# Patient Record
Sex: FEMALE | Race: WHITE | NOT HISPANIC OR LATINO | Employment: PART TIME | URBAN - METROPOLITAN AREA
[De-identification: names, ages, dates, MRNs, and addresses within clinical notes are randomized per-mention and may not be internally consistent; named-entity substitution may affect disease eponyms.]

---

## 2017-01-03 ENCOUNTER — OFFICE VISIT (OUTPATIENT)
Dept: FAMILY MEDICINE | Facility: CLINIC | Age: 47
End: 2017-01-03
Payer: MEDICARE

## 2017-01-03 VITALS
RESPIRATION RATE: 16 BRPM | SYSTOLIC BLOOD PRESSURE: 150 MMHG | TEMPERATURE: 98 F | WEIGHT: 186.75 LBS | OXYGEN SATURATION: 98 % | DIASTOLIC BLOOD PRESSURE: 96 MMHG | HEIGHT: 62 IN | BODY MASS INDEX: 34.37 KG/M2 | HEART RATE: 61 BPM

## 2017-01-03 DIAGNOSIS — J06.9 UPPER RESPIRATORY TRACT INFECTION, UNSPECIFIED TYPE: ICD-10-CM

## 2017-01-03 DIAGNOSIS — I10 ESSENTIAL HYPERTENSION: ICD-10-CM

## 2017-01-03 DIAGNOSIS — Z23 NEED FOR PROPHYLACTIC VACCINATION AND INOCULATION AGAINST INFLUENZA: ICD-10-CM

## 2017-01-03 DIAGNOSIS — G43.711 CHRONIC MIGRAINE WITHOUT AURA, WITH INTRACTABLE MIGRAINE, SO STATED, WITH STATUS MIGRAINOSUS: Primary | ICD-10-CM

## 2017-01-03 PROCEDURE — 96372 THER/PROPH/DIAG INJ SC/IM: CPT | Mod: 59,S$GLB,, | Performed by: PHYSICIAN ASSISTANT

## 2017-01-03 PROCEDURE — 90688 IIV4 VACCINE SPLT 0.5 ML IM: CPT | Mod: S$GLB,,, | Performed by: FAMILY MEDICINE

## 2017-01-03 PROCEDURE — 99999 PR PBB SHADOW E&M-EST. PATIENT-LVL IV: CPT | Mod: PBBFAC,,, | Performed by: PHYSICIAN ASSISTANT

## 2017-01-03 PROCEDURE — 99214 OFFICE O/P EST MOD 30 MIN: CPT | Mod: 25,S$GLB,, | Performed by: PHYSICIAN ASSISTANT

## 2017-01-03 PROCEDURE — 99499 UNLISTED E&M SERVICE: CPT | Mod: S$PBB,,, | Performed by: PHYSICIAN ASSISTANT

## 2017-01-03 PROCEDURE — G0008 ADMIN INFLUENZA VIRUS VAC: HCPCS | Mod: S$GLB,,, | Performed by: FAMILY MEDICINE

## 2017-01-03 PROCEDURE — 3077F SYST BP >= 140 MM HG: CPT | Mod: S$GLB,,, | Performed by: PHYSICIAN ASSISTANT

## 2017-01-03 PROCEDURE — 3080F DIAST BP >= 90 MM HG: CPT | Mod: S$GLB,,, | Performed by: PHYSICIAN ASSISTANT

## 2017-01-03 PROCEDURE — 1159F MED LIST DOCD IN RCRD: CPT | Mod: S$GLB,,, | Performed by: PHYSICIAN ASSISTANT

## 2017-01-03 RX ORDER — KETOROLAC TROMETHAMINE 30 MG/ML
60 INJECTION, SOLUTION INTRAMUSCULAR; INTRAVENOUS
Status: COMPLETED | OUTPATIENT
Start: 2017-01-03 | End: 2017-01-03

## 2017-01-03 RX ADMIN — KETOROLAC TROMETHAMINE 60 MG: 30 INJECTION, SOLUTION INTRAMUSCULAR; INTRAVENOUS at 10:01

## 2017-01-03 NOTE — MR AVS SNAPSHOT
[facility]  [address]  [address]  [place] [address]  Phone: [phone]  Fax: [phone]                  Giuliana Rodas   1/3/2017 9:00 AM   Office Visit    Description:  Female : 1970   Provider:  DAISY Arreguin   Department:  [facility]           Reason for Visit     URI           Diagnoses this Visit        Comments    Chronic migraine without aura, with intractable migraine, so stated, with status migrainosus    -  Primary     Essential hypertension         Upper respiratory tract infection, unspecified type                To Do List           Future Appointments        Provider Department Dept Phone    2017 9:00 AM DAISY Arreguin [facility] [phone]    2017 8:20 AM Amrik Santacruz MD [facility] Neurology [phone]      Goals (5 Years of Data)     None      [facility] On [facility]     [facility] On Call Nurse Care Line -  Assistance  Registered nurses in the [facility] On Call Center provide clinical advisement, health education, appointment booking, and other advisory services.  Call for this free service at [phone].             Medications           Message regarding Medications     Verify the changes and/or additions to your medication regime listed below are the same as discussed with your clinician today.  If any of these changes or additions are incorrect, please notify your healthcare provider.        These medications were administered today        Dose Freq    ketorolac injection 60 mg 60 mg Clinic/HOD 1 time    Sig: Inject 2 mLs (60 mg total) into the muscle one time.    Class: Normal    Route: Intramuscular    Cosign for Ordering: Required by Frank Auguste MD      STOP taking these medications     co-enzyme Q-10 30 mg capsule Take 30 mg by mouth once daily.           Verify that the below list of medications is an accurate representation of the medications you are currently taking.  If none reported,  "the list may be blank. If incorrect, please contact your healthcare provider. Carry this list with you in case of emergency.           Current Medications     amitriptyline (ELAVIL) 75 MG tablet Take 1 tablet (75 mg total) by mouth every evening.    amlodipine (NORVASC) 5 MG tablet TAKE ONE TABLET BY MOUTH EVERY DAY    atenolol (TENORMIN) 50 MG tablet TAKE ONE TABLET BY MOUTH DAILY    BIFIDOBACTERIUM INFANTIS (ALIGN ORAL) Take 1 tablet by mouth once daily.    cetirizine (ZYRTEC) 10 MG tablet Take 10 mg by mouth once daily.    fluticasone (FLONASE) 50 mcg/actuation nasal spray ONE SPRAY IN EACH NOSTRIL DAILY    hydrocodone-acetaminophen 7.5-325mg (NORCO) 7.5-325 mg per tablet Take 1 tablet by mouth every 4 to 6 hours as needed.    JUNEL FE 1/20, 28, 1 mg-20 mcg (21)/75 mg (7) per tablet TAKE ONE TABLET BY MOUTH DAILY *21 DAYS    lidocaine-prilocaine (EMLA) cream     methocarbamol (ROBAXIN) 500 MG Tab Take 1 tablet (500 mg total) by mouth every 8 (eight) hours as needed.    multivitamin capsule Take 1 capsule by mouth once daily.    promethazine (PHENERGAN) 25 MG tablet Take 1 tablet (25 mg total) by mouth every 6 (six) hours as needed for Nausea.    tramadol (ULTRAM) 50 mg tablet Take 1 tablet (50 mg total) by mouth every 8 (eight) hours as needed for Pain.    diclofenac (VOLTAREN) 50 MG EC tablet TAKE ONE TABLET BY MOUTH TWICE DAILY WITH FOOD *DO NOT TAKE WITH ASPIRIN* ** NO ALCOHOL **    magnesium oxide (MAG-OX) 400 mg tablet Take 400 mg by mouth 2 (two) times daily.            Clinical Reference Information           Vital Signs - Last Recorded  Most recent update: 1/3/2017  9:01 AM by Sera Lopez MA    BP Pulse Temp Resp Ht Wt    (!) 150/96 61 98.2 °F (36.8 °C) (Oral) 16 5' 2" (1.575 m) 84.7 kg (186 lb 11.7 oz)    SpO2 BMI             98% 34.15 kg/m2         Blood Pressure          Most Recent Value    BP  (!)  150/96      Allergies as of 1/3/2017     Depo-provera [Medroxyprogesterone]    Dicyclomine    Prozac " [Fluoxetine]      Immunizations Administered on Date of Encounter - 1/3/2017     None      Instructions    Switch zyrtec to Claritin. She may additionally take benadryl at night if she still struggles the drip.   Hot tea with honey and lemon  Salt water gargles.   Call for fever 100.4 or higher, change in discharge color, no improvement in 7-10 days.

## 2017-01-03 NOTE — PROGRESS NOTES
"Subjective:       Patient ID: Giuliana Rodas is a 46 y.o. female with multiple medical diagnoses as listed in the medical history and problem list that presents for URI  .    Chief Complaint: URI      URI    This is a new problem. The current episode started yesterday (7 pm last night ). Associated symptoms include congestion, ear pain (left ), headaches, nausea, neck pain and a sore throat. Pertinent negatives include no abdominal pain, coughing, diarrhea, rhinorrhea, sneezing, vomiting or wheezing. Treatments tried: nyquil last night; zyrtec today and tramadol  The treatment provided mild relief.     Review of Systems   Constitutional: Negative for chills and fever (99.8 at highest).   HENT: Positive for congestion, ear pain (left ), postnasal drip, sinus pressure and sore throat. Negative for rhinorrhea and sneezing.    Eyes: Positive for photophobia and pain. Negative for discharge and itching.        Eyes "burning"   Respiratory: Positive for shortness of breath. Negative for cough, chest tightness and wheezing.    Gastrointestinal: Positive for nausea. Negative for abdominal pain, constipation, diarrhea and vomiting.   Musculoskeletal: Positive for myalgias (both arms ) and neck pain. Negative for neck stiffness.   Neurological: Positive for headaches.         PAST MEDICAL HISTORY:  Past Medical History   Diagnosis Date    Bile reflux gastritis     Eczema     Fibromyalgia     Gastroparesis      dr. harden    GERD (gastroesophageal reflux disease)     Hyperglyceridemia     Hyperlipidemia     Hypertension     IC (interstitial cystitis)      dr. labadie    Psoriasis     Tension headache        SOCIAL HISTORY:  Social History     Social History    Marital status:      Spouse name: N/A    Number of children: 2    Years of education: N/A     Occupational History     [employer]     Social History Main Topics    Smoking status: Never Smoker    Smokeless tobacco: Not on file    " Alcohol use No    Drug use: No    Sexual activity: Yes     Partners: Male     Other Topics Concern    Not on file     Social History Narrative    Lives at home with  and daughter       ALLERGIES AND MEDICATIONS: updated and reviewed.  Review of patient's allergies indicates:   Allergen Reactions    Depo-provera [medroxyprogesterone] Anxiety    Dicyclomine Rash     Swelling of lip      Prozac [fluoxetine] Anxiety     Current Outpatient Prescriptions   Medication Sig Dispense Refill    amitriptyline (ELAVIL) 75 MG tablet Take 1 tablet (75 mg total) by mouth every evening. 30 tablet 5    amlodipine (NORVASC) 5 MG tablet TAKE ONE TABLET BY MOUTH EVERY DAY 90 tablet 0    atenolol (TENORMIN) 50 MG tablet TAKE ONE TABLET BY MOUTH DAILY 90 tablet 0    BIFIDOBACTERIUM INFANTIS (ALIGN ORAL) Take 1 tablet by mouth once daily.      cetirizine (ZYRTEC) 10 MG tablet Take 10 mg by mouth once daily.      fluticasone (FLONASE) 50 mcg/actuation nasal spray ONE SPRAY IN EACH NOSTRIL DAILY 16 g 5    hydrocodone-acetaminophen 7.5-325mg (NORCO) 7.5-325 mg per tablet Take 1 tablet by mouth every 4 to 6 hours as needed.  0    JUNEL FE 1/20, 28, 1 mg-20 mcg (21)/75 mg (7) per tablet TAKE ONE TABLET BY MOUTH DAILY *21 DAYS 28 tablet 6    lidocaine-prilocaine (EMLA) cream       methocarbamol (ROBAXIN) 500 MG Tab Take 1 tablet (500 mg total) by mouth every 8 (eight) hours as needed. 90 tablet 2    multivitamin capsule Take 1 capsule by mouth once daily.      promethazine (PHENERGAN) 25 MG tablet Take 1 tablet (25 mg total) by mouth every 6 (six) hours as needed for Nausea. 12 tablet 0    tramadol (ULTRAM) 50 mg tablet Take 1 tablet (50 mg total) by mouth every 8 (eight) hours as needed for Pain. 40 tablet 0    diclofenac (VOLTAREN) 50 MG EC tablet TAKE ONE TABLET BY MOUTH TWICE DAILY WITH FOOD *DO NOT TAKE WITH ASPIRIN* ** NO ALCOHOL ** 60 tablet 2    magnesium oxide (MAG-OX) 400 mg tablet Take 400 mg by mouth 2  "(two) times daily.        Current Facility-Administered Medications   Medication Dose Route Frequency Provider Last Rate Last Dose    ketorolac injection 60 mg  60 mg Intramuscular 1 time in Clinic/HOD DAISY Arreguin             Objective:     Visit Vitals    BP (!) 150/96    Pulse 61    Temp 98.2 °F (36.8 °C) (Oral)    Resp 16    Ht 5' 2" (1.575 m)    Wt 84.7 kg (186 lb 11.7 oz)    SpO2 98%    BMI 34.15 kg/m2        Physical Exam   Constitutional: She is oriented to person, place, and time.   Shades on    HENT:   Head: Normocephalic and atraumatic.   Right Ear: External ear and ear canal normal.   Left Ear: External ear and ear canal normal.   Nose: Mucosal edema present. No rhinorrhea. Right sinus exhibits no maxillary sinus tenderness and no frontal sinus tenderness. Left sinus exhibits no maxillary sinus tenderness and no frontal sinus tenderness.   Mouth/Throat: Uvula is midline and mucous membranes are normal. Posterior oropharyngeal erythema (PND) present. No oropharyngeal exudate.   Air fluid levels    Eyes: Conjunctivae and EOM are normal.   Cardiovascular: Normal rate and regular rhythm.    Pulmonary/Chest: Effort normal and breath sounds normal. She has no wheezes.   Musculoskeletal: Normal range of motion.   Lymphadenopathy:     She has no cervical adenopathy.   Neurological: She is alert and oriented to person, place, and time.   Skin: Skin is warm. No erythema.           Assessment:       1. Chronic migraine without aura, with intractable migraine, so stated, with status migrainosus    2. Essential hypertension    3. Upper respiratory tract infection, unspecified type        Plan:       Chronic migraine without aura, with intractable migraine, so stated, with status migrainosus  -     ketorolac injection 60 mg; Inject 2 mLs (60 mg total) into the muscle one time.    Essential hypertension  She forgot to take her BP medication yesterday in the am so took it last night when her pressure was " high.   It has come down since but still high today. She took Nyquil last night.  She is to follow up in 2 weeks to recheck her BP on this this regiment.     Upper respiratory tract infection, unspecified type  Switch zyrtec to Claritin. She may additionally take benadryl at night if she still struggles the drip.   Hot tea with honey and lemon  Salt water gargles.   Call for fever 100.4 or higher, change in discharge color, no improvement in 7-10 days.             No Follow-up on file.

## 2017-01-03 NOTE — PATIENT INSTRUCTIONS
Switch zyrtec to Claritin. She may additionally take benadryl at night if she still struggles the drip.   Hot tea with honey and lemon  Salt water gargles.   Call for fever 100.4 or higher, change in discharge color, no improvement in 7-10 days.

## 2017-01-10 ENCOUNTER — OFFICE VISIT (OUTPATIENT)
Dept: FAMILY MEDICINE | Facility: CLINIC | Age: 47
End: 2017-01-10
Payer: MEDICARE

## 2017-01-10 VITALS
TEMPERATURE: 99 F | OXYGEN SATURATION: 97 % | WEIGHT: 185.19 LBS | HEART RATE: 76 BPM | RESPIRATION RATE: 16 BRPM | HEIGHT: 62 IN | DIASTOLIC BLOOD PRESSURE: 70 MMHG | BODY MASS INDEX: 34.08 KG/M2 | SYSTOLIC BLOOD PRESSURE: 130 MMHG

## 2017-01-10 DIAGNOSIS — J01.90 ACUTE SINUSITIS WITH SYMPTOMS GREATER THAN 10 DAYS: Primary | ICD-10-CM

## 2017-01-10 DIAGNOSIS — T14.8XXA MUSCLE STRAIN: ICD-10-CM

## 2017-01-10 PROCEDURE — 99999 PR PBB SHADOW E&M-EST. PATIENT-LVL IV: CPT | Mod: PBBFAC,,, | Performed by: PHYSICIAN ASSISTANT

## 2017-01-10 PROCEDURE — 3075F SYST BP GE 130 - 139MM HG: CPT | Mod: S$GLB,,, | Performed by: PHYSICIAN ASSISTANT

## 2017-01-10 PROCEDURE — 99214 OFFICE O/P EST MOD 30 MIN: CPT | Mod: S$GLB,,, | Performed by: PHYSICIAN ASSISTANT

## 2017-01-10 PROCEDURE — 3078F DIAST BP <80 MM HG: CPT | Mod: S$GLB,,, | Performed by: PHYSICIAN ASSISTANT

## 2017-01-10 PROCEDURE — 1159F MED LIST DOCD IN RCRD: CPT | Mod: S$GLB,,, | Performed by: PHYSICIAN ASSISTANT

## 2017-01-10 RX ORDER — BETHANECHOL CHLORIDE 25 MG/1
TABLET ORAL
Refills: 0 | COMMUNITY
Start: 2016-12-01 | End: 2017-05-16

## 2017-01-10 RX ORDER — PANTOPRAZOLE SODIUM 40 MG/1
40 TABLET, DELAYED RELEASE ORAL DAILY
Refills: 6 | COMMUNITY
Start: 2017-01-06 | End: 2018-05-30 | Stop reason: SDUPTHER

## 2017-01-10 RX ORDER — LORATADINE 10 MG/1
10 TABLET ORAL DAILY
COMMUNITY
End: 2017-12-19

## 2017-01-10 RX ORDER — AMOXICILLIN 500 MG/1
500 TABLET, FILM COATED ORAL EVERY 12 HOURS
Qty: 20 TABLET | Refills: 0 | Status: SHIPPED | OUTPATIENT
Start: 2017-01-10 | End: 2017-01-20

## 2017-01-10 RX ORDER — SUCRALFATE 1 G/10ML
SUSPENSION ORAL
Refills: 0 | COMMUNITY
Start: 2017-01-05 | End: 2018-05-30 | Stop reason: DRUGHIGH

## 2017-01-10 RX ORDER — BUTALBITAL, ACETAMINOPHEN AND CAFFEINE 50; 325; 40 MG/1; MG/1; MG/1
1 TABLET ORAL EVERY 6 HOURS PRN
Refills: 2 | COMMUNITY
Start: 2016-12-01 | End: 2017-02-16 | Stop reason: SDUPTHER

## 2017-01-10 NOTE — PROGRESS NOTES
Subjective:       Patient ID: Giuliana Rodas is a 46 y.o. female with multiple medical diagnoses as listed in the medical history and problem list that presents for URI (nose bleeds when blow) and Abdominal Pain (left side)  .    Chief Complaint: URI (nose bleeds when blow) and Abdominal Pain (left side)      Abdominal Pain   This is a new problem. The current episode started in the past 7 days (4 days ). The onset quality is sudden. The problem has been unchanged. The pain is located in the LUQ and left flank. The pain is at a severity of 2/10 (only with cough ). The quality of the pain is dull. The abdominal pain does not radiate. Associated symptoms include frequency, headaches, myalgias and nausea. Pertinent negatives include no belching, diarrhea, dysuria, fever, flatus, hematuria or vomiting. The pain is aggravated by coughing (walking; twisting, ). The pain is relieved by being still.   Sinus Problem   This is a new problem. The current episode started 1 to 4 weeks ago. The problem has been gradually worsening since onset. There has been no fever. Associated symptoms include congestion, coughing, ear pain, headaches, shortness of breath, sinus pressure, sneezing and a sore throat. Pertinent negatives include no chills. Treatments tried: claritin, salt water gargle.     Review of Systems   Constitutional: Negative for chills and fever.   HENT: Positive for congestion, ear pain, postnasal drip, sinus pressure, sneezing, sore throat and trouble swallowing. Negative for rhinorrhea.    Eyes: Negative for pain, discharge (watery) and itching.   Respiratory: Positive for cough and shortness of breath. Negative for chest tightness and wheezing.    Gastrointestinal: Positive for nausea. Negative for abdominal pain, diarrhea, flatus and vomiting.   Genitourinary: Positive for frequency. Negative for dysuria and hematuria.   Musculoskeletal: Positive for myalgias.   Neurological: Positive for weakness and headaches.          PAST MEDICAL HISTORY:  Past Medical History   Diagnosis Date    Bile reflux gastritis     Eczema     Fibromyalgia     Gastroparesis      dr. harden    GERD (gastroesophageal reflux disease)     Hyperglyceridemia     Hyperlipidemia     Hypertension     IC (interstitial cystitis)      dr. labadie    Psoriasis     Tension headache        SOCIAL HISTORY:  Social History     Social History    Marital status:      Spouse name: N/A    Number of children: 2    Years of education: N/A     Occupational History     [employer]     Social History Main Topics    Smoking status: Never Smoker    Smokeless tobacco: Not on file    Alcohol use No    Drug use: No    Sexual activity: Yes     Partners: Male     Other Topics Concern    Not on file     Social History Narrative    Lives at home with  and daughter       ALLERGIES AND MEDICATIONS: updated and reviewed.  Review of patient's allergies indicates:   Allergen Reactions    Depo-provera [medroxyprogesterone] Anxiety    Dicyclomine Rash     Swelling of lip      Prozac [fluoxetine] Anxiety     Current Outpatient Prescriptions   Medication Sig Dispense Refill    amitriptyline (ELAVIL) 75 MG tablet Take 1 tablet (75 mg total) by mouth every evening. 30 tablet 5    amlodipine (NORVASC) 5 MG tablet TAKE ONE TABLET BY MOUTH EVERY DAY 90 tablet 0    atenolol (TENORMIN) 50 MG tablet TAKE ONE TABLET BY MOUTH DAILY 90 tablet 0    bethanechol (URECHOLINE) 25 MG Tab TAKE ONE TABLET BY MOUTH FOUR TIMES DAILY ON AN EMPTY STOMACH ONE HOUR BEFORE OR 2 HOURS AFTER meal  0    BIFIDOBACTERIUM INFANTIS (ALIGN ORAL) Take 1 tablet by mouth once daily.      butalbital-acetaminophen-caffeine -40 mg (FIORICET, ESGIC) -40 mg per tablet Take 1 tablet by mouth every 6 (six) hours as needed.  2    diclofenac (VOLTAREN) 50 MG EC tablet TAKE ONE TABLET BY MOUTH TWICE DAILY WITH FOOD *DO NOT TAKE WITH ASPIRIN* ** NO ALCOHOL ** 60 tablet 2     "fluticasone (FLONASE) 50 mcg/actuation nasal spray ONE SPRAY IN EACH NOSTRIL DAILY 16 g 5    hydrocodone-acetaminophen 7.5-325mg (NORCO) 7.5-325 mg per tablet Take 1 tablet by mouth every 4 to 6 hours as needed.  0    JUNEL FE 1/20, 28, 1 mg-20 mcg (21)/75 mg (7) per tablet TAKE ONE TABLET BY MOUTH DAILY *21 DAYS 28 tablet 6    lidocaine-prilocaine (EMLA) cream       loratadine (CLARITIN) 10 mg tablet Take 10 mg by mouth once daily.      methocarbamol (ROBAXIN) 500 MG Tab Take 1 tablet (500 mg total) by mouth every 8 (eight) hours as needed. 90 tablet 2    pantoprazole (PROTONIX) 40 MG tablet Take 40 mg by mouth once daily.  6    promethazine (PHENERGAN) 25 MG tablet Take 1 tablet (25 mg total) by mouth every 6 (six) hours as needed for Nausea. 12 tablet 0    sucralfate (CARAFATE) 100 mg/mL suspension TAKE TWO TEASPOONSFUL BY MOUTH FOUR TIMES DAILY ON AN EMPTY STOMACH ONE HOUR BEFORE meals AND AT BEDTIME  0    tramadol (ULTRAM) 50 mg tablet Take 1 tablet (50 mg total) by mouth every 8 (eight) hours as needed for Pain. 40 tablet 0    amoxicillin (AMOXIL) 500 MG Tab Take 1 tablet (500 mg total) by mouth every 12 (twelve) hours. 20 tablet 0    cetirizine (ZYRTEC) 10 MG tablet Take 10 mg by mouth once daily.       No current facility-administered medications for this visit.          Objective:     Visit Vitals    /70    Pulse 76    Temp 98.5 °F (36.9 °C) (Oral)    Resp 16    Ht 5' 2" (1.575 m)    Wt 84 kg (185 lb 3 oz)    SpO2 97%    BMI 33.87 kg/m2        Physical Exam   Constitutional: She is oriented to person, place, and time. No distress.   HENT:   Head: Normocephalic and atraumatic.   Right Ear: External ear and ear canal normal. No middle ear effusion.   Left Ear: External ear and ear canal normal.  No middle ear effusion.   Nose: No mucosal edema or rhinorrhea. Right sinus exhibits no maxillary sinus tenderness and no frontal sinus tenderness. Left sinus exhibits no maxillary sinus " tenderness and no frontal sinus tenderness.   Mouth/Throat: Uvula is midline and mucous membranes are normal. No oropharyngeal exudate or posterior oropharyngeal erythema.   Air fluid levels    Eyes: Conjunctivae and EOM are normal.   Cardiovascular: Normal rate and regular rhythm.    Pulmonary/Chest: Effort normal and breath sounds normal. She has no wheezes.   Abdominal: Soft. Bowel sounds are normal. There is no tenderness. There is no CVA tenderness.   Musculoskeletal: Normal range of motion.        Back:    Lymphadenopathy:     She has no cervical adenopathy.   Neurological: She is alert and oriented to person, place, and time.   Skin: Skin is warm.           Assessment:       1. Acute sinusitis with symptoms greater than 10 days    2. Muscle strain        Plan:       Acute sinusitis with symptoms greater than 10 days  -     amoxicillin (AMOXIL) 500 MG Tab; Take 1 tablet (500 mg total) by mouth every 12 (twelve) hours.  Dispense: 20 tablet; Refill: 0  Continue anti-histamine.     Muscle strain  She has robaxin at home.   Heat.           No Follow-up on file.

## 2017-01-10 NOTE — MR AVS SNAPSHOT
[facility]  [address]  [address]  [place] [address]  Phone: [phone]  Fax: [phone]                  Giuliana Rodas   1/10/2017 2:00 PM   Office Visit    Description:  Female : 1970   Provider:  DAISY Arreguin   Department:  [facility]           Reason for Visit     URI     Abdominal Pain           Diagnoses this Visit        Comments    Acute sinusitis with symptoms greater than 10 days    -  Primary     Muscle strain                To Do List           Future Appointments        Provider Department Dept Phone    2017 9:00 AM DAISY Arreguin [facility] [phone]    2017 8:20 AM Amrik Santacruz MD [facility]rology [phone]      Goals (5 Years of Data)     None       These Medications        Disp Refills Start End    amoxicillin (AMOXIL) 500 MG Tab 20 tablet 0 1/10/2017 2017    Take 1 tablet (500 mg total) by mouth every 12 (twelve) hours. - Oral    Pharmacy: [facility] - [facility] [place], [place] - [address] Ph #: [phone]         [facility] On Call     [facility] On Call Nurse Care Line -  Assistance  Registered nurses in the [facility] On Call Center provide clinical advisement, health education, appointment booking, and other advisory services.  Call for this free service at [phone].             Medications           Message regarding Medications     Verify the changes and/or additions to your medication regime listed below are the same as discussed with your clinician today.  If any of these changes or additions are incorrect, please notify your healthcare provider.        START taking these NEW medications        Refills    amoxicillin (AMOXIL) 500 MG Tab 0    Sig: Take 1 tablet (500 mg total) by mouth every 12 (twelve) hours.    Class: Normal    Route: Oral      STOP taking these medications     magnesium oxide (MAG-OX) 400 mg tablet Take 400 mg by mouth 2 (two) times  daily.     multivitamin capsule Take 1 capsule by mouth once daily.           Verify that the below list of medications is an accurate representation of the medications you are currently taking.  If none reported, the list may be blank. If incorrect, please contact your healthcare provider. Carry this list with you in case of emergency.           Current Medications     amitriptyline (ELAVIL) 75 MG tablet Take 1 tablet (75 mg total) by mouth every evening.    amlodipine (NORVASC) 5 MG tablet TAKE ONE TABLET BY MOUTH EVERY DAY    atenolol (TENORMIN) 50 MG tablet TAKE ONE TABLET BY MOUTH DAILY    bethanechol (URECHOLINE) 25 MG Tab TAKE ONE TABLET BY MOUTH FOUR TIMES DAILY ON AN EMPTY STOMACH ONE HOUR BEFORE OR 2 HOURS AFTER meal    BIFIDOBACTERIUM INFANTIS (ALIGN ORAL) Take 1 tablet by mouth once daily.    butalbital-acetaminophen-caffeine -40 mg (FIORICET, ESGIC) -40 mg per tablet Take 1 tablet by mouth every 6 (six) hours as needed.    diclofenac (VOLTAREN) 50 MG EC tablet TAKE ONE TABLET BY MOUTH TWICE DAILY WITH FOOD *DO NOT TAKE WITH ASPIRIN* ** NO ALCOHOL **    fluticasone (FLONASE) 50 mcg/actuation nasal spray ONE SPRAY IN EACH NOSTRIL DAILY    hydrocodone-acetaminophen 7.5-325mg (NORCO) 7.5-325 mg per tablet Take 1 tablet by mouth every 4 to 6 hours as needed.    JUNEL FE 1/20, 28, 1 mg-20 mcg (21)/75 mg (7) per tablet TAKE ONE TABLET BY MOUTH DAILY *21 DAYS    lidocaine-prilocaine (EMLA) cream     loratadine (CLARITIN) 10 mg tablet Take 10 mg by mouth once daily.    methocarbamol (ROBAXIN) 500 MG Tab Take 1 tablet (500 mg total) by mouth every 8 (eight) hours as needed.    pantoprazole (PROTONIX) 40 MG tablet Take 40 mg by mouth once daily.    promethazine (PHENERGAN) 25 MG tablet Take 1 tablet (25 mg total) by mouth every 6 (six) hours as needed for Nausea.    sucralfate (CARAFATE) 100 mg/mL suspension TAKE TWO TEASPOONSFUL BY MOUTH FOUR TIMES DAILY ON AN EMPTY STOMACH ONE HOUR BEFORE meals AND  "AT BEDTIME    tramadol (ULTRAM) 50 mg tablet Take 1 tablet (50 mg total) by mouth every 8 (eight) hours as needed for Pain.    amoxicillin (AMOXIL) 500 MG Tab Take 1 tablet (500 mg total) by mouth every 12 (twelve) hours.    cetirizine (ZYRTEC) 10 MG tablet Take 10 mg by mouth once daily.           Clinical Reference Information           Vital Signs - Last Recorded  Most recent update: 1/10/2017  2:10 PM by Sera Lopez MA    BP Pulse Temp Resp Ht Wt    130/70 76 98.5 °F (36.9 °C) (Oral) 16 5' 2" (1.575 m) 84 kg (185 lb 3 oz)    SpO2 BMI             97% 33.87 kg/m2         Blood Pressure          Most Recent Value    BP  130/70      Allergies as of 1/10/2017     Depo-provera [Medroxyprogesterone]    Dicyclomine    Prozac [Fluoxetine]      Immunizations Administered on Date of Encounter - 1/10/2017     None      "

## 2017-01-27 ENCOUNTER — OFFICE VISIT (OUTPATIENT)
Dept: FAMILY MEDICINE | Facility: CLINIC | Age: 47
End: 2017-01-27
Payer: MEDICARE

## 2017-01-27 VITALS
OXYGEN SATURATION: 96 % | HEART RATE: 78 BPM | SYSTOLIC BLOOD PRESSURE: 130 MMHG | WEIGHT: 184.75 LBS | BODY MASS INDEX: 34 KG/M2 | RESPIRATION RATE: 20 BRPM | TEMPERATURE: 99 F | DIASTOLIC BLOOD PRESSURE: 90 MMHG | HEIGHT: 62 IN

## 2017-01-27 DIAGNOSIS — J02.9 VIRAL PHARYNGITIS: Primary | ICD-10-CM

## 2017-01-27 DIAGNOSIS — J02.9 SORE THROAT: ICD-10-CM

## 2017-01-27 PROCEDURE — 99214 OFFICE O/P EST MOD 30 MIN: CPT | Mod: S$GLB,,, | Performed by: PHYSICIAN ASSISTANT

## 2017-01-27 PROCEDURE — 1159F MED LIST DOCD IN RCRD: CPT | Mod: S$GLB,,, | Performed by: PHYSICIAN ASSISTANT

## 2017-01-27 PROCEDURE — 99999 PR PBB SHADOW E&M-EST. PATIENT-LVL V: CPT | Mod: PBBFAC,,, | Performed by: PHYSICIAN ASSISTANT

## 2017-01-27 PROCEDURE — 3075F SYST BP GE 130 - 139MM HG: CPT | Mod: S$GLB,,, | Performed by: PHYSICIAN ASSISTANT

## 2017-01-27 PROCEDURE — 87081 CULTURE SCREEN ONLY: CPT

## 2017-01-27 PROCEDURE — 3080F DIAST BP >= 90 MM HG: CPT | Mod: S$GLB,,, | Performed by: PHYSICIAN ASSISTANT

## 2017-01-27 RX ORDER — PREDNISONE 20 MG/1
TABLET ORAL
Qty: 9 TABLET | Refills: 0 | Status: SHIPPED | OUTPATIENT
Start: 2017-01-27 | End: 2017-02-16 | Stop reason: ALTCHOICE

## 2017-01-27 NOTE — PROGRESS NOTES
Subjective:       Patient ID: Giuliana Rodas is a 46 y.o. female with multiple medical diagnoses as listed in the medical history and problem list that presents for Sore Throat (x 1 day)  .    Chief Complaint: Sore Throat (x 1 day)      Sore Throat    This is a new problem. The current episode started yesterday. The problem has been gradually worsening. The pain is worse on the left side. There has been no fever. The pain is at a severity of 8/10. The pain is moderate. Associated symptoms include coughing (mild ), headaches and trouble swallowing. Pertinent negatives include no abdominal pain, congestion, diarrhea, drooling, ear discharge, ear pain (left side ), hoarse voice, plugged ear sensation or vomiting. She has had no exposure to mono.        Review of Systems   Constitutional: Negative for chills, fatigue and fever.   HENT: Positive for postnasal drip, sore throat and trouble swallowing. Negative for congestion, drooling, ear discharge, ear pain (left side ), hoarse voice, rhinorrhea, sinus pressure and sneezing.         Feels dry    Eyes: Negative for pain, discharge and itching.   Respiratory: Positive for cough (mild ).    Gastrointestinal: Negative for abdominal pain, blood in stool, diarrhea and vomiting.   Musculoskeletal: Positive for myalgias.   Skin: Negative for rash.   Neurological: Positive for headaches.         PAST MEDICAL HISTORY:  Past Medical History   Diagnosis Date    Bile reflux gastritis     Eczema     Fibromyalgia     Gastroparesis      dr. harden    GERD (gastroesophageal reflux disease)     Hyperglyceridemia     Hyperlipidemia     Hypertension     IC (interstitial cystitis)      dr. labadie    Psoriasis     Tension headache        SOCIAL HISTORY:  Social History     Social History    Marital status:      Spouse name: N/A    Number of children: 2    Years of education: N/A     Occupational History     [employer]     Social History Main Topics     Smoking status: Never Smoker    Smokeless tobacco: Not on file    Alcohol use No    Drug use: No    Sexual activity: Yes     Partners: Male     Other Topics Concern    Not on file     Social History Narrative    Lives at home with  and daughter       ALLERGIES AND MEDICATIONS: updated and reviewed.  Review of patient's allergies indicates:   Allergen Reactions    Depo-provera [medroxyprogesterone] Anxiety    Dicyclomine Rash     Swelling of lip      Prozac [fluoxetine] Anxiety     Current Outpatient Prescriptions   Medication Sig Dispense Refill    amitriptyline (ELAVIL) 75 MG tablet Take 1 tablet (75 mg total) by mouth every evening. 30 tablet 5    amlodipine (NORVASC) 5 MG tablet TAKE ONE TABLET BY MOUTH EVERY DAY 90 tablet 0    atenolol (TENORMIN) 50 MG tablet TAKE ONE TABLET BY MOUTH DAILY 90 tablet 0    bethanechol (URECHOLINE) 25 MG Tab TAKE ONE TABLET BY MOUTH FOUR TIMES DAILY ON AN EMPTY STOMACH ONE HOUR BEFORE OR 2 HOURS AFTER meal  0    BIFIDOBACTERIUM INFANTIS (ALIGN ORAL) Take 1 tablet by mouth once daily.      butalbital-acetaminophen-caffeine -40 mg (FIORICET, ESGIC) -40 mg per tablet Take 1 tablet by mouth every 6 (six) hours as needed.  2    cetirizine (ZYRTEC) 10 MG tablet Take 10 mg by mouth once daily.      diclofenac (VOLTAREN) 50 MG EC tablet TAKE ONE TABLET BY MOUTH TWICE DAILY WITH FOOD *DO NOT TAKE WITH ASPIRIN* ** NO ALCOHOL ** 60 tablet 2    fluticasone (FLONASE) 50 mcg/actuation nasal spray ONE SPRAY IN EACH NOSTRIL DAILY 16 g 5    JUNEL FE 1/20, 28, 1 mg-20 mcg (21)/75 mg (7) per tablet TAKE ONE TABLET BY MOUTH DAILY *21 DAYS 28 tablet 6    lidocaine-prilocaine (EMLA) cream       loratadine (CLARITIN) 10 mg tablet Take 10 mg by mouth once daily.      methocarbamol (ROBAXIN) 500 MG Tab Take 1 tablet (500 mg total) by mouth every 8 (eight) hours as needed. 90 tablet 2    pantoprazole (PROTONIX) 40 MG tablet Take 40 mg by mouth once daily.  6     "sucralfate (CARAFATE) 100 mg/mL suspension TAKE TWO TEASPOONSFUL BY MOUTH FOUR TIMES DAILY ON AN EMPTY STOMACH ONE HOUR BEFORE meals AND AT BEDTIME  0    hydrocodone-acetaminophen 7.5-325mg (NORCO) 7.5-325 mg per tablet Take 1 tablet by mouth every 4 to 6 hours as needed.  0    predniSONE (DELTASONE) 20 MG tablet Take 3 daily for 3 days. 9 tablet 0    promethazine (PHENERGAN) 25 MG tablet Take 1 tablet (25 mg total) by mouth every 6 (six) hours as needed for Nausea. 12 tablet 0     No current facility-administered medications for this visit.          Objective:     Visit Vitals    BP (!) 130/90    Pulse 78    Temp 98.9 °F (37.2 °C) (Oral)    Resp 20    Ht 5' 2" (1.575 m)    Wt 83.8 kg (184 lb 11.9 oz)    SpO2 96%    BMI 33.79 kg/m2        Physical Exam   Constitutional: She is oriented to person, place, and time. No distress.   HENT:   Head: Normocephalic and atraumatic.   Right Ear: Tympanic membrane, external ear and ear canal normal.   Left Ear: Tympanic membrane, external ear and ear canal normal.   Nose: Nose normal.   Mouth/Throat: Uvula is midline. No oropharyngeal exudate or posterior oropharyngeal erythema. No tonsillar exudate.   Injected pharynx    Eyes: Conjunctivae and EOM are normal.   Cardiovascular: Normal rate and regular rhythm.    Pulmonary/Chest: Effort normal and breath sounds normal. She has no wheezes.   Lymphadenopathy:     She has cervical adenopathy.   Neurological: She is alert and oriented to person, place, and time.   Skin: Skin is warm. No erythema.           Assessment:       1. Viral pharyngitis    2. Sore throat        Plan:       Viral pharyngitis  -     predniSONE (DELTASONE) 20 MG tablet; Take 3 daily for 3 days.  Dispense: 9 tablet; Refill: 0  Ibuprofen 800 mg TID with food.  Call me if anything changes.     Sore throat  -     POCT Rapid Strep A: negative   -     Strep A culture, throat  Will contact with results.             No Follow-up on file.  "

## 2017-01-27 NOTE — LETTER
January 27, 2017               [facility]  [address]  [address]  [place] [address]  Phone: [phone]  Fax: [phone]   January 27, 2017     Patient: Giuliana Rodas   YOB: 1970   Date of Visit: 1/27/2017       To Whom it May Concern:    Giuliana Rodas was seen in my clinic on 1/27/2017. She may return to work on 1/30/17.    If you have any questions or concerns, please don't hesitate to call.    Sincerely,         DAISY Arreguin

## 2017-01-27 NOTE — MR AVS SNAPSHOT
[facility]  [address]  [address]  [place] [address]  Phone: [phone]  Fax: [phone]                  Giuliana Rodas   2017 1:00 PM   Office Visit    Description:  Female : 1970   Provider:  DAISY Arreguin   Department:  [facility]           Reason for Visit     Sore Throat           Diagnoses this Visit        Comments    Viral pharyngitis    -  Primary     Sore throat                To Do List           Future Appointments        Provider Department Dept Phone    2017 8:20 AM Amrik Santacruz MD [facility] [phone]      Goals (5 Years of Data)     None       These Medications        Disp Refills Start End    predniSONE (DELTASONE) 20 MG tablet 9 tablet 0 2017     Take 3 daily for 3 days.    Pharmacy: [facility] [place] - [address] Ph #: [phone]         [facility] On Call     [facility] On Call Nurse Care Line -  Assistance  Registered nurses in the [facility] On Call Center provide clinical advisement, health education, appointment booking, and other advisory services.  Call for this free service at [phone].             Medications           Message regarding Medications     Verify the changes and/or additions to your medication regime listed below are the same as discussed with your clinician today.  If any of these changes or additions are incorrect, please notify your healthcare provider.        START taking these NEW medications        Refills    predniSONE (DELTASONE) 20 MG tablet 0    Sig: Take 3 daily for 3 days.    Class: Normal           Verify that the below list of medications is an accurate representation of the medications you are currently taking.  If none reported, the list may be blank. If incorrect, please contact your healthcare provider. Carry this list with you in case of emergency.           Current Medications     amitriptyline (ELAVIL) 75 MG  tablet Take 1 tablet (75 mg total) by mouth every evening.    amlodipine (NORVASC) 5 MG tablet TAKE ONE TABLET BY MOUTH EVERY DAY    atenolol (TENORMIN) 50 MG tablet TAKE ONE TABLET BY MOUTH DAILY    bethanechol (URECHOLINE) 25 MG Tab TAKE ONE TABLET BY MOUTH FOUR TIMES DAILY ON AN EMPTY STOMACH ONE HOUR BEFORE OR 2 HOURS AFTER meal    BIFIDOBACTERIUM INFANTIS (ALIGN ORAL) Take 1 tablet by mouth once daily.    butalbital-acetaminophen-caffeine -40 mg (FIORICET, ESGIC) -40 mg per tablet Take 1 tablet by mouth every 6 (six) hours as needed.    cetirizine (ZYRTEC) 10 MG tablet Take 10 mg by mouth once daily.    diclofenac (VOLTAREN) 50 MG EC tablet TAKE ONE TABLET BY MOUTH TWICE DAILY WITH FOOD *DO NOT TAKE WITH ASPIRIN* ** NO ALCOHOL **    fluticasone (FLONASE) 50 mcg/actuation nasal spray ONE SPRAY IN EACH NOSTRIL DAILY    JUNEL FE 1/20, 28, 1 mg-20 mcg (21)/75 mg (7) per tablet TAKE ONE TABLET BY MOUTH DAILY *21 DAYS    lidocaine-prilocaine (EMLA) cream     loratadine (CLARITIN) 10 mg tablet Take 10 mg by mouth once daily.    methocarbamol (ROBAXIN) 500 MG Tab Take 1 tablet (500 mg total) by mouth every 8 (eight) hours as needed.    pantoprazole (PROTONIX) 40 MG tablet Take 40 mg by mouth once daily.    sucralfate (CARAFATE) 100 mg/mL suspension TAKE TWO TEASPOONSFUL BY MOUTH FOUR TIMES DAILY ON AN EMPTY STOMACH ONE HOUR BEFORE meals AND AT BEDTIME    hydrocodone-acetaminophen 7.5-325mg (NORCO) 7.5-325 mg per tablet Take 1 tablet by mouth every 4 to 6 hours as needed.    predniSONE (DELTASONE) 20 MG tablet Take 3 daily for 3 days.    promethazine (PHENERGAN) 25 MG tablet Take 1 tablet (25 mg total) by mouth every 6 (six) hours as needed for Nausea.           Clinical Reference Information           Vital Signs - Last Recorded  Most recent update: 1/27/2017  1:03 PM by Christine Borden LPN    BP Pulse Temp Resp Ht Wt    (!) 160/98 (BP Location: Left arm, Patient Position: Sitting, BP Method: Manual) 78 98.9  "°F (37.2 °C) (Oral) 20 5' 2" (1.575 m) 83.8 kg (184 lb 11.9 oz)    SpO2 BMI             96% 33.79 kg/m2         Blood Pressure          Most Recent Value    BP  (!)  160/98      Allergies as of 1/27/2017     Depo-provera [Medroxyprogesterone]    Dicyclomine    Prozac [Fluoxetine]      Immunizations Administered on Date of Encounter - 1/27/2017     None      Orders Placed During Today's Visit      Normal Orders This Visit    POCT Rapid Strep A     Strep A culture, throat       "

## 2017-01-29 LAB — BACTERIA THROAT CULT: NORMAL

## 2017-02-16 ENCOUNTER — OFFICE VISIT (OUTPATIENT)
Dept: NEUROLOGY | Facility: CLINIC | Age: 47
End: 2017-02-16
Payer: MEDICARE

## 2017-02-16 VITALS
DIASTOLIC BLOOD PRESSURE: 72 MMHG | HEIGHT: 62 IN | BODY MASS INDEX: 34.48 KG/M2 | SYSTOLIC BLOOD PRESSURE: 128 MMHG | WEIGHT: 187.38 LBS | HEART RATE: 75 BPM

## 2017-02-16 DIAGNOSIS — K31.84 GASTROPARESIS: ICD-10-CM

## 2017-02-16 DIAGNOSIS — G43.909 MIGRAINE WITHOUT STATUS MIGRAINOSUS, NOT INTRACTABLE, UNSPECIFIED MIGRAINE TYPE: Primary | ICD-10-CM

## 2017-02-16 PROCEDURE — 3078F DIAST BP <80 MM HG: CPT | Mod: S$GLB,,, | Performed by: NEUROLOGICAL SURGERY

## 2017-02-16 PROCEDURE — 99999 PR PBB SHADOW E&M-EST. PATIENT-LVL III: CPT | Mod: PBBFAC,,, | Performed by: NEUROLOGICAL SURGERY

## 2017-02-16 PROCEDURE — 99214 OFFICE O/P EST MOD 30 MIN: CPT | Mod: S$GLB,,, | Performed by: NEUROLOGICAL SURGERY

## 2017-02-16 PROCEDURE — 3074F SYST BP LT 130 MM HG: CPT | Mod: S$GLB,,, | Performed by: NEUROLOGICAL SURGERY

## 2017-02-16 PROCEDURE — 99499 UNLISTED E&M SERVICE: CPT | Mod: S$PBB,,, | Performed by: NEUROLOGICAL SURGERY

## 2017-02-16 RX ORDER — AMITRIPTYLINE HYDROCHLORIDE 100 MG/1
100 TABLET ORAL NIGHTLY
Qty: 30 TABLET | Refills: 5 | Status: SHIPPED | OUTPATIENT
Start: 2017-02-16 | End: 2017-08-18 | Stop reason: SDUPTHER

## 2017-02-16 RX ORDER — DICLOFENAC SODIUM 50 MG/1
TABLET, DELAYED RELEASE ORAL
Qty: 60 TABLET | Refills: 2 | Status: SHIPPED | OUTPATIENT
Start: 2017-02-16 | End: 2017-12-19

## 2017-02-16 RX ORDER — BUTALBITAL, ACETAMINOPHEN AND CAFFEINE 50; 325; 40 MG/1; MG/1; MG/1
1 TABLET ORAL EVERY 6 HOURS PRN
Qty: 40 TABLET | Refills: 2 | Status: SHIPPED | OUTPATIENT
Start: 2017-02-16 | End: 2017-10-11 | Stop reason: SDUPTHER

## 2017-02-16 NOTE — PATIENT INSTRUCTIONS
Increase Amitriptyline to 100 mg at night to help with migraine prevention  Use Fioricet as needed to help with headaches  Try Diclofenac if headaches do not respond to Fioricet

## 2017-02-16 NOTE — PROGRESS NOTES
"Chief Complaint   Patient presents with    3 Month Follow Up for Tension Headache        Giuliana Rodas is a 46 y.o. female with a history of multiple medical diagnoses as listed below that presents for evaluation of headaches. She has been having headaches almost weekly for the last several months. The headache tends to feel like pressure as if a "cap" were on her head and at other times she has headaches that are from the front of her head down to the middle of the posterior aspect of the head. The headache that is like a ca is described as a "sharp pressure" that is 10/10 in intensity. The headache are debilitating and only allow her to lie down in a ximena quiet room as the pain worsened with lights and sounds. She has nausea very frequently with these headaches but says that she does not vomit. When the headaches are in the center of her head she feels that the pain extends into the neck and shoulders as well. It too can get up to a 10/10 but she is less sensitive to light and sounds and tends not to have nausea. The head can last from 3 hours to 3 days at a time. She has not family history of headaches. She has also been having problems sleeping so she was started on Elavil by her PCP in hopes of treating both her headaches and her insomnia. She has not had a headache in the last three weeks since she has been on the medication. She has no complaints with the medication.    Interval History  8/18/2016  She has had about 5-6 "bad" headaches since she was last seen in clinic. She has been having various headache types including tension headaches and sinus headaches as well. She has been using Robaxin which she says helps with the tension headaches and she has been using Fioricet to help with her various other headache types. She has not had much relief with tramadol as she says that she has taken the medication several times in the past and feel that it's effects have likely been lost on her. Elavil 50 mg qhs has " been helping her to sleep but she does not feel like the medication has made her excessively sedated and she does think that she could tolerate an increased dose of the medication. She has been having GI issues and has been looking to find a gastroenterologist that is within her network for gastroparesis and she has been scheduled to see dermatology for evaluation of two skin lesions that her PCP felt could be precancerous.    11/17/2016  She has still been having episodic headaches since she was last seen. She recently had a tooth extracted and was told that she had an infection underlying it as well. She has been taking Elavil as directed and has bene having some improvement in her migraines. She still tends to have frequent tension headaches that are treated very well with Robaxin. She has been seeing GI for her gastroparesis but has scheduled follow-up toward the end of the month to decide how the problem will be approached. She has not had any new problems since she was last seen in clinic.    02/16/2017  Since last being seen she says that she has been seen by GI who determined that she had a problem with bile acid production. She says that she has been doing better overall with her headaches and feels that when she has taken the Fioricet it has been beneficial to help in the relief of her headaches. She has not had any new problems but feels that the pain has been slightly more frequent than before.     PAST MEDICAL HISTORY:  Past Medical History   Diagnosis Date    Bile reflux gastritis     Eczema     Fibromyalgia     Gastroparesis      dr. harden    GERD (gastroesophageal reflux disease)     Hyperglyceridemia     Hyperlipidemia     Hypertension     IC (interstitial cystitis)      dr. labadie    Psoriasis     Tension headache        PAST SURGICAL HISTORY:  Past Surgical History   Procedure Laterality Date    Cholecystectomy      Hysterectomy      Hemorrhoid surgery      Oophorectomy       Knee surgery         SOCIAL HISTORY:  Social History     Social History    Marital status:      Spouse name: N/A    Number of children: 2    Years of education: N/A     Occupational History     [employer]     Social History Main Topics    Smoking status: Never Smoker    Smokeless tobacco: Not on file    Alcohol use No    Drug use: No    Sexual activity: Yes     Partners: Male     Other Topics Concern    Not on file     Social History Narrative    Lives at home with  and daughter       FAMILY HISTORY:  Family History   Problem Relation Age of Onset    Hypertension Mother     Migraines Mother     Hypertension Father     Stroke Father     Heart disease Father     Hyperlipidemia Father     Breast cancer Paternal Grandmother     Colon cancer Neg Hx     Ovarian cancer Neg Hx     Inflammatory bowel disease Neg Hx     Celiac disease Neg Hx        ALLERGIES AND MEDICATIONS: updated and reviewed.  Review of patient's allergies indicates:   Allergen Reactions    Depo-provera [medroxyprogesterone] Anxiety    Dicyclomine Rash     Swelling of lip      Prozac [fluoxetine] Anxiety     Current Outpatient Prescriptions   Medication Sig Dispense Refill    amitriptyline (ELAVIL) 100 MG tablet Take 1 tablet (100 mg total) by mouth every evening. 30 tablet 5    amlodipine (NORVASC) 5 MG tablet TAKE ONE TABLET BY MOUTH EVERY DAY 90 tablet 0    atenolol (TENORMIN) 50 MG tablet TAKE ONE TABLET BY MOUTH DAILY 90 tablet 0    BIFIDOBACTERIUM INFANTIS (ALIGN ORAL) Take 1 tablet by mouth once daily.      butalbital-acetaminophen-caffeine -40 mg (FIORICET, ESGIC) -40 mg per tablet Take 1 tablet by mouth every 6 (six) hours as needed. 40 tablet 2    fluticasone (FLONASE) 50 mcg/actuation nasal spray ONE SPRAY IN EACH NOSTRIL DAILY 16 g 5    JUNEL FE 1/20, 28, 1 mg-20 mcg (21)/75 mg (7) per tablet TAKE ONE TABLET BY MOUTH DAILY *21 DAYS 28 tablet 6    loratadine (CLARITIN) 10 mg tablet  Take 10 mg by mouth once daily.      methocarbamol (ROBAXIN) 500 MG Tab Take 1 tablet (500 mg total) by mouth every 8 (eight) hours as needed. 90 tablet 2    pantoprazole (PROTONIX) 40 MG tablet Take 40 mg by mouth once daily.  6    promethazine (PHENERGAN) 25 MG tablet Take 1 tablet (25 mg total) by mouth every 6 (six) hours as needed for Nausea. 12 tablet 0    sucralfate (CARAFATE) 100 mg/mL suspension TAKE TWO TEASPOONSFUL BY MOUTH FOUR TIMES DAILY ON AN EMPTY STOMACH ONE HOUR BEFORE meals AND AT BEDTIME  0    bethanechol (URECHOLINE) 25 MG Tab TAKE ONE TABLET BY MOUTH FOUR TIMES DAILY ON AN EMPTY STOMACH ONE HOUR BEFORE OR 2 HOURS AFTER meal  0    diclofenac (VOLTAREN) 50 MG EC tablet TAKE ONE TABLET BY MOUTH TWICE DAILY WITH FOOD *DO NOT TAKE WITH ASPIRIN* ** NO ALCOHOL ** 60 tablet 2    hydrocodone-acetaminophen 7.5-325mg (NORCO) 7.5-325 mg per tablet Take 1 tablet by mouth every 4 to 6 hours as needed.  0    lidocaine-prilocaine (EMLA) cream        No current facility-administered medications for this visit.        Review of Systems   Constitutional: Negative for activity change, appetite change, fever and unexpected weight change.   HENT: Negative for trouble swallowing and voice change.    Eyes: Positive for photophobia and visual disturbance.   Respiratory: Negative for apnea and shortness of breath.    Cardiovascular: Negative for chest pain and leg swelling.   Gastrointestinal: Positive for nausea and vomiting. Negative for constipation.   Genitourinary: Negative for difficulty urinating.   Musculoskeletal: Negative for back pain, gait problem and neck pain.   Skin: Negative for color change and pallor.   Neurological: Positive for headaches. Negative for dizziness, seizures, syncope, weakness and numbness.   Hematological: Negative for adenopathy.   Psychiatric/Behavioral: Negative for agitation, confusion and decreased concentration.       Neurologic Exam     Mental Status   Oriented to person,  place, and time.   Registration: recalls 3 of 3 objects.   Attention: normal. Concentration: normal.   Speech: speech is normal   Level of consciousness: alert  Knowledge: good.     Cranial Nerves     CN II   Visual fields full to confrontation.   Right visual field deficit: none  Left visual field deficit: none     CN III, IV, VI   Pupils are equal, round, and reactive to light.  Extraocular motions are normal.   Right pupil: Size: 3 mm. Shape: regular. Accommodation: intact.   Left pupil: Size: 3 mm. Shape: regular. Accommodation: intact.   CN III: no CN III palsy  CN VI: no CN VI palsy  Nystagmus: none   Diplopia: none  Ophthalmoparesis: none  Upgaze: normal  Downgaze: normal  Conjugate gaze: present    CN V   Facial sensation intact.   Right facial sensation deficit: none  Left facial sensation deficit: none    CN VII   Facial expression full, symmetric.   Right facial weakness: none  Left facial weakness: none    CN VIII   CN VIII normal.     CN IX, X   CN IX normal.   CN X normal.   Palate: symmetric    CN XI   CN XI normal.   Right sternocleidomastoid strength: normal  Left sternocleidomastoid strength: normal  Right trapezius strength: normal  Left trapezius strength: normal    CN XII   CN XII normal.   Tongue deviation: none    Motor Exam   Muscle bulk: normal  Overall muscle tone: normal  Right arm tone: normal  Left arm tone: normal  Right leg tone: normal  Left leg tone: normal    Strength   Strength 5/5 throughout.     Sensory Exam   Right arm light touch: normal  Left arm light touch: normal  Right leg light touch: normal  Left leg light touch: normal  Right arm vibration: normal  Left arm vibration: normal  Right leg vibration: normal  Left leg vibration: normal  Right arm proprioception: normal  Left arm proprioception: normal  Right leg proprioception: normal  Left leg proprioception: normal  Right arm pinprick: normal  Left arm pinprick: normal  Right leg pinprick: normal  Left leg pinprick:  "normal    Gait, Coordination, and Reflexes     Gait  Gait: normal    Coordination   Romberg: negative  Finger to nose coordination: normal  Heel to shin coordination: normal  Tandem walking coordination: normal    Tremor   Resting tremor: absent    Reflexes   Right brachioradialis: 2+  Left brachioradialis: 2+  Right biceps: 2+  Left biceps: 2+  Right triceps: 2+  Left triceps: 2+  Right patellar: 2+  Left patellar: 2+  Right achilles: 2+  Left achilles: 2+  Right plantar: normal  Left plantar: normal      Physical Exam   Constitutional: She is oriented to person, place, and time. She appears well-developed and well-nourished.   HENT:   Head: Normocephalic and atraumatic.   Eyes: EOM are normal. Pupils are equal, round, and reactive to light.   Neck: Normal range of motion.   Cardiovascular: Normal rate and intact distal pulses.    Pulmonary/Chest: Effort normal. No apnea. No respiratory distress.   Musculoskeletal: Normal range of motion.   Neurological: She is alert and oriented to person, place, and time. She has normal strength. She has a normal Finger-Nose-Finger Test, a normal Heel to Shin Test, a normal Romberg Test and a normal Tandem Gait Test. Gait normal.   Reflex Scores:       Tricep reflexes are 2+ on the right side and 2+ on the left side.       Bicep reflexes are 2+ on the right side and 2+ on the left side.       Brachioradialis reflexes are 2+ on the right side and 2+ on the left side.       Patellar reflexes are 2+ on the right side and 2+ on the left side.       Achilles reflexes are 2+ on the right side and 2+ on the left side.  Skin: Skin is warm and dry.   Psychiatric: She has a normal mood and affect. Her speech is normal and behavior is normal. Thought content normal.   Vitals reviewed.      Vitals:    02/16/17 1000   BP: 128/72   BP Location: Right arm   Patient Position: Sitting   BP Method: Manual   Pulse: 75   Weight: 85 kg (187 lb 6.3 oz)   Height: 5' 2" (1.575 m)       Assessment & " Plan:  Problem List Items Addressed This Visit     Gastroparesis      Other Visit Diagnoses     Migraine without status migrainosus, not intractable, unspecified migraine type    -  Primary    Relevant Medications    butalbital-acetaminophen-caffeine -40 mg (FIORICET, ESGIC) -40 mg per tablet    amitriptyline (ELAVIL) 100 MG tablet    diclofenac (VOLTAREN) 50 MG EC tablet        Increase Elavil to help with headache prevention; monitor gastroparesis  Fioricet and Diclofenac PRN to treat her headaches  Health Maintenance reviewed.    Follow-up: Return in about 3 months (around 5/16/2017).  More than 50% of this 25 minute encounter was spent in counseling and coordinating care.

## 2017-02-16 NOTE — MR AVS SNAPSHOT
[facility]  [address]  [address]  [place] [address]  Phone: [phone]  Fax: [phone]                  Giuliana Rodas   2017 8:20 AM   Office Visit    Description:  Female : 1970   Provider:  Amrik Santacruz MD   Department:  [facility]           Reason for Visit     3 Month Follow Up for Tension Headache           Diagnoses this Visit        Comments    Migraine without status migrainosus, not intractable, unspecified migraine type    -  Primary     Gastroparesis                To Do List           Goals (5 Years of Data)     None      Follow-Up and Disposition     Return in about 3 months (around 2017).       These Medications        Disp Refills Start End    butalbital-acetaminophen-caffeine -40 mg (FIORICET, ESGIC) -40 mg per tablet 40 tablet 2 2017     Take 1 tablet by mouth every 6 (six) hours as needed. - Oral    Pharmacy: [facility] - [facility] [address] Ph #: [phone]       amitriptyline (ELAVIL) 100 MG tablet 30 tablet 5 2017    Take 1 tablet (100 mg total) by mouth every evening. - Oral    Pharmacy: [place], [place] - [address] Ph #: [phone]       diclofenac (VOLTAREN) 50 MG EC tablet 60 tablet 2 2017     TAKE ONE TABLET BY MOUTH TWICE DAILY WITH FOOD *DO NOT TAKE WITH ASPIRIN* ** NO ALCOHOL **    Pharmacy: [place], [place] - [address] Ph #: [phone]         [facility]     [facility] -  Assistance  Registered nurses in the [facility] provide clinical advisement, health education, appointment booking, and other advisory services.  Call for this free service at [phone].             Medications           Message regarding Medications     Verify the changes and/or additions to your medication regime listed below are the same as discussed  with your clinician today.  If any of these changes or additions are incorrect, please notify your healthcare provider.        CHANGE how you are taking these medications     Start Taking Instead of    amitriptyline (ELAVIL) 100 MG tablet amitriptyline (ELAVIL) 75 MG tablet    Dosage:  Take 1 tablet (100 mg total) by mouth every evening. Dosage:  Take 1 tablet (75 mg total) by mouth every evening.    Reason for Change:  Reorder       STOP taking these medications     cetirizine (ZYRTEC) 10 MG tablet Take 10 mg by mouth once daily.    predniSONE (DELTASONE) 20 MG tablet Take 3 daily for 3 days.           Verify that the below list of medications is an accurate representation of the medications you are currently taking.  If none reported, the list may be blank. If incorrect, please contact your healthcare provider. Carry this list with you in case of emergency.           Current Medications     amitriptyline (ELAVIL) 100 MG tablet Take 1 tablet (100 mg total) by mouth every evening.    amlodipine (NORVASC) 5 MG tablet TAKE ONE TABLET BY MOUTH EVERY DAY    atenolol (TENORMIN) 50 MG tablet TAKE ONE TABLET BY MOUTH DAILY    BIFIDOBACTERIUM INFANTIS (ALIGN ORAL) Take 1 tablet by mouth once daily.    butalbital-acetaminophen-caffeine -40 mg (FIORICET, ESGIC) -40 mg per tablet Take 1 tablet by mouth every 6 (six) hours as needed.    fluticasone (FLONASE) 50 mcg/actuation nasal spray ONE SPRAY IN EACH NOSTRIL DAILY    JUNEL FE 1/20, 28, 1 mg-20 mcg (21)/75 mg (7) per tablet TAKE ONE TABLET BY MOUTH DAILY *21 DAYS    loratadine (CLARITIN) 10 mg tablet Take 10 mg by mouth once daily.    methocarbamol (ROBAXIN) 500 MG Tab Take 1 tablet (500 mg total) by mouth every 8 (eight) hours as needed.    pantoprazole (PROTONIX) 40 MG tablet Take 40 mg by mouth once daily.    promethazine (PHENERGAN) 25 MG tablet Take 1 tablet (25 mg total) by mouth every 6 (six) hours as needed for Nausea.    sucralfate (CARAFATE) 100 mg/mL  "suspension TAKE TWO TEASPOONSFUL BY MOUTH FOUR TIMES DAILY ON AN EMPTY STOMACH ONE HOUR BEFORE meals AND AT BEDTIME    bethanechol (URECHOLINE) 25 MG Tab TAKE ONE TABLET BY MOUTH FOUR TIMES DAILY ON AN EMPTY STOMACH ONE HOUR BEFORE OR 2 HOURS AFTER meal    diclofenac (VOLTAREN) 50 MG EC tablet TAKE ONE TABLET BY MOUTH TWICE DAILY WITH FOOD *DO NOT TAKE WITH ASPIRIN* ** NO ALCOHOL **    hydrocodone-acetaminophen 7.5-325mg (NORCO) 7.5-325 mg per tablet Take 1 tablet by mouth every 4 to 6 hours as needed.    lidocaine-prilocaine (EMLA) cream            Clinical Reference Information           Your Vitals Were     BP Pulse Height Weight BMI    128/72 (BP Location: Right arm, Patient Position: Sitting, BP Method: Manual) 75 5' 2" (1.575 m) 85 kg (187 lb 6.3 oz) 34.27 kg/m2      Blood Pressure          Most Recent Value    BP  128/72      Allergies as of 2/16/2017     Depo-provera [Medroxyprogesterone]    Dicyclomine    Prozac [Fluoxetine]      Immunizations Administered on Date of Encounter - 2/16/2017     None      Instructions    Increase Amitriptyline to 100 mg at night to help with migraine prevention  Use Fioricet as needed to help with headaches  Try Diclofenac if headaches do not respond to Fioricet       Language Assistance Services     ATTENTION: Language assistance services are available, free of charge. Please call [phone].      ATENCIÓN: Si hung pang, tiene a davis disposición servicios gratuitos de asistencia lingüística. Llame al [phone].     [facility] Ý: N?u b?n nói Ti?ng Vi?t, có các d?ch v? h? tr? ngôn ng? mi?n phí dành cho b?n. G?i s? [phone].         [facility] complies with applicable Federal civil rights laws and does not discriminate on the basis of race, color, national origin, age, disability, or sex.        "

## 2017-02-27 RX ORDER — ATENOLOL 50 MG/1
TABLET ORAL
Qty: 90 TABLET | Refills: 0 | Status: SHIPPED | OUTPATIENT
Start: 2017-02-27 | End: 2017-05-30 | Stop reason: SDUPTHER

## 2017-03-03 RX ORDER — AMLODIPINE BESYLATE 5 MG/1
TABLET ORAL
Qty: 90 TABLET | Refills: 1 | Status: SHIPPED | OUTPATIENT
Start: 2017-03-03 | End: 2017-09-05 | Stop reason: SDUPTHER

## 2017-04-03 ENCOUNTER — OFFICE VISIT (OUTPATIENT)
Dept: FAMILY MEDICINE | Facility: CLINIC | Age: 47
End: 2017-04-03
Payer: MEDICARE

## 2017-04-03 VITALS
WEIGHT: 189.81 LBS | OXYGEN SATURATION: 97 % | HEART RATE: 78 BPM | TEMPERATURE: 99 F | SYSTOLIC BLOOD PRESSURE: 140 MMHG | DIASTOLIC BLOOD PRESSURE: 80 MMHG | BODY MASS INDEX: 34.93 KG/M2 | HEIGHT: 62 IN

## 2017-04-03 DIAGNOSIS — M25.50 ARTHRALGIA, UNSPECIFIED JOINT: Primary | ICD-10-CM

## 2017-04-03 DIAGNOSIS — I10 ESSENTIAL HYPERTENSION: ICD-10-CM

## 2017-04-03 DIAGNOSIS — Z12.39 BREAST CANCER SCREENING: ICD-10-CM

## 2017-04-03 DIAGNOSIS — Z23 NEED FOR VACCINATION WITH 13-POLYVALENT PNEUMOCOCCAL CONJUGATE VACCINE: ICD-10-CM

## 2017-04-03 PROCEDURE — 1160F RVW MEDS BY RX/DR IN RCRD: CPT | Mod: S$GLB,,, | Performed by: FAMILY MEDICINE

## 2017-04-03 PROCEDURE — 3079F DIAST BP 80-89 MM HG: CPT | Mod: S$GLB,,, | Performed by: FAMILY MEDICINE

## 2017-04-03 PROCEDURE — 99499 UNLISTED E&M SERVICE: CPT | Mod: S$PBB,,, | Performed by: FAMILY MEDICINE

## 2017-04-03 PROCEDURE — G0009 ADMIN PNEUMOCOCCAL VACCINE: HCPCS | Mod: 59,S$GLB,, | Performed by: FAMILY MEDICINE

## 2017-04-03 PROCEDURE — 3077F SYST BP >= 140 MM HG: CPT | Mod: S$GLB,,, | Performed by: FAMILY MEDICINE

## 2017-04-03 PROCEDURE — 99999 PR PBB SHADOW E&M-EST. PATIENT-LVL III: CPT | Mod: PBBFAC,,, | Performed by: FAMILY MEDICINE

## 2017-04-03 PROCEDURE — 99214 OFFICE O/P EST MOD 30 MIN: CPT | Mod: S$GLB,,, | Performed by: FAMILY MEDICINE

## 2017-04-03 PROCEDURE — 90670 PCV13 VACCINE IM: CPT | Mod: S$GLB,,, | Performed by: FAMILY MEDICINE

## 2017-04-03 NOTE — PROGRESS NOTES
Answers for HPI/ROS submitted by the patient on 4/1/2017   neck pain: Yes, Yes  hearing loss: No  rhinorrhea: No  trouble swallowing: No  eye discharge: No  visual disturbance: Yes  chest tightness: No  wheezing: No  chest pain: No  palpitations: No  blood in stool: No  constipation: No  vomiting: No  diarrhea: No  polydipsia: No  polyuria: No  difficulty urinating: No  hematuria: No  menstrual problem: No  dysuria: No  joint swelling: No  arthralgias: Yes  headaches: Yes  weakness: Yes  confusion: Yes  dysphoric mood: No

## 2017-04-03 NOTE — PROGRESS NOTES
"Subjective:       Patient ID: Giuliana Rodas is a 46 y.o. female.    Chief Complaint: Generalized Body Aches    HPI Comments: Patient presents with concerns about joint pains in her  left ankle, knees, hips, and knuckles. She states it has been gradual and it has worsened. She denies joint swelling or redness. She has no fever or chills. She is not taking anything for this. She states she used to take glucosamine, but stopped this as she doesn't want to continue so many pills per day. She hs fibromyalgia, and has bene under stress as her mom had an MI AND HER DAD WAS REALLY SICK IN [place].    She also would like her heart checked. She states her mom had an MI at 71. Her father also has CAD. He has heart disease at 71. She has had her cholesterol done, but it was a  year ago.     Review of Systems   Constitutional: Positive for activity change.   HENT: Negative for hearing loss, rhinorrhea and trouble swallowing.    Eyes: Positive for visual disturbance. Negative for discharge.   Respiratory: Negative for chest tightness and wheezing.    Cardiovascular: Negative for chest pain and palpitations.   Gastrointestinal: Negative for blood in stool, constipation, diarrhea and vomiting.   Endocrine: Negative for polydipsia and polyuria.   Genitourinary: Negative for difficulty urinating, dysuria, hematuria and menstrual problem.   Musculoskeletal: Positive for arthralgias. Negative for joint swelling.   Neurological: Positive for weakness and headaches.   Psychiatric/Behavioral: Positive for confusion. Negative for dysphoric mood.       Objective:       Vitals:    04/03/17 1543   BP: (!) 140/80   Pulse: 78   Temp: 98.8 °F (37.1 °C)   TempSrc: Oral   SpO2: 97%   Weight: 86.1 kg (189 lb 13.1 oz)   Height: 5' 2" (1.575 m)       Physical Exam   Constitutional: She is oriented to person, place, and time. She appears well-developed and well-nourished. No distress.   HENT:   Head: Normocephalic and atraumatic.   Eyes: Conjunctivae " are normal.   Neck: Normal range of motion. Neck supple. Carotid bruit is not present.   Cardiovascular: Normal rate, regular rhythm and normal heart sounds.  Exam reveals no gallop and no friction rub.    No murmur heard.  Pulmonary/Chest: Effort normal and breath sounds normal. No respiratory distress. She has no wheezes. She has no rales.   Musculoskeletal: She exhibits no edema.   Neurological: She is alert and oriented to person, place, and time.   Skin: She is not diaphoretic.       Assessment:       1. Arthralgia, unspecified joint    2. Essential hypertension        Plan:       Giuliana was seen today for generalized body aches.    Diagnoses and all orders for this visit:    Arthralgia, unspecified joint  -     C-reactive protein; Future  -     Sedimentation rate, manual; Future  -     Vitamin D; Future  -     Cyclic citrul peptide antibody, IgG; Future    Essential hypertension  -     Comprehensive metabolic panel; Future  -     Lipid panel; Future  -     CBC auto differential; Future    .

## 2017-04-05 ENCOUNTER — LAB VISIT (OUTPATIENT)
Dept: LAB | Facility: HOSPITAL | Age: 47
End: 2017-04-05
Attending: FAMILY MEDICINE
Payer: MEDICARE

## 2017-04-05 DIAGNOSIS — I10 ESSENTIAL HYPERTENSION: ICD-10-CM

## 2017-04-05 DIAGNOSIS — M25.50 ARTHRALGIA, UNSPECIFIED JOINT: ICD-10-CM

## 2017-04-05 LAB
25(OH)D3+25(OH)D2 SERPL-MCNC: 36 NG/ML
ALBUMIN SERPL BCP-MCNC: 3.7 G/DL
ALP SERPL-CCNC: 59 U/L
ALT SERPL W/O P-5'-P-CCNC: 20 U/L
ANION GAP SERPL CALC-SCNC: 10 MMOL/L
AST SERPL-CCNC: 27 U/L
BASOPHILS # BLD AUTO: 0.02 K/UL
BASOPHILS NFR BLD: 0.4 %
BILIRUB SERPL-MCNC: 0.6 MG/DL
BUN SERPL-MCNC: 13 MG/DL
CALCIUM SERPL-MCNC: 9.7 MG/DL
CCP AB SER IA-ACNC: 0.9 U/ML
CHLORIDE SERPL-SCNC: 105 MMOL/L
CHOLEST/HDLC SERPL: 5.2 {RATIO}
CO2 SERPL-SCNC: 27 MMOL/L
CREAT SERPL-MCNC: 1 MG/DL
CRP SERPL-MCNC: 22.8 MG/L
DIFFERENTIAL METHOD: NORMAL
EOSINOPHIL # BLD AUTO: 0.1 K/UL
EOSINOPHIL NFR BLD: 2.9 %
ERYTHROCYTE [DISTWIDTH] IN BLOOD BY AUTOMATED COUNT: 12.8 %
ERYTHROCYTE [SEDIMENTATION RATE] IN BLOOD BY WESTERGREN METHOD: 13 MM/HR
EST. GFR  (AFRICAN AMERICAN): >60 ML/MIN/1.73 M^2
EST. GFR  (NON AFRICAN AMERICAN): >60 ML/MIN/1.73 M^2
GLUCOSE SERPL-MCNC: 84 MG/DL
HCT VFR BLD AUTO: 40.8 %
HDL/CHOLESTEROL RATIO: 19.3 %
HDLC SERPL-MCNC: 202 MG/DL
HDLC SERPL-MCNC: 39 MG/DL
HGB BLD-MCNC: 14.4 G/DL
LDLC SERPL CALC-MCNC: 94.2 MG/DL
LYMPHOCYTES # BLD AUTO: 1.7 K/UL
LYMPHOCYTES NFR BLD: 35.2 %
MCH RBC QN AUTO: 29.3 PG
MCHC RBC AUTO-ENTMCNC: 35.3 %
MCV RBC AUTO: 83 FL
MONOCYTES # BLD AUTO: 0.4 K/UL
MONOCYTES NFR BLD: 9.2 %
NEUTROPHILS # BLD AUTO: 2.5 K/UL
NEUTROPHILS NFR BLD: 52.1 %
NONHDLC SERPL-MCNC: 163 MG/DL
PLATELET # BLD AUTO: 169 K/UL
PMV BLD AUTO: 10 FL
POTASSIUM SERPL-SCNC: 4.2 MMOL/L
PROT SERPL-MCNC: 7.7 G/DL
RBC # BLD AUTO: 4.92 M/UL
SODIUM SERPL-SCNC: 142 MMOL/L
TRIGL SERPL-MCNC: 344 MG/DL
WBC # BLD AUTO: 4.77 K/UL

## 2017-04-05 PROCEDURE — 86140 C-REACTIVE PROTEIN: CPT

## 2017-04-05 PROCEDURE — 85025 COMPLETE CBC W/AUTO DIFF WBC: CPT

## 2017-04-05 PROCEDURE — 82306 VITAMIN D 25 HYDROXY: CPT

## 2017-04-05 PROCEDURE — 36415 COLL VENOUS BLD VENIPUNCTURE: CPT

## 2017-04-05 PROCEDURE — 80053 COMPREHEN METABOLIC PANEL: CPT

## 2017-04-05 PROCEDURE — 86200 CCP ANTIBODY: CPT

## 2017-04-05 PROCEDURE — 85651 RBC SED RATE NONAUTOMATED: CPT

## 2017-04-05 PROCEDURE — 80061 LIPID PANEL: CPT

## 2017-04-07 ENCOUNTER — PATIENT MESSAGE (OUTPATIENT)
Dept: FAMILY MEDICINE | Facility: CLINIC | Age: 47
End: 2017-04-07

## 2017-04-07 DIAGNOSIS — E78.5 HYPERLIPIDEMIA, UNSPECIFIED HYPERLIPIDEMIA TYPE: Primary | ICD-10-CM

## 2017-04-07 RX ORDER — PRAVASTATIN SODIUM 20 MG/1
20 TABLET ORAL NIGHTLY
Qty: 90 TABLET | Refills: 1 | Status: SHIPPED | OUTPATIENT
Start: 2017-04-07 | End: 2017-10-18 | Stop reason: SDUPTHER

## 2017-05-02 ENCOUNTER — HOSPITAL ENCOUNTER (OUTPATIENT)
Dept: RADIOLOGY | Facility: HOSPITAL | Age: 47
Discharge: HOME | End: 2017-05-02
Attending: OBSTETRICS & GYNECOLOGY
Payer: MEDICARE

## 2017-05-02 DIAGNOSIS — N63.0 LUMP OR MASS IN BREAST: ICD-10-CM

## 2017-05-02 PROCEDURE — 77066 DX MAMMO INCL CAD BI: CPT | Mod: 26,,, | Performed by: RADIOLOGY

## 2017-05-02 PROCEDURE — 76641 ULTRASOUND BREAST COMPLETE: CPT | Mod: TC,LT

## 2017-05-02 PROCEDURE — 76642 ULTRASOUND BREAST LIMITED: CPT | Mod: TC,LT

## 2017-05-02 PROCEDURE — 77066 DX MAMMO INCL CAD BI: CPT | Mod: TC

## 2017-05-02 PROCEDURE — 77062 BREAST TOMOSYNTHESIS BI: CPT | Mod: 26,,, | Performed by: RADIOLOGY

## 2017-05-02 PROCEDURE — 76642 ULTRASOUND BREAST LIMITED: CPT | Mod: 26,LT,, | Performed by: RADIOLOGY

## 2017-05-16 ENCOUNTER — OFFICE VISIT (OUTPATIENT)
Dept: NEUROLOGY | Facility: CLINIC | Age: 47
End: 2017-05-16
Payer: MEDICARE

## 2017-05-16 VITALS
HEART RATE: 96 BPM | SYSTOLIC BLOOD PRESSURE: 119 MMHG | HEIGHT: 62 IN | WEIGHT: 181.69 LBS | DIASTOLIC BLOOD PRESSURE: 80 MMHG | BODY MASS INDEX: 33.43 KG/M2

## 2017-05-16 DIAGNOSIS — E50.7 XEROPHTHALMIA: ICD-10-CM

## 2017-05-16 DIAGNOSIS — G44.209 TENSION HEADACHE: ICD-10-CM

## 2017-05-16 DIAGNOSIS — G43.711 CHRONIC MIGRAINE WITHOUT AURA, WITH INTRACTABLE MIGRAINE, SO STATED, WITH STATUS MIGRAINOSUS: Primary | ICD-10-CM

## 2017-05-16 DIAGNOSIS — M54.81 OCCIPITAL NEURALGIA, UNSPECIFIED LATERALITY: ICD-10-CM

## 2017-05-16 PROCEDURE — 3079F DIAST BP 80-89 MM HG: CPT | Mod: S$GLB,,, | Performed by: NEUROLOGICAL SURGERY

## 2017-05-16 PROCEDURE — 99999 PR PBB SHADOW E&M-EST. PATIENT-LVL III: CPT | Mod: PBBFAC,,, | Performed by: NEUROLOGICAL SURGERY

## 2017-05-16 PROCEDURE — 99499 UNLISTED E&M SERVICE: CPT | Mod: S$PBB,,, | Performed by: NEUROLOGICAL SURGERY

## 2017-05-16 PROCEDURE — 1160F RVW MEDS BY RX/DR IN RCRD: CPT | Mod: S$GLB,,, | Performed by: NEUROLOGICAL SURGERY

## 2017-05-16 PROCEDURE — 99214 OFFICE O/P EST MOD 30 MIN: CPT | Mod: S$GLB,,, | Performed by: NEUROLOGICAL SURGERY

## 2017-05-16 PROCEDURE — 3074F SYST BP LT 130 MM HG: CPT | Mod: S$GLB,,, | Performed by: NEUROLOGICAL SURGERY

## 2017-05-16 RX ORDER — METHOCARBAMOL 500 MG/1
500 TABLET, FILM COATED ORAL EVERY 8 HOURS PRN
Qty: 90 TABLET | Refills: 0 | Status: SHIPPED | OUTPATIENT
Start: 2017-05-16 | End: 2017-06-28 | Stop reason: SDUPTHER

## 2017-05-16 RX ORDER — CARBOXYMETHYLCELLULOSE SODIUM 5 MG/ML
1 SOLUTION/ DROPS OPHTHALMIC 3 TIMES DAILY PRN
Qty: 30 ML | Refills: 5 | Status: SHIPPED | OUTPATIENT
Start: 2017-05-16 | End: 2017-06-28 | Stop reason: SDUPTHER

## 2017-05-16 RX ORDER — METHOCARBAMOL 500 MG/1
500 TABLET, FILM COATED ORAL EVERY 8 HOURS PRN
Qty: 90 TABLET | Refills: 2 | Status: SHIPPED | OUTPATIENT
Start: 2017-05-16 | End: 2017-05-16 | Stop reason: SDUPTHER

## 2017-05-16 NOTE — PROGRESS NOTES
"Chief Complaint   Patient presents with    Headache        Giuliana Rodas is a 46 y.o. female with a history of multiple medical diagnoses as listed below that presents for evaluation of headaches. She has been having headaches almost weekly for the last several months. The headache tends to feel like pressure as if a "cap" were on her head and at other times she has headaches that are from the front of her head down to the middle of the posterior aspect of the head. The headache that is like a ca is described as a "sharp pressure" that is 10/10 in intensity. The headache are debilitating and only allow her to lie down in a ximena quiet room as the pain worsened with lights and sounds. She has nausea very frequently with these headaches but says that she does not vomit. When the headaches are in the center of her head she feels that the pain extends into the neck and shoulders as well. It too can get up to a 10/10 but she is less sensitive to light and sounds and tends not to have nausea. The head can last from 3 hours to 3 days at a time. She has not family history of headaches. She has also been having problems sleeping so she was started on Elavil by her PCP in hopes of treating both her headaches and her insomnia. She has not had a headache in the last three weeks since she has been on the medication. She has no complaints with the medication.    Interval History  8/18/2016  She has had about 5-6 "bad" headaches since she was last seen in clinic. She has been having various headache types including tension headaches and sinus headaches as well. She has been using Robaxin which she says helps with the tension headaches and she has been using Fioricet to help with her various other headache types. She has not had much relief with tramadol as she says that she has taken the medication several times in the past and feel that it's effects have likely been lost on her. Elavil 50 mg qhs has been helping her to sleep but " she does not feel like the medication has made her excessively sedated and she does think that she could tolerate an increased dose of the medication. She has been having GI issues and has been looking to find a gastroenterologist that is within her network for gastroparesis and she has been scheduled to see dermatology for evaluation of two skin lesions that her PCP felt could be precancerous.    11/17/2016  She has still been having episodic headaches since she was last seen. She recently had a tooth extracted and was told that she had an infection underlying it as well. She has been taking Elavil as directed and has bene having some improvement in her migraines. She still tends to have frequent tension headaches that are treated very well with Robaxin. She has been seeing GI for her gastroparesis but has scheduled follow-up toward the end of the month to decide how the problem will be approached. She has not had any new problems since she was last seen in clinic.    02/16/2017  Since last being seen she says that she has been seen by GI who determined that she had a problem with bile acid production. She says that she has been doing better overall with her headaches and feels that when she has taken the Fioricet it has been beneficial to help in the relief of her headaches. She has not had any new problems but feels that the pain has been slightly more frequent than before.    05/16/2017  Headaches were well controlled when she was taking her medications consisting of Elavil and Methocarbamol as preventative medications. She has since been unable to get Robaxin because insurance is no longer covering the medication. She has not been taking it for the last month and has been having more frequent headaches since that time. She has been using Fioricet as an abortive medication which has been helpful in alleviating her pain. She has dry mouth and dry eyes as a result of her Elavil. Her dry eyes has been making  reading more difficult as she has constant tearing in the eyes.     PAST MEDICAL HISTORY:  Past Medical History:   Diagnosis Date    Bile reflux gastritis     Eczema     Fibromyalgia     Gastroparesis     dr. harden    GERD (gastroesophageal reflux disease)     Hyperglyceridemia     Hyperlipidemia     Hypertension     IC (interstitial cystitis)     dr. labadie    Psoriasis     Tension headache        PAST SURGICAL HISTORY:  Past Surgical History:   Procedure Laterality Date    CHOLECYSTECTOMY      HEMORRHOID SURGERY      HYSTERECTOMY      KNEE SURGERY      OOPHORECTOMY         SOCIAL HISTORY:  Social History     Social History    Marital status:      Spouse name: N/A    Number of children: 2    Years of education: N/A     Occupational History     [employer]     Social History Main Topics    Smoking status: Never Smoker    Smokeless tobacco: Not on file    Alcohol use No    Drug use: No    Sexual activity: Yes     Partners: Male     Other Topics Concern    Not on file     Social History Narrative    Lives at home with  and daughter       FAMILY HISTORY:  Family History   Problem Relation Age of Onset    Hypertension Mother     Migraines Mother     Hypertension Father     Stroke Father     Heart disease Father     Hyperlipidemia Father     Breast cancer Paternal Grandmother     Colon cancer Neg Hx     Ovarian cancer Neg Hx     Inflammatory bowel disease Neg Hx     Celiac disease Neg Hx        ALLERGIES AND MEDICATIONS: updated and reviewed.  Review of patient's allergies indicates:   Allergen Reactions    Depo-provera [medroxyprogesterone] Anxiety    Dicyclomine Rash     Swelling of lip      Prozac [fluoxetine] Anxiety     Current Outpatient Prescriptions   Medication Sig Dispense Refill    amitriptyline (ELAVIL) 100 MG tablet Take 1 tablet (100 mg total) by mouth every evening. 30 tablet 5    amlodipine (NORVASC) 5 MG tablet TAKE ONE TABLET BY MOUTH  EVERY DAY 90 tablet 1    atenolol (TENORMIN) 50 MG tablet TAKE ONE TABLET BY MOUTH DAILY 90 tablet 0    BIFIDOBACTERIUM INFANTIS (ALIGN ORAL) Take 1 tablet by mouth once daily.      butalbital-acetaminophen-caffeine -40 mg (FIORICET, ESGIC) -40 mg per tablet Take 1 tablet by mouth every 6 (six) hours as needed. 40 tablet 2    diclofenac (VOLTAREN) 50 MG EC tablet TAKE ONE TABLET BY MOUTH TWICE DAILY WITH FOOD *DO NOT TAKE WITH ASPIRIN* ** NO ALCOHOL ** 60 tablet 2    fluticasone (FLONASE) 50 mcg/actuation nasal spray ONE SPRAY IN EACH NOSTRIL DAILY 16 g 5    hydrocodone-acetaminophen 7.5-325mg (NORCO) 7.5-325 mg per tablet Take 1 tablet by mouth every 4 to 6 hours as needed.  0    lidocaine-prilocaine (EMLA) cream       loratadine (CLARITIN) 10 mg tablet Take 10 mg by mouth once daily.      methocarbamol (ROBAXIN) 500 MG Tab Take 1 tablet (500 mg total) by mouth every 8 (eight) hours as needed. 90 tablet 0    norethindrone-ethinyl estradiol (JUNEL FE 1/20) 1 mg-20 mcg (21)/75 mg (7) per tablet TAKE ONE TABLET BY MOUTH DAILY *21 DAYS 28 tablet 4    pantoprazole (PROTONIX) 40 MG tablet Take 40 mg by mouth once daily.  6    pravastatin (PRAVACHOL) 20 MG tablet Take 1 tablet (20 mg total) by mouth every evening. 90 tablet 1    promethazine (PHENERGAN) 25 MG tablet Take 1 tablet (25 mg total) by mouth every 6 (six) hours as needed for Nausea. 12 tablet 0    sucralfate (CARAFATE) 100 mg/mL suspension TAKE TWO TEASPOONSFUL BY MOUTH FOUR TIMES DAILY ON AN EMPTY STOMACH ONE HOUR BEFORE meals AND AT BEDTIME  0     No current facility-administered medications for this visit.        Review of Systems   Constitutional: Negative for activity change, appetite change, fever and unexpected weight change.   HENT: Negative for trouble swallowing and voice change.    Eyes: Positive for photophobia and visual disturbance.   Respiratory: Negative for apnea and shortness of breath.    Cardiovascular: Negative for  chest pain and leg swelling.   Gastrointestinal: Positive for nausea and vomiting. Negative for constipation.   Genitourinary: Negative for difficulty urinating.   Musculoskeletal: Negative for back pain, gait problem and neck pain.   Skin: Negative for color change and pallor.   Neurological: Positive for headaches. Negative for dizziness, seizures, syncope, weakness and numbness.   Hematological: Negative for adenopathy.   Psychiatric/Behavioral: Negative for agitation, confusion and decreased concentration.       Neurologic Exam     Mental Status   Oriented to person, place, and time.   Registration: recalls 3 of 3 objects.   Attention: normal. Concentration: normal.   Speech: speech is normal   Level of consciousness: alert  Knowledge: good.     Cranial Nerves     CN II   Visual fields full to confrontation.   Right visual field deficit: none  Left visual field deficit: none     CN III, IV, VI   Pupils are equal, round, and reactive to light.  Extraocular motions are normal.   Right pupil: Size: 3 mm. Shape: regular. Accommodation: intact.   Left pupil: Size: 3 mm. Shape: regular. Accommodation: intact.   CN III: no CN III palsy  CN VI: no CN VI palsy  Nystagmus: none   Diplopia: none  Ophthalmoparesis: none  Upgaze: normal  Downgaze: normal  Conjugate gaze: present    CN V   Facial sensation intact.   Right facial sensation deficit: none  Left facial sensation deficit: none    CN VII   Facial expression full, symmetric.   Right facial weakness: none  Left facial weakness: none    CN VIII   CN VIII normal.     CN IX, X   CN IX normal.   CN X normal.   Palate: symmetric    CN XI   CN XI normal.   Right sternocleidomastoid strength: normal  Left sternocleidomastoid strength: normal  Right trapezius strength: normal  Left trapezius strength: normal    CN XII   CN XII normal.   Tongue deviation: none    Motor Exam   Muscle bulk: normal  Overall muscle tone: normal  Right arm tone: normal  Left arm tone:  normal  Right leg tone: normal  Left leg tone: normal    Strength   Strength 5/5 throughout.     Sensory Exam   Right arm light touch: normal  Left arm light touch: normal  Right leg light touch: normal  Left leg light touch: normal  Right arm vibration: normal  Left arm vibration: normal  Right leg vibration: normal  Left leg vibration: normal  Right arm proprioception: normal  Left arm proprioception: normal  Right leg proprioception: normal  Left leg proprioception: normal  Right arm pinprick: normal  Left arm pinprick: normal  Right leg pinprick: normal  Left leg pinprick: normal    Gait, Coordination, and Reflexes     Gait  Gait: normal    Coordination   Romberg: negative  Finger to nose coordination: normal  Heel to shin coordination: normal  Tandem walking coordination: normal    Tremor   Resting tremor: absent    Reflexes   Right brachioradialis: 2+  Left brachioradialis: 2+  Right biceps: 2+  Left biceps: 2+  Right triceps: 2+  Left triceps: 2+  Right patellar: 2+  Left patellar: 2+  Right achilles: 2+  Left achilles: 2+  Right plantar: normal  Left plantar: normal      Physical Exam   Constitutional: She is oriented to person, place, and time. She appears well-developed and well-nourished.   HENT:   Head: Normocephalic and atraumatic.   Eyes: EOM are normal. Pupils are equal, round, and reactive to light.   Neck: Normal range of motion.   Cardiovascular: Normal rate and intact distal pulses.    Pulmonary/Chest: Effort normal. No apnea. No respiratory distress.   Musculoskeletal: Normal range of motion.   Neurological: She is alert and oriented to person, place, and time. She has normal strength. She has a normal Finger-Nose-Finger Test, a normal Heel to Shin Test, a normal Romberg Test and a normal Tandem Gait Test. Gait normal.   Reflex Scores:       Tricep reflexes are 2+ on the right side and 2+ on the left side.       Bicep reflexes are 2+ on the right side and 2+ on the left side.        "Brachioradialis reflexes are 2+ on the right side and 2+ on the left side.       Patellar reflexes are 2+ on the right side and 2+ on the left side.       Achilles reflexes are 2+ on the right side and 2+ on the left side.  Skin: Skin is warm and dry.   Psychiatric: She has a normal mood and affect. Her speech is normal and behavior is normal. Thought content normal.   Vitals reviewed.      Vitals:    05/16/17 0829   BP: 119/80   BP Location: Left arm   Patient Position: Sitting   BP Method: Manual   Pulse: 96   Weight: 82.4 kg (181 lb 10.5 oz)   Height: 5' 2" (1.575 m)       Assessment & Plan:  Problem List Items Addressed This Visit     Chronic migraine without aura, with intractable migraine, so stated, with status migrainosus - Primary    Occipital neuralgia      Other Visit Diagnoses     Tension headache        Relevant Medications    methocarbamol (ROBAXIN) 500 MG Tab        Follow-up: No Follow-up on file.  More than 50% of this 25 minute encounter was spent in counseling and coordinating care.      "

## 2017-05-30 RX ORDER — ATENOLOL 50 MG/1
TABLET ORAL
Qty: 90 TABLET | Refills: 1 | Status: SHIPPED | OUTPATIENT
Start: 2017-05-30 | End: 2017-12-19 | Stop reason: SDUPTHER

## 2017-06-28 DIAGNOSIS — E50.7 XEROPHTHALMIA: ICD-10-CM

## 2017-06-28 DIAGNOSIS — G44.209 TENSION HEADACHE: ICD-10-CM

## 2017-06-28 RX ORDER — CARBOXYMETHYLCELLULOSE SODIUM 5 MG/ML
1 SOLUTION/ DROPS OPHTHALMIC 3 TIMES DAILY PRN
Qty: 30 ML | Refills: 5 | Status: SHIPPED | OUTPATIENT
Start: 2017-06-28 | End: 2017-12-19

## 2017-06-28 RX ORDER — METHOCARBAMOL 500 MG/1
500 TABLET, FILM COATED ORAL EVERY 8 HOURS PRN
Qty: 90 TABLET | Refills: 0 | Status: SHIPPED | OUTPATIENT
Start: 2017-06-28 | End: 2017-07-18 | Stop reason: SDUPTHER

## 2017-06-28 NOTE — TELEPHONE ENCOUNTER
----- Message from Santo Hamm sent at 6/28/2017  1:37 PM CDT -----  Contact: Self  Pt request a refill for Methotareamol and also states she never received the prescription for the eyedrops.    Mrs Rodas- [phone]

## 2017-07-12 ENCOUNTER — OFFICE VISIT (OUTPATIENT)
Dept: FAMILY MEDICINE | Facility: CLINIC | Age: 47
End: 2017-07-12
Payer: MEDICARE

## 2017-07-12 VITALS
DIASTOLIC BLOOD PRESSURE: 80 MMHG | HEIGHT: 62 IN | TEMPERATURE: 99 F | HEART RATE: 77 BPM | WEIGHT: 181.88 LBS | OXYGEN SATURATION: 97 % | BODY MASS INDEX: 33.47 KG/M2 | SYSTOLIC BLOOD PRESSURE: 130 MMHG

## 2017-07-12 DIAGNOSIS — L70.9 ACNE, UNSPECIFIED ACNE TYPE: ICD-10-CM

## 2017-07-12 DIAGNOSIS — E78.5 HYPERLIPIDEMIA, UNSPECIFIED HYPERLIPIDEMIA TYPE: Primary | ICD-10-CM

## 2017-07-12 PROCEDURE — 99999 PR PBB SHADOW E&M-EST. PATIENT-LVL III: CPT | Mod: PBBFAC,,, | Performed by: FAMILY MEDICINE

## 2017-07-12 PROCEDURE — 99499 UNLISTED E&M SERVICE: CPT | Mod: S$PBB,,, | Performed by: FAMILY MEDICINE

## 2017-07-12 PROCEDURE — 99213 OFFICE O/P EST LOW 20 MIN: CPT | Mod: S$GLB,,, | Performed by: FAMILY MEDICINE

## 2017-07-12 RX ORDER — CLINDAMYCIN PHOSPHATE 10 MG/G
GEL TOPICAL 2 TIMES DAILY
Qty: 30 G | Refills: 1 | Status: SHIPPED | OUTPATIENT
Start: 2017-07-12 | End: 2018-05-30

## 2017-07-12 NOTE — PROGRESS NOTES
"Subjective:       Patient ID: Giuliana Rodas is a 47 y.o. female.    Chief Complaint: Follow Up/ Cholesterol    Hyperlipidemia   This is a chronic problem. The current episode started more than 1 year ago. The problem is uncontrolled. Recent lipid tests were reviewed and are high. Factors aggravating her hyperlipidemia include fatty foods. Pertinent negatives include no chest pain, focal weakness, leg pain or myalgias. Current antihyperlipidemic treatment includes diet change. The current treatment provides moderate improvement of lipids. Risk factors for coronary artery disease include hypertension and obesity.     Review of Systems   Cardiovascular: Negative for chest pain.   Musculoskeletal: Negative for myalgias.   Neurological: Negative for focal weakness.       Objective:       Vitals:    07/12/17 1609   BP: 130/80   Pulse: 77   Temp: 99.1 °F (37.3 °C)   TempSrc: Oral   SpO2: 97%   Weight: 82.5 kg (181 lb 14.1 oz)   Height: 5' 2" (1.575 m)       Physical Exam   Constitutional: She appears well-developed and well-nourished. No distress.   HENT:   Head: Normocephalic and atraumatic.   Skin: She is not diaphoretic.            Assessment:       1. Hyperlipidemia, unspecified hyperlipidemia type    2. Acne, unspecified acne type        Plan:       Giuliana was seen today for follow up/ cholesterol.    Diagnoses and all orders for this visit:    Hyperlipidemia, unspecified hyperlipidemia type  -     Comprehensive metabolic panel; Future  -     Lipid panel; Future  He is due for labs    Acne, unspecified acne type  -     clindamycin phosphate 1% (CLINDAGEL) 1 % gel; Apply topically 2 (two) times daily.           "

## 2017-07-13 ENCOUNTER — LAB VISIT (OUTPATIENT)
Dept: LAB | Facility: HOSPITAL | Age: 47
End: 2017-07-13
Attending: FAMILY MEDICINE
Payer: MEDICARE

## 2017-07-13 DIAGNOSIS — E78.5 HYPERLIPIDEMIA, UNSPECIFIED HYPERLIPIDEMIA TYPE: ICD-10-CM

## 2017-07-13 LAB
ALBUMIN SERPL BCP-MCNC: 3.6 G/DL
ALP SERPL-CCNC: 60 U/L
ALT SERPL W/O P-5'-P-CCNC: 22 U/L
ANION GAP SERPL CALC-SCNC: 8 MMOL/L
AST SERPL-CCNC: 25 U/L
BILIRUB SERPL-MCNC: 0.4 MG/DL
BUN SERPL-MCNC: 11 MG/DL
CALCIUM SERPL-MCNC: 9.5 MG/DL
CHLORIDE SERPL-SCNC: 106 MMOL/L
CHOLEST/HDLC SERPL: 4 {RATIO}
CO2 SERPL-SCNC: 28 MMOL/L
CREAT SERPL-MCNC: 1 MG/DL
EST. GFR  (AFRICAN AMERICAN): >60 ML/MIN/1.73 M^2
EST. GFR  (NON AFRICAN AMERICAN): >60 ML/MIN/1.73 M^2
GLUCOSE SERPL-MCNC: 84 MG/DL
HDL/CHOLESTEROL RATIO: 25.1 %
HDLC SERPL-MCNC: 175 MG/DL
HDLC SERPL-MCNC: 44 MG/DL
LDLC SERPL CALC-MCNC: 90.4 MG/DL
NONHDLC SERPL-MCNC: 131 MG/DL
POTASSIUM SERPL-SCNC: 3.8 MMOL/L
PROT SERPL-MCNC: 7.5 G/DL
SODIUM SERPL-SCNC: 142 MMOL/L
TRIGL SERPL-MCNC: 203 MG/DL

## 2017-07-13 PROCEDURE — 80053 COMPREHEN METABOLIC PANEL: CPT

## 2017-07-13 PROCEDURE — 80061 LIPID PANEL: CPT

## 2017-07-13 PROCEDURE — 36415 COLL VENOUS BLD VENIPUNCTURE: CPT

## 2017-07-14 RX ORDER — FLUTICASONE PROPIONATE 50 MCG
SPRAY, SUSPENSION (ML) NASAL
Qty: 16 G | Refills: 2 | Status: SHIPPED | OUTPATIENT
Start: 2017-07-14 | End: 2018-02-03 | Stop reason: SDUPTHER

## 2017-07-18 ENCOUNTER — OFFICE VISIT (OUTPATIENT)
Dept: NEUROLOGY | Facility: CLINIC | Age: 47
End: 2017-07-18
Payer: MEDICARE

## 2017-07-18 VITALS
WEIGHT: 182.19 LBS | SYSTOLIC BLOOD PRESSURE: 130 MMHG | BODY MASS INDEX: 33.53 KG/M2 | DIASTOLIC BLOOD PRESSURE: 72 MMHG | HEART RATE: 72 BPM | HEIGHT: 62 IN

## 2017-07-18 DIAGNOSIS — G43.711 CHRONIC MIGRAINE WITHOUT AURA, WITH INTRACTABLE MIGRAINE, SO STATED, WITH STATUS MIGRAINOSUS: Primary | ICD-10-CM

## 2017-07-18 DIAGNOSIS — M54.81 OCCIPITAL NEURALGIA, UNSPECIFIED LATERALITY: ICD-10-CM

## 2017-07-18 DIAGNOSIS — G44.209 TENSION HEADACHE: ICD-10-CM

## 2017-07-18 PROCEDURE — 99214 OFFICE O/P EST MOD 30 MIN: CPT | Mod: S$GLB,,, | Performed by: NEUROLOGICAL SURGERY

## 2017-07-18 PROCEDURE — 99499 UNLISTED E&M SERVICE: CPT | Mod: S$PBB,,, | Performed by: FAMILY MEDICINE

## 2017-07-18 PROCEDURE — 99999 PR PBB SHADOW E&M-EST. PATIENT-LVL II: CPT | Mod: PBBFAC,,, | Performed by: NEUROLOGICAL SURGERY

## 2017-07-18 PROCEDURE — 99499 UNLISTED E&M SERVICE: CPT | Mod: S$PBB,,, | Performed by: NEUROLOGICAL SURGERY

## 2017-07-18 RX ORDER — SUMATRIPTAN SUCCINATE 100 MG/1
100 TABLET ORAL
Qty: 9 TABLET | Refills: 5 | Status: SHIPPED | OUTPATIENT
Start: 2017-07-18 | End: 2017-12-19

## 2017-07-18 RX ORDER — METHOCARBAMOL 500 MG/1
500 TABLET, FILM COATED ORAL EVERY 8 HOURS PRN
Qty: 90 TABLET | Refills: 0 | Status: SHIPPED | OUTPATIENT
Start: 2017-07-18 | End: 2017-10-18 | Stop reason: ALTCHOICE

## 2017-07-18 NOTE — PROGRESS NOTES
"Chief Complaint   Patient presents with    Headache        Giuliana Rodas is a 47 y.o. female with a history of multiple medical diagnoses as listed below that presents for evaluation of headaches. She has been having headaches almost weekly for the last several months. The headache tends to feel like pressure as if a "cap" were on her head and at other times she has headaches that are from the front of her head down to the middle of the posterior aspect of the head. The headache that is like a ca is described as a "sharp pressure" that is 10/10 in intensity. The headache are debilitating and only allow her to lie down in a ximena quiet room as the pain worsened with lights and sounds. She has nausea very frequently with these headaches but says that she does not vomit. When the headaches are in the center of her head she feels that the pain extends into the neck and shoulders as well. It too can get up to a 10/10 but she is less sensitive to light and sounds and tends not to have nausea. The head can last from 3 hours to 3 days at a time. She has not family history of headaches. She has also been having problems sleeping so she was started on Elavil by her PCP in hopes of treating both her headaches and her insomnia. She has not had a headache in the last three weeks since she has been on the medication. She has no complaints with the medication.    Interval History  8/18/2016  She has had about 5-6 "bad" headaches since she was last seen in clinic. She has been having various headache types including tension headaches and sinus headaches as well. She has been using Robaxin which she says helps with the tension headaches and she has been using Fioricet to help with her various other headache types. She has not had much relief with tramadol as she says that she has taken the medication several times in the past and feel that it's effects have likely been lost on her. Elavil 50 mg qhs has been helping her to sleep but " she does not feel like the medication has made her excessively sedated and she does think that she could tolerate an increased dose of the medication. She has been having GI issues and has been looking to find a gastroenterologist that is within her network for gastroparesis and she has been scheduled to see dermatology for evaluation of two skin lesions that her PCP felt could be precancerous.    11/17/2016  She has still been having episodic headaches since she was last seen. She recently had a tooth extracted and was told that she had an infection underlying it as well. She has been taking Elavil as directed and has bene having some improvement in her migraines. She still tends to have frequent tension headaches that are treated very well with Robaxin. She has been seeing GI for her gastroparesis but has scheduled follow-up toward the end of the month to decide how the problem will be approached. She has not had any new problems since she was last seen in clinic.    02/16/2017  Since last being seen she says that she has been seen by GI who determined that she had a problem with bile acid production. She says that she has been doing better overall with her headaches and feels that when she has taken the Fioricet it has been beneficial to help in the relief of her headaches. She has not had any new problems but feels that the pain has been slightly more frequent than before.    05/16/2017  Headaches were well controlled when she was taking her medications consisting of Elavil and Methocarbamol as preventative medications. She has since been unable to get Robaxin because insurance is no longer covering the medication. She has not been taking it for the last month and has been having more frequent headaches since that time. She has been using Fioricet as an abortive medication which has been helpful in alleviating her pain. She has dry mouth and dry eyes as a result of her Elavil. Her dry eyes has been making  reading more difficult as she has constant tearing in the eyes.    07/18/2017  She has had at least one migraine since she was last seen in clinic while she was visiting [place] with her parents and was spending some time outside when she had a headache that began and intensified fairly quickly. Fioricet was on hand which she took and was able to relieve her headache so that she could continue her day without many issues. She has continued to have tension headaches that eminate in the neck and into the shoulders as well. She has the pain radiate across the back at times as well. She has also been having headaches across the front of the head that has been feeling of intense pressure that she feels is related to her sinus headaches. She has been working to become more active and exercise more.     PAST MEDICAL HISTORY:  Past Medical History:   Diagnosis Date    Bile reflux gastritis     Eczema     Fibromyalgia     Gastroparesis     dr. harden    GERD (gastroesophageal reflux disease)     Hyperglyceridemia     Hyperlipidemia     Hypertension     IC (interstitial cystitis)     dr. labadie    Psoriasis     Tension headache        PAST SURGICAL HISTORY:  Past Surgical History:   Procedure Laterality Date    CHOLECYSTECTOMY      HEMORRHOID SURGERY      HYSTERECTOMY      KNEE SURGERY      OOPHORECTOMY         SOCIAL HISTORY:  Social History     Social History    Marital status:      Spouse name: N/A    Number of children: 2    Years of education: N/A     Occupational History     [employer]     Social History Main Topics    Smoking status: Never Smoker    Smokeless tobacco: Not on file    Alcohol use No    Drug use: No    Sexual activity: Yes     Partners: Male     Other Topics Concern    Not on file     Social History Narrative    Lives at home with  and daughter       FAMILY HISTORY:  Family History   Problem Relation Age of Onset    Hypertension Mother     Migraines  Mother     Hypertension Father     Stroke Father     Heart disease Father     Hyperlipidemia Father     Breast cancer Paternal Grandmother     Colon cancer Neg Hx     Ovarian cancer Neg Hx     Inflammatory bowel disease Neg Hx     Celiac disease Neg Hx        ALLERGIES AND MEDICATIONS: updated and reviewed.  Review of patient's allergies indicates:   Allergen Reactions    Depo-provera [medroxyprogesterone] Anxiety    Dicyclomine Rash     Swelling of lip      Prozac [fluoxetine] Anxiety     Current Outpatient Prescriptions   Medication Sig Dispense Refill    amitriptyline (ELAVIL) 100 MG tablet Take 1 tablet (100 mg total) by mouth every evening. 30 tablet 5    amlodipine (NORVASC) 5 MG tablet TAKE ONE TABLET BY MOUTH EVERY DAY 90 tablet 1    atenolol (TENORMIN) 50 MG tablet TAKE ONE TABLET BY MOUTH DAILY 90 tablet 1    BIFIDOBACTERIUM INFANTIS (ALIGN ORAL) Take 1 tablet by mouth once daily.      butalbital-acetaminophen-caffeine -40 mg (FIORICET, ESGIC) -40 mg per tablet Take 1 tablet by mouth every 6 (six) hours as needed. 40 tablet 2    carboxymethylcellulose (REFRESH PLUS) 0.5 % Dpet Place 1 drop into both eyes 3 (three) times daily as needed. 30 mL 5    clindamycin phosphate 1% (CLINDAGEL) 1 % gel Apply topically 2 (two) times daily. 30 g 1    diclofenac (VOLTAREN) 50 MG EC tablet TAKE ONE TABLET BY MOUTH TWICE DAILY WITH FOOD *DO NOT TAKE WITH ASPIRIN* ** NO ALCOHOL ** 60 tablet 2    fluticasone (FLONASE) 50 mcg/actuation nasal spray ONE SPRAY IN EACH NOSTRIL DAILY 16 g 2    lidocaine-prilocaine (EMLA) cream       loratadine (CLARITIN) 10 mg tablet Take 10 mg by mouth once daily.      methocarbamol (ROBAXIN) 500 MG Tab Take 1 tablet (500 mg total) by mouth every 8 (eight) hours as needed. 90 tablet 0    norethindrone-ethinyl estradiol (JUNEL FE 1/20) 1 mg-20 mcg (21)/75 mg (7) per tablet TAKE ONE TABLET BY MOUTH DAILY *21 DAYS 28 tablet 4    pantoprazole (PROTONIX) 40 MG  tablet Take 40 mg by mouth once daily.  6    pravastatin (PRAVACHOL) 20 MG tablet Take 1 tablet (20 mg total) by mouth every evening. 90 tablet 1    promethazine (PHENERGAN) 25 MG tablet Take 1 tablet (25 mg total) by mouth every 6 (six) hours as needed for Nausea. 12 tablet 0    sucralfate (CARAFATE) 100 mg/mL suspension TAKE TWO TEASPOONSFUL BY MOUTH FOUR TIMES DAILY ON AN EMPTY STOMACH ONE HOUR BEFORE meals AND AT BEDTIME  0    sumatriptan (IMITREX) 100 MG tablet Take 1 tablet (100 mg total) by mouth as needed for Migraine. Take at the onset of headache, may take 1 more in 1 hour if needed. 9 tablet 5     No current facility-administered medications for this visit.        Review of Systems   Constitutional: Negative for activity change, appetite change, fever and unexpected weight change.   HENT: Negative for trouble swallowing and voice change.    Eyes: Positive for photophobia and visual disturbance.   Respiratory: Negative for apnea and shortness of breath.    Cardiovascular: Negative for chest pain and leg swelling.   Gastrointestinal: Positive for nausea and vomiting. Negative for constipation.   Genitourinary: Negative for difficulty urinating.   Musculoskeletal: Negative for back pain, gait problem and neck pain.   Skin: Negative for color change and pallor.   Neurological: Positive for headaches. Negative for dizziness, seizures, syncope, weakness and numbness.   Hematological: Negative for adenopathy.   Psychiatric/Behavioral: Negative for agitation, confusion and decreased concentration.       Neurologic Exam     Mental Status   Oriented to person, place, and time.   Registration: recalls 3 of 3 objects.   Attention: normal. Concentration: normal.   Speech: speech is normal   Level of consciousness: alert  Knowledge: good.     Cranial Nerves     CN II   Visual fields full to confrontation.   Right visual field deficit: none  Left visual field deficit: none     CN III, IV, VI   Pupils are equal,  round, and reactive to light.  Extraocular motions are normal.   Right pupil: Size: 3 mm. Shape: regular. Accommodation: intact.   Left pupil: Size: 3 mm. Shape: regular. Accommodation: intact.   CN III: no CN III palsy  CN VI: no CN VI palsy  Nystagmus: none   Diplopia: none  Ophthalmoparesis: none  Upgaze: normal  Downgaze: normal  Conjugate gaze: present    CN V   Facial sensation intact.   Right facial sensation deficit: none  Left facial sensation deficit: none    CN VII   Facial expression full, symmetric.   Right facial weakness: none  Left facial weakness: none    CN VIII   CN VIII normal.     CN IX, X   CN IX normal.   CN X normal.   Palate: symmetric    CN XI   CN XI normal.   Right sternocleidomastoid strength: normal  Left sternocleidomastoid strength: normal  Right trapezius strength: normal  Left trapezius strength: normal    CN XII   CN XII normal.   Tongue deviation: none    Motor Exam   Muscle bulk: normal  Overall muscle tone: normal  Right arm tone: normal  Left arm tone: normal  Right leg tone: normal  Left leg tone: normal    Strength   Strength 5/5 throughout.     Sensory Exam   Right arm light touch: normal  Left arm light touch: normal  Right leg light touch: normal  Left leg light touch: normal  Right arm vibration: normal  Left arm vibration: normal  Right leg vibration: normal  Left leg vibration: normal  Right arm proprioception: normal  Left arm proprioception: normal  Right leg proprioception: normal  Left leg proprioception: normal  Right arm pinprick: normal  Left arm pinprick: normal  Right leg pinprick: normal  Left leg pinprick: normal    Gait, Coordination, and Reflexes     Gait  Gait: normal    Coordination   Romberg: negative  Finger to nose coordination: normal  Heel to shin coordination: normal  Tandem walking coordination: normal    Tremor   Resting tremor: absent    Reflexes   Right brachioradialis: 2+  Left brachioradialis: 2+  Right biceps: 2+  Left biceps: 2+  Right  "triceps: 2+  Left triceps: 2+  Right patellar: 2+  Left patellar: 2+  Right achilles: 2+  Left achilles: 2+  Right plantar: normal  Left plantar: normal      Physical Exam   Constitutional: She is oriented to person, place, and time. She appears well-developed and well-nourished.   HENT:   Head: Normocephalic and atraumatic.   Eyes: EOM are normal. Pupils are equal, round, and reactive to light.   Neck: Normal range of motion.   Cardiovascular: Normal rate and intact distal pulses.    Pulmonary/Chest: Effort normal. No apnea. No respiratory distress.   Musculoskeletal: Normal range of motion.   Neurological: She is alert and oriented to person, place, and time. She has normal strength. She has a normal Finger-Nose-Finger Test, a normal Heel to Shin Test, a normal Romberg Test and a normal Tandem Gait Test. Gait normal.   Reflex Scores:       Tricep reflexes are 2+ on the right side and 2+ on the left side.       Bicep reflexes are 2+ on the right side and 2+ on the left side.       Brachioradialis reflexes are 2+ on the right side and 2+ on the left side.       Patellar reflexes are 2+ on the right side and 2+ on the left side.       Achilles reflexes are 2+ on the right side and 2+ on the left side.  Skin: Skin is warm and dry.   Psychiatric: She has a normal mood and affect. Her speech is normal and behavior is normal. Thought content normal.   Vitals reviewed.      Vitals:    07/18/17 0837   BP: 130/72   BP Location: Left arm   Patient Position: Sitting   BP Method: Manual   Pulse: 72   Weight: 82.6 kg (182 lb 3.4 oz)   Height: 5' 2" (1.575 m)       Assessment & Plan:  Problem List Items Addressed This Visit     Chronic migraine without aura, with intractable migraine, so stated, with status migrainosus - Primary    Overview     Migraine headaches have been better controlled with Elavil. She has been having occasional migraines that have been responsive to her abortive therapies.         Current Assessment & Plan "     Discussed using Imitrex PRN as first line abortive. Advised that using the mediation as early in the onset of the headache as possible is most effective. Can still use Fioricet PRN for any headache refractory to triptans.         Relevant Medications    sumatriptan (IMITREX) 100 MG tablet    Occipital neuralgia    Overview     She has tried nerve block in the past but did not find that it provided lasting relief. She wishes to defer at this time.         Tension headache    Relevant Medications    methocarbamol (ROBAXIN) 500 MG Tab      Other Visit Diagnoses    None.       Follow-up: Return in about 3 months (around 10/18/2017).

## 2017-07-18 NOTE — ASSESSMENT & PLAN NOTE
Discussed using Imitrex PRN as first line abortive. Advised that using the mediation as early in the onset of the headache as possible is most effective. Can still use Fioricet PRN for any headache refractory to triptans.

## 2017-08-18 DIAGNOSIS — G43.909 MIGRAINE WITHOUT STATUS MIGRAINOSUS, NOT INTRACTABLE, UNSPECIFIED MIGRAINE TYPE: ICD-10-CM

## 2017-08-18 RX ORDER — AMITRIPTYLINE HYDROCHLORIDE 100 MG/1
TABLET ORAL
Qty: 30 TABLET | Refills: 5 | Status: SHIPPED | OUTPATIENT
Start: 2017-08-18 | End: 2018-01-15 | Stop reason: SDUPTHER

## 2017-09-05 RX ORDER — AMLODIPINE BESYLATE 5 MG/1
TABLET ORAL
Qty: 90 TABLET | Refills: 0 | Status: SHIPPED | OUTPATIENT
Start: 2017-09-05 | End: 2017-10-23

## 2017-10-11 DIAGNOSIS — G43.909 MIGRAINE WITHOUT STATUS MIGRAINOSUS, NOT INTRACTABLE, UNSPECIFIED MIGRAINE TYPE: ICD-10-CM

## 2017-10-11 RX ORDER — BUTALBITAL, ACETAMINOPHEN AND CAFFEINE 50; 325; 40 MG/1; MG/1; MG/1
TABLET ORAL
Qty: 40 TABLET | Refills: 1 | Status: SHIPPED | OUTPATIENT
Start: 2017-10-11 | End: 2017-12-19 | Stop reason: SDUPTHER

## 2017-10-18 ENCOUNTER — OFFICE VISIT (OUTPATIENT)
Dept: NEUROLOGY | Facility: CLINIC | Age: 47
End: 2017-10-18
Payer: MEDICARE

## 2017-10-18 VITALS
HEIGHT: 62 IN | BODY MASS INDEX: 33.51 KG/M2 | DIASTOLIC BLOOD PRESSURE: 85 MMHG | SYSTOLIC BLOOD PRESSURE: 196 MMHG | HEART RATE: 72 BPM | WEIGHT: 182.13 LBS

## 2017-10-18 DIAGNOSIS — G44.221 CHRONIC TENSION-TYPE HEADACHE, INTRACTABLE: Primary | ICD-10-CM

## 2017-10-18 DIAGNOSIS — G43.711 CHRONIC MIGRAINE WITHOUT AURA, WITH INTRACTABLE MIGRAINE, SO STATED, WITH STATUS MIGRAINOSUS: ICD-10-CM

## 2017-10-18 DIAGNOSIS — G44.209 TENSION HEADACHE: ICD-10-CM

## 2017-10-18 DIAGNOSIS — E78.5 HYPERLIPIDEMIA, UNSPECIFIED HYPERLIPIDEMIA TYPE: ICD-10-CM

## 2017-10-18 PROCEDURE — 99999 PR PBB SHADOW E&M-EST. PATIENT-LVL III: CPT | Mod: PBBFAC,,, | Performed by: NEUROLOGICAL SURGERY

## 2017-10-18 PROCEDURE — 99214 OFFICE O/P EST MOD 30 MIN: CPT | Mod: S$GLB,,, | Performed by: NEUROLOGICAL SURGERY

## 2017-10-18 PROCEDURE — 99499 UNLISTED E&M SERVICE: CPT | Mod: S$PBB,,, | Performed by: NEUROLOGICAL SURGERY

## 2017-10-18 RX ORDER — TIZANIDINE 4 MG/1
4 TABLET ORAL EVERY 8 HOURS PRN
Qty: 60 TABLET | Refills: 3 | Status: SHIPPED | OUTPATIENT
Start: 2017-10-18 | End: 2018-06-09 | Stop reason: SDUPTHER

## 2017-10-18 RX ORDER — PRAVASTATIN SODIUM 20 MG/1
TABLET ORAL
Qty: 90 TABLET | Refills: 1 | Status: SHIPPED | OUTPATIENT
Start: 2017-10-18 | End: 2018-04-16 | Stop reason: SDUPTHER

## 2017-10-18 NOTE — PROGRESS NOTES
"Chief Complaint   Patient presents with    Headache        Giuliana Rodas is a 47 y.o. female with a history of multiple medical diagnoses as listed below that presents for evaluation of headaches. She has been having headaches almost weekly for the last several months. The headache tends to feel like pressure as if a "cap" were on her head and at other times she has headaches that are from the front of her head down to the middle of the posterior aspect of the head. The headache that is like a ca is described as a "sharp pressure" that is 10/10 in intensity. The headache are debilitating and only allow her to lie down in a ximean quiet room as the pain worsened with lights and sounds. She has nausea very frequently with these headaches but says that she does not vomit. When the headaches are in the center of her head she feels that the pain extends into the neck and shoulders as well. It too can get up to a 10/10 but she is less sensitive to light and sounds and tends not to have nausea. The head can last from 3 hours to 3 days at a time. She has not family history of headaches. She has also been having problems sleeping so she was started on Elavil by her PCP in hopes of treating both her headaches and her insomnia. She has not had a headache in the last three weeks since she has been on the medication. She has no complaints with the medication.    Interval History  8/18/2016  She has had about 5-6 "bad" headaches since she was last seen in clinic. She has been having various headache types including tension headaches and sinus headaches as well. She has been using Robaxin which she says helps with the tension headaches and she has been using Fioricet to help with her various other headache types. She has not had much relief with tramadol as she says that she has taken the medication several times in the past and feel that it's effects have likely been lost on her. Elavil 50 mg qhs has been helping her to sleep but " she does not feel like the medication has made her excessively sedated and she does think that she could tolerate an increased dose of the medication. She has been having GI issues and has been looking to find a gastroenterologist that is within her network for gastroparesis and she has been scheduled to see dermatology for evaluation of two skin lesions that her PCP felt could be precancerous.    11/17/2016  She has still been having episodic headaches since she was last seen. She recently had a tooth extracted and was told that she had an infection underlying it as well. She has been taking Elavil as directed and has bene having some improvement in her migraines. She still tends to have frequent tension headaches that are treated very well with Robaxin. She has been seeing GI for her gastroparesis but has scheduled follow-up toward the end of the month to decide how the problem will be approached. She has not had any new problems since she was last seen in clinic.    02/16/2017  Since last being seen she says that she has been seen by GI who determined that she had a problem with bile acid production. She says that she has been doing better overall with her headaches and feels that when she has taken the Fioricet it has been beneficial to help in the relief of her headaches. She has not had any new problems but feels that the pain has been slightly more frequent than before.    05/16/2017  Headaches were well controlled when she was taking her medications consisting of Elavil and Methocarbamol as preventative medications. She has since been unable to get Robaxin because insurance is no longer covering the medication. She has not been taking it for the last month and has been having more frequent headaches since that time. She has been using Fioricet as an abortive medication which has been helpful in alleviating her pain. She has dry mouth and dry eyes as a result of her Elavil. Her dry eyes has been making  reading more difficult as she has constant tearing in the eyes.    07/18/2017  She has had at least one migraine since she was last seen in clinic while she was visiting [place] with her parents and was spending some time outside when she had a headache that began and intensified fairly quickly. Fioricet was on hand which she took and was able to relieve her headache so that she could continue her day without many issues. She has continued to have tension headaches that eminate in the neck and into the shoulders as well. She has the pain radiate across the back at times as well. She has also been having headaches across the front of the head that has been feeling of intense pressure that she feels is related to her sinus headaches. She has been working to become more active and exercise more.    10/18/2017  She continues to work with her medical team try to get better control of her symptoms.  Tension headaches has still been bothersome despite her compliance and methocarbamol.  She feels that the medication be ineffective and she is is wishing to try different medication to control her symptoms.  Despite the frequency of her tension headaches she has had fewer migraines than compared to her previous visit.  She'll been taking all medications as directed and has been tolerating well.     PAST MEDICAL HISTORY:  Past Medical History:   Diagnosis Date    Bile reflux gastritis     Eczema     Fibromyalgia     Gastroparesis     dr. harden    GERD (gastroesophageal reflux disease)     Hyperglyceridemia     Hyperlipidemia     Hypertension     IC (interstitial cystitis)     dr. labadie    Psoriasis     Tension headache        PAST SURGICAL HISTORY:  Past Surgical History:   Procedure Laterality Date    CHOLECYSTECTOMY      HEMORRHOID SURGERY      HYSTERECTOMY      KNEE SURGERY      OOPHORECTOMY         SOCIAL HISTORY:  Social History     Social History    Marital status:      Spouse name: N/A     Number of children: 2    Years of education: N/A     Occupational History     [employer]     Social History Main Topics    Smoking status: Never Smoker    Smokeless tobacco: Not on file    Alcohol use No    Drug use: No    Sexual activity: Yes     Partners: Male     Other Topics Concern    Not on file     Social History Narrative    Lives at home with  and daughter       FAMILY HISTORY:  Family History   Problem Relation Age of Onset    Hypertension Mother     Migraines Mother     Hypertension Father     Stroke Father     Heart disease Father     Hyperlipidemia Father     Breast cancer Paternal Grandmother     Colon cancer Neg Hx     Ovarian cancer Neg Hx     Inflammatory bowel disease Neg Hx     Celiac disease Neg Hx        ALLERGIES AND MEDICATIONS: updated and reviewed.  Review of patient's allergies indicates:   Allergen Reactions    Depo-provera [medroxyprogesterone] Anxiety    Dicyclomine Rash     Swelling of lip      Prozac [fluoxetine] Anxiety     Current Outpatient Prescriptions   Medication Sig Dispense Refill    amitriptyline (ELAVIL) 100 MG tablet Take 1 tablet by mouth every evening. 30 tablet 5    amlodipine (NORVASC) 5 MG tablet TAKE ONE TABLET BY MOUTH EVERY DAY 90 tablet 0    atenolol (TENORMIN) 50 MG tablet TAKE ONE TABLET BY MOUTH DAILY 90 tablet 1    BIFIDOBACTERIUM INFANTIS (ALIGN ORAL) Take 1 tablet by mouth once daily.      butalbital-acetaminophen-caffeine -40 mg (FIORICET, ESGIC) -40 mg per tablet Take 1 tablet by mouth every 6 hours as needed. 40 tablet 1    carboxymethylcellulose (REFRESH PLUS) 0.5 % Dpet Place 1 drop into both eyes 3 (three) times daily as needed. 30 mL 5    clindamycin phosphate 1% (CLINDAGEL) 1 % gel Apply topically 2 (two) times daily. 30 g 1    diclofenac (VOLTAREN) 50 MG EC tablet TAKE ONE TABLET BY MOUTH TWICE DAILY WITH FOOD *DO NOT TAKE WITH ASPIRIN* ** NO ALCOHOL ** 60 tablet 2    fluticasone (FLONASE)  50 mcg/actuation nasal spray ONE SPRAY IN EACH NOSTRIL DAILY 16 g 2    lidocaine-prilocaine (EMLA) cream       loratadine (CLARITIN) 10 mg tablet Take 10 mg by mouth once daily.      norethindrone-ethinyl estradiol (JUNEL FE 1/20) 1 mg-20 mcg (21)/75 mg (7) per tablet TAKE ONE TABLET BY MOUTH DAILY *21 DAYS 28 tablet 4    norethindrone-ethinyl estradiol (JUNEL FE 1/20) 1 mg-20 mcg (21)/75 mg (7) per tablet TAKE ONE TABLET BY MOUTH EVERY DAY 21 DAYS 28 tablet 3    pantoprazole (PROTONIX) 40 MG tablet Take 40 mg by mouth once daily.  6    pravastatin (PRAVACHOL) 20 MG tablet TAKE ONE TABLET BY MOUTH EVERY EVENING 90 tablet 1    promethazine (PHENERGAN) 25 MG tablet Take 1 tablet (25 mg total) by mouth every 6 (six) hours as needed for Nausea. 12 tablet 0    sucralfate (CARAFATE) 100 mg/mL suspension TAKE TWO TEASPOONSFUL BY MOUTH FOUR TIMES DAILY ON AN EMPTY STOMACH ONE HOUR BEFORE meals AND AT BEDTIME  0    sumatriptan (IMITREX) 100 MG tablet Take 1 tablet (100 mg total) by mouth as needed for Migraine. Take at the onset of headache, may take 1 more in 1 hour if needed. 9 tablet 5    tizanidine (ZANAFLEX) 4 MG tablet Take 1 tablet (4 mg total) by mouth every 8 (eight) hours as needed. 60 tablet 3     No current facility-administered medications for this visit.        Review of Systems   Constitutional: Negative for activity change, appetite change, fever and unexpected weight change.   HENT: Negative for trouble swallowing and voice change.    Eyes: Positive for photophobia and visual disturbance.   Respiratory: Negative for apnea and shortness of breath.    Cardiovascular: Negative for chest pain and leg swelling.   Gastrointestinal: Positive for nausea and vomiting. Negative for constipation.   Genitourinary: Negative for difficulty urinating.   Musculoskeletal: Negative for back pain, gait problem and neck pain.   Skin: Negative for color change and pallor.   Neurological: Positive for headaches.  Negative for dizziness, seizures, syncope, weakness and numbness.   Hematological: Negative for adenopathy.   Psychiatric/Behavioral: Negative for agitation, confusion and decreased concentration.       Neurologic Exam     Mental Status   Oriented to person, place, and time.   Registration: recalls 3 of 3 objects.   Attention: normal. Concentration: normal.   Speech: speech is normal   Level of consciousness: alert  Knowledge: good.     Cranial Nerves     CN II   Visual fields full to confrontation.   Right visual field deficit: none  Left visual field deficit: none     CN III, IV, VI   Pupils are equal, round, and reactive to light.  Extraocular motions are normal.   Right pupil: Size: 3 mm. Shape: regular. Accommodation: intact.   Left pupil: Size: 3 mm. Shape: regular. Accommodation: intact.   CN III: no CN III palsy  CN VI: no CN VI palsy  Nystagmus: none   Diplopia: none  Ophthalmoparesis: none  Upgaze: normal  Downgaze: normal  Conjugate gaze: present    CN V   Facial sensation intact.   Right facial sensation deficit: none  Left facial sensation deficit: none    CN VII   Facial expression full, symmetric.   Right facial weakness: none  Left facial weakness: none    CN VIII   CN VIII normal.     CN IX, X   CN IX normal.   CN X normal.   Palate: symmetric    CN XI   CN XI normal.   Right sternocleidomastoid strength: normal  Left sternocleidomastoid strength: normal  Right trapezius strength: normal  Left trapezius strength: normal    CN XII   CN XII normal.   Tongue deviation: none    Motor Exam   Muscle bulk: normal  Overall muscle tone: normal  Right arm tone: normal  Left arm tone: normal  Right leg tone: normal  Left leg tone: normal    Strength   Strength 5/5 throughout.     Sensory Exam   Right arm light touch: normal  Left arm light touch: normal  Right leg light touch: normal  Left leg light touch: normal  Right arm vibration: normal  Left arm vibration: normal  Right leg vibration: normal  Left leg  vibration: normal  Right arm proprioception: normal  Left arm proprioception: normal  Right leg proprioception: normal  Left leg proprioception: normal  Right arm pinprick: normal  Left arm pinprick: normal  Right leg pinprick: normal  Left leg pinprick: normal    Gait, Coordination, and Reflexes     Gait  Gait: normal    Coordination   Romberg: negative  Finger to nose coordination: normal  Heel to shin coordination: normal  Tandem walking coordination: normal    Tremor   Resting tremor: absent    Reflexes   Right brachioradialis: 2+  Left brachioradialis: 2+  Right biceps: 2+  Left biceps: 2+  Right triceps: 2+  Left triceps: 2+  Right patellar: 2+  Left patellar: 2+  Right achilles: 2+  Left achilles: 2+  Right plantar: normal  Left plantar: normal      Physical Exam   Constitutional: She is oriented to person, place, and time. She appears well-developed and well-nourished.   HENT:   Head: Normocephalic and atraumatic.   Eyes: EOM are normal. Pupils are equal, round, and reactive to light.   Neck: Normal range of motion.   Cardiovascular: Normal rate and intact distal pulses.    Pulmonary/Chest: Effort normal. No apnea. No respiratory distress.   Musculoskeletal: Normal range of motion.   Neurological: She is alert and oriented to person, place, and time. She has normal strength. She has a normal Finger-Nose-Finger Test, a normal Heel to Shin Test, a normal Romberg Test and a normal Tandem Gait Test. Gait normal.   Reflex Scores:       Tricep reflexes are 2+ on the right side and 2+ on the left side.       Bicep reflexes are 2+ on the right side and 2+ on the left side.       Brachioradialis reflexes are 2+ on the right side and 2+ on the left side.       Patellar reflexes are 2+ on the right side and 2+ on the left side.       Achilles reflexes are 2+ on the right side and 2+ on the left side.  Skin: Skin is warm and dry.   Psychiatric: She has a normal mood and affect. Her speech is normal and behavior is  "normal. Thought content normal.   Vitals reviewed.      Vitals:    10/18/17 1555   BP: (!) 196/85   BP Location: Left arm   Patient Position: Sitting   BP Method: Large (Automatic)   Pulse: 72   Weight: 82.6 kg (182 lb 1.6 oz)   Height: 5' 2" (1.575 m)       Assessment & Plan:  Problem List Items Addressed This Visit     Chronic migraine without aura, with intractable migraine, so stated, with status migrainosus    Overview     Migraine headaches have been better controlled with Elavil. She has been having occasional migraines that have been responsive to her abortive therapies.         Tension headache      Other Visit Diagnoses     Chronic tension-type headache, intractable    -  Primary    Relevant Medications    tizanidine (ZANAFLEX) 4 MG tablet        Follow-up: Return in about 3 months (around 1/18/2018).  More than 50% of this 25 minute encounter was spent in counseling and coordinating care.      "

## 2017-10-23 ENCOUNTER — OFFICE VISIT (OUTPATIENT)
Dept: FAMILY MEDICINE | Facility: CLINIC | Age: 47
End: 2017-10-23
Payer: MEDICARE

## 2017-10-23 VITALS
DIASTOLIC BLOOD PRESSURE: 100 MMHG | HEART RATE: 87 BPM | BODY MASS INDEX: 35.06 KG/M2 | WEIGHT: 190.5 LBS | SYSTOLIC BLOOD PRESSURE: 164 MMHG | TEMPERATURE: 99 F | OXYGEN SATURATION: 96 % | HEIGHT: 62 IN

## 2017-10-23 DIAGNOSIS — Z23 NEEDS FLU SHOT: ICD-10-CM

## 2017-10-23 DIAGNOSIS — I10 ESSENTIAL (PRIMARY) HYPERTENSION: Primary | ICD-10-CM

## 2017-10-23 PROCEDURE — 99999 PR PBB SHADOW E&M-EST. PATIENT-LVL III: CPT | Mod: PBBFAC,,, | Performed by: FAMILY MEDICINE

## 2017-10-23 PROCEDURE — 99214 OFFICE O/P EST MOD 30 MIN: CPT | Mod: S$GLB,,, | Performed by: FAMILY MEDICINE

## 2017-10-23 PROCEDURE — G0008 ADMIN INFLUENZA VIRUS VAC: HCPCS | Mod: S$GLB,,, | Performed by: FAMILY MEDICINE

## 2017-10-23 PROCEDURE — 90686 IIV4 VACC NO PRSV 0.5 ML IM: CPT | Mod: S$GLB,,, | Performed by: FAMILY MEDICINE

## 2017-10-23 PROCEDURE — 99499 UNLISTED E&M SERVICE: CPT | Mod: S$PBB,,, | Performed by: FAMILY MEDICINE

## 2017-10-23 RX ORDER — AMLODIPINE BESYLATE 10 MG/1
10 TABLET ORAL DAILY
Qty: 90 TABLET | Refills: 1 | Status: SHIPPED | OUTPATIENT
Start: 2017-10-23 | End: 2017-12-28 | Stop reason: SINTOL

## 2017-10-23 RX ORDER — CEPHALEXIN 500 MG/1
CAPSULE ORAL
COMMUNITY
Start: 2017-10-04 | End: 2017-12-19

## 2017-10-23 RX ORDER — ESCITALOPRAM OXALATE 10 MG/1
TABLET ORAL
COMMUNITY
Start: 2017-10-05 | End: 2017-12-22 | Stop reason: DRUGHIGH

## 2017-10-23 RX ORDER — ATENOLOL 50 MG/1
50 TABLET ORAL 2 TIMES DAILY
Qty: 180 TABLET | Refills: 1 | Status: SHIPPED | OUTPATIENT
Start: 2017-10-23 | End: 2018-05-15 | Stop reason: DRUGHIGH

## 2017-10-23 NOTE — PROGRESS NOTES
"Subjective:       Patient ID: Giuliana Rodas is a 47 y.o. female.    Chief Complaint: Follow-up; Hypertension; and Sinusitis    Patient presents for follow-up . She has been having elevated blood pressures. She states she swaw a cardiologist and her stress test was normal, but blood pressure was elevated. No changes were made. She is on atenolol 50 mg and amlodipine 5 mg once daily      Hypertension   This is a chronic problem. The current episode started more than 1 year ago. The problem has been gradually worsening since onset. The problem is uncontrolled. Pertinent negatives include no anxiety, chest pain, orthopnea, palpitations, peripheral edema or shortness of breath. Past treatments include calcium channel blockers and beta blockers (atenolol 50 mg , amlodipine 5 mg). The current treatment provides significant improvement. There is no history of kidney disease, CAD/MI, heart failure or left ventricular hypertrophy.     Review of Systems   Respiratory: Negative for shortness of breath.    Cardiovascular: Negative for chest pain, palpitations and orthopnea.       Objective:       Vitals:    10/23/17 0855   BP: (!) 164/100   Pulse: 87   Temp: 99 °F (37.2 °C)   TempSrc: Oral   SpO2: 96%   Weight: 86.4 kg (190 lb 7.6 oz)   Height: 5' 2" (1.575 m)       Physical Exam   Constitutional: She is oriented to person, place, and time. She appears well-developed and well-nourished. No distress.   HENT:   Head: Normocephalic and atraumatic.   Eyes: Conjunctivae are normal.   Neck: Normal range of motion. Neck supple. Carotid bruit is not present.   Cardiovascular: Normal rate, regular rhythm and normal heart sounds.  Exam reveals no gallop and no friction rub.    No murmur heard.  Pulmonary/Chest: Effort normal and breath sounds normal. No respiratory distress. She has no wheezes. She has no rales.   Musculoskeletal: She exhibits no edema.   Neurological: She is alert and oriented to person, place, and time.   Skin: She is " not diaphoretic.       Assessment:       1. Essential (primary) hypertension    2. Needs flu shot        Plan:       Giuliana was seen today for follow-up, hypertension and sinusitis.    Diagnoses and all orders for this visit:    Essential (primary) hypertension  -     amlodipine (NORVASC) 10 MG tablet; Take 1 tablet (10 mg total) by mouth once daily.  -     atenolol (TENORMIN) 50 MG tablet; Take 1 tablet (50 mg total) by mouth 2 (two) times daily.  Increasing both amlodipine from 5 mg to 10 mg and atenolol to 50 mg twice a day from once a day.   Return in about 1 week (around 10/30/2017).    Other orders  -     Influenza - Quadrivalent (3 years & older) (PF)

## 2017-11-01 ENCOUNTER — OFFICE VISIT (OUTPATIENT)
Dept: FAMILY MEDICINE | Facility: CLINIC | Age: 47
End: 2017-11-01
Payer: MEDICARE

## 2017-11-01 VITALS
HEIGHT: 62 IN | SYSTOLIC BLOOD PRESSURE: 136 MMHG | OXYGEN SATURATION: 96 % | BODY MASS INDEX: 34.61 KG/M2 | HEART RATE: 75 BPM | WEIGHT: 188.06 LBS | DIASTOLIC BLOOD PRESSURE: 82 MMHG | TEMPERATURE: 99 F

## 2017-11-01 DIAGNOSIS — I10 ESSENTIAL HYPERTENSION: Primary | ICD-10-CM

## 2017-11-01 PROCEDURE — 99499 UNLISTED E&M SERVICE: CPT | Mod: S$PBB,,, | Performed by: FAMILY MEDICINE

## 2017-11-01 PROCEDURE — 99214 OFFICE O/P EST MOD 30 MIN: CPT | Mod: S$GLB,,, | Performed by: FAMILY MEDICINE

## 2017-11-01 PROCEDURE — 99999 PR PBB SHADOW E&M-EST. PATIENT-LVL III: CPT | Mod: PBBFAC,,, | Performed by: FAMILY MEDICINE

## 2017-11-01 NOTE — PROGRESS NOTES
Answers for HPI/ROS submitted by the patient on 10/30/2017   Hypertension  Chronicity: chronic  Onset: more than 1 year ago  Progression since onset: gradually worsening  Condition status: resistant  anxiety: No  blurred vision: Yes  chest pain: No  headaches: Yes  malaise/fatigue: No  neck pain: Yes  orthopnea: No  palpitations: Yes  peripheral edema: No  PND: No  shortness of breath: No  sweats: No  Agents associated with hypertension: NSAIDs, oral contraceptives  CAD risks: dyslipidemia, obesity, stress  Compliance problems: diet  Improvement on treatment: moderate

## 2017-11-01 NOTE — PROGRESS NOTES
"Subjective:       Patient ID: Giuliana Rodas is a 47 y.o. female.    Chief Complaint: Follow Up/ Hypertension    Hypertension   This is a chronic problem. The current episode started more than 1 year ago. The problem has been gradually worsening since onset. The problem is resistant. Associated symptoms include blurred vision, headaches, neck pain and palpitations. Pertinent negatives include no anxiety, chest pain, malaise/fatigue, orthopnea, peripheral edema, PND, shortness of breath or sweats. Agents associated with hypertension include NSAIDs and oral contraceptives. Risk factors for coronary artery disease include dyslipidemia, obesity and stress. The current treatment provides moderate improvement. Compliance problems include diet.  There is no history of kidney disease or CAD/MI.     Review of Systems   Constitutional: Negative for malaise/fatigue.   Eyes: Positive for blurred vision.   Respiratory: Negative for shortness of breath.    Cardiovascular: Positive for palpitations. Negative for chest pain, orthopnea and PND.   Musculoskeletal: Positive for neck pain.   Neurological: Positive for headaches.       Objective:       Vitals:    11/01/17 1125   BP: 136/82   Pulse: 75   Temp: 98.6 °F (37 °C)   TempSrc: Oral   SpO2: 96%   Weight: 85.3 kg (188 lb 0.8 oz)   Height: 5' 2" (1.575 m)         Physical Exam   Constitutional: She is oriented to person, place, and time. She appears well-developed and well-nourished. No distress.   HENT:   Head: Normocephalic and atraumatic.   Eyes: Conjunctivae are normal.   Neck: Normal range of motion. Neck supple. Carotid bruit is not present.   Cardiovascular: Normal rate, regular rhythm and normal heart sounds.  Exam reveals no gallop and no friction rub.    No murmur heard.  Pulmonary/Chest: Effort normal and breath sounds normal. No respiratory distress. She has no wheezes. She has no rales.   Musculoskeletal: She exhibits no edema.   Neurological: She is alert and " oriented to person, place, and time.   Skin: She is not diaphoretic.       Assessment:       1. Essential hypertension        Plan:       Giuliana was seen today for follow up/ hypertension.    Diagnoses and all orders for this visit:    Essential hypertension      Continue amlodipine 10 mg and atenolol 50 mg BID.

## 2017-11-17 ENCOUNTER — CLINICAL SUPPORT (OUTPATIENT)
Dept: FAMILY MEDICINE | Facility: CLINIC | Age: 47
End: 2017-11-17
Payer: MEDICARE

## 2017-11-17 VITALS — SYSTOLIC BLOOD PRESSURE: 136 MMHG | DIASTOLIC BLOOD PRESSURE: 88 MMHG

## 2017-11-17 DIAGNOSIS — I10 ESSENTIAL HYPERTENSION: Primary | ICD-10-CM

## 2017-11-17 PROCEDURE — 99999 PR PBB SHADOW E&M-EST. PATIENT-LVL III: CPT | Mod: PBBFAC,,,

## 2017-11-17 PROCEDURE — 99499 UNLISTED E&M SERVICE: CPT | Mod: S$GLB,,, | Performed by: FAMILY MEDICINE

## 2017-11-17 NOTE — PROGRESS NOTES
Giuliana LEIVA Adrianna 47 y.o. female is here today for Blood Pressure check.   History of HTN yes.    Review of patient's allergies indicates:   Allergen Reactions    Depo-provera [medroxyprogesterone] Anxiety    Dicyclomine Rash     Swelling of lip      Prozac [fluoxetine] Anxiety     Creatinine   Date Value Ref Range Status   07/13/2017 1.0 0.5 - 1.4 mg/dL Final     Sodium   Date Value Ref Range Status   07/13/2017 142 136 - 145 mmol/L Final     Potassium   Date Value Ref Range Status   07/13/2017 3.8 3.5 - 5.1 mmol/L Final   ]  Patient verifies taking blood pressure medications on a regular basis at the same time of the day.     Current Outpatient Prescriptions:     amitriptyline (ELAVIL) 100 MG tablet, Take 1 tablet by mouth every evening., Disp: 30 tablet, Rfl: 5    amlodipine (NORVASC) 10 MG tablet, Take 1 tablet (10 mg total) by mouth once daily., Disp: 90 tablet, Rfl: 1    atenolol (TENORMIN) 50 MG tablet, TAKE ONE TABLET BY MOUTH DAILY, Disp: 90 tablet, Rfl: 1    atenolol (TENORMIN) 50 MG tablet, Take 1 tablet (50 mg total) by mouth 2 (two) times daily., Disp: 180 tablet, Rfl: 1    BIFIDOBACTERIUM INFANTIS (ALIGN ORAL), Take 1 tablet by mouth once daily., Disp: , Rfl:     butalbital-acetaminophen-caffeine -40 mg (FIORICET, ESGIC) -40 mg per tablet, Take 1 tablet by mouth every 6 hours as needed., Disp: 40 tablet, Rfl: 1    carboxymethylcellulose (REFRESH PLUS) 0.5 % Dpet, Place 1 drop into both eyes 3 (three) times daily as needed., Disp: 30 mL, Rfl: 5    cephALEXin (KEFLEX) 500 MG capsule, , Disp: , Rfl:     clindamycin phosphate 1% (CLINDAGEL) 1 % gel, Apply topically 2 (two) times daily., Disp: 30 g, Rfl: 1    diclofenac (VOLTAREN) 50 MG EC tablet, TAKE ONE TABLET BY MOUTH TWICE DAILY WITH FOOD *DO NOT TAKE WITH ASPIRIN* ** NO ALCOHOL **, Disp: 60 tablet, Rfl: 2    escitalopram oxalate (LEXAPRO) 10 MG tablet, , Disp: , Rfl:     fluticasone (FLONASE) 50 mcg/actuation nasal spray, ONE  SPRAY IN EACH NOSTRIL DAILY, Disp: 16 g, Rfl: 2    lidocaine-prilocaine (EMLA) cream, , Disp: , Rfl:     loratadine (CLARITIN) 10 mg tablet, Take 10 mg by mouth once daily., Disp: , Rfl:     norethindrone-ethinyl estradiol (JUNEL FE 1/20) 1 mg-20 mcg (21)/75 mg (7) per tablet, TAKE ONE TABLET BY MOUTH DAILY *21 DAYS, Disp: 28 tablet, Rfl: 4    norethindrone-ethinyl estradiol (JUNEL FE 1/20) 1 mg-20 mcg (21)/75 mg (7) per tablet, TAKE ONE TABLET BY MOUTH EVERY DAY 21 DAYS, Disp: 28 tablet, Rfl: 0    pantoprazole (PROTONIX) 40 MG tablet, Take 40 mg by mouth once daily., Disp: , Rfl: 6    pravastatin (PRAVACHOL) 20 MG tablet, TAKE ONE TABLET BY MOUTH EVERY EVENING, Disp: 90 tablet, Rfl: 1    promethazine (PHENERGAN) 25 MG tablet, Take 1 tablet (25 mg total) by mouth every 6 (six) hours as needed for Nausea., Disp: 12 tablet, Rfl: 0    sucralfate (CARAFATE) 100 mg/mL suspension, TAKE TWO TEASPOONSFUL BY MOUTH FOUR TIMES DAILY ON AN EMPTY STOMACH ONE HOUR BEFORE meals AND AT BEDTIME, Disp: , Rfl: 0    sumatriptan (IMITREX) 100 MG tablet, Take 1 tablet (100 mg total) by mouth as needed for Migraine. Take at the onset of headache, may take 1 more in 1 hour if needed., Disp: 9 tablet, Rfl: 5    tizanidine (ZANAFLEX) 4 MG tablet, Take 1 tablet (4 mg total) by mouth every 8 (eight) hours as needed., Disp: 60 tablet, Rfl: 3  Does patient have record of home blood pressure readings yes.    Last dose of blood pressure medication was taken at 8:00 am.  Patient is asymptomatic.   Complains of patient has no complaints.    Vitals:    11/17/17 0834   BP: 136/88         Dr. oRjas notified.

## 2017-11-27 ENCOUNTER — OFFICE VISIT (OUTPATIENT)
Dept: FAMILY MEDICINE | Facility: CLINIC | Age: 47
End: 2017-11-27
Payer: MEDICARE

## 2017-11-27 VITALS
OXYGEN SATURATION: 97 % | HEART RATE: 77 BPM | SYSTOLIC BLOOD PRESSURE: 124 MMHG | HEIGHT: 62 IN | TEMPERATURE: 99 F | DIASTOLIC BLOOD PRESSURE: 84 MMHG

## 2017-11-27 DIAGNOSIS — I10 ESSENTIAL HYPERTENSION: Primary | ICD-10-CM

## 2017-11-27 PROCEDURE — 99999 PR PBB SHADOW E&M-EST. PATIENT-LVL II: CPT | Mod: PBBFAC,,, | Performed by: FAMILY MEDICINE

## 2017-11-27 PROCEDURE — 99214 OFFICE O/P EST MOD 30 MIN: CPT | Mod: S$GLB,,, | Performed by: FAMILY MEDICINE

## 2017-11-27 PROCEDURE — 99499 UNLISTED E&M SERVICE: CPT | Mod: S$PBB,,, | Performed by: FAMILY MEDICINE

## 2017-11-27 RX ORDER — VALSARTAN 160 MG/1
160 TABLET ORAL DAILY
Qty: 90 TABLET | Refills: 1 | Status: SHIPPED | OUTPATIENT
Start: 2017-11-27 | End: 2017-12-28 | Stop reason: DRUGHIGH

## 2017-11-27 NOTE — PROGRESS NOTES
"Subjective:       Patient ID: Giuliana Rodas is a 47 y.o. female.    Chief Complaint: No chief complaint on file.    Patient presents for follow up on her blood pressure. She is on atenolol 50 mg BID and amlodipine 10 mg. She has had increased swelling of her legs and ankles, which is uncomfortable. She has had a blood pressure ranges in the 130's-150's/80's-90's.  She had a stress test hat was normal.       Review of Systems   Constitutional: Negative for fatigue.   Respiratory: Negative for cough, chest tightness and shortness of breath.    Cardiovascular: Positive for leg swelling. Negative for chest pain and palpitations.   Gastrointestinal: Negative for abdominal pain.   Neurological: Negative for dizziness, syncope, light-headedness and headaches.       Objective:       Vitals:    11/27/17 1608   BP: 124/84   Pulse: 77   Temp: 98.9 °F (37.2 °C)   TempSrc: Oral   SpO2: 97%   Height: 5' 2" (1.575 m)       Physical Exam   Constitutional: She is oriented to person, place, and time. She appears well-developed and well-nourished. No distress.   HENT:   Head: Normocephalic and atraumatic.   Eyes: Conjunctivae are normal.   Neck: Normal range of motion. Neck supple. Carotid bruit is not present.   Cardiovascular: Normal rate, regular rhythm and normal heart sounds.  Exam reveals no gallop and no friction rub.    No murmur heard.  Pulmonary/Chest: Effort normal and breath sounds normal. No respiratory distress. She has no wheezes. She has no rales.   Musculoskeletal: She exhibits edema.   Neurological: She is alert and oriented to person, place, and time.   Skin: She is not diaphoretic.       Assessment:       1. Essential hypertension        Plan:       Diagnoses and all orders for this visit:    Essential hypertension  -     valsartan (DIOVAN) 160 MG tablet; Take 1 tablet (160 mg total) by mouth once daily.      Stop amlodipine and start valsartan for blood pressure control.      "

## 2017-12-19 ENCOUNTER — OFFICE VISIT (OUTPATIENT)
Dept: FAMILY MEDICINE | Facility: CLINIC | Age: 47
End: 2017-12-19
Payer: MEDICARE

## 2017-12-19 VITALS
TEMPERATURE: 99 F | SYSTOLIC BLOOD PRESSURE: 140 MMHG | DIASTOLIC BLOOD PRESSURE: 90 MMHG | OXYGEN SATURATION: 98 % | HEIGHT: 62 IN | BODY MASS INDEX: 35.46 KG/M2 | WEIGHT: 192.69 LBS | HEART RATE: 74 BPM | RESPIRATION RATE: 16 BRPM

## 2017-12-19 DIAGNOSIS — I10 HYPERTENSION, UNSPECIFIED TYPE: Primary | ICD-10-CM

## 2017-12-19 DIAGNOSIS — G43.711 CHRONIC MIGRAINE WITHOUT AURA, WITH INTRACTABLE MIGRAINE, SO STATED, WITH STATUS MIGRAINOSUS: ICD-10-CM

## 2017-12-19 PROCEDURE — 99999 PR PBB SHADOW E&M-EST. PATIENT-LVL V: CPT | Mod: PBBFAC,,, | Performed by: PHYSICIAN ASSISTANT

## 2017-12-19 PROCEDURE — 99499 UNLISTED E&M SERVICE: CPT | Mod: S$PBB,,, | Performed by: PHYSICIAN ASSISTANT

## 2017-12-19 PROCEDURE — 96372 THER/PROPH/DIAG INJ SC/IM: CPT | Mod: S$GLB,,, | Performed by: FAMILY MEDICINE

## 2017-12-19 RX ORDER — CETIRIZINE HYDROCHLORIDE 10 MG/1
10 TABLET ORAL DAILY
COMMUNITY
End: 2018-03-15

## 2017-12-19 RX ORDER — TIZANIDINE 4 MG/1
4 TABLET ORAL EVERY 8 HOURS PRN
Refills: 3 | COMMUNITY
Start: 2017-12-15 | End: 2018-07-30 | Stop reason: SDUPTHER

## 2017-12-19 RX ORDER — METHOCARBAMOL 500 MG/1
500 TABLET, FILM COATED ORAL 3 TIMES DAILY
COMMUNITY
End: 2017-12-28

## 2017-12-19 RX ORDER — BUTALBITAL, ACETAMINOPHEN AND CAFFEINE 50; 325; 40 MG/1; MG/1; MG/1
1 TABLET ORAL EVERY 6 HOURS PRN
Qty: 40 TABLET | Refills: 1 | Status: SHIPPED | OUTPATIENT
Start: 2017-12-19 | End: 2019-08-28

## 2017-12-19 RX ORDER — KETOROLAC TROMETHAMINE 30 MG/ML
60 INJECTION, SOLUTION INTRAMUSCULAR; INTRAVENOUS
Status: COMPLETED | OUTPATIENT
Start: 2017-12-19 | End: 2017-12-19

## 2017-12-19 RX ADMIN — KETOROLAC TROMETHAMINE 60 MG: 30 INJECTION, SOLUTION INTRAMUSCULAR; INTRAVENOUS at 01:12

## 2017-12-19 NOTE — PATIENT INSTRUCTIONS
Reach and Hold Exercise       Do this exercise on your hands and knees. Keep your knees under your hips and your hands under your shoulders. Keep your spine in a neutral position (not arched or sagging). Keep your ears in line with your shoulders. Hold for a few seconds before starting the exercise:  1. Tighten your abdominal muscles and raise one arm straight in front of you, palm down. Hold for 5 seconds, then lower. Repeat 5 times.  2. Do the exercise again, this time lifting your arm to the side. Repeat 5 times.  3. Do the exercise again, this time lifting your arm backward, palm up. Repeat 5 times.  Switch sides and do each exercise with the other arm.  Date Last Reviewed: 8/16/2015  © 2758-9139 [employer]. [address] [place] [address]. All rights reserved. This information is not intended as a substitute for professional medical care. Always follow your healthcare professional's instructions.        Shoulder Squeeze Exercise    To start, sit in a chair with your feet flat on the floor. Shift your weight slightly forward to avoid rounding your back. Relax. Keep your ears, shoulders, and hips aligned:  · Raise your arms to shoulder height, elbows bent and palms forward.  · Move your arms back, squeezing your shoulder blades together.  · Hold for 10 seconds. Return to starting position.   · Repeat 5 times.   For your safety, check with your healthcare provider before starting an exercise program.   Date Last Reviewed: 8/16/2015  © 4442-0508 [employer]. [address] [place] [address]. All rights reserved. This information is not intended as a substitute for professional medical care. Always follow your healthcare professional's instructions.        Neck Exercises: Neck Flex  To start, sit in a chair with your feet flat on the floor. Your weight should be slightly forward so that youre balanced evenly on your buttocks. Relax your shoulders and keep your head  level. Avoid arching your back or rounding your shoulders. Using a chair with arms may help you keep your balance:  · Rest the back of your left hand against your lower back. Place your right palm on the top of your head.  · Gently pull your head forward and down until you feel a stretch in the muscles in the back of your neck. Dont force the motion.  · Hold for 20 seconds, then return to starting position. Switch arms.    Date Last Reviewed: 10/2/2015  © 8297-4492 [employer]. [address] [place] [address]. All rights reserved. This information is not intended as a substitute for professional medical care. Always follow your healthcare professional's instructions.        Head Tilt / Upper Trapezius Stretch (Flexibility)    4. Sit up straight in a chair with your head and neck in a neutral position, ears in line with shoulders. Hold the edge of your chair seat with your right hand. Tuck your chin in slightly.  5. Tilt your head to the left, while looking straight ahead.  6. Put your left hand on the right side of your head. Gently pull your head to the left. Hold for 30 to 60 seconds. Use gentle pressure to increase the stretch. Dont force your head into position.  7. Return your head and neck to the neutral position.  8. Repeat this exercise 2 times, or as instructed.  9. Switch sides and repeat 2 times, or as instructed.  Challenge yourself  Tuck one end of a towel under your left arm. Then bring the other end over your right shoulder. Pull the towel down on your right shoulder with both hands as you side-bend your head to the left. Repeat with the other side.   Date Last Reviewed: 3/10/2016  © 8287-7813 [employer]. [address] [place] [address]. All rights reserved. This information is not intended as a substitute for professional medical care. Always follow your healthcare professional's instructions.

## 2017-12-19 NOTE — PROGRESS NOTES
Subjective:       Patient ID: Giuliana Rodas is a 47 y.o. female with multiple medical diagnoses as listed in the medical history and problem list that presents for Migraine (x 6 days)  .    Chief Complaint: Migraine (x 6 days)      Migraine    This is a chronic problem. Episode onset: most recent flair last wednesday  The problem occurs intermittently. The problem has been waxing and waning. The pain is located in the frontal, bilateral and occipital region. The pain radiates to the right neck and left neck. The pain quality is similar to prior headaches. Quality: tightness for neck and occiptal; sharp frontal pains and throbbing  Associated symptoms include nausea, phonophobia and photophobia. Pertinent negatives include no rhinorrhea or vomiting. Treatments tried: fiorcet    Hypertension   This is a new problem. The current episode started more than 1 month ago. The problem has been gradually improving since onset. Associated symptoms include headaches. Pertinent negatives include no anxiety, chest pain, palpitations or shortness of breath. Risk factors for coronary artery disease include obesity. Past treatments include calcium channel blockers. The current treatment provides moderate improvement.     Review of Systems   HENT: Negative for congestion and rhinorrhea.         Nasal dry    Eyes: Positive for photophobia.   Respiratory: Positive for chest tightness (working with GI; triggered with deep breath; some improvement with carafate ---negative stress test ). Negative for shortness of breath and wheezing.    Cardiovascular: Negative for chest pain and palpitations.   Gastrointestinal: Positive for nausea. Negative for vomiting.   Neurological: Positive for headaches.         PAST MEDICAL HISTORY:  Past Medical History:   Diagnosis Date    Bile reflux gastritis     Eczema     Fibromyalgia     Gastroparesis     dr. harden    GERD (gastroesophageal reflux disease)     Hyperglyceridemia      Hyperlipidemia     Hypertension     IC (interstitial cystitis)     dr. labadie    Psoriasis     Tension headache        SOCIAL HISTORY:  Social History     Social History    Marital status:      Spouse name: N/A    Number of children: 2    Years of education: N/A     Occupational History     [employer]     Social History Main Topics    Smoking status: Never Smoker    Smokeless tobacco: Never Used    Alcohol use No    Drug use: No    Sexual activity: Yes     Partners: Male     Other Topics Concern    Not on file     Social History Narrative    Lives at home with  and daughter       ALLERGIES AND MEDICATIONS: updated and reviewed.  Review of patient's allergies indicates:   Allergen Reactions    Depo-provera [medroxyprogesterone] Anxiety    Dicyclomine Rash     Swelling of lip      Prozac [fluoxetine] Anxiety     Current Outpatient Prescriptions   Medication Sig Dispense Refill    amitriptyline (ELAVIL) 100 MG tablet Take 1 tablet by mouth every evening. 30 tablet 5    atenolol (TENORMIN) 50 MG tablet Take 1 tablet (50 mg total) by mouth 2 (two) times daily. 180 tablet 1    BIFIDOBACTERIUM INFANTIS (ALIGN ORAL) Take 1 tablet by mouth once daily.      butalbital-acetaminophen-caffeine -40 mg (FIORICET, ESGIC) -40 mg per tablet Take 1 tablet by mouth every 6 (six) hours as needed. 40 tablet 1    cetirizine (ZYRTEC) 10 MG tablet Take 10 mg by mouth once daily.      clindamycin phosphate 1% (CLINDAGEL) 1 % gel Apply topically 2 (two) times daily. 30 g 1    escitalopram oxalate (LEXAPRO) 10 MG tablet       fluticasone (FLONASE) 50 mcg/actuation nasal spray ONE SPRAY IN EACH NOSTRIL DAILY 16 g 2    lidocaine-prilocaine (EMLA) cream       methocarbamol (ROBAXIN) 500 MG Tab Take 500 mg by mouth 3 (three) times daily.      norethindrone-ethinyl estradiol (JUNEL FE 1/20) 1 mg-20 mcg (21)/75 mg (7) per tablet TAKE ONE TABLET BY MOUTH EVERY DAY 21 DAYS 28 tablet 11     "pantoprazole (PROTONIX) 40 MG tablet Take 40 mg by mouth once daily.  6    pravastatin (PRAVACHOL) 20 MG tablet TAKE ONE TABLET BY MOUTH EVERY EVENING 90 tablet 1    sucralfate (CARAFATE) 100 mg/mL suspension TAKE TWO TEASPOONSFUL BY MOUTH FOUR TIMES DAILY ON AN EMPTY STOMACH ONE HOUR BEFORE meals AND AT BEDTIME  0    tiZANidine (ZANAFLEX) 4 MG tablet Take 4 mg by mouth every 8 (eight) hours as needed.  3    valsartan (DIOVAN) 160 MG tablet Take 1 tablet (160 mg total) by mouth once daily. 90 tablet 1    amlodipine (NORVASC) 10 MG tablet Take 1 tablet (10 mg total) by mouth once daily. 90 tablet 1     No current facility-administered medications for this visit.          Objective:   BP (!) 140/90   Pulse 74   Temp 98.6 °F (37 °C) (Oral)   Resp 16   Ht 5' 2" (1.575 m)   Wt 87.4 kg (192 lb 10.9 oz)   SpO2 98%   BMI 35.24 kg/m²      Physical Exam   Constitutional: She is oriented to person, place, and time. No distress.   HENT:   Head: Normocephalic and atraumatic.   Eyes: Conjunctivae and EOM are normal.   Neck: Muscular tenderness present.   Cardiovascular: Normal rate and regular rhythm.    Pulmonary/Chest: Effort normal and breath sounds normal. She has no wheezes.   Neurological: She is alert and oriented to person, place, and time.   Skin: Skin is warm. No erythema.           Assessment:       1. Hypertension, unspecified type    2. Chronic migraine without aura, with intractable migraine, so stated, with status migrainosus        Plan:       Hypertension, unspecified type  Reading was the same on recheck so I will have her come back and check with nurse in 2-3 weeks with her cuff as well. If still 140/90 or higher, will need to add another medication.     Chronic migraine without aura, with intractable migraine, so stated, with status migrainosus  -     ketorolac injection 60 mg; Inject 2 mLs (60 mg total) into the muscle one time.  Refill Fioricet.   Heat and stretches for neck too.           No " Follow-up on file.

## 2017-12-21 ENCOUNTER — CLINICAL SUPPORT (OUTPATIENT)
Dept: FAMILY MEDICINE | Facility: CLINIC | Age: 47
End: 2017-12-21
Payer: MEDICARE

## 2017-12-21 VITALS — DIASTOLIC BLOOD PRESSURE: 100 MMHG | SYSTOLIC BLOOD PRESSURE: 182 MMHG

## 2017-12-21 DIAGNOSIS — G43.711 CHRONIC MIGRAINE WITHOUT AURA, WITH INTRACTABLE MIGRAINE, SO STATED, WITH STATUS MIGRAINOSUS: ICD-10-CM

## 2017-12-21 DIAGNOSIS — I10 ESSENTIAL HYPERTENSION: Primary | ICD-10-CM

## 2017-12-21 PROCEDURE — 99499 UNLISTED E&M SERVICE: CPT | Mod: S$GLB,,, | Performed by: FAMILY MEDICINE

## 2017-12-21 PROCEDURE — 99999 PR PBB SHADOW E&M-EST. PATIENT-LVL I: CPT | Mod: PBBFAC,,,

## 2017-12-21 PROCEDURE — 96372 THER/PROPH/DIAG INJ SC/IM: CPT | Mod: S$GLB,,, | Performed by: FAMILY MEDICINE

## 2017-12-21 RX ORDER — KETOROLAC TROMETHAMINE 30 MG/ML
60 INJECTION, SOLUTION INTRAMUSCULAR; INTRAVENOUS
Status: COMPLETED | OUTPATIENT
Start: 2017-12-21 | End: 2017-12-21

## 2017-12-21 RX ADMIN — KETOROLAC TROMETHAMINE 60 MG: 30 INJECTION, SOLUTION INTRAMUSCULAR; INTRAVENOUS at 11:12

## 2017-12-21 NOTE — PROGRESS NOTES
Giuliana H Adrianna 47 y.o. female is here today for Blood Pressure check.   History of HTN yes.    Review of patient's allergies indicates:   Allergen Reactions    Depo-provera [medroxyprogesterone] Anxiety    Dicyclomine Rash     Swelling of lip      Prozac [fluoxetine] Anxiety     Creatinine   Date Value Ref Range Status   07/13/2017 1.0 0.5 - 1.4 mg/dL Final     Sodium   Date Value Ref Range Status   07/13/2017 142 136 - 145 mmol/L Final     Potassium   Date Value Ref Range Status   07/13/2017 3.8 3.5 - 5.1 mmol/L Final   ]  Patient verifies taking blood pressure medications on a regular basis at the same time of the day.     Current Outpatient Prescriptions:     amitriptyline (ELAVIL) 100 MG tablet, Take 1 tablet by mouth every evening., Disp: 30 tablet, Rfl: 5    atenolol (TENORMIN) 50 MG tablet, Take 1 tablet (50 mg total) by mouth 2 (two) times daily., Disp: 180 tablet, Rfl: 1    BIFIDOBACTERIUM INFANTIS (ALIGN ORAL), Take 1 tablet by mouth once daily., Disp: , Rfl:     butalbital-acetaminophen-caffeine -40 mg (FIORICET, ESGIC) -40 mg per tablet, Take 1 tablet by mouth every 6 (six) hours as needed., Disp: 40 tablet, Rfl: 1    cetirizine (ZYRTEC) 10 MG tablet, Take 10 mg by mouth once daily., Disp: , Rfl:     clindamycin phosphate 1% (CLINDAGEL) 1 % gel, Apply topically 2 (two) times daily., Disp: 30 g, Rfl: 1    escitalopram oxalate (LEXAPRO) 10 MG tablet, , Disp: , Rfl:     fluticasone (FLONASE) 50 mcg/actuation nasal spray, ONE SPRAY IN EACH NOSTRIL DAILY, Disp: 16 g, Rfl: 2    lidocaine-prilocaine (EMLA) cream, , Disp: , Rfl:     methocarbamol (ROBAXIN) 500 MG Tab, Take 500 mg by mouth 3 (three) times daily., Disp: , Rfl:     norethindrone-ethinyl estradiol (JUNEL FE 1/20) 1 mg-20 mcg (21)/75 mg (7) per tablet, TAKE ONE TABLET BY MOUTH EVERY DAY 21 DAYS, Disp: 28 tablet, Rfl: 11    pantoprazole (PROTONIX) 40 MG tablet, Take 40 mg by mouth once daily., Disp: , Rfl: 6    pravastatin  (PRAVACHOL) 20 MG tablet, TAKE ONE TABLET BY MOUTH EVERY EVENING, Disp: 90 tablet, Rfl: 1    sucralfate (CARAFATE) 100 mg/mL suspension, TAKE TWO TEASPOONSFUL BY MOUTH FOUR TIMES DAILY ON AN EMPTY STOMACH ONE HOUR BEFORE meals AND AT BEDTIME, Disp: , Rfl: 0    tiZANidine (ZANAFLEX) 4 MG tablet, Take 4 mg by mouth every 8 (eight) hours as needed., Disp: , Rfl: 3    valsartan (DIOVAN) 160 MG tablet, Take 1 tablet (160 mg total) by mouth once daily., Disp: 90 tablet, Rfl: 1    amlodipine (NORVASC) 10 MG tablet, Take 1 tablet (10 mg total) by mouth once daily., Disp: 90 tablet, Rfl: 1  Does patient have record of home blood pressure readings no.    Last dose of blood pressure medication was taken at 8 am.  Patient is symptomatic.   Complains of migraine and nausea. She was seen a few days ago for this and given a toradol injection.    Vitals:    12/21/17 1035 12/21/17 1103   BP: (!) 190/100 (!) 182/100         Dr. Rojas notified.     Patient advised per Dr Rojas to take an additional diovan 160 mg.  She was given toradol 60 mg injection for migraine and advised to follow up with her neurologist Dr Santacruz.  Patient verbalized understanding.

## 2017-12-22 ENCOUNTER — OFFICE VISIT (OUTPATIENT)
Dept: FAMILY MEDICINE | Facility: CLINIC | Age: 47
End: 2017-12-22
Payer: MEDICARE

## 2017-12-22 ENCOUNTER — TELEPHONE (OUTPATIENT)
Dept: FAMILY MEDICINE | Facility: CLINIC | Age: 47
End: 2017-12-22

## 2017-12-22 VITALS
SYSTOLIC BLOOD PRESSURE: 180 MMHG | WEIGHT: 193.31 LBS | DIASTOLIC BLOOD PRESSURE: 96 MMHG | HEART RATE: 79 BPM | TEMPERATURE: 99 F | BODY MASS INDEX: 35.57 KG/M2 | OXYGEN SATURATION: 96 % | HEIGHT: 62 IN

## 2017-12-22 DIAGNOSIS — I10 HYPERTENSION, UNSPECIFIED TYPE: Primary | ICD-10-CM

## 2017-12-22 PROCEDURE — 99999 PR PBB SHADOW E&M-EST. PATIENT-LVL V: CPT | Mod: PBBFAC,,, | Performed by: PHYSICIAN ASSISTANT

## 2017-12-22 PROCEDURE — 99499 UNLISTED E&M SERVICE: CPT | Mod: S$PBB,,, | Performed by: PHYSICIAN ASSISTANT

## 2017-12-22 PROCEDURE — 99214 OFFICE O/P EST MOD 30 MIN: CPT | Mod: S$GLB,,, | Performed by: PHYSICIAN ASSISTANT

## 2017-12-22 RX ORDER — HYDROCHLOROTHIAZIDE 12.5 MG/1
12.5 TABLET ORAL DAILY
Qty: 30 TABLET | Refills: 0 | Status: SHIPPED | OUTPATIENT
Start: 2017-12-22 | End: 2017-12-28 | Stop reason: ALTCHOICE

## 2017-12-22 RX ORDER — ESCITALOPRAM OXALATE 20 MG/1
20 TABLET ORAL NIGHTLY
COMMUNITY
Start: 2017-12-21 | End: 2018-10-11 | Stop reason: SDUPTHER

## 2017-12-22 NOTE — PATIENT INSTRUCTIONS
Check blood pressure twice a day. One hour after taking medication and after dinner or before bed. Sit for 5 minutes. Keep arm at heart level.

## 2017-12-22 NOTE — PROGRESS NOTES
Subjective:       Patient ID: Giuliana Rodas is a 47 y.o. female with multiple medical diagnoses as listed in the medical history and problem list that presents for Hypertension  .    Chief Complaint: Hypertension      Hypertension   This is a chronic problem. The current episode started more than 1 month ago. The problem is unchanged. The problem is uncontrolled. Associated symptoms include anxiety and headaches. Pertinent negatives include no blurred vision, chest pain, palpitations, peripheral edema, PND or shortness of breath. There are no associated agents to hypertension. Risk factors for coronary artery disease include obesity. Treatments tried: Diovan 160 --took 2 pills but not at the same time today. second dose around 11. Atenolol 50 mg.  The current treatment provides mild improvement.     Review of Systems   Eyes: Negative for blurred vision.   Respiratory: Negative for shortness of breath.    Cardiovascular: Negative for chest pain, palpitations and PND.   Neurological: Positive for headaches.         PAST MEDICAL HISTORY:  Past Medical History:   Diagnosis Date    Bile reflux gastritis     Eczema     Fibromyalgia     Gastroparesis     dr. harden    GERD (gastroesophageal reflux disease)     Hyperglyceridemia     Hyperlipidemia     Hypertension     IC (interstitial cystitis)     dr. labadie    Psoriasis     Tension headache        SOCIAL HISTORY:  Social History     Social History    Marital status:      Spouse name: N/A    Number of children: 2    Years of education: N/A     Occupational History     [employer]     Social History Main Topics    Smoking status: Never Smoker    Smokeless tobacco: Never Used    Alcohol use No    Drug use: No    Sexual activity: Yes     Partners: Male     Other Topics Concern    Not on file     Social History Narrative    Lives at home with  and daughter       ALLERGIES AND MEDICATIONS: updated and reviewed.  Review of patient's  allergies indicates:   Allergen Reactions    Depo-provera [medroxyprogesterone] Anxiety    Dicyclomine Rash     Swelling of lip      Prozac [fluoxetine] Anxiety     Current Outpatient Prescriptions   Medication Sig Dispense Refill    amitriptyline (ELAVIL) 100 MG tablet Take 1 tablet by mouth every evening. 30 tablet 5    atenolol (TENORMIN) 50 MG tablet Take 1 tablet (50 mg total) by mouth 2 (two) times daily. 180 tablet 1    BIFIDOBACTERIUM INFANTIS (ALIGN ORAL) Take 1 tablet by mouth once daily.      butalbital-acetaminophen-caffeine -40 mg (FIORICET, ESGIC) -40 mg per tablet Take 1 tablet by mouth every 6 (six) hours as needed. 40 tablet 1    cetirizine (ZYRTEC) 10 MG tablet Take 10 mg by mouth once daily.      clindamycin phosphate 1% (CLINDAGEL) 1 % gel Apply topically 2 (two) times daily. 30 g 1    escitalopram oxalate (LEXAPRO) 20 MG tablet       fluticasone (FLONASE) 50 mcg/actuation nasal spray ONE SPRAY IN EACH NOSTRIL DAILY 16 g 2    lidocaine-prilocaine (EMLA) cream       methocarbamol (ROBAXIN) 500 MG Tab Take 500 mg by mouth 3 (three) times daily.      norethindrone-ethinyl estradiol (JUNEL FE 1/20) 1 mg-20 mcg (21)/75 mg (7) per tablet TAKE ONE TABLET BY MOUTH EVERY DAY 21 DAYS 28 tablet 11    pantoprazole (PROTONIX) 40 MG tablet Take 40 mg by mouth once daily.  6    pravastatin (PRAVACHOL) 20 MG tablet TAKE ONE TABLET BY MOUTH EVERY EVENING 90 tablet 1    sucralfate (CARAFATE) 100 mg/mL suspension TAKE TWO TEASPOONSFUL BY MOUTH FOUR TIMES DAILY ON AN EMPTY STOMACH ONE HOUR BEFORE meals AND AT BEDTIME  0    tiZANidine (ZANAFLEX) 4 MG tablet Take 4 mg by mouth every 8 (eight) hours as needed.  3    amlodipine (NORVASC) 10 MG tablet Take 1 tablet (10 mg total) by mouth once daily. 90 tablet 1    hydroCHLOROthiazide (HYDRODIURIL) 12.5 MG Tab Take 1 tablet (12.5 mg total) by mouth once daily. 30 tablet 0    valsartan (DIOVAN) 160 MG tablet Take 1 tablet (160 mg total) by  "mouth once daily. 90 tablet 1     No current facility-administered medications for this visit.          Objective:   BP (!) 180/96   Pulse 79   Temp 99.2 °F (37.3 °C) (Oral)   Ht 5' 2" (1.575 m)   Wt 87.7 kg (193 lb 5.5 oz)   SpO2 96%   BMI 35.36 kg/m²      Physical Exam   Constitutional: She is oriented to person, place, and time. No distress.   HENT:   Head: Normocephalic and atraumatic.   Eyes: Conjunctivae and EOM are normal.   Cardiovascular: Normal rate and regular rhythm.    Pulmonary/Chest: Effort normal and breath sounds normal. She has no wheezes.   Lymphadenopathy:     She has no cervical adenopathy.   Neurological: She is alert and oriented to person, place, and time.   Skin: Skin is warm. No erythema.           Assessment:       1. Hypertension, unspecified type        Plan:       Hypertension, unspecified type  -     hydroCHLOROthiazide (HYDRODIURIL) 12.5 MG Tab; Take 1 tablet (12.5 mg total) by mouth once daily.  Dispense: 30 tablet; Refill: 0  Check blood pressure twice a day. One hour after taking medication and after dinner or before bed. Sit for 5 minutes. Keep arm at heart level.     Plan:  DIOVAN 320 mg and HCTz 25 mg daily and her Atenolol 50 BID.   Log sheet given.  Follow up next week for BP check.     Will see if she can getting to HTN program.   Other orders  -     Hypertension Digital Medicine (HDMP) Enrollment Order  -     Hypertension Digital Medicine (HDMP): Assign Onboarding Questionnaires            Return in about 2 weeks (around 1/5/2018) for nurse BP.  "

## 2017-12-22 NOTE — TELEPHONE ENCOUNTER
----- Message from Celia Foster sent at 12/22/2017  8:28 AM CST -----  Contact: self  Patient blood reading is 178/101 after taking medication. Please call patient at [phone].

## 2017-12-27 ENCOUNTER — PATIENT MESSAGE (OUTPATIENT)
Dept: ADMINISTRATIVE | Facility: OTHER | Age: 47
End: 2017-12-27

## 2017-12-28 ENCOUNTER — DOCUMENTATION ONLY (OUTPATIENT)
Dept: FAMILY MEDICINE | Facility: CLINIC | Age: 47
End: 2017-12-28

## 2017-12-28 ENCOUNTER — PATIENT MESSAGE (OUTPATIENT)
Dept: ADMINISTRATIVE | Facility: OTHER | Age: 47
End: 2017-12-28

## 2017-12-28 ENCOUNTER — NURSE TRIAGE (OUTPATIENT)
Dept: FAMILY MEDICINE | Facility: CLINIC | Age: 47
End: 2017-12-28

## 2017-12-28 ENCOUNTER — CLINICAL SUPPORT (OUTPATIENT)
Dept: FAMILY MEDICINE | Facility: CLINIC | Age: 47
End: 2017-12-28
Payer: MEDICARE

## 2017-12-28 ENCOUNTER — PATIENT OUTREACH (OUTPATIENT)
Dept: OTHER | Facility: OTHER | Age: 47
End: 2017-12-28

## 2017-12-28 VITALS — DIASTOLIC BLOOD PRESSURE: 106 MMHG | SYSTOLIC BLOOD PRESSURE: 172 MMHG

## 2017-12-28 DIAGNOSIS — I10 ESSENTIAL HYPERTENSION: Primary | ICD-10-CM

## 2017-12-28 PROCEDURE — 99499 UNLISTED E&M SERVICE: CPT | Mod: S$GLB,,, | Performed by: PHYSICIAN ASSISTANT

## 2017-12-28 RX ORDER — VALSARTAN 320 MG/1
320 TABLET ORAL DAILY
Qty: 90 TABLET | Refills: 3 | Status: SHIPPED | OUTPATIENT
Start: 2017-12-28 | End: 2018-07-23 | Stop reason: ALTCHOICE

## 2017-12-28 RX ORDER — CHLORTHALIDONE 25 MG/1
25 TABLET ORAL DAILY
Qty: 30 TABLET | Refills: 2 | Status: SHIPPED | OUTPATIENT
Start: 2017-12-28 | End: 2018-01-04

## 2017-12-28 NOTE — LETTER
[address] [place] [address]     Dear Giuliana Rodas,    Welcome to the [facility]!         My name is No care team member to display and I am your dedicated Digital Medicine clinician.  As an expert in medication management, I will help ensure that the medications you are taking continue to provide you with the intended benefits.      I am Luis Carcamo and I will be your health  for the duration of the program.  My  job is to help you identify lifestyle changes to improve your blood pressure control.  We will talk about nutrition, exercise, and other ways that you may be able to adjust your current habits to better your health. Together, we will work to improve your overall health and encourage you to meet your goals for a healthier lifestyle.    What we expect from YOU:    You will need to take blood pressure readings multiple times a week and no less than one reading per week.   It is important that you take your measurements at different times during the day, when possible.     What you should expect from your Digital Medicine Care Team:   We will provide you with education about high blood pressure, including lifestyle changes that could help you to control your blood pressure.   We will review your weekly readings and provide you with monthly blood pressure progress reports after you have been in the program for more than 30 days.   We will send monthly progress reports on your blood pressure control to your physician so they can follow along with your progress as well.    You will be able to reach me by phone at  or through your [facility] account by clicking my name under Care Team on the right side of the home screen.    I look forward to working with you to achieve your blood pressure goals!    Sincerely,    No care team member to display  Your personal clinician    Please visit [email or web address] to learn more  about high blood pressure and what you can do lower your blood pressure.                                                                                           Giuliana Rodas  [address] [place] [address] [place] [address]

## 2017-12-28 NOTE — LETTER
No care team member to display  [address] [facility] [place] [address]     Dear Giuliana Rodas,    Welcome to the [facility]!         My name is No care team member to display and I will be your dedicated Digital Medicine clinician. As an expert in medication management, I will help manage your medications and identify lifestyle changes to improve blood pressure control. Together, we will work to improve your overall health.     What we expect from YOU:    You will need to take blood pressure readings multiple times a week and no less than one reading per week.   It is important that you take your measurements at different times during the day, when possible.     What you should expect from your Digital Medicine Care Team:   I will provide you with education about high blood pressure, including lifestyle changes that could help you to control your blood pressure.   I will review your weekly readings and provide you with monthly blood pressure progress reports after you have been in the program for more than 30 days.   I will send monthly progress reports on your blood pressure control to your physician so they can follow along with your progress as well.    You will be able to reach me by phone at  or through your [facility] account by clicking my name under Care Team on the right side of the home screen.    I look forward to working with you to achieve your blood pressure goals!    Sincerely,    No care team member to display  Your Personal Clinical Pharmacist    Please visit [email or web address] to learn more about high blood pressure and what you can do lower your blood pressure.                                                                                                    Giuliana Rodas  [address] [place] [address]

## 2017-12-28 NOTE — LETTER
Bela Torres, PharmD  [address] [place] [address]     Dear Giuliana Rodas,    Welcome to the [facility]!         My name is Placido MillsD and I am your dedicated Digital Medicine clinician.  As an expert in medication management, I will help ensure that the medications you are taking continue to provide you with the intended benefits.      I am Luis Carcamo and I will be your health  for the duration of the program.  My  job is to help you identify lifestyle changes to improve your blood pressure control.  We will talk about nutrition, exercise, and other ways that you may be able to adjust your current habits to better your health. Together, we will work to improve your overall health and encourage you to meet your goals for a healthier lifestyle.    What we expect from YOU:    You will need to take blood pressure readings multiple times a week and no less than one reading per week.   It is important that you take your measurements at different times during the day, when possible.     What you should expect from your Digital Medicine Care Team:   We will provide you with education about high blood pressure, including lifestyle changes that could help you to control your blood pressure.   We will review your weekly readings and provide you with monthly blood pressure progress reports after you have been in the program for more than 30 days.   We will send monthly progress reports on your blood pressure control to your physician so they can follow along with your progress as well.    You will be able to reach me by phone at [phone] or through your [facility] account by clicking my name under Care Team on the right side of the home screen.    I look forward to working with you to achieve your blood pressure goals!    Sincerely,    Bela Torres, PharmD  Your personal clinician    Please visit  [email or web address] to learn more about high blood pressure and what you can do lower your blood pressure.                                                                                           Giuliana Rodas  [address] [place] [id number]

## 2017-12-28 NOTE — PROGRESS NOTES
Giuliana LEIVA Adrianna 47 y.o. female is here today for Blood Pressure check.   History of HTN yes.    Review of patient's allergies indicates:   Allergen Reactions    Depo-provera [medroxyprogesterone] Anxiety    Dicyclomine Rash     Swelling of lip      Prozac [fluoxetine] Anxiety     Creatinine   Date Value Ref Range Status   07/13/2017 1.0 0.5 - 1.4 mg/dL Final     Sodium   Date Value Ref Range Status   07/13/2017 142 136 - 145 mmol/L Final     Potassium   Date Value Ref Range Status   07/13/2017 3.8 3.5 - 5.1 mmol/L Final   ]  Patient verifies taking blood pressure medications on a regular basis at the same time of the day.     Current Outpatient Prescriptions:     amitriptyline (ELAVIL) 100 MG tablet, Take 1 tablet by mouth every evening., Disp: 30 tablet, Rfl: 5    amlodipine (NORVASC) 10 MG tablet, Take 1 tablet (10 mg total) by mouth once daily., Disp: 90 tablet, Rfl: 1    atenolol (TENORMIN) 50 MG tablet, Take 1 tablet (50 mg total) by mouth 2 (two) times daily., Disp: 180 tablet, Rfl: 1    BIFIDOBACTERIUM INFANTIS (ALIGN ORAL), Take 1 tablet by mouth once daily., Disp: , Rfl:     butalbital-acetaminophen-caffeine -40 mg (FIORICET, ESGIC) -40 mg per tablet, Take 1 tablet by mouth every 6 (six) hours as needed., Disp: 40 tablet, Rfl: 1    cetirizine (ZYRTEC) 10 MG tablet, Take 10 mg by mouth once daily., Disp: , Rfl:     clindamycin phosphate 1% (CLINDAGEL) 1 % gel, Apply topically 2 (two) times daily., Disp: 30 g, Rfl: 1    escitalopram oxalate (LEXAPRO) 20 MG tablet, , Disp: , Rfl:     fluticasone (FLONASE) 50 mcg/actuation nasal spray, ONE SPRAY IN EACH NOSTRIL DAILY, Disp: 16 g, Rfl: 2    hydroCHLOROthiazide (HYDRODIURIL) 12.5 MG Tab, Take 1 tablet (12.5 mg total) by mouth once daily., Disp: 30 tablet, Rfl: 0    lidocaine-prilocaine (EMLA) cream, , Disp: , Rfl:     methocarbamol (ROBAXIN) 500 MG Tab, Take 500 mg by mouth 3 (three) times daily., Disp: , Rfl:     norethindrone-ethinyl  estradiol (JUNEL FE 1/20) 1 mg-20 mcg (21)/75 mg (7) per tablet, TAKE ONE TABLET BY MOUTH EVERY DAY 21 DAYS, Disp: 28 tablet, Rfl: 11    pantoprazole (PROTONIX) 40 MG tablet, Take 40 mg by mouth once daily., Disp: , Rfl: 6    pravastatin (PRAVACHOL) 20 MG tablet, TAKE ONE TABLET BY MOUTH EVERY EVENING, Disp: 90 tablet, Rfl: 1    sucralfate (CARAFATE) 100 mg/mL suspension, TAKE TWO TEASPOONSFUL BY MOUTH FOUR TIMES DAILY ON AN EMPTY STOMACH ONE HOUR BEFORE meals AND AT BEDTIME, Disp: , Rfl: 0    tiZANidine (ZANAFLEX) 4 MG tablet, Take 4 mg by mouth every 8 (eight) hours as needed., Disp: , Rfl: 3    valsartan (DIOVAN) 160 MG tablet, Take 1 tablet (160 mg total) by mouth once daily., Disp: 90 tablet, Rfl: 1  Does patient have record of home blood pressure readings yes. Readings have been averaging 154//108.   Last dose of blood pressure medication was taken at 7:30 am this morning.  Patient is symptomatic.   Complains of migraines, nausea, weakness and unable to take deep breathes    BP: (!) 172/106 (180/100 rt arm) ,   .    Blood pressure reading after 15 minutes was 180/102, Pulse 70  DAISY Chase notified. Patient was instructed per Maryam Bates to increase HCTZ from 12.5mg to 25mg 1 daily; patient understands instructions.

## 2017-12-28 NOTE — TELEPHONE ENCOUNTER
Patient was seen in [facility] to enroll in Digital Hypertension Program.  [facility] technician called Primary Care supervisor to notify of blood pressure readings of 197/123, 191/104 and 179/104.  Patient reports left frontal headache.  States had BP Nurse Visit today with elevated blood pressure reading.  Was advised to take a second dose of HCTZ today.  Patient took a second dose of HCTZ today.  Also, took daily dose of Diovan (reports recently began doubling the Diovan dose) and Atenolol.  Manual blood pressure 162/88.  Notified Paulo Francisco of blood pressure reading. Patient is stable.  Scheduled appointment with PCP.

## 2017-12-28 NOTE — PROGRESS NOTES
"  Ms. Giuliana Rodas is a 47 y.o. male who is newly enrolled in the [facility]pert[facility]. She is newly enrolled in [facility]. She had very high readings at [facility] and was subsequently evaluated by a nurse and found to be stable. She currently denies any symptoms except for a headache that has been there for"about a week." Patient has had persistently elevated BP readings over the last few weeks. She developed THEE due to amlodipine and was recently switched to valsartan 160mg, then increased to 320mg daily about a week ago. She reports medication compliance. Reports diet high in sodium with frequent meals out.       The following information was reviewed/updated:  [facility] pharmacy   [facility] - [place], [place] - [address]  [address]  [facility] [address]  Phone: [phone] Fax: [phone]    [facility] [address]  [address]  [facility] [address]  Phone: [phone] Fax: [phone]      Review of patient's allergies indicates:   Allergen Reactions    Depo-provera [medroxyprogesterone] Anxiety    Dicyclomine Rash     Swelling of lip      Prozac [fluoxetine] Anxiety     Current Outpatient Prescriptions on File Prior to Visit   Medication Sig Dispense Refill    amitriptyline (ELAVIL) 100 MG tablet Take 1 tablet by mouth every evening. 30 tablet 5    atenolol (TENORMIN) 50 MG tablet Take 1 tablet (50 mg total) by mouth 2 (two) times daily. 180 tablet 1    BIFIDOBACTERIUM INFANTIS (ALIGN ORAL) Take 1 tablet by mouth once daily.      butalbital-acetaminophen-caffeine -40 mg (FIORICET, ESGIC) -40 mg per tablet Take 1 tablet by mouth every 6 (six) hours as needed. 40 tablet 1    cetirizine (ZYRTEC) 10 MG tablet Take 10 mg by mouth once daily.      clindamycin phosphate 1% (CLINDAGEL) 1 % gel Apply topically 2 (two) times daily. 30 g 1    escitalopram oxalate (LEXAPRO) 20 MG tablet       fluticasone (FLONASE) 50 " mcg/actuation nasal spray ONE SPRAY IN EACH NOSTRIL DAILY 16 g 2    lidocaine-prilocaine (EMLA) cream       norethindrone-ethinyl estradiol (JUNEL FE 1/20) 1 mg-20 mcg (21)/75 mg (7) per tablet TAKE ONE TABLET BY MOUTH EVERY DAY 21 DAYS 28 tablet 11    pantoprazole (PROTONIX) 40 MG tablet Take 40 mg by mouth once daily.  6    pravastatin (PRAVACHOL) 20 MG tablet TAKE ONE TABLET BY MOUTH EVERY EVENING 90 tablet 1    sucralfate (CARAFATE) 100 mg/mL suspension TAKE TWO TEASPOONSFUL BY MOUTH FOUR TIMES DAILY ON AN EMPTY STOMACH ONE HOUR BEFORE meals AND AT BEDTIME  0    tiZANidine (ZANAFLEX) 4 MG tablet Take 4 mg by mouth every 8 (eight) hours as needed.  3    [DISCONTINUED] amlodipine (NORVASC) 10 MG tablet Take 1 tablet (10 mg total) by mouth once daily. 90 tablet 1    [DISCONTINUED] hydroCHLOROthiazide (HYDRODIURIL) 12.5 MG Tab Take 1 tablet (12.5 mg total) by mouth once daily. 30 tablet 0    [DISCONTINUED] methocarbamol (ROBAXIN) 500 MG Tab Take 500 mg by mouth 3 (three) times daily.      [DISCONTINUED] valsartan (DIOVAN) 160 MG tablet Take 1 tablet (160 mg total) by mouth once daily. 90 tablet 1     No current facility-administered medications on file prior to visit.        Reviewed non-pharmacologic therapies and impact on BP:    1. Low-sodium diet- 11 mmHg reduction 2-4 weeks. I have reviewed [facility].A.[facility] diet sodium restrictions (<2000mg/day)  2. Exercise- 7 mmHg reduction 4-12 weeks. I have recommended patient engage in exercise as tolerated at least 30 minutes 5x per week to improve cardiovascular health.    3. Alcohol intake- 3 mmHg reduction 4-12 weeks. I have discussed with patient the maximum recommended number of 1 drink(s) per day for men/women.     Explained that we expect patient to obtain several blood pressures per week at random times of day.   Our goal is to get  BP to consistently below 130/80mmHg and make the process convenient so patient can avoid extra trips to the office. Getting your  blood pressure below 130/80mmHg (definition of control) will reduce your risk for heart attack, kidney failure, stroke and death (as well as kidney failure, eye disease, & dementia).     Patient is not meeting the goal already.   When asked what the patient thinks is causing BP to be elevated, she states: high sodium diet, lack of exercise.     Instructed patient not to allow anyone else to use phone and BP cuff.   I'm not available for emergencies. Patient will call [facility] on-call ([phone] or [phone]) or 911 if needed.     Discussed appropriate BP measuring technique:  Before taking your blood pressure  Find a quiet place. You will need to listen for your heartbeat.  Roll up the sleeve on your left arm or remove any tight-sleeved clothing, if needed. (It's best to take your blood pressure from your left arm if you are right-handed.You can use the other arm if you have been told by your health care provider to do so.)  Rest in a chair next to a table for 5 to 10 minutes. (Your left arm should rest comfortably at heart level.)  Sit up straight with your back against the chair, legs uncrossed and on the ground.  Rest your forearm on the table with the palm of your hand facing up.  You should not talk, read the newspaper, or watch television during this process.      Patient and I agreed that she will continue to monitor blood pressure and sodium intake, and continue to remain adherent to medications.   Change HCTZ to chlorthalidone 25mg  Schedule BMP at next encounter  I will plan to follow-up with the patient in 1 week      Emailed patient link to [facility] HTN webpage and my contact information in case she has any questions.       Last 5 Patient Entered Readings Current 30 Day Average: 179/104     Recent Readings 12/28/2017 12/28/2017 12/28/2017    SBP (mmHg) 179 191 197    DBP (mmHg) 104 104 123    Pulse 68 71 76

## 2018-01-02 ENCOUNTER — PATIENT OUTREACH (OUTPATIENT)
Dept: OTHER | Facility: OTHER | Age: 48
End: 2018-01-02

## 2018-01-02 DIAGNOSIS — I10 ESSENTIAL HYPERTENSION: Primary | ICD-10-CM

## 2018-01-02 NOTE — PROGRESS NOTES
"Ms. Rodas is tolerating chlorthalidone without problems. She reports she stopped taking lexapro 3 days ago, and she feels that it has helped bring the BP down. Denies CP, HA, SOB, etc. Does feel like BP goes up when she is active. She says she can "feel it".    Last 5 Patient Entered Readings Current 30 Day Average: 158/100     Recent Readings 1/1/2018 1/1/2018 12/31/2017 12/31/2017 12/30/2017    SBP (mmHg) 154 139 156 164 153    DBP (mmHg) 104 94 99 101 93    Pulse 79 81 85 82 85          BP trending down  BMP before 1/18 appt  Asked patient to resume lexapro until she sees Dr. Rojas on 1/4 and then discuss    Hypertension Medications             atenolol (TENORMIN) 50 MG tablet Take 1 tablet (50 mg total) by mouth 2 (two) times daily.    chlorthalidone (HYGROTEN) 25 MG Tab Take 1 tablet (25 mg total) by mouth once daily. STOP hydrochlorothiazide    valsartan (DIOVAN) 320 MG tablet Take 1 tablet (320 mg total) by mouth once daily.          "

## 2018-01-04 ENCOUNTER — OFFICE VISIT (OUTPATIENT)
Dept: FAMILY MEDICINE | Facility: CLINIC | Age: 48
End: 2018-01-04
Payer: MEDICARE

## 2018-01-04 VITALS
WEIGHT: 183 LBS | DIASTOLIC BLOOD PRESSURE: 80 MMHG | HEART RATE: 75 BPM | OXYGEN SATURATION: 97 % | HEIGHT: 62 IN | SYSTOLIC BLOOD PRESSURE: 136 MMHG | TEMPERATURE: 98 F | BODY MASS INDEX: 33.68 KG/M2

## 2018-01-04 DIAGNOSIS — I10 ESSENTIAL HYPERTENSION: Primary | ICD-10-CM

## 2018-01-04 PROCEDURE — 99999 PR PBB SHADOW E&M-EST. PATIENT-LVL III: CPT | Mod: PBBFAC,,, | Performed by: FAMILY MEDICINE

## 2018-01-04 PROCEDURE — 99214 OFFICE O/P EST MOD 30 MIN: CPT | Mod: S$GLB,,, | Performed by: FAMILY MEDICINE

## 2018-01-04 RX ORDER — AMLODIPINE BESYLATE 5 MG/1
5 TABLET ORAL DAILY
Qty: 30 TABLET | Refills: 11 | Status: SHIPPED | OUTPATIENT
Start: 2018-01-04 | End: 2018-07-23 | Stop reason: ALTCHOICE

## 2018-01-04 RX ORDER — HYDROCHLOROTHIAZIDE 12.5 MG/1
12.5 CAPSULE ORAL DAILY
Refills: 0 | COMMUNITY
Start: 2017-12-22 | End: 2018-01-04

## 2018-01-04 NOTE — PROGRESS NOTES
"Subjective:       Patient ID: Giuliana Rodas is a 47 y.o. female.    Chief Complaint: Follow-up (B/P follow up )    Patient presents for followup. She has some nausea, but no headache. Her blood pressure is controlled today. She has been having constant headaches, but states she had stopped her lexapro. She has since restarted this. She plans to see her optometrist soon as well. Her blood pressure has been controlled at home. She states it goes up with activity, which is natural.       Review of Systems    Objective:       Vitals:    01/04/18 0912   BP: 136/80   Pulse: 75   Temp: 98.2 °F (36.8 °C)   TempSrc: Tympanic   SpO2: 97%   Weight: 83 kg (182 lb 15.7 oz)   Height: 5' 2" (1.575 m)       Physical Exam   Constitutional: She is oriented to person, place, and time. She appears well-developed and well-nourished. No distress.   HENT:   Head: Normocephalic and atraumatic.   Eyes: Conjunctivae are normal.   Neck: Normal range of motion. Neck supple. Carotid bruit is not present.   Cardiovascular: Normal rate, regular rhythm and normal heart sounds.  Exam reveals no gallop and no friction rub.    No murmur heard.  Pulmonary/Chest: Effort normal and breath sounds normal. No respiratory distress. She has no wheezes. She has no rales.   Musculoskeletal: She exhibits no edema.   Neurological: She is alert and oriented to person, place, and time.   Skin: She is not diaphoretic.       Assessment:       1. Essential hypertension        Plan:       Giuliana was seen today for follow-up.    Diagnoses and all orders for this visit:    Essential hypertension  -     amLODIPine (NORVASC) 5 MG tablet; Take 1 tablet (5 mg total) by mouth once daily.       starting amlodipine 5 mg and continue atenolol, chlorthalidone, valstartan  "

## 2018-01-05 ENCOUNTER — PATIENT OUTREACH (OUTPATIENT)
Dept: OTHER | Facility: OTHER | Age: 48
End: 2018-01-05

## 2018-01-05 NOTE — PROGRESS NOTES
Last 5 Patient Entered Readings Current 30 Day Average: 153/97     Recent Readings 1/4/2018 1/4/2018 1/3/2018 1/2/2018 1/1/2018    SBP (mmHg) 139 138 150 142 154    DBP (mmHg) 87 91 92 95 104    Pulse 71 78 77 78 79        1/5-Intro attempt.Patient answered requesting a call later today.     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     Patient reports not knowing the proper timing of taking BP readings (i.e.waiting at least 45 minutes after ADLs to take BP reading). Informed patient about proper timing procedure.    Hypertension Digital Medicine Program (HDMP): Health  Introduction    Introduced Ms. Giuliana Rodas to HDMP. Discussed health  role and recommended lifestyle modifications.    Diet (i.e. Low sodium, food labels): Patient reports having a poor diet during the holidays. She states she was eating fast food and sodium-filled foods. Patient expresses a need to decrease her sodium content. Patient reports scrambling or frying eggs with veggies for her first meal. She states consuming soup frequently. Patient reports having discipline in restricting her carbohydrates (i.e. Breads). She states staying away from potato soup. Patient reports purchasing hot dinners from the local grocery store. Patient reports having a club sandwich with sweet potato fries. She expresses a need to increase her cooking skills. She states having success in making salads with chicken. Patient states using low-sodium Ms. DASH seasonings. Patient reports drinking Kangen asian water frequently. Patient is aware of the amount of high sodium in her food choices. Patient requested DASH Diet and Low-Sodium Seasoning information.   Exercise: Patient reports having a sedentary job. Patient reports walking will the best form of exercise. She states she would like to start walking. 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  Alcohol/Tobacco: Patient reports being a nonsmoker. She states not consuming alcohol.  Medication Adherence: has been compliant with the medicaiton regimen Patient reports no abnormal signs or symptoms with BP medication.  Other goals:    Reviewed AHA recommendations:  Limit sodium intake to <2000mg/day  Perform 150 minutes of physical activity per week    Patient is aware of the importance of taking daily BP readings at various times of the day  Patient aware that the health  can be used as a resource for lifestyle modifications to help reduce or maintain a healthy BP  Patient is aware of the importance of medication adherence.  [facility]. Patient should call 911 or [facility] on [facility] if one arises.

## 2018-01-15 DIAGNOSIS — G43.909 MIGRAINE WITHOUT STATUS MIGRAINOSUS, NOT INTRACTABLE, UNSPECIFIED MIGRAINE TYPE: ICD-10-CM

## 2018-01-15 RX ORDER — AMITRIPTYLINE HYDROCHLORIDE 100 MG/1
TABLET ORAL
Qty: 30 TABLET | Refills: 5 | Status: SHIPPED | OUTPATIENT
Start: 2018-01-15 | End: 2018-01-18 | Stop reason: SDUPTHER

## 2018-01-18 ENCOUNTER — LAB VISIT (OUTPATIENT)
Dept: LAB | Facility: HOSPITAL | Age: 48
End: 2018-01-18
Attending: FAMILY MEDICINE
Payer: MEDICARE

## 2018-01-18 ENCOUNTER — OFFICE VISIT (OUTPATIENT)
Dept: NEUROLOGY | Facility: CLINIC | Age: 48
End: 2018-01-18
Payer: MEDICARE

## 2018-01-18 ENCOUNTER — PATIENT MESSAGE (OUTPATIENT)
Dept: FAMILY MEDICINE | Facility: CLINIC | Age: 48
End: 2018-01-18

## 2018-01-18 VITALS
SYSTOLIC BLOOD PRESSURE: 132 MMHG | BODY MASS INDEX: 33.68 KG/M2 | WEIGHT: 183 LBS | HEIGHT: 62 IN | DIASTOLIC BLOOD PRESSURE: 79 MMHG | HEART RATE: 72 BPM

## 2018-01-18 DIAGNOSIS — G43.909 MIGRAINE WITHOUT STATUS MIGRAINOSUS, NOT INTRACTABLE, UNSPECIFIED MIGRAINE TYPE: ICD-10-CM

## 2018-01-18 DIAGNOSIS — N28.9 RENAL INSUFFICIENCY: Primary | ICD-10-CM

## 2018-01-18 DIAGNOSIS — I10 ESSENTIAL HYPERTENSION: ICD-10-CM

## 2018-01-18 LAB
ANION GAP SERPL CALC-SCNC: 9 MMOL/L
BUN SERPL-MCNC: 18 MG/DL
CALCIUM SERPL-MCNC: 10 MG/DL
CHLORIDE SERPL-SCNC: 99 MMOL/L
CO2 SERPL-SCNC: 30 MMOL/L
CREAT SERPL-MCNC: 1.3 MG/DL
EST. GFR  (AFRICAN AMERICAN): 56 ML/MIN/1.73 M^2
EST. GFR  (NON AFRICAN AMERICAN): 48 ML/MIN/1.73 M^2
GLUCOSE SERPL-MCNC: 100 MG/DL
POTASSIUM SERPL-SCNC: 3.3 MMOL/L
SODIUM SERPL-SCNC: 138 MMOL/L

## 2018-01-18 PROCEDURE — 36415 COLL VENOUS BLD VENIPUNCTURE: CPT

## 2018-01-18 PROCEDURE — 80048 BASIC METABOLIC PNL TOTAL CA: CPT

## 2018-01-18 PROCEDURE — 99999 PR PBB SHADOW E&M-EST. PATIENT-LVL III: CPT | Mod: PBBFAC,,, | Performed by: NEUROLOGICAL SURGERY

## 2018-01-18 PROCEDURE — 99214 OFFICE O/P EST MOD 30 MIN: CPT | Mod: S$GLB,,, | Performed by: NEUROLOGICAL SURGERY

## 2018-01-18 RX ORDER — AMITRIPTYLINE HYDROCHLORIDE 100 MG/1
100 TABLET ORAL NIGHTLY
Qty: 90 TABLET | Refills: 3 | Status: SHIPPED | OUTPATIENT
Start: 2018-01-18 | End: 2018-02-06

## 2018-01-19 ENCOUNTER — PATIENT OUTREACH (OUTPATIENT)
Dept: OTHER | Facility: OTHER | Age: 48
End: 2018-01-19

## 2018-01-19 NOTE — PROGRESS NOTES
Ms. Rodas started amlodipine 5mg per Dr. Rojas. She is tolerating without problems. She does not know why BP was low on 1/16. She did have dizziness and weakness but it resolved. No other episodes before or after. Reviewed home treatment for low BP. Patient verbalizes understanding. Reviewed labs.     BMP  Lab Results   Component Value Date     01/18/2018    K 3.3 (L) 01/18/2018    CL 99 01/18/2018    CO2 30 (H) 01/18/2018    BUN 18 01/18/2018    CREATININE 1.3 01/18/2018    CALCIUM 10.0 01/18/2018    ANIONGAP 9 01/18/2018    ESTGFRAFRICA 56 (A) 01/18/2018    EGFRNONAA 48 (A) 01/18/2018         Last 5 Patient Entered Readings                                      Current 30 Day Average: 138/89     Recent Readings 1/18/2018 1/17/2018 1/16/2018 1/16/2018 1/16/2018    SBP (mmHg) 146 125 74 85 91    DBP (mmHg) 87 83 51 51 64    Pulse 70 74 63 62 68        K LLN- encouraged patient eat K rich foods  Scr ULN- encouraged hydration. Avoid NSAIDS  Repeat BMP ~2/8  Continue monitoring

## 2018-01-20 NOTE — PROGRESS NOTES
"Chief Complaint   Patient presents with    Headache        Giuliana Rodas is a 47 y.o. female with a history of multiple medical diagnoses as listed below that presents for evaluation of headaches. She has been having headaches almost weekly for the last several months. The headache tends to feel like pressure as if a "cap" were on her head and at other times she has headaches that are from the front of her head down to the middle of the posterior aspect of the head. The headache that is like a ca is described as a "sharp pressure" that is 10/10 in intensity. The headache are debilitating and only allow her to lie down in a ximena quiet room as the pain worsened with lights and sounds. She has nausea very frequently with these headaches but says that she does not vomit. When the headaches are in the center of her head she feels that the pain extends into the neck and shoulders as well. It too can get up to a 10/10 but she is less sensitive to light and sounds and tends not to have nausea. The head can last from 3 hours to 3 days at a time. She has not family history of headaches. She has also been having problems sleeping so she was started on Elavil by her PCP in hopes of treating both her headaches and her insomnia. She has not had a headache in the last three weeks since she has been on the medication. She has no complaints with the medication.    Interval History  8/18/2016  She has had about 5-6 "bad" headaches since she was last seen in clinic. She has been having various headache types including tension headaches and sinus headaches as well. She has been using Robaxin which she says helps with the tension headaches and she has been using Fioricet to help with her various other headache types. She has not had much relief with tramadol as she says that she has taken the medication several times in the past and feel that it's effects have likely been lost on her. Elavil 50 mg qhs has been helping her to sleep but " she does not feel like the medication has made her excessively sedated and she does think that she could tolerate an increased dose of the medication. She has been having GI issues and has been looking to find a gastroenterologist that is within her network for gastroparesis and she has been scheduled to see dermatology for evaluation of two skin lesions that her PCP felt could be precancerous.    11/17/2016  She has still been having episodic headaches since she was last seen. She recently had a tooth extracted and was told that she had an infection underlying it as well. She has been taking Elavil as directed and has bene having some improvement in her migraines. She still tends to have frequent tension headaches that are treated very well with Robaxin. She has been seeing GI for her gastroparesis but has scheduled follow-up toward the end of the month to decide how the problem will be approached. She has not had any new problems since she was last seen in clinic.    02/16/2017  Since last being seen she says that she has been seen by GI who determined that she had a problem with bile acid production. She says that she has been doing better overall with her headaches and feels that when she has taken the Fioricet it has been beneficial to help in the relief of her headaches. She has not had any new problems but feels that the pain has been slightly more frequent than before.    05/16/2017  Headaches were well controlled when she was taking her medications consisting of Elavil and Methocarbamol as preventative medications. She has since been unable to get Robaxin because insurance is no longer covering the medication. She has not been taking it for the last month and has been having more frequent headaches since that time. She has been using Fioricet as an abortive medication which has been helpful in alleviating her pain. She has dry mouth and dry eyes as a result of her Elavil. Her dry eyes has been making  reading more difficult as she has constant tearing in the eyes.    07/18/2017  She has had at least one migraine since she was last seen in clinic while she was visiting [place] with her parents and was spending some time outside when she had a headache that began and intensified fairly quickly. Fioricet was on hand which she took and was able to relieve her headache so that she could continue her day without many issues. She has continued to have tension headaches that eminate in the neck and into the shoulders as well. She has the pain radiate across the back at times as well. She has also been having headaches across the front of the head that has been feeling of intense pressure that she feels is related to her sinus headaches. She has been working to become more active and exercise more.    10/18/2017  She continues to work with her medical team try to get better control of her symptoms.  Tension headaches has still been bothersome despite her compliance and methocarbamol.  She feels that the medication be ineffective and she is is wishing to try different medication to control her symptoms.  Despite the frequency of her tension headaches she has had fewer migraines than compared to her previous visit.  She'll been taking all medications as directed and has been tolerating well.    01/18/2018  She has been dealing with her mother going to chemotherapy treatments with has been taxing on both her mother and herself.  Headaches have still been present, but slightly better than when she was last seen in clinic.  Migraines have not been very bothersome; however, she has continued to have tension headaches relatively often.     PAST MEDICAL HISTORY:  Past Medical History:   Diagnosis Date    Bile reflux gastritis     Eczema     Fibromyalgia     Gastroparesis     dr. harden    GERD (gastroesophageal reflux disease)     Hyperglyceridemia     Hyperlipidemia     Hypertension     IC (interstitial cystitis)      dr. labadie    Psoriasis     Tension headache        PAST SURGICAL HISTORY:  Past Surgical History:   Procedure Laterality Date    CHOLECYSTECTOMY      HEMORRHOID SURGERY      HYSTERECTOMY      KNEE SURGERY      OOPHORECTOMY         SOCIAL HISTORY:  Social History     Social History    Marital status:      Spouse name: N/A    Number of children: 2    Years of education: N/A     Occupational History     [employer]     Social History Main Topics    Smoking status: Never Smoker    Smokeless tobacco: Never Used    Alcohol use No    Drug use: No    Sexual activity: Yes     Partners: Male     Other Topics Concern    Not on file     Social History Narrative    Lives at home with  and daughter       FAMILY HISTORY:  Family History   Problem Relation Age of Onset    Hypertension Mother     Migraines Mother     Breast cancer Mother     Hypertension Father     Stroke Father     Heart disease Father     Hyperlipidemia Father     Breast cancer Paternal Grandmother     Colon cancer Neg Hx     Ovarian cancer Neg Hx     Inflammatory bowel disease Neg Hx     Celiac disease Neg Hx        ALLERGIES AND MEDICATIONS: updated and reviewed.  Review of patient's allergies indicates:   Allergen Reactions    Depo-provera [medroxyprogesterone] Anxiety    Dicyclomine Rash     Swelling of lip      Prozac [fluoxetine] Anxiety     Current Outpatient Prescriptions   Medication Sig Dispense Refill    amitriptyline (ELAVIL) 100 MG tablet Take 1 tablet (100 mg total) by mouth every evening. 90 tablet 3    amLODIPine (NORVASC) 5 MG tablet Take 1 tablet (5 mg total) by mouth once daily. 30 tablet 11    atenolol (TENORMIN) 50 MG tablet Take 1 tablet (50 mg total) by mouth 2 (two) times daily. 180 tablet 1    BIFIDOBACTERIUM INFANTIS (ALIGN ORAL) Take 1 tablet by mouth once daily.      butalbital-acetaminophen-caffeine -40 mg (FIORICET, ESGIC) -40 mg per tablet Take 1 tablet by  mouth every 6 (six) hours as needed. 40 tablet 1    cetirizine (ZYRTEC) 10 MG tablet Take 10 mg by mouth once daily.      clindamycin phosphate 1% (CLINDAGEL) 1 % gel Apply topically 2 (two) times daily. 30 g 1    escitalopram oxalate (LEXAPRO) 20 MG tablet       fluticasone (FLONASE) 50 mcg/actuation nasal spray ONE SPRAY IN EACH NOSTRIL DAILY 16 g 2    lidocaine-prilocaine (EMLA) cream       norethindrone-ethinyl estradiol (JUNEL FE 1/20) 1 mg-20 mcg (21)/75 mg (7) per tablet TAKE ONE TABLET BY MOUTH EVERY DAY 21 DAYS 28 tablet 11    pantoprazole (PROTONIX) 40 MG tablet Take 40 mg by mouth once daily.  6    pravastatin (PRAVACHOL) 20 MG tablet TAKE ONE TABLET BY MOUTH EVERY EVENING 90 tablet 1    sucralfate (CARAFATE) 100 mg/mL suspension TAKE TWO TEASPOONSFUL BY MOUTH FOUR TIMES DAILY ON AN EMPTY STOMACH ONE HOUR BEFORE meals AND AT BEDTIME  0    tiZANidine (ZANAFLEX) 4 MG tablet Take 4 mg by mouth every 8 (eight) hours as needed.  3    valsartan (DIOVAN) 320 MG tablet Take 1 tablet (320 mg total) by mouth once daily. 90 tablet 3     No current facility-administered medications for this visit.        Review of Systems   Constitutional: Negative for activity change, appetite change, fever and unexpected weight change.   HENT: Negative for trouble swallowing and voice change.    Eyes: Positive for photophobia and visual disturbance.   Respiratory: Negative for apnea and shortness of breath.    Cardiovascular: Negative for chest pain and leg swelling.   Gastrointestinal: Positive for nausea and vomiting. Negative for constipation.   Genitourinary: Negative for difficulty urinating.   Musculoskeletal: Negative for back pain, gait problem and neck pain.   Skin: Negative for color change and pallor.   Neurological: Positive for headaches. Negative for dizziness, seizures, syncope, weakness and numbness.   Hematological: Negative for adenopathy.   Psychiatric/Behavioral: Negative for agitation, confusion and  decreased concentration.       Neurologic Exam     Mental Status   Oriented to person, place, and time.   Registration: recalls 3 of 3 objects.   Attention: normal. Concentration: normal.   Speech: speech is normal   Level of consciousness: alert  Knowledge: good.     Cranial Nerves     CN II   Visual fields full to confrontation.   Right visual field deficit: none  Left visual field deficit: none     CN III, IV, VI   Pupils are equal, round, and reactive to light.  Extraocular motions are normal.   Right pupil: Size: 3 mm. Shape: regular. Accommodation: intact.   Left pupil: Size: 3 mm. Shape: regular. Accommodation: intact.   CN III: no CN III palsy  CN VI: no CN VI palsy  Nystagmus: none   Diplopia: none  Ophthalmoparesis: none  Upgaze: normal  Downgaze: normal  Conjugate gaze: present    CN V   Facial sensation intact.   Right facial sensation deficit: none  Left facial sensation deficit: none    CN VII   Facial expression full, symmetric.   Right facial weakness: none  Left facial weakness: none    CN VIII   CN VIII normal.     CN IX, X   CN IX normal.   CN X normal.   Palate: symmetric    CN XI   CN XI normal.   Right sternocleidomastoid strength: normal  Left sternocleidomastoid strength: normal  Right trapezius strength: normal  Left trapezius strength: normal    CN XII   CN XII normal.   Tongue deviation: none    Motor Exam   Muscle bulk: normal  Overall muscle tone: normal  Right arm tone: normal  Left arm tone: normal  Right leg tone: normal  Left leg tone: normal    Strength   Strength 5/5 throughout.     Sensory Exam   Right arm light touch: normal  Left arm light touch: normal  Right leg light touch: normal  Left leg light touch: normal  Right arm vibration: normal  Left arm vibration: normal  Right leg vibration: normal  Left leg vibration: normal  Right arm proprioception: normal  Left arm proprioception: normal  Right leg proprioception: normal  Left leg proprioception: normal  Right arm pinprick:  normal  Left arm pinprick: normal  Right leg pinprick: normal  Left leg pinprick: normal    Gait, Coordination, and Reflexes     Gait  Gait: normal    Coordination   Romberg: negative  Finger to nose coordination: normal  Heel to shin coordination: normal  Tandem walking coordination: normal    Tremor   Resting tremor: absent    Reflexes   Right brachioradialis: 2+  Left brachioradialis: 2+  Right biceps: 2+  Left biceps: 2+  Right triceps: 2+  Left triceps: 2+  Right patellar: 2+  Left patellar: 2+  Right achilles: 2+  Left achilles: 2+  Right plantar: normal  Left plantar: normal      Physical Exam   Constitutional: She is oriented to person, place, and time. She appears well-developed and well-nourished.   HENT:   Head: Normocephalic and atraumatic.   Eyes: EOM are normal. Pupils are equal, round, and reactive to light.   Neck: Normal range of motion.   Cardiovascular: Normal rate and intact distal pulses.    Pulmonary/Chest: Effort normal. No apnea. No respiratory distress.   Musculoskeletal: Normal range of motion.   Neurological: She is alert and oriented to person, place, and time. She has normal strength. She has a normal Finger-Nose-Finger Test, a normal Heel to Shin Test, a normal Romberg Test and a normal Tandem Gait Test. Gait normal.   Reflex Scores:       Tricep reflexes are 2+ on the right side and 2+ on the left side.       Bicep reflexes are 2+ on the right side and 2+ on the left side.       Brachioradialis reflexes are 2+ on the right side and 2+ on the left side.       Patellar reflexes are 2+ on the right side and 2+ on the left side.       Achilles reflexes are 2+ on the right side and 2+ on the left side.  Skin: Skin is warm and dry.   Psychiatric: She has a normal mood and affect. Her speech is normal and behavior is normal. Thought content normal.   Vitals reviewed.      Vitals:    01/18/18 1534   BP: 132/79   BP Location: Right arm   Patient Position: Sitting   BP Method: Large (Automatic)  "  Pulse: 72   Weight: 83 kg (182 lb 15.7 oz)   Height: 5' 2" (1.575 m)       Assessment & Plan:  Problem List Items Addressed This Visit     None      Visit Diagnoses     Migraine without status migrainosus, not intractable, unspecified migraine type        Relevant Medications    amitriptyline (ELAVIL) 100 MG tablet        Follow-up: Follow-up in about 3 months (around 4/18/2018).  More than 50% of this 25 minute encounter was spent in counseling and coordinating care.      "

## 2018-01-26 ENCOUNTER — PATIENT OUTREACH (OUTPATIENT)
Dept: OTHER | Facility: OTHER | Age: 48
End: 2018-01-26

## 2018-01-26 NOTE — PROGRESS NOTES
"Last 5 Patient Entered Readings                                      Current 30 Day Average: 135/87     Recent Readings 1/25/2018 1/24/2018 1/22/2018 1/21/2018 1/20/2018    SBP (mmHg) 116 150 123 108 132    DBP (mmHg) 79 80 78 74 80    Pulse 69 85 65 76 72        Patient states she may feel a little lightheadedness when she turns her head too fast but is unsure if its related to BP medication. She states it rarely occurs. Instructed patient to document when she takes her BP medication and when she experiences the episodes.      Patient expressed her gratitude for the program. She states what we are doing is wonderful and sincerely thank'd us and the program.    Hypertension Digital Medicine Program (HDMP): Health  Follow Up    Lifestyle Modifications:    1.Low sodium diet: yes Patient reports consuming a lot of salads even when she is eating out. Patient states she is focusing on eliminating cleopatra cake in her diet.      2.Physical activity: yes Patient states stretching at work every hour. She states using the elliptical for about 12-15 minutes per day. Patient states having more energy and able to clean more around the house. She expressed she is able to go home after work and "do more."    3.Hypotension/Hypertension symptoms: no Patient states she may feel a little lightheadedness when she turns her head too fast but is unsure if its related to BP medication. She states it rarely occurs. Instructed patient to document when she takes her BP medication and when she experiences the episodes.  Frequency/Alleviating factors/Precipitating factors, etc.     4.Patient has been compliant with the medication regimen.     Follow up with Ms. Giuliana Rodas completed. No further questions or concerns. I will follow up in a few weeks to assess progress.     "

## 2018-01-29 NOTE — TELEPHONE ENCOUNTER
Spoke to pt, denies using nsaids, denies sleep problems, states drinks plenty of fluids,  reports blood pressure has recently been uncontrolled but now ok but seems to hallucinate with new medication, advise for pt to repeat lab and schedule a follow up with Dr. Rojas to review labs and treatment plan and adjust as needed. Pt verbalize understanding and states she will return to lab to obtain repeat renal studies.

## 2018-02-05 RX ORDER — FLUTICASONE PROPIONATE 50 MCG
SPRAY, SUSPENSION (ML) NASAL
Qty: 16 G | Refills: 2 | Status: SHIPPED | OUTPATIENT
Start: 2018-02-05 | End: 2019-01-09

## 2018-02-06 ENCOUNTER — OFFICE VISIT (OUTPATIENT)
Dept: FAMILY MEDICINE | Facility: CLINIC | Age: 48
End: 2018-02-06
Payer: MEDICARE

## 2018-02-06 VITALS
HEIGHT: 62 IN | TEMPERATURE: 99 F | SYSTOLIC BLOOD PRESSURE: 124 MMHG | DIASTOLIC BLOOD PRESSURE: 74 MMHG | HEART RATE: 83 BPM | OXYGEN SATURATION: 98 % | WEIGHT: 183.19 LBS | BODY MASS INDEX: 33.71 KG/M2

## 2018-02-06 DIAGNOSIS — G43.001 MIGRAINE WITHOUT AURA AND WITH STATUS MIGRAINOSUS, NOT INTRACTABLE: Primary | ICD-10-CM

## 2018-02-06 DIAGNOSIS — F10.982 ALCOHOL-INDUCED INSOMNIA: ICD-10-CM

## 2018-02-06 DIAGNOSIS — F51.01 PRIMARY INSOMNIA: ICD-10-CM

## 2018-02-06 PROCEDURE — 99214 OFFICE O/P EST MOD 30 MIN: CPT | Mod: S$GLB,,, | Performed by: FAMILY MEDICINE

## 2018-02-06 PROCEDURE — 99999 PR PBB SHADOW E&M-EST. PATIENT-LVL III: CPT | Mod: PBBFAC,,, | Performed by: FAMILY MEDICINE

## 2018-02-06 PROCEDURE — 3008F BODY MASS INDEX DOCD: CPT | Mod: S$GLB,,, | Performed by: FAMILY MEDICINE

## 2018-02-06 PROCEDURE — 99499 UNLISTED E&M SERVICE: CPT | Mod: S$GLB,,, | Performed by: FAMILY MEDICINE

## 2018-02-06 RX ORDER — AMITRIPTYLINE HYDROCHLORIDE 75 MG/1
75 TABLET ORAL NIGHTLY
Qty: 90 TABLET | Refills: 3 | Status: SHIPPED | OUTPATIENT
Start: 2018-02-06 | End: 2018-03-15 | Stop reason: SINTOL

## 2018-02-06 RX ORDER — ATENOLOL 100 MG/1
50 TABLET ORAL 2 TIMES DAILY
Refills: 1 | COMMUNITY
Start: 2018-01-22 | End: 2018-02-06 | Stop reason: ALTCHOICE

## 2018-02-06 RX ORDER — CHLORTHALIDONE 25 MG/1
TABLET ORAL
Refills: 2 | COMMUNITY
Start: 2018-01-22 | End: 2018-05-30 | Stop reason: SDUPTHER

## 2018-02-06 RX ORDER — ESCITALOPRAM OXALATE 10 MG/1
TABLET ORAL
COMMUNITY
Start: 2018-01-10 | End: 2018-02-06 | Stop reason: CLARIF

## 2018-02-06 RX ORDER — RAMELTEON 8 MG/1
8 TABLET ORAL NIGHTLY
Qty: 90 TABLET | Refills: 0 | Status: SHIPPED | OUTPATIENT
Start: 2018-02-06 | End: 2018-07-30

## 2018-02-06 NOTE — PROGRESS NOTES
"Subjective:       Patient ID: Giuliana Rodas is a 47 y.o. female.    Chief Complaint: Hallucinations    Patient presents with hallucinations. She states she is seeing different things ont he floor. She states she saw chandeliers with spiders on this. She also saw candy canes on the floor. She states when she wakes up she feels weak. She states the hallucianation started about amonth ago.       Review of Systems    Objective:       Vitals:    02/06/18 1446   BP: 124/74   Pulse: 83   Temp: 99.2 °F (37.3 °C)   TempSrc: Oral   SpO2: 98%   Weight: 83.1 kg (183 lb 3.2 oz)   Height: 5' 2" (1.575 m)       Physical Exam   Constitutional: She is oriented to person, place, and time. She appears well-developed and well-nourished. No distress.   HENT:   Head: Normocephalic and atraumatic.   Neck: Normal range of motion. Neck supple.   Cardiovascular: Normal rate, regular rhythm and normal heart sounds.  Exam reveals no gallop and no friction rub.    No murmur heard.  Pulmonary/Chest: Effort normal and breath sounds normal. No respiratory distress. She has no wheezes. She has no rales.   Neurological: She is alert and oriented to person, place, and time.   Skin: She is not diaphoretic.   Psychiatric: She has a normal mood and affect.       Assessment:       1. Migraine without aura and with status migrainosus, not intractable    2. Alcohol-induced insomnia    3. Primary insomnia        Plan:       Giuliana was seen today for hallucinations.    Diagnoses and all orders for this visit:    Migraine without aura and with status migrainosus, not intractable  -     amitriptyline (ELAVIL) 75 MG tablet; Take 1 tablet (75 mg total) by mouth every evening.  Decreased amitriptyline from 100 to 75 mg daily. She is hallucinating on this at 100 mg. Will see if decreasing this will minimize hallucinations.     Primary insomnia  -     ramelteon (ROZEREM) 8 mg tablet; Take 1 tablet (8 mg total) by mouth every evening.  Trial of rozerem for " insomnia.

## 2018-02-16 ENCOUNTER — PATIENT OUTREACH (OUTPATIENT)
Dept: OTHER | Facility: OTHER | Age: 48
End: 2018-02-16

## 2018-02-16 NOTE — PROGRESS NOTES
Last 5 Patient Entered Readings                                      Current 30 Day Average: 121/75     Recent Readings 2/16/2018 2/15/2018 2/15/2018 2/14/2018 2/10/2018    SBP (mmHg) 139 101 85 128 127    DBP (mmHg) 87 54 57 71 81    Pulse 85 63 64 78 73        Assessed low BP reading on 2/15 and patient said she felt light headed and ate some crackers to help bring up Bp. Patient asked about what might have caused the low reading. Informed patient BP reading fluctuates and is difficult to determine exactly caused it. Also, informed patient if she is feeling ill then BP readings may be irregular.      Patient states feeling like she is fighting off something when talking to patient. Encouraged patient to see PCP about issues. Patient states she will try to make an appointment soon.    Hypertension Digital Medicine Program (HDMP): Health  Follow Up    Lifestyle Modifications:    1.Low sodium diet: yes Patient states adhering to consuming salads often. She states she treated herself occasionally during Mikel Gras season. Patient states she has stayed within her diet for the most part.     2.Physical activity: no Patient reports not achieving much activity due to feeling like her body is fighting off something. Patient states her physical activity has been minimal but is achieving lite exercise by cleaning the house often. Encouraged patient to recover.     3.Hypotension/Hypertension symptoms: no Patient reports no abnormal signs or symptoms with BP medication.   Frequency/Alleviating factors/Precipitating factors, etc.     4.Patient has been compliant with the medication regimen.     Follow up with Mrs. Giuliana LEIVA Adrianna completed. No further questions or concerns. I will follow up in a few weeks to assess progress.

## 2018-02-23 ENCOUNTER — PATIENT MESSAGE (OUTPATIENT)
Dept: FAMILY MEDICINE | Facility: CLINIC | Age: 48
End: 2018-02-23

## 2018-03-09 ENCOUNTER — PATIENT OUTREACH (OUTPATIENT)
Dept: OTHER | Facility: OTHER | Age: 48
End: 2018-03-09

## 2018-03-09 NOTE — PROGRESS NOTES
"Last 5 Patient Entered Readings                                      Current 30 Day Average: 125/76     Recent Readings 3/7/2018 3/5/2018 3/3/2018 2/27/2018 2/23/2018    SBP (mmHg) 145 115 129 123 129    DBP (mmHg) 80 77 82 75 80    Pulse 69 66 70 67 86        Encounter consisted of patient discussing her GI issues. Patient reports feeling so bad its almost like the flu. Patient reports feeling sick all day. She states having gastroparesis and "all healthy foods are making her feel bad." She states she will try to deal with her issues day by day. Patient reports having heart palpitations the past two days. Encouraged patient to make doctor's appointment ASAP. She states she will make an appointment next week.      She states was not taking her amitripyline. She states since she has not been taking the pill due to the hallucinations.     Hypertension Digital Medicine Program (HDMP): Health  Follow Up    Lifestyle Modifications:    1.Low sodium diet: no Deferred    2.Physical activity: no Deferred    3.Hypotension/Hypertension symptoms: no Deferred  Frequency/Alleviating factors/Precipitating factors, etc.     4.Patient has been compliant with the medication regimen.     Follow up with Mrs. Giuliana LEIVA Adrianna completed. No further questions or concerns. I will follow up in a few weeks to assess progress.       "

## 2018-03-15 ENCOUNTER — LAB VISIT (OUTPATIENT)
Dept: LAB | Facility: HOSPITAL | Age: 48
End: 2018-03-15
Attending: PHYSICIAN ASSISTANT
Payer: MEDICARE

## 2018-03-15 ENCOUNTER — OFFICE VISIT (OUTPATIENT)
Dept: FAMILY MEDICINE | Facility: CLINIC | Age: 48
End: 2018-03-15
Payer: MEDICARE

## 2018-03-15 VITALS
HEIGHT: 62 IN | DIASTOLIC BLOOD PRESSURE: 80 MMHG | HEART RATE: 62 BPM | TEMPERATURE: 99 F | BODY MASS INDEX: 34.53 KG/M2 | SYSTOLIC BLOOD PRESSURE: 134 MMHG | OXYGEN SATURATION: 98 % | RESPIRATION RATE: 16 BRPM | WEIGHT: 187.63 LBS

## 2018-03-15 DIAGNOSIS — M79.7 FIBROMYALGIA: ICD-10-CM

## 2018-03-15 DIAGNOSIS — F41.9 ANXIETY: Primary | ICD-10-CM

## 2018-03-15 DIAGNOSIS — R00.2 PALPITATION: ICD-10-CM

## 2018-03-15 LAB
T4 FREE SERPL-MCNC: 1.03 NG/DL
TSH SERPL DL<=0.005 MIU/L-ACNC: 2.39 UIU/ML

## 2018-03-15 PROCEDURE — 99214 OFFICE O/P EST MOD 30 MIN: CPT | Mod: S$GLB,,, | Performed by: PHYSICIAN ASSISTANT

## 2018-03-15 PROCEDURE — 93005 ELECTROCARDIOGRAM TRACING: CPT | Mod: S$GLB,,, | Performed by: PHYSICIAN ASSISTANT

## 2018-03-15 PROCEDURE — 93010 ELECTROCARDIOGRAM REPORT: CPT | Mod: S$GLB,,, | Performed by: INTERNAL MEDICINE

## 2018-03-15 PROCEDURE — 84443 ASSAY THYROID STIM HORMONE: CPT

## 2018-03-15 PROCEDURE — 84480 ASSAY TRIIODOTHYRONINE (T3): CPT

## 2018-03-15 PROCEDURE — 99999 PR PBB SHADOW E&M-EST. PATIENT-LVL V: CPT | Mod: PBBFAC,,, | Performed by: PHYSICIAN ASSISTANT

## 2018-03-15 PROCEDURE — 36415 COLL VENOUS BLD VENIPUNCTURE: CPT | Mod: PO

## 2018-03-15 PROCEDURE — 3079F DIAST BP 80-89 MM HG: CPT | Mod: CPTII,S$GLB,, | Performed by: PHYSICIAN ASSISTANT

## 2018-03-15 PROCEDURE — 84439 ASSAY OF FREE THYROXINE: CPT

## 2018-03-15 PROCEDURE — 3075F SYST BP GE 130 - 139MM HG: CPT | Mod: CPTII,S$GLB,, | Performed by: PHYSICIAN ASSISTANT

## 2018-03-15 RX ORDER — METOCLOPRAMIDE 10 MG/1
1 TABLET ORAL 2 TIMES DAILY
COMMUNITY
Start: 2018-03-09 | End: 2018-05-30

## 2018-03-15 RX ORDER — DULOXETIN HYDROCHLORIDE 30 MG/1
30 CAPSULE, DELAYED RELEASE ORAL DAILY
Qty: 30 CAPSULE | Refills: 0 | Status: CANCELLED | OUTPATIENT
Start: 2018-03-15 | End: 2019-03-15

## 2018-03-15 RX ORDER — DULOXETIN HYDROCHLORIDE 30 MG/1
30 CAPSULE, DELAYED RELEASE ORAL DAILY
Qty: 30 CAPSULE | Refills: 0 | Status: SHIPPED | OUTPATIENT
Start: 2018-03-15 | End: 2018-05-30

## 2018-03-15 RX ORDER — BUSPIRONE HYDROCHLORIDE 5 MG/1
5 TABLET ORAL 2 TIMES DAILY PRN
Qty: 60 TABLET | Refills: 0 | Status: SHIPPED | OUTPATIENT
Start: 2018-03-15 | End: 2019-01-09

## 2018-03-15 NOTE — PROGRESS NOTES
Answers for HPI/ROS submitted by the patient on 3/15/2018   activity change: Yes  unexpected weight change: No  neck pain: No  hearing loss: No  rhinorrhea: No  trouble swallowing: No  eye discharge: No  visual disturbance: No  chest tightness: Yes  wheezing: No  chest pain: Yes  palpitations: Yes  blood in stool: No  constipation: No  vomiting: No  diarrhea: No  polydipsia: No  polyuria: Yes  difficulty urinating: No  hematuria: No  menstrual problem: No  dysuria: No  joint swelling: No  arthralgias: Yes  headaches: No  weakness: Yes  confusion: Yes  dysphoric mood: No

## 2018-03-15 NOTE — PROGRESS NOTES
Subjective:       Patient ID: Giuliana Rodas is a 47 y.o. female with multiple medical diagnoses as listed in the medical history and problem list that presents for Palpitations; Arm Pain (left); and Anxiety  .    Chief Complaint: Palpitations; Arm Pain (left); and Anxiety      Palpitations    This is a new problem. The current episode started in the past 7 days. Progression since onset: was just at night but today during the day to  The symptoms are aggravated by stress. Associated symptoms include anxiety, an irregular heartbeat and shortness of breath (mild ). Pertinent negatives include no chest fullness, chest pain, diaphoresis, fever, nausea, numbness or vomiting. She has tried beta blockers for the symptoms.   Arm Pain    The incident occurred 3 to 6 hours ago. There was no injury mechanism. Pain location: left upper arm  The quality of the pain is described as aching (sore). The pain does not radiate. The pain has been intermittent since the incident. Pertinent negatives include no chest pain, numbness or tingling. The symptoms are aggravated by movement.   Anxiety   Presents for follow-up visit. Symptoms include excessive worry, nervous/anxious behavior, palpitations and shortness of breath (mild ). Patient reports no chest pain, depressed mood, insomnia, irritability or nausea. Symptoms occur occasionally. The severity of symptoms is mild. The quality of sleep is fair.       States she stop Elavil and complains  Fibromyalgia pains worse.     Review of Systems   Constitutional: Negative for diaphoresis, fever and irritability.   Respiratory: Positive for shortness of breath (mild ).    Cardiovascular: Positive for palpitations. Negative for chest pain.   Gastrointestinal: Negative for nausea and vomiting.   Neurological: Negative for tingling and numbness.   Psychiatric/Behavioral: The patient is nervous/anxious. The patient does not have insomnia.          PAST MEDICAL HISTORY:  Past Medical History:    Diagnosis Date    Bile reflux gastritis     Eczema     Fibromyalgia     Gastroparesis     dr. harden    GERD (gastroesophageal reflux disease)     Hyperglyceridemia     Hyperlipidemia     Hypertension     IC (interstitial cystitis)     dr. labadie    Psoriasis     Tension headache        SOCIAL HISTORY:  Social History     Social History    Marital status:      Spouse name: N/A    Number of children: 2    Years of education: N/A     Occupational History     [employer]     Social History Main Topics    Smoking status: Never Smoker    Smokeless tobacco: Never Used    Alcohol use No    Drug use: No    Sexual activity: Yes     Partners: Male     Other Topics Concern    Not on file     Social History Narrative    Lives at home with  and daughter       ALLERGIES AND MEDICATIONS: updated and reviewed.  Review of patient's allergies indicates:   Allergen Reactions    Elavil [amitriptyline] Hallucinations    Depo-provera [medroxyprogesterone] Anxiety    Dicyclomine Rash     Swelling of lip      Prozac [fluoxetine] Anxiety     Current Outpatient Prescriptions   Medication Sig Dispense Refill    amLODIPine (NORVASC) 5 MG tablet Take 1 tablet (5 mg total) by mouth once daily. 30 tablet 11    atenolol (TENORMIN) 50 MG tablet Take 1 tablet (50 mg total) by mouth 2 (two) times daily. 180 tablet 1    BIFIDOBACTERIUM INFANTIS (ALIGN ORAL) Take 1 tablet by mouth once daily.      butalbital-acetaminophen-caffeine -40 mg (FIORICET, ESGIC) -40 mg per tablet Take 1 tablet by mouth every 6 (six) hours as needed. 40 tablet 1    chlorthalidone (HYGROTEN) 25 MG Tab TAKE ONE TABLET BY MOUTH DAILY. stop hydrochorothiazide  2    escitalopram oxalate (LEXAPRO) 20 MG tablet Take 20 mg by mouth every evening.       fluticasone (FLONASE) 50 mcg/actuation nasal spray ONE SPRAY IN EACH NOSTRIL EVERY DAY 16 g 2    lidocaine-prilocaine (EMLA) cream       norethindrone-ethinyl  "estradiol (JUNEL FE 1/20) 1 mg-20 mcg (21)/75 mg (7) per tablet TAKE ONE TABLET BY MOUTH EVERY DAY 21 DAYS 28 tablet 11    pantoprazole (PROTONIX) 40 MG tablet Take 40 mg by mouth once daily.  6    pravastatin (PRAVACHOL) 20 MG tablet TAKE ONE TABLET BY MOUTH EVERY EVENING 90 tablet 1    tiZANidine (ZANAFLEX) 4 MG tablet Take 4 mg by mouth every 8 (eight) hours as needed.  3    valsartan (DIOVAN) 320 MG tablet Take 1 tablet (320 mg total) by mouth once daily. 90 tablet 3    busPIRone (BUSPAR) 5 MG Tab Take 1 tablet (5 mg total) by mouth 2 (two) times daily as needed. 60 tablet 0    clindamycin phosphate 1% (CLINDAGEL) 1 % gel Apply topically 2 (two) times daily. 30 g 1    DULoxetine (CYMBALTA) 30 MG capsule Take 1 capsule (30 mg total) by mouth once daily. 30 capsule 0    metoclopramide HCl (REGLAN) 10 MG tablet Take 1 tablet by mouth 2 (two) times daily.      ramelteon (ROZEREM) 8 mg tablet Take 1 tablet (8 mg total) by mouth every evening. 90 tablet 0    sucralfate (CARAFATE) 100 mg/mL suspension TAKE TWO TEASPOONSFUL BY MOUTH FOUR TIMES DAILY ON AN EMPTY STOMACH ONE HOUR BEFORE meals AND AT BEDTIME  0     No current facility-administered medications for this visit.          Objective:   /80   Pulse 62   Temp 98.8 °F (37.1 °C) (Oral)   Resp 16   Ht 5' 2" (1.575 m)   Wt 85.1 kg (187 lb 9.8 oz)   SpO2 98%   BMI 34.31 kg/m²      Physical Exam   Constitutional: She is oriented to person, place, and time. No distress.   HENT:   Head: Normocephalic and atraumatic.   Eyes: Conjunctivae and EOM are normal.   Cardiovascular: Normal rate and regular rhythm.    Pulmonary/Chest: Effort normal and breath sounds normal. She has no wheezes.   Neurological: She is alert and oriented to person, place, and time.   Skin: Skin is warm. No erythema.           Assessment:       1. Anxiety    2. Palpitation    3. Fibromyalgia        Plan:       Anxiety  -     busPIRone (BUSPAR) 5 MG Tab; Take 1 tablet (5 mg total) " by mouth 2 (two) times daily as needed.  Dispense: 60 tablet; Refill: 0    Palpitation  -     TSH; Future; Expected date: 03/15/2018  -     T3; Future; Expected date: 03/15/2018  -     T4, free; Future; Expected date: 03/15/2018    Call if no improvement with additional anxiety treatment. Will get Holter. EKG was fine     Fibromyalgia  -     DULoxetine (CYMBALTA) 30 MG capsule; Take 1 capsule (30 mg total) by mouth once daily.  Dispense: 30 capsule; Refill: 0  Follow up in 4 weeks.                Follow-up in about 4 weeks (around 4/12/2018).

## 2018-03-16 LAB — T3 SERPL-MCNC: 125 NG/DL

## 2018-03-27 DIAGNOSIS — I10 ESSENTIAL HYPERTENSION: ICD-10-CM

## 2018-03-27 RX ORDER — CHLORTHALIDONE 25 MG/1
TABLET ORAL
Qty: 30 TABLET | Refills: 5 | Status: SHIPPED | OUTPATIENT
Start: 2018-03-27 | End: 2018-05-30 | Stop reason: SDUPTHER

## 2018-04-02 ENCOUNTER — PATIENT OUTREACH (OUTPATIENT)
Dept: OTHER | Facility: OTHER | Age: 48
End: 2018-04-02

## 2018-04-02 NOTE — PROGRESS NOTES
"Last 5 Patient Entered Readings                                      Current 30 Day Average: 131/77     Recent Readings 3/27/2018 3/17/2018 3/9/2018 3/7/2018 3/5/2018    SBP (mmHg) 135 137 124 145 115    DBP (mmHg) 71 75 77 80 77    Pulse 72 69 65 69 66          Hypertension Digital Medicine Program (HDMP): Health  Follow Up    Lifestyle Modifications:    1.Low sodium diet: no Patient reports "doing okay with it." She states consuming sodium-filled foods, like crackers. Patient reports consuming Cheese Its. Patient reports consuming crackers. Patient reports enjoying foods that have a crunch to them. Encouraged patient to consume healthier options like apples. She states enjoying apples and will replace crackers soon.     2.Physical activity: yes Patient reports stretching her full body, mostly stomach and back, 2x day for 30 minutes per session. She states needing to increase her activity level but her current exercise regimen. Encouraged patient to continue her stretching regimen.      3.Hypotension/Hypertension symptoms: no Patient reports not knowing if BP medication will cause "creepy/strange dreams." Encouraged patient to ask PCP about issue.   Frequency/Alleviating factors/Precipitating factors, etc.     4.Patient has been compliant with the medication regimen.     Follow up with . Giuliana ABBIE Rodas completed. No further questions or concerns. I will follow up in a few weeks to assess progress.     "

## 2018-04-16 DIAGNOSIS — E78.5 HYPERLIPIDEMIA, UNSPECIFIED HYPERLIPIDEMIA TYPE: ICD-10-CM

## 2018-04-16 RX ORDER — PRAVASTATIN SODIUM 20 MG/1
TABLET ORAL
Qty: 90 TABLET | Refills: 0 | Status: SHIPPED | OUTPATIENT
Start: 2018-04-16 | End: 2018-07-23 | Stop reason: SDUPTHER

## 2018-04-16 RX ORDER — ATENOLOL 50 MG/1
TABLET ORAL
Qty: 90 TABLET | Refills: 0 | Status: SHIPPED | OUTPATIENT
Start: 2018-04-16 | End: 2018-05-15 | Stop reason: DRUGHIGH

## 2018-04-23 ENCOUNTER — PATIENT OUTREACH (OUTPATIENT)
Dept: OTHER | Facility: OTHER | Age: 48
End: 2018-04-23

## 2018-04-23 NOTE — PROGRESS NOTES
"Last 5 Patient Entered Readings                                      Current 30 Day Average: 136/78     Recent Readings 4/20/2018 4/12/2018 4/4/2018 3/27/2018 3/17/2018    SBP (mmHg) 140 127 140 135 137    DBP (mmHg) 80 78 81 71 75    Pulse 66 68 69 72 69        Hypertension Digital Medicine Program (HDMP): Health  Follow Up    Lifestyle Modifications:    1.Low sodium diet: no Patient reports consuming fast food frequently. She states her children are moving in and finding it difficult to prepare meals due to moving their possessions. She states consuming roast beef deli meat with cheese. Informed patient about deli meat being high in sodium often. Patient reports not knowing the amount of sodium in deli meat.      2.Physical activity: no Patient states she has been moving boxes from her children moving in. She states using her elliptical around 20 minutes occasionally. She expresses an interest in finding a regimen involving her elliptical. Encouraged patient to continue using her elliptical and will further discuss a regimen upon next encounter.     3.Hypotension/Hypertension symptoms: no Patient reports "being fine" with her BP medication.   Frequency/Alleviating factors/Precipitating factors, etc.     4.Patient has been compliant with the medication regimen.     Follow up with Mrs. Giuliana LEIVA Adrianna completed. No further questions or concerns. I will follow up in a few weeks to assess progress.       "

## 2018-05-14 ENCOUNTER — PATIENT OUTREACH (OUTPATIENT)
Dept: OTHER | Facility: OTHER | Age: 48
End: 2018-05-14

## 2018-05-14 NOTE — PROGRESS NOTES
"Last 5 Patient Entered Readings                                      Current 30 Day Average: 125/72     Recent Readings 5/10/2018 5/6/2018 4/28/2018 4/20/2018 4/12/2018    SBP (mmHg) 100 119 142 140 127    DBP (mmHg) 61 73 74 80 78    Pulse 58 70 70 66 68             Digital Medicine: Health  Follow Up    Lifestyle Modifications:    1.Dietary Modifications (Sodium intake <2,000mg/day, food labels, dining out): Patient reports "being back and forth with diet." Patient reports reading food labels for sodium often. She states staying away from [employer]. She states watching her diet. Patient reports " not going overboard." Patient reports consuming potato salad for Mother's day. Patient reports still eating out "a bit." Patient reports doing her best to make more meals at home. Encouraged patient to continue reading labels and searching for items 140 mg of sodium or less and increase meals made at home.     2.Physical Activity: Patient reports walking her dog. She expresses her need to increase her walking, maybe more in the evening time now the temperature has increase.. Will further assess patient's increase in activity.     3.Medication Therapy: Patient has been compliant with the medication regimen.    4.Patient has the following medication side effects/concerns:   (Frequency/Alleviating factors/Precipitating factors, etc.)     Follow up with Mrs. Giuliana LEIVA Adrianna completed. No further questions or concerns. Will continue follow up to achieve health goals.  "

## 2018-05-14 NOTE — PROGRESS NOTES
Ms. Rodas reports some dizziness when she turns her head. Pleased with lower readings, monitoring sodium intake.     Last 5 Patient Entered Readings                                      Current 30 Day Average: 125/72     Recent Readings 5/10/2018 5/6/2018 4/28/2018 4/20/2018 4/12/2018    SBP (mmHg) 100 119 142 140 127    DBP (mmHg) 61 73 74 80 78    Pulse 58 70 70 66 68          BP at goal, <130/80 mmHg  Decrease atenolol to 25mg BID  Continue monitoring    Hypertension Medications             amLODIPine (NORVASC) 5 MG tablet Take 1 tablet (5 mg total) by mouth once daily.    atenolol (TENORMIN) 50 MG tablet Take 25 mg by mouth 2 (two) times daily.    chlorthalidone (HYGROTEN) 25 MG Tab TAKE ONE TABLET BY MOUTH DAILY. stop hydrochorothiazide    chlorthalidone (HYGROTEN) 25 MG Tab TAKE ONE TABLET BY MOUTH DAILY. stop hydrochorothiazide    valsartan (DIOVAN) 320 MG tablet Take 1 tablet (320 mg total) by mouth once daily.

## 2018-05-15 RX ORDER — ATENOLOL 50 MG/1
25 TABLET ORAL 2 TIMES DAILY
COMMUNITY
End: 2018-05-30 | Stop reason: SDUPTHER

## 2018-05-29 ENCOUNTER — PATIENT OUTREACH (OUTPATIENT)
Dept: OTHER | Facility: OTHER | Age: 48
End: 2018-05-29

## 2018-05-29 NOTE — PROGRESS NOTES
Ms. Rodas is feeling better since atenolol dose decrease. She wants to increase activity. Recommended a walk in the evenings now that it is warmer out.   Last 5 Patient Entered Readings                                      Current 30 Day Average: 129/76     Recent Readings 5/24/2018 5/23/2018 5/22/2018 5/21/2018 5/20/2018    SBP (mmHg) 119 139 126 130 128    DBP (mmHg) 69 81 77 77 75    Pulse 80 74 68 69 71          BP at goal, <130/80 mmHg  Continue monitoring  Continue lifestyle modifications    Hypertension Medications             amLODIPine (NORVASC) 5 MG tablet Take 1 tablet (5 mg total) by mouth once daily.    atenolol (TENORMIN) 50 MG tablet Take 25 mg by mouth 2 (two) times daily.    chlorthalidone (HYGROTEN) 25 MG Tab TAKE ONE TABLET BY MOUTH DAILY. stop hydrochorothiazide    chlorthalidone (HYGROTEN) 25 MG Tab TAKE ONE TABLET BY MOUTH DAILY. stop hydrochorothiazide    valsartan (DIOVAN) 320 MG tablet Take 1 tablet (320 mg total) by mouth once daily.

## 2018-05-30 ENCOUNTER — OFFICE VISIT (OUTPATIENT)
Dept: FAMILY MEDICINE | Facility: CLINIC | Age: 48
End: 2018-05-30
Payer: MEDICARE

## 2018-05-30 VITALS
HEIGHT: 62 IN | TEMPERATURE: 99 F | RESPIRATION RATE: 16 BRPM | WEIGHT: 189.63 LBS | HEART RATE: 74 BPM | SYSTOLIC BLOOD PRESSURE: 118 MMHG | OXYGEN SATURATION: 97 % | BODY MASS INDEX: 34.89 KG/M2 | DIASTOLIC BLOOD PRESSURE: 70 MMHG

## 2018-05-30 DIAGNOSIS — I10 ESSENTIAL HYPERTENSION: ICD-10-CM

## 2018-05-30 DIAGNOSIS — L21.9 SEBORRHEIC DERMATITIS: ICD-10-CM

## 2018-05-30 DIAGNOSIS — L85.3 DRY SKIN DERMATITIS: ICD-10-CM

## 2018-05-30 DIAGNOSIS — K21.9 GASTROESOPHAGEAL REFLUX DISEASE, ESOPHAGITIS PRESENCE NOT SPECIFIED: ICD-10-CM

## 2018-05-30 DIAGNOSIS — S33.5XXA LUMBAR SPRAIN, INITIAL ENCOUNTER: Primary | ICD-10-CM

## 2018-05-30 PROCEDURE — 3074F SYST BP LT 130 MM HG: CPT | Mod: S$GLB,,, | Performed by: FAMILY MEDICINE

## 2018-05-30 PROCEDURE — 3078F DIAST BP <80 MM HG: CPT | Mod: S$GLB,,, | Performed by: FAMILY MEDICINE

## 2018-05-30 PROCEDURE — 99214 OFFICE O/P EST MOD 30 MIN: CPT | Mod: 25,S$GLB,, | Performed by: FAMILY MEDICINE

## 2018-05-30 PROCEDURE — 96372 THER/PROPH/DIAG INJ SC/IM: CPT | Mod: S$GLB,,, | Performed by: FAMILY MEDICINE

## 2018-05-30 PROCEDURE — 99999 PR PBB SHADOW E&M-EST. PATIENT-LVL IV: CPT | Mod: PBBFAC,,, | Performed by: PHYSICIAN ASSISTANT

## 2018-05-30 PROCEDURE — 3008F BODY MASS INDEX DOCD: CPT | Mod: S$GLB,,, | Performed by: FAMILY MEDICINE

## 2018-05-30 RX ORDER — PANTOPRAZOLE SODIUM 40 MG/1
40 TABLET, DELAYED RELEASE ORAL DAILY
Qty: 90 TABLET | Refills: 3 | Status: SHIPPED | OUTPATIENT
Start: 2018-05-30 | End: 2020-01-04 | Stop reason: SDUPTHER

## 2018-05-30 RX ORDER — CHLORTHALIDONE 25 MG/1
25 TABLET ORAL DAILY
Qty: 90 TABLET | Refills: 3 | Status: SHIPPED | OUTPATIENT
Start: 2018-05-30 | End: 2019-01-14 | Stop reason: SINTOL

## 2018-05-30 RX ORDER — ATENOLOL 50 MG/1
25 TABLET ORAL 2 TIMES DAILY
Qty: 90 TABLET | Refills: 3 | Status: SHIPPED | OUTPATIENT
Start: 2018-05-30 | End: 2018-07-30 | Stop reason: DRUGHIGH

## 2018-05-30 RX ORDER — SUCRALFATE 1 G/1
1 TABLET ORAL 4 TIMES DAILY
COMMUNITY
End: 2020-07-01 | Stop reason: SDUPTHER

## 2018-05-30 RX ORDER — NAPROXEN 500 MG/1
500 TABLET ORAL 2 TIMES DAILY WITH MEALS
Qty: 60 TABLET | Refills: 0 | Status: SHIPPED | OUTPATIENT
Start: 2018-05-30 | End: 2018-11-28

## 2018-05-30 RX ORDER — KETOCONAZOLE 20 MG/ML
SHAMPOO, SUSPENSION TOPICAL
Qty: 120 ML | Refills: 0 | Status: SHIPPED | OUTPATIENT
Start: 2018-05-31 | End: 2019-05-22

## 2018-05-30 RX ORDER — KETOROLAC TROMETHAMINE 30 MG/ML
30 INJECTION, SOLUTION INTRAMUSCULAR; INTRAVENOUS ONCE
Status: COMPLETED | OUTPATIENT
Start: 2018-05-30 | End: 2018-05-30

## 2018-05-30 RX ADMIN — KETOROLAC TROMETHAMINE 30 MG: 30 INJECTION, SOLUTION INTRAMUSCULAR; INTRAVENOUS at 04:05

## 2018-05-30 NOTE — PROGRESS NOTES
Subjective:       Patient ID: Giuliana Rodas is a 48 y.o. female with multiple medical diagnoses as listed in the medical history and problem list that presents for Back Pain (since tuesday, right side and down buttock) and Dry Skin  .    Chief Complaint: Back Pain (since tuesday, right side and down buttock) and Dry Skin      Back Pain   This is a new problem. The current episode started yesterday. The problem occurs intermittently. The problem is unchanged. The pain is present in the lumbar spine (right ). The quality of the pain is described as shooting (sharp ). Radiates to: buttock  The pain is at a severity of 9/10. The pain is severe. The symptoms are aggravated by position, standing, bending and twisting. Pertinent negatives include no bladder incontinence, bowel incontinence, fever, headaches, leg pain, numbness, paresis, paresthesias, pelvic pain, perianal numbness, tingling or weakness. She has tried heat (ibuprofen 2 am and 3 pm none today and muscler relaxer cream ) for the symptoms.   Dry Skin   This is a recurrent problem. The current episode started 1 to 4 weeks ago. The problem occurs constantly. The problem has been waxing and waning (face and scalp ). Associated symptoms include a rash. Pertinent negatives include no chills, fever, headaches, nausea, numbness or weakness. She has tried nothing for the symptoms.     Review of Systems   Constitutional: Negative for chills and fever.   Gastrointestinal: Negative for bowel incontinence and nausea.   Genitourinary: Negative for bladder incontinence and pelvic pain.   Musculoskeletal: Positive for back pain.   Skin: Positive for rash.   Neurological: Negative for tingling, weakness, numbness, headaches and paresthesias.         PAST MEDICAL HISTORY:  Past Medical History:   Diagnosis Date    Bile reflux gastritis     Eczema     Fibromyalgia     Gastroparesis     dr. harden    GERD (gastroesophageal reflux disease)     Hyperglyceridemia      Hyperlipidemia     Hypertension     IC (interstitial cystitis)     dr. labadie    Psoriasis     Tension headache        SOCIAL HISTORY:  Social History     Social History    Marital status:      Spouse name: N/A    Number of children: 2    Years of education: N/A     Occupational History     [employer]     Social History Main Topics    Smoking status: Never Smoker    Smokeless tobacco: Never Used    Alcohol use No    Drug use: No    Sexual activity: Yes     Partners: Male     Other Topics Concern    Not on file     Social History Narrative    Lives at home with  and daughter       ALLERGIES AND MEDICATIONS: updated and reviewed.  Review of patient's allergies indicates:   Allergen Reactions    Elavil [amitriptyline] Hallucinations    Depo-provera [medroxyprogesterone] Anxiety    Dicyclomine Rash     Swelling of lip      Prozac [fluoxetine] Anxiety     Current Outpatient Prescriptions   Medication Sig Dispense Refill    amLODIPine (NORVASC) 5 MG tablet Take 1 tablet (5 mg total) by mouth once daily. 30 tablet 11    atenolol (TENORMIN) 50 MG tablet Take 0.5 tablets (25 mg total) by mouth 2 (two) times daily. 90 tablet 3    BIFIDOBACTERIUM INFANTIS (ALIGN ORAL) Take 1 tablet by mouth once daily.      butalbital-acetaminophen-caffeine -40 mg (FIORICET, ESGIC) -40 mg per tablet Take 1 tablet by mouth every 6 (six) hours as needed. 40 tablet 1    chlorthalidone (HYGROTEN) 25 MG Tab Take 1 tablet (25 mg total) by mouth once daily. 90 tablet 3    escitalopram oxalate (LEXAPRO) 20 MG tablet Take 20 mg by mouth every evening.       lidocaine-prilocaine (EMLA) cream       norethindrone-ethinyl estradiol (JUNEL FE 1/20) 1 mg-20 mcg (21)/75 mg (7) per tablet TAKE ONE TABLET BY MOUTH EVERY DAY 21 DAYS 28 tablet 11    pantoprazole (PROTONIX) 40 MG tablet Take 1 tablet (40 mg total) by mouth once daily. 90 tablet 3    pravastatin (PRAVACHOL) 20 MG tablet TAKE ONE TABLET BY  "MOUTH EVERY EVENING 90 tablet 0    ramelteon (ROZEREM) 8 mg tablet Take 1 tablet (8 mg total) by mouth every evening. 90 tablet 0    sucralfate (CARAFATE) 1 gram tablet Take 1 g by mouth 4 (four) times daily.      tiZANidine (ZANAFLEX) 4 MG tablet Take 4 mg by mouth every 8 (eight) hours as needed.  3    valsartan (DIOVAN) 320 MG tablet Take 1 tablet (320 mg total) by mouth once daily. 90 tablet 3    busPIRone (BUSPAR) 5 MG Tab Take 1 tablet (5 mg total) by mouth 2 (two) times daily as needed. 60 tablet 0    fluticasone (FLONASE) 50 mcg/actuation nasal spray ONE SPRAY IN EACH NOSTRIL EVERY DAY 16 g 2    ketoconazole (NIZORAL) 2 % shampoo Apply topically twice a week. 120 mL 0    naproxen (NAPROSYN) 500 MG tablet Take 1 tablet (500 mg total) by mouth 2 (two) times daily with meals. 60 tablet 0     No current facility-administered medications for this visit.          Objective:   /70   Pulse 74   Temp 98.7 °F (37.1 °C)   Resp 16   Ht 5' 2" (1.575 m)   Wt 86 kg (189 lb 9.5 oz)   SpO2 97%   BMI 34.68 kg/m²      Physical Exam   Constitutional: She is oriented to person, place, and time. No distress.   HENT:   Head: Normocephalic and atraumatic.   Cardiovascular: Normal rate and regular rhythm.    Pulmonary/Chest: Effort normal and breath sounds normal.   Musculoskeletal:        Lumbar back: She exhibits tenderness and pain. She exhibits normal range of motion and no bony tenderness.        Back:    Neurological: She is alert and oriented to person, place, and time.   Skin:   Scalp dry, flaky, yellow   Face dry between her eyebrows but covered with makeup as well            Assessment:       1. Lumbar sprain, initial encounter    2. Essential hypertension    3. Gastroesophageal reflux disease, esophagitis presence not specified    4. Seborrheic dermatitis    5. Dry skin dermatitis        Plan:       Lumbar sprain, initial encounter  -     ketorolac injection 30 mg; Inject 1 mL (30 mg total) into the " muscle once.  -     naproxen (NAPROSYN) 500 MG tablet; Take 1 tablet (500 mg total) by mouth 2 (two) times daily with meals.  Dispense: 60 tablet; Refill: 0  Start tomorrow  She has zanaflex. Patient informed she can take an additional 1-2 pills at night during this time If needed.  Moist heat  Stretches starting tomorrow. Provided handout.      Essential hypertension  -     chlorthalidone (HYGROTEN) 25 MG Tab; Take 1 tablet (25 mg total) by mouth once daily.  Dispense: 90 tablet; Refill: 3  -     atenolol (TENORMIN) 50 MG tablet; Take 0.5 tablets (25 mg total) by mouth 2 (two) times daily.  Dispense: 90 tablet; Refill: 3    Gastroesophageal reflux disease, esophagitis presence not specified  -     pantoprazole (PROTONIX) 40 MG tablet; Take 1 tablet (40 mg total) by mouth once daily.  Dispense: 90 tablet; Refill: 3    Seborrheic dermatitis  -     ketoconazole (NIZORAL) 2 % shampoo; Apply topically twice a week.  Dispense: 120 mL; Refill: 0    Dry skin dermatitis  Advised to avoid fragrance items on face.   Wash with Cetaphil and moisturize as well.   Avoid using exfoliating products.               No Follow-up on file.

## 2018-05-30 NOTE — PROGRESS NOTES
Administered Ketorolac 30 mg IM to right upper outer quad gluteus.  Patient tolerated injection well, no adverse reactions noted.

## 2018-06-06 ENCOUNTER — PATIENT OUTREACH (OUTPATIENT)
Dept: OTHER | Facility: OTHER | Age: 48
End: 2018-06-06

## 2018-06-06 NOTE — PROGRESS NOTES
"Last 5 Patient Entered Readings                                      Current 30 Day Average: 130/77     Recent Readings 6/2/2018 5/24/2018 5/23/2018 5/22/2018 5/21/2018    SBP (mmHg) 126 119 139 126 130    DBP (mmHg) 86 69 81 77 77    Pulse 67 80 74 68 69        Encounter also consisted of patient discussing how she gained weight after she acquired an illness.     Digital Medicine: Health  Follow Up    Lifestyle Modifications:    1.Dietary Modifications (Sodium intake <2,000mg/day, food labels, dining out): Patients maintaining the "back and forth" diet. She states consuming rice and other carb-heavy foods. She states cooking for her entire family therefore it is difficult to maintain a healthy diet. She states being her mother's caretaker, being a house mom, and parent of two children. She states its not easy to taking care of herself. Asked patient if she has close friends or family to help out with relieving responsibilities. She states having a son who helps her out frequently. Encouraged patient to empower son to help out with various jobs.     2.Physical Activity: Patient states her physical activity is about the same. She states knowing she needs to increase her activity level and is "back and forth with it." Encouraged patient to focus more on herself and incorporate son with sharing responsibilities.    3.Medication Therapy: Patient has been compliant with the medication regimen.    4.Patient has the following medication side effects/concerns:   (Frequency/Alleviating factors/Precipitating factors, etc.)     Follow up with Mrs. Giuliana LEIVA Adrianna completed. No further questions or concerns. Will continue follow up to achieve health goals.  "

## 2018-06-09 DIAGNOSIS — G44.221 CHRONIC TENSION-TYPE HEADACHE, INTRACTABLE: ICD-10-CM

## 2018-06-11 RX ORDER — TIZANIDINE 4 MG/1
TABLET ORAL
Qty: 60 TABLET | Refills: 3 | Status: SHIPPED | OUTPATIENT
Start: 2018-06-11 | End: 2019-02-12 | Stop reason: SDUPTHER

## 2018-06-27 ENCOUNTER — PATIENT OUTREACH (OUTPATIENT)
Dept: OTHER | Facility: OTHER | Age: 48
End: 2018-06-27

## 2018-06-27 NOTE — PROGRESS NOTES
Last 5 Patient Entered Readings                                      Current 30 Day Average: 127/83     Recent Readings 6/21/2018 6/10/2018 6/2/2018 5/24/2018 5/23/2018    SBP (mmHg) 121 135 126 119 139    DBP (mmHg) 80 83 86 69 81    Pulse 77 64 67 80 74        6/27-Patient answered stating she is not feeling well today. Will call in a couple weeks to try and follow up.

## 2018-07-03 RX ORDER — AMLODIPINE BESYLATE 5 MG/1
TABLET ORAL
Qty: 90 TABLET | Refills: 1 | Status: SHIPPED | OUTPATIENT
Start: 2018-07-03 | End: 2018-07-23 | Stop reason: ALTCHOICE

## 2018-07-10 NOTE — PROGRESS NOTES
Reviewed BP readings. BP actively managed, goal <130/80 mmHg  Continue monitoring      Last 5 Patient Entered Readings                                      Current 30 Day Average: 125/79     Recent Readings 7/2/2018 6/21/2018 6/10/2018 6/2/2018 5/24/2018    SBP (mmHg) 120 121 135 126 119    DBP (mmHg) 74 80 83 86 69    Pulse 67 77 64 67 80

## 2018-07-12 NOTE — PROGRESS NOTES
Last 5 Patient Entered Readings                                      Current 30 Day Average: 118/75     Recent Readings 7/10/2018 7/2/2018 6/21/2018 6/10/2018 6/2/2018    SBP (mmHg) 114 120 121 135 126    DBP (mmHg) 70 74 80 83 86    Pulse 68 67 77 64 67        Patient reports having sinus headaches recently but is trying to maintain a healthy diet as much as possible.     Digital Medicine: Health  Follow Up    Lifestyle Modifications:    1.Dietary Modifications (Sodium intake <2,000mg/day, food labels, dining out): Patient reports consuming tomato salads with salt free seasonings. She states consuming chicken salads with cucumbers often. Patient expresses her  is also adhering to low-sodium diet and exercising as well. Encouraged patient to continue low-sodium diet.      2.Physical Activity: Patient reports attending the gym for 1 hour, every other day of the week. She states focusing on using elliptical, rowing machine, and stairmaster machines. She states having some wrist pain but decreases exercises involving her wrist. Patient reports talking to a  about specific exercises. She expresses the success in the burning many calories. Encouraged patient to be mindful of physical burnout due to high level, high intensity training. She states she listens to her body to prevent injury.     3.Medication Therapy: Patient has been compliant with the medication regimen.    4.Patient has the following medication side effects/concerns:   (Frequency/Alleviating factors/Precipitating factors, etc.)     Follow up with Mrs. Giuliana LEIVA Adrianna completed. No further questions or concerns. Will continue follow up to achieve health goals.

## 2018-07-23 DIAGNOSIS — Z12.39 BREAST CANCER SCREENING: Primary | ICD-10-CM

## 2018-07-23 DIAGNOSIS — E78.5 HYPERLIPIDEMIA, UNSPECIFIED HYPERLIPIDEMIA TYPE: ICD-10-CM

## 2018-07-23 RX ORDER — AMLODIPINE AND VALSARTAN 5; 320 MG/1; MG/1
1 TABLET ORAL DAILY
Qty: 90 TABLET | Refills: 3 | Status: SHIPPED | OUTPATIENT
Start: 2018-07-23 | End: 2019-01-07

## 2018-07-23 RX ORDER — PRAVASTATIN SODIUM 20 MG/1
TABLET ORAL
Qty: 90 TABLET | Refills: 0 | Status: SHIPPED | OUTPATIENT
Start: 2018-07-23 | End: 2018-10-22 | Stop reason: SDUPTHER

## 2018-07-23 NOTE — TELEPHONE ENCOUNTER
Please make sure patient is aware we are combing Norvasc and valsartan so she does not take her old ones due to recall

## 2018-07-23 NOTE — TELEPHONE ENCOUNTER
----- Message from Funmilayo Valles sent at 7/23/2018  9:23 AM CDT -----  Contact: Beth with [facility]/ [phone]  VALSARTAN RECALL    Beth calling to request alternative. Please call to advise. Says alternatives are Exsorge or Combo-LOSARTAN. Thank you.    [facility] - [place] - [address]  [address]  [place] [address]  Phone: [phone] Fax: [phone]

## 2018-07-24 ENCOUNTER — HOSPITAL ENCOUNTER (OUTPATIENT)
Dept: RADIOLOGY | Facility: HOSPITAL | Age: 48
Discharge: HOME | End: 2018-07-24
Attending: PHYSICIAN ASSISTANT
Payer: MEDICARE

## 2018-07-24 DIAGNOSIS — Z12.39 BREAST CANCER SCREENING: ICD-10-CM

## 2018-07-24 PROCEDURE — 77063 BREAST TOMOSYNTHESIS BI: CPT | Mod: 26,,, | Performed by: RADIOLOGY

## 2018-07-24 PROCEDURE — 77067 SCR MAMMO BI INCL CAD: CPT | Mod: 26,,, | Performed by: RADIOLOGY

## 2018-07-24 PROCEDURE — 77067 SCR MAMMO BI INCL CAD: CPT | Mod: TC

## 2018-07-30 ENCOUNTER — OFFICE VISIT (OUTPATIENT)
Dept: FAMILY MEDICINE | Facility: CLINIC | Age: 48
End: 2018-07-30
Payer: MEDICARE

## 2018-07-30 VITALS
SYSTOLIC BLOOD PRESSURE: 120 MMHG | DIASTOLIC BLOOD PRESSURE: 76 MMHG | RESPIRATION RATE: 16 BRPM | HEIGHT: 62 IN | WEIGHT: 192.25 LBS | HEART RATE: 71 BPM | OXYGEN SATURATION: 97 % | BODY MASS INDEX: 35.38 KG/M2 | TEMPERATURE: 99 F

## 2018-07-30 DIAGNOSIS — M79.675 TOE PAIN, LEFT: ICD-10-CM

## 2018-07-30 DIAGNOSIS — I10 ESSENTIAL HYPERTENSION: ICD-10-CM

## 2018-07-30 DIAGNOSIS — N83.202 CYST OF LEFT OVARY: Primary | ICD-10-CM

## 2018-07-30 PROCEDURE — 99999 PR PBB SHADOW E&M-EST. PATIENT-LVL V: CPT | Mod: PBBFAC,,, | Performed by: PHYSICIAN ASSISTANT

## 2018-07-30 PROCEDURE — 3074F SYST BP LT 130 MM HG: CPT | Mod: CPTII,S$GLB,, | Performed by: PHYSICIAN ASSISTANT

## 2018-07-30 PROCEDURE — 99213 OFFICE O/P EST LOW 20 MIN: CPT | Mod: S$GLB,,, | Performed by: PHYSICIAN ASSISTANT

## 2018-07-30 PROCEDURE — 3008F BODY MASS INDEX DOCD: CPT | Mod: CPTII,S$GLB,, | Performed by: PHYSICIAN ASSISTANT

## 2018-07-30 PROCEDURE — 3078F DIAST BP <80 MM HG: CPT | Mod: CPTII,S$GLB,, | Performed by: PHYSICIAN ASSISTANT

## 2018-07-30 RX ORDER — ATENOLOL 25 MG/1
25 TABLET ORAL 2 TIMES DAILY
Refills: 0 | COMMUNITY
Start: 2018-06-26 | End: 2018-09-26 | Stop reason: SDUPTHER

## 2018-07-30 NOTE — PROGRESS NOTES
Subjective:       Patient ID: Giuliana Rodas is a 48 y.o. female with multiple medical diagnoses as listed in the medical history and problem list that presents for Hypertension  .    Chief Complaint: Hypertension      Hypertension   This is a chronic problem. The current episode started more than 1 year ago. The problem has been gradually improving since onset. The problem is controlled. Associated symptoms include blurred vision, headaches and neck pain. Pertinent negatives include no anxiety, chest pain, malaise/fatigue, orthopnea, palpitations, peripheral edema, PND, shortness of breath or sweats. Agents associated with hypertension include oral contraceptives. Risk factors for coronary artery disease include dyslipidemia and obesity. Past treatments include lifestyle changes, ACE inhibitors, calcium channel blockers and diuretics. The current treatment provides significant improvement. There are no compliance problems.    Toe Pain    The incident occurred more than 1 week ago. The incident occurred at home. There was no injury mechanism. Pain location: left big toe  Quality: pulling pain with walking only  The pain is mild. The pain has been intermittent since onset. Pertinent negatives include no inability to bear weight, loss of motion, loss of sensation, muscle weakness, numbness or tingling. The symptoms are aggravated by weight bearing. She has tried nothing for the symptoms.     Review of Systems   Constitutional: Negative for malaise/fatigue.   Eyes: Positive for blurred vision.   Respiratory: Negative for shortness of breath.    Cardiovascular: Negative for chest pain, palpitations, orthopnea and PND.   Musculoskeletal: Positive for neck pain.   Neurological: Positive for headaches. Negative for tingling and numbness.         PAST MEDICAL HISTORY:  Past Medical History:   Diagnosis Date    Bile reflux gastritis     Breast cyst     Eczema     Fibrocystic breast     Fibromyalgia     Gastroparesis      dr. harden    GERD (gastroesophageal reflux disease)     Hyperglyceridemia     Hyperlipidemia     Hypertension     IC (interstitial cystitis)     dr. labadie    Psoriasis     Tension headache        SOCIAL HISTORY:  Social History     Social History    Marital status:      Spouse name: N/A    Number of children: 2    Years of education: N/A     Occupational History     [employer]     Social History Main Topics    Smoking status: Never Smoker    Smokeless tobacco: Never Used    Alcohol use No    Drug use: No    Sexual activity: Yes     Partners: Male     Other Topics Concern    Not on file     Social History Narrative    Lives at home with  and daughter       ALLERGIES AND MEDICATIONS: updated and reviewed.  Review of patient's allergies indicates:   Allergen Reactions    Elavil [amitriptyline] Hallucinations    Depo-provera [medroxyprogesterone] Anxiety    Dicyclomine Rash     Swelling of lip      Prozac [fluoxetine] Anxiety     Current Outpatient Prescriptions   Medication Sig Dispense Refill    amlodipine-valsartan (EXFORGE) 5-320 mg per tablet Take 1 tablet by mouth once daily. 90 tablet 3    atenolol (TENORMIN) 25 MG tablet Take 25 mg by mouth 2 (two) times daily.  0    BIFIDOBACTERIUM INFANTIS (ALIGN ORAL) Take 1 tablet by mouth once daily.      busPIRone (BUSPAR) 5 MG Tab Take 1 tablet (5 mg total) by mouth 2 (two) times daily as needed. 60 tablet 0    butalbital-acetaminophen-caffeine -40 mg (FIORICET, ESGIC) -40 mg per tablet Take 1 tablet by mouth every 6 (six) hours as needed. 40 tablet 1    chlorthalidone (HYGROTEN) 25 MG Tab Take 1 tablet (25 mg total) by mouth once daily. 90 tablet 3    escitalopram oxalate (LEXAPRO) 20 MG tablet Take 20 mg by mouth every evening.       fluticasone (FLONASE) 50 mcg/actuation nasal spray ONE SPRAY IN EACH NOSTRIL EVERY DAY 16 g 2    ketoconazole (NIZORAL) 2 % shampoo Apply topically twice a week. 120 mL  "0    lidocaine-prilocaine (EMLA) cream       naproxen (NAPROSYN) 500 MG tablet Take 1 tablet (500 mg total) by mouth 2 (two) times daily with meals. 60 tablet 0    norethindrone-ethinyl estradiol (JUNEL FE 1/20) 1 mg-20 mcg (21)/75 mg (7) per tablet TAKE ONE TABLET BY MOUTH EVERY DAY 21 DAYS 28 tablet 11    pantoprazole (PROTONIX) 40 MG tablet Take 1 tablet (40 mg total) by mouth once daily. 90 tablet 3    pravastatin (PRAVACHOL) 20 MG tablet TAKE ONE TABLET BY MOUTH EVERY EVENING 90 tablet 0    sucralfate (CARAFATE) 1 gram tablet Take 1 g by mouth 4 (four) times daily.      tiZANidine (ZANAFLEX) 4 MG tablet TAKE ONE TABLET BY MOUTH EVERY 8 HOURS AS NEEDED 60 tablet 3     No current facility-administered medications for this visit.          Objective:   /76   Pulse 71   Temp 98.6 °F (37 °C) (Oral)   Resp 16   Ht 5' 2" (1.575 m)   Wt 87.2 kg (192 lb 3.9 oz)   SpO2 97%   BMI 35.16 kg/m²      Physical Exam   Constitutional: She is oriented to person, place, and time.   Eyes: Conjunctivae and EOM are normal.   Cardiovascular: Normal rate and regular rhythm.    Pulmonary/Chest: Effort normal and breath sounds normal. She has no wheezes.   Musculoskeletal: Normal range of motion.        Feet:    Feet:   Right Foot:   Skin Integrity: Positive for callus and dry skin. Negative for ulcer, blister, skin breakdown, erythema or warmth.   Left Foot:   Skin Integrity: Positive for callus and dry skin. Negative for ulcer, blister, skin breakdown, erythema or warmth.   Neurological: She is alert and oriented to person, place, and time.   Skin: Skin is warm. No erythema.           Assessment:       1. Cyst of left ovary    2. Essential hypertension    3. Toe pain, left        Plan:       Cyst of left ovary  -     Follicle stimulating hormone; Future; Expected date: 07/30/2018  If  will trial her off OCP to see if she still has issues with pain or not.     Essential hypertension  The current medical regimen is " effective;  continue present plan and medications.    Toe pain, left  Good supportive shoes   Let us know if it worsens.           No Follow-up on file.

## 2018-07-31 ENCOUNTER — LAB VISIT (OUTPATIENT)
Dept: LAB | Facility: HOSPITAL | Age: 48
End: 2018-07-31
Attending: FAMILY MEDICINE
Payer: MEDICARE

## 2018-07-31 DIAGNOSIS — N83.202 CYST OF LEFT OVARY: ICD-10-CM

## 2018-07-31 LAB — FSH SERPL-ACNC: 10.5 MIU/ML

## 2018-07-31 PROCEDURE — 83001 ASSAY OF GONADOTROPIN (FSH): CPT

## 2018-07-31 PROCEDURE — 36415 COLL VENOUS BLD VENIPUNCTURE: CPT | Mod: PO

## 2018-08-02 ENCOUNTER — PATIENT OUTREACH (OUTPATIENT)
Dept: OTHER | Facility: OTHER | Age: 48
End: 2018-08-02

## 2018-08-02 NOTE — PROGRESS NOTES
Last 5 Patient Entered Readings                                      Current 30 Day Average: 127/77     Recent Readings 7/29/2018 7/22/2018 7/20/2018 7/13/2018 7/10/2018    SBP (mmHg) 119 124 141 137 114    DBP (mmHg) 77 78 79 82 70    Pulse 66 74 80 75 68          8/2-Patient reports eating lunch. Requested phone call on 8/3.

## 2018-08-03 NOTE — PROGRESS NOTES
Last 5 Patient Entered Readings                                      Current 30 Day Average: 127/77     Recent Readings 7/29/2018 7/22/2018 7/20/2018 7/13/2018 7/10/2018    SBP (mmHg) 119 124 141 137 114    DBP (mmHg) 77 78 79 82 70    Pulse 66 74 80 75 68          Digital Medicine: Health  Follow Up    Lifestyle Modifications:    1.Dietary Modifications (Sodium intake <2,000mg/day, food labels, dining out): Patient reports eating fast food maybe 1-2x/week. She states eating healthy most of the time but treats herself sometimes. Encouraged patient to keep everything in moderation. She states she is trying her best and feels good about it.     2.Physical Activity: Patient reports attending gym stretching, leg-lifts, and using exercise machines (leg lift machine and rowing machine) every other day. She states losing around 200 calories for 30 minutes. Patient reports doing tricep exercises. Patient reports craving attaining her exercise. Patient reports feeling more energized and is happy where she is with her current exercise routine. Congratulated patient on her progress and encouraged her to continue her exercise regimen.       3.Medication Therapy: Patient has been compliant with the medication regimen.    4.Patient has the following medication side effects/concerns:   (Frequency/Alleviating factors/Precipitating factors, etc.)     Follow up with . Giuliana Rodas completed. No further questions or concerns. Will continue follow up to achieve health goals.

## 2018-08-17 ENCOUNTER — PATIENT OUTREACH (OUTPATIENT)
Dept: OTHER | Facility: OTHER | Age: 48
End: 2018-08-17

## 2018-08-17 NOTE — PROGRESS NOTES
"Last 5 Patient Entered Readings                                      Current 30 Day Average: 130/79     Recent Readings 8/9/2018 8/8/2018 7/29/2018 7/22/2018 7/20/2018    SBP (mmHg) 145 123 119 124 141    DBP (mmHg) 78 82 77 78 79    Pulse 81 71 66 74 80        Digital Medicine: Health  Follow Up    Lifestyle Modifications:    1.Dietary Modifications (Sodium intake <2,000mg/day, food labels, dining out): Patient reports eating fast food "here and there." She states trying to focus on meal prep. She reports wanting to focus on this moving forward. Encouraged patient to continue working on meal prep. Will call in 2 weeks to assess meal prep changes.     2.Physical Activity: Patient reports maintaining the same exercise regimen since previous encounter. She states exercising at home if she does not attend the gym. Encouraged patient to continue her exercise regimen.     3.Medication Therapy: Patient has been compliant with the medication regimen.    4.Patient has the following medication side effects/concerns:   (Frequency/Alleviating factors/Precipitating factors, etc.)     Follow up with Mi Giulianaluisana Rodas completed. No further questions or concerns. Will continue follow up to achieve health goals.  "

## 2018-09-17 ENCOUNTER — PATIENT OUTREACH (OUTPATIENT)
Dept: OTHER | Facility: OTHER | Age: 48
End: 2018-09-17

## 2018-09-17 NOTE — PROGRESS NOTES
Last 5 Patient Entered Readings                                      Current 30 Day Average: 113/75     Recent Readings 9/10/2018 8/30/2018 8/20/2018 8/9/2018 8/8/2018    SBP (mmHg) 114 114 112 145 123    DBP (mmHg) 74 74 77 78 82    Pulse 70 72 76 81 71        Patient also consisted of patient discussing her achilles heel incident 6 weeks ago. She states she is walking more and is better now.     Digital Medicine: Health  Follow Up    Lifestyle Modifications:    1.Dietary Modifications (Sodium intake <2,000mg/day, food labels, dining out): Patient reports increasing meal prep. She states making meals around 3x/week. She states bringing her lunch today. Patient reports reading food labels. She states she is snacking on sunflower seeds to prevent snacking on calorie-laden foods. She states using the no sodium sun flower seeds. Encouraged patient to continue making meals for lunch and reading food labels.      2.Physical Activity: She states exercising 3days last week and will exercise (walk) 3x this week. Encouraged patient to maintain her current exercise regimen.     3.Medication Therapy: Patient has been compliant with the medication regimen.    4.Patient has the following medication side effects/concerns:   (Frequency/Alleviating factors/Precipitating factors, etc.)     Follow up with Mrs. Giuliana LEIVA Adrianna completed. No further questions or concerns. Will continue to follow up to achieve health goals.

## 2018-09-21 DIAGNOSIS — I10 ESSENTIAL HYPERTENSION: Primary | ICD-10-CM

## 2018-09-24 ENCOUNTER — OFFICE VISIT (OUTPATIENT)
Dept: FAMILY MEDICINE | Facility: CLINIC | Age: 48
End: 2018-09-24
Payer: MEDICARE

## 2018-09-24 VITALS
HEIGHT: 62 IN | DIASTOLIC BLOOD PRESSURE: 80 MMHG | BODY MASS INDEX: 35.57 KG/M2 | SYSTOLIC BLOOD PRESSURE: 120 MMHG | TEMPERATURE: 99 F | HEART RATE: 70 BPM | WEIGHT: 193.31 LBS | OXYGEN SATURATION: 97 %

## 2018-09-24 DIAGNOSIS — E66.01 MORBID OBESITY: ICD-10-CM

## 2018-09-24 DIAGNOSIS — J30.9 ALLERGIC SINUSITIS: ICD-10-CM

## 2018-09-24 DIAGNOSIS — E78.5 HYPERLIPIDEMIA, UNSPECIFIED HYPERLIPIDEMIA TYPE: Primary | ICD-10-CM

## 2018-09-24 DIAGNOSIS — M20.60 DEFORMITY OF TOE, UNSPECIFIED LATERALITY: ICD-10-CM

## 2018-09-24 PROCEDURE — 3008F BODY MASS INDEX DOCD: CPT | Mod: CPTII,,, | Performed by: FAMILY MEDICINE

## 2018-09-24 PROCEDURE — 3074F SYST BP LT 130 MM HG: CPT | Mod: CPTII,,, | Performed by: FAMILY MEDICINE

## 2018-09-24 PROCEDURE — 99214 OFFICE O/P EST MOD 30 MIN: CPT | Mod: S$PBB,,, | Performed by: FAMILY MEDICINE

## 2018-09-24 PROCEDURE — 99999 PR PBB SHADOW E&M-EST. PATIENT-LVL III: CPT | Mod: PBBFAC,,, | Performed by: FAMILY MEDICINE

## 2018-09-24 PROCEDURE — 90686 IIV4 VACC NO PRSV 0.5 ML IM: CPT | Mod: PBBFAC,PO

## 2018-09-24 PROCEDURE — 99213 OFFICE O/P EST LOW 20 MIN: CPT | Mod: PBBFAC,PO,25 | Performed by: FAMILY MEDICINE

## 2018-09-24 PROCEDURE — 99499 UNLISTED E&M SERVICE: CPT | Mod: S$GLB,,, | Performed by: FAMILY MEDICINE

## 2018-09-24 PROCEDURE — 3079F DIAST BP 80-89 MM HG: CPT | Mod: CPTII,,, | Performed by: FAMILY MEDICINE

## 2018-09-24 RX ORDER — AZELASTINE 1 MG/ML
1 SPRAY, METERED NASAL 2 TIMES DAILY
Qty: 30 ML | Refills: 5 | Status: SHIPPED | OUTPATIENT
Start: 2018-09-24 | End: 2019-01-09

## 2018-09-24 NOTE — PROGRESS NOTES
Subjective:       Patient ID: Giuliana Rodas is a 48 y.o. female.    Chief Complaint: Discuss weight concerns; Discuss achilles tendon/ right leg; Discuss cyst on toe/ concerns about next step; and Sinus Problem    48 year old patient with hypertension, migraine headache, fibromyalgia, hyperlipidemia, IBS, gastroparesis, occipital neuralgia has concerns about her weight. She is exercising daily and doing the ellipitcal daily. She states she has issues with eating due to her health issues and she goes back to the carbs and she gains the weight. She has not been cooking lately. She is eating [employer].     She is also having sinus issues. She states she has had a nose bleed from it bleeding recently. She states she can't smell well lately. She is not taking anything for this now. She is not doing flonase anymore as it didn't help. She feels her nose is dry.     Past Medical History:  No date: Bile reflux gastritis  No date: Breast cyst  No date: Eczema  No date: Fibrocystic breast  No date: Fibromyalgia  No date: Gastroparesis      Comment:  dr. harden  No date: GERD (gastroesophageal reflux disease)  No date: Hyperglyceridemia  No date: Hyperlipidemia  No date: Hypertension  No date: IC (interstitial cystitis)      Comment:  dr. labadie  No date: Psoriasis  No date: Tension headache   Past Surgical History:  No date: BREAST BIOPSY; Right      Comment:  over 10 years ago/ milk duct  No date: CHOLECYSTECTOMY  2/1/2014: EGD (ESOPHAGOGASTRODUODENOSCOPY); N/A      Comment:  Performed by Fitz Breen MD at [facility] ([address] [place] [address])  No date: HEMORRHOID SURGERY  No date: HYSTERECTOMY  No date: KNEE SURGERY  No date: OOPHORECTOMY      Comment:  one ov removed  Review of patient's family history indicates:  Problem: Hypertension      Relation: Mother          Age of Onset: (Not Specified)  Problem: Migraines      Relation: Mother          Age of Onset: (Not Specified)  Problem: Breast cancer      Relation: Mother          Age of  Onset: (Not Specified)  Problem: Hypertension      Relation: Father          Age of Onset: (Not Specified)  Problem: Stroke      Relation: Father          Age of Onset: (Not Specified)  Problem: Heart disease      Relation: Father          Age of Onset: (Not Specified)  Problem: Hyperlipidemia      Relation: Father          Age of Onset: (Not Specified)  Problem: Breast cancer      Relation: Paternal Grandmother          Age of Onset: (Not Specified)  Problem: Colon cancer      Relation: Neg Hx          Age of Onset: (Not Specified)  Problem: Ovarian cancer      Relation: Neg Hx          Age of Onset: (Not Specified)  Problem: Inflammatory bowel disease      Relation: Neg Hx          Age of Onset: (Not Specified)  Problem: Celiac disease      Relation: Neg Hx          Age of Onset: (Not Specified)    Social History    Socioeconomic History      Marital status:       Spouse name: Not on file      Number of children: 2      Years of education: Not on file      Highest education level: Not on file    Social Needs      Financial resource strain: Not on file      Food insecurity - worry: Not on file      Food insecurity - inability: Not on file      Transportation needs - medical: Not on file      Transportation needs - non-medical: Not on file    Occupational History      Occupation:         Employer: [employer]    Tobacco Use      Smoking status: Never Smoker      Smokeless tobacco: Never Used    Substance and Sexual Activity      Alcohol use: No      Drug use: No      Sexual activity: Yes        Partners: Male    Other Topics      Concerns:        Not on file    Social History Narrative      Lives at home with  and daughter          Review of Systems   Constitutional: Positive for activity change. Negative for unexpected weight change.   HENT: Negative for hearing loss, rhinorrhea and trouble swallowing.    Eyes: Negative for discharge and visual disturbance.   Respiratory: Negative for chest  "tightness and wheezing.    Cardiovascular: Negative for chest pain and palpitations.   Gastrointestinal: Negative for blood in stool, constipation, diarrhea and vomiting.   Endocrine: Negative for polydipsia and polyuria.   Genitourinary: Negative for difficulty urinating, dysuria, hematuria and menstrual problem.   Musculoskeletal: Positive for arthralgias. Negative for joint swelling and neck pain.   Neurological: Positive for headaches. Negative for weakness.   Psychiatric/Behavioral: Negative for confusion and dysphoric mood.       Objective:       Vitals:    09/24/18 0917   BP: 120/80   Pulse: 70   Temp: 98.6 °F (37 °C)   TempSrc: Oral   SpO2: 97%   Weight: 87.7 kg (193 lb 5.5 oz)   Height: 5' 2" (1.575 m)       Physical Exam   Constitutional: She is oriented to person, place, and time. She appears well-developed and well-nourished. No distress.   HENT:   Head: Normocephalic and atraumatic.   Right Ear: External ear normal.   Left Ear: External ear normal.   Mouth/Throat: Oropharynx is clear and moist. No oropharyngeal exudate.   Neck: Normal range of motion. Neck supple.   Cardiovascular: Normal rate, regular rhythm and normal heart sounds. Exam reveals no gallop and no friction rub.   No murmur heard.  Pulmonary/Chest: Effort normal and breath sounds normal. No respiratory distress. She has no wheezes. She has no rales. She exhibits no tenderness.   Lymphadenopathy:     She has cervical adenopathy.   Neurological: She is alert and oriented to person, place, and time.   Skin: She is not diaphoretic.       Assessment:       1. Hyperlipidemia, unspecified hyperlipidemia type    2. Allergic sinusitis    3. Deformity of toe, unspecified laterality    4. Morbid obesity        Plan:       Giuliana was seen today for discuss weight concerns, discuss achilles tendon/ right leg, discuss cyst on toe/ concerns about next step and sinus problem.    Diagnoses and all orders for this visit:    Hyperlipidemia, unspecified " hyperlipidemia type  -     CBC auto differential; Future  -     Comprehensive metabolic panel; Future  -     Lipid panel; Future  -     TSH; Future    Allergic sinusitis  -     azelastine (ASTELIN) 137 mcg (0.1 %) nasal spray; 1 spray (137 mcg total) by Nasal route 2 (two) times daily.    Deformity of toe, unspecified laterality  -     Ambulatory referral to Podiatry    Morbid obesity  Patient will start to keep a food diary to address what she may be eating. She is to eat high protein, low carbohydrate meals 5 times per day. She is to increase her water intake to 3-5 water bottles per day as well . Moderate exercise for 30-45 minutes 5 days a week. Start with 5-10 minutes and work her way up .     Other orders  -     Influenza - Quadrivalent (3 years & older) (PF)  -     Cancel: Hemoglobin A1c; Future

## 2018-09-25 ENCOUNTER — LAB VISIT (OUTPATIENT)
Dept: LAB | Facility: HOSPITAL | Age: 48
End: 2018-09-25
Attending: FAMILY MEDICINE
Payer: MEDICARE

## 2018-09-25 DIAGNOSIS — E78.5 HYPERLIPIDEMIA, UNSPECIFIED HYPERLIPIDEMIA TYPE: ICD-10-CM

## 2018-09-25 LAB
ALBUMIN SERPL BCP-MCNC: 3.6 G/DL
ALP SERPL-CCNC: 69 U/L
ALT SERPL W/O P-5'-P-CCNC: 18 U/L
ANION GAP SERPL CALC-SCNC: 11 MMOL/L
AST SERPL-CCNC: 23 U/L
BASOPHILS # BLD AUTO: 0.02 K/UL
BASOPHILS NFR BLD: 0.4 %
BILIRUB SERPL-MCNC: 0.9 MG/DL
BUN SERPL-MCNC: 17 MG/DL
CALCIUM SERPL-MCNC: 9.6 MG/DL
CHLORIDE SERPL-SCNC: 100 MMOL/L
CHOLEST SERPL-MCNC: 153 MG/DL
CHOLEST/HDLC SERPL: 4.1 {RATIO}
CO2 SERPL-SCNC: 27 MMOL/L
CREAT SERPL-MCNC: 1.1 MG/DL
DIFFERENTIAL METHOD: ABNORMAL
EOSINOPHIL # BLD AUTO: 0.1 K/UL
EOSINOPHIL NFR BLD: 2.4 %
ERYTHROCYTE [DISTWIDTH] IN BLOOD BY AUTOMATED COUNT: 14 %
EST. GFR  (AFRICAN AMERICAN): >60 ML/MIN/1.73 M^2
EST. GFR  (NON AFRICAN AMERICAN): 59 ML/MIN/1.73 M^2
GLUCOSE SERPL-MCNC: 82 MG/DL
HCT VFR BLD AUTO: 38.3 %
HDLC SERPL-MCNC: 37 MG/DL
HDLC SERPL: 24.2 %
HGB BLD-MCNC: 12.8 G/DL
LDLC SERPL CALC-MCNC: 55.4 MG/DL
LYMPHOCYTES # BLD AUTO: 1.6 K/UL
LYMPHOCYTES NFR BLD: 29.8 %
MCH RBC QN AUTO: 26.1 PG
MCHC RBC AUTO-ENTMCNC: 33.4 G/DL
MCV RBC AUTO: 78 FL
MONOCYTES # BLD AUTO: 0.5 K/UL
MONOCYTES NFR BLD: 9.3 %
NEUTROPHILS # BLD AUTO: 3.1 K/UL
NEUTROPHILS NFR BLD: 58.1 %
NONHDLC SERPL-MCNC: 116 MG/DL
PLATELET # BLD AUTO: 184 K/UL
PMV BLD AUTO: 10.1 FL
POTASSIUM SERPL-SCNC: 3 MMOL/L
PROT SERPL-MCNC: 7.6 G/DL
RBC # BLD AUTO: 4.91 M/UL
SODIUM SERPL-SCNC: 138 MMOL/L
TRIGL SERPL-MCNC: 303 MG/DL
TSH SERPL DL<=0.005 MIU/L-ACNC: 2.43 UIU/ML
WBC # BLD AUTO: 5.37 K/UL

## 2018-09-25 PROCEDURE — 36415 COLL VENOUS BLD VENIPUNCTURE: CPT | Mod: PO

## 2018-09-25 PROCEDURE — 80061 LIPID PANEL: CPT

## 2018-09-25 PROCEDURE — 84443 ASSAY THYROID STIM HORMONE: CPT

## 2018-09-25 PROCEDURE — 85025 COMPLETE CBC W/AUTO DIFF WBC: CPT

## 2018-09-25 PROCEDURE — 80053 COMPREHEN METABOLIC PANEL: CPT

## 2018-09-26 DIAGNOSIS — E87.6 HYPOKALEMIA: Primary | ICD-10-CM

## 2018-09-26 DIAGNOSIS — E78.5 HYPERLIPIDEMIA, UNSPECIFIED HYPERLIPIDEMIA TYPE: ICD-10-CM

## 2018-09-26 RX ORDER — GEMFIBROZIL 600 MG/1
600 TABLET, FILM COATED ORAL
Qty: 180 TABLET | Refills: 3 | Status: SHIPPED | OUTPATIENT
Start: 2018-09-26 | End: 2020-01-27

## 2018-09-26 RX ORDER — POTASSIUM CHLORIDE 750 MG/1
10 TABLET, EXTENDED RELEASE ORAL DAILY
Qty: 3 TABLET | Refills: 0 | Status: SHIPPED | OUTPATIENT
Start: 2018-09-26 | End: 2018-09-29

## 2018-09-26 RX ORDER — ATENOLOL 25 MG/1
TABLET ORAL
Qty: 180 TABLET | Refills: 0 | Status: SHIPPED | OUTPATIENT
Start: 2018-09-26 | End: 2018-12-26 | Stop reason: SDUPTHER

## 2018-10-03 ENCOUNTER — HOSPITAL ENCOUNTER (OUTPATIENT)
Dept: RADIOLOGY | Facility: HOSPITAL | Age: 48
Discharge: HOME | End: 2018-10-03
Attending: PODIATRIST
Payer: MEDICARE

## 2018-10-03 ENCOUNTER — OFFICE VISIT (OUTPATIENT)
Dept: PODIATRY | Facility: CLINIC | Age: 48
End: 2018-10-03
Payer: MEDICARE

## 2018-10-03 VITALS — HEIGHT: 62 IN | BODY MASS INDEX: 35.51 KG/M2 | WEIGHT: 193 LBS

## 2018-10-03 DIAGNOSIS — M79.675 GREAT TOE PAIN, LEFT: ICD-10-CM

## 2018-10-03 DIAGNOSIS — G89.29 CHRONIC PAIN OF RIGHT ANKLE: ICD-10-CM

## 2018-10-03 DIAGNOSIS — M67.472 DIGITAL MUCINOUS CYST OF TOE OF LEFT FOOT: Primary | ICD-10-CM

## 2018-10-03 DIAGNOSIS — M25.571 CHRONIC PAIN OF RIGHT ANKLE: ICD-10-CM

## 2018-10-03 PROCEDURE — 99999 PR PBB SHADOW E&M-EST. PATIENT-LVL IV: CPT | Mod: PBBFAC,,, | Performed by: PODIATRIST

## 2018-10-03 PROCEDURE — 3008F BODY MASS INDEX DOCD: CPT | Mod: CPTII,,, | Performed by: PODIATRIST

## 2018-10-03 PROCEDURE — 99214 OFFICE O/P EST MOD 30 MIN: CPT | Mod: PBBFAC,25,PO | Performed by: PODIATRIST

## 2018-10-03 PROCEDURE — 99203 OFFICE O/P NEW LOW 30 MIN: CPT | Mod: 25,S$PBB,, | Performed by: PODIATRIST

## 2018-10-03 PROCEDURE — 73630 X-RAY EXAM OF FOOT: CPT | Mod: 26,LT,, | Performed by: RADIOLOGY

## 2018-10-03 PROCEDURE — 20612 ASPIRATE/INJ GANGLION CYST: CPT | Mod: S$PBB,LT,, | Performed by: PODIATRIST

## 2018-10-03 PROCEDURE — 20612 ASPIRATE/INJ GANGLION CYST: CPT | Mod: PBBFAC,PO | Performed by: PODIATRIST

## 2018-10-03 PROCEDURE — 73630 X-RAY EXAM OF FOOT: CPT | Mod: TC,FY,PO,LT

## 2018-10-03 NOTE — LETTER
October 5, 2018      Giuliana Rojas MD  [address]  [place] [address]           [facility]  [address] [facility]  [place] [address]  Phone: [phone]          Patient: Giuliana Rodas   MR Number: [id number]   YOB: 1970   Date of Visit: 10/3/2018       Dear Dr. Giuliana Rojas:    Thank you for referring Giuliana Rodas to me for evaluation. Attached you will find relevant portions of my assessment and plan of care.    If you have questions, please do not hesitate to call me. I look forward to following Giuliana Rodas along with you.    Sincerely,    Inga Adan, RAF    Enclosure  CC:  No Recipients    If you would like to receive this communication electronically, please contact [email or web address] or [phone] to request more information on "[employer] Link access.    For providers and/or their staff who would like to refer a patient to [facility], please contact us through our one-stop-shop provider referral line, [facility], at [phone].    If you feel you have received this communication in error or would no longer like to receive these types of communications, please e-mail [email or web address]

## 2018-10-05 NOTE — PROGRESS NOTES
Subjective:      Patient ID: Giuliana Rodas is a 48 y.o. female.    Chief Complaint: Foot Pain (achillies pain (PCP Dr Rojas)) and Foot Problem (cyst to left great toe )    Giuliana is a 48 y.o. female who presents to the podiatry clinic  with complaint of  bilateral foot pain.    RIGHT FOOT: Reports right posterior heel injury x 1.5 months. Reports dog clawed back or right heel leading to an achilles tendon injury. Pt. Was then seen in urgent care at [facility] where x ray was performed and patient was diagnosed with achilles tendon tear. Purchased boot for right foot and has been wearing this x one month and reports some improvement in pain - 50%.     LEFT FOOT- Reports painful cyst to left dorsal hallux IPJ for several months. Reports pain to left great toe with shoegear as this rubs on the mass. Requesting mass removal.       Review of Systems   Constitution: Negative for chills, diaphoresis, fever and weakness.   Cardiovascular: Negative for claudication, cyanosis, leg swelling and syncope.   Respiratory: Negative for cough and shortness of breath.    Skin: Positive for color change and suspicious lesions. Negative for nail changes.   Musculoskeletal: Positive for joint pain and myalgias. Negative for falls, muscle cramps and muscle weakness.   Gastrointestinal: Negative for diarrhea, nausea and vomiting.   Neurological: Negative for disturbances in coordination, numbness, paresthesias, sensory change and tremors.   Psychiatric/Behavioral: Negative for altered mental status.           Objective:      Physical Exam   Constitutional: She appears well-developed. She is cooperative.   Oriented to time, place, and person.   Cardiovascular:   DP and PT pulses are palpable bilaterally. 3 sec capillary refill time and toes and feet are warm to touch proximally .  There is  hair growth on the feet and toes b/l. There is no edema b/l. No spider veins or varicosities present b/l.      Musculoskeletal:   Equinus noted b/l ankles  with < 10 deg DF noted.   Right posterior ankle with palpable dell. Mild pain upon DF right ankle.     Pain upon palpation left dorsal hallux IPJ. Mobile mass palpated.      Feet:   Right Foot:   Skin Integrity: Negative for callus or dry skin.   Left Foot:   Skin Integrity: Negative for callus or dry skin.   Lymphadenopathy:   Negative lymphadenopathy bilateral popliteal fossa and tarsal tunnel.   Neurological: She is alert.   Light touch, proprioception, and sharp/dull sensation are all intact bilaterally. Protective threshold with the [place]ienstein monofilament is intact bilaterally.    Skin:   No open lesions, lacerations or wounds noted.Interdigital spaces clean, dry and intact b/l. No erythema noted to b/l foot.  Nails normal color and trophic qualities.     Psychiatric: She has a normal mood and affect.             Assessment:       Encounter Diagnoses   Name Primary?    Chronic pain of right ankle     Great toe pain, left     Digital mucinous cyst of toe of left foot Yes         Plan:       Giuliana was seen today for foot pain and foot problem.    Diagnoses and all orders for this visit:    Digital mucinous cyst of toe of left foot  -     Tissue Specimen To Pathology, Podiatry    Chronic pain of right ankle  -     MRI Foot ([place]) Right Without Contrast; Future    Great toe pain, left  -     X-Ray Foot Complete Left; Future      I counseled the patient on her conditions, their implications and medical management.      RIGHT FOOT:  MRI ordered to evaluate extent of Achilles tendon tear. Instructed to continue with  CAM boot applied to affected foot. Advised to use at all times while weightbearing and ambulating even for few minutes. Advised to use sneaker style shoe in opposite foot to maintain balance. Ok to remove  at night but reapply if patient is to get up in the middle of the night. Verbalized understanding. Advised to use for 3 -4 weeks.     RICE. The patient is advised to apply ice or cold  packs intermittently as needed to relieve pain. 20 minute increments, protect skin with towel. Apply ACE wrap, dispensed.     LEFT FOOT:     Aspiration left dorsal hallux cyst  performed today see procedure note.      Left foot xray to assess underlying deformity and for underlying osseous pathology.     PROCEDURE REPORT:   Date:  10/3/18  Time:  1500  Procedure name:  Aspiration/removal of ganglion cyst on the  Left dorsal great toe.   Indications: Painful recurrent  ganglion cyst.   Consent:  An informed consent was obtained.  Indications, risks, and alternatives to the procedure were explained to the patient.  The patient was given the opportunity to ask questions and patient consented to the procedure.  Anesthesia:  1ccs 2% plain lidocaine  Description of procedure:  A time out was performed.  With the patient's permission, the affected area was anesthetized with local anesthesia consisting of 1ccs of 2% lidocaine plain.  The area was cleaned and prepped with betadine.   The cyst was then aspirated using an 18 gauge needle.  Approximately 0.5ccs of viscous fluid was obtained.   The site was cleaned and covered with a dry dressing.   Complications:  None  EBL:  None  Disposition:   Patient tolerated the procedure well.  Verbal and written instructions provided for home care    F/u one week     Inga Adan DPM

## 2018-10-09 ENCOUNTER — TELEPHONE (OUTPATIENT)
Dept: PODIATRY | Facility: CLINIC | Age: 48
End: 2018-10-09

## 2018-10-09 NOTE — TELEPHONE ENCOUNTER
Spoke with patient. [place] [facility] [place]. Unsure when MRI will be repaired.     Offered to schedule test at another [facility] location     Patient refused    She will cancel follow up appointment with Dr. Adan until she can get MRI

## 2018-10-09 NOTE — TELEPHONE ENCOUNTER
----- Message from Funmilayo Mims sent at 10/9/2018  9:43 AM CDT -----  Contact: pt            Name of Who is Calling: pt      What is the request in detail: pt is requesting to discuss options for rescheduling her [place] but [place] [facility] states the mri on [address] is broken and nothing else is available. Call pt      Can the clinic reply by [facility]: no      What Number to Call Back if not in [facility]: [phone]

## 2018-10-11 RX ORDER — ESCITALOPRAM OXALATE 20 MG/1
TABLET ORAL
Qty: 30 TABLET | Refills: 2 | Status: SHIPPED | OUTPATIENT
Start: 2018-10-11 | End: 2019-01-19 | Stop reason: SDUPTHER

## 2018-10-19 ENCOUNTER — HOSPITAL ENCOUNTER (OUTPATIENT)
Dept: RADIOLOGY | Facility: HOSPITAL | Age: 48
Discharge: HOME | End: 2018-10-19
Attending: PODIATRIST
Payer: MEDICARE

## 2018-10-19 DIAGNOSIS — G89.29 CHRONIC PAIN OF RIGHT ANKLE: ICD-10-CM

## 2018-10-19 DIAGNOSIS — M25.571 CHRONIC PAIN OF RIGHT ANKLE: ICD-10-CM

## 2018-10-19 PROCEDURE — 73721 MRI JNT OF LWR EXTRE W/O DYE: CPT | Mod: TC,RT

## 2018-10-19 PROCEDURE — 73721 MRI JNT OF LWR EXTRE W/O DYE: CPT | Mod: 26,RT,, | Performed by: RADIOLOGY

## 2018-10-22 DIAGNOSIS — E78.5 HYPERLIPIDEMIA, UNSPECIFIED HYPERLIPIDEMIA TYPE: ICD-10-CM

## 2018-10-22 RX ORDER — PRAVASTATIN SODIUM 20 MG/1
TABLET ORAL
Qty: 90 TABLET | Refills: 0 | Status: SHIPPED | OUTPATIENT
Start: 2018-10-22 | End: 2019-01-19 | Stop reason: SDUPTHER

## 2018-10-24 ENCOUNTER — OFFICE VISIT (OUTPATIENT)
Dept: PODIATRY | Facility: CLINIC | Age: 48
End: 2018-10-24
Payer: MEDICARE

## 2018-10-24 VITALS — BODY MASS INDEX: 35.51 KG/M2 | HEIGHT: 62 IN | WEIGHT: 193 LBS

## 2018-10-24 DIAGNOSIS — M76.60 ACHILLES TENDINITIS, UNSPECIFIED LATERALITY: Primary | ICD-10-CM

## 2018-10-24 DIAGNOSIS — M25.571 CHRONIC PAIN OF RIGHT ANKLE: ICD-10-CM

## 2018-10-24 DIAGNOSIS — G89.29 CHRONIC PAIN OF RIGHT ANKLE: ICD-10-CM

## 2018-10-24 PROCEDURE — 99999 PR PBB SHADOW E&M-EST. PATIENT-LVL IV: CPT | Mod: PBBFAC,,, | Performed by: PODIATRIST

## 2018-10-24 PROCEDURE — 99214 OFFICE O/P EST MOD 30 MIN: CPT | Mod: PBBFAC,PO | Performed by: PODIATRIST

## 2018-10-24 PROCEDURE — 3008F BODY MASS INDEX DOCD: CPT | Mod: CPTII,,, | Performed by: PODIATRIST

## 2018-10-24 PROCEDURE — 99213 OFFICE O/P EST LOW 20 MIN: CPT | Mod: S$PBB,,, | Performed by: PODIATRIST

## 2018-10-28 NOTE — PROGRESS NOTES
Subjective:      Patient ID: Giuliana Rodas is a 48 y.o. female.    Chief Complaint: Foot Pain (right foot pain (PCP Dr Ashley)) and Foot Problem    Giuliana is a 48 y.o. female who presents to the podiatry clinic  with complaint of  bilateral foot pain.    RIGHT FOOT: Reports right posterior heel injury x 1.5 months. Reports dog clawed back or right heel leading to an achilles tendon injury. Pt. Was then seen in urgent care at [facility] where x ray was performed and patient was diagnosed with achilles tendon tear. Purchased boot for right foot and has been wearing this x one month and reports some improvement in pain - 50%.     LEFT FOOT- Reports painful cyst to left dorsal hallux IPJ for several months. Reports pain to left great toe with shoegear as this rubs on the mass. Requesting mass removal.     10/24/18: F/u right posterior heel pain.Rates pain 2/10. MRI completed unremarkable for Achilles tendon tear.       Review of Systems   Constitution: Negative for chills, diaphoresis, fever and weakness.   Cardiovascular: Negative for claudication, cyanosis, leg swelling and syncope.   Respiratory: Negative for cough and shortness of breath.    Skin: Positive for color change and suspicious lesions. Negative for nail changes.   Musculoskeletal: Positive for joint pain and myalgias. Negative for falls, muscle cramps and muscle weakness.   Gastrointestinal: Negative for diarrhea, nausea and vomiting.   Neurological: Negative for disturbances in coordination, numbness, paresthesias, sensory change and tremors.   Psychiatric/Behavioral: Negative for altered mental status.           Objective:      Physical Exam   Constitutional: She appears well-developed. She is cooperative.   Oriented to time, place, and person.   Cardiovascular:   DP and PT pulses are palpable bilaterally. 3 sec capillary refill time and toes and feet are warm to touch proximally .  There is  hair growth on the feet and toes b/l. There is no edema b/l. No  spider veins or varicosities present b/l.      Musculoskeletal:   Equinus noted b/l ankles with < 10 deg DF noted.   Mild pain upon DF right ankle.     No Pain upon palpation left dorsal hallux IPJ.    Feet:   Right Foot:   Skin Integrity: Negative for callus or dry skin.   Left Foot:   Skin Integrity: Negative for callus or dry skin.   Lymphadenopathy:   Negative lymphadenopathy bilateral popliteal fossa and tarsal tunnel.   Neurological: She is alert.   Light touch, proprioception, and sharp/dull sensation are all intact bilaterally. Protective threshold with the [place]Wienstein monofilament is intact bilaterally.    Skin:   No open lesions, lacerations or wounds noted.Interdigital spaces clean, dry and intact b/l. No erythema noted to b/l foot.  Nails normal color and trophic qualities.     Psychiatric: She has a normal mood and affect.             Assessment:       Encounter Diagnosis   Name Primary?    Achilles tendinitis, unspecified laterality Yes         Plan:       Giuliana was seen today for foot pain and foot problem.    Diagnoses and all orders for this visit:    Achilles tendinitis, unspecified laterality  -     Ambulatory consult to Physical Therapy      I counseled the patient on her conditions, their implications and medical management.    MRI reviewed results below unremarkable for achilles tendon tear.     Discussed conservative treatment with shoes of adequate dimensions, material, and style to alleviate symptoms and delay or prevent surgical intervention.    Prescription for PT sent    Stretching handout dispensed to patient. Instructions on adequate stretching reviewed in clinic     - Patient will stretch the tendo achilles complex three times daily as demonstrated in the office to lengthen heel cord and increase motion at ankle.    S/p left hallux IPJ aspiration, site healed.     F/u 6 weeks    Inga Adan DPM       MRI Ankle Without Contrast Right   Order: [id number]   Status:  Final result    Visible to patient:  Yes (Patient Portal) Next appt:  12/11/2018 at 03:15 PM in Podiatry (Inga Adan DPM) Dx:  Chronic pain of right ankle   Details     Reading Physician Reading Date Result Priority   Raj Ann MD 10/19/2018       Narrative     EXAMINATION:  MRI ANKLE WITHOUT CONTRAST RIGHT    CLINICAL HISTORY:  Foot pain, chronic, etiol unknown, first study;Eval for achilles tendon tear, h/o injury;  Pain in right ankle and joints of right foot    TECHNIQUE:  MRI without contrast    COMPARISON:  None    FINDINGS:  Limited fluid about major tendons medial and lateral to ankle joint including tibialis posterior, flexor hallucis longus and flexor digitorum longus and also perineal tendons.  No tendon, ligament tear.  No ankle joint effusion.    Ankle joint mortise, subtalar joint, plantar aponeurosis and Achilles tendon normal.    Localize DJD involves the anterior cuboid cuneiform joint as seen on image 11 series 4.  Otherwise no major DJD changes confirmed.    No fracture dislocation or medial or lateral collateral ligament, deltoid tendon abnormality.      Impression       Localize DJD cuboid 1st cuneiform joint.    Limited fluid within major tendon structures mediolateral to the ankle which does not appear clinically significant.    Consideration for radiographs of the right ankle suggested for further evaluation as indicated.

## 2018-10-30 ENCOUNTER — PATIENT OUTREACH (OUTPATIENT)
Dept: OTHER | Facility: OTHER | Age: 48
End: 2018-10-30

## 2018-10-30 NOTE — PROGRESS NOTES
Last 5 Patient Entered Readings                                      Current 30 Day Average: 120/76     Recent Readings 10/24/2018 10/14/2018 10/2/2018 9/27/2018 9/18/2018    SBP (mmHg) 136 127 98 105 131    DBP (mmHg) 80 84 65 79 80    Pulse 76 70 64 71 70        Encounter mostly consisted of HC encouraging patient to sending additional BP readings (i.e. At least 1 per week) and patient discussing previous occular issues.     BP is well controlled.     Digital Medicine: Health  Follow Up    Lifestyle Modifications:    1.Dietary Modifications (Sodium intake <2,000mg/day, food labels, dining out): Patient states she is struggling to maintain a healthy balance. She states making meals at home and consuming salads but thinks she needs more carbs. Encouraged patient to keep everything in moderation and its okay to treat herself-not adhere to a fad diet or strict diet.     2.Physical Activity: Patient reports exercise has been limited due to previous eye issues. She states she has healed and plans on returning to gym tonight. Encouraged patient to return to gym when she is able.     3.Medication Therapy: Patient has been compliant with the medication regimen.    4.Patient has the following medication side effects/concerns:   (Frequency/Alleviating factors/Precipitating factors, etc.)     Follow up with  Giuliana Rodas completed. No further questions or concerns. Will continue to follow up to achieve health goals.

## 2018-11-28 ENCOUNTER — OFFICE VISIT (OUTPATIENT)
Dept: FAMILY MEDICINE | Facility: CLINIC | Age: 48
End: 2018-11-28
Payer: MEDICARE

## 2018-11-28 VITALS
DIASTOLIC BLOOD PRESSURE: 70 MMHG | SYSTOLIC BLOOD PRESSURE: 104 MMHG | TEMPERATURE: 99 F | WEIGHT: 198.44 LBS | OXYGEN SATURATION: 97 % | HEART RATE: 70 BPM | BODY MASS INDEX: 36.29 KG/M2

## 2018-11-28 DIAGNOSIS — M25.50 ARTHRALGIA, UNSPECIFIED JOINT: Primary | ICD-10-CM

## 2018-11-28 PROCEDURE — 3074F SYST BP LT 130 MM HG: CPT | Mod: CPTII,S$GLB,, | Performed by: FAMILY MEDICINE

## 2018-11-28 PROCEDURE — 99999 PR PBB SHADOW E&M-EST. PATIENT-LVL IV: CPT | Mod: PBBFAC,,, | Performed by: FAMILY MEDICINE

## 2018-11-28 PROCEDURE — 3078F DIAST BP <80 MM HG: CPT | Mod: CPTII,S$GLB,, | Performed by: FAMILY MEDICINE

## 2018-11-28 PROCEDURE — 99214 OFFICE O/P EST MOD 30 MIN: CPT | Mod: S$GLB,,, | Performed by: FAMILY MEDICINE

## 2018-11-28 PROCEDURE — 3008F BODY MASS INDEX DOCD: CPT | Mod: CPTII,S$GLB,, | Performed by: FAMILY MEDICINE

## 2018-11-28 RX ORDER — METHYLPREDNISOLONE 4 MG/1
TABLET ORAL
Qty: 1 PACKAGE | Refills: 0 | Status: SHIPPED | OUTPATIENT
Start: 2018-11-28 | End: 2019-01-09 | Stop reason: ALTCHOICE

## 2018-11-28 RX ORDER — DICLOFENAC SODIUM 10 MG/G
2 GEL TOPICAL 4 TIMES DAILY
Qty: 100 G | Refills: 5 | Status: SHIPPED | OUTPATIENT
Start: 2018-11-28 | End: 2019-05-22

## 2018-11-28 NOTE — PROGRESS NOTES
Subjective:       Patient ID: Giuliana Rodas is a 48 y.o. female.    Chief Complaint: Discuss Aches and Pains    48 year old female presnets for body aches. She has been suffering with pains in her joints and her right foot. She states Dr. Adan, podiatry, diagnosed her with arthritis. shes tates her legs hurt. She states her hands hurt and her achy. She juan a any swelling of her joints. She avoid medications as she doesn't want to irritate her kidneys.             Past Medical History:  No date: Bile reflux gastritis  No date: Breast cyst  No date: Eczema  No date: Fibrocystic breast  No date: Fibromyalgia  No date: Gastroparesis      Comment:  dr. harden  No date: GERD (gastroesophageal reflux disease)  No date: Hyperglyceridemia  No date: Hyperlipidemia  No date: Hypertension  No date: IC (interstitial cystitis)      Comment:  dr. labadie  No date: Psoriasis  No date: Tension headache   Past Surgical History:  No date: BREAST BIOPSY; Right      Comment:  over 10 years ago/ milk duct  No date: CHOLECYSTECTOMY  2/1/2014: EGD (ESOPHAGOGASTRODUODENOSCOPY); N/A      Comment:  Performed by Fitz Breen MD at [facility] (4TH FLR)  No date: HEMORRHOID SURGERY  No date: HYSTERECTOMY  No date: KNEE SURGERY  No date: OOPHORECTOMY      Comment:  one ov removed  Review of patient's family history indicates:  Problem: Hypertension      Relation: Mother          Age of Onset: (Not Specified)  Problem: Migraines      Relation: Mother          Age of Onset: (Not Specified)  Problem: Breast cancer      Relation: Mother          Age of Onset: (Not Specified)  Problem: Hypertension      Relation: Father          Age of Onset: (Not Specified)  Problem: Stroke      Relation: Father          Age of Onset: (Not Specified)  Problem: Heart disease      Relation: Father          Age of Onset: (Not Specified)  Problem: Hyperlipidemia      Relation: Father          Age of Onset: (Not Specified)  Problem: Breast cancer      Relation:  Paternal Grandmother          Age of Onset: (Not Specified)  Problem: Colon cancer      Relation: Neg Hx          Age of Onset: (Not Specified)  Problem: Ovarian cancer      Relation: Neg Hx          Age of Onset: (Not Specified)  Problem: Inflammatory bowel disease      Relation: Neg Hx          Age of Onset: (Not Specified)  Problem: Celiac disease      Relation: Neg Hx          Age of Onset: (Not Specified)    Social History    Socioeconomic History      Marital status:       Spouse name: Not on file      Number of children: 2      Years of education: Not on file      Highest education level: Not on file    Social Needs      Financial resource strain: Not on file      Food insecurity - worry: Not on file      Food insecurity - inability: Not on file      Transportation needs - medical: Not on file      Transportation needs - non-medical: Not on file    Occupational History      Occupation:         Employer: [employer]    Tobacco Use      Smoking status: Never Smoker      Smokeless tobacco: Never Used    Substance and Sexual Activity      Alcohol use: No      Drug use: No      Sexual activity: Yes        Partners: Male    Other Topics      Concerns:        Not on file    Social History Narrative      Lives at home with  and daughter          Review of Systems   Constitutional: Negative for activity change and unexpected weight change.   HENT: Negative for hearing loss, rhinorrhea and trouble swallowing.    Eyes: Negative for discharge and visual disturbance.   Respiratory: Negative for chest tightness and wheezing.    Cardiovascular: Negative for chest pain and palpitations.   Gastrointestinal: Negative for blood in stool, constipation, diarrhea and vomiting.   Endocrine: Negative for polydipsia and polyuria.   Genitourinary: Negative for difficulty urinating, dysuria, hematuria and menstrual problem.   Musculoskeletal: Positive for arthralgias and neck pain. Negative for joint swelling.    Neurological: Positive for headaches. Negative for weakness.   Psychiatric/Behavioral: Negative for confusion and dysphoric mood.       Objective:       Vitals:    11/28/18 1048   BP: 104/70   Pulse: 70   Temp: 98.5 °F (36.9 °C)   TempSrc: Oral   SpO2: 97%   Weight: 90 kg (198 lb 6.6 oz)       Physical Exam   Constitutional: She appears well-developed and well-nourished. No distress.   HENT:   Head: Normocephalic and atraumatic.   Cardiovascular: Normal rate, regular rhythm and normal heart sounds. Exam reveals no gallop and no friction rub.   No murmur heard.  Musculoskeletal:        Cervical back: She exhibits pain. She exhibits normal range of motion, no tenderness, no bony tenderness and normal pulse.        Right hand: She exhibits normal range of motion, no tenderness, no deformity and no swelling.        Left hand: She exhibits normal range of motion, no tenderness, no deformity and no swelling.   Skin: She is not diaphoretic.       Assessment:       1. Arthralgia, unspecified joint        Plan:       Giuliana was seen today for discuss aches and pains.    Diagnoses and all orders for this visit:    Arthralgia, unspecified joint  -     methylPREDNISolone (MEDROL DOSEPACK) 4 mg tablet; use as directed  -     diclofenac sodium (VOLTAREN) 1 % Gel; Apply 2 g topically 4 (four) times daily. for 10 days

## 2018-11-29 ENCOUNTER — PATIENT OUTREACH (OUTPATIENT)
Dept: OTHER | Facility: OTHER | Age: 48
End: 2018-11-29

## 2018-11-29 ENCOUNTER — LAB VISIT (OUTPATIENT)
Dept: LAB | Facility: HOSPITAL | Age: 48
End: 2018-11-29
Attending: FAMILY MEDICINE
Payer: MEDICARE

## 2018-11-29 DIAGNOSIS — I10 ESSENTIAL HYPERTENSION: ICD-10-CM

## 2018-11-29 DIAGNOSIS — E87.6 HYPOKALEMIA: ICD-10-CM

## 2018-11-29 LAB
ANION GAP SERPL CALC-SCNC: 9 MMOL/L
BUN SERPL-MCNC: 22 MG/DL
CALCIUM SERPL-MCNC: 9.5 MG/DL
CHLORIDE SERPL-SCNC: 103 MMOL/L
CHOLEST SERPL-MCNC: 163 MG/DL
CHOLEST/HDLC SERPL: 3.3 {RATIO}
CO2 SERPL-SCNC: 27 MMOL/L
CREAT SERPL-MCNC: 0.9 MG/DL
EST. GFR  (AFRICAN AMERICAN): >60 ML/MIN/1.73 M^2
EST. GFR  (NON AFRICAN AMERICAN): >60 ML/MIN/1.73 M^2
GLUCOSE SERPL-MCNC: 92 MG/DL
HDLC SERPL-MCNC: 50 MG/DL
HDLC SERPL: 30.7 %
LDLC SERPL CALC-MCNC: 71.4 MG/DL
NONHDLC SERPL-MCNC: 113 MG/DL
POTASSIUM SERPL-SCNC: 3.5 MMOL/L
SODIUM SERPL-SCNC: 139 MMOL/L
TRIGL SERPL-MCNC: 208 MG/DL

## 2018-11-29 PROCEDURE — 80048 BASIC METABOLIC PNL TOTAL CA: CPT

## 2018-11-29 PROCEDURE — 36415 COLL VENOUS BLD VENIPUNCTURE: CPT | Mod: PO

## 2018-11-29 PROCEDURE — 80061 LIPID PANEL: CPT

## 2018-11-29 NOTE — PROGRESS NOTES
Last 5 Patient Entered Readings                                      Current 30 Day Average: 122/74     Recent Readings 11/26/2018 11/18/2018 11/11/2018 11/2/2018 10/24/2018    SBP (mmHg) 119 124 143 100 136    DBP (mmHg) 76 78 79 63 80    Pulse 79 70 75 79 76          11/29: Completed new health  introduction. States she has been working on diet.  Has gastroparesis and finds it hard to find foods that won't upset her stomach.  Will find list of foods that she can eat and send to "[employer].  States she finds herself snacking more at night.  States she eats raw sunflower seeds to help keep her busy and also have a low calorie snack.

## 2018-12-26 RX ORDER — ATENOLOL 25 MG/1
TABLET ORAL
Qty: 180 TABLET | Refills: 0 | Status: SHIPPED | OUTPATIENT
Start: 2018-12-26 | End: 2019-03-18 | Stop reason: SDUPTHER

## 2018-12-27 ENCOUNTER — HOSPITAL ENCOUNTER (EMERGENCY)
Facility: HOSPITAL | Age: 48
Discharge: HOME | End: 2018-12-28
Attending: EMERGENCY MEDICINE
Payer: MEDICARE

## 2018-12-27 DIAGNOSIS — R10.12 LEFT UPPER QUADRANT PAIN: Primary | ICD-10-CM

## 2018-12-27 DIAGNOSIS — S39.011A ABDOMINAL MUSCLE STRAIN, INITIAL ENCOUNTER: ICD-10-CM

## 2018-12-27 LAB
B-HCG UR QL: NEGATIVE
BASOPHILS # BLD AUTO: 0.02 K/UL
BASOPHILS NFR BLD: 0.3 %
CTP QC/QA: YES
DIFFERENTIAL METHOD: ABNORMAL
EOSINOPHIL # BLD AUTO: 0.2 K/UL
EOSINOPHIL NFR BLD: 2.9 %
ERYTHROCYTE [DISTWIDTH] IN BLOOD BY AUTOMATED COUNT: 14.1 %
HCT VFR BLD AUTO: 36.7 %
HGB BLD-MCNC: 12.2 G/DL
LYMPHOCYTES # BLD AUTO: 2.8 K/UL
LYMPHOCYTES NFR BLD: 45.1 %
MCH RBC QN AUTO: 26.3 PG
MCHC RBC AUTO-ENTMCNC: 33.2 G/DL
MCV RBC AUTO: 79 FL
MONOCYTES # BLD AUTO: 0.6 K/UL
MONOCYTES NFR BLD: 10.2 %
NEUTROPHILS # BLD AUTO: 2.5 K/UL
NEUTROPHILS NFR BLD: 41.3 %
PLATELET # BLD AUTO: 225 K/UL
PMV BLD AUTO: 9.6 FL
RBC # BLD AUTO: 4.64 M/UL
WBC # BLD AUTO: 6.16 K/UL

## 2018-12-27 PROCEDURE — 63600175 PHARM REV CODE 636 W HCPCS: Performed by: EMERGENCY MEDICINE

## 2018-12-27 PROCEDURE — 96374 THER/PROPH/DIAG INJ IV PUSH: CPT

## 2018-12-27 PROCEDURE — 99284 EMERGENCY DEPT VISIT MOD MDM: CPT | Mod: 25

## 2018-12-27 PROCEDURE — 25000003 PHARM REV CODE 250: Performed by: EMERGENCY MEDICINE

## 2018-12-27 PROCEDURE — 81000 URINALYSIS NONAUTO W/SCOPE: CPT

## 2018-12-27 PROCEDURE — 80053 COMPREHEN METABOLIC PANEL: CPT

## 2018-12-27 PROCEDURE — 85025 COMPLETE CBC W/AUTO DIFF WBC: CPT

## 2018-12-27 PROCEDURE — 81025 URINE PREGNANCY TEST: CPT | Performed by: NURSE PRACTITIONER

## 2018-12-27 PROCEDURE — 96361 HYDRATE IV INFUSION ADD-ON: CPT

## 2018-12-27 PROCEDURE — 83690 ASSAY OF LIPASE: CPT

## 2018-12-27 RX ORDER — SODIUM CHLORIDE 9 MG/ML
1000 INJECTION, SOLUTION INTRAVENOUS
Status: COMPLETED | OUTPATIENT
Start: 2018-12-27 | End: 2018-12-28

## 2018-12-27 RX ORDER — KETOROLAC TROMETHAMINE 30 MG/ML
15 INJECTION, SOLUTION INTRAMUSCULAR; INTRAVENOUS
Status: COMPLETED | OUTPATIENT
Start: 2018-12-27 | End: 2018-12-27

## 2018-12-27 RX ADMIN — SODIUM CHLORIDE 1000 ML: 0.9 INJECTION, SOLUTION INTRAVENOUS at 11:12

## 2018-12-27 RX ADMIN — KETOROLAC TROMETHAMINE 15 MG: 30 INJECTION, SOLUTION INTRAMUSCULAR at 11:12

## 2018-12-28 VITALS
SYSTOLIC BLOOD PRESSURE: 160 MMHG | OXYGEN SATURATION: 98 % | RESPIRATION RATE: 18 BRPM | HEART RATE: 70 BPM | TEMPERATURE: 98 F | BODY MASS INDEX: 34.23 KG/M2 | DIASTOLIC BLOOD PRESSURE: 82 MMHG | WEIGHT: 186 LBS | HEIGHT: 62 IN

## 2018-12-28 PROBLEM — S39.011A ABDOMINAL MUSCLE STRAIN, INITIAL ENCOUNTER: Status: ACTIVE | Noted: 2018-12-28

## 2018-12-28 LAB
ALBUMIN SERPL BCP-MCNC: 3.4 G/DL
ALP SERPL-CCNC: 53 U/L
ALT SERPL W/O P-5'-P-CCNC: 14 U/L
ANION GAP SERPL CALC-SCNC: 12 MMOL/L
AST SERPL-CCNC: 19 U/L
BACTERIA #/AREA URNS HPF: ABNORMAL /HPF
BILIRUB SERPL-MCNC: 0.3 MG/DL
BILIRUB UR QL STRIP: NEGATIVE
BUN SERPL-MCNC: 17 MG/DL
CALCIUM SERPL-MCNC: 9.5 MG/DL
CHLORIDE SERPL-SCNC: 101 MMOL/L
CLARITY UR: ABNORMAL
CO2 SERPL-SCNC: 26 MMOL/L
COLOR UR: YELLOW
CREAT SERPL-MCNC: 1.1 MG/DL
EST. GFR  (AFRICAN AMERICAN): >60 ML/MIN/1.73 M^2
EST. GFR  (NON AFRICAN AMERICAN): 59 ML/MIN/1.73 M^2
GLUCOSE SERPL-MCNC: 90 MG/DL
GLUCOSE UR QL STRIP: NEGATIVE
HGB UR QL STRIP: ABNORMAL
KETONES UR QL STRIP: NEGATIVE
LEUKOCYTE ESTERASE UR QL STRIP: NEGATIVE
LIPASE SERPL-CCNC: 107 U/L
MICROSCOPIC COMMENT: ABNORMAL
NITRITE UR QL STRIP: NEGATIVE
PH UR STRIP: 5 [PH] (ref 5–8)
POTASSIUM SERPL-SCNC: 3 MMOL/L
PROT SERPL-MCNC: 7.3 G/DL
PROT UR QL STRIP: NEGATIVE
RBC #/AREA URNS HPF: 3 /HPF (ref 0–4)
SODIUM SERPL-SCNC: 139 MMOL/L
SP GR UR STRIP: 1.01 (ref 1–1.03)
SQUAMOUS #/AREA URNS HPF: 3 /HPF
URN SPEC COLLECT METH UR: ABNORMAL
UROBILINOGEN UR STRIP-ACNC: NEGATIVE EU/DL
WBC #/AREA URNS HPF: 1 /HPF (ref 0–5)

## 2018-12-28 PROCEDURE — 25000003 PHARM REV CODE 250: Performed by: EMERGENCY MEDICINE

## 2018-12-28 RX ORDER — POTASSIUM CHLORIDE 20 MEQ/1
40 TABLET, EXTENDED RELEASE ORAL
Status: COMPLETED | OUTPATIENT
Start: 2018-12-28 | End: 2018-12-28

## 2018-12-28 RX ORDER — KETOROLAC TROMETHAMINE 10 MG/1
10 TABLET, FILM COATED ORAL EVERY 6 HOURS
Qty: 28 TABLET | Refills: 0 | Status: SHIPPED | OUTPATIENT
Start: 2018-12-28 | End: 2019-01-04

## 2018-12-28 RX ADMIN — POTASSIUM CHLORIDE 40 MEQ: 1500 TABLET, EXTENDED RELEASE ORAL at 12:12

## 2018-12-28 NOTE — ED PROVIDER NOTES
Encounter Date: 2018  This is a SORT/MSE of a 48 y.o. female with GERD presenting to the ED with c/o left upper abdominal pain x 6 hours.  Care will be transferred to an alternate provider when patient is roomed for a full evaluation and final disposition. Patient is aware that he/she is awaiting a room in the emergency department, where another provider will review results, evaluate and treat as needed. ROSY Ramirez DNP  SCRIBE #1 NOTE: I, Luis Owusu II, am scribing for, and in the presence of,  Lauren Montiel MD. I have scribed the following portions of the note - Other sections scribed: HPI, ROS, PE.       History     Chief Complaint   Patient presents with    Abdominal Pain     States she has abd pain with nausea since 1700 today.  States hx pancreatitis.       CC: Abdominal Pain     HPI: This 48 y.o. female presents to the ED c/o acute onset, abdominal pain with nausea x5 hours. ERP . Pt reports she has hx of pancreatitis and states similar pain. Abdominal pain is exacerbated with palpation, deep inspiration, and movement. She states her last episode was 5 years ago. Pt reports she is concerned and wanted to seek medical attention. Pt reports taking  rx'd Hydrocodone twice pta with minimal alleviation. She reports BORIS Brooks, dx her with gastroparesis 7 years ago. Pt reports she now being followed by BORIS Matamoros, for the past four years.     PMHx: fibromyalgia, hyperglyceridemia, HTN, IC, gastroparesis, eczema, GERD, tension headache, HLD, bile reflux gastritis, fibrocystic breast, breast cyst.       The history is provided by the patient. No  was used.     Review of patient's allergies indicates:   Allergen Reactions    Elavil [amitriptyline] Hallucinations    Depo-provera [medroxyprogesterone] Anxiety    Dicyclomine Rash     Swelling of lip      Prozac [fluoxetine] Anxiety     Past Medical History:   Diagnosis Date    Bile reflux gastritis     Breast  "cyst     Eczema     Fibrocystic breast     Fibromyalgia     Gastroparesis     dr. harden    GERD (gastroesophageal reflux disease)     Hyperglyceridemia     Hyperlipidemia     Hypertension     IC (interstitial cystitis)     dr. labadie    Psoriasis     Tension headache      Past Surgical History:   Procedure Laterality Date    BREAST BIOPSY Right     over 10 years ago/ milk duct    CHOLECYSTECTOMY      EGD (ESOPHAGOGASTRODUODENOSCOPY) N/A 2/1/2014    Performed by Fitz Breen [facility] at [facility] ([facility])    HEMORRHOID SURGERY      HYSTERECTOMY      KNEE SURGERY      OOPHORECTOMY      one ov removed     Family History   Problem Relation Age of Onset    Hypertension Mother     Migraines Mother     Breast cancer Mother     Hypertension Father     Stroke Father     Heart disease Father     Hyperlipidemia Father     Breast cancer Paternal Grandmother     Colon cancer Neg Hx     Ovarian cancer Neg Hx     Inflammatory bowel disease Neg Hx     Celiac disease Neg Hx      Social History     Tobacco Use    Smoking status: Never Smoker    Smokeless tobacco: Never Used   Substance Use Topics    Alcohol use: No    Drug use: No     Review of Systems    Physical Exam     Initial Vitals [12/27/18 2246]   BP Pulse Resp Temp SpO2   (!) 160/91 88 16 98.2 °F (36.8 °C) 97 %      MAP       --         Vitals:    12/27/18 2246 12/28/18 0017 12/28/18 0032   BP: (!) 160/91 (!) 164/80 (!) 166/86   Pulse: 88 72 69   Resp: 16     Temp: 98.2 °F (36.8 °C)     SpO2: 97% 95% 96%   Weight: 84.4 kg (186 lb)     Height: 5' 2" (1.575 m)         Physical Exam    Nursing note and vitals reviewed.  Constitutional: She appears well-developed and well-nourished. She is not diaphoretic. She is cooperative.  Non-toxic appearance. No distress.   HENT:   Head: Normocephalic and atraumatic.   Mouth/Throat: Oropharynx is clear and moist.   Eyes: Conjunctivae and EOM are normal. Pupils are equal, round, and reactive to light. Right " eye exhibits no discharge. Left eye exhibits no discharge.   Neck: Normal range of motion and full passive range of motion without pain. Neck supple. No thyromegaly present. No JVD present.   Cardiovascular: Normal rate, regular rhythm, normal heart sounds and normal pulses.   Pulmonary/Chest: Effort normal and breath sounds normal. No respiratory distress.   Abdominal: Soft. Normal appearance and bowel sounds are normal. She exhibits no distension and no mass. There is no tenderness.   LUQ costal margin is visibly swollen but not tender to palpation. There is no sign of erythema nor visible cellulitis.   Musculoskeletal: Normal range of motion.   Neurological: She is alert and oriented to person, place, and time. She has normal strength. No cranial nerve deficit or sensory deficit.   Skin: Skin is warm, dry and intact. No rash noted. No erythema.   Psychiatric: She has a normal mood and affect. Her speech is normal and behavior is normal. Judgment and thought content normal.         ED Course   Procedures  Labs Reviewed   CBC W/ AUTO DIFFERENTIAL - Abnormal; Notable for the following components:       Result Value    Hematocrit 36.7 (*)     MCV 79 (*)     MCH 26.3 (*)     All other components within normal limits   COMPREHENSIVE METABOLIC PANEL - Abnormal; Notable for the following components:    Potassium 3.0 (*)     Albumin 3.4 (*)     Alkaline Phosphatase 53 (*)     eGFR if non  59 (*)     All other components within normal limits   LIPASE - Abnormal; Notable for the following components:    Lipase 107 (*)     All other components within normal limits   URINALYSIS, REFLEX TO URINE CULTURE - Abnormal; Notable for the following components:    Appearance, UA Hazy (*)     Occult Blood UA 3+ (*)     All other components within normal limits    Narrative:     Preferred Collection Type->Urine, Clean Catch   URINALYSIS MICROSCOPIC - Abnormal; Notable for the following components:    Bacteria, UA Moderate  (*)     All other components within normal limits    Narrative:     Preferred Collection Type->Urine, Clean Catch   POCT URINE PREGNANCY          Imaging Results    None          Medical Decision Making:   Initial Assessment:   48-year-old female who comes to the emergency department for evaluation of acute abdominal pain.  Differential Diagnosis:   Differential diagnosis include but not limited to...  Pancreatitis  Abdominal muscle strain  Chest wall pain  Pleuritic pain    Clinical Tests:   Lab Tests: Ordered and Reviewed  ED Management:  Upon initial evaluation and physical examination the patient was alert and oriented x3 lying on the stretcher comfortable while having her hand on her left upper quadrant/left subcostal margin.  On physical examination the patient's abdomen was actually completely non-tender to palpation. I did visualize that there was mild swelling of the left subcostal margin/left upper quadrant.  I performed a bedside ultrasound that did not reveal any fluid collection nor abscess.  The patient verbalized concern that her symptoms could be due to a pancreatitis exacerbation but I informed her it was my clinical impression that her symptoms are more related to her abdominal muscle wall and not her pancreas.  I did reassure her by telling her that I would do a full workup to rule out pancreatitis.  Initially I had order a CT of the abdomen to look at her pancreas specifically but the lipase came back before the patient was able to go CT scan.  I did give her some Toradol IV for her pain and swelling. I discussed all the laboratory results with the patient including her lipase results which does not go with a diagnosis of pancreatitis.  Patient informed me that her symptoms had completely resolved and that she agree with my rationale to cancel CT scan. Thus decision was made to canceled the CT scan.  In addition I explained to her that her laboratory results showed low potassium for which  repletion was given here in the emergency department.  I explained to the patient that I will discharge her with anti-inflammatory medication and that I expect the swelling to continue to go down not get worse.  I discussed in detail signs and symptoms that should prompt an immediate return (such as erythema on the site.).  After the Toradol IV in the patient remained, and painless throughout her ED stay.  Patient was able to ambulate and tolerate p.o before discharge.     I discussed with the patient that the evaluation in the ED does not suggest any emergent or life-threatening medical condition requiring admission or immediate intervention beyond what was provided in the ED.  Regardless, an unremarkable evaluation in the ED does not preclude the development or presence of a serious or life threatening condition. As such, patient was instructed to return immediately for any worsening or change in current symptoms.  Patient verbalized understanding and agreement with outpatient treatment and follow-up plan.      ED Course:     12:14 AM After Toradol was given for pain, all of her pain has subsided.                       Clinical Impression:   The primary encounter diagnosis was Left upper quadrant pain. A diagnosis of Abdominal muscle strain, initial encounter was also pertinent to this visit.                             Lauren Montiel MD  12/28/18 0119

## 2018-12-28 NOTE — ED TRIAGE NOTES
Pt c/o upper left abdominal pain that began at 1700. Pt took  hydrocodone 2100 with no relief. Pt has history of pancreatitis

## 2019-01-07 ENCOUNTER — TELEPHONE (OUTPATIENT)
Dept: FAMILY MEDICINE | Facility: CLINIC | Age: 49
End: 2019-01-07

## 2019-01-07 ENCOUNTER — PATIENT OUTREACH (OUTPATIENT)
Dept: OTHER | Facility: OTHER | Age: 49
End: 2019-01-07

## 2019-01-07 RX ORDER — AMLODIPINE AND OLMESARTAN MEDOXOMIL 5; 40 MG/1; MG/1
1 TABLET ORAL DAILY
Qty: 90 TABLET | Refills: 3 | Status: SHIPPED | OUTPATIENT
Start: 2019-01-07 | End: 2020-01-09

## 2019-01-07 NOTE — PROGRESS NOTES
Last 5 Patient Entered Readings                                      Current 30 Day Average: 132/82     Recent Readings 1/3/2019 12/29/2018 12/20/2018 12/12/2018 12/4/2018    SBP (mmHg) 111 131 154 130 145    DBP (mmHg) 72 85 85 84 79    Pulse 69 67 80 85 75        Digital Medicine: Health  Follow Up    Lifestyle Modifications:    1.Dietary Modifications (Sodium intake <2,000mg/day, food labels, dining out): Patient inquired about keto pills as a supplement to weight loss.  Not familiar with keto pills, so did not recommend.  Encouraged patient to follow heart healthy diet.    2.Physical Activity: deferred    3.Medication Therapy: Patient has been compliant with the medication regimen.    4.Patient has the following medication side effects/concerns: Patient inquired about amlodipine-valsartan medication.  Informed patient to contact pharmacy to see if her medication was affected.  (Frequency/Alleviating factors/Precipitating factors, etc.)     Follow up with Mrs. Giuliana LEIVA Adrianna completed. No further questions or concerns. Will continue to follow up to achieve health goals.    Patient was admitted to ED on 12/27 due to inflamed muscle.  States she has also been suffering from some virus because she is experiencing diarrhea for the past week.  Encouraged patient to contact PCP or go to urgent care if it gets worse.  Also informed patient to stay hydrated.

## 2019-01-07 NOTE — TELEPHONE ENCOUNTER
----- Message from Ricardo Long sent at 1/7/2019  1:43 PM CST -----  Contact: [facility] [phone]  The pharmacy called to get a medication change for the patient's medication:amlodipine-valsartan (EXFORGE) 5-320 mg per tablet. They would like to change it to olmesartan- amlodipine. Please call at your earliest convenience.

## 2019-01-09 ENCOUNTER — OFFICE VISIT (OUTPATIENT)
Dept: FAMILY MEDICINE | Facility: CLINIC | Age: 49
End: 2019-01-09
Payer: MEDICARE

## 2019-01-09 VITALS
RESPIRATION RATE: 16 BRPM | TEMPERATURE: 99 F | BODY MASS INDEX: 36.07 KG/M2 | WEIGHT: 196 LBS | HEIGHT: 62 IN | HEART RATE: 68 BPM | SYSTOLIC BLOOD PRESSURE: 110 MMHG | OXYGEN SATURATION: 98 % | DIASTOLIC BLOOD PRESSURE: 70 MMHG

## 2019-01-09 DIAGNOSIS — R19.7 DIARRHEA, UNSPECIFIED TYPE: Primary | ICD-10-CM

## 2019-01-09 DIAGNOSIS — R10.9 ABDOMINAL CRAMPING: ICD-10-CM

## 2019-01-09 PROCEDURE — 99214 PR OFFICE/OUTPT VISIT, EST, LEVL IV, 30-39 MIN: ICD-10-PCS | Mod: S$GLB,,, | Performed by: PHYSICIAN ASSISTANT

## 2019-01-09 PROCEDURE — 3078F PR MOST RECENT DIASTOLIC BLOOD PRESSURE < 80 MM HG: ICD-10-PCS | Mod: CPTII,S$GLB,, | Performed by: PHYSICIAN ASSISTANT

## 2019-01-09 PROCEDURE — 3078F DIAST BP <80 MM HG: CPT | Mod: CPTII,S$GLB,, | Performed by: PHYSICIAN ASSISTANT

## 2019-01-09 PROCEDURE — 3008F PR BODY MASS INDEX (BMI) DOCUMENTED: ICD-10-PCS | Mod: CPTII,S$GLB,, | Performed by: PHYSICIAN ASSISTANT

## 2019-01-09 PROCEDURE — 3074F PR MOST RECENT SYSTOLIC BLOOD PRESSURE < 130 MM HG: ICD-10-PCS | Mod: CPTII,S$GLB,, | Performed by: PHYSICIAN ASSISTANT

## 2019-01-09 PROCEDURE — 3074F SYST BP LT 130 MM HG: CPT | Mod: CPTII,S$GLB,, | Performed by: PHYSICIAN ASSISTANT

## 2019-01-09 PROCEDURE — 99999 PR PBB SHADOW E&M-EST. PATIENT-LVL V: CPT | Mod: PBBFAC,,, | Performed by: PHYSICIAN ASSISTANT

## 2019-01-09 PROCEDURE — 99999 PR PBB SHADOW E&M-EST. PATIENT-LVL V: ICD-10-PCS | Mod: PBBFAC,,, | Performed by: PHYSICIAN ASSISTANT

## 2019-01-09 PROCEDURE — 99214 OFFICE O/P EST MOD 30 MIN: CPT | Mod: S$GLB,,, | Performed by: PHYSICIAN ASSISTANT

## 2019-01-09 PROCEDURE — 3008F BODY MASS INDEX DOCD: CPT | Mod: CPTII,S$GLB,, | Performed by: PHYSICIAN ASSISTANT

## 2019-01-09 RX ORDER — HYOSCYAMINE SULFATE 0.125 MG
125 TABLET ORAL EVERY 4 HOURS PRN
Qty: 60 TABLET | Refills: 0 | Status: SHIPPED | OUTPATIENT
Start: 2019-01-09 | End: 2019-05-22

## 2019-01-09 NOTE — PROGRESS NOTES
Answers for HPI/ROS submitted by the patient on 1/8/2019   activity change: Yes  neck pain: No  hearing loss: No  rhinorrhea: No  trouble swallowing: No  eye discharge: No  visual disturbance: No  chest tightness: No  wheezing: No  chest pain: No  palpitations: No  blood in stool: No  constipation: No  vomiting: No  diarrhea: Yes  polydipsia: No  polyuria: Yes  difficulty urinating: No  hematuria: No  menstrual problem: No  dysuria: No  joint swelling: No  arthralgias: Yes  headaches: No  weakness: Yes  confusion: No  dysphoric mood: No

## 2019-01-09 NOTE — PROGRESS NOTES
Subjective:       Patient ID: Giuliana Rodas is a 48 y.o. female with multiple medical diagnoses as listed in the medical history and problem list that presents for Diarrhea (1 week); Abdominal Pain; and Nausea  .    Chief Complaint: Diarrhea (1 week); Abdominal Pain; and Nausea      Diarrhea    This is a new problem. The current episode started 1 to 4 weeks ago (about a week or a little more ). The problem occurs 5 to 10 times per day. The problem has been unchanged. The stool consistency is described as watery. The patient states that diarrhea awakens her from sleep. Associated symptoms include abdominal pain, arthralgias, chills and myalgias. Pertinent negatives include no fever, headaches, increased  flatus or vomiting. gastroperesis, bile reflux    Abdominal Pain   This is a new problem. The current episode started in the past 7 days. The problem occurs intermittently. The problem has been unchanged. The pain is located in the epigastric region and RUQ. The pain is at a severity of 7/10. The pain is moderate. The quality of the pain is cramping. The abdominal pain does not radiate. Associated symptoms include arthralgias, diarrhea, myalgias and nausea. Pertinent negatives include no belching, constipation, dysuria, fever, flatus, headaches, hematuria, melena or vomiting. The pain is aggravated by eating (rich foods: atkins meal chicken magarita, onions, vegteable ). Treatments tried: immodium last dose 2 days ago, 4 pills  Prior diagnostic workup includes upper endoscopy and lower endoscopy. Her past medical history is significant for GERD.   Nausea   This is a new problem. The current episode started 1 to 4 weeks ago. The problem occurs intermittently. Associated symptoms include abdominal pain, arthralgias, chills, myalgias, nausea and weakness. Pertinent negatives include no chest pain, fever, headaches, joint swelling, neck pain or vomiting. She has tried nothing for the symptoms.     She just started the  low carb/keto diet right before she got sick.   No one with diarrhea around her.   No new medication or supplement.   No raw foods.   No recent out the country traveling.   No recent antibiotic use.     Review of Systems   Constitutional: Positive for activity change and chills. Negative for fever.   HENT: Negative for hearing loss, rhinorrhea and trouble swallowing.    Eyes: Negative for discharge and visual disturbance.   Respiratory: Negative for chest tightness and wheezing.    Cardiovascular: Negative for chest pain and palpitations.   Gastrointestinal: Positive for abdominal pain, diarrhea and nausea. Negative for blood in stool, constipation, flatus, melena and vomiting.   Endocrine: Positive for polyuria. Negative for polydipsia.   Genitourinary: Negative for difficulty urinating, dysuria, hematuria and menstrual problem.   Musculoskeletal: Positive for arthralgias and myalgias. Negative for joint swelling and neck pain.   Neurological: Positive for weakness. Negative for headaches.   Psychiatric/Behavioral: Negative for confusion and dysphoric mood.         PAST MEDICAL HISTORY:  Past Medical History:   Diagnosis Date    Bile reflux gastritis     Breast cyst     Eczema     Fibrocystic breast     Fibromyalgia     Gastroparesis     dr. harden    GERD (gastroesophageal reflux disease)     Hyperglyceridemia     Hyperlipidemia     Hypertension     IC (interstitial cystitis)     dr. labadie    Psoriasis     Tension headache        SOCIAL HISTORY:  Social History     Socioeconomic History    Marital status:      Spouse name: Not on file    Number of children: 2    Years of education: Not on file    Highest education level: Not on file   Social Needs    Financial resource strain: Not on file    Food insecurity - worry: Not on file    Food insecurity - inability: Not on file    Transportation needs - medical: Not on file    Transportation needs - non-medical: Not on file    Occupational History    Occupation:      Employer: [employer]   Tobacco Use    Smoking status: Never Smoker    Smokeless tobacco: Never Used   Substance and Sexual Activity    Alcohol use: No    Drug use: No    Sexual activity: Yes     Partners: Male   Other Topics Concern    Not on file   Social History Narrative    Lives at home with  and daughter       ALLERGIES AND MEDICATIONS: updated and reviewed.  Review of patient's allergies indicates:   Allergen Reactions    Elavil [amitriptyline] Hallucinations    Depo-provera [medroxyprogesterone] Anxiety    Dicyclomine Rash     Swelling of lip      Prozac [fluoxetine] Anxiety     Current Outpatient Medications   Medication Sig Dispense Refill    atenolol (TENORMIN) 25 MG tablet TAKE ONE TABLET BY MOUTH TWICE DAILY 180 tablet 0    BIFIDOBACTERIUM INFANTIS (ALIGN ORAL) Take 1 tablet by mouth once daily.      butalbital-acetaminophen-caffeine -40 mg (FIORICET, ESGIC) -40 mg per tablet Take 1 tablet by mouth every 6 (six) hours as needed. 40 tablet 1    chlorthalidone (HYGROTEN) 25 MG Tab Take 1 tablet (25 mg total) by mouth once daily. 90 tablet 3    diclofenac sodium (VOLTAREN) 1 % Gel Apply 2 g topically 4 (four) times daily. for 10 days 100 g 5    escitalopram oxalate (LEXAPRO) 20 MG tablet TAKE ONE TABLET BY MOUTH DAILY 30 tablet 2    gemfibrozil (LOPID) 600 MG tablet Take 1 tablet (600 mg total) by mouth 2 (two) times daily before meals. 180 tablet 3    norethindrone-ethinyl estradiol (JUNEL FE 1/20) 1 mg-20 mcg (21)/75 mg (7) per tablet TAKE ONE TABLET BY MOUTH EVERY DAY 21 DAYS 28 tablet 11    pantoprazole (PROTONIX) 40 MG tablet Take 1 tablet (40 mg total) by mouth once daily. 90 tablet 3    pravastatin (PRAVACHOL) 20 MG tablet TAKE ONE TABLET BY MOUTH EVERY EVENING 90 tablet 0    sucralfate (CARAFATE) 1 gram tablet Take 1 g by mouth 4 (four) times daily.      tiZANidine (ZANAFLEX) 4 MG tablet TAKE ONE TABLET BY  "MOUTH EVERY 8 HOURS AS NEEDED 60 tablet 3    amlodipine-olmesartan (ANGEL) 5-40 mg per tablet Take 1 tablet by mouth once daily. 90 tablet 3    hyoscyamine (ANASPAZ,LEVSIN) 0.125 mg Tab Take 1 tablet (125 mcg total) by mouth every 4 (four) hours as needed. 60 tablet 0    ketoconazole (NIZORAL) 2 % shampoo Apply topically twice a week. 120 mL 0     No current facility-administered medications for this visit.          Objective:   /70   Pulse 68   Temp 98.6 °F (37 °C) (Oral)   Resp 16   Ht 5' 2" (1.575 m)   Wt 88.9 kg (195 lb 15.8 oz)   SpO2 98%   BMI 35.85 kg/m²      Physical Exam   Constitutional: She is oriented to person, place, and time. No distress.   HENT:   Mouth/Throat: Oropharynx is clear and moist.   Eyes: Conjunctivae and EOM are normal.   Cardiovascular: Normal rate and regular rhythm.   Pulmonary/Chest: Effort normal and breath sounds normal.   Abdominal: Soft. Normal appearance and bowel sounds are normal. There is tenderness in the right lower quadrant, suprapubic area and left lower quadrant. There is no rigidity, no guarding, no CVA tenderness, no tenderness at McBurney's point and negative Frias's sign.   Negative psoas and obturator sign    Neurological: She is alert and oriented to person, place, and time.   Skin: Skin is warm. No rash noted. No erythema.           Assessment:       1. Diarrhea, unspecified type    2. Abdominal cramping        Plan:       Diarrhea, unspecified type  -     Comprehensive metabolic panel; Future; Expected date: 01/09/2019  -     CBC auto differential; Future; Expected date: 01/09/2019  -     Stool Exam-Ova,Cysts,Parasites; Future; Expected date: 01/09/2019  -     WBC, Stool; Future; Expected date: 01/09/2019  -     Stool culture; Future; Expected date: 01/09/2019  -     Clostridium difficile EIA; Future; Expected date: 01/09/2019    Will work up stool. If negative, follow up with her GI doctor LIN MOSLEY with electrolytes.   Vitals " stable.     Abdominal cramping  -     hyoscyamine (ANASPAZ,LEVSIN) 0.125 mg Tab; Take 1 tablet (125 mcg total) by mouth every 4 (four) hours as needed.  Dispense: 60 tablet; Refill: 0            No Follow-up on file.

## 2019-01-10 ENCOUNTER — TELEPHONE (OUTPATIENT)
Dept: FAMILY MEDICINE | Facility: CLINIC | Age: 49
End: 2019-01-10

## 2019-01-10 ENCOUNTER — LAB VISIT (OUTPATIENT)
Dept: LAB | Facility: HOSPITAL | Age: 49
End: 2019-01-10
Attending: FAMILY MEDICINE
Payer: MEDICARE

## 2019-01-10 DIAGNOSIS — N17.9 AKI (ACUTE KIDNEY INJURY): ICD-10-CM

## 2019-01-10 DIAGNOSIS — R19.7 DIARRHEA, UNSPECIFIED TYPE: ICD-10-CM

## 2019-01-10 DIAGNOSIS — E87.6 HYPOKALEMIA: Primary | ICD-10-CM

## 2019-01-10 LAB
ALBUMIN SERPL BCP-MCNC: 3.3 G/DL
ALP SERPL-CCNC: 55 U/L
ALT SERPL W/O P-5'-P-CCNC: 22 U/L
ANION GAP SERPL CALC-SCNC: 13 MMOL/L
AST SERPL-CCNC: 28 U/L
BASOPHILS # BLD AUTO: 0.02 K/UL
BASOPHILS NFR BLD: 0.3 %
BILIRUB SERPL-MCNC: 0.3 MG/DL
BUN SERPL-MCNC: 19 MG/DL
CALCIUM SERPL-MCNC: 9.4 MG/DL
CHLORIDE SERPL-SCNC: 104 MMOL/L
CO2 SERPL-SCNC: 22 MMOL/L
CREAT SERPL-MCNC: 1.3 MG/DL
DIFFERENTIAL METHOD: ABNORMAL
EOSINOPHIL # BLD AUTO: 0.5 K/UL
EOSINOPHIL NFR BLD: 7.8 %
ERYTHROCYTE [DISTWIDTH] IN BLOOD BY AUTOMATED COUNT: 14.2 %
EST. GFR  (AFRICAN AMERICAN): 55 ML/MIN/1.73 M^2
EST. GFR  (NON AFRICAN AMERICAN): 48 ML/MIN/1.73 M^2
GLUCOSE SERPL-MCNC: 89 MG/DL
HCT VFR BLD AUTO: 38.9 %
HGB BLD-MCNC: 13.3 G/DL
LYMPHOCYTES # BLD AUTO: 2 K/UL
LYMPHOCYTES NFR BLD: 30 %
MCH RBC QN AUTO: 26.4 PG
MCHC RBC AUTO-ENTMCNC: 34.2 G/DL
MCV RBC AUTO: 77 FL
MONOCYTES # BLD AUTO: 0.6 K/UL
MONOCYTES NFR BLD: 9.5 %
NEUTROPHILS # BLD AUTO: 3.5 K/UL
NEUTROPHILS NFR BLD: 52.1 %
PLATELET # BLD AUTO: 239 K/UL
PMV BLD AUTO: 10.3 FL
POTASSIUM SERPL-SCNC: 2.8 MMOL/L
PROT SERPL-MCNC: 7.2 G/DL
RBC # BLD AUTO: 5.03 M/UL
SODIUM SERPL-SCNC: 139 MMOL/L
WBC # BLD AUTO: 6.66 K/UL
WBC #/AREA STL HPF: NORMAL /[HPF]

## 2019-01-10 PROCEDURE — 36415 COLL VENOUS BLD VENIPUNCTURE: CPT | Mod: PO

## 2019-01-10 PROCEDURE — 80053 COMPREHEN METABOLIC PANEL: CPT

## 2019-01-10 PROCEDURE — 87449 NOS EACH ORGANISM AG IA: CPT

## 2019-01-10 PROCEDURE — 87427 SHIGA-LIKE TOXIN AG IA: CPT

## 2019-01-10 PROCEDURE — 87046 STOOL CULTR AEROBIC BACT EA: CPT

## 2019-01-10 PROCEDURE — 87045 FECES CULTURE AEROBIC BACT: CPT

## 2019-01-10 PROCEDURE — 85025 COMPLETE CBC W/AUTO DIFF WBC: CPT

## 2019-01-10 PROCEDURE — 89055 LEUKOCYTE ASSESSMENT FECAL: CPT

## 2019-01-10 RX ORDER — POTASSIUM CHLORIDE 20 MEQ/1
20 TABLET, EXTENDED RELEASE ORAL DAILY
Qty: 30 TABLET | Refills: 0 | Status: SHIPPED | OUTPATIENT
Start: 2019-01-10 | End: 2019-05-22

## 2019-01-11 LAB
C DIFF GDH STL QL: NEGATIVE
C DIFF TOX A+B STL QL IA: NEGATIVE
E COLI SXT1 STL QL IA: NEGATIVE
E COLI SXT2 STL QL IA: NEGATIVE

## 2019-01-13 LAB — BACTERIA STL CULT: NORMAL

## 2019-01-14 ENCOUNTER — LAB VISIT (OUTPATIENT)
Dept: LAB | Facility: HOSPITAL | Age: 49
End: 2019-01-14
Attending: PHYSICIAN ASSISTANT
Payer: MEDICARE

## 2019-01-14 DIAGNOSIS — E87.6 HYPOKALEMIA: ICD-10-CM

## 2019-01-14 DIAGNOSIS — E87.6 HYPOKALEMIA: Primary | ICD-10-CM

## 2019-01-14 DIAGNOSIS — N17.9 AKI (ACUTE KIDNEY INJURY): ICD-10-CM

## 2019-01-14 LAB
ANION GAP SERPL CALC-SCNC: 9 MMOL/L
BUN SERPL-MCNC: 12 MG/DL
CALCIUM SERPL-MCNC: 9.4 MG/DL
CHLORIDE SERPL-SCNC: 103 MMOL/L
CO2 SERPL-SCNC: 26 MMOL/L
CREAT SERPL-MCNC: 1.1 MG/DL
EST. GFR  (AFRICAN AMERICAN): >60 ML/MIN/1.73 M^2
EST. GFR  (NON AFRICAN AMERICAN): 59 ML/MIN/1.73 M^2
GLUCOSE SERPL-MCNC: 83 MG/DL
POTASSIUM SERPL-SCNC: 2.9 MMOL/L
SODIUM SERPL-SCNC: 138 MMOL/L

## 2019-01-14 PROCEDURE — 80048 BASIC METABOLIC PNL TOTAL CA: CPT

## 2019-01-14 PROCEDURE — 36415 COLL VENOUS BLD VENIPUNCTURE: CPT | Mod: PO

## 2019-01-16 ENCOUNTER — OFFICE VISIT (OUTPATIENT)
Dept: FAMILY MEDICINE | Facility: CLINIC | Age: 49
End: 2019-01-16
Payer: MEDICARE

## 2019-01-16 VITALS
TEMPERATURE: 98 F | RESPIRATION RATE: 16 BRPM | OXYGEN SATURATION: 97 % | SYSTOLIC BLOOD PRESSURE: 130 MMHG | HEIGHT: 62 IN | BODY MASS INDEX: 36.55 KG/M2 | WEIGHT: 198.63 LBS | HEART RATE: 70 BPM | DIASTOLIC BLOOD PRESSURE: 84 MMHG

## 2019-01-16 DIAGNOSIS — E87.6 HYPOKALEMIA: ICD-10-CM

## 2019-01-16 DIAGNOSIS — R76.8 POSITIVE ANA (ANTINUCLEAR ANTIBODY): Primary | ICD-10-CM

## 2019-01-16 DIAGNOSIS — R21 RASH: ICD-10-CM

## 2019-01-16 PROCEDURE — 3079F DIAST BP 80-89 MM HG: CPT | Mod: CPTII,S$GLB,, | Performed by: PHYSICIAN ASSISTANT

## 2019-01-16 PROCEDURE — 3008F PR BODY MASS INDEX (BMI) DOCUMENTED: ICD-10-PCS | Mod: CPTII,S$GLB,, | Performed by: PHYSICIAN ASSISTANT

## 2019-01-16 PROCEDURE — 3008F BODY MASS INDEX DOCD: CPT | Mod: CPTII,S$GLB,, | Performed by: PHYSICIAN ASSISTANT

## 2019-01-16 PROCEDURE — 99213 OFFICE O/P EST LOW 20 MIN: CPT | Mod: S$GLB,,, | Performed by: PHYSICIAN ASSISTANT

## 2019-01-16 PROCEDURE — 3075F SYST BP GE 130 - 139MM HG: CPT | Mod: CPTII,S$GLB,, | Performed by: PHYSICIAN ASSISTANT

## 2019-01-16 PROCEDURE — 99213 PR OFFICE/OUTPT VISIT, EST, LEVL III, 20-29 MIN: ICD-10-PCS | Mod: S$GLB,,, | Performed by: PHYSICIAN ASSISTANT

## 2019-01-16 PROCEDURE — 3079F PR MOST RECENT DIASTOLIC BLOOD PRESSURE 80-89 MM HG: ICD-10-PCS | Mod: CPTII,S$GLB,, | Performed by: PHYSICIAN ASSISTANT

## 2019-01-16 PROCEDURE — 99999 PR PBB SHADOW E&M-EST. PATIENT-LVL V: CPT | Mod: PBBFAC,,, | Performed by: PHYSICIAN ASSISTANT

## 2019-01-16 PROCEDURE — 3075F PR MOST RECENT SYSTOLIC BLOOD PRESS GE 130-139MM HG: ICD-10-PCS | Mod: CPTII,S$GLB,, | Performed by: PHYSICIAN ASSISTANT

## 2019-01-16 PROCEDURE — 99999 PR PBB SHADOW E&M-EST. PATIENT-LVL V: ICD-10-PCS | Mod: PBBFAC,,, | Performed by: PHYSICIAN ASSISTANT

## 2019-01-16 RX ORDER — SULFAMETHOXAZOLE AND TRIMETHOPRIM 800; 160 MG/1; MG/1
TABLET ORAL
Refills: 0 | COMMUNITY
Start: 2019-01-13 | End: 2019-05-22

## 2019-01-16 NOTE — PROGRESS NOTES
Answers for HPI/ROS submitted by the patient on 1/16/2019   Rash  Chronicity: new  Onset: 1 to 4 weeks ago  Progression since onset: gradually worsening  Affected locations: chest  Characteristics: dryness, redness  Exposed to: an ill contact, a new medication  anorexia: Yes  congestion: No  cough: No  diarrhea: No  eye pain: No  facial edema: No  fatigue: Yes  fever: No  joint pain: Yes  nail changes: No  rhinorrhea: No  shortness of breath: No  sore throat: No  vomiting: No  Treatments tried: nothing  Improvement on treatment: no relief  asthma: No  allergies: Yes  eczema: Yes  varicella: Yes  Answers for HPI/ROS submitted by the patient on 1/16/2019   Rash  Chronicity: new  Onset: 1 to 4 weeks ago  Progression since onset: gradually worsening  Affected locations: chest  Characteristics: dryness, redness  Exposed to: an ill contact, a new medication  anorexia: Yes  congestion: No  cough: No  diarrhea: No  eye pain: No  facial edema: No  fatigue: Yes  fever: No  joint pain: Yes  nail changes: No  rhinorrhea: No  shortness of breath: No  sore throat: No  vomiting: No  Treatments tried: nothing  Improvement on treatment: no relief  asthma: No  allergies: Yes  eczema: Yes  varicella: Yes  Answers for HPI/ROS submitted by the patient on 1/16/2019   Rash  Chronicity: new  Onset: 1 to 4 weeks ago  Progression since onset: gradually worsening  Affected locations: chest  Characteristics: dryness, redness  Exposed to: an ill contact, a new medication  anorexia: Yes  congestion: No  cough: No  diarrhea: No  eye pain: No  facial edema: No  fatigue: Yes  fever: No  joint pain: Yes  nail changes: No  rhinorrhea: No  shortness of breath: No  sore throat: No  vomiting: No  Treatments tried: nothing  Improvement on treatment: no relief  asthma: No  allergies: Yes  eczema: Yes  varicella: Yes

## 2019-01-16 NOTE — PROGRESS NOTES
Subjective:       Patient ID: Giuliana Rodas is a 48 y.o. female with multiple medical diagnoses as listed in the medical history and problem list that presents for Generalized Body Aches (since starting dg) and red spot (chest)  .    Chief Complaint: Generalized Body Aches (since starting dg) and red spot (chest)      Rash   This is a new problem. The current episode started 1 to 4 weeks ago. The problem is unchanged. Location: on spot left chest  The rash is characterized by redness and swelling. She was exposed to nothing. Associated symptoms include fatigue. Pertinent negatives include no congestion, cough, diarrhea, eye pain, fever, rhinorrhea, shortness of breath, sore throat or vomiting. Past treatments include nothing.      She states she JUST got the message today about stopping the chlorthalidone so she has not yet.     Review of Systems   Constitutional: Positive for fatigue. Negative for chills and fever.   HENT: Negative for congestion, rhinorrhea and sore throat.    Eyes: Negative for pain.   Respiratory: Negative for cough and shortness of breath.    Cardiovascular: Negative for chest pain, palpitations and leg swelling.   Gastrointestinal: Negative for diarrhea and vomiting.   Musculoskeletal: Positive for myalgias (with occasionally shocks ).   Skin: Positive for rash.         PAST MEDICAL HISTORY:  Past Medical History:   Diagnosis Date    Bile reflux gastritis     Breast cyst     Eczema     Fibrocystic breast     Fibromyalgia     Gastroparesis     dr. harden    GERD (gastroesophageal reflux disease)     Hyperglyceridemia     Hyperlipidemia     Hypertension     IC (interstitial cystitis)     dr. labadie    Psoriasis     Tension headache        SOCIAL HISTORY:  Social History     Socioeconomic History    Marital status:      Spouse name: Not on file    Number of children: 2    Years of education: Not on file    Highest education level: Not on file   Social Needs     Financial resource strain: Not on file    Food insecurity - worry: Not on file    Food insecurity - inability: Not on file    Transportation needs - medical: Not on file    Transportation needs - non-medical: Not on file   Occupational History    Occupation:      Employer: [employer]   Tobacco Use    Smoking status: Never Smoker    Smokeless tobacco: Never Used   Substance and Sexual Activity    Alcohol use: No    Drug use: No    Sexual activity: Yes     Partners: Male   Other Topics Concern    Not on file   Social History Narrative    Lives at home with  and daughter       ALLERGIES AND MEDICATIONS: updated and reviewed.  Review of patient's allergies indicates:   Allergen Reactions    Chlorthalidone      Hypokalemia     Elavil [amitriptyline] Hallucinations    Depo-provera [medroxyprogesterone] Anxiety    Dicyclomine Rash     Swelling of lip      Prozac [fluoxetine] Anxiety     Current Outpatient Medications   Medication Sig Dispense Refill    amlodipine-olmesartan (ANGEL) 5-40 mg per tablet Take 1 tablet by mouth once daily. 90 tablet 3    atenolol (TENORMIN) 25 MG tablet TAKE ONE TABLET BY MOUTH TWICE DAILY 180 tablet 0    BIFIDOBACTERIUM INFANTIS (ALIGN ORAL) Take 1 tablet by mouth once daily.      butalbital-acetaminophen-caffeine -40 mg (FIORICET, ESGIC) -40 mg per tablet Take 1 tablet by mouth every 6 (six) hours as needed. 40 tablet 1    diclofenac sodium (VOLTAREN) 1 % Gel Apply 2 g topically 4 (four) times daily. for 10 days 100 g 5    escitalopram oxalate (LEXAPRO) 20 MG tablet TAKE ONE TABLET BY MOUTH DAILY 30 tablet 2    gemfibrozil (LOPID) 600 MG tablet Take 1 tablet (600 mg total) by mouth 2 (two) times daily before meals. 180 tablet 3    hyoscyamine (ANASPAZ,LEVSIN) 0.125 mg Tab Take 1 tablet (125 mcg total) by mouth every 4 (four) hours as needed. 60 tablet 0    ketoconazole (NIZORAL) 2 % shampoo Apply topically twice a week. 120 mL 0     "norethindrone-ethinyl estradiol (JUNEL FE 1/20) 1 mg-20 mcg (21)/75 mg (7) per tablet TAKE ONE TABLET BY MOUTH EVERY DAY 21 DAYS 28 tablet 11    pantoprazole (PROTONIX) 40 MG tablet Take 1 tablet (40 mg total) by mouth once daily. 90 tablet 3    potassium chloride SA (K-DUR,KLOR-CON) 20 MEQ tablet Take 1 tablet (20 mEq total) by mouth once daily. 30 tablet 0    pravastatin (PRAVACHOL) 20 MG tablet TAKE ONE TABLET BY MOUTH EVERY EVENING 90 tablet 0    sucralfate (CARAFATE) 1 gram tablet Take 1 g by mouth 4 (four) times daily.      sulfamethoxazole-trimethoprim 800-160mg (BACTRIM DS) 800-160 mg Tab TK 1 T PO  BID  0    tiZANidine (ZANAFLEX) 4 MG tablet TAKE ONE TABLET BY MOUTH EVERY 8 HOURS AS NEEDED 60 tablet 3     No current facility-administered medications for this visit.          Objective:   /84   Pulse 70   Temp 98.2 °F (36.8 °C) (Oral)   Resp 16   Ht 5' 2" (1.575 m)   Wt 90.1 kg (198 lb 10.2 oz)   SpO2 97%   BMI 36.33 kg/m²      Physical Exam   Constitutional: She is oriented to person, place, and time. No distress.   HENT:   Head: Normocephalic and atraumatic.   Cardiovascular: Normal rate and regular rhythm.   Pulmonary/Chest: Effort normal and breath sounds normal.   Musculoskeletal: Normal range of motion.   Neurological: She is alert and oriented to person, place, and time.   Skin: Rash noted.                Assessment:       1. Positive BRANDEN (antinuclear antibody)    2. Rash    3. Hypokalemia        Plan:       Positive BRANDEN (antinuclear antibody)  -     Ambulatory referral to Rheumatology  States she use to see Dr. Verduzco but did not like him  She states her only dx from him was fibromyalgia     Rash  -     Ambulatory referral to Rheumatology    Hypokalemia  -     Magnesium; Future; Expected date: 01/16/2019    Stop chlorthalidone   Take potassium for 3 days   Repeat labs next thursday           No Follow-up on file.  "

## 2019-01-19 DIAGNOSIS — E78.5 HYPERLIPIDEMIA, UNSPECIFIED HYPERLIPIDEMIA TYPE: ICD-10-CM

## 2019-01-21 ENCOUNTER — OFFICE VISIT (OUTPATIENT)
Dept: FAMILY MEDICINE | Facility: CLINIC | Age: 49
End: 2019-01-21
Payer: MEDICARE

## 2019-01-21 ENCOUNTER — LAB VISIT (OUTPATIENT)
Dept: LAB | Facility: HOSPITAL | Age: 49
End: 2019-01-21
Attending: FAMILY MEDICINE
Payer: MEDICARE

## 2019-01-21 VITALS
DIASTOLIC BLOOD PRESSURE: 80 MMHG | HEART RATE: 68 BPM | BODY MASS INDEX: 37.08 KG/M2 | HEIGHT: 62 IN | SYSTOLIC BLOOD PRESSURE: 120 MMHG | WEIGHT: 201.5 LBS | RESPIRATION RATE: 16 BRPM | OXYGEN SATURATION: 98 % | TEMPERATURE: 98 F

## 2019-01-21 DIAGNOSIS — F41.9 ANXIETY: Primary | ICD-10-CM

## 2019-01-21 DIAGNOSIS — E87.6 HYPOKALEMIA: ICD-10-CM

## 2019-01-21 LAB
ANION GAP SERPL CALC-SCNC: 10 MMOL/L
BUN SERPL-MCNC: 13 MG/DL
CALCIUM SERPL-MCNC: 9.5 MG/DL
CHLORIDE SERPL-SCNC: 106 MMOL/L
CO2 SERPL-SCNC: 23 MMOL/L
CREAT SERPL-MCNC: 1 MG/DL
EST. GFR  (AFRICAN AMERICAN): >60 ML/MIN/1.73 M^2
EST. GFR  (NON AFRICAN AMERICAN): >60 ML/MIN/1.73 M^2
GLUCOSE SERPL-MCNC: 76 MG/DL
MAGNESIUM SERPL-MCNC: 1.7 MG/DL
POTASSIUM SERPL-SCNC: 4 MMOL/L
SODIUM SERPL-SCNC: 139 MMOL/L

## 2019-01-21 PROCEDURE — 80048 BASIC METABOLIC PNL TOTAL CA: CPT

## 2019-01-21 PROCEDURE — 3079F PR MOST RECENT DIASTOLIC BLOOD PRESSURE 80-89 MM HG: ICD-10-PCS | Mod: CPTII,S$GLB,, | Performed by: PHYSICIAN ASSISTANT

## 2019-01-21 PROCEDURE — 83735 ASSAY OF MAGNESIUM: CPT

## 2019-01-21 PROCEDURE — 3079F DIAST BP 80-89 MM HG: CPT | Mod: CPTII,S$GLB,, | Performed by: PHYSICIAN ASSISTANT

## 2019-01-21 PROCEDURE — 3074F SYST BP LT 130 MM HG: CPT | Mod: CPTII,S$GLB,, | Performed by: PHYSICIAN ASSISTANT

## 2019-01-21 PROCEDURE — 99213 PR OFFICE/OUTPT VISIT, EST, LEVL III, 20-29 MIN: ICD-10-PCS | Mod: S$GLB,,, | Performed by: PHYSICIAN ASSISTANT

## 2019-01-21 PROCEDURE — 3074F PR MOST RECENT SYSTOLIC BLOOD PRESSURE < 130 MM HG: ICD-10-PCS | Mod: CPTII,S$GLB,, | Performed by: PHYSICIAN ASSISTANT

## 2019-01-21 PROCEDURE — 3008F PR BODY MASS INDEX (BMI) DOCUMENTED: ICD-10-PCS | Mod: CPTII,S$GLB,, | Performed by: PHYSICIAN ASSISTANT

## 2019-01-21 PROCEDURE — 99999 PR PBB SHADOW E&M-EST. PATIENT-LVL V: CPT | Mod: PBBFAC,,, | Performed by: PHYSICIAN ASSISTANT

## 2019-01-21 PROCEDURE — 99999 PR PBB SHADOW E&M-EST. PATIENT-LVL V: ICD-10-PCS | Mod: PBBFAC,,, | Performed by: PHYSICIAN ASSISTANT

## 2019-01-21 PROCEDURE — 36415 COLL VENOUS BLD VENIPUNCTURE: CPT | Mod: PO

## 2019-01-21 PROCEDURE — 3008F BODY MASS INDEX DOCD: CPT | Mod: CPTII,S$GLB,, | Performed by: PHYSICIAN ASSISTANT

## 2019-01-21 PROCEDURE — 99213 OFFICE O/P EST LOW 20 MIN: CPT | Mod: S$GLB,,, | Performed by: PHYSICIAN ASSISTANT

## 2019-01-21 RX ORDER — PRAVASTATIN SODIUM 20 MG/1
TABLET ORAL
Qty: 90 TABLET | Refills: 0 | Status: SHIPPED | OUTPATIENT
Start: 2019-01-21 | End: 2019-04-15 | Stop reason: SDUPTHER

## 2019-01-21 RX ORDER — ESCITALOPRAM OXALATE 20 MG/1
TABLET ORAL
Qty: 30 TABLET | Refills: 2 | Status: SHIPPED | OUTPATIENT
Start: 2019-01-21 | End: 2019-05-20 | Stop reason: SDUPTHER

## 2019-01-21 NOTE — PROGRESS NOTES
Subjective:       Patient ID: Giuliana Rodas is a 48 y.o. female with multiple medical diagnoses as listed in the medical history and problem list that presents for Cough and Wheezing (last night)  .    Chief Complaint: Cough and Wheezing (last night)      Cough   This is a new problem. The current episode started yesterday (last night ). The problem has been gradually worsening. The cough is non-productive. Associated symptoms include chest pain, chills, headaches and wheezing (last night ). Pertinent negatives include no ear pain, fever, rhinorrhea or sore throat. Treatments tried: humidifer and vicks.   Wheezing    This is a new problem. Associated symptoms include chest pain, chills, coughing (dry cough ), diarrhea and headaches. Pertinent negatives include no ear pain, fever, neck pain, rhinorrhea, sore throat or vomiting.     Pt admits to increased stress.  After discussing patient states she has forgotten and is out of her lexapro about 1.5 weeks or so. It was refilled today and she state she needs to pick it up.       Review of Systems   Constitutional: Positive for chills. Negative for activity change, fever and unexpected weight change.   HENT: Positive for congestion. Negative for ear pain, hearing loss, rhinorrhea, sinus pressure, sinus pain, sore throat and trouble swallowing.    Eyes: Negative for pain, discharge, itching and visual disturbance.   Respiratory: Positive for cough (dry cough ), chest tightness and wheezing (last night ).    Cardiovascular: Positive for chest pain. Negative for palpitations.   Gastrointestinal: Positive for constipation and diarrhea. Negative for blood in stool and vomiting.   Endocrine: Negative for polydipsia and polyuria.   Genitourinary: Negative for difficulty urinating, dysuria, hematuria and menstrual problem.   Musculoskeletal: Negative for arthralgias, joint swelling and neck pain.   Neurological: Positive for weakness and headaches.   Psychiatric/Behavioral:  Positive for confusion. Negative for dysphoric mood.         PAST MEDICAL HISTORY:  Past Medical History:   Diagnosis Date    Bile reflux gastritis     Breast cyst     Eczema     Fibrocystic breast     Fibromyalgia     Gastroparesis     dr. harden    GERD (gastroesophageal reflux disease)     Hyperglyceridemia     Hyperlipidemia     Hypertension     IC (interstitial cystitis)     dr. labadie    Psoriasis     Tension headache        SOCIAL HISTORY:  Social History     Socioeconomic History    Marital status:      Spouse name: Not on file    Number of children: 2    Years of education: Not on file    Highest education level: Not on file   Social Needs    Financial resource strain: Not on file    Food insecurity - worry: Not on file    Food insecurity - inability: Not on file    Transportation needs - medical: Not on file    Transportation needs - non-medical: Not on file   Occupational History    Occupation:      Employer: [employer]   Tobacco Use    Smoking status: Never Smoker    Smokeless tobacco: Never Used   Substance and Sexual Activity    Alcohol use: No    Drug use: No    Sexual activity: Yes     Partners: Male   Other Topics Concern    Not on file   Social History Narrative    Lives at home with  and daughter       ALLERGIES AND MEDICATIONS: updated and reviewed.  Review of patient's allergies indicates:   Allergen Reactions    Chlorthalidone      Hypokalemia     Elavil [amitriptyline] Hallucinations    Depo-provera [medroxyprogesterone] Anxiety    Dicyclomine Rash     Swelling of lip      Prozac [fluoxetine] Anxiety     Current Outpatient Medications   Medication Sig Dispense Refill    amlodipine-olmesartan (ANGEL) 5-40 mg per tablet Take 1 tablet by mouth once daily. 90 tablet 3    atenolol (TENORMIN) 25 MG tablet TAKE ONE TABLET BY MOUTH TWICE DAILY 180 tablet 0    BIFIDOBACTERIUM INFANTIS (ALIGN ORAL) Take 1 tablet by mouth once daily.       "butalbital-acetaminophen-caffeine -40 mg (FIORICET, ESGIC) -40 mg per tablet Take 1 tablet by mouth every 6 (six) hours as needed. 40 tablet 1    diclofenac sodium (VOLTAREN) 1 % Gel Apply 2 g topically 4 (four) times daily. for 10 days 100 g 5    escitalopram oxalate (LEXAPRO) 20 MG tablet TAKE ONE TABLET BY MOUTH DAILY 30 tablet 2    gemfibrozil (LOPID) 600 MG tablet Take 1 tablet (600 mg total) by mouth 2 (two) times daily before meals. 180 tablet 3    hyoscyamine (ANASPAZ,LEVSIN) 0.125 mg Tab Take 1 tablet (125 mcg total) by mouth every 4 (four) hours as needed. 60 tablet 0    ketoconazole (NIZORAL) 2 % shampoo Apply topically twice a week. 120 mL 0    norethindrone-ethinyl estradiol (JUNEL FE 1/20) 1 mg-20 mcg (21)/75 mg (7) per tablet TAKE ONE TABLET BY MOUTH EVERY DAY 21 DAYS 28 tablet 11    pantoprazole (PROTONIX) 40 MG tablet Take 1 tablet (40 mg total) by mouth once daily. 90 tablet 3    potassium chloride SA (K-DUR,KLOR-CON) 20 MEQ tablet Take 1 tablet (20 mEq total) by mouth once daily. 30 tablet 0    pravastatin (PRAVACHOL) 20 MG tablet TAKE ONE TABLET BY MOUTH EVERY EVENING 90 tablet 0    sucralfate (CARAFATE) 1 gram tablet Take 1 g by mouth 4 (four) times daily.      sulfamethoxazole-trimethoprim 800-160mg (BACTRIM DS) 800-160 mg Tab TK 1 T PO  BID  0    tiZANidine (ZANAFLEX) 4 MG tablet TAKE ONE TABLET BY MOUTH EVERY 8 HOURS AS NEEDED 60 tablet 3     No current facility-administered medications for this visit.          Objective:   /80   Pulse 68   Temp 98.1 °F (36.7 °C) (Oral)   Resp 16   Ht 5' 2" (1.575 m)   Wt 91.4 kg (201 lb 8 oz)   SpO2 98%   BMI 36.85 kg/m²      Physical Exam   Constitutional: She is oriented to person, place, and time. No distress.   HENT:   Head: Normocephalic and atraumatic.   Eyes: Conjunctivae and EOM are normal.   Cardiovascular: Normal rate and regular rhythm.   Pulmonary/Chest: Effort normal and breath sounds normal. She has no " decreased breath sounds. She has no wheezes. She has no rhonchi. She has no rales.   Negative egophony    Neurological: She is alert and oriented to person, place, and time.           Assessment:       1. Anxiety        Plan:       Anxiety  She has NO positive physical exam findings for her complaints.   When asking about increased stress, she admits yes,  Will get her back on lexapro to see if symptoms improved. Buspar if needed.   Follow up for no improvement or any concerns.           No Follow-up on file.

## 2019-01-21 NOTE — PROGRESS NOTES
Answers for HPI/ROS submitted by the patient on 1/21/2019   activity change: No  unexpected weight change: No  neck pain: No  hearing loss: No  rhinorrhea: No  trouble swallowing: No  eye discharge: No  visual disturbance: No  chest tightness: Yes  wheezing: Yes  chest pain: Yes  palpitations: No  blood in stool: No  constipation: Yes  vomiting: No  diarrhea: Yes  polydipsia: No  polyuria: No  difficulty urinating: No  hematuria: No  menstrual problem: No  dysuria: No  joint swelling: No  arthralgias: No  headaches: No  weakness: Yes  confusion: Yes  dysphoric mood: No

## 2019-02-11 ENCOUNTER — PATIENT OUTREACH (OUTPATIENT)
Dept: OTHER | Facility: OTHER | Age: 49
End: 2019-02-11

## 2019-02-11 DIAGNOSIS — E87.6 HYPOKALEMIA: ICD-10-CM

## 2019-02-11 RX ORDER — POTASSIUM CHLORIDE 20 MEQ/1
TABLET, EXTENDED RELEASE ORAL
Qty: 30 TABLET | Refills: 0 | OUTPATIENT
Start: 2019-02-11

## 2019-02-11 NOTE — PROGRESS NOTES
"Last 5 Patient Entered Readings                                      Current 30 Day Average: 141/77     Recent Readings 2/7/2019 1/30/2019 1/22/2019 1/13/2019 1/3/2019    SBP (mmHg) 156 151 127 128 111    DBP (mmHg) 69 88 73 77 72    Pulse 82 72 75 80 69          Digital Medicine: Health  Follow Up    Lifestyle Modifications:    1.Dietary Modifications (Sodium intake <2,000mg/day, food labels, dining out): States she has changed her diet by cutting out carbs from of her diet.  States she is not eating salads because they "tear up her stomach."  States she is feeling so much better with cutting out carbs.    2.Physical Activity: States she is exercising 3x/week by exercising on the elliptical where you sit down, but still taking steps.  States she gets about 3000 steps in 30min.  States she works on the triceps and abdominal machine.  Set [employer].  States she is going to [place] in 6 weeks.     3.Medication Therapy: Patient has been compliant with the medication regimen.    4.Patient has the following medication side effects/concerns: none  (Frequency/Alleviating factors/Precipitating factors, etc.)     Follow up with Mrs. Giuliana LEIVA Adrianna completed. No further questions or concerns. Will continue to follow up to achieve health goals.    States she is unsure why readings have been higher lately.  Asked patient when the last time she charged cuff and she said about a month ago.  Encouraged patient to charge cuff 1x/2 weeks.  "

## 2019-02-12 DIAGNOSIS — G44.221 CHRONIC TENSION-TYPE HEADACHE, INTRACTABLE: ICD-10-CM

## 2019-02-12 RX ORDER — TIZANIDINE 4 MG/1
4 TABLET ORAL EVERY 8 HOURS PRN
Qty: 60 TABLET | Refills: 3 | Status: SHIPPED | OUTPATIENT
Start: 2019-02-12 | End: 2020-01-27

## 2019-02-18 ENCOUNTER — TELEPHONE (OUTPATIENT)
Dept: FAMILY MEDICINE | Facility: CLINIC | Age: 49
End: 2019-02-18

## 2019-02-19 ENCOUNTER — OFFICE VISIT (OUTPATIENT)
Dept: FAMILY MEDICINE | Facility: CLINIC | Age: 49
End: 2019-02-19
Payer: MEDICARE

## 2019-02-19 VITALS
HEART RATE: 68 BPM | TEMPERATURE: 98 F | HEIGHT: 62 IN | SYSTOLIC BLOOD PRESSURE: 138 MMHG | OXYGEN SATURATION: 98 % | DIASTOLIC BLOOD PRESSURE: 80 MMHG | BODY MASS INDEX: 36.63 KG/M2 | WEIGHT: 199.06 LBS | RESPIRATION RATE: 16 BRPM

## 2019-02-19 DIAGNOSIS — J01.90 ACUTE SINUSITIS, RECURRENCE NOT SPECIFIED, UNSPECIFIED LOCATION: ICD-10-CM

## 2019-02-19 DIAGNOSIS — E66.09 CLASS 2 OBESITY DUE TO EXCESS CALORIES WITH BODY MASS INDEX (BMI) OF 36.0 TO 36.9 IN ADULT, UNSPECIFIED WHETHER SERIOUS COMORBIDITY PRESENT: ICD-10-CM

## 2019-02-19 DIAGNOSIS — G44.209 TENSION HEADACHE: Primary | ICD-10-CM

## 2019-02-19 DIAGNOSIS — G43.711 CHRONIC MIGRAINE WITHOUT AURA, WITH INTRACTABLE MIGRAINE, SO STATED, WITH STATUS MIGRAINOSUS: ICD-10-CM

## 2019-02-19 PROCEDURE — 96372 THER/PROPH/DIAG INJ SC/IM: CPT | Mod: S$GLB,,, | Performed by: PHYSICIAN ASSISTANT

## 2019-02-19 PROCEDURE — 96372 PR INJECTION,THERAP/PROPH/DIAG2ST, IM OR SUBCUT: ICD-10-PCS | Mod: S$GLB,,, | Performed by: PHYSICIAN ASSISTANT

## 2019-02-19 PROCEDURE — 99214 PR OFFICE/OUTPT VISIT, EST, LEVL IV, 30-39 MIN: ICD-10-PCS | Mod: 25,S$GLB,, | Performed by: PHYSICIAN ASSISTANT

## 2019-02-19 PROCEDURE — 99214 OFFICE O/P EST MOD 30 MIN: CPT | Mod: 25,S$GLB,, | Performed by: PHYSICIAN ASSISTANT

## 2019-02-19 PROCEDURE — 3079F DIAST BP 80-89 MM HG: CPT | Mod: CPTII,S$GLB,, | Performed by: PHYSICIAN ASSISTANT

## 2019-02-19 PROCEDURE — 99999 PR PBB SHADOW E&M-EST. PATIENT-LVL V: CPT | Mod: PBBFAC,,, | Performed by: PHYSICIAN ASSISTANT

## 2019-02-19 PROCEDURE — 3008F BODY MASS INDEX DOCD: CPT | Mod: CPTII,S$GLB,, | Performed by: PHYSICIAN ASSISTANT

## 2019-02-19 PROCEDURE — 3079F PR MOST RECENT DIASTOLIC BLOOD PRESSURE 80-89 MM HG: ICD-10-PCS | Mod: CPTII,S$GLB,, | Performed by: PHYSICIAN ASSISTANT

## 2019-02-19 PROCEDURE — 3075F SYST BP GE 130 - 139MM HG: CPT | Mod: CPTII,S$GLB,, | Performed by: PHYSICIAN ASSISTANT

## 2019-02-19 PROCEDURE — 99999 PR PBB SHADOW E&M-EST. PATIENT-LVL V: ICD-10-PCS | Mod: PBBFAC,,, | Performed by: PHYSICIAN ASSISTANT

## 2019-02-19 PROCEDURE — 3008F PR BODY MASS INDEX (BMI) DOCUMENTED: ICD-10-PCS | Mod: CPTII,S$GLB,, | Performed by: PHYSICIAN ASSISTANT

## 2019-02-19 PROCEDURE — 3075F PR MOST RECENT SYSTOLIC BLOOD PRESS GE 130-139MM HG: ICD-10-PCS | Mod: CPTII,S$GLB,, | Performed by: PHYSICIAN ASSISTANT

## 2019-02-19 RX ORDER — KETOROLAC TROMETHAMINE 30 MG/ML
30 INJECTION, SOLUTION INTRAMUSCULAR; INTRAVENOUS ONCE
Status: COMPLETED | OUTPATIENT
Start: 2019-02-19 | End: 2019-02-19

## 2019-02-19 RX ORDER — FLUTICASONE PROPIONATE 50 MCG
1 SPRAY, SUSPENSION (ML) NASAL 2 TIMES DAILY
Qty: 16 G | Refills: 1 | Status: SHIPPED | OUTPATIENT
Start: 2019-02-19 | End: 2019-03-21

## 2019-02-19 RX ADMIN — KETOROLAC TROMETHAMINE 30 MG: 30 INJECTION, SOLUTION INTRAMUSCULAR; INTRAVENOUS at 11:02

## 2019-02-19 NOTE — PROGRESS NOTES
Subjective:       Patient ID: Giuliana Rodas is a 48 y.o. female with multiple medical diagnoses as listed in the medical history and problem list that presents for Headache  .    Chief Complaint: Headache      Migraine    This is a recurrent problem. The current episode started in the past 7 days (2 days ). The problem occurs constantly. The problem has been unchanged. The pain is located in the parietal, frontal, occipital and bilateral region. The pain does not radiate. The pain quality is similar to prior headaches. The quality of the pain is described as aching. The pain is at a severity of 8/10. Associated symptoms include back pain, blurred vision, nausea, neck pain, phonophobia, photophobia, sinus pressure and a sore throat. Pertinent negatives include no ear pain (left ear ), fever, hearing loss, insomnia, rhinorrhea, tinnitus, vomiting or weakness. The symptoms are aggravated by bright light and noise (smells ). Treatments tried: execdrin, fiocert, in the past 2 days; todaygoodies 2 hours ago. Her past medical history is significant for migraine headaches.     Review of Systems   Constitutional: Negative for activity change, chills, fever and unexpected weight change.   HENT: Positive for congestion, postnasal drip, sinus pressure, sneezing and sore throat. Negative for ear pain (left ear ), hearing loss, rhinorrhea, sinus pain, tinnitus and trouble swallowing.    Eyes: Positive for blurred vision and photophobia. Negative for discharge and visual disturbance.   Respiratory: Negative for chest tightness and wheezing.    Cardiovascular: Negative for chest pain and palpitations.   Gastrointestinal: Positive for nausea. Negative for blood in stool, constipation, diarrhea and vomiting.   Endocrine: Negative for polydipsia and polyuria.   Genitourinary: Negative for difficulty urinating, dysuria, hematuria and menstrual problem.   Musculoskeletal: Positive for arthralgias, back pain and neck pain. Negative for  joint swelling.   Neurological: Positive for headaches. Negative for weakness.   Psychiatric/Behavioral: Negative for confusion and dysphoric mood. The patient does not have insomnia.          PAST MEDICAL HISTORY:  Past Medical History:   Diagnosis Date    Bile reflux gastritis     Breast cyst     Eczema     Fibrocystic breast     Fibromyalgia     Gastroparesis     dr. harden    GERD (gastroesophageal reflux disease)     Hyperglyceridemia     Hyperlipidemia     Hypertension     IC (interstitial cystitis)     dr. labadie    Psoriasis     Tension headache        SOCIAL HISTORY:  Social History     Socioeconomic History    Marital status:      Spouse name: Not on file    Number of children: 2    Years of education: Not on file    Highest education level: Not on file   Social Needs    Financial resource strain: Not on file    Food insecurity - worry: Not on file    Food insecurity - inability: Not on file    Transportation needs - medical: Not on file    Transportation needs - non-medical: Not on file   Occupational History    Occupation:      Employer: [employer]   Tobacco Use    Smoking status: Never Smoker    Smokeless tobacco: Never Used   Substance and Sexual Activity    Alcohol use: No    Drug use: No    Sexual activity: Yes     Partners: Male   Other Topics Concern    Not on file   Social History Narrative    Lives at home with  and daughter       ALLERGIES AND MEDICATIONS: updated and reviewed.  Review of patient's allergies indicates:   Allergen Reactions    Chlorthalidone      Hypokalemia     Elavil [amitriptyline] Hallucinations    Depo-provera [medroxyprogesterone] Anxiety    Dicyclomine Rash     Swelling of lip      Prozac [fluoxetine] Anxiety     Current Outpatient Medications   Medication Sig Dispense Refill    amlodipine-olmesartan (ANGEL) 5-40 mg per tablet Take 1 tablet by mouth once daily. 90 tablet 3    atenolol (TENORMIN) 25 MG tablet TAKE  "ONE TABLET BY MOUTH TWICE DAILY 180 tablet 0    BIFIDOBACTERIUM INFANTIS (ALIGN ORAL) Take 1 tablet by mouth once daily.      butalbital-acetaminophen-caffeine -40 mg (FIORICET, ESGIC) -40 mg per tablet Take 1 tablet by mouth every 6 (six) hours as needed. 40 tablet 1    diclofenac sodium (VOLTAREN) 1 % Gel Apply 2 g topically 4 (four) times daily. for 10 days 100 g 5    escitalopram oxalate (LEXAPRO) 20 MG tablet TAKE ONE TABLET BY MOUTH DAILY 30 tablet 2    fluticasone (FLONASE) 50 mcg/actuation nasal spray 1 spray (50 mcg total) by Each Nare route 2 (two) times daily. 16 g 1    gemfibrozil (LOPID) 600 MG tablet Take 1 tablet (600 mg total) by mouth 2 (two) times daily before meals. 180 tablet 3    hyoscyamine (ANASPAZ,LEVSIN) 0.125 mg Tab Take 1 tablet (125 mcg total) by mouth every 4 (four) hours as needed. 60 tablet 0    ketoconazole (NIZORAL) 2 % shampoo Apply topically twice a week. 120 mL 0    norethindrone-ethinyl estradiol (JUNEL FE 1/20) 1 mg-20 mcg (21)/75 mg (7) per tablet TAKE ONE TABLET BY MOUTH EVERY DAY 21 DAYS 28 tablet 11    pantoprazole (PROTONIX) 40 MG tablet Take 1 tablet (40 mg total) by mouth once daily. 90 tablet 3    potassium chloride SA (K-DUR,KLOR-CON) 20 MEQ tablet Take 1 tablet (20 mEq total) by mouth once daily. 30 tablet 0    pravastatin (PRAVACHOL) 20 MG tablet TAKE ONE TABLET BY MOUTH EVERY EVENING 90 tablet 0    sucralfate (CARAFATE) 1 gram tablet Take 1 g by mouth 4 (four) times daily.      sulfamethoxazole-trimethoprim 800-160mg (BACTRIM DS) 800-160 mg Tab TK 1 T PO  BID  0    tiZANidine (ZANAFLEX) 4 MG tablet Take 1 tablet (4 mg total) by mouth every 8 (eight) hours as needed. 60 tablet 3     No current facility-administered medications for this visit.          Objective:   /80   Pulse 68   Temp 98.4 °F (36.9 °C) (Oral)   Resp 16   Ht 5' 2" (1.575 m)   Wt 90.3 kg (199 lb 1.2 oz)   SpO2 98%   BMI 36.41 kg/m²      Physical Exam "   Constitutional: She is oriented to person, place, and time.   HENT:   Head: Normocephalic and atraumatic.   Right Ear: No tenderness. Tympanic membrane is injected. No middle ear effusion.   Left Ear: No tenderness. Tympanic membrane is injected.  No middle ear effusion.   Nose: Mucosal edema present. Right sinus exhibits no maxillary sinus tenderness and no frontal sinus tenderness. Left sinus exhibits no maxillary sinus tenderness and no frontal sinus tenderness.   Mouth/Throat: Uvula is midline, oropharynx is clear and moist and mucous membranes are normal.   Neck: Normal range of motion. Muscular tenderness present. Normal range of motion present.   Cardiovascular: Normal rate and regular rhythm.   Pulmonary/Chest: Effort normal and breath sounds normal.   Neurological: She is alert and oriented to person, place, and time.           Assessment:       1. Tension headache    2. Chronic migraine without aura, with intractable migraine, so stated, with status migrainosus    3. Class 2 obesity due to excess calories with body mass index (BMI) of 36.0 to 36.9 in adult, unspecified whether serious comorbidity present    4. Acute sinusitis, recurrence not specified, unspecified location        Plan:       Tension headache  -     ketorolac injection 30 mg    Chronic migraine without aura, with intractable migraine, so stated, with status migrainosus  -     ketorolac injection 30 mg  Follow up with Dr. Santacruz.     Class 2 obesity due to excess calories with body mass index (BMI) of 36.0 to 36.9 in adult, unspecified whether serious comorbidity present    Acute sinusitis, recurrence not specified, unspecified location  -     fluticasone (FLONASE) 50 mcg/actuation nasal spray; 1 spray (50 mcg total) by Each Nare route 2 (two) times daily.  Dispense: 16 g; Refill: 1            No Follow-up on file.

## 2019-02-25 ENCOUNTER — TELEPHONE (OUTPATIENT)
Dept: NEUROLOGY | Facility: CLINIC | Age: 49
End: 2019-02-25

## 2019-02-25 NOTE — TELEPHONE ENCOUNTER
Made an appt.      ----- Message from Tawnya Mcmahon sent at 2/25/2019  9:31 AM CST -----  Contact: Self   Patient says she has been having really bad migraines and would like to know if she can be seen today. Please call at [phone].

## 2019-02-28 ENCOUNTER — TELEPHONE (OUTPATIENT)
Dept: NEUROLOGY | Facility: CLINIC | Age: 49
End: 2019-02-28

## 2019-02-28 ENCOUNTER — OFFICE VISIT (OUTPATIENT)
Dept: NEUROLOGY | Facility: CLINIC | Age: 49
End: 2019-02-28
Payer: MEDICARE

## 2019-02-28 VITALS
DIASTOLIC BLOOD PRESSURE: 81 MMHG | HEIGHT: 62 IN | HEART RATE: 68 BPM | WEIGHT: 199.06 LBS | SYSTOLIC BLOOD PRESSURE: 183 MMHG | BODY MASS INDEX: 36.63 KG/M2

## 2019-02-28 DIAGNOSIS — G43.711 CHRONIC MIGRAINE WITHOUT AURA, WITH INTRACTABLE MIGRAINE, SO STATED, WITH STATUS MIGRAINOSUS: Primary | ICD-10-CM

## 2019-02-28 DIAGNOSIS — G44.209 TENSION HEADACHE: ICD-10-CM

## 2019-02-28 DIAGNOSIS — R25.1 EPISODE OF SHAKING: ICD-10-CM

## 2019-02-28 PROCEDURE — 99214 PR OFFICE/OUTPT VISIT, EST, LEVL IV, 30-39 MIN: ICD-10-PCS | Mod: S$GLB,,, | Performed by: NEUROLOGICAL SURGERY

## 2019-02-28 PROCEDURE — 99999 PR PBB SHADOW E&M-EST. PATIENT-LVL II: ICD-10-PCS | Mod: PBBFAC,,, | Performed by: NEUROLOGICAL SURGERY

## 2019-02-28 PROCEDURE — 3008F BODY MASS INDEX DOCD: CPT | Mod: CPTII,S$GLB,, | Performed by: NEUROLOGICAL SURGERY

## 2019-02-28 PROCEDURE — 3079F DIAST BP 80-89 MM HG: CPT | Mod: CPTII,S$GLB,, | Performed by: NEUROLOGICAL SURGERY

## 2019-02-28 PROCEDURE — 3008F PR BODY MASS INDEX (BMI) DOCUMENTED: ICD-10-PCS | Mod: CPTII,S$GLB,, | Performed by: NEUROLOGICAL SURGERY

## 2019-02-28 PROCEDURE — 99214 OFFICE O/P EST MOD 30 MIN: CPT | Mod: S$GLB,,, | Performed by: NEUROLOGICAL SURGERY

## 2019-02-28 PROCEDURE — 3079F PR MOST RECENT DIASTOLIC BLOOD PRESSURE 80-89 MM HG: ICD-10-PCS | Mod: CPTII,S$GLB,, | Performed by: NEUROLOGICAL SURGERY

## 2019-02-28 PROCEDURE — 3077F PR MOST RECENT SYSTOLIC BLOOD PRESSURE >= 140 MM HG: ICD-10-PCS | Mod: CPTII,S$GLB,, | Performed by: NEUROLOGICAL SURGERY

## 2019-02-28 PROCEDURE — 99999 PR PBB SHADOW E&M-EST. PATIENT-LVL II: CPT | Mod: PBBFAC,,, | Performed by: NEUROLOGICAL SURGERY

## 2019-02-28 PROCEDURE — 3077F SYST BP >= 140 MM HG: CPT | Mod: CPTII,S$GLB,, | Performed by: NEUROLOGICAL SURGERY

## 2019-02-28 RX ORDER — METHOCARBAMOL 500 MG/1
500 TABLET, FILM COATED ORAL EVERY 8 HOURS PRN
Qty: 60 TABLET | Refills: 1 | Status: SHIPPED | OUTPATIENT
Start: 2019-02-28 | End: 2019-02-28 | Stop reason: SDUPTHER

## 2019-02-28 RX ORDER — KETOROLAC TROMETHAMINE 10 MG/1
10 TABLET, FILM COATED ORAL EVERY 6 HOURS
Qty: 30 TABLET | Refills: 5 | Status: SHIPPED | OUTPATIENT
Start: 2019-02-28 | End: 2019-05-22

## 2019-02-28 RX ORDER — TOPIRAMATE 25 MG/1
25 CAPSULE, EXTENDED RELEASE ORAL DAILY
Qty: 30 CAPSULE | Refills: 0 | Status: SHIPPED | OUTPATIENT
Start: 2019-02-28 | End: 2019-03-30 | Stop reason: SDUPTHER

## 2019-02-28 RX ORDER — TOPIRAMATE 50 MG/1
50 CAPSULE, EXTENDED RELEASE ORAL DAILY
Qty: 30 CAPSULE | Refills: 0 | Status: SHIPPED | OUTPATIENT
Start: 2019-02-28 | End: 2019-03-14 | Stop reason: SDUPTHER

## 2019-02-28 RX ORDER — METHOCARBAMOL 500 MG/1
500 TABLET, FILM COATED ORAL EVERY 8 HOURS PRN
Qty: 90 TABLET | Refills: 1 | Status: SHIPPED | OUTPATIENT
Start: 2019-02-28 | End: 2019-03-10

## 2019-02-28 NOTE — PROGRESS NOTES
"Chief Complaint   Patient presents with    Headache        Giuliana Rodas is a 48 y.o. female with a history of multiple medical diagnoses as listed below that presents for evaluation of headaches. She has been having headaches almost weekly for the last several months. The headache tends to feel like pressure as if a "cap" were on her head and at other times she has headaches that are from the front of her head down to the middle of the posterior aspect of the head. The headache that is like a ca is described as a "sharp pressure" that is 10/10 in intensity. The headache are debilitating and only allow her to lie down in a ximena quiet room as the pain worsened with lights and sounds. She has nausea very frequently with these headaches but says that she does not vomit. When the headaches are in the center of her head she feels that the pain extends into the neck and shoulders as well. It too can get up to a 10/10 but she is less sensitive to light and sounds and tends not to have nausea. The head can last from 3 hours to 3 days at a time. She has not family history of headaches. She has also been having problems sleeping so she was started on Elavil by her PCP in hopes of treating both her headaches and her insomnia. She has not had a headache in the last three weeks since she has been on the medication. She has no complaints with the medication.    Interval History  8/18/2016  She has had about 5-6 "bad" headaches since she was last seen in clinic. She has been having various headache types including tension headaches and sinus headaches as well. She has been using Robaxin which she says helps with the tension headaches and she has been using Fioricet to help with her various other headache types. She has not had much relief with tramadol as she says that she has taken the medication several times in the past and feel that it's effects have likely been lost on her. Elavil 50 mg qhs has been helping her to sleep but " she does not feel like the medication has made her excessively sedated and she does think that she could tolerate an increased dose of the medication. She has been having GI issues and has been looking to find a gastroenterologist that is within her network for gastroparesis and she has been scheduled to see dermatology for evaluation of two skin lesions that her PCP felt could be precancerous.    11/17/2016  She has still been having episodic headaches since she was last seen. She recently had a tooth extracted and was told that she had an infection underlying it as well. She has been taking Elavil as directed and has bene having some improvement in her migraines. She still tends to have frequent tension headaches that are treated very well with Robaxin. She has been seeing GI for her gastroparesis but has scheduled follow-up toward the end of the month to decide how the problem will be approached. She has not had any new problems since she was last seen in clinic.    02/16/2017  Since last being seen she says that she has been seen by GI who determined that she had a problem with bile acid production. She says that she has been doing better overall with her headaches and feels that when she has taken the Fioricet it has been beneficial to help in the relief of her headaches. She has not had any new problems but feels that the pain has been slightly more frequent than before.    05/16/2017  Headaches were well controlled when she was taking her medications consisting of Elavil and Methocarbamol as preventative medications. She has since been unable to get Robaxin because insurance is no longer covering the medication. She has not been taking it for the last month and has been having more frequent headaches since that time. She has been using Fioricet as an abortive medication which has been helpful in alleviating her pain. She has dry mouth and dry eyes as a result of her Elavil. Her dry eyes has been making  reading more difficult as she has constant tearing in the eyes.    07/18/2017  She has had at least one migraine since she was last seen in clinic while she was visiting [place] with her parents and was spending some time outside when she had a headache that began and intensified fairly quickly. Fioricet was on hand which she took and was able to relieve her headache so that she could continue her day without many issues. She has continued to have tension headaches that eminate in the neck and into the shoulders as well. She has the pain radiate across the back at times as well. She has also been having headaches across the front of the head that has been feeling of intense pressure that she feels is related to her sinus headaches. She has been working to become more active and exercise more.    10/18/2017  She continues to work with her medical team try to get better control of her symptoms.  Tension headaches has still been bothersome despite her compliance and methocarbamol.  She feels that the medication be ineffective and she is is wishing to try different medication to control her symptoms.  Despite the frequency of her tension headaches she has had fewer migraines than compared to her previous visit.  She'll been taking all medications as directed and has been tolerating well.    01/18/2018  She has been dealing with her mother going to chemotherapy treatments with has been taxing on both her mother and herself.  Headaches have still been present, but slightly better than when she was last seen in clinic.  Migraines have not been very bothersome; however, she has continued to have tension headaches relatively often.    02/28/2019  Headaches have been bothersome since she was last seen in clinic but seem to be better in the last year.  She has been tolerating her current medication regimen well.  She feels that her level of stress has been a major trigger for her symptoms which she has been unable to find  a suitable medication regimen to control these symptoms would do reliability.  She has been most plagued by an episode of shaking which occurred unexplained weight clear to patient said that she seemed to lose consciousness and was unaware of what was going on during the episode.  She has never had any episodes like this since it happened at does not recall any previous history of syncope or seizure..     PAST MEDICAL HISTORY:  Past Medical History:   Diagnosis Date    Bile reflux gastritis     Breast cyst     Eczema     Fibrocystic breast     Fibromyalgia     Gastroparesis     dr. harden    GERD (gastroesophageal reflux disease)     Hyperglyceridemia     Hyperlipidemia     Hypertension     IC (interstitial cystitis)     dr. labadie    Psoriasis     Tension headache        PAST SURGICAL HISTORY:  Past Surgical History:   Procedure Laterality Date    BREAST BIOPSY Right     over 10 years ago/ milk duct    CHOLECYSTECTOMY      EGD (ESOPHAGOGASTRODUODENOSCOPY) N/A 2/1/2014    [facility] ([facility]    HEMORRHOID SURGERY      HYSTERECTOMY      KNEE SURGERY      OOPHORECTOMY      one ov removed       SOCIAL HISTORY:  Social History     Socioeconomic History    Marital status:      Spouse name: Not on file    Number of children: 2    Years of education: Not on file    Highest education level: Not on file   Occupational History    Occupation:      Employer: [employer]   Social Needs    Financial resource strain: Not on file    Food insecurity:     Worry: Not on file     Inability: Not on file    Transportation needs:     Medical: Not on file     Non-medical: Not on file   Tobacco Use    Smoking status: Never Smoker    Smokeless tobacco: Never Used   Substance and Sexual Activity    Alcohol use: No    Drug use: No    Sexual activity: Yes     Partners: Male     Birth control/protection: See Surgical Hx   Lifestyle    Physical activity:     Days per  week: Not on file     Minutes per session: Not on file    Stress: Not on file   Relationships    Social connections:     Talks on phone: Not on file     Gets together: Not on file     Attends Latter-day service: Not on file     Active member of club or organization: Not on file     Attends meetings of clubs or organizations: Not on file     Relationship status: Not on file   Other Topics Concern    Not on file   Social History Narrative    Lives at home with  and daughter       FAMILY HISTORY:  Family History   Problem Relation Age of Onset    Hypertension Mother     Migraines Mother     Breast cancer Mother     Hypertension Father     Stroke Father     Heart disease Father     Hyperlipidemia Father     Breast cancer Paternal Grandmother     Colon cancer Neg Hx     Ovarian cancer Neg Hx     Inflammatory bowel disease Neg Hx     Celiac disease Neg Hx        ALLERGIES AND MEDICATIONS: updated and reviewed.  Review of patient's allergies indicates:   Allergen Reactions    Depo-provera [medroxyprogesterone] Anxiety    Dicyclomine Rash     Swelling of lip      Prozac [fluoxetine] Anxiety     Current Outpatient Medications   Medication Sig Dispense Refill    amlodipine-olmesartan (ANGEL) 5-40 mg per tablet Take 1 tablet by mouth once daily. 90 tablet 3    atenolol (TENORMIN) 25 MG tablet TAKE ONE TABLET BY MOUTH TWICE DAILY 180 tablet 0    BIFIDOBACTERIUM INFANTIS (ALIGN ORAL) Take 1 tablet by mouth once daily.      butalbital-acetaminophen-caffeine -40 mg (FIORICET, ESGIC) -40 mg per tablet Take 1 tablet by mouth every 6 (six) hours as needed. 40 tablet 1    diclofenac sodium (VOLTAREN) 1 % Gel Apply 2 g topically 4 (four) times daily. for 10 days 100 g 5    DULoxetine (CYMBALTA) 30 MG capsule 30 mg daily and then 30 mg bid 60 capsule 5    escitalopram oxalate (LEXAPRO) 20 MG tablet TAKE ONE TABLET BY MOUTH DAILY 30 tablet 2    gemfibrozil (LOPID) 600 MG tablet Take 1 tablet  (600 mg total) by mouth 2 (two) times daily before meals. 180 tablet 3    hyoscyamine (ANASPAZ,LEVSIN) 0.125 mg Tab Take 1 tablet (125 mcg total) by mouth every 4 (four) hours as needed. 60 tablet 0    ketoconazole (NIZORAL) 2 % shampoo Apply topically twice a week. 120 mL 0    ketorolac (TORADOL) 10 mg tablet Take 1 tablet (10 mg total) by mouth every 6 (six) hours. 30 tablet 5    norethindrone-ethinyl estradiol (JUNEL FE 1/20) 1 mg-20 mcg (21)/75 mg (7) per tablet Take 1 tablet by mouth once daily. 28 tablet 11    pantoprazole (PROTONIX) 40 MG tablet Take 1 tablet (40 mg total) by mouth once daily. 90 tablet 3    potassium chloride SA (K-DUR,KLOR-CON) 20 MEQ tablet Take 1 tablet (20 mEq total) by mouth once daily. 30 tablet 0    pravastatin (PRAVACHOL) 20 MG tablet TAKE ONE TABLET BY MOUTH EVERY EVENING 90 tablet 0    sucralfate (CARAFATE) 1 gram tablet Take 1 g by mouth 4 (four) times daily.      sulfamethoxazole-trimethoprim 800-160mg (BACTRIM DS) 800-160 mg Tab TK 1 T PO  BID  0    tiZANidine (ZANAFLEX) 4 MG tablet Take 1 tablet (4 mg total) by mouth every 8 (eight) hours as needed. 60 tablet 3    topiramate (TROKENDI XR) 100 mg Cp24 Take 1 capsule (100 mg total) by mouth once daily. 30 capsule 11    TROKENDI XR 25 mg Cp24 TAKE 1 CAPSULE BY MOUTH ONCE DAILY 30 capsule 0     No current facility-administered medications for this visit.        Review of Systems   Constitutional: Negative for activity change, appetite change, fever and unexpected weight change.   HENT: Negative for trouble swallowing and voice change.    Eyes: Positive for photophobia and visual disturbance.   Respiratory: Negative for apnea and shortness of breath.    Cardiovascular: Negative for chest pain and leg swelling.   Gastrointestinal: Positive for nausea and vomiting. Negative for constipation.   Genitourinary: Negative for difficulty urinating.   Musculoskeletal: Negative for back pain, gait problem and neck pain.   Skin:  Negative for color change and pallor.   Neurological: Positive for headaches. Negative for dizziness, seizures, syncope, weakness and numbness.   Hematological: Negative for adenopathy.   Psychiatric/Behavioral: Negative for agitation, confusion and decreased concentration.       Neurologic Exam     Mental Status   Oriented to person, place, and time.   Registration: recalls 3 of 3 objects.   Attention: normal. Concentration: normal.   Speech: speech is normal   Level of consciousness: alert  Knowledge: good.     Cranial Nerves     CN II   Visual fields full to confrontation.   Right visual field deficit: none  Left visual field deficit: none     CN III, IV, VI   Pupils are equal, round, and reactive to light.  Extraocular motions are normal.   Right pupil: Size: 3 mm. Shape: regular. Accommodation: intact.   Left pupil: Size: 3 mm. Shape: regular. Accommodation: intact.   CN III: no CN III palsy  CN VI: no CN VI palsy  Nystagmus: none   Diplopia: none  Ophthalmoparesis: none  Upgaze: normal  Downgaze: normal  Conjugate gaze: present    CN V   Facial sensation intact.   Right facial sensation deficit: none  Left facial sensation deficit: none    CN VII   Facial expression full, symmetric.   Right facial weakness: none  Left facial weakness: none    CN VIII   CN VIII normal.     CN IX, X   CN IX normal.   CN X normal.   Palate: symmetric    CN XI   CN XI normal.   Right sternocleidomastoid strength: normal  Left sternocleidomastoid strength: normal  Right trapezius strength: normal  Left trapezius strength: normal    CN XII   CN XII normal.   Tongue deviation: none    Motor Exam   Muscle bulk: normal  Overall muscle tone: normal  Right arm tone: normal  Left arm tone: normal  Right leg tone: normal  Left leg tone: normal    Strength   Strength 5/5 throughout.     Sensory Exam   Right arm light touch: normal  Left arm light touch: normal  Right leg light touch: normal  Left leg light touch: normal  Right arm  vibration: normal  Left arm vibration: normal  Right leg vibration: normal  Left leg vibration: normal  Right arm proprioception: normal  Left arm proprioception: normal  Right leg proprioception: normal  Left leg proprioception: normal  Right arm pinprick: normal  Left arm pinprick: normal  Right leg pinprick: normal  Left leg pinprick: normal    Gait, Coordination, and Reflexes     Gait  Gait: normal    Coordination   Romberg: negative  Finger to nose coordination: normal  Heel to shin coordination: normal  Tandem walking coordination: normal    Tremor   Resting tremor: absent    Reflexes   Right brachioradialis: 2+  Left brachioradialis: 2+  Right biceps: 2+  Left biceps: 2+  Right triceps: 2+  Left triceps: 2+  Right patellar: 2+  Left patellar: 2+  Right achilles: 2+  Left achilles: 2+  Right plantar: normal  Left plantar: normal      Physical Exam   Constitutional: She is oriented to person, place, and time. She appears well-developed and well-nourished.   HENT:   Head: Normocephalic and atraumatic.   Eyes: EOM are normal. Pupils are equal, round, and reactive to light.   Neck: Normal range of motion.   Cardiovascular: Normal rate and intact distal pulses.   Pulmonary/Chest: Effort normal. No apnea. No respiratory distress.   Musculoskeletal: Normal range of motion.   Neurological: She is alert and oriented to person, place, and time. She has normal strength. She has a normal Finger-Nose-Finger Test, a normal Heel to Shin Test, a normal Romberg Test and a normal Tandem Gait Test. Gait normal.   Reflex Scores:       Tricep reflexes are 2+ on the right side and 2+ on the left side.       Bicep reflexes are 2+ on the right side and 2+ on the left side.       Brachioradialis reflexes are 2+ on the right side and 2+ on the left side.       Patellar reflexes are 2+ on the right side and 2+ on the left side.       Achilles reflexes are 2+ on the right side and 2+ on the left side.  Skin: Skin is warm and dry.  "  Psychiatric: She has a normal mood and affect. Her speech is normal and behavior is normal. Thought content normal.   Vitals reviewed.      Vitals:    02/28/19 1448   BP: (!) 183/81   BP Location: Left arm   Patient Position: Sitting   BP Method: Large (Automatic)   Pulse: 68   Weight: 90.3 kg (199 lb 1.2 oz)   Height: 5' 2" (1.575 m)       Assessment & Plan:  Problem List Items Addressed This Visit     Chronic migraine without aura, with intractable migraine, so stated, with status migrainosus - Primary    Overview     Migraine headaches have been better controlled with Elavil. She has been having occasional migraines that have been responsive to her abortive therapies.         Tension headache    Relevant Medications    ketorolac (TORADOL) 10 mg tablet      Other Visit Diagnoses     Episode of shaking        Relevant Orders    EEG,w/awake & drowsy record        Follow-up: No follow-ups on file.  More than 50% of this 25 minute encounter was spent in counseling and coordinating care.      "

## 2019-02-28 NOTE — TELEPHONE ENCOUNTER
Told patient I will start a PA on Trokendi XR and the Methocarbamol today.      ----- Message from Ngozi Low sent at 2/28/2019  3:39 PM CST -----  Contact: Self   Pt states that the meds that was called into pharmacy are not cover by her ins. [phone]

## 2019-03-06 ENCOUNTER — HOSPITAL ENCOUNTER (EMERGENCY)
Facility: HOSPITAL | Age: 49
Discharge: HOME | End: 2019-03-06
Attending: EMERGENCY MEDICINE
Payer: MEDICARE

## 2019-03-06 VITALS
WEIGHT: 196 LBS | HEART RATE: 75 BPM | HEIGHT: 62 IN | RESPIRATION RATE: 16 BRPM | OXYGEN SATURATION: 95 % | SYSTOLIC BLOOD PRESSURE: 169 MMHG | DIASTOLIC BLOOD PRESSURE: 76 MMHG | BODY MASS INDEX: 36.07 KG/M2 | TEMPERATURE: 99 F

## 2019-03-06 DIAGNOSIS — G89.29 CHRONIC NONINTRACTABLE HEADACHE, UNSPECIFIED HEADACHE TYPE: Primary | ICD-10-CM

## 2019-03-06 DIAGNOSIS — R51.9 CHRONIC NONINTRACTABLE HEADACHE, UNSPECIFIED HEADACHE TYPE: Primary | ICD-10-CM

## 2019-03-06 PROCEDURE — 96361 HYDRATE IV INFUSION ADD-ON: CPT

## 2019-03-06 PROCEDURE — 99284 EMERGENCY DEPT VISIT MOD MDM: CPT | Mod: 25

## 2019-03-06 PROCEDURE — 96374 THER/PROPH/DIAG INJ IV PUSH: CPT

## 2019-03-06 PROCEDURE — 63600175 PHARM REV CODE 636 W HCPCS: Performed by: PHYSICIAN ASSISTANT

## 2019-03-06 PROCEDURE — 25000003 PHARM REV CODE 250: Performed by: NURSE PRACTITIONER

## 2019-03-06 PROCEDURE — 96375 TX/PRO/DX INJ NEW DRUG ADDON: CPT

## 2019-03-06 RX ORDER — KETOROLAC TROMETHAMINE 30 MG/ML
15 INJECTION, SOLUTION INTRAMUSCULAR; INTRAVENOUS
Status: COMPLETED | OUTPATIENT
Start: 2019-03-06 | End: 2019-03-06

## 2019-03-06 RX ORDER — DIPHENHYDRAMINE HYDROCHLORIDE 50 MG/ML
25 INJECTION INTRAMUSCULAR; INTRAVENOUS
Status: COMPLETED | OUTPATIENT
Start: 2019-03-06 | End: 2019-03-06

## 2019-03-06 RX ORDER — DEXAMETHASONE SODIUM PHOSPHATE 4 MG/ML
8 INJECTION, SOLUTION INTRA-ARTICULAR; INTRALESIONAL; INTRAMUSCULAR; INTRAVENOUS; SOFT TISSUE
Status: COMPLETED | OUTPATIENT
Start: 2019-03-06 | End: 2019-03-06

## 2019-03-06 RX ORDER — PROCHLORPERAZINE EDISYLATE 5 MG/ML
10 INJECTION INTRAMUSCULAR; INTRAVENOUS ONCE
Status: COMPLETED | OUTPATIENT
Start: 2019-03-06 | End: 2019-03-06

## 2019-03-06 RX ADMIN — DEXAMETHASONE SODIUM PHOSPHATE 8 MG: 4 INJECTION, SOLUTION INTRAMUSCULAR; INTRAVENOUS at 01:03

## 2019-03-06 RX ADMIN — KETOROLAC TROMETHAMINE 15 MG: 30 INJECTION, SOLUTION INTRAMUSCULAR at 01:03

## 2019-03-06 RX ADMIN — PROCHLORPERAZINE EDISYLATE 10 MG: 5 INJECTION INTRAMUSCULAR; INTRAVENOUS at 01:03

## 2019-03-06 RX ADMIN — DIPHENHYDRAMINE HYDROCHLORIDE 25 MG: 50 INJECTION INTRAMUSCULAR; INTRAVENOUS at 01:03

## 2019-03-06 RX ADMIN — SODIUM CHLORIDE 1000 ML: 0.9 INJECTION, SOLUTION INTRAVENOUS at 12:03

## 2019-03-06 NOTE — ED TRIAGE NOTES
Pt presents to ED c/o migraine x 4 days.  States her doctor put her on a new medication, but it isn't working.    Denies n/v/d.

## 2019-03-06 NOTE — ED PROVIDER NOTES
Encounter Date: 3/6/2019       History     Chief Complaint   Patient presents with    Migraine     x 4 weeks states started on new medecine by neurologist  but not working so far.      48-year-old female history of tension headaches, psoriasis, interstitial cystitis, hypertension, hyperlipidemia, GERD, gastroparesis, fibromyalgia, eczema, gastritis, migraines, with chief complaint migraine x4 weeks.  Patient admits to a sensation of a cap sitting on top overhead, also with occipital component.  She states the headache waxes and wanes in severity, never fully resolves.  She is followed by Dr. Santacruz.  She is currently taking Toradol, recently started on Topamax, also taking Robaxin for muscle stiffness/soreness.  She denies fever, denies trauma, denies visual disturbance.  She denies lightheadedness or dizziness.  She states this headache is the same character of her chronic headaches.  She denies any change in character.  She states she presented today because headache was severe today.  No alleviating factors.  Headache exacerbated with bright lights, loud noises.  Symptoms are chronic, constant, severity 8/10.    No unilateral weakness, no slurred speech, no facial droop, no change in mental status.  Denies history of TIA or CVA.          Review of patient's allergies indicates:   Allergen Reactions    Chlorthalidone      Hypokalemia     Elavil [amitriptyline] Hallucinations    Depo-provera [medroxyprogesterone] Anxiety    Dicyclomine Rash     Swelling of lip      Prozac [fluoxetine] Anxiety     Past Medical History:   Diagnosis Date    Bile reflux gastritis     Breast cyst     Eczema     Fibrocystic breast     Fibromyalgia     Gastroparesis     dr. harden    GERD (gastroesophageal reflux disease)     Hyperglyceridemia     Hyperlipidemia     Hypertension     IC (interstitial cystitis)     dr. labadie    Psoriasis     Tension headache      Past Surgical History:   Procedure Laterality Date     BREAST BIOPSY Right     over 10 years ago/ milk duct    CHOLECYSTECTOMY      EGD (ESOPHAGOGASTRODUODENOSCOPY) N/A 2/1/2014    Performed by Fitz [facility] at [facility] ([facility] F[facility])    HEMORRHOID SURGERY      HYSTERECTOMY      KNEE SURGERY      OOPHORECTOMY      one ov removed     Family History   Problem Relation Age of Onset    Hypertension Mother     Migraines Mother     Breast cancer Mother     Hypertension Father     Stroke Father     Heart disease Father     Hyperlipidemia Father     Breast cancer Paternal Grandmother     Colon cancer Neg Hx     Ovarian cancer Neg Hx     Inflammatory bowel disease Neg Hx     Celiac disease Neg Hx      Social History     Tobacco Use    Smoking status: Never Smoker    Smokeless tobacco: Never Used   Substance Use Topics    Alcohol use: No    Drug use: No     Review of Systems   Constitutional: Negative for chills and fever.   HENT: Negative for congestion and sore throat.    Eyes: Negative.    Respiratory: Negative for shortness of breath.    Cardiovascular: Negative for chest pain.   Gastrointestinal: Positive for nausea. Negative for abdominal pain and vomiting.   Endocrine: Negative.    Genitourinary: Negative for dysuria.   Musculoskeletal: Negative for back pain, neck pain and neck stiffness.   Skin: Negative for rash.   Neurological: Positive for headaches. Negative for dizziness, weakness, light-headedness and numbness.   Hematological: Does not bruise/bleed easily.   Psychiatric/Behavioral: Negative.    All other systems reviewed and are negative.      Physical Exam     Initial Vitals [03/06/19 1115]   BP Pulse Resp Temp SpO2   (!) 182/86 78 16 98.2 °F (36.8 °C) 97 %      MAP       --         Physical Exam    Nursing note and vitals reviewed.  Constitutional: She appears well-developed and well-nourished. She is not diaphoretic. No distress.   Well-appearing, nontoxic.   HENT:   Head: Normocephalic and atraumatic.   Eyes: Conjunctivae and EOM are normal.  Pupils are equal, round, and reactive to light.   Neck: Normal range of motion. Neck supple. No tracheal deviation present.   Cardiovascular: Intact distal pulses.   Pulmonary/Chest: Breath sounds normal. No stridor. No respiratory distress. She has no wheezes.   Abdominal: Soft. Bowel sounds are normal. She exhibits no distension. There is no tenderness.   Musculoskeletal: Normal range of motion.   Mild tenderness to cervical paraspinal musculature without midline spinal tenderness. Mild discomfort with flexion extension neck.  Neck remains supple.   Lymphadenopathy:     She has no cervical adenopathy.   Neurological: She is alert and oriented to person, place, and time. GCS score is 15. GCS eye subscore is 4. GCS verbal subscore is 5. GCS motor subscore is 6.   Negative Romberg.  No pronator drift.  Normal finger-to-nose bilaterally. No obvious visual field deficit.  Grossly equal biceps, triceps, hip flexion, knee extension, knee flexion strength bilaterally. Normal, steady gait.   Skin: Skin is warm and dry. Capillary refill takes less than 2 seconds.   Psychiatric: She has a normal mood and affect. Her behavior is normal. Judgment and thought content normal.         ED Course   Procedures  Labs Reviewed - No data to display       Imaging Results    None          Medical Decision Making:   Differential Diagnosis:   Headache disorder unspecified, chronic headaches, tension headache, migraine headache, meningitis  ED Management:  She states this is her chronic headache, worsening in severity today.  There is no focal neurologic deficit.  She denies any change in character of headache.  She is followed closely by Neurology.  I will treat supportively, have her follow up.  She does understand and agree.                      Clinical Impression:       ICD-10-CM ICD-9-CM   1. Chronic nonintractable headache, unspecified headache type R51 784.0         Disposition:   Disposition: Discharged  Condition:  Stable                        Donnie Esparza PA-C  03/07/19 9851

## 2019-03-06 NOTE — ED NOTES
Patient was asked if she had a ride. Patient states she can call for someone to pick her up. Patient has been educated that if no ride comes she would have to wait four hours before discharge. Patient called family member whom stated they would pick her up.

## 2019-03-12 ENCOUNTER — PATIENT OUTREACH (OUTPATIENT)
Dept: OTHER | Facility: OTHER | Age: 49
End: 2019-03-12

## 2019-03-12 NOTE — PROGRESS NOTES
Last 5 Patient Entered Readings                                      Current 30 Day Average: 153/86     Recent Readings 3/9/2019 3/9/2019 3/8/2019 3/8/2019 2/28/2019    SBP (mmHg) 157 155 160 167 164    DBP (mmHg) 87 95 87 85 86    Pulse 76 75 73 74 77          Digital Medicine: Health  Follow Up    Lifestyle Modifications:    1.Dietary Modifications (Sodium intake <2,000mg/day, food labels, dining out): Reports she is going to try to change diet and exercise when she returns from vacation.  Going to [place] on [place]th.    2.Physical Activity: Will start exercise when she returns from trip.  Will set SMG when she returns.    3.Medication Therapy: Patient has been compliant with the medication regimen.    4.Patient has the following medication side effects/concerns: none  (Frequency/Alleviating factors/Precipitating factors, etc.)     Follow up with Mrs. Giuliana LEIVA Adrianna completed. No further questions or concerns. Will continue to follow up to achieve health goals.    Patient was admitted to ED on 3/6 for a migraine that lasted 4 weeks.  Patient states she is feeling better since neurologist put her on a new medication.  Believes this was the cause of higher readings.

## 2019-03-14 DIAGNOSIS — G43.711 CHRONIC MIGRAINE WITHOUT AURA, WITH INTRACTABLE MIGRAINE, SO STATED, WITH STATUS MIGRAINOSUS: ICD-10-CM

## 2019-03-14 RX ORDER — TOPIRAMATE 50 MG/1
50 CAPSULE, EXTENDED RELEASE ORAL DAILY
Qty: 30 CAPSULE | Refills: 0 | Status: SHIPPED | OUTPATIENT
Start: 2019-03-14 | End: 2019-03-28

## 2019-03-14 RX ORDER — TOPIRAMATE 100 MG/1
100 CAPSULE, EXTENDED RELEASE ORAL DAILY
Qty: 30 CAPSULE | Refills: 11 | Status: SHIPPED | OUTPATIENT
Start: 2019-03-14 | End: 2019-04-09

## 2019-03-14 NOTE — TELEPHONE ENCOUNTER
----- Message from Funmilayo Mims sent at 3/14/2019 12:35 PM CDT -----  Contact: pt  Can the clinic reply in [facility]:       Please refill the medication(s) listed below. The patient can be reached at this phone number ([phone] once it is called into the pharmacy.      Medication #1topiramate (TROKENDI XR) 50 mg Cp24       Medication #2topiramate (TROKENDI XR) 100 mg Cp24       Preferred Pharmacy:[facility]

## 2019-03-18 ENCOUNTER — INITIAL CONSULT (OUTPATIENT)
Dept: RHEUMATOLOGY | Facility: CLINIC | Age: 49
End: 2019-03-18
Payer: MEDICARE

## 2019-03-18 VITALS
WEIGHT: 205.69 LBS | BODY MASS INDEX: 37.85 KG/M2 | SYSTOLIC BLOOD PRESSURE: 150 MMHG | HEART RATE: 73 BPM | DIASTOLIC BLOOD PRESSURE: 87 MMHG | HEIGHT: 62 IN

## 2019-03-18 DIAGNOSIS — M79.7 FIBROMYALGIA: Primary | ICD-10-CM

## 2019-03-18 PROCEDURE — 3008F BODY MASS INDEX DOCD: CPT | Mod: CPTII,S$GLB,, | Performed by: INTERNAL MEDICINE

## 2019-03-18 PROCEDURE — 99999 PR PBB SHADOW E&M-EST. PATIENT-LVL III: ICD-10-PCS | Mod: PBBFAC,,, | Performed by: INTERNAL MEDICINE

## 2019-03-18 PROCEDURE — 3008F PR BODY MASS INDEX (BMI) DOCUMENTED: ICD-10-PCS | Mod: CPTII,S$GLB,, | Performed by: INTERNAL MEDICINE

## 2019-03-18 PROCEDURE — 99203 OFFICE O/P NEW LOW 30 MIN: CPT | Mod: S$GLB,,, | Performed by: INTERNAL MEDICINE

## 2019-03-18 PROCEDURE — 3077F SYST BP >= 140 MM HG: CPT | Mod: CPTII,S$GLB,, | Performed by: INTERNAL MEDICINE

## 2019-03-18 PROCEDURE — 99999 PR PBB SHADOW E&M-EST. PATIENT-LVL III: CPT | Mod: PBBFAC,,, | Performed by: INTERNAL MEDICINE

## 2019-03-18 PROCEDURE — 3079F DIAST BP 80-89 MM HG: CPT | Mod: CPTII,S$GLB,, | Performed by: INTERNAL MEDICINE

## 2019-03-18 PROCEDURE — 3077F PR MOST RECENT SYSTOLIC BLOOD PRESSURE >= 140 MM HG: ICD-10-PCS | Mod: CPTII,S$GLB,, | Performed by: INTERNAL MEDICINE

## 2019-03-18 PROCEDURE — 99203 PR OFFICE/OUTPT VISIT, NEW, LEVL III, 30-44 MIN: ICD-10-PCS | Mod: S$GLB,,, | Performed by: INTERNAL MEDICINE

## 2019-03-18 PROCEDURE — 3079F PR MOST RECENT DIASTOLIC BLOOD PRESSURE 80-89 MM HG: ICD-10-PCS | Mod: CPTII,S$GLB,, | Performed by: INTERNAL MEDICINE

## 2019-03-18 RX ORDER — DULOXETIN HYDROCHLORIDE 30 MG/1
CAPSULE, DELAYED RELEASE ORAL
Qty: 60 CAPSULE | Refills: 5 | Status: SHIPPED | OUTPATIENT
Start: 2019-03-18 | End: 2019-03-18

## 2019-03-18 RX ORDER — DULOXETIN HYDROCHLORIDE 30 MG/1
CAPSULE, DELAYED RELEASE ORAL
Qty: 60 CAPSULE | Refills: 5 | Status: SHIPPED | OUTPATIENT
Start: 2019-03-18 | End: 2020-01-27 | Stop reason: SDUPTHER

## 2019-03-18 RX ORDER — ATENOLOL 25 MG/1
TABLET ORAL
Qty: 180 TABLET | Refills: 0 | Status: SHIPPED | OUTPATIENT
Start: 2019-03-18 | End: 2019-05-03 | Stop reason: DRUGHIGH

## 2019-03-18 ASSESSMENT — ROUTINE ASSESSMENT OF PATIENT INDEX DATA (RAPID3)
PSYCHOLOGICAL DISTRESS SCORE: 3.3
FATIGUE SCORE: 5.5
TOTAL RAPID3 SCORE: 4.5
PATIENT GLOBAL ASSESSMENT SCORE: 4.5
MDHAQ FUNCTION SCORE: .6
WHEN YOU AWAKENED IN THE MORNING OVER THE LAST WEEK, PLEASE INDICATE THE AMOUNT OF TIME IT TAKES UNTIL YOU ARE AS LIMBER AS YOU WILL BE FOR THE DAY: 1-2 HOURS
PAIN SCORE: 7
AM STIFFNESS SCORE: 1, YES

## 2019-03-18 NOTE — LETTER
March 18, 2019      DAISY Arreguin  [address] [place] [address]           [facility] - Rheumatology  [address]  [place] [address]  Phone: [phone]  Fax: [phone]          Patient: Giuliana Rodas   MR Number: [id number]   YOB: 1970   Date of Visit: 3/18/2019       Dear Paulo Francisco:    Thank you for referring Giuliana Rodas to me for evaluation. Attached you will find relevant portions of my assessment and plan of care.    If you have questions, please do not hesitate to call me. I look forward to following Giuliana Rodas along with you.    Sincerely,    Atif Galvan MD    Enclosure  CC:  No Recipients    If you would like to receive this communication electronically, please contact [email or web address] or [phone] to request more information on [employer] access.    For providers and/or their staff who would like to refer a patient to [facility], please contact us through our one-stop-shop provider referral line, [facility], at [phone].    If you feel you have received this communication in error or would no longer like to receive these types of communications, please e-mail [email or web address]

## 2019-03-18 NOTE — PROGRESS NOTES
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  Her hands started to hurt very bad  She used to be fatigued and falling asleep  This all started in 1999  She got hand braces  She used to be forgetful  She then had flu like body aches  She used to miss work a lot  She then couldn't move  Finally she saw rheumatology  She got diagnosed with fibromyalgia  Ruled out everything     For years she couldn't work    She now has fatigue,pain,tender spots in the hips and knees and arms  She has shooting pains  Hands hurt and feel weak  She is stiff in the back of the head and neck  She gets migraines  She gets the spasms   Top of feet hurt  She clenches her teeth  She has TMJ pain and she wears a mouth guard    She became hypertensive    She has interstitial cystitis    She has gastroparesis  She has acid reflux  She has bile reflux /gall bladder removed/has a stent in the region and has had pancreatitis and then took enzymes     Ketorolac doesn't help  Voltaren gel : insurance doesn't pay  Lexapro 20 mg   Zanaflex 4 mg every 8 hours  Topiramate prn for migraines     Past history : migraines,tension headaches,HLD,HTN,GERD,IBS,gastroparesis,fibromyalgia,occipital neuralgia    Family history : htn,migraines,breast cancer,stroke,heart disease,hld    Social history : not a smoker,alcoholic    Review of Systems   Constitutional: Negative for activity change, appetite change, chills, diaphoresis, fatigue, fever and unexpected weight change.    HENT: Negative for congestion, dental problem, drooling, ear discharge, ear pain, facial swelling, hearing loss, mouth sores, nosebleeds, postnasal drip, rhinorrhea, sinus pressure, sinus pain, sneezing, sore throat, tinnitus, trouble swallowing and voice change.    Eyes: Negative for photophobia, pain, discharge, redness, itching and visual disturbance.   Respiratory: Negative for apnea, cough, choking, chest tightness, shortness of breath, wheezing and stridor.    Cardiovascular: Negative for chest pain, palpitations and leg swelling.   Gastrointestinal: Negative for abdominal distention, abdominal pain, anal bleeding, blood in stool, constipation, diarrhea, nausea, rectal pain and vomiting.   Endocrine: Negative for cold intolerance, heat intolerance, polydipsia, polyphagia and polyuria.   Genitourinary: Negative for decreased urine volume, difficulty urinating, dysuria, enuresis, flank pain, frequency, genital sores, hematuria and urgency.   Musculoskeletal: Positive for arthralgias and myalgias. Negative for back pain, gait problem, joint swelling, neck pain and neck stiffness.   Skin: Negative for color change, pallor, rash and wound.   Allergic/Immunologic: Negative for environmental allergies, food allergies and immunocompromised state.   Neurological: Negative for dizziness, tremors, seizures, syncope, facial asymmetry, speech difficulty, weakness, light-headedness, numbness and headaches.   Hematological: Negative for adenopathy. Does not bruise/bleed easily.   Psychiatric/Behavioral: Negative for agitation, behavioral problems, confusion, decreased concentration, dysphoric mood, hallucinations, self-injury, sleep disturbance and suicidal ideas. The patient is not nervous/anxious and is not hyperactive.      Physical Exam     FREED-28 tender joint count: 0  FREED-28 swollen joint count: 0    Physical Exam   Constitutional: She is oriented to person, place, and time and well-developed, well-nourished, and in  no distress. No distress.   HENT:   Head: Normocephalic.   Mouth/Throat: Oropharynx is clear and moist.   Eyes: Conjunctivae are normal. Pupils are equal, round, and reactive to light. Right eye exhibits no discharge. Left eye exhibits no discharge. No scleral icterus.   Neck: Normal range of motion. No thyromegaly present.   Cardiovascular: Normal rate, regular rhythm, normal heart sounds and intact distal pulses.    Pulmonary/Chest: Effort normal and breath sounds normal. No stridor.   Abdominal: Soft. Bowel sounds are normal.   Lymphadenopathy:     She has no cervical adenopathy.   Neurological: She is alert and oriented to person, place, and time.   Skin: Skin is warm. No rash noted. She is not diaphoretic.     Psychiatric: Affect and judgment normal.   Musculoskeletal: Normal range of motion.         Assessment       48 year old female has been diagnosed with fibromyalgia in 2000  Initially diagnosed by rheumatologist and not followed    She tried cymbalta in the past,doesnt remember the dose,it didn't help  It did no harm  She tried lyrica,had a bad reaction    For years she couldn't work    She now has fatigue,pain,tender spots in the hips and knees and arms  She has shooting pains  Hands hurt and feel weak  She is stiff in the back of the head and neck  She gets migraines  She gets the spasms   Top of feet hurt  She clenches her teeth  She has TMJ pain and she wears a mouth guard    She has interstitial cystitis  She has gastroparesis  She has acid reflux  She has bile reflux /gall bladder removed/has a stent in the region and has had pancreatitis and then took enzymes     Ketorolac doesn't help  Voltaren gel : insurance doesn't pay  Lexapro 20 mg   Zanaflex 4 mg every 8 hours  Topiramate prn for migraines     She has migraines,tension headaches,HLD,HTN,GERD,IBS,gastroparesis,fibromyalgia,occipital neuralgia    She would like to initiate treatment for fibromyalgia     1. Fibromyalgia          New problem      Plan    Initiate duloxetine 30 mg bedtime and titrate to 60 mg in a week  Max 120 mg but slow titration  Wean off lexapro     Continue zanaflex 4 mg 1 to 2 tabs daily    Water aerobics   Yoga   Oni chi    Sleep hygiene    Management of mood symptoms      Giuliana was seen today for disease management and pain.    Diagnoses and all orders for this visit:    Fibromyalgia    Other orders  -     DULoxetine (CYMBALTA) 30 MG capsule; 30 mg daily and then 30 mg bid

## 2019-03-30 DIAGNOSIS — G43.711 CHRONIC MIGRAINE WITHOUT AURA, WITH INTRACTABLE MIGRAINE, SO STATED, WITH STATUS MIGRAINOSUS: ICD-10-CM

## 2019-03-31 RX ORDER — TOPIRAMATE 25 MG/1
CAPSULE, EXTENDED RELEASE ORAL
Qty: 30 CAPSULE | Refills: 0 | Status: SHIPPED | OUTPATIENT
Start: 2019-03-31 | End: 2019-04-09

## 2019-04-09 ENCOUNTER — TELEPHONE (OUTPATIENT)
Dept: NEUROLOGY | Facility: CLINIC | Age: 49
End: 2019-04-09

## 2019-04-09 RX ORDER — TOPIRAMATE 50 MG/1
50 CAPSULE, EXTENDED RELEASE ORAL DAILY
Qty: 30 CAPSULE | Refills: 0 | Status: SHIPPED | OUTPATIENT
Start: 2019-04-09 | End: 2019-04-16

## 2019-04-09 NOTE — TELEPHONE ENCOUNTER
Patient is having several side affects taking Trokendi 100mg and would like to get off the medication.       ----- Message from Ngozi Low sent at 4/9/2019 11:09 AM CDT -----  Contact: Self  Pt calling to speak to a nurse regarding health. [phone]

## 2019-04-11 ENCOUNTER — PATIENT OUTREACH (OUTPATIENT)
Dept: OTHER | Facility: OTHER | Age: 49
End: 2019-04-11

## 2019-04-11 NOTE — PROGRESS NOTES
Last 5 Patient Entered Readings                                      Current 30 Day Average: 153/90     Recent Readings 4/5/2019 3/26/2019 3/17/2019 3/9/2019 3/9/2019    SBP (mmHg) 149 144 166 157 155    DBP (mmHg) 91 87 92 87 95    Pulse 81 73 76 76 75          Digital Medicine: Health  Follow Up    Lifestyle Modifications:    1.Dietary Modifications (Sodium intake <2,000mg/day, food labels, dining out): Reports higher number could have been from a salty meal.  Reports coming back from [place] on 3/[place].     2.Physical Activity: States she has not been able to exercise much since feeling ill on medication for migraines.  Reports she is weaning off of it with help from neurologist.  Reports she did a lot of walking in [place].  Will set SMG once patient starts feeling better.    3.Medication Therapy: Patient has been compliant with the medication regimen.    4.Patient has the following medication side effects/concerns: none  (Frequency/Alleviating factors/Precipitating factors, etc.)     Follow up with Mi Giuliana ABBIE Rodas completed. No further questions or concerns. Will continue to follow up to achieve health goals.

## 2019-04-15 DIAGNOSIS — E78.5 HYPERLIPIDEMIA, UNSPECIFIED HYPERLIPIDEMIA TYPE: ICD-10-CM

## 2019-04-15 RX ORDER — PRAVASTATIN SODIUM 20 MG/1
TABLET ORAL
Qty: 90 TABLET | Refills: 0 | Status: SHIPPED | OUTPATIENT
Start: 2019-04-15 | End: 2019-07-05 | Stop reason: SDUPTHER

## 2019-04-22 ENCOUNTER — OFFICE VISIT (OUTPATIENT)
Dept: NEUROLOGY | Facility: CLINIC | Age: 49
End: 2019-04-22
Payer: MEDICARE

## 2019-04-22 VITALS
BODY MASS INDEX: 36.63 KG/M2 | HEART RATE: 80 BPM | HEIGHT: 62 IN | WEIGHT: 199.06 LBS | DIASTOLIC BLOOD PRESSURE: 81 MMHG | SYSTOLIC BLOOD PRESSURE: 160 MMHG

## 2019-04-22 DIAGNOSIS — G43.711 CHRONIC MIGRAINE WITHOUT AURA, WITH INTRACTABLE MIGRAINE, SO STATED, WITH STATUS MIGRAINOSUS: Primary | ICD-10-CM

## 2019-04-22 PROCEDURE — 99999 PR PBB SHADOW E&M-EST. PATIENT-LVL III: CPT | Mod: PBBFAC,,, | Performed by: NEUROLOGICAL SURGERY

## 2019-04-22 PROCEDURE — 99214 PR OFFICE/OUTPT VISIT, EST, LEVL IV, 30-39 MIN: ICD-10-PCS | Mod: S$GLB,,, | Performed by: NEUROLOGICAL SURGERY

## 2019-04-22 PROCEDURE — 3008F PR BODY MASS INDEX (BMI) DOCUMENTED: ICD-10-PCS | Mod: CPTII,S$GLB,, | Performed by: NEUROLOGICAL SURGERY

## 2019-04-22 PROCEDURE — 3077F PR MOST RECENT SYSTOLIC BLOOD PRESSURE >= 140 MM HG: ICD-10-PCS | Mod: CPTII,S$GLB,, | Performed by: NEUROLOGICAL SURGERY

## 2019-04-22 PROCEDURE — 99214 OFFICE O/P EST MOD 30 MIN: CPT | Mod: S$GLB,,, | Performed by: NEUROLOGICAL SURGERY

## 2019-04-22 PROCEDURE — 3079F PR MOST RECENT DIASTOLIC BLOOD PRESSURE 80-89 MM HG: ICD-10-PCS | Mod: CPTII,S$GLB,, | Performed by: NEUROLOGICAL SURGERY

## 2019-04-22 PROCEDURE — 99999 PR PBB SHADOW E&M-EST. PATIENT-LVL III: ICD-10-PCS | Mod: PBBFAC,,, | Performed by: NEUROLOGICAL SURGERY

## 2019-04-22 PROCEDURE — 3077F SYST BP >= 140 MM HG: CPT | Mod: CPTII,S$GLB,, | Performed by: NEUROLOGICAL SURGERY

## 2019-04-22 PROCEDURE — 3008F BODY MASS INDEX DOCD: CPT | Mod: CPTII,S$GLB,, | Performed by: NEUROLOGICAL SURGERY

## 2019-04-22 PROCEDURE — 3079F DIAST BP 80-89 MM HG: CPT | Mod: CPTII,S$GLB,, | Performed by: NEUROLOGICAL SURGERY

## 2019-04-22 NOTE — PROGRESS NOTES
"Chief Complaint   Patient presents with    Headache        Giuliana Rodas is a 48 y.o. female with a history of multiple medical diagnoses as listed below that presents for evaluation of headaches. She has been having headaches almost weekly for the last several months. The headache tends to feel like pressure as if a "cap" were on her head and at other times she has headaches that are from the front of her head down to the middle of the posterior aspect of the head. The headache that is like a ca is described as a "sharp pressure" that is 10/10 in intensity. The headache are debilitating and only allow her to lie down in a ximena quiet room as the pain worsened with lights and sounds. She has nausea very frequently with these headaches but says that she does not vomit. When the headaches are in the center of her head she feels that the pain extends into the neck and shoulders as well. It too can get up to a 10/10 but she is less sensitive to light and sounds and tends not to have nausea. The head can last from 3 hours to 3 days at a time. She has not family history of headaches. She has also been having problems sleeping so she was started on Elavil by her PCP in hopes of treating both her headaches and her insomnia. She has not had a headache in the last three weeks since she has been on the medication. She has no complaints with the medication.    Interval History  8/18/2016  She has had about 5-6 "bad" headaches since she was last seen in clinic. She has been having various headache types including tension headaches and sinus headaches as well. She has been using Robaxin which she says helps with the tension headaches and she has been using Fioricet to help with her various other headache types. She has not had much relief with tramadol as she says that she has taken the medication several times in the past and feel that it's effects have likely been lost on her. Elavil 50 mg qhs has been helping her to sleep but " she does not feel like the medication has made her excessively sedated and she does think that she could tolerate an increased dose of the medication. She has been having GI issues and has been looking to find a gastroenterologist that is within her network for gastroparesis and she has been scheduled to see dermatology for evaluation of two skin lesions that her PCP felt could be precancerous.    11/17/2016  She has still been having episodic headaches since she was last seen. She recently had a tooth extracted and was told that she had an infection underlying it as well. She has been taking Elavil as directed and has bene having some improvement in her migraines. She still tends to have frequent tension headaches that are treated very well with Robaxin. She has been seeing GI for her gastroparesis but has scheduled follow-up toward the end of the month to decide how the problem will be approached. She has not had any new problems since she was last seen in clinic.    02/16/2017  Since last being seen she says that she has been seen by GI who determined that she had a problem with bile acid production. She says that she has been doing better overall with her headaches and feels that when she has taken the Fioricet it has been beneficial to help in the relief of her headaches. She has not had any new problems but feels that the pain has been slightly more frequent than before.    05/16/2017  Headaches were well controlled when she was taking her medications consisting of Elavil and Methocarbamol as preventative medications. She has since been unable to get Robaxin because insurance is no longer covering the medication. She has not been taking it for the last month and has been having more frequent headaches since that time. She has been using Fioricet as an abortive medication which has been helpful in alleviating her pain. She has dry mouth and dry eyes as a result of her Elavil. Her dry eyes has been making  reading more difficult as she has constant tearing in the eyes.    07/18/2017  She has had at least one migraine since she was last seen in clinic while she was visiting [place] with her parents and was spending some time outside when she had a headache that began and intensified fairly quickly. Fioricet was on hand which she took and was able to relieve her headache so that she could continue her day without many issues. She has continued to have tension headaches that eminate in the neck and into the shoulders as well. She has the pain radiate across the back at times as well. She has also been having headaches across the front of the head that has been feeling of intense pressure that she feels is related to her sinus headaches. She has been working to become more active and exercise more.    10/18/2017  She continues to work with her medical team try to get better control of her symptoms.  Tension headaches has still been bothersome despite her compliance and methocarbamol.  She feels that the medication be ineffective and she is is wishing to try different medication to control her symptoms.  Despite the frequency of her tension headaches she has had fewer migraines than compared to her previous visit.  She'll been taking all medications as directed and has been tolerating well.    01/18/2018  She has been dealing with her mother going to chemotherapy treatments with has been taxing on both her mother and herself.  Headaches have still been present, but slightly better than when she was last seen in clinic.  Migraines have not been very bothersome; however, she has continued to have tension headaches relatively often.    02/28/2019  Headaches have been bothersome since she was last seen in clinic but seem to be better in the last year.  She has been tolerating her current medication regimen well.  She feels that her level of stress has been a major trigger for her symptoms which she has been unable to find  a suitable medication regimen to control these symptoms would do reliability.  She has been most plagued by an episode of shaking which occurred unexplained weight clear to patient said that she seemed to lose consciousness and was unaware of what was going on during the episode.  She has never had any episodes like this since it happened at does not recall any previous history of syncope or seizure.    04/22/2019  She has found the last 2 months the headaches seem to be less responsive to her current control medication.  Despite compliance with her Elavil therapy headaches seem to be occurring more frequently, more intensely, and seemed to be longer lasting compared to 2 months ago.  She has not been able to identify any triggers that would account for this increase in her headaches.  She has continued to have response to her abortive therapies before and she has been relying on the much more than she had been 2 months prior.  She has come to the point where her abortive therapies are not able to last as long as they did before she is concerned that with continued headache increases she would not having medication to successfully treat all the headaches she has had    PAST MEDICAL HISTORY:  Past Medical History:   Diagnosis Date    Bile reflux gastritis     Breast cyst     Eczema     Fibrocystic breast     Fibromyalgia     Gastroparesis     dr. harden    GERD (gastroesophageal reflux disease)     Hyperglyceridemia     Hyperlipidemia     Hypertension     IC (interstitial cystitis)     dr. labadie    Psoriasis     Tension headache        PAST SURGICAL HISTORY:  Past Surgical History:   Procedure Laterality Date    BREAST BIOPSY Right     over 10 years ago/ milk duct    CHOLECYSTECTOMY      EGD (ESOPHAGOGASTRODUODENOSCOPY) N/A 2/1/2014    Performed by Fitz [facility]reen [facility] ([facility])    HEMORRHOID SURGERY      HYSTERECTOMY      KNEE SURGERY      OOPHORECTOMY      one ov removed       SOCIAL  HISTORY:  Social History     Socioeconomic History    Marital status:      Spouse name: Not on file    Number of children: 2    Years of education: Not on file    Highest education level: Not on file   Occupational History    Occupation:      Employer: [employer]   Social Needs    Financial resource strain: Not on file    Food insecurity:     Worry: Not on file     Inability: Not on file    Transportation needs:     Medical: Not on file     Non-medical: Not on file   Tobacco Use    Smoking status: Never Smoker    Smokeless tobacco: Never Used   Substance and Sexual Activity    Alcohol use: No    Drug use: No    Sexual activity: Yes     Partners: Male     Birth control/protection: See Surgical Hx   Lifestyle    Physical activity:     Days per week: Not on file     Minutes per session: Not on file    Stress: Not on file   Relationships    Social connections:     Talks on phone: Not on file     Gets together: Not on file     Attends Jew service: Not on file     Active member of club or organization: Not on file     Attends meetings of clubs or organizations: Not on file     Relationship status: Not on file   Other Topics Concern    Not on file   Social History Narrative    Lives at home with  and daughter       FAMILY HISTORY:  Family History   Problem Relation Age of Onset    Hypertension Mother     Migraines Mother     Breast cancer Mother     Hypertension Father     Stroke Father     Heart disease Father     Hyperlipidemia Father     Breast cancer Paternal Grandmother     Colon cancer Neg Hx     Ovarian cancer Neg Hx     Inflammatory bowel disease Neg Hx     Celiac disease Neg Hx        ALLERGIES AND MEDICATIONS: updated and reviewed.  Review of patient's allergies indicates:   Allergen Reactions    Depo-provera [medroxyprogesterone] Anxiety    Dicyclomine Rash     Swelling of lip      Prozac [fluoxetine] Anxiety     Current Outpatient Medications    Medication Sig Dispense Refill    amlodipine-olmesartan (ANGEL) 5-40 mg per tablet Take 1 tablet by mouth once daily. 90 tablet 3    atenolol (TENORMIN) 25 MG tablet TAKE ONE TABLET BY MOUTH TWICE DAILY 180 tablet 0    BIFIDOBACTERIUM INFANTIS (ALIGN ORAL) Take 1 tablet by mouth once daily.      butalbital-acetaminophen-caffeine -40 mg (FIORICET, ESGIC) -40 mg per tablet Take 1 tablet by mouth every 6 (six) hours as needed. 40 tablet 1    diclofenac sodium (VOLTAREN) 1 % Gel Apply 2 g topically 4 (four) times daily. for 10 days 100 g 5    DULoxetine (CYMBALTA) 30 MG capsule 30 mg daily and then 30 mg bid 60 capsule 5    escitalopram oxalate (LEXAPRO) 20 MG tablet TAKE ONE TABLET BY MOUTH DAILY 30 tablet 2    fremanezumab-vfrm (AJOVY) 225 mg/1.5 mL Syrg Inject 225 mg into the skin every 28 days. 1.5 mL 2    gemfibrozil (LOPID) 600 MG tablet Take 1 tablet (600 mg total) by mouth 2 (two) times daily before meals. 180 tablet 3    hyoscyamine (ANASPAZ,LEVSIN) 0.125 mg Tab Take 1 tablet (125 mcg total) by mouth every 4 (four) hours as needed. 60 tablet 0    ketoconazole (NIZORAL) 2 % shampoo Apply topically twice a week. 120 mL 0    ketorolac (TORADOL) 10 mg tablet Take 1 tablet (10 mg total) by mouth every 6 (six) hours. 30 tablet 5    norethindrone-ethinyl estradiol (JUNEL FE 1/20) 1 mg-20 mcg (21)/75 mg (7) per tablet Take 1 tablet by mouth once daily. 28 tablet 11    pantoprazole (PROTONIX) 40 MG tablet Take 1 tablet (40 mg total) by mouth once daily. 90 tablet 3    potassium chloride SA (K-DUR,KLOR-CON) 20 MEQ tablet Take 1 tablet (20 mEq total) by mouth once daily. 30 tablet 0    pravastatin (PRAVACHOL) 20 MG tablet TAKE ONE TABLET BY MOUTH EVERY EVENING 90 tablet 0    sucralfate (CARAFATE) 1 gram tablet Take 1 g by mouth 4 (four) times daily.      sulfamethoxazole-trimethoprim 800-160mg (BACTRIM DS) 800-160 mg Tab TK 1 T PO  BID  0    tiZANidine (ZANAFLEX) 4 MG tablet Take 1 tablet  (4 mg total) by mouth every 8 (eight) hours as needed. 60 tablet 3     No current facility-administered medications for this visit.        Review of Systems   Constitutional: Negative for activity change, appetite change, fever and unexpected weight change.   HENT: Negative for trouble swallowing and voice change.    Eyes: Positive for photophobia and visual disturbance.   Respiratory: Negative for apnea and shortness of breath.    Cardiovascular: Negative for chest pain and leg swelling.   Gastrointestinal: Positive for nausea and vomiting. Negative for constipation.   Genitourinary: Negative for difficulty urinating.   Musculoskeletal: Negative for back pain, gait problem and neck pain.   Skin: Negative for color change and pallor.   Neurological: Positive for headaches. Negative for dizziness, seizures, syncope, weakness and numbness.   Hematological: Negative for adenopathy.   Psychiatric/Behavioral: Negative for agitation, confusion and decreased concentration.       Neurologic Exam     Mental Status   Oriented to person, place, and time.   Registration: recalls 3 of 3 objects.   Attention: normal. Concentration: normal.   Speech: speech is normal   Level of consciousness: alert  Knowledge: good.     Cranial Nerves     CN II   Visual fields full to confrontation.   Right visual field deficit: none  Left visual field deficit: none     CN III, IV, VI   Pupils are equal, round, and reactive to light.  Extraocular motions are normal.   Right pupil: Size: 3 mm. Shape: regular. Accommodation: intact.   Left pupil: Size: 3 mm. Shape: regular. Accommodation: intact.   CN III: no CN III palsy  CN VI: no CN VI palsy  Nystagmus: none   Diplopia: none  Ophthalmoparesis: none  Upgaze: normal  Downgaze: normal  Conjugate gaze: present    CN V   Facial sensation intact.   Right facial sensation deficit: none  Left facial sensation deficit: none    CN VII   Facial expression full, symmetric.   Right facial weakness: none  Left  facial weakness: none    CN VIII   CN VIII normal.     CN IX, X   CN IX normal.   CN X normal.   Palate: symmetric    CN XI   CN XI normal.   Right sternocleidomastoid strength: normal  Left sternocleidomastoid strength: normal  Right trapezius strength: normal  Left trapezius strength: normal    CN XII   CN XII normal.   Tongue deviation: none    Motor Exam   Muscle bulk: normal  Overall muscle tone: normal  Right arm tone: normal  Left arm tone: normal  Right leg tone: normal  Left leg tone: normal    Strength   Strength 5/5 throughout.     Sensory Exam   Right arm light touch: normal  Left arm light touch: normal  Right leg light touch: normal  Left leg light touch: normal  Right arm vibration: normal  Left arm vibration: normal  Right leg vibration: normal  Left leg vibration: normal  Right arm proprioception: normal  Left arm proprioception: normal  Right leg proprioception: normal  Left leg proprioception: normal  Right arm pinprick: normal  Left arm pinprick: normal  Right leg pinprick: normal  Left leg pinprick: normal    Gait, Coordination, and Reflexes     Gait  Gait: normal    Coordination   Romberg: negative  Finger to nose coordination: normal  Heel to shin coordination: normal  Tandem walking coordination: normal    Tremor   Resting tremor: absent    Reflexes   Right brachioradialis: 2+  Left brachioradialis: 2+  Right biceps: 2+  Left biceps: 2+  Right triceps: 2+  Left triceps: 2+  Right patellar: 2+  Left patellar: 2+  Right achilles: 2+  Left achilles: 2+  Right plantar: normal  Left plantar: normal      Physical Exam   Constitutional: She is oriented to person, place, and time. She appears well-developed and well-nourished.   HENT:   Head: Normocephalic and atraumatic.   Eyes: Pupils are equal, round, and reactive to light. EOM are normal.   Neck: Normal range of motion.   Cardiovascular: Normal rate and intact distal pulses.   Pulmonary/Chest: Effort normal. No apnea. No respiratory distress.  "  Musculoskeletal: Normal range of motion.   Neurological: She is alert and oriented to person, place, and time. She has normal strength. She has a normal Finger-Nose-Finger Test, a normal Heel to Shin Test, a normal Romberg Test and a normal Tandem Gait Test. Gait normal.   Reflex Scores:       Tricep reflexes are 2+ on the right side and 2+ on the left side.       Bicep reflexes are 2+ on the right side and 2+ on the left side.       Brachioradialis reflexes are 2+ on the right side and 2+ on the left side.       Patellar reflexes are 2+ on the right side and 2+ on the left side.       Achilles reflexes are 2+ on the right side and 2+ on the left side.  Skin: Skin is warm and dry.   Psychiatric: She has a normal mood and affect. Her speech is normal and behavior is normal. Thought content normal.   Vitals reviewed.      Vitals:    04/22/19 1500   BP: (!) 160/81   BP Location: Right arm   Patient Position: Sitting   BP Method: Large (Automatic)   Pulse: 80   Weight: 90.3 kg (199 lb 1.2 oz)   Height: 5' 2" (1.575 m)       Assessment & Plan:  Problem List Items Addressed This Visit     Chronic migraine without aura, with intractable migraine, so stated, with status migrainosus - Primary    Overview     She has been having increasing migraines and other headache semiology is.  At this point her headache control medication seems to be ineffective and a switch to a different agent should be considered to offer her better headache prevention which was minimize her risk for analgesic rebound headache due to overuse her abortive therapies.         Relevant Medications    fremanezumab-vfrm (AJOVY) 225 mg/1.5 mL Syrg        Follow-up: Follow up in about 3 months (around 7/22/2019).  More than 50% of this 25 minute encounter was spent in counseling and coordinating care of headache.      "

## 2019-04-23 ENCOUNTER — TELEPHONE (OUTPATIENT)
Dept: PHARMACY | Facility: CLINIC | Age: 49
End: 2019-04-23

## 2019-04-23 ENCOUNTER — TELEPHONE (OUTPATIENT)
Dept: NEUROLOGY | Facility: CLINIC | Age: 49
End: 2019-04-23

## 2019-04-23 NOTE — TELEPHONE ENCOUNTER
Informed patient that [facility] received prescription for Ajovy and benefits investigation is required.  Patient requested that we close referral as she is already working with her local pharmacy for this medication.  Advised that she call [facility] with any problems in the future.

## 2019-04-23 NOTE — TELEPHONE ENCOUNTER
----- Message from Pat Smalls sent at 4/23/2019 12:52 PM CDT -----  Contact: Self  Type: RX Refill Request    Who Called: Giuliana Rodas    Refill or New Rx:New    RX Name and Strength: Ajovy 225 mg/1.5 ml syringe    How is the patient currently taking it? (ex. 1XDay):1 every 28 days    Is this a 30 day or 90 day RX:30    Preferred Pharmacy with phone number:[facility] [place]  [place] - [address] [phone] (Phone)     Local or Mail Order:Local    Ordering Provider:Amrik Santacruz    Would the patient rather a call back or a response via [facility]? Call    Best Call Back Number:[phone]    Additional Information: patient stated medication was never called in to pharmacy      Spoke to patient, called in Ajovy to [facility] but it needs a PA. Will get PA and notify when complete

## 2019-04-25 ENCOUNTER — TELEPHONE (OUTPATIENT)
Dept: NEUROLOGY | Facility: CLINIC | Age: 49
End: 2019-04-25

## 2019-04-25 NOTE — TELEPHONE ENCOUNTER
Sent to Dr. Santacruz. Patient wants Zembrace.r        ----- Message from Cielo Sky sent at 4/25/2019 11:08 AM CDT -----  Contact: Patient  Type: RX Refill Request    Who Called:  Patient     Refill or New Rx: New    RX Name and Strength: Zembrace    How is the patient currently taking it? (ex. 1XDay): As needed    Is this a 30 day or 90 day RX: 90    Pref[facility] with phone number: [facility] [place] [place] - [address] [phone] (Phone)  [phone] (Fax)      Local or Mail Order: Local    Ordering Provider: Dr. Santacruz    Would the patient rather a call back or a response via [facility]?  Call back    Best Call Back Number: [phone]    Additional Information: Pt stated that she received a sample and was inform to call back if the medication is working to get it prescribed.

## 2019-04-30 ENCOUNTER — TELEPHONE (OUTPATIENT)
Dept: NEUROLOGY | Facility: CLINIC | Age: 49
End: 2019-04-30

## 2019-04-30 NOTE — TELEPHONE ENCOUNTER
Will start PA today for Zembrace.        ----- Message from Chencho Sneed sent at 4/30/2019  9:42 AM CDT -----  Contact: [facility]/[phone]  Type: Patient Call Back    Who called:Zayda    What is the request in detail:Zayda stating a PA is required for the medication Zembrace    Would the patient rather a call back or a response via [facility]? Call back    Best call back number:[phone]      Thank you

## 2019-05-03 ENCOUNTER — PATIENT OUTREACH (OUTPATIENT)
Dept: OTHER | Facility: OTHER | Age: 49
End: 2019-05-03

## 2019-05-03 ENCOUNTER — TELEPHONE (OUTPATIENT)
Dept: NEUROLOGY | Facility: CLINIC | Age: 49
End: 2019-05-03

## 2019-05-03 RX ORDER — SUMATRIPTAN SUCCINATE 4 MG/.5ML
4 INJECTION, SOLUTION SUBCUTANEOUS ONCE AS NEEDED
Qty: 9 EACH | Refills: 5 | Status: SHIPPED | OUTPATIENT
Start: 2019-05-03 | End: 2019-05-30 | Stop reason: SDUPTHER

## 2019-05-03 RX ORDER — ATENOLOL 25 MG/1
50 TABLET ORAL
COMMUNITY
End: 2019-06-03

## 2019-05-03 NOTE — TELEPHONE ENCOUNTER
Insurance company will pay for the sumatriptan 4mg injection please send in a RX for this drug to her pharmacy.        ----- Message from Ambar Leon sent at 5/3/2019 10:58 AM CDT -----  Contact: Jacinta with PHN  ..Type: Patient Call Back    Who called:Jacinta MENDOZA    What is the request in detail: Rep asking for a call back from staff regarding PA     Can the clinic reply by [facility]? No     Would the patient rather a call back or a response via [facility]? Call back     Best call back number:[phone]    Additional Information:Rep calling to ask if pt has tried Sumatriptan nasal spray or inj. They are tier 2 so they would only cost the pt $10 instead of $80 a month with Zembrace

## 2019-05-03 NOTE — PROGRESS NOTES
Called patient to review readings. She has been working with neurology to address migraines. Reports some elevated readings during migraines but others were resting. Amenable to medication changes. Will work on being more mindful of sodium intake.    Last 5 Patient Entered Readings                                      Current 30 Day Average: 155/90     Recent Readings 4/28/2019 4/27/2019 4/27/2019 4/27/2019 4/21/2019    SBP (mmHg) 160 165 179 171 145    DBP (mmHg) 97 87 93 91 87    Pulse 81 79 79 81 75          BP above goal, <130/80mmHg  Increase atenolol to 50mg BID- consider switch to carvedilol pending response  Continue monitoring    Hypertension Medications             atenolol (TENORMIN) 25 MG tablet Take 50 mg by mouth 2 (two) times daily.    amlodipine-olmesartan (ANGEL) 5-40 mg per tablet Take 1 tablet by mouth once daily.

## 2019-05-09 ENCOUNTER — PATIENT OUTREACH (OUTPATIENT)
Dept: OTHER | Facility: OTHER | Age: 49
End: 2019-05-09

## 2019-05-09 NOTE — PROGRESS NOTES
Last 5 Patient Entered Readings                                      Current 30 Day Average: 156/90     Recent Readings 5/5/2019 4/28/2019 4/27/2019 4/27/2019 4/27/2019    SBP (mmHg) 154 160 165 179 171    DBP (mmHg) 88 97 87 93 91    Pulse 68 81 79 79 81        Reports migraines have gotten better.    Digital Medicine: Health  Follow Up    Lifestyle Modifications:    1.Dietary Modifications (Sodium intake <2,000mg/day, food labels, dining out): States she has been making her own lunches.  States she uses a Ms Dash seasoning to season chicken tenderloins and boils vegetables in a low sodium beef broth.  Reports she brings leftovers to work. Set [employer]    2.Physical Activity: States she is getting up and moving for about 3-5min every hour at work with employees.  States they do arm circles or knee to elbow touches.    3.Medication Therapy: Patient has been compliant with the medication regimen.    4.Patient has the following medication side effects/concerns: none  (Frequency/Alleviating factors/Precipitating factors, etc.)     Follow up with Mrs. Giuliana LEIVA Adrianna completed. No further questions or concerns. Will continue to follow up to achieve health goals.

## 2019-05-20 RX ORDER — ESCITALOPRAM OXALATE 20 MG/1
TABLET ORAL
Qty: 30 TABLET | Refills: 11 | Status: SHIPPED | OUTPATIENT
Start: 2019-05-20 | End: 2020-01-27 | Stop reason: SDUPTHER

## 2019-05-22 ENCOUNTER — OFFICE VISIT (OUTPATIENT)
Dept: FAMILY MEDICINE | Facility: CLINIC | Age: 49
End: 2019-05-22
Payer: MEDICARE

## 2019-05-22 VITALS
DIASTOLIC BLOOD PRESSURE: 80 MMHG | HEIGHT: 62 IN | BODY MASS INDEX: 35.7 KG/M2 | HEART RATE: 72 BPM | SYSTOLIC BLOOD PRESSURE: 124 MMHG | WEIGHT: 194 LBS | OXYGEN SATURATION: 98 %

## 2019-05-22 DIAGNOSIS — M17.0 OSTEOARTHRITIS OF BOTH KNEES, UNSPECIFIED OSTEOARTHRITIS TYPE: ICD-10-CM

## 2019-05-22 DIAGNOSIS — L91.8 SKIN TAG: ICD-10-CM

## 2019-05-22 DIAGNOSIS — G47.00 INSOMNIA, UNSPECIFIED TYPE: Primary | ICD-10-CM

## 2019-05-22 PROCEDURE — 99999 PR PBB SHADOW E&M-EST. PATIENT-LVL IV: ICD-10-PCS | Mod: PBBFAC,,, | Performed by: FAMILY MEDICINE

## 2019-05-22 PROCEDURE — 3074F PR MOST RECENT SYSTOLIC BLOOD PRESSURE < 130 MM HG: ICD-10-PCS | Mod: CPTII,S$GLB,, | Performed by: FAMILY MEDICINE

## 2019-05-22 PROCEDURE — 99214 OFFICE O/P EST MOD 30 MIN: CPT | Mod: S$GLB,,, | Performed by: FAMILY MEDICINE

## 2019-05-22 PROCEDURE — 3074F SYST BP LT 130 MM HG: CPT | Mod: CPTII,S$GLB,, | Performed by: FAMILY MEDICINE

## 2019-05-22 PROCEDURE — 99999 PR PBB SHADOW E&M-EST. PATIENT-LVL IV: CPT | Mod: PBBFAC,,, | Performed by: FAMILY MEDICINE

## 2019-05-22 PROCEDURE — 99214 PR OFFICE/OUTPT VISIT, EST, LEVL IV, 30-39 MIN: ICD-10-PCS | Mod: S$GLB,,, | Performed by: FAMILY MEDICINE

## 2019-05-22 PROCEDURE — 3008F PR BODY MASS INDEX (BMI) DOCUMENTED: ICD-10-PCS | Mod: CPTII,S$GLB,, | Performed by: FAMILY MEDICINE

## 2019-05-22 PROCEDURE — 3079F PR MOST RECENT DIASTOLIC BLOOD PRESSURE 80-89 MM HG: ICD-10-PCS | Mod: CPTII,S$GLB,, | Performed by: FAMILY MEDICINE

## 2019-05-22 PROCEDURE — 3008F BODY MASS INDEX DOCD: CPT | Mod: CPTII,S$GLB,, | Performed by: FAMILY MEDICINE

## 2019-05-22 PROCEDURE — 3079F DIAST BP 80-89 MM HG: CPT | Mod: CPTII,S$GLB,, | Performed by: FAMILY MEDICINE

## 2019-05-22 RX ORDER — DICLOFENAC SODIUM 50 MG/1
50 TABLET, DELAYED RELEASE ORAL 2 TIMES DAILY PRN
Qty: 45 TABLET | Refills: 2 | Status: SHIPPED | OUTPATIENT
Start: 2019-05-22 | End: 2019-07-17 | Stop reason: SDUPTHER

## 2019-05-22 RX ORDER — TRAZODONE HYDROCHLORIDE 50 MG/1
50 TABLET ORAL NIGHTLY
Qty: 30 TABLET | Refills: 11 | Status: SHIPPED | OUTPATIENT
Start: 2019-05-22 | End: 2020-01-27 | Stop reason: SDUPTHER

## 2019-05-22 RX ORDER — FLUTICASONE PROPIONATE 50 MCG
1 SPRAY, SUSPENSION (ML) NASAL 2 TIMES DAILY
Refills: 1 | COMMUNITY
Start: 2019-04-15 | End: 2019-08-28

## 2019-05-22 NOTE — PROGRESS NOTES
Subjective:       Patient ID: Giuliana Rodas is a 49 y.o. female.    Chief Complaint: Bilateral Knee Pain and Discuss Cholesterol Medications    48 year old female with bilateral knee pain. She has it mainly in her right knee. She has no swelling, but it gives out on her. She denies a history of trauma. She states it is aching and is a sharp pain when she is trying to get up. She has it anteriorly and along the side. She also had some shooting pain down her leg to her foot. She is not exercising, but states 2 weeks ago she was doing elbow to knee exercises. She is using roll-on icy hot.     She has some issues with shooting pain in her legs and is wondering if it is her cholesterol medication.     She also stopped her Trokendi due to rash.    She states she is not sleeping well.S he tried Unisom and it helped temporarily. She tried melatonin and it doesn't help. She is up 4 to 5 times at night with urination. She has no dysuria, hematuria, or urgency, or frequency.      Past Medical History:  No date: Bile reflux gastritis  No date: Breast cyst  No date: Eczema  No date: Fibrocystic breast  No date: Fibromyalgia  No date: Gastroparesis      Comment:  dr. harden  No date: GERD (gastroesophageal reflux disease)  No date: Hyperglyceridemia  No date: Hyperlipidemia  No date: Hypertension  No date: IC (interstitial cystitis)      Comment:  dr. labadie  No date: Psoriasis  No date: Tension headache   Past Surgical History:  No date: BREAST BIOPSY; Right      Comment:  over 10 years ago/ milk duct  No date: CHOLECYSTECTOMY  2/1/2014: EGD (ESOPHAGOGASTRODUODENOSCOPY); N/A      Comment:  Performed by Fitz Breen MD at [facility] (4TH FLR)  No date: HEMORRHOID SURGERY  No date: HYSTERECTOMY  No date: KNEE SURGERY  No date: OOPHORECTOMY      Comment:  one ov removed  Review of patient's family history indicates:  Problem: Hypertension      Relation: Mother          Age of Onset: (Not Specified)  Problem: Migraines       Relation: Mother          Age of Onset: (Not Specified)  Problem: Breast cancer      Relation: Mother          Age of Onset: (Not Specified)  Problem: Hypertension      Relation: Father          Age of Onset: (Not Specified)  Problem: Stroke      Relation: Father          Age of Onset: (Not Specified)  Problem: Heart disease      Relation: Father          Age of Onset: (Not Specified)  Problem: Hyperlipidemia      Relation: Father          Age of Onset: (Not Specified)  Problem: Breast cancer      Relation: Paternal Grandmother          Age of Onset: (Not Specified)  Problem: Colon cancer      Relation: Neg Hx          Age of Onset: (Not Specified)  Problem: Ovarian cancer      Relation: Neg Hx          Age of Onset: (Not Specified)  Problem: Inflammatory bowel disease      Relation: Neg Hx          Age of Onset: (Not Specified)  Problem: Celiac disease      Relation: Neg Hx          Age of Onset: (Not Specified)    Social History    Socioeconomic History      Marital status:       Spouse name: Not on file      Number of children: 2      Years of education: Not on file      Highest education level: Not on file    Occupational History      Occupation:         Employer: [employer]    Social Needs      Financial resource strain: Not on file      Food insecurity:        Worry: Not on file        Inability: Not on file      Transportation needs:        Medical: Not on file        Non-medical: Not on file    Tobacco Use      Smoking status: Never Smoker      Smokeless tobacco: Never Used    Substance and Sexual Activity      Alcohol use: No      Drug use: No      Sexual activity: Yes        Partners: Male        Birth control/protection: See Surgical Hx    Lifestyle      Physical activity:        Days per week: Not on file        Minutes per session: Not on file      Stress: Not on file    Relationships      Social connections:        Talks on phone: Not on file        Gets together: Not on file         "Attends Confucianism service: Not on file        Active member of club or organization: Not on file        Attends meetings of clubs or organizations: Not on file        Relationship status: Not on file    Other Topics      Concerns:        Not on file    Social History Narrative      Lives at home with  and daughter        Review of Systems    Objective:       Vitals:    05/22/19 1437   BP: 124/80   Pulse: 72   SpO2: 98%   Weight: 88 kg (194 lb 0.1 oz)   Height: 5' 2" (1.575 m)       Physical Exam   Constitutional: She is oriented to person, place, and time. She appears well-developed and well-nourished. No distress.   HENT:   Head: Normocephalic and atraumatic.   Neck: Normal range of motion. Neck supple.   Cardiovascular: Normal rate, regular rhythm and normal heart sounds. Exam reveals no gallop and no friction rub.   No murmur heard.  Pulmonary/Chest: Effort normal and breath sounds normal. No stridor. No respiratory distress. She has no wheezes. She has no rales.   Musculoskeletal:        Right knee: She exhibits normal range of motion, no swelling, no effusion, no ecchymosis, no erythema, normal alignment, no LCL laxity, normal patellar mobility and no MCL laxity. Tenderness found. Medial joint line tenderness noted.   Neurological: She is alert and oriented to person, place, and time.   Skin: She is not diaphoretic.   Psychiatric: She has a normal mood and affect.       Assessment:       1. Insomnia, unspecified type    2. Skin tag    3. Osteoarthritis of both knees, unspecified osteoarthritis type        Plan:       Giuliana was seen today for bilateral knee pain and discuss cholesterol medications.    Diagnoses and all orders for this visit:    Insomnia, unspecified type  -     traZODone (DESYREL) 50 MG tablet; Take 1 tablet (50 mg total) by mouth every evening.  Trial of trazodone     Skin tag  -     Ambulatory referral to Dermatology    Osteoarthritis of both knees, unspecified osteoarthritis type  -    "  diclofenac (VOLTAREN) 50 MG EC tablet; Take 1 tablet (50 mg total) by mouth 2 (two) times daily as needed.  Trial of diclofenac for knee pain.

## 2019-05-28 RX ORDER — ATENOLOL 25 MG/1
TABLET ORAL
Qty: 180 TABLET | Refills: 0 | Status: SHIPPED | OUTPATIENT
Start: 2019-05-28 | End: 2019-06-03

## 2019-05-28 RX ORDER — ATENOLOL 50 MG/1
TABLET ORAL
Qty: 90 TABLET | Refills: 1 | Status: SHIPPED | OUTPATIENT
Start: 2019-05-28 | End: 2019-05-30 | Stop reason: SDUPTHER

## 2019-05-30 ENCOUNTER — TELEPHONE (OUTPATIENT)
Dept: FAMILY MEDICINE | Facility: CLINIC | Age: 49
End: 2019-05-30

## 2019-05-30 ENCOUNTER — PATIENT MESSAGE (OUTPATIENT)
Dept: ADMINISTRATIVE | Facility: OTHER | Age: 49
End: 2019-05-30

## 2019-05-30 RX ORDER — SUMATRIPTAN SUCCINATE 4 MG/.5ML
4 INJECTION, SOLUTION SUBCUTANEOUS ONCE AS NEEDED
Qty: 9 EACH | Refills: 5 | Status: SHIPPED | OUTPATIENT
Start: 2019-05-30 | End: 2019-07-08 | Stop reason: SDUPTHER

## 2019-05-30 RX ORDER — ATENOLOL 50 MG/1
50 TABLET ORAL DAILY
Qty: 90 TABLET | Refills: 1 | Status: SHIPPED | OUTPATIENT
Start: 2019-05-30 | End: 2019-06-03

## 2019-05-30 NOTE — TELEPHONE ENCOUNTER
----- Message from Ambar Leon sent at 5/30/2019  2:59 PM CDT -----  Contact: [facility]   ..Type: Patient Call Back    Who called: Elliott with [facility]    What is the request in detail: rep states pt believes she is supposed to take---atenolol (TENORMIN) 50 MG tablet--- 2x a day instead of 1. They are asking for a call back with clarification    Can the clinic reply by [facility]? No     Would the patient rather a call back or a response via [facility]? Call back     Best call back number:[phone]

## 2019-05-31 ENCOUNTER — HOSPITAL ENCOUNTER (EMERGENCY)
Facility: HOSPITAL | Age: 49
Discharge: HOME | End: 2019-05-31
Attending: EMERGENCY MEDICINE
Payer: MEDICARE

## 2019-05-31 VITALS
SYSTOLIC BLOOD PRESSURE: 178 MMHG | BODY MASS INDEX: 35.7 KG/M2 | DIASTOLIC BLOOD PRESSURE: 83 MMHG | RESPIRATION RATE: 18 BRPM | HEART RATE: 60 BPM | TEMPERATURE: 99 F | OXYGEN SATURATION: 95 % | HEIGHT: 62 IN | WEIGHT: 194 LBS

## 2019-05-31 DIAGNOSIS — R10.9 ABDOMINAL PAIN, UNSPECIFIED ABDOMINAL LOCATION: Primary | ICD-10-CM

## 2019-05-31 LAB
ALBUMIN SERPL BCP-MCNC: 3.8 G/DL (ref 3.5–5.2)
ALP SERPL-CCNC: 73 U/L (ref 55–135)
ALT SERPL W/O P-5'-P-CCNC: 41 U/L (ref 10–44)
ANION GAP SERPL CALC-SCNC: 11 MMOL/L (ref 8–16)
AST SERPL-CCNC: 38 U/L (ref 10–40)
B-HCG UR QL: NEGATIVE
BACTERIA #/AREA URNS HPF: ABNORMAL /HPF
BASOPHILS # BLD AUTO: 0.03 K/UL (ref 0–0.2)
BASOPHILS NFR BLD: 0.5 % (ref 0–1.9)
BILIRUB SERPL-MCNC: 0.3 MG/DL (ref 0.1–1)
BILIRUB UR QL STRIP: NEGATIVE
BUN SERPL-MCNC: 17 MG/DL (ref 6–20)
CALCIUM SERPL-MCNC: 9.8 MG/DL (ref 8.7–10.5)
CHLORIDE SERPL-SCNC: 106 MMOL/L (ref 95–110)
CLARITY UR: CLEAR
CO2 SERPL-SCNC: 24 MMOL/L (ref 23–29)
COLOR UR: YELLOW
CREAT SERPL-MCNC: 1.2 MG/DL (ref 0.5–1.4)
CTP QC/QA: YES
DIFFERENTIAL METHOD: ABNORMAL
EOSINOPHIL # BLD AUTO: 0.6 K/UL (ref 0–0.5)
EOSINOPHIL NFR BLD: 10.8 % (ref 0–8)
ERYTHROCYTE [DISTWIDTH] IN BLOOD BY AUTOMATED COUNT: 14 % (ref 11.5–14.5)
EST. GFR  (AFRICAN AMERICAN): >60 ML/MIN/1.73 M^2
EST. GFR  (NON AFRICAN AMERICAN): 52 ML/MIN/1.73 M^2
GLUCOSE SERPL-MCNC: 89 MG/DL (ref 70–110)
GLUCOSE UR QL STRIP: NEGATIVE
HCT VFR BLD AUTO: 39.2 % (ref 37–48.5)
HGB BLD-MCNC: 12.7 G/DL (ref 12–16)
HGB UR QL STRIP: ABNORMAL
HYALINE CASTS #/AREA URNS LPF: 0 /LPF
KETONES UR QL STRIP: NEGATIVE
LEUKOCYTE ESTERASE UR QL STRIP: ABNORMAL
LIPASE SERPL-CCNC: 49 U/L (ref 4–60)
LYMPHOCYTES # BLD AUTO: 1.6 K/UL (ref 1–4.8)
LYMPHOCYTES NFR BLD: 29.3 % (ref 18–48)
MCH RBC QN AUTO: 26.1 PG (ref 27–31)
MCHC RBC AUTO-ENTMCNC: 32.4 G/DL (ref 32–36)
MCV RBC AUTO: 81 FL (ref 82–98)
MICROSCOPIC COMMENT: ABNORMAL
MONOCYTES # BLD AUTO: 0.5 K/UL (ref 0.3–1)
MONOCYTES NFR BLD: 8.4 % (ref 4–15)
NEUTROPHILS # BLD AUTO: 2.8 K/UL (ref 1.8–7.7)
NEUTROPHILS NFR BLD: 51 % (ref 38–73)
NITRITE UR QL STRIP: NEGATIVE
PH UR STRIP: 5 [PH] (ref 5–8)
PLATELET # BLD AUTO: 168 K/UL (ref 150–350)
PMV BLD AUTO: 9.6 FL (ref 9.2–12.9)
POTASSIUM SERPL-SCNC: 3.7 MMOL/L (ref 3.5–5.1)
PROT SERPL-MCNC: 7.1 G/DL (ref 6–8.4)
PROT UR QL STRIP: ABNORMAL
RBC # BLD AUTO: 4.86 M/UL (ref 4–5.4)
RBC #/AREA URNS HPF: 10 /HPF (ref 0–4)
SODIUM SERPL-SCNC: 141 MMOL/L (ref 136–145)
SP GR UR STRIP: 1.01 (ref 1–1.03)
SQUAMOUS #/AREA URNS HPF: 25 /HPF
URN SPEC COLLECT METH UR: ABNORMAL
UROBILINOGEN UR STRIP-ACNC: NEGATIVE EU/DL
WBC # BLD AUTO: 5.46 K/UL (ref 3.9–12.7)
WBC #/AREA URNS HPF: 8 /HPF (ref 0–5)

## 2019-05-31 PROCEDURE — 96374 THER/PROPH/DIAG INJ IV PUSH: CPT

## 2019-05-31 PROCEDURE — 25000003 PHARM REV CODE 250: Performed by: EMERGENCY MEDICINE

## 2019-05-31 PROCEDURE — 99285 EMERGENCY DEPT VISIT HI MDM: CPT | Mod: 25

## 2019-05-31 PROCEDURE — 85025 COMPLETE CBC W/AUTO DIFF WBC: CPT

## 2019-05-31 PROCEDURE — 96375 TX/PRO/DX INJ NEW DRUG ADDON: CPT

## 2019-05-31 PROCEDURE — 83690 ASSAY OF LIPASE: CPT

## 2019-05-31 PROCEDURE — 81000 URINALYSIS NONAUTO W/SCOPE: CPT

## 2019-05-31 PROCEDURE — P9612 CATHETERIZE FOR URINE SPEC: HCPCS

## 2019-05-31 PROCEDURE — 81025 URINE PREGNANCY TEST: CPT | Performed by: EMERGENCY MEDICINE

## 2019-05-31 PROCEDURE — 63600175 PHARM REV CODE 636 W HCPCS: Performed by: EMERGENCY MEDICINE

## 2019-05-31 PROCEDURE — 25500020 PHARM REV CODE 255: Performed by: EMERGENCY MEDICINE

## 2019-05-31 PROCEDURE — 80053 COMPREHEN METABOLIC PANEL: CPT

## 2019-05-31 PROCEDURE — S0028 INJECTION, FAMOTIDINE, 20 MG: HCPCS | Performed by: EMERGENCY MEDICINE

## 2019-05-31 PROCEDURE — 96361 HYDRATE IV INFUSION ADD-ON: CPT

## 2019-05-31 RX ORDER — ONDANSETRON 4 MG/1
4 TABLET, FILM COATED ORAL EVERY 6 HOURS
Qty: 12 TABLET | Refills: 0 | Status: SHIPPED | OUTPATIENT
Start: 2019-05-31 | End: 2020-07-01 | Stop reason: SDUPTHER

## 2019-05-31 RX ORDER — ONDANSETRON 2 MG/ML
4 INJECTION INTRAMUSCULAR; INTRAVENOUS
Status: COMPLETED | OUTPATIENT
Start: 2019-05-31 | End: 2019-05-31

## 2019-05-31 RX ORDER — METHOCARBAMOL 500 MG/1
500 TABLET, FILM COATED ORAL 4 TIMES DAILY
COMMUNITY
End: 2020-01-27

## 2019-05-31 RX ORDER — KETOROLAC TROMETHAMINE 30 MG/ML
30 INJECTION, SOLUTION INTRAMUSCULAR; INTRAVENOUS
Status: COMPLETED | OUTPATIENT
Start: 2019-05-31 | End: 2019-05-31

## 2019-05-31 RX ORDER — FAMOTIDINE 10 MG/ML
20 INJECTION INTRAVENOUS
Status: COMPLETED | OUTPATIENT
Start: 2019-05-31 | End: 2019-05-31

## 2019-05-31 RX ORDER — OXYCODONE AND ACETAMINOPHEN 5; 325 MG/1; MG/1
1 TABLET ORAL EVERY 4 HOURS PRN
Qty: 11 TABLET | Refills: 0 | Status: SHIPPED | OUTPATIENT
Start: 2019-05-31 | End: 2020-01-27

## 2019-05-31 RX ORDER — MORPHINE SULFATE 10 MG/ML
4 INJECTION INTRAMUSCULAR; INTRAVENOUS; SUBCUTANEOUS
Status: COMPLETED | OUTPATIENT
Start: 2019-05-31 | End: 2019-05-31

## 2019-05-31 RX ADMIN — KETOROLAC TROMETHAMINE 30 MG: 30 INJECTION, SOLUTION INTRAMUSCULAR; INTRAVENOUS at 09:05

## 2019-05-31 RX ADMIN — FAMOTIDINE 20 MG: 10 INJECTION INTRAVENOUS at 09:05

## 2019-05-31 RX ADMIN — IOHEXOL 75 ML: 350 INJECTION, SOLUTION INTRAVENOUS at 10:05

## 2019-05-31 RX ADMIN — MORPHINE SULFATE 4 MG: 10 INJECTION INTRAVENOUS at 10:05

## 2019-05-31 RX ADMIN — ONDANSETRON 4 MG: 2 INJECTION INTRAMUSCULAR; INTRAVENOUS at 10:05

## 2019-05-31 RX ADMIN — SODIUM CHLORIDE 1000 ML: 0.9 INJECTION, SOLUTION INTRAVENOUS at 09:05

## 2019-05-31 NOTE — ED TRIAGE NOTES
"Pt arrives to ED via personal vehicle with mother-in-law with c/o abdominal pain, tenderness and swelling since 5/25/19. Pt reports abdominal pain radiates to back, pt states pain is 10/10. Pt also c/o "burning" chest pain 8/10 severity. Pt denies SOB and numbess/tingling.   "

## 2019-05-31 NOTE — DISCHARGE INSTRUCTIONS
Please call your gastroenterologist to discuss results and also symptoms with them and set up a follow-up appointment.

## 2019-05-31 NOTE — ED PROVIDER NOTES
Encounter Date: 5/31/2019    SCRIBE #1 NOTE: I, Ruddy Phillips, am scribing for, and in the presence of,  Jay Mello MD . I have scribed the following portions of the note - Other sections scribed: HPI, ROS, PE.       History     Chief Complaint   Patient presents with    Abdominal Pain     +ABD pain and swelling x 6 days, reports hx of gastroparesis. Pt reports nausea and diarrhea. NAD. VSS.      CC: Abdominal Pain    This is a 49 y.o F with pmhx of bile reflux gastritis, fibrocystic breast, fibromyalgia, gastroparesis, GERD, Hyperglyceridemia, Hyperlipidemia, HTN, interstitial cystitis, and migraines presenting to the ED for emergent evaluation of abdominal pain, abdominal distension, nausea and diarrhea x6 days. Pt reports that she symptoms began while shopping with her mother on the afternoon of 5/25/19. Specifically, about an hour after eating she was driving home and her abdomen started to swell and cramp. Pt also reports headache and weakness. She is followed by Dr. Gamez for GI. She states that she presented this am because she was unable to sleep and the pain has persisted. She also reports chronic knee pain from tendonitis. She denies dysuria, emesis, and fever. No other symptoms to report. No modifying factors.  Pain 10 in 10, achy, nonradiating, diffuse, no alleviating or worsening factors    The history is provided by the patient. No  was used.     Review of patient's allergies indicates:   Allergen Reactions    Amitriptyline Hallucinations and Other (See Comments)    Chlorthalidone      Hypokalemia     Adhesive Rash    Depo-provera [medroxyprogesterone] Anxiety    Dicyclomine Rash     Swelling of lip    Swelling of lip    Prozac [fluoxetine] Anxiety     Past Medical History:   Diagnosis Date    Bile reflux gastritis     Breast cyst     Eczema     Fibrocystic breast     Fibromyalgia     Gastroparesis     dr. harden    GERD (gastroesophageal reflux disease)      Hyperglyceridemia     Hyperlipidemia     Hypertension     IC (interstitial cystitis)     dr. labadie    Psoriasis     Tension headache      Past Surgical History:   Procedure Laterality Date    BREAST BIOPSY Right     over 10 years ago/ milk duct    CHOLECYSTECTOMY      EGD (ESOPHAGOGASTRODUODENOSCOPY) N/A 2/1/2014    Performed by [facility] at [facility] (4TH FLR)    HEMORRHOID SURGERY      HYSTERECTOMY      KNEE SURGERY      OOPHORECTOMY      one ov removed     Family History   Problem Relation Age of Onset    Hypertension Mother     Migraines Mother     Breast cancer Mother     Hypertension Father     Stroke Father     Heart disease Father     Hyperlipidemia Father     Breast cancer Paternal Grandmother     Colon cancer Neg Hx     Ovarian cancer Neg Hx     Inflammatory bowel disease Neg Hx     Celiac disease Neg Hx      Social History     Tobacco Use    Smoking status: Never Smoker    Smokeless tobacco: Never Used   Substance Use Topics    Alcohol use: No    Drug use: No     Review of Systems   Constitutional: Negative for fever.   HENT: Negative for congestion.    Eyes: Negative for redness.   Respiratory: Negative for cough.    Gastrointestinal: Positive for abdominal distention, abdominal pain, diarrhea and nausea. Negative for vomiting.   Genitourinary: Negative for dysuria.   Musculoskeletal: Negative for back pain.   Skin: Negative for rash.   Neurological: Positive for headaches.       Physical Exam     Initial Vitals [05/31/19 0837]   BP Pulse Resp Temp SpO2   (!) 165/95 71 18 97.9 °F (36.6 °C) 96 %      MAP       --         Physical Exam    Nursing note and vitals reviewed.  Constitutional: She appears well-nourished. She is not diaphoretic. No distress.   HENT:   Head: Normocephalic and atraumatic.   Mouth/Throat: Oropharynx is clear and moist.   Eyes: EOM are normal. Pupils are equal, round, and reactive to light. No scleral icterus.   Neck: Normal range of motion. Neck  supple. No JVD present.   Cardiovascular: Normal rate, regular rhythm and intact distal pulses.   Pulmonary/Chest: Breath sounds normal. No stridor. No respiratory distress.   Abdominal: Soft. Bowel sounds are normal. She exhibits no distension. There is tenderness (diffuse). There is no rebound and no guarding.   Diffuse   Musculoskeletal: Normal range of motion. She exhibits no edema or tenderness.   Neurological: She is alert and oriented to person, place, and time. She has normal strength. No cranial nerve deficit or sensory deficit.   Skin: Skin is warm and dry. Capillary refill takes less than 2 seconds.   Psychiatric: She has a normal mood and affect. Thought content normal.         ED Course   Procedures  Labs Reviewed   CBC W/ AUTO DIFFERENTIAL - Abnormal; Notable for the following components:       Result Value    Mean Corpuscular Volume 81 (*)     Mean Corpuscular Hemoglobin 26.1 (*)     Eos # 0.6 (*)     Eosinophil% 10.8 (*)     All other components within normal limits   COMPREHENSIVE METABOLIC PANEL - Abnormal; Notable for the following components:    eGFR if non  52 (*)     All other components within normal limits   URINALYSIS, REFLEX TO URINE CULTURE - Abnormal; Notable for the following components:    Protein, UA 2+ (*)     Occult Blood UA 2+ (*)     Leukocytes, UA Trace (*)     All other components within normal limits    Narrative:     Preferred Collection Type->Urine, Clean Catch   URINALYSIS MICROSCOPIC - Abnormal; Notable for the following components:    RBC, UA 10 (*)     WBC, UA 8 (*)     All other components within normal limits    Narrative:     Preferred Collection Type->Urine, Clean Catch   LIPASE   POCT URINE PREGNANCY          Imaging Results          CT Abdomen Pelvis With Contrast (Final result)  Result time 05/31/19 11:17:19    Final result by Griffin Estes MD (05/31/19 11:17:19)                 Impression:      1. No acute intra-abdominal abnormality  identified  2. Status post cholecystectomy; chronic, mild dilatation of common bile duct.  3. Bilateral renal cysts.  No urinary stone or obstruction.  4. Status post hysterectomy.  5. Other findings as above.      Electronically signed by: Griffin Estes MD  Date:    05/31/2019  Time:    11:17             Narrative:    EXAMINATION:  CT ABDOMEN PELVIS WITH CONTRAST    CLINICAL HISTORY:  abdominal pain;    TECHNIQUE:  Low dose axial images, sagittal and coronal reformations were obtained from the lung bases to the pubic symphysis following the IV administration of 75 mL of Omnipaque 350 .  No oral contrast.    COMPARISON:  CT renal stone study 06/19/2014.    FINDINGS:  Visualized lung bases are clear.  No free air is seen in the abdomen.    Spleen is upper normal size with homogeneous parenchyma except subcentimeter hypodense finding, too small to characterize, possible cyst.  Liver is normal size with homogeneous parenchyma and possible steatosis.  Surgical clips are present from cholecystectomy.  The common bile duct shows mild, chronic dilatation at 11 mm down to the papilla without obvious cause.  Periampullary diverticulum is noted in the area.  The pancreas appears normal.    Adrenal glands are normal.  The kidneys are normal size and enhance normally without evidence of stone, obstruction, or solid mass.  Several cysts are present in both kidneys up to about 3 cm size.  Extrarenal pelves are present bilaterally.  Ureters are normal caliber and clear to the bladder.  Wall of the urinary bladder is smooth.  The uterus is not identified.  Small left ovary does look to be present.    The abdominal aorta tapers normally with all major branch vessels patent, incidentally noting separate origin of the hepatic artery.  No enlarged retroperitoneal lymph nodes are seen.    No significant ascites is seen in the abdomen or pelvis.  Intestinal tract shows no obstruction or acute inflammatory process.  Stomach is  collapsed.  Small bowel is normal caliber.  The normal appendix is visualized.  Few diverticula are seen in the left colon without acute inflammation.    No abdominal wall hernias are seen.  Skeletal structures are intact without acute finding.  A small intramuscular lipoma is noted in the right thigh.                                 Medical Decision Making:   Initial Assessment:   49 year old patient presenting 2/2 abdominal pain with a history of abdominal surgeries and also gastroparesis and also chronic abdominal pain. Her pain is acutely worse than her baseline.  She does have diffuse tenderness.  I stoma differentials likely colitis, diverticulitis, or ileitis or partial small-bowel obstruction.  IV fluids, ketorolac ordered for patient.  Patient took her home blood pressure medications while in the emergency department.  Asymptomatic hypertension    Also considered but less likely:     AAA: location inconsistent, no pulsatile mass  Cholecystitis: location inconsistent, no relation with meals, negative burtons  Appendicitis: location inconsistent, no fever, no rebound/guarding  Mesenteric ischemia: HPI inconsistent, does not coincide with meals, other dx more likely  Kidney stone: no radiation to back or cva tenderness, no dysuria, no hematuria  Pyelonephritis: no cva tenderness, no dysuria, no fever  Pancreatitis: no history of alcohol abuse, unlikely gallstone obstructing, location inconsistent  TOA: no systemic symptoms, location inconsistent, pelvic exam benign  Ectopic: status post hysterectomy.  Urine pregnancy negative  Torsion: no adnexal tenderness and hpi not consistent  PID: no history of STDs, no vaginal discharge report    Patient still in pain and given IV morphine.  This significantly helped her symptoms.  Labs reassuring and CT abdomen pelvis do not show anything acute.  Patient is tolerating p.o. afterwards and feels better.  Will discharge home with and a couple Percocet and Zofran.  Patient  is to call her gastroenterologist in follow-up. I discussed with the patient the diagnosis, treatment plan, indications for return to the emergency department, and for expected follow-up. The patient verbalized an understanding. The patient is asked if there are any questions or concerns. We discuss the case, until all issues are addressed to the patients satisfaction. Patient understands and is agreeable to the plan.   Jay Mello    Clinical Tests:   Lab Tests: Reviewed and Ordered  Radiological Study: Ordered and Reviewed            Scribe Attestation:   Scribe #1: I performed the above scribed service and the documentation accurately describes the services I performed. I attest to the accuracy of the note.    Attending Attestation:           Physician Attestation for Scribe:  Physician Attestation Statement for Scribe #1: I, Jay Mello MD, reviewed documentation, as scribed by Ruddy Phillips in my presence, and it is both accurate and complete.                    Clinical Impression:       ICD-10-CM ICD-9-CM   1. Abdominal pain, unspecified abdominal location R10.9 789.00                                Jay Mello MD  05/31/19 1801

## 2019-06-03 RX ORDER — ATENOLOL 50 MG/1
50 TABLET ORAL 2 TIMES DAILY PRN
Qty: 180 TABLET | Refills: 1 | Status: SHIPPED | OUTPATIENT
Start: 2019-06-03 | End: 2020-01-13

## 2019-06-10 ENCOUNTER — PATIENT MESSAGE (OUTPATIENT)
Dept: ADMINISTRATIVE | Facility: OTHER | Age: 49
End: 2019-06-10

## 2019-06-10 ENCOUNTER — INITIAL CONSULT (OUTPATIENT)
Dept: DERMATOLOGY | Facility: CLINIC | Age: 49
End: 2019-06-10
Payer: MEDICARE

## 2019-06-10 DIAGNOSIS — L40.9 PSORIASIS: ICD-10-CM

## 2019-06-10 DIAGNOSIS — I78.1 TELANGIECTASIA: ICD-10-CM

## 2019-06-10 DIAGNOSIS — L72.0 MILIA: ICD-10-CM

## 2019-06-10 DIAGNOSIS — L82.1 SEBORRHEIC KERATOSIS: ICD-10-CM

## 2019-06-10 DIAGNOSIS — L57.0 AK (ACTINIC KERATOSIS): Primary | ICD-10-CM

## 2019-06-10 DIAGNOSIS — L91.8 SKIN TAGS, MULTIPLE ACQUIRED: ICD-10-CM

## 2019-06-10 DIAGNOSIS — D22.9 MULTIPLE BENIGN NEVI: ICD-10-CM

## 2019-06-10 DIAGNOSIS — L71.9 ROSACEA: ICD-10-CM

## 2019-06-10 DIAGNOSIS — L73.8 SEBACEOUS HYPERPLASIA: ICD-10-CM

## 2019-06-10 DIAGNOSIS — D18.00 ANGIOMA: ICD-10-CM

## 2019-06-10 DIAGNOSIS — B07.8 OTHER VIRAL WARTS: ICD-10-CM

## 2019-06-10 PROCEDURE — 17110 PR DESTRUCTION BENIGN LESIONS UP TO 14: ICD-10-PCS | Mod: S$GLB,,, | Performed by: NURSE PRACTITIONER

## 2019-06-10 PROCEDURE — 17000 PR DESTRUCTION(LASER SURGERY,CRYOSURGERY,CHEMOSURGERY),PREMALIGNANT LESIONS,FIRST LESION: ICD-10-PCS | Mod: 59,S$GLB,, | Performed by: NURSE PRACTITIONER

## 2019-06-10 PROCEDURE — 99203 OFFICE O/P NEW LOW 30 MIN: CPT | Mod: 25,S$GLB,, | Performed by: NURSE PRACTITIONER

## 2019-06-10 PROCEDURE — 99999 PR PBB SHADOW E&M-EST. PATIENT-LVL II: CPT | Mod: PBBFAC,,, | Performed by: NURSE PRACTITIONER

## 2019-06-10 PROCEDURE — 17110 DESTRUCTION B9 LES UP TO 14: CPT | Mod: S$GLB,,, | Performed by: NURSE PRACTITIONER

## 2019-06-10 PROCEDURE — 99203 PR OFFICE/OUTPT VISIT, NEW, LEVL III, 30-44 MIN: ICD-10-PCS | Mod: 25,S$GLB,, | Performed by: NURSE PRACTITIONER

## 2019-06-10 PROCEDURE — 99999 PR PBB SHADOW E&M-EST. PATIENT-LVL II: ICD-10-PCS | Mod: PBBFAC,,, | Performed by: NURSE PRACTITIONER

## 2019-06-10 PROCEDURE — 17000 DESTRUCT PREMALG LESION: CPT | Mod: 59,S$GLB,, | Performed by: NURSE PRACTITIONER

## 2019-06-10 RX ORDER — KETOCONAZOLE 20 MG/ML
SHAMPOO, SUSPENSION TOPICAL
Qty: 120 ML | Refills: 5 | Status: SHIPPED | OUTPATIENT
Start: 2019-06-10 | End: 2021-11-17 | Stop reason: SDUPTHER

## 2019-06-10 RX ORDER — FLUOCINONIDE TOPICAL SOLUTION USP, 0.05% 0.5 MG/ML
SOLUTION TOPICAL
Qty: 60 ML | Refills: 3 | Status: SHIPPED | OUTPATIENT
Start: 2019-06-10 | End: 2021-03-18

## 2019-06-10 RX ORDER — METRONIDAZOLE 7.5 MG/G
CREAM TOPICAL
Qty: 45 G | Refills: 3 | Status: CANCELLED | OUTPATIENT
Start: 2019-06-10

## 2019-06-10 RX ORDER — TRIAMCINOLONE ACETONIDE 1 MG/G
CREAM TOPICAL
Qty: 60 G | Refills: 1 | Status: SHIPPED | OUTPATIENT
Start: 2019-06-10 | End: 2020-09-24

## 2019-06-10 RX ORDER — AZELAIC ACID 0.15 G/G
GEL TOPICAL
Qty: 50 G | Refills: 2 | Status: SHIPPED | OUTPATIENT
Start: 2019-06-10 | End: 2019-10-29 | Stop reason: SDUPTHER

## 2019-06-10 RX ORDER — AZELAIC ACID 0.15 G/G
GEL TOPICAL
Status: CANCELLED | OUTPATIENT
Start: 2019-06-10 | End: 2020-06-09

## 2019-06-10 NOTE — LETTER
Hollie 10, 2019      Giuliana Rojas MD  [address] [place] [address]           [facility]  [address]  [place] [address]  Phone: [phone]  Fax: [phone]          Patient: Giuliana Rodas   MR Number: [id number]   YOB: 1970   Date of Visit: 6/10/2019       Dear Dr. Giuliana Rojas:    Thank you for referring Giuliana Rodas to me for evaluation. Attached you will find relevant portions of my assessment and plan of care.    If you have questions, please do not hesitate to call me. I look forward to following Giuliana Rodas along with you.    Sincerely,    Hannah Saha, NP    Enclosure  CC:  No Recipients    If you would like to receive this communication electronically, please contact [email or web address] or [phone] to request more information on [email or web address] Link access.    For providers and/or their staff who would like to refer a patient to [facility], please contact us through our one-stop-shop provider referral line, [facility], at [phone].    If you feel you have received this communication in error or would no longer like to receive these types of communications, please e-mail [email or web address]

## 2019-06-10 NOTE — PROGRESS NOTES
"  Subjective:       Patient ID:  Giuliana Rodas is a 49 y.o. female who presents for   Chief Complaint   Patient presents with    Rash     post neck, X2mos , red, dry, no tx     Skin Tags     neck , X3yrs, no tx     Lesion     right cheek, X1yr, non healing no tx      Rash  - Initial  Affected locations: back  Duration: 2 months  Signs / symptoms: redness  Severity: mild  Timing: constant  Aggravated by: nothing  Treatments tried: reports rash started after taking trokendi; started in March, stopped in April.    Skin Tags  - Initial  Affected locations: neck  Signs / symptoms: growing  Severity: mild  Timing: constant  Aggravated by: nothing  Treatments tried: reports she pinches them off.    Lesion  - Initial  Affected locations: face  Duration: 1 year  Signs / symptoms: growing  Severity: mild  Timing: constant  Aggravated by: nothing  Relieving factors/Treatments tried: OTC hydrocortisone  Improvement on treatment: no relief      Denies any previous h/o NMSC or MM    +h/o AK's    Review of Systems   Skin: Positive for rash, daily sunscreen use and activity-related sunscreen use.        Reports was diagnosed w/ "psoriasis & eczema" by PCP as a child        Objective:    Physical Exam   Constitutional: She appears well-developed and well-nourished. No distress.   Neurological: She is alert and oriented to person, place, and time. She is not disoriented.   Psychiatric: She has a normal mood and affect.   Skin:   Areas Examined (abnormalities noted in diagram):   Scalp / Hair Palpated and Inspected  Head / Face Inspection Performed  Neck Inspection Performed  Chest / Axilla Inspection Performed  Abdomen Inspection Performed  Back Inspection Performed  RUE Inspected  LUE Inspection Performed  RLE Inspected  LLE Inspection Performed                   Diagram Legend     Erythematous scaling macule/papule c/w actinic keratosis       Vascular papule c/w angioma      Pigmented verrucoid papule/plaque c/w seborrheic " keratosis      Yellow umbilicated papule c/w sebaceous hyperplasia      Irregularly shaped tan macule c/w lentigo     1-2 mm smooth white papules consistent with Milia      Movable subcutaneous cyst with punctum c/w epidermal inclusion cyst      Subcutaneous movable cyst c/w pilar cyst      Firm pink to brown papule c/w dermatofibroma      Pedunculated fleshy papule(s) c/w skin tag(s)      Evenly pigmented macule c/w junctional nevus     Mildly variegated pigmented, slightly irregular-bordered macule c/w mildly atypical nevus      Flesh colored to evenly pigmented papule c/w intradermal nevus       Pink pearly papule/plaque c/w basal cell carcinoma      Erythematous hyperkeratotic cursted plaque c/w SCC      Surgical scar with no sign of skin cancer recurrence      Open and closed comedones      Inflammatory papules and pustules      Verrucoid papule consistent consistent with wart     Erythematous eczematous patches and plaques     Dystrophic onycholytic nail with subungual debris c/w onychomycosis     Umbilicated papule    Erythematous-base heme-crusted tan verrucoid plaque consistent with inflamed seborrheic keratosis     Erythematous Silvery Scaling Plaque c/w Psoriasis     See annotation      Assessment / Plan:        AK (actinic keratosis)  Cryosurgery Procedure Note    Verbal consent from the patient is obtained and the patient is aware of the precancerous quality and need for treatment of these lesions. Liquid nitrogen cryosurgery is applied to the 1 actinic keratoses, as detailed in the physical exam, to produce a freeze injury. The patient is aware that blisters may form and is instructed on wound care with gentle cleansing and use of vaseline ointment to keep moist until healed. The patient is supplied a handout on cryosurgery and is instructed to call if lesions do not completely resolve.    Other viral warts  Cryosurgery procedure note:    Verbal consent from the patient is obtained. Liquid nitrogen  cryosurgery is applied to 1 verruca with prior paring, as detailed in the physical exam, to produce a freeze injury. 3 consecutive freeze thaw cycles are applied to each verruca. The patient is aware that blisters (possibly blood blisters) may form.    Rosacea w/ background redness & Telangiectasia  -     azelaic acid (FINACEA) 15 % gel; Apply to face nightly. Increase to BID as tolerated  Dispense: 50 g; Refill: 2    Discussed rosacea is chronic inflammatory disorder of the facial capillaries and facial pilosebaceous units resulting in flushing and telangiectasia. Topicals prescribed will help with redness and papules but typically not the telangiectasia. Recurrences are common. Discussed trying to avoid triggers.    Psoriasis- scalp  -     ketoconazole (NIZORAL) 2 % shampoo; Wash hair with medicated shampoo at least 2x/week - let sit on scalp at least 5 minutes prior to rinsing  Dispense: 120 mL; Refill: 5  -     fluocinonide (LIDEX) 0.05 % external solution; AAA scalp qday - bid prn pruritus  Dispense: 60 mL; Refill: 3  -     triamcinolone acetonide 0.1% (KENALOG) 0.1 % cream; AAA bid prn redness, scaling or itching  Dispense: 60 g; Refill: 1    Multiple benign nevi  Reassurance provided.  Instructed patient to observe lesion(s) for changes and follow up in clinic if changes are noted. Discussed ABCDE's of moles and brochure provided.    Angioma  This is a benign vascular lesion. Reassurance given. No treatment required.     Milia  Reassurance given to patient. No treatment is necessary.   Treatment of benign, asymptomatic lesions may be considered cosmetic.    Seborrheic keratosis  These are benign inherited growths without a malignant potential. Reassurance given to patient. No treatment is necessary.     Sebaceous hyperplasia  This is a common condition representing benign enlargement of the sebaceous lobule. It typically occurs in adulthood. Reassurance given to patient.     Skin tags  Reassurance given to  patient. No treatment is necessary.   Treatment of benign, asymptomatic lesions may be considered cosmetic.               Follow up in about 6 months (around 12/10/2019) for skin cancer screening  w/ MD.

## 2019-06-12 ENCOUNTER — PATIENT OUTREACH (OUTPATIENT)
Dept: OTHER | Facility: OTHER | Age: 49
End: 2019-06-12

## 2019-06-12 NOTE — PROGRESS NOTES
Patient continues to deal with GI distress after ED visit. She will see GI tomorrow. Has not been able to take many BP readings. Tolerating atenolol 50mg without problems. Will monitor more when she feels better.    Last 5 Patient Entered Readings                                      Current 30 Day Average: 157/93     Recent Readings 6/3/2019 5/30/2019 5/30/2019 5/22/2019 5/22/2019    SBP (mmHg) 155 160 170 166 170    DBP (mmHg) 87 92 96 93 99    Pulse 71 64 68 73 73          BP above goal, <130/80 mmHg  Continue monitoring  Hypertension Medications             amlodipine-olmesartan (ANGEL) 5-40 mg per tablet Take 1 tablet by mouth once daily.    atenolol (TENORMIN) 50 MG tablet Take 1 tablet (50 mg total) by mouth 2 (two) times daily as needed.

## 2019-06-13 NOTE — PROGRESS NOTES
Last 5 Patient Entered Readings                                      Current 30 Day Average: 157/92     Recent Readings 6/12/2019 6/3/2019 5/30/2019 5/30/2019 5/22/2019    SBP (mmHg) 160 155 160 170 166    DBP (mmHg) 87 87 92 96 93    Pulse 57 71 64 68 73          6/13: Spoke with pharmacist last week.  Will call in 1 week.

## 2019-06-20 ENCOUNTER — PATIENT OUTREACH (OUTPATIENT)
Dept: OTHER | Facility: OTHER | Age: 49
End: 2019-06-20

## 2019-06-20 NOTE — PROGRESS NOTES
Last 5 Patient Entered Readings                                      Current 30 Day Average: 161/94     Recent Readings 6/15/2019 6/15/2019 6/12/2019 6/3/2019 5/30/2019    SBP (mmHg) 166 162 160 155 160    DBP (mmHg) 92 100 87 87 92    Pulse 65 64 57 71 64        6/20: LVM.  Will call in 2 weeks.

## 2019-07-05 DIAGNOSIS — E78.5 HYPERLIPIDEMIA, UNSPECIFIED HYPERLIPIDEMIA TYPE: ICD-10-CM

## 2019-07-05 RX ORDER — PRAVASTATIN SODIUM 20 MG/1
TABLET ORAL
Qty: 90 TABLET | Refills: 0 | Status: SHIPPED | OUTPATIENT
Start: 2019-07-05 | End: 2020-09-24

## 2019-07-08 RX ORDER — SUMATRIPTAN SUCCINATE 4 MG/.5ML
4 INJECTION, SOLUTION SUBCUTANEOUS ONCE AS NEEDED
Qty: 9 EACH | Refills: 0 | Status: SHIPPED | OUTPATIENT
Start: 2019-07-08 | End: 2019-07-17 | Stop reason: SDUPTHER

## 2019-07-08 NOTE — PROGRESS NOTES
"Last 5 Patient Entered Readings                                      Current 30 Day Average: 157/91     Recent Readings 7/7/2019 7/5/2019 6/27/2019 6/20/2019 6/20/2019    SBP (mmHg) 153 171 140 151 166    DBP (mmHg) 86 90 88 90 98    Pulse 69 64 71 62 64          Digital Medicine: Health  Follow Up    Lifestyle Modifications:    1.Dietary Modifications (Sodium intake <2,000mg/day, food labels, dining out): Reports she has been sticking with a low salt and low fat diet. States she has lost 17lbs. Reports she has been eating "bland foods."  States she has been cooking her own rotisserie chickens in the air fryer so she can control the seasonings she uses.  Reports she drinks only water and milk and sometimes iced tea.    2.Physical Activity: Reports she has not been able to exercise due to headache and stomach pains.    3.Medication Therapy: Patient has been compliant with the medication regimen.    4.Patient has the following medication side effects/concerns: none  (Frequency/Alleviating factors/Precipitating factors, etc.)     Follow up with Mrs. Giuliana LEIVA Adrianna completed. No further questions or concerns. Will continue to follow up to achieve health goals.    "

## 2019-07-17 DIAGNOSIS — M17.0 OSTEOARTHRITIS OF BOTH KNEES, UNSPECIFIED OSTEOARTHRITIS TYPE: ICD-10-CM

## 2019-07-17 RX ORDER — SUMATRIPTAN SUCCINATE 4 MG/.5ML
4 INJECTION, SOLUTION SUBCUTANEOUS ONCE AS NEEDED
Qty: 9 EACH | Refills: 0 | Status: SHIPPED | OUTPATIENT
Start: 2019-07-17 | End: 2019-07-25 | Stop reason: SDUPTHER

## 2019-07-17 RX ORDER — SUMATRIPTAN SUCCINATE 4 MG/.5ML
4 INJECTION, SOLUTION SUBCUTANEOUS ONCE AS NEEDED
Qty: 9 EACH | Refills: 0 | Status: CANCELLED | OUTPATIENT
Start: 2019-07-17 | End: 2019-07-17

## 2019-07-17 RX ORDER — DICLOFENAC SODIUM 50 MG/1
50 TABLET, DELAYED RELEASE ORAL 2 TIMES DAILY PRN
Qty: 45 TABLET | Refills: 2 | Status: SHIPPED | OUTPATIENT
Start: 2019-07-17 | End: 2019-10-16 | Stop reason: SDUPTHER

## 2019-07-25 RX ORDER — SUMATRIPTAN SUCCINATE 4 MG/.5ML
4 INJECTION, SOLUTION SUBCUTANEOUS ONCE AS NEEDED
Qty: 9 EACH | Refills: 0 | Status: SHIPPED | OUTPATIENT
Start: 2019-07-25 | End: 2019-07-25

## 2019-07-30 ENCOUNTER — PATIENT OUTREACH (OUTPATIENT)
Dept: ADMINISTRATIVE | Facility: OTHER | Age: 49
End: 2019-07-30

## 2019-08-01 ENCOUNTER — OFFICE VISIT (OUTPATIENT)
Dept: NEUROLOGY | Facility: CLINIC | Age: 49
End: 2019-08-01
Payer: MEDICARE

## 2019-08-01 VITALS
HEART RATE: 68 BPM | BODY MASS INDEX: 33.79 KG/M2 | SYSTOLIC BLOOD PRESSURE: 180 MMHG | WEIGHT: 183.63 LBS | HEIGHT: 62 IN | DIASTOLIC BLOOD PRESSURE: 92 MMHG

## 2019-08-01 DIAGNOSIS — G43.711 CHRONIC MIGRAINE WITHOUT AURA, WITH INTRACTABLE MIGRAINE, SO STATED, WITH STATUS MIGRAINOSUS: Primary | ICD-10-CM

## 2019-08-01 PROCEDURE — 99214 PR OFFICE/OUTPT VISIT, EST, LEVL IV, 30-39 MIN: ICD-10-PCS | Mod: S$GLB,,, | Performed by: NEUROLOGICAL SURGERY

## 2019-08-01 PROCEDURE — 99999 PR PBB SHADOW E&M-EST. PATIENT-LVL IV: CPT | Mod: PBBFAC,,, | Performed by: NEUROLOGICAL SURGERY

## 2019-08-01 PROCEDURE — 99999 PR PBB SHADOW E&M-EST. PATIENT-LVL IV: ICD-10-PCS | Mod: PBBFAC,,, | Performed by: NEUROLOGICAL SURGERY

## 2019-08-01 PROCEDURE — 3008F BODY MASS INDEX DOCD: CPT | Mod: CPTII,S$GLB,, | Performed by: NEUROLOGICAL SURGERY

## 2019-08-01 PROCEDURE — 3008F PR BODY MASS INDEX (BMI) DOCUMENTED: ICD-10-PCS | Mod: CPTII,S$GLB,, | Performed by: NEUROLOGICAL SURGERY

## 2019-08-01 PROCEDURE — 99214 OFFICE O/P EST MOD 30 MIN: CPT | Mod: S$GLB,,, | Performed by: NEUROLOGICAL SURGERY

## 2019-08-01 PROCEDURE — 3080F PR MOST RECENT DIASTOLIC BLOOD PRESSURE >= 90 MM HG: ICD-10-PCS | Mod: CPTII,S$GLB,, | Performed by: NEUROLOGICAL SURGERY

## 2019-08-01 PROCEDURE — 3077F SYST BP >= 140 MM HG: CPT | Mod: CPTII,S$GLB,, | Performed by: NEUROLOGICAL SURGERY

## 2019-08-01 PROCEDURE — 3077F PR MOST RECENT SYSTOLIC BLOOD PRESSURE >= 140 MM HG: ICD-10-PCS | Mod: CPTII,S$GLB,, | Performed by: NEUROLOGICAL SURGERY

## 2019-08-01 PROCEDURE — 3080F DIAST BP >= 90 MM HG: CPT | Mod: CPTII,S$GLB,, | Performed by: NEUROLOGICAL SURGERY

## 2019-08-01 RX ORDER — SUMATRIPTAN SUCCINATE 100 MG/1
100 TABLET ORAL
Qty: 9 TABLET | Refills: 5 | Status: SHIPPED | OUTPATIENT
Start: 2019-08-01 | End: 2019-08-01 | Stop reason: SDUPTHER

## 2019-08-01 RX ORDER — SUMATRIPTAN SUCCINATE 100 MG/1
100 TABLET ORAL
Qty: 9 TABLET | Refills: 5 | Status: SHIPPED | OUTPATIENT
Start: 2019-08-01 | End: 2020-01-27

## 2019-08-01 NOTE — PROGRESS NOTES
"Chief Complaint   Patient presents with    Migraine        Giuliana Rodas is a 49 y.o. female with a history of multiple medical diagnoses as listed below that presents for evaluation of headaches. She has been having headaches almost weekly for the last several months. The headache tends to feel like pressure as if a "cap" were on her head and at other times she has headaches that are from the front of her head down to the middle of the posterior aspect of the head. The headache that is like a ca is described as a "sharp pressure" that is 10/10 in intensity. The headache are debilitating and only allow her to lie down in a ximena quiet room as the pain worsened with lights and sounds. She has nausea very frequently with these headaches but says that she does not vomit. When the headaches are in the center of her head she feels that the pain extends into the neck and shoulders as well. It too can get up to a 10/10 but she is less sensitive to light and sounds and tends not to have nausea. The head can last from 3 hours to 3 days at a time. She has not family history of headaches. She has also been having problems sleeping so she was started on Elavil by her PCP in hopes of treating both her headaches and her insomnia. She has not had a headache in the last three weeks since she has been on the medication. She has no complaints with the medication.    Interval History  8/18/2016  She has had about 5-6 "bad" headaches since she was last seen in clinic. She has been having various headache types including tension headaches and sinus headaches as well. She has been using Robaxin which she says helps with the tension headaches and she has been using Fioricet to help with her various other headache types. She has not had much relief with tramadol as she says that she has taken the medication several times in the past and feel that it's effects have likely been lost on her. Elavil 50 mg qhs has been helping her to sleep but " she does not feel like the medication has made her excessively sedated and she does think that she could tolerate an increased dose of the medication. She has been having GI issues and has been looking to find a gastroenterologist that is within her network for gastroparesis and she has been scheduled to see dermatology for evaluation of two skin lesions that her PCP felt could be precancerous.    11/17/2016  She has still been having episodic headaches since she was last seen. She recently had a tooth extracted and was told that she had an infection underlying it as well. She has been taking Elavil as directed and has bene having some improvement in her migraines. She still tends to have frequent tension headaches that are treated very well with Robaxin. She has been seeing GI for her gastroparesis but has scheduled follow-up toward the end of the month to decide how the problem will be approached. She has not had any new problems since she was last seen in clinic.    02/16/2017  Since last being seen she says that she has been seen by GI who determined that she had a problem with bile acid production. She says that she has been doing better overall with her headaches and feels that when she has taken the Fioricet it has been beneficial to help in the relief of her headaches. She has not had any new problems but feels that the pain has been slightly more frequent than before.    05/16/2017  Headaches were well controlled when she was taking her medications consisting of Elavil and Methocarbamol as preventative medications. She has since been unable to get Robaxin because insurance is no longer covering the medication. She has not been taking it for the last month and has been having more frequent headaches since that time. She has been using Fioricet as an abortive medication which has been helpful in alleviating her pain. She has dry mouth and dry eyes as a result of her Elavil. Her dry eyes has been making  reading more difficult as she has constant tearing in the eyes.    07/18/2017  She has had at least one migraine since she was last seen in clinic while she was visiting [place] with her parents and was spending some time outside when she had a headache that began and intensified fairly quickly. Fioricet was on hand which she took and was able to relieve her headache so that she could continue her day without many issues. She has continued to have tension headaches that eminate in the neck and into the shoulders as well. She has the pain radiate across the back at times as well. She has also been having headaches across the front of the head that has been feeling of intense pressure that she feels is related to her sinus headaches. She has been working to become more active and exercise more.    10/18/2017  She continues to work with her medical team try to get better control of her symptoms.  Tension headaches has still been bothersome despite her compliance and methocarbamol.  She feels that the medication be ineffective and she is is wishing to try different medication to control her symptoms.  Despite the frequency of her tension headaches she has had fewer migraines than compared to her previous visit.  She'll been taking all medications as directed and has been tolerating well.    01/18/2018  She has been dealing with her mother going to chemotherapy treatments with has been taxing on both her mother and herself.  Headaches have still been present, but slightly better than when she was last seen in clinic.  Migraines have not been very bothersome; however, she has continued to have tension headaches relatively often.    02/28/2019  Headaches have been bothersome since she was last seen in clinic but seem to be better in the last year.  She has been tolerating her current medication regimen well.  She feels that her level of stress has been a major trigger for her symptoms which she has been unable to find  a suitable medication regimen to control these symptoms would do reliability.  She has been most plagued by an episode of shaking which occurred unexplained weight clear to patient said that she seemed to lose consciousness and was unaware of what was going on during the episode.  She has never had any episodes like this since it happened at does not recall any previous history of syncope or seizure.    04/22/2019  She has found the last 2 months the headaches seem to be less responsive to her current control medication.  Despite compliance with her Elavil therapy headaches seem to be occurring more frequently, more intensely, and seemed to be longer lasting compared to 2 months ago.  She has not been able to identify any triggers that would account for this increase in her headaches.  She has continued to have response to her abortive therapies before and she has been relying on the much more than she had been 2 months prior.  She has come to the point where her abortive therapies are not able to last as long as they did before she is concerned that with continued headache increases she would not having medication to successfully treat all the headaches she has had.    08/01/2019  She has been following closely with her GI doctor to determine what has been causing her to have gastritis and persistent GI upset.  The patient has been having very little food intake and has also been having decreased fluid intake over the last several weeks due to persistent abdominal pain.  There is suspicion that she has had excessive by will build up the stomach which is causing some her symptoms.  She has been placed on medication to try her reduce bile secretion, but the medication seemed to make her headaches become worse.  She also admits that due to decrease food and fluid intake this could also be contributing to her symptoms.  She has found significant relief from injectable sumatriptan, but the medication seems to be  somewhat too strong at times and she request injectable at a lower dose to determine if he would be just as efficacious.    PAST MEDICAL HISTORY:  Past Medical History:   Diagnosis Date    Bile reflux gastritis     Breast cyst     Eczema     Fibrocystic breast     Fibromyalgia     Gastroparesis     dr. harden    GERD (gastroesophageal reflux disease)     Hyperglyceridemia     Hyperlipidemia     Hypertension     IC (interstitial cystitis)     dr. labadie    Psoriasis     Tension headache        PAST SURGICAL HISTORY:  Past Surgical History:   Procedure Laterality Date    BREAST BIOPSY Right     over 10 years ago/ milk duct    CHOLECYSTECTOMY      EGD (ESOPHAGOGASTRODUODENOSCOPY) N/A 2/1/2014    Performed by Fitz Breen [facility] at [facility] (4[facility])    HEMORRHOID SURGERY      HYSTERECTOMY      KNEE SURGERY      OOPHORECTOMY      one ov removed       SOCIAL HISTORY:  Social History     Socioeconomic History    Marital status:      Spouse name: Not on file    Number of children: 2    Years of education: Not on file    Highest education level: Not on file   Occupational History    Occupation:      Employer: [employer]   Social Needs    Financial resource strain: Not on file    Food insecurity:     Worry: Not on file     Inability: Not on file    Transportation needs:     Medical: Not on file     Non-medical: Not on file   Tobacco Use    Smoking status: Never Smoker    Smokeless tobacco: Never Used   Substance and Sexual Activity    Alcohol use: No    Drug use: No    Sexual activity: Yes     Partners: Male     Birth control/protection: See Surgical Hx   Lifestyle    Physical activity:     Days per week: Not on file     Minutes per session: Not on file    Stress: Not on file   Relationships    Social connections:     Talks on phone: Not on file     Gets together: Not on file     Attends Buddhism service: Not on file     Active member of club or organization: Not on file      Attends meetings of clubs or organizations: Not on file     Relationship status: Not on file   Other Topics Concern    Not on file   Social History Narrative    Lives at home with  and daughter       FAMILY HISTORY:  Family History   Problem Relation Age of Onset    Hypertension Mother     Migraines Mother     Breast cancer Mother     Hypertension Father     Stroke Father     Heart disease Father     Hyperlipidemia Father     Breast cancer Paternal Grandmother     Colon cancer Neg Hx     Ovarian cancer Neg Hx     Inflammatory bowel disease Neg Hx     Celiac disease Neg Hx        ALLERGIES AND MEDICATIONS: updated and reviewed.  Review of patient's allergies indicates:   Allergen Reactions    Depo-provera [medroxyprogesterone] Anxiety    Dicyclomine Rash     Swelling of lip      Prozac [fluoxetine] Anxiety     Current Outpatient Medications   Medication Sig Dispense Refill    amlodipine-olmesartan (ANGEL) 5-40 mg per tablet Take 1 tablet by mouth once daily. 90 tablet 3    atenolol (TENORMIN) 50 MG tablet Take 1 tablet (50 mg total) by mouth 2 (two) times daily as needed. 180 tablet 1    azelaic acid (FINACEA) 15 % gel Apply to face nightly. Increase to BID as tolerated 50 g 2    BIFIDOBACTERIUM INFANTIS (ALIGN ORAL) Take 1 tablet by mouth once daily.      butalbital-acetaminophen-caffeine -40 mg (FIORICET, ESGIC) -40 mg per tablet Take 1 tablet by mouth every 6 (six) hours as needed. 40 tablet 1    diclofenac (VOLTAREN) 50 MG EC tablet Take 1 tablet (50 mg total) by mouth 2 (two) times daily as needed. 45 tablet 2    DULoxetine (CYMBALTA) 30 MG capsule 30 mg daily and then 30 mg bid 60 capsule 5    escitalopram oxalate (LEXAPRO) 20 MG tablet TAKE ONE TABLET BY MOUTH DAILY 30 tablet 11    fluocinonide (LIDEX) 0.05 % external solution AAA scalp qday - bid prn pruritus 60 mL 3    fluticasone propionate (FLONASE) 50 mcg/actuation nasal spray 1 spray by Each Nare route 2 (two)  times daily.  1    fremanezumab-vfrm (AJOVY) 225 mg/1.5 mL Syrg Inject 225 mg into the skin every 28 days. 1.5 mL 2    gemfibrozil (LOPID) 600 MG tablet Take 1 tablet (600 mg total) by mouth 2 (two) times daily before meals. 180 tablet 3    ketoconazole (NIZORAL) 2 % shampoo Wash hair with medicated shampoo at least 2x/week - let sit on scalp at least 5 minutes prior to rinsing 120 mL 5    methocarbamol (ROBAXIN) 500 MG Tab Take 500 mg by mouth 4 (four) times daily.      ondansetron (ZOFRAN) 4 MG tablet Take 1 tablet (4 mg total) by mouth every 6 (six) hours. 12 tablet 0    oxyCODONE-acetaminophen (PERCOCET) 5-325 mg per tablet Take 1 tablet by mouth every 4 (four) hours as needed for Pain. 11 tablet 0    pantoprazole (PROTONIX) 40 MG tablet Take 1 tablet (40 mg total) by mouth once daily. 90 tablet 3    pravastatin (PRAVACHOL) 20 MG tablet TAKE ONE TABLET BY MOUTH EVERY EVENING 90 tablet 0    sucralfate (CARAFATE) 1 gram tablet Take 1 g by mouth 4 (four) times daily.      sumatriptan (IMITREX) 100 MG tablet Take 1 tablet (100 mg total) by mouth as needed for Migraine. Take at the onset of headache, may take 1 more in 1 hour if needed. 9 tablet 5    SUMAtriptan succinate (ZEMBRACE SYMTOUCH) 3 mg/0.5 mL PnIj Inject 3 mg into the skin once as needed. May repeat x 1 if needed. Do not exceed 2 in 24 hours. 6 Syringe 5    tiZANidine (ZANAFLEX) 4 MG tablet Take 1 tablet (4 mg total) by mouth every 8 (eight) hours as needed. 60 tablet 3    traZODone (DESYREL) 50 MG tablet Take 1 tablet (50 mg total) by mouth every evening. 30 tablet 11    triamcinolone acetonide 0.1% (KENALOG) 0.1 % cream AAA bid prn redness, scaling or itching 60 g 1     No current facility-administered medications for this visit.        Review of Systems   Constitutional: Negative for activity change, appetite change, fever and unexpected weight change.   HENT: Negative for trouble swallowing and voice change.    Eyes: Positive for  photophobia and visual disturbance.   Respiratory: Negative for apnea and shortness of breath.    Cardiovascular: Negative for chest pain and leg swelling.   Gastrointestinal: Positive for nausea and vomiting. Negative for constipation.   Genitourinary: Negative for difficulty urinating.   Musculoskeletal: Negative for back pain, gait problem and neck pain.   Skin: Negative for color change and pallor.   Neurological: Positive for headaches. Negative for dizziness, seizures, syncope, weakness and numbness.   Hematological: Negative for adenopathy.   Psychiatric/Behavioral: Negative for agitation, confusion and decreased concentration.       Neurologic Exam     Mental Status   Oriented to person, place, and time.   Registration: recalls 3 of 3 objects.   Attention: normal. Concentration: normal.   Speech: speech is normal   Level of consciousness: alert  Knowledge: good.     Cranial Nerves     CN II   Visual fields full to confrontation.   Right visual field deficit: none  Left visual field deficit: none     CN III, IV, VI   Pupils are equal, round, and reactive to light.  Extraocular motions are normal.   Right pupil: Size: 3 mm. Shape: regular. Accommodation: intact.   Left pupil: Size: 3 mm. Shape: regular. Accommodation: intact.   CN III: no CN III palsy  CN VI: no CN VI palsy  Nystagmus: none   Diplopia: none  Ophthalmoparesis: none  Upgaze: normal  Downgaze: normal  Conjugate gaze: present    CN V   Facial sensation intact.   Right facial sensation deficit: none  Left facial sensation deficit: none    CN VII   Facial expression full, symmetric.   Right facial weakness: none  Left facial weakness: none    CN VIII   CN VIII normal.     CN IX, X   CN IX normal.   CN X normal.   Palate: symmetric    CN XI   CN XI normal.   Right sternocleidomastoid strength: normal  Left sternocleidomastoid strength: normal  Right trapezius strength: normal  Left trapezius strength: normal    CN XII   CN XII normal.   Tongue  deviation: none    Motor Exam   Muscle bulk: normal  Overall muscle tone: normal  Right arm tone: normal  Left arm tone: normal  Right leg tone: normal  Left leg tone: normal    Strength   Strength 5/5 throughout.     Sensory Exam   Right arm light touch: normal  Left arm light touch: normal  Right leg light touch: normal  Left leg light touch: normal  Right arm vibration: normal  Left arm vibration: normal  Right leg vibration: normal  Left leg vibration: normal  Right arm proprioception: normal  Left arm proprioception: normal  Right leg proprioception: normal  Left leg proprioception: normal  Right arm pinprick: normal  Left arm pinprick: normal  Right leg pinprick: normal  Left leg pinprick: normal    Gait, Coordination, and Reflexes     Gait  Gait: normal    Coordination   Romberg: negative  Finger to nose coordination: normal  Heel to shin coordination: normal  Tandem walking coordination: normal    Tremor   Resting tremor: absent    Reflexes   Right brachioradialis: 2+  Left brachioradialis: 2+  Right biceps: 2+  Left biceps: 2+  Right triceps: 2+  Left triceps: 2+  Right patellar: 2+  Left patellar: 2+  Right achilles: 2+  Left achilles: 2+  Right plantar: normal  Left plantar: normal      Physical Exam   Constitutional: She is oriented to person, place, and time. She appears well-developed and well-nourished.   HENT:   Head: Normocephalic and atraumatic.   Eyes: Pupils are equal, round, and reactive to light. EOM are normal.   Neck: Normal range of motion.   Cardiovascular: Normal rate and intact distal pulses.   Pulmonary/Chest: Effort normal. No apnea. No respiratory distress.   Musculoskeletal: Normal range of motion.   Neurological: She is alert and oriented to person, place, and time. She has normal strength. She has a normal Finger-Nose-Finger Test, a normal Heel to Shin Test, a normal Romberg Test and a normal Tandem Gait Test. Gait normal.   Reflex Scores:       Tricep reflexes are 2+ on the right  "side and 2+ on the left side.       Bicep reflexes are 2+ on the right side and 2+ on the left side.       Brachioradialis reflexes are 2+ on the right side and 2+ on the left side.       Patellar reflexes are 2+ on the right side and 2+ on the left side.       Achilles reflexes are 2+ on the right side and 2+ on the left side.  Skin: Skin is warm and dry.   Psychiatric: She has a normal mood and affect. Her speech is normal and behavior is normal. Thought content normal.   Vitals reviewed.      Vitals:    08/01/19 1530   BP: (!) 180/92   BP Location: Right arm   Patient Position: Sitting   BP Method: Large (Automatic)   Pulse: 68   Weight: 83.3 kg (183 lb 10.3 oz)   Height: 5' 2" (1.575 m)       Assessment & Plan:  Problem List Items Addressed This Visit     Chronic migraine without aura, with intractable migraine, so stated, with status migrainosus - Primary    Overview     Headaches have been much better with the once monthly headache control injections.  Periodic headaches have been treated with subcutaneous sumatriptan, but the medication may be slightly too strong for the patient. Lower does sumatriptan may be better tolerated offering headache control without any potential for side effects.         Relevant Medications    SUMAtriptan succinate (ZEMBRACE SYMTOUCH) 3 mg/0.5 mL PnIj    sumatriptan (IMITREX) 100 MG tablet        Follow-up: Follow up in about 1 month (around 9/1/2019).  More than 50% of this 25 minute encounter was spent in counseling and coordinating care of headache.      "

## 2019-08-12 ENCOUNTER — PATIENT OUTREACH (OUTPATIENT)
Dept: OTHER | Facility: OTHER | Age: 49
End: 2019-08-12

## 2019-08-12 NOTE — PROGRESS NOTES
Last 5 Patient Entered Readings                                      Current 30 Day Average: 158/92     Recent Readings 8/10/2019 7/31/2019 7/28/2019 7/18/2019 7/11/2019    SBP (mmHg) 163 149 162 156 143    DBP (mmHg) 93 86 94 93 84    Pulse 69 67 66 64 68          Digital Medicine: Health  Follow Up    Left voicemail to follow up with Ms. Giuliana Rodas.  Current BP average 158/92 mmHg is not at goal, 130/80 mmHg.    Will call in 1 week.

## 2019-08-19 NOTE — PROGRESS NOTES
Last 5 Patient Entered Readings                                      Current 30 Day Average: 158/91     Recent Readings 8/18/2019 8/10/2019 7/31/2019 7/28/2019 7/18/2019    SBP (mmHg) 159 163 149 162 156    DBP (mmHg) 91 93 86 94 93    Pulse 69 69 67 66 64        8/19: Patient RCB tomorrow when she is home.

## 2019-08-19 NOTE — PROGRESS NOTES
Last 5 Patient Entered Readings                                      Current 30 Day Average: 158/91     Recent Readings 8/18/2019 8/10/2019 7/31/2019 7/28/2019 7/18/2019    SBP (mmHg) 159 163 149 162 156    DBP (mmHg) 91 93 86 94 93    Pulse 69 69 67 66 64        8/19: RCB around 3pm

## 2019-08-20 ENCOUNTER — TELEPHONE (OUTPATIENT)
Dept: NEUROLOGY | Facility: CLINIC | Age: 49
End: 2019-08-20

## 2019-08-20 RX ORDER — SUMATRIPTAN SUCCINATE 4 MG/.5ML
4 INJECTION, SOLUTION SUBCUTANEOUS
Qty: 6 EACH | Refills: 5 | Status: SHIPPED | OUTPATIENT
Start: 2019-08-20 | End: 2019-08-27

## 2019-08-20 NOTE — PROGRESS NOTES
Last 5 Patient Entered Readings                                      Current 30 Day Average: 158/91     Recent Readings 8/18/2019 8/10/2019 7/31/2019 7/28/2019 7/18/2019    SBP (mmHg) 159 163 149 162 156    DBP (mmHg) 91 93 86 94 93    Pulse 69 69 67 66 64            Digital Medicine: Health  Follow Up    Left voicemail to follow up with Mi Giuliana ABBIE Rodas.  Current BP average 158/91 mmHg is not at goal, 130/80 mmHg    Will call in 2 weeks.

## 2019-08-20 NOTE — PROGRESS NOTES
Last 5 Patient Entered Readings                                      Current 30 Day Average: 158/91     Recent Readings 8/18/2019 8/10/2019 7/31/2019 7/28/2019 7/18/2019    SBP (mmHg) 159 163 149 162 156    DBP (mmHg) 91 93 86 94 93    Pulse 69 69 67 66 64          Digital Medicine: Health  Follow Up    Lifestyle Modifications:    1.Dietary Modifications (Sodium intake <2,000mg/day, food labels, dining out): Patient reports she is eating less food since she has had GI problems.  States she is reducing salt intake.  Reports she was told to eat more starchy vegetables like potatoes, rice, and noodles and she has incorporated some protein.  Reports she lost 21lbs.  Encouraged patient to continue checking food labels for less than 140mg per serving of sodium.    2.Physical Activity: Reports she has been stretching her hips, neck and back with exercises her daughter who works at PT clinic showed her.  Reports she would like to start getting back to exercise.  Patient states she has a gym membership that she would like to start using.  Encouraged patient to start with small goal of going to the gym.  Set SM[employer] going 2x/week for 20-30min exercising on elliptical or riding bike for 4 weeks.    3.Medication Therapy: Patient has been compliant with the medication regimen.    4.Patient has the following medication side effects/concerns: none  (Frequency/Alleviating factors/Precipitating factors, etc.)     Follow up with Mrs. Giuliana LEIVA Adrianna completed. No further questions or concerns. Will continue to follow up to achieve health goals.

## 2019-08-27 RX ORDER — SUMATRIPTAN 6 MG/.5ML
6 INJECTION, SOLUTION SUBCUTANEOUS ONCE AS NEEDED
Qty: 6 SYRINGE | Refills: 5 | Status: SHIPPED | OUTPATIENT
Start: 2019-08-27 | End: 2019-11-04 | Stop reason: SDUPTHER

## 2019-08-28 ENCOUNTER — OFFICE VISIT (OUTPATIENT)
Dept: FAMILY MEDICINE | Facility: CLINIC | Age: 49
End: 2019-08-28
Payer: MEDICARE

## 2019-08-28 VITALS
HEART RATE: 67 BPM | SYSTOLIC BLOOD PRESSURE: 138 MMHG | HEIGHT: 62 IN | BODY MASS INDEX: 33.47 KG/M2 | DIASTOLIC BLOOD PRESSURE: 80 MMHG | WEIGHT: 181.88 LBS | TEMPERATURE: 99 F | RESPIRATION RATE: 16 BRPM | OXYGEN SATURATION: 97 %

## 2019-08-28 DIAGNOSIS — R53.83 FATIGUE, UNSPECIFIED TYPE: ICD-10-CM

## 2019-08-28 DIAGNOSIS — R06.83 SNORING: ICD-10-CM

## 2019-08-28 DIAGNOSIS — K29.70 GASTRITIS, PRESENCE OF BLEEDING UNSPECIFIED, UNSPECIFIED CHRONICITY, UNSPECIFIED GASTRITIS TYPE: ICD-10-CM

## 2019-08-28 DIAGNOSIS — K58.9 IRRITABLE BOWEL SYNDROME, UNSPECIFIED TYPE: ICD-10-CM

## 2019-08-28 DIAGNOSIS — K31.84 GASTROPARESIS: Primary | ICD-10-CM

## 2019-08-28 PROCEDURE — 3079F DIAST BP 80-89 MM HG: CPT | Mod: CPTII,S$GLB,, | Performed by: FAMILY MEDICINE

## 2019-08-28 PROCEDURE — 3008F BODY MASS INDEX DOCD: CPT | Mod: CPTII,S$GLB,, | Performed by: FAMILY MEDICINE

## 2019-08-28 PROCEDURE — 3075F PR MOST RECENT SYSTOLIC BLOOD PRESS GE 130-139MM HG: ICD-10-PCS | Mod: CPTII,S$GLB,, | Performed by: FAMILY MEDICINE

## 2019-08-28 PROCEDURE — 3075F SYST BP GE 130 - 139MM HG: CPT | Mod: CPTII,S$GLB,, | Performed by: FAMILY MEDICINE

## 2019-08-28 PROCEDURE — 99214 PR OFFICE/OUTPT VISIT, EST, LEVL IV, 30-39 MIN: ICD-10-PCS | Mod: S$GLB,,, | Performed by: FAMILY MEDICINE

## 2019-08-28 PROCEDURE — 3008F PR BODY MASS INDEX (BMI) DOCUMENTED: ICD-10-PCS | Mod: CPTII,S$GLB,, | Performed by: FAMILY MEDICINE

## 2019-08-28 PROCEDURE — 3079F PR MOST RECENT DIASTOLIC BLOOD PRESSURE 80-89 MM HG: ICD-10-PCS | Mod: CPTII,S$GLB,, | Performed by: FAMILY MEDICINE

## 2019-08-28 PROCEDURE — 99999 PR PBB SHADOW E&M-EST. PATIENT-LVL IV: ICD-10-PCS | Mod: PBBFAC,,, | Performed by: FAMILY MEDICINE

## 2019-08-28 PROCEDURE — 99999 PR PBB SHADOW E&M-EST. PATIENT-LVL IV: CPT | Mod: PBBFAC,,, | Performed by: FAMILY MEDICINE

## 2019-08-28 PROCEDURE — 99214 OFFICE O/P EST MOD 30 MIN: CPT | Mod: S$GLB,,, | Performed by: FAMILY MEDICINE

## 2019-08-28 NOTE — PROGRESS NOTES
Subjective:       Patient ID: Giuliana Rodas is a 49 y.o. female.    Chief Complaint: Sore Throat (on and off for a month) and schedule sleep study    49 year old female presents for referral to a new GI. She has gastritis, GERD, fatty liver, IBS , and gastroparesis. She would like to assume care at [facility]. She states she has sensitivity in her stomach. She states Dr. Aceves was her doctor before Dr. Van. She states she has had her pancreatic levels checked. She had an EGD done 1-2 months ago and it showed gastritis. She also had an ultrasound showing fatty liver and a normal pancreas. She has epigastric and RUQ abdominal pain.she has some mild nausea, but not vomiting. She admits to reflux. She states she is belching all the time.     She has chronic fatiuge and states she has been told that she needs a sleep study. She states she does snore and  She has woken herself up from sleeping a few time.     Past Medical History:  No date: Bile reflux gastritis  No date: Breast cyst  No date: Eczema  No date: Fibrocystic breast  No date: Fibromyalgia  No date: Gastroparesis      Comment:  dr. harden  No date: GERD (gastroesophageal reflux disease)  No date: Hyperglyceridemia  No date: Hyperlipidemia  No date: Hypertension  No date: IC (interstitial cystitis)      Comment:  dr. labadie  No date: Psoriasis  No date: Tension headache   Past Surgical History:  No date: BREAST BIOPSY; Right      Comment:  over 10 years ago/ milk duct  No date: CHOLECYSTECTOMY  2/1/2014: EGD (ESOPHAGOGASTRODUODENOSCOPY); N/A      Comment:  Performed by Fitz Breen MD at [facility] ([facility])  No date: HEMORRHOID SURGERY  No date: HYSTERECTOMY  No date: KNEE SURGERY  No date: OOPHORECTOMY      Comment:  one ov removed  Review of patient's family history indicates:  Problem: Hypertension      Relation: Mother          Age of Onset: (Not Specified)  Problem: Migraines      Relation: Mother          Age of Onset: (Not  Specified)  Problem: Breast cancer      Relation: Mother          Age of Onset: (Not Specified)  Problem: Hypertension      Relation: Father          Age of Onset: (Not Specified)  Problem: Stroke      Relation: Father          Age of Onset: (Not Specified)  Problem: Heart disease      Relation: Father          Age of Onset: (Not Specified)  Problem: Hyperlipidemia      Relation: Father          Age of Onset: (Not Specified)  Problem: Breast cancer      Relation: Paternal Grandmother          Age of Onset: (Not Specified)  Problem: Colon cancer      Relation: Neg Hx          Age of Onset: (Not Specified)  Problem: Ovarian cancer      Relation: Neg Hx          Age of Onset: (Not Specified)  Problem: Inflammatory bowel disease      Relation: Neg Hx          Age of Onset: (Not Specified)  Problem: Celiac disease      Relation: Neg Hx          Age of Onset: (Not Specified)    Social History    Socioeconomic History      Marital status:       Spouse name: Not on file      Number of children: 2      Years of education: Not on file      Highest education level: Not on file    Occupational History      Occupation:         Employer: [employer]    Social Needs      Financial resource strain: Not on file      Food insecurity:        Worry: Not on file        Inability: Not on file      Transportation needs:        Medical: Not on file        Non-medical: Not on file    Tobacco Use      Smoking status: Never Smoker      Smokeless tobacco: Never Used    Substance and Sexual Activity      Alcohol use: No      Drug use: No      Sexual activity: Yes        Partners: Male        Birth control/protection: See Surgical Hx    Lifestyle      Physical activity:        Days per week: Not on file        Minutes per session: Not on file      Stress: Not on file    Relationships      Social connections:        Talks on phone: Not on file        Gets together: Not on file        Attends [employer] service: Not on file         "Active member of club or organization: Not on file        Attends meetings of clubs or organizations: Not on file        Relationship status: Not on file    Other Topics      Concerns:        Not on file    Social History Narrative      Lives at home with  and daughter        Review of Systems    Objective:       Vitals:    08/28/19 1451   BP: 138/80   Pulse: 67   Resp: 16   Temp: 98.5 °F (36.9 °C)   TempSrc: Oral   SpO2: 97%   Weight: 82.5 kg (181 lb 14.1 oz)   Height: 5' 2" (1.575 m)   ,  Physical Exam   Constitutional: She is oriented to person, place, and time. She appears well-developed and well-nourished. No distress.   Cardiovascular: Normal rate, regular rhythm and normal heart sounds. Exam reveals no gallop and no friction rub.   No murmur heard.  Pulmonary/Chest: Breath sounds normal. No stridor. No respiratory distress. She has no wheezes. She has no rales.   Abdominal: There is tenderness in the epigastric area.   Neurological: She is alert and oriented to person, place, and time.   Skin: She is not diaphoretic.       Assessment:       1. Gastroparesis    2. Gastritis, presence of bleeding unspecified, unspecified chronicity, unspecified gastritis type    3. Irritable bowel syndrome, unspecified type    4. Fatigue, unspecified type    5. Snoring        Plan:       Giuliana was seen today for sore throat and schedule sleep study.    Diagnoses and all orders for this visit:    Gastroparesis  -     Ambulatory referral to Gastroenterology    Gastritis, presence of bleeding unspecified, unspecified chronicity, unspecified gastritis type  -     Ambulatory referral to Gastroenterology    Irritable bowel syndrome, unspecified type  -     Ambulatory referral to Gastroenterology    Fatigue, unspecified type  -     Ambulatory referral to Sleep Disorders    Snoring  -     Ambulatory referral to Sleep Disorders           "

## 2019-08-29 ENCOUNTER — PATIENT MESSAGE (OUTPATIENT)
Dept: FAMILY MEDICINE | Facility: CLINIC | Age: 49
End: 2019-08-29

## 2019-08-29 DIAGNOSIS — Z12.39 BREAST CANCER SCREENING: Primary | ICD-10-CM

## 2019-08-30 ENCOUNTER — PATIENT OUTREACH (OUTPATIENT)
Dept: ADMINISTRATIVE | Facility: OTHER | Age: 49
End: 2019-08-30

## 2019-09-04 ENCOUNTER — OFFICE VISIT (OUTPATIENT)
Dept: NEUROLOGY | Facility: CLINIC | Age: 49
End: 2019-09-04
Payer: MEDICARE

## 2019-09-04 VITALS
DIASTOLIC BLOOD PRESSURE: 77 MMHG | SYSTOLIC BLOOD PRESSURE: 140 MMHG | BODY MASS INDEX: 32.94 KG/M2 | HEART RATE: 62 BPM | WEIGHT: 179 LBS | HEIGHT: 62 IN

## 2019-09-04 DIAGNOSIS — M54.81 OCCIPITAL NEURALGIA, UNSPECIFIED LATERALITY: ICD-10-CM

## 2019-09-04 DIAGNOSIS — G44.209 TENSION HEADACHE: ICD-10-CM

## 2019-09-04 DIAGNOSIS — G43.711 CHRONIC MIGRAINE WITHOUT AURA, WITH INTRACTABLE MIGRAINE, SO STATED, WITH STATUS MIGRAINOSUS: ICD-10-CM

## 2019-09-04 DIAGNOSIS — G43.711 CHRONIC MIGRAINE WITHOUT AURA, WITH INTRACTABLE MIGRAINE, SO STATED, WITH STATUS MIGRAINOSUS: Primary | ICD-10-CM

## 2019-09-04 PROCEDURE — 99214 OFFICE O/P EST MOD 30 MIN: CPT | Mod: S$GLB,,, | Performed by: NEUROLOGICAL SURGERY

## 2019-09-04 PROCEDURE — 3077F PR MOST RECENT SYSTOLIC BLOOD PRESSURE >= 140 MM HG: ICD-10-PCS | Mod: CPTII,S$GLB,, | Performed by: NEUROLOGICAL SURGERY

## 2019-09-04 PROCEDURE — 99999 PR PBB SHADOW E&M-EST. PATIENT-LVL IV: CPT | Mod: PBBFAC,,, | Performed by: NEUROLOGICAL SURGERY

## 2019-09-04 PROCEDURE — 3008F BODY MASS INDEX DOCD: CPT | Mod: CPTII,S$GLB,, | Performed by: NEUROLOGICAL SURGERY

## 2019-09-04 PROCEDURE — 3078F DIAST BP <80 MM HG: CPT | Mod: CPTII,S$GLB,, | Performed by: NEUROLOGICAL SURGERY

## 2019-09-04 PROCEDURE — 99214 PR OFFICE/OUTPT VISIT, EST, LEVL IV, 30-39 MIN: ICD-10-PCS | Mod: S$GLB,,, | Performed by: NEUROLOGICAL SURGERY

## 2019-09-04 PROCEDURE — 3077F SYST BP >= 140 MM HG: CPT | Mod: CPTII,S$GLB,, | Performed by: NEUROLOGICAL SURGERY

## 2019-09-04 PROCEDURE — 3008F PR BODY MASS INDEX (BMI) DOCUMENTED: ICD-10-PCS | Mod: CPTII,S$GLB,, | Performed by: NEUROLOGICAL SURGERY

## 2019-09-04 PROCEDURE — 3078F PR MOST RECENT DIASTOLIC BLOOD PRESSURE < 80 MM HG: ICD-10-PCS | Mod: CPTII,S$GLB,, | Performed by: NEUROLOGICAL SURGERY

## 2019-09-04 PROCEDURE — 99999 PR PBB SHADOW E&M-EST. PATIENT-LVL IV: ICD-10-PCS | Mod: PBBFAC,,, | Performed by: NEUROLOGICAL SURGERY

## 2019-09-04 NOTE — PROGRESS NOTES
"Chief Complaint   Patient presents with    Headache        Giuliana Rodas is a 49 y.o. female with a history of multiple medical diagnoses as listed below that presents for evaluation of headaches. She has been having headaches almost weekly for the last several months. The headache tends to feel like pressure as if a "cap" were on her head and at other times she has headaches that are from the front of her head down to the middle of the posterior aspect of the head. The headache that is like a ca is described as a "sharp pressure" that is 10/10 in intensity. The headache are debilitating and only allow her to lie down in a ximena quiet room as the pain worsened with lights and sounds. She has nausea very frequently with these headaches but says that she does not vomit. When the headaches are in the center of her head she feels that the pain extends into the neck and shoulders as well. It too can get up to a 10/10 but she is less sensitive to light and sounds and tends not to have nausea. The head can last from 3 hours to 3 days at a time. She has not family history of headaches. She has also been having problems sleeping so she was started on Elavil by her PCP in hopes of treating both her headaches and her insomnia. She has not had a headache in the last three weeks since she has been on the medication. She has no complaints with the medication.    Interval History  8/18/2016  She has had about 5-6 "bad" headaches since she was last seen in clinic. She has been having various headache types including tension headaches and sinus headaches as well. She has been using Robaxin which she says helps with the tension headaches and she has been using Fioricet to help with her various other headache types. She has not had much relief with tramadol as she says that she has taken the medication several times in the past and feel that it's effects have likely been lost on her. Elavil 50 mg qhs has been helping her to sleep but " she does not feel like the medication has made her excessively sedated and she does think that she could tolerate an increased dose of the medication. She has been having GI issues and has been looking to find a gastroenterologist that is within her network for gastroparesis and she has been scheduled to see dermatology for evaluation of two skin lesions that her PCP felt could be precancerous.    11/17/2016  She has still been having episodic headaches since she was last seen. She recently had a tooth extracted and was told that she had an infection underlying it as well. She has been taking Elavil as directed and has bene having some improvement in her migraines. She still tends to have frequent tension headaches that are treated very well with Robaxin. She has been seeing GI for her gastroparesis but has scheduled follow-up toward the end of the month to decide how the problem will be approached. She has not had any new problems since she was last seen in clinic.    02/16/2017  Since last being seen she says that she has been seen by GI who determined that she had a problem with bile acid production. She says that she has been doing better overall with her headaches and feels that when she has taken the Fioricet it has been beneficial to help in the relief of her headaches. She has not had any new problems but feels that the pain has been slightly more frequent than before.    05/16/2017  Headaches were well controlled when she was taking her medications consisting of Elavil and Methocarbamol as preventative medications. She has since been unable to get Robaxin because insurance is no longer covering the medication. She has not been taking it for the last month and has been having more frequent headaches since that time. She has been using Fioricet as an abortive medication which has been helpful in alleviating her pain. She has dry mouth and dry eyes as a result of her Elavil. Her dry eyes has been making  reading more difficult as she has constant tearing in the eyes.    07/18/2017  She has had at least one migraine since she was last seen in clinic while she was visiting [place] with her parents and was spending some time outside when she had a headache that began and intensified fairly quickly. Fioricet was on hand which she took and was able to relieve her headache so that she could continue her day without many issues. She has continued to have tension headaches that eminate in the neck and into the shoulders as well. She has the pain radiate across the back at times as well. She has also been having headaches across the front of the head that has been feeling of intense pressure that she feels is related to her sinus headaches. She has been working to become more active and exercise more.    10/18/2017  She continues to work with her medical team try to get better control of her symptoms.  Tension headaches has still been bothersome despite her compliance and methocarbamol.  She feels that the medication be ineffective and she is is wishing to try different medication to control her symptoms.  Despite the frequency of her tension headaches she has had fewer migraines than compared to her previous visit.  She'll been taking all medications as directed and has been tolerating well.    01/18/2018  She has been dealing with her mother going to chemotherapy treatments with has been taxing on both her mother and herself.  Headaches have still been present, but slightly better than when she was last seen in clinic.  Migraines have not been very bothersome; however, she has continued to have tension headaches relatively often.    02/28/2019  Headaches have been bothersome since she was last seen in clinic but seem to be better in the last year.  She has been tolerating her current medication regimen well.  She feels that her level of stress has been a major trigger for her symptoms which she has been unable to find  a suitable medication regimen to control these symptoms would do reliability.  She has been most plagued by an episode of shaking which occurred unexplained weight clear to patient said that she seemed to lose consciousness and was unaware of what was going on during the episode.  She has never had any episodes like this since it happened at does not recall any previous history of syncope or seizure.    04/22/2019  She has found the last 2 months the headaches seem to be less responsive to her current control medication.  Despite compliance with her Elavil therapy headaches seem to be occurring more frequently, more intensely, and seemed to be longer lasting compared to 2 months ago.  She has not been able to identify any triggers that would account for this increase in her headaches.  She has continued to have response to her abortive therapies before and she has been relying on the much more than she had been 2 months prior.  She has come to the point where her abortive therapies are not able to last as long as they did before she is concerned that with continued headache increases she would not having medication to successfully treat all the headaches she has had.    08/01/2019  She has been following closely with her GI doctor to determine what has been causing her to have gastritis and persistent GI upset.  The patient has been having very little food intake and has also been having decreased fluid intake over the last several weeks due to persistent abdominal pain.  There is suspicion that she has had excessive by will build up the stomach which is causing some her symptoms.  She has been placed on medication to try her reduce bile secretion, but the medication seemed to make her headaches become worse.  She also admits that due to decrease food and fluid intake this could also be contributing to her symptoms.  She has found significant relief from injectable sumatriptan, but the medication seems to be  somewhat too strong at times and she request injectable at a lower dose to determine if he would be just as efficacious.    09/04/2019  Despite her compliance with the OB the patient continues to have headaches approximately 4-5 times per week.  She says that she has been having 20-22 days of headache per month.  Headaches seem to be slightly less intense compared to when she was last seen in clinic, but the headaches still debilitating enough to prevent her from caring for her own activities of daily living.  Sumatriptan has been helpful in treating headaches when have occurred, but the patient feels limited by the number of injections she is able to obtain via her insurance.  She says that she tries to save the medication for bad headaches rather than trying to treat them as they occur.    PAST MEDICAL HISTORY:  Past Medical History:   Diagnosis Date    Bile reflux gastritis     Breast cyst     Eczema     Fibrocystic breast     Fibromyalgia     Gastroparesis     dr. harden    GERD (gastroesophageal reflux disease)     Hyperglyceridemia     Hyperlipidemia     Hypertension     IC (interstitial cystitis)     dr. labadie    Psoriasis     Tension headache        PAST SURGICAL HISTORY:  Past Surgical History:   Procedure Laterality Date    BREAST BIOPSY Right     over 10 years ago/ milk duct    CHOLECYSTECTOMY      EGD (ESOPHAGOGASTRODUODENOSCOPY) N/A 2/1/2014    Performed by Fitz [facility]    HEMORRHOID SURGERY      HYSTERECTOMY      KNEE SURGERY      OOPHORECTOMY      one ov removed       SOCIAL HISTORY:  Social History     Socioeconomic History    Marital status:      Spouse name: Not on file    Number of children: 2    Years of education: Not on file    Highest education level: Not on file   Occupational History    Occupation:      Employer: [employer]   Social Needs    Financial resource strain: Not on file    Food insecurity:     Worry: Not on file      Inability: Not on file    Transportation needs:     Medical: Not on file     Non-medical: Not on file   Tobacco Use    Smoking status: Never Smoker    Smokeless tobacco: Never Used   Substance and Sexual Activity    Alcohol use: No    Drug use: No    Sexual activity: Yes     Partners: Male     Birth control/protection: See Surgical Hx   Lifestyle    Physical activity:     Days per week: Not on file     Minutes per session: Not on file    Stress: Not on file   Relationships    Social connections:     Talks on phone: Not on file     Gets together: Not on file     Attends Restorationist service: Not on file     Active member of club or organization: Not on file     Attends meetings of clubs or organizations: Not on file     Relationship status: Not on file   Other Topics Concern    Not on file   Social History Narrative    Lives at home with  and daughter       FAMILY HISTORY:  Family History   Problem Relation Age of Onset    Hypertension Mother     Migraines Mother     Breast cancer Mother     Hypertension Father     Stroke Father     Heart disease Father     Hyperlipidemia Father     Breast cancer Paternal Grandmother     Colon cancer Neg Hx     Ovarian cancer Neg Hx     Inflammatory bowel disease Neg Hx     Celiac disease Neg Hx        ALLERGIES AND MEDICATIONS: updated and reviewed.  Review of patient's allergies indicates:   Allergen Reactions    Depo-provera [medroxyprogesterone] Anxiety    Dicyclomine Rash     Swelling of lip      Prozac [fluoxetine] Anxiety     Current Outpatient Medications   Medication Sig Dispense Refill    amlodipine-olmesartan (ANGEL) 5-40 mg per tablet Take 1 tablet by mouth once daily. 90 tablet 3    atenolol (TENORMIN) 50 MG tablet Take 1 tablet (50 mg total) by mouth 2 (two) times daily as needed. 180 tablet 1    azelaic acid (FINACEA) 15 % gel Apply to face nightly. Increase to BID as tolerated 50 g 2    BIFIDOBACTERIUM INFANTIS (ALIGN ORAL) Take 1  tablet by mouth once daily.      diclofenac (VOLTAREN) 50 MG EC tablet Take 1 tablet (50 mg total) by mouth 2 (two) times daily as needed. 45 tablet 2    DULoxetine (CYMBALTA) 30 MG capsule 30 mg daily and then 30 mg bid 60 capsule 5    escitalopram oxalate (LEXAPRO) 20 MG tablet TAKE ONE TABLET BY MOUTH DAILY 30 tablet 11    fluocinonide (LIDEX) 0.05 % external solution AAA scalp qday - bid prn pruritus 60 mL 3    fremanezumab-vfrm (AJOVY) 225 mg/1.5 mL injection Inject 1.5 mLs (225 mg total) into the skin every 28 days. 1.5 mL 2    gemfibrozil (LOPID) 600 MG tablet Take 1 tablet (600 mg total) by mouth 2 (two) times daily before meals. 180 tablet 3    ketoconazole (NIZORAL) 2 % shampoo Wash hair with medicated shampoo at least 2x/week - let sit on scalp at least 5 minutes prior to rinsing 120 mL 5    methocarbamol (ROBAXIN) 500 MG Tab Take 500 mg by mouth 4 (four) times daily.      ondansetron (ZOFRAN) 4 MG tablet Take 1 tablet (4 mg total) by mouth every 6 (six) hours. 12 tablet 0    oxyCODONE-acetaminophen (PERCOCET) 5-325 mg per tablet Take 1 tablet by mouth every 4 (four) hours as needed for Pain. 11 tablet 0    pantoprazole (PROTONIX) 40 MG tablet Take 1 tablet (40 mg total) by mouth once daily. 90 tablet 3    pravastatin (PRAVACHOL) 20 MG tablet TAKE ONE TABLET BY MOUTH EVERY EVENING 90 tablet 0    sucralfate (CARAFATE) 1 gram tablet Take 1 g by mouth 4 (four) times daily.      sumatriptan (IMITREX) 100 MG tablet Take 1 tablet (100 mg total) by mouth as needed for Migraine. Take at the onset of headache, may take 1 more in 1 hour if needed. 9 tablet 5    SUMAtriptan succinate 6 mg/0.5 mL Syrg Inject 6 mg into the skin once as needed (migraine. Do no exceeed 2 in 24 hours.). 6 Syringe 5    tiZANidine (ZANAFLEX) 4 MG tablet Take 1 tablet (4 mg total) by mouth every 8 (eight) hours as needed. 60 tablet 3    traZODone (DESYREL) 50 MG tablet Take 1 tablet (50 mg total) by mouth every evening. 30  tablet 11    triamcinolone acetonide 0.1% (KENALOG) 0.1 % cream AAA bid prn redness, scaling or itching 60 g 1     No current facility-administered medications for this visit.        Review of Systems   Constitutional: Negative for activity change, appetite change, fever and unexpected weight change.   HENT: Negative for trouble swallowing and voice change.    Eyes: Positive for photophobia and visual disturbance.   Respiratory: Negative for apnea and shortness of breath.    Cardiovascular: Negative for chest pain and leg swelling.   Gastrointestinal: Positive for nausea and vomiting. Negative for constipation.   Genitourinary: Negative for difficulty urinating.   Musculoskeletal: Negative for back pain, gait problem and neck pain.   Skin: Negative for color change and pallor.   Neurological: Positive for headaches. Negative for dizziness, seizures, syncope, weakness and numbness.   Hematological: Negative for adenopathy.   Psychiatric/Behavioral: Negative for agitation, confusion and decreased concentration.       Neurologic Exam     Mental Status   Oriented to person, place, and time.   Registration: recalls 3 of 3 objects.   Attention: normal. Concentration: normal.   Speech: speech is normal   Level of consciousness: alert  Knowledge: good.     Cranial Nerves     CN II   Visual fields full to confrontation.   Right visual field deficit: none  Left visual field deficit: none     CN III, IV, VI   Pupils are equal, round, and reactive to light.  Extraocular motions are normal.   Right pupil: Size: 3 mm. Shape: regular. Accommodation: intact.   Left pupil: Size: 3 mm. Shape: regular. Accommodation: intact.   CN III: no CN III palsy  CN VI: no CN VI palsy  Nystagmus: none   Diplopia: none  Ophthalmoparesis: none  Upgaze: normal  Downgaze: normal  Conjugate gaze: present    CN V   Facial sensation intact.   Right facial sensation deficit: none  Left facial sensation deficit: none    CN VII   Facial expression full,  symmetric.   Right facial weakness: none  Left facial weakness: none    CN VIII   CN VIII normal.     CN IX, X   CN IX normal.   CN X normal.   Palate: symmetric    CN XI   CN XI normal.   Right sternocleidomastoid strength: normal  Left sternocleidomastoid strength: normal  Right trapezius strength: normal  Left trapezius strength: normal    CN XII   CN XII normal.   Tongue deviation: none    Motor Exam   Muscle bulk: normal  Overall muscle tone: normal  Right arm tone: normal  Left arm tone: normal  Right leg tone: normal  Left leg tone: normal    Strength   Strength 5/5 throughout.     Sensory Exam   Right arm light touch: normal  Left arm light touch: normal  Right leg light touch: normal  Left leg light touch: normal  Right arm vibration: normal  Left arm vibration: normal  Right leg vibration: normal  Left leg vibration: normal  Right arm proprioception: normal  Left arm proprioception: normal  Right leg proprioception: normal  Left leg proprioception: normal  Right arm pinprick: normal  Left arm pinprick: normal  Right leg pinprick: normal  Left leg pinprick: normal    Gait, Coordination, and Reflexes     Gait  Gait: normal    Coordination   Romberg: negative  Finger to nose coordination: normal  Heel to shin coordination: normal  Tandem walking coordination: normal    Tremor   Resting tremor: absent    Reflexes   Right brachioradialis: 2+  Left brachioradialis: 2+  Right biceps: 2+  Left biceps: 2+  Right triceps: 2+  Left triceps: 2+  Right patellar: 2+  Left patellar: 2+  Right achilles: 2+  Left achilles: 2+  Right plantar: normal  Left plantar: normal      Physical Exam   Constitutional: She is oriented to person, place, and time. She appears well-developed and well-nourished.   HENT:   Head: Normocephalic and atraumatic.   Eyes: Pupils are equal, round, and reactive to light. EOM are normal.   Neck: Normal range of motion.   Cardiovascular: Normal rate and intact distal pulses.   Pulmonary/Chest: Effort  "normal. No apnea. No respiratory distress.   Musculoskeletal: Normal range of motion.   Neurological: She is alert and oriented to person, place, and time. She has normal strength. She has a normal Finger-Nose-Finger Test, a normal Heel to Shin Test, a normal Romberg Test and a normal Tandem Gait Test. Gait normal.   Reflex Scores:       Tricep reflexes are 2+ on the right side and 2+ on the left side.       Bicep reflexes are 2+ on the right side and 2+ on the left side.       Brachioradialis reflexes are 2+ on the right side and 2+ on the left side.       Patellar reflexes are 2+ on the right side and 2+ on the left side.       Achilles reflexes are 2+ on the right side and 2+ on the left side.  Skin: Skin is warm and dry.   Psychiatric: She has a normal mood and affect. Her speech is normal and behavior is normal. Thought content normal.   Vitals reviewed.      Vitals:    09/04/19 1346   BP: (!) 140/77   BP Location: Right arm   Patient Position: Sitting   BP Method: Large (Automatic)   Pulse: 62   Weight: 81.2 kg (179 lb 0.2 oz)   Height: 5' 2" (1.575 m)       Assessment & Plan:  Problem List Items Addressed This Visit     Chronic migraine without aura, with intractable migraine, so stated, with status migrainosus - Primary    Overview     Headaches have been much better with the once monthly headache control injections.  Periodic headaches have been treated with subcutaneous sumatriptan, but the medication may be slightly too strong for the patient. Lower does sumatriptan may be better tolerated offering headache control without any potential for side effects.         Relevant Orders    Prior Authorization Order    Ambulatory referral to Neurology    Occipital neuralgia    Overview     She has tried nerve block in the past but did not find that it provided lasting relief. She wishes to defer at this time.         Relevant Orders    Prior Authorization Order    Ambulatory referral to Neurology    Tension " headache    Relevant Orders    Prior Authorization Order    Ambulatory referral to Neurology        Follow-up: Follow up in about 3 months (around 12/4/2019).  More than 50% of this 25 minute encounter was spent in counseling and coordinating care of headache.

## 2019-09-05 ENCOUNTER — TELEPHONE (OUTPATIENT)
Dept: PHARMACY | Facility: CLINIC | Age: 49
End: 2019-09-05

## 2019-09-05 DIAGNOSIS — G43.711 CHRONIC MIGRAINE WITHOUT AURA, WITH INTRACTABLE MIGRAINE, SO STATED, WITH STATUS MIGRAINOSUS: ICD-10-CM

## 2019-09-05 NOTE — TELEPHONE ENCOUNTER
Spoke with the patient and verified that she wants her Ajovy filled at her local pharmacy, [facility].  Will message MD that pharmacy is already listed in her chart and to please just send it there.

## 2019-09-09 DIAGNOSIS — G43.711 CHRONIC MIGRAINE WITHOUT AURA, WITH INTRACTABLE MIGRAINE, SO STATED, WITH STATUS MIGRAINOSUS: ICD-10-CM

## 2019-09-09 RX ORDER — FREMANEZUMAB-VFRM 225 MG/1.5ML
INJECTION SUBCUTANEOUS
Qty: 1.5 ML | Refills: 2 | Status: SHIPPED | OUTPATIENT
Start: 2019-09-09 | End: 2020-01-05

## 2019-09-18 ENCOUNTER — PATIENT OUTREACH (OUTPATIENT)
Dept: OTHER | Facility: OTHER | Age: 49
End: 2019-09-18

## 2019-09-18 NOTE — PROGRESS NOTES
"Digital Medicine: Health  Follow-Up    Patient reports she has been suffering with "dry sinuses."  Reports she plans to go to an ENT.    The history is provided by the patient.     Follow Up  Follow-up reason(s): routine education and goal follow-up      Routine Education Topics: eating patterns and physical activity            Diet:   She has the following dietary restrictions: low sodium diet    Asked about adding salt to foods if she has been monitoring salt intake.  Encouraged patient to refrain from adding additional salt.  Reviewed AHA salt recommendation <2,000mg per day, which equals to about 1 tsp.    Intervention(s): reducing sodium intake    Assigning the following patient goals: maintain low sodium diet    Physical Activity:   When asked if exercising, patient responded: yes2 day(s) a week.      Patient participates in the following activities: elliptical and weights    Patient reports she is sticking with goal to go to the gym 2x/week.    Intervention(s): goal tracking     Assigning the following patient goal(s): participate in exercise weekly      SDOH    INTERVENTION(S)  recommended diet modifications, encouragement/support and goal setting    PLAN  patient verbalizes understanding      There are no preventive care reminders to display for this patient.    Last 5 Patient Entered Readings                                      Current 30 Day Average: 158/89     Recent Readings 9/12/2019 9/3/2019 8/27/2019 8/18/2019 8/10/2019    SBP (mmHg) 159 161 153 159 163    DBP (mmHg) 87 92 87 91 93    Pulse 73 72 66 69 69                "

## 2019-10-08 ENCOUNTER — TELEPHONE (OUTPATIENT)
Dept: NEUROLOGY | Facility: CLINIC | Age: 49
End: 2019-10-08

## 2019-10-08 NOTE — TELEPHONE ENCOUNTER
----- Message from Fabiana Turpin RN sent at 10/8/2019  9:47 AM CDT -----  Regarding: botox appt needed  Referral in from Dr Santacruz for botox injections. Please contact pt to get scheduled. Thanks!

## 2019-10-09 NOTE — TELEPHONE ENCOUNTER
"Called patient to schedule Botox with Brenda Harper, patient declines Botox injections, stating, "I don't want the side effects. I already have some of those now." Advised will make a note and inform Dr. Santacruz of her decision. Patient agreed and verbalized understanding.   "

## 2019-10-16 DIAGNOSIS — M17.0 OSTEOARTHRITIS OF BOTH KNEES, UNSPECIFIED OSTEOARTHRITIS TYPE: ICD-10-CM

## 2019-10-16 RX ORDER — DICLOFENAC SODIUM 50 MG/1
TABLET, DELAYED RELEASE ORAL
Qty: 45 TABLET | Refills: 2 | Status: SHIPPED | OUTPATIENT
Start: 2019-10-16 | End: 2020-01-27

## 2019-10-20 ENCOUNTER — PATIENT OUTREACH (OUTPATIENT)
Dept: ADMINISTRATIVE | Facility: OTHER | Age: 49
End: 2019-10-20

## 2019-10-21 ENCOUNTER — OFFICE VISIT (OUTPATIENT)
Dept: FAMILY MEDICINE | Facility: CLINIC | Age: 49
End: 2019-10-21
Payer: MEDICARE

## 2019-10-21 ENCOUNTER — LAB VISIT (OUTPATIENT)
Dept: LAB | Facility: HOSPITAL | Age: 49
End: 2019-10-21
Payer: MEDICARE

## 2019-10-21 VITALS
BODY MASS INDEX: 32.17 KG/M2 | WEIGHT: 174.81 LBS | OXYGEN SATURATION: 98 % | RESPIRATION RATE: 16 BRPM | DIASTOLIC BLOOD PRESSURE: 88 MMHG | TEMPERATURE: 98 F | HEIGHT: 62 IN | HEART RATE: 63 BPM | SYSTOLIC BLOOD PRESSURE: 138 MMHG

## 2019-10-21 DIAGNOSIS — M79.604 BILATERAL LEG PAIN: ICD-10-CM

## 2019-10-21 DIAGNOSIS — Z23 NEED FOR SHINGLES VACCINE: ICD-10-CM

## 2019-10-21 DIAGNOSIS — M79.604 BILATERAL LEG PAIN: Primary | ICD-10-CM

## 2019-10-21 DIAGNOSIS — M79.605 BILATERAL LEG PAIN: Primary | ICD-10-CM

## 2019-10-21 DIAGNOSIS — M79.605 BILATERAL LEG PAIN: ICD-10-CM

## 2019-10-21 LAB
ALBUMIN SERPL BCP-MCNC: 3.8 G/DL (ref 3.5–5.2)
ALP SERPL-CCNC: 135 U/L (ref 55–135)
ALT SERPL W/O P-5'-P-CCNC: 18 U/L (ref 10–44)
ANION GAP SERPL CALC-SCNC: 11 MMOL/L (ref 8–16)
AST SERPL-CCNC: 25 U/L (ref 10–40)
BILIRUB SERPL-MCNC: 0.4 MG/DL (ref 0.1–1)
BUN SERPL-MCNC: 20 MG/DL (ref 6–20)
CALCIUM SERPL-MCNC: 9.8 MG/DL (ref 8.7–10.5)
CHLORIDE SERPL-SCNC: 101 MMOL/L (ref 95–110)
CK SERPL-CCNC: 48 U/L (ref 20–180)
CO2 SERPL-SCNC: 27 MMOL/L (ref 23–29)
CREAT SERPL-MCNC: 1.5 MG/DL (ref 0.5–1.4)
EST. GFR  (AFRICAN AMERICAN): 46 ML/MIN/1.73 M^2
EST. GFR  (NON AFRICAN AMERICAN): 40 ML/MIN/1.73 M^2
GLUCOSE SERPL-MCNC: 91 MG/DL (ref 70–110)
POTASSIUM SERPL-SCNC: 4.1 MMOL/L (ref 3.5–5.1)
PROT SERPL-MCNC: 7.2 G/DL (ref 6–8.4)
SODIUM SERPL-SCNC: 139 MMOL/L (ref 136–145)

## 2019-10-21 PROCEDURE — 36415 COLL VENOUS BLD VENIPUNCTURE: CPT | Mod: PO

## 2019-10-21 PROCEDURE — 99999 PR PBB SHADOW E&M-EST. PATIENT-LVL V: CPT | Mod: PBBFAC,,, | Performed by: PHYSICIAN ASSISTANT

## 2019-10-21 PROCEDURE — 99999 PR PBB SHADOW E&M-EST. PATIENT-LVL V: ICD-10-PCS | Mod: PBBFAC,,, | Performed by: PHYSICIAN ASSISTANT

## 2019-10-21 PROCEDURE — 99213 OFFICE O/P EST LOW 20 MIN: CPT | Mod: S$GLB,,, | Performed by: PHYSICIAN ASSISTANT

## 2019-10-21 PROCEDURE — 3008F PR BODY MASS INDEX (BMI) DOCUMENTED: ICD-10-PCS | Mod: CPTII,S$GLB,, | Performed by: PHYSICIAN ASSISTANT

## 2019-10-21 PROCEDURE — 3008F BODY MASS INDEX DOCD: CPT | Mod: CPTII,S$GLB,, | Performed by: PHYSICIAN ASSISTANT

## 2019-10-21 PROCEDURE — 3079F DIAST BP 80-89 MM HG: CPT | Mod: CPTII,S$GLB,, | Performed by: PHYSICIAN ASSISTANT

## 2019-10-21 PROCEDURE — 82550 ASSAY OF CK (CPK): CPT

## 2019-10-21 PROCEDURE — 3075F PR MOST RECENT SYSTOLIC BLOOD PRESS GE 130-139MM HG: ICD-10-PCS | Mod: CPTII,S$GLB,, | Performed by: PHYSICIAN ASSISTANT

## 2019-10-21 PROCEDURE — 3075F SYST BP GE 130 - 139MM HG: CPT | Mod: CPTII,S$GLB,, | Performed by: PHYSICIAN ASSISTANT

## 2019-10-21 PROCEDURE — 99213 PR OFFICE/OUTPT VISIT, EST, LEVL III, 20-29 MIN: ICD-10-PCS | Mod: S$GLB,,, | Performed by: PHYSICIAN ASSISTANT

## 2019-10-21 PROCEDURE — 80053 COMPREHEN METABOLIC PANEL: CPT

## 2019-10-21 PROCEDURE — 3079F PR MOST RECENT DIASTOLIC BLOOD PRESSURE 80-89 MM HG: ICD-10-PCS | Mod: CPTII,S$GLB,, | Performed by: PHYSICIAN ASSISTANT

## 2019-10-21 NOTE — PROGRESS NOTES
Subjective:       Patient ID: Giuliana Rodas is a 49 y.o. female with multiple medical diagnoses as listed in the medical history and problem list that presents for Leg Pain (restless legs bilaterally)  .    Chief Complaint: Leg Pain (restless legs bilaterally)      Leg Pain    The incident occurred more than 1 week ago (several months worse last night ). There was no injury mechanism. Pain location: lower legs bilaterally  Quality: dull sharpness  The pain is at a severity of 7/10. The pain has been constant since onset. Pertinent negatives include no inability to bear weight, loss of motion, numbness or tingling. Nothing aggravates the symptoms. She has tried nothing for the symptoms.   she is on both pravastatin and gemfibrozil  Has coenzyme q10 at home but not currently on it     Review of Systems   Constitutional: Negative for chills and fever.   Cardiovascular: Negative for leg swelling.   Musculoskeletal: Positive for myalgias.   Neurological: Negative for tingling and numbness.         PAST MEDICAL HISTORY:  Past Medical History:   Diagnosis Date    Bile reflux gastritis     Breast cyst     Eczema     Fibrocystic breast     Fibromyalgia     Gastroparesis     dr. harden    GERD (gastroesophageal reflux disease)     Hyperglyceridemia     Hyperlipidemia     Hypertension     IC (interstitial cystitis)     dr. labadie    Psoriasis     Tension headache        SOCIAL HISTORY:  Social History     Socioeconomic History    Marital status:      Spouse name: Not on file    Number of children: 2    Years of education: Not on file    Highest education level: Not on file   Occupational History    Occupation:      Employer: [employer]   Social Needs    Financial resource strain: Not on file    Food insecurity:     Worry: Not on file     Inability: Not on file    Transportation needs:     Medical: Not on file     Non-medical: Not on file   Tobacco Use    Smoking status: Never Smoker     Smokeless tobacco: Never Used   Substance and Sexual Activity    Alcohol use: No    Drug use: No    Sexual activity: Yes     Partners: Male     Birth control/protection: See Surgical Hx   Lifestyle    Physical activity:     Days per week: Not on file     Minutes per session: Not on file    Stress: Not on file   Relationships    Social connections:     Talks on phone: Not on file     Gets together: Not on file     Attends Protestant service: Not on file     Active member of club or organization: Not on file     Attends meetings of clubs or organizations: Not on file     Relationship status: Not on file   Other Topics Concern    Not on file   Social History Narrative    Lives at home with  and daughter       ALLERGIES AND MEDICATIONS: updated and reviewed.  Review of patient's allergies indicates:   Allergen Reactions    Amitriptyline Hallucinations and Other (See Comments)    Chlorthalidone      Hypokalemia     Adhesive Rash    Depo-provera [medroxyprogesterone] Anxiety    Dicyclomine Rash     Swelling of lip    Swelling of lip    Prozac [fluoxetine] Anxiety    Topiramate Rash     Current Outpatient Medications   Medication Sig Dispense Refill    AJOVY 225 mg/1.5 mL injection inject once subcutaneously EVERY 28 DAYS AS DIRECTED 1.5 mL 2    amlodipine-olmesartan (ANGEL) 5-40 mg per tablet Take 1 tablet by mouth once daily. 90 tablet 3    atenolol (TENORMIN) 50 MG tablet Take 1 tablet (50 mg total) by mouth 2 (two) times daily as needed. 180 tablet 1    azelaic acid (FINACEA) 15 % gel Apply to face nightly. Increase to BID as tolerated 50 g 2    BIFIDOBACTERIUM INFANTIS (ALIGN ORAL) Take 1 tablet by mouth once daily.      diclofenac (VOLTAREN) 50 MG EC tablet TAKE ONE TABLET BY MOUTH TWICE DAILY AS NEEDED 45 tablet 2    DULoxetine (CYMBALTA) 30 MG capsule 30 mg daily and then 30 mg bid 60 capsule 5    escitalopram oxalate (LEXAPRO) 20 MG tablet TAKE ONE TABLET BY MOUTH DAILY 30 tablet 11     "fluocinonide (LIDEX) 0.05 % external solution AAA scalp qday - bid prn pruritus 60 mL 3    gemfibrozil (LOPID) 600 MG tablet Take 1 tablet (600 mg total) by mouth 2 (two) times daily before meals. 180 tablet 3    ketoconazole (NIZORAL) 2 % shampoo Wash hair with medicated shampoo at least 2x/week - let sit on scalp at least 5 minutes prior to rinsing 120 mL 5    methocarbamol (ROBAXIN) 500 MG Tab Take 500 mg by mouth 4 (four) times daily.      ondansetron (ZOFRAN) 4 MG tablet Take 1 tablet (4 mg total) by mouth every 6 (six) hours. 12 tablet 0    pantoprazole (PROTONIX) 40 MG tablet Take 1 tablet (40 mg total) by mouth once daily. 90 tablet 3    pravastatin (PRAVACHOL) 20 MG tablet TAKE ONE TABLET BY MOUTH EVERY EVENING 90 tablet 0    sucralfate (CARAFATE) 1 gram tablet Take 1 g by mouth 4 (four) times daily.      SUMAtriptan succinate 6 mg/0.5 mL Syrg Inject 6 mg into the skin once as needed (migraine. Do no exceeed 2 in 24 hours.). 6 Syringe 5    tiZANidine (ZANAFLEX) 4 MG tablet Take 1 tablet (4 mg total) by mouth every 8 (eight) hours as needed. 60 tablet 3    traZODone (DESYREL) 50 MG tablet Take 1 tablet (50 mg total) by mouth every evening. 30 tablet 11    triamcinolone acetonide 0.1% (KENALOG) 0.1 % cream AAA bid prn redness, scaling or itching 60 g 1    oxyCODONE-acetaminophen (PERCOCET) 5-325 mg per tablet Take 1 tablet by mouth every 4 (four) hours as needed for Pain. 11 tablet 0    sumatriptan (IMITREX) 100 MG tablet Take 1 tablet (100 mg total) by mouth as needed for Migraine. Take at the onset of headache, may take 1 more in 1 hour if needed. (Patient not taking: Reported on 10/21/2019) 9 tablet 5    varicella-zoster gE-AS01B, PF, (SHINGRIX) 50 mcg/0.5 mL injection Inject 0.5 mLs into the muscle once. for 1 dose 0.5 mL 0     No current facility-administered medications for this visit.          Objective:   /88   Pulse 63   Temp 98.3 °F (36.8 °C) (Oral)   Resp 16   Ht 5' 2" " (1.575 m)   Wt 79.3 kg (174 lb 13.2 oz)   SpO2 98%   BMI 31.98 kg/m²      Physical Exam   Constitutional: She is oriented to person, place, and time. No distress.   HENT:   Head: Normocephalic and atraumatic.   Musculoskeletal: Normal range of motion. She exhibits no edema or tenderness.   Neurological: She is alert and oriented to person, place, and time.   Skin: Skin is warm. No rash noted. No erythema.           Assessment:       1. Bilateral leg pain    2. Need for shingles vaccine        Plan:       Bilateral leg pain  -     CK; Future; Expected date: 10/21/2019  -     Comprehensive metabolic panel; Future; Expected date: 10/21/2019    Need for shingles vaccine  -     varicella-zoster gE-AS01B, PF, (SHINGRIX) 50 mcg/0.5 mL injection; Inject 0.5 mLs into the muscle once. for 1 dose  Dispense: 0.5 mL; Refill: 0    -will contact with results           No follow-ups on file.

## 2019-10-22 DIAGNOSIS — N17.9 AKI (ACUTE KIDNEY INJURY): Primary | ICD-10-CM

## 2019-10-23 ENCOUNTER — OFFICE VISIT (OUTPATIENT)
Dept: GASTROENTEROLOGY | Facility: CLINIC | Age: 49
End: 2019-10-23
Payer: MEDICARE

## 2019-10-23 VITALS
SYSTOLIC BLOOD PRESSURE: 137 MMHG | BODY MASS INDEX: 31.72 KG/M2 | HEIGHT: 62 IN | WEIGHT: 172.38 LBS | DIASTOLIC BLOOD PRESSURE: 80 MMHG | HEART RATE: 68 BPM

## 2019-10-23 DIAGNOSIS — K31.84 GASTROPARESIS: ICD-10-CM

## 2019-10-23 DIAGNOSIS — R19.7 DIARRHEA, UNSPECIFIED TYPE: Primary | ICD-10-CM

## 2019-10-23 DIAGNOSIS — K52.9 ENTERITIS: ICD-10-CM

## 2019-10-23 DIAGNOSIS — M79.7 FIBROMYALGIA: ICD-10-CM

## 2019-10-23 DIAGNOSIS — K58.9 IRRITABLE BOWEL SYNDROME WITHOUT DIARRHEA: ICD-10-CM

## 2019-10-23 DIAGNOSIS — R10.9 ABDOMINAL PAIN, UNSPECIFIED ABDOMINAL LOCATION: ICD-10-CM

## 2019-10-23 PROCEDURE — 99499 RISK ADDL DX/OHS AUDIT: ICD-10-PCS | Mod: S$GLB,,, | Performed by: NURSE PRACTITIONER

## 2019-10-23 PROCEDURE — 3079F DIAST BP 80-89 MM HG: CPT | Mod: CPTII,S$GLB,, | Performed by: NURSE PRACTITIONER

## 2019-10-23 PROCEDURE — 3075F SYST BP GE 130 - 139MM HG: CPT | Mod: CPTII,S$GLB,, | Performed by: NURSE PRACTITIONER

## 2019-10-23 PROCEDURE — 99999 PR PBB SHADOW E&M-EST. PATIENT-LVL III: ICD-10-PCS | Mod: PBBFAC,,, | Performed by: NURSE PRACTITIONER

## 2019-10-23 PROCEDURE — 3079F PR MOST RECENT DIASTOLIC BLOOD PRESSURE 80-89 MM HG: ICD-10-PCS | Mod: CPTII,S$GLB,, | Performed by: NURSE PRACTITIONER

## 2019-10-23 PROCEDURE — 99205 PR OFFICE/OUTPT VISIT, NEW, LEVL V, 60-74 MIN: ICD-10-PCS | Mod: S$GLB,,, | Performed by: NURSE PRACTITIONER

## 2019-10-23 PROCEDURE — 3075F PR MOST RECENT SYSTOLIC BLOOD PRESS GE 130-139MM HG: ICD-10-PCS | Mod: CPTII,S$GLB,, | Performed by: NURSE PRACTITIONER

## 2019-10-23 PROCEDURE — 3008F PR BODY MASS INDEX (BMI) DOCUMENTED: ICD-10-PCS | Mod: CPTII,S$GLB,, | Performed by: NURSE PRACTITIONER

## 2019-10-23 PROCEDURE — 99205 OFFICE O/P NEW HI 60 MIN: CPT | Mod: S$GLB,,, | Performed by: NURSE PRACTITIONER

## 2019-10-23 PROCEDURE — 99999 PR PBB SHADOW E&M-EST. PATIENT-LVL III: CPT | Mod: PBBFAC,,, | Performed by: NURSE PRACTITIONER

## 2019-10-23 PROCEDURE — 99499 UNLISTED E&M SERVICE: CPT | Mod: S$GLB,,, | Performed by: NURSE PRACTITIONER

## 2019-10-23 PROCEDURE — 3008F BODY MASS INDEX DOCD: CPT | Mod: CPTII,S$GLB,, | Performed by: NURSE PRACTITIONER

## 2019-10-23 RX ORDER — MONTELUKAST SODIUM 4 MG/1
1 TABLET, CHEWABLE ORAL 2 TIMES DAILY
Qty: 180 TABLET | Refills: 0 | Status: SHIPPED | OUTPATIENT
Start: 2019-10-23 | End: 2020-09-24

## 2019-10-23 NOTE — PROGRESS NOTES
[facility] Consultation Note    Reason for Consult:  The primary encounter diagnosis was Diarrhea, unspecified type. Diagnoses of Abdominal pain, unspecified abdominal location, Gastroparesis, Enteritis, Irritable bowel syndrome without diarrhea, and Fibromyalgia were also pertinent to this visit.    PCP:   Giuliana Rojas   [address] [place] [address]    Referring MD:  Giuliana Rojas Md  [address] [address]    HPI:  This is a 49 y.o. female here as she reports as a second opinion, but this is at least a fourth opinion. She has been seen my Dr. Breen in 2014. Then Dr. Ulloa at [facility]. Then another Israel Haddad MD, possibly Dr. Van in June 2019 . Reports at her June 2019 visit she was told she should be seen at [facility] due to her complex GI history.  She is a new pt.     Previous work up - pt did not have her records sent over,  Did not bring with her to this appt either  GES 3/16/18  Manometry 01/2017 - She is unsure what the results were.   EUS  Egd and colonoscopy  VCE 2014 here at [facility] [place]  Enteroscopy 2014 here t [facility] [place]     Has never had a breath test to r/o SIBO    Today patient reports she has lot of belching and nausea no vomiting. She does have gastroparesis.  Reports she has been on Reglan in the past.  Stopped 2 months ago because she reports she was told to stop by 1 of her physicians due to the potential of EPS.  She is unsure if her symptoms changed when she got off the reglan since she has fibromyalgia and other diagnosis that affect her GI tract .  She does have a history of C diff that was treated January 2019.  She is still having diarrhea but she has always had diarrhea.  She states the watery diarrhea resolved.  Now she has 1-2 bowel movements per day but on a bad day she may have up to 4.  She denies any overt signs of GI bleeding.  She has been taking her Protonix at night, she states bc she suffers at night.   She has not tried taking her Protonix in the morning.    Treatments tried:  Protonix  Carafate  Creon for a year because she had pancreatitis due to cholecystectomy. She reports it did help with pancreatitis. MAY have helped with gas and belching.   Probiotics every day.  Allergic to Bentyl  Reglan  Omeprazole failed  Tums does not help  Gasx helps some   On duloxetine and Lexapro currently  Has not tried Fdgard or Ibgard.      ROS:  Constitutional: + feverish, chills, No weight loss  ENT: + allergies, +throat clearing  CV: + chest pain  Pulm: No cough, No shortness of breath  Ophtho: + vision changes  GI: see HPI  Derm: + rash under arm pits  Heme:+ bruising  MSK: +Fibromyalgia  : No dysuria, + microscopic hematuria due to IC  Endo: No hot or cold intolerance  Neuro: No syncope, No seizure  Psych: + anxiety, No depression    Medical History:  has a past medical history of Bile reflux gastritis, Breast cyst, Eczema, Fibrocystic breast, Fibromyalgia, Gastroparesis, GERD (gastroesophageal reflux disease), Hyperglyceridemia, Hyperlipidemia, Hypertension, IC (interstitial cystitis), Psoriasis, and Tension headache.    Surgical History:  has a past surgical history that includes Cholecystectomy; Hysterectomy; Hemorrhoid surgery; Knee surgery; Oophorectomy; and Breast biopsy (Right).    Family History: family history includes Breast cancer in her mother and paternal grandmother; Diabetes in her father; Heart disease in her father; Hyperlipidemia in her father; Hypertension in her father and mother; Migraines in her mother; Stroke in her father..     Social History:  reports that she has never smoked. She has never used smokeless tobacco. She reports that she does not drink alcohol or use drugs.    Review of patient's allergies indicates:   Allergen Reactions    Amitriptyline Hallucinations and Other (See Comments)    Chlorthalidone      Hypokalemia     Adhesive Rash    Depo-provera [medroxyprogesterone] Anxiety     Dicyclomine Rash     Swelling of lip    Swelling of lip    Prozac [fluoxetine] Anxiety    Topiramate Rash       Current Outpatient Medications on File Prior to Visit   Medication Sig Dispense Refill    AJOVY 225 mg/1.5 mL injection inject once subcutaneously EVERY 28 DAYS AS DIRECTED 1.5 mL 2    amlodipine-olmesartan (ANGEL) 5-40 mg per tablet Take 1 tablet by mouth once daily. 90 tablet 3    atenolol (TENORMIN) 50 MG tablet Take 1 tablet (50 mg total) by mouth 2 (two) times daily as needed. 180 tablet 1    azelaic acid (FINACEA) 15 % gel Apply to face nightly. Increase to BID as tolerated 50 g 2    BIFIDOBACTERIUM INFANTIS (ALIGN ORAL) Take 1 tablet by mouth once daily.      DULoxetine (CYMBALTA) 30 MG capsule 30 mg daily and then 30 mg bid 60 capsule 5    escitalopram oxalate (LEXAPRO) 20 MG tablet TAKE ONE TABLET BY MOUTH DAILY 30 tablet 11    fluocinonide (LIDEX) 0.05 % external solution AAA scalp qday - bid prn pruritus 60 mL 3    ketoconazole (NIZORAL) 2 % shampoo Wash hair with medicated shampoo at least 2x/week - let sit on scalp at least 5 minutes prior to rinsing 120 mL 5    methocarbamol (ROBAXIN) 500 MG Tab Take 500 mg by mouth 4 (four) times daily.      ondansetron (ZOFRAN) 4 MG tablet Take 1 tablet (4 mg total) by mouth every 6 (six) hours. 12 tablet 0    pantoprazole (PROTONIX) 40 MG tablet Take 1 tablet (40 mg total) by mouth once daily. 90 tablet 3    pravastatin (PRAVACHOL) 20 MG tablet TAKE ONE TABLET BY MOUTH EVERY EVENING 90 tablet 0    sucralfate (CARAFATE) 1 gram tablet Take 1 g by mouth 4 (four) times daily.      SUMAtriptan succinate 6 mg/0.5 mL Syrg Inject 6 mg into the skin once as needed (migraine. Do no exceeed 2 in 24 hours.). 6 Syringe 5    traZODone (DESYREL) 50 MG tablet Take 1 tablet (50 mg total) by mouth every evening. 30 tablet 11    triamcinolone acetonide 0.1% (KENALOG) 0.1 % cream AAA bid prn redness, scaling or itching 60 g 1    diclofenac (VOLTAREN) 50 MG  "EC tablet TAKE ONE TABLET BY MOUTH TWICE DAILY AS NEEDED 45 tablet 2    gemfibrozil (LOPID) 600 MG tablet Take 1 tablet (600 mg total) by mouth 2 (two) times daily before meals. 180 tablet 3    oxyCODONE-acetaminophen (PERCOCET) 5-325 mg per tablet Take 1 tablet by mouth every 4 (four) hours as needed for Pain. 11 tablet 0    sumatriptan (IMITREX) 100 MG tablet Take 1 tablet (100 mg total) by mouth as needed for Migraine. Take at the onset of headache, may take 1 more in 1 hour if needed. (Patient not taking: Reported on 10/21/2019) 9 tablet 5    tiZANidine (ZANAFLEX) 4 MG tablet Take 1 tablet (4 mg total) by mouth every 8 (eight) hours as needed. 60 tablet 3     No current facility-administered medications on file prior to visit.          Objective Findings:    Vital Signs:  /80 (BP Location: Left arm)   Pulse 68   Ht 5' 2" (1.575 m)   Wt 78.2 kg (172 lb 6.4 oz)   BMI 31.53 kg/m²   Body mass index is 31.53 kg/m².    Physical Exam:  General Appearance: Well appearing in no acute distress  Head:   Normocephalic, without obvious abnormality  Eyes:    No scleral icterus  ENT: Neck supple  Lungs: CTA bilaterally in anterior and posterior fields, no wheezes, no crackles.  Heart:  Regular rate and rhythm, S1, S2 normal, no murmurs heard  Abdomen: Soft, non distended with positive bowel sounds in all four quadrants. + generalized tenderness  Extremities: No edema  Skin: No rash  Neurologic: AAO x 3      Labs reviewed:  Lab Results   Component Value Date    WBC 5.46 05/31/2019    HGB 12.7 05/31/2019    HCT 39.2 05/31/2019     05/31/2019    CRP 22.8 (H) 04/05/2017    CHOL 163 11/29/2018    TRIG 208 (H) 11/29/2018    HDL 50 11/29/2018    ALT 18 10/21/2019    AST 25 10/21/2019     10/21/2019    K 4.1 10/21/2019     10/21/2019    CREATININE 1.5 (H) 10/21/2019    BUN 20 10/21/2019    CO2 27 10/21/2019    TSH 2.434 09/25/2018    INR 1.1 04/13/2014    HGBA1C 4.7 04/06/2016       Imaging you " had:  2019 CT of her abdomen and pelvis with contrast  1. No acute intra-abdominal abnormality identified  2. Status post cholecystectomy; chronic, mild dilatation of common bile duct.  3. Bilateral renal cysts.  No urinary stone or obstruction.  4. Status post hysterectomy.    Endoscopies reviewed:    Colonoscopy 3/2018. [facility]GA. In media Tab  Diverticulosis in the sigmoid colon.  Erythema in the IC valve.  Do not have any path reports.    Enteroscopy 2014  - Jejunal ulcer. Biopsied.                        - Normal examined duodenum.                        - Normal esophagus.                        - Normal stomach.    VCE  with Dr. Breen  - Atrophic mucosa in the jejunum. Biopsies nonspecific for celiac or crohn's. Celiac serology in 2013 was neg.    Addendum: EGD 2019 at  reviewed for epigastric abd pain and heart burn  Normal esophagus, regular z line, normal duodenum, moderate inflammation and congestion in stomach.    Assessment:    Ms. Enrique is a 49 y.o. WF with:    1. Diarrhea, unspecified type    2. Abdominal pain, unspecified abdominal location    3. Gastroparesis    4. Enteritis    5. Irritable bowel syndrome without diarrhea    6. Fibromyalgia       Patient states she is here for a 2nd opinion but in actuality she is a 4th opinion today.  Last seen by [facility] & was told she should go to [facility] due to her complex GI history.   I do not have any of her previous medical records from [facility].  Will request those today.   She has been taking PPI at night.  We discussed the benefit of taking a PPI in the morning.  She is going to try Pepcid at night.  Since she is reporting a lot of gas and abdominal distention I recommended she try Creon.  She tried in the past for acute pancreatitis due to Radha, and she believes it may have helped her with her gas and distention at that time.  She believes she still has some at home and will let me know if the bottle as  and I will prescribe some of that  for her.    Recommendations:  1.  Obtain medical records from previous GI doctors.  No need to order any test that have been performed in the past at this time until we receive those results.  2.  Protonix in the morning.  May take Zantac at night  3.  Dietitian to discuss gastroparesis diet  4.  Colestid as needed during diarrhea episodes  5. Glucose send out breath test to r/o ADEBAYO    F/u with MD in about 12 weeks    Order summary:  Orders Placed This Encounter    colestipol (COLESTID) 1 gram Tab         Thank you so much for allowing me to participate in the care of Giuliana ABBIE Nina, TENAP-C

## 2019-10-23 NOTE — PATIENT INSTRUCTIONS
Start taking protonix in the morning, take pepcid at night before bed. Continue with carafate.     Let me know if you have some creon at home. This may help with the gas and bloating.     Start taking Colestid on the days you have diarrhea.

## 2019-10-23 NOTE — LETTER
October 24, 2019      Giuliana Rojas MD  [address] [place] [address]           [facility]  [address]  [facility] [address]  Phone: [phone]  Fax: [phone]          Patient: Giuliana Rodas   MR Number: [id number]   YOB: 1970   Date of Visit: 10/23/2019       Dear Dr. Giuliana Rojas:    Thank you for referring Giuliana Rodas to me for evaluation. Attached you will find relevant portions of my assessment and plan of care.    If you have questions, please do not hesitate to call me. I look forward to following Giuliana Rodas along with you.    Sincerely,    Yaquelin Nina, PABLO    Enclosure  CC:  No Recipients    If you would like to receive this communication electronically, please contact [email or web address] or [phone] to request more information on [employer] access.    For providers and/or their staff who would like to refer a patient to [facility], please contact us through our one-stop-shop provider referral line, [facility], at [phone].    If you feel you have received this communication in error or would no longer like to receive these types of communications, please e-mail [email or web address]

## 2019-10-27 ENCOUNTER — PATIENT MESSAGE (OUTPATIENT)
Dept: GASTROENTEROLOGY | Facility: CLINIC | Age: 49
End: 2019-10-27

## 2019-10-28 ENCOUNTER — PATIENT OUTREACH (OUTPATIENT)
Dept: OTHER | Facility: OTHER | Age: 49
End: 2019-10-28

## 2019-10-28 DIAGNOSIS — R10.9 ABDOMINAL PAIN, UNSPECIFIED ABDOMINAL LOCATION: Primary | ICD-10-CM

## 2019-10-28 NOTE — PROGRESS NOTES
[facility]: Health  Follow-Up    Patient reports she went to the doctor's office last week and had readings in 130s/80s, which is lower than [facility] cuff.  Reports sometimes she does not sit and rest for 5min before taking a BP reading.    The history is provided by the patient.     Follow Up  Follow-up reason(s): routine education      Routine Education Topics: eating patterns            Diet:       States she is going to see a dietitian tomorrow to discuss food options.      SDOH    INTERVENTION(S)  recommended diet modifications, reviewed monitoring technique and encouragement/support    PLAN  patient verbalizes understanding and Clinician follow-up    Patient had questions about blood pressure medications and asked if there needs to be any adjustments.  Will place task and route note to pharmacist.      There are no preventive care reminders to display for this patient.    Last 5 Patient Entered Readings                                      Current 30 Day Average: 156/85     Recent Readings 10/21/2019 10/20/2019 10/20/2019 10/12/2019 10/2/2019    SBP (mmHg) 155 158 161 155 154    DBP (mmHg) 86 77 88 81 92    Pulse 71 68 62 70 67

## 2019-10-29 ENCOUNTER — HOSPITAL ENCOUNTER (OUTPATIENT)
Dept: RADIOLOGY | Facility: HOSPITAL | Age: 49
Discharge: HOME | End: 2019-10-29
Attending: FAMILY MEDICINE
Payer: MEDICARE

## 2019-10-29 DIAGNOSIS — L71.9 ROSACEA: ICD-10-CM

## 2019-10-29 DIAGNOSIS — Z12.31 ENCOUNTER FOR SCREENING MAMMOGRAM FOR BREAST CANCER: ICD-10-CM

## 2019-10-29 PROCEDURE — 77067 MAMMO DIGITAL SCREENING BILAT WITH TOMOSYNTHESIS_CAD: ICD-10-PCS | Mod: 26,,, | Performed by: RADIOLOGY

## 2019-10-29 PROCEDURE — 77063 BREAST TOMOSYNTHESIS BI: CPT | Mod: 26,,, | Performed by: RADIOLOGY

## 2019-10-29 PROCEDURE — 77067 SCR MAMMO BI INCL CAD: CPT | Mod: 26,,, | Performed by: RADIOLOGY

## 2019-10-29 PROCEDURE — 77063 MAMMO DIGITAL SCREENING BILAT WITH TOMOSYNTHESIS_CAD: ICD-10-PCS | Mod: 26,,, | Performed by: RADIOLOGY

## 2019-10-29 PROCEDURE — 77067 SCR MAMMO BI INCL CAD: CPT | Mod: TC

## 2019-10-29 RX ORDER — AZELAIC ACID 0.15 G/G
GEL TOPICAL
Qty: 50 G | Refills: 2 | Status: SHIPPED | OUTPATIENT
Start: 2019-10-29 | End: 2021-03-18

## 2019-10-29 RX ORDER — AZELAIC ACID 0.15 G/G
GEL TOPICAL
Qty: 50 G | Refills: 2 | Status: SHIPPED | OUTPATIENT
Start: 2019-10-29 | End: 2019-10-29 | Stop reason: SDUPTHER

## 2019-11-04 ENCOUNTER — PATIENT OUTREACH (OUTPATIENT)
Dept: ADMINISTRATIVE | Facility: OTHER | Age: 49
End: 2019-11-04

## 2019-11-04 ENCOUNTER — OFFICE VISIT (OUTPATIENT)
Dept: NEUROLOGY | Facility: CLINIC | Age: 49
End: 2019-11-04
Payer: MEDICARE

## 2019-11-04 VITALS
WEIGHT: 171.31 LBS | DIASTOLIC BLOOD PRESSURE: 65 MMHG | SYSTOLIC BLOOD PRESSURE: 133 MMHG | BODY MASS INDEX: 31.52 KG/M2 | HEART RATE: 76 BPM | HEIGHT: 62 IN

## 2019-11-04 DIAGNOSIS — G43.711 CHRONIC MIGRAINE WITHOUT AURA, WITH INTRACTABLE MIGRAINE, SO STATED, WITH STATUS MIGRAINOSUS: Primary | ICD-10-CM

## 2019-11-04 PROCEDURE — 3075F PR MOST RECENT SYSTOLIC BLOOD PRESS GE 130-139MM HG: ICD-10-PCS | Mod: CPTII,S$GLB,, | Performed by: NEUROLOGICAL SURGERY

## 2019-11-04 PROCEDURE — 99999 PR PBB SHADOW E&M-EST. PATIENT-LVL IV: CPT | Mod: PBBFAC,,, | Performed by: NEUROLOGICAL SURGERY

## 2019-11-04 PROCEDURE — 3078F PR MOST RECENT DIASTOLIC BLOOD PRESSURE < 80 MM HG: ICD-10-PCS | Mod: CPTII,S$GLB,, | Performed by: NEUROLOGICAL SURGERY

## 2019-11-04 PROCEDURE — 3078F DIAST BP <80 MM HG: CPT | Mod: CPTII,S$GLB,, | Performed by: NEUROLOGICAL SURGERY

## 2019-11-04 PROCEDURE — 99214 OFFICE O/P EST MOD 30 MIN: CPT | Mod: S$GLB,,, | Performed by: NEUROLOGICAL SURGERY

## 2019-11-04 PROCEDURE — 99214 PR OFFICE/OUTPT VISIT, EST, LEVL IV, 30-39 MIN: ICD-10-PCS | Mod: S$GLB,,, | Performed by: NEUROLOGICAL SURGERY

## 2019-11-04 PROCEDURE — 3008F BODY MASS INDEX DOCD: CPT | Mod: CPTII,S$GLB,, | Performed by: NEUROLOGICAL SURGERY

## 2019-11-04 PROCEDURE — 3008F PR BODY MASS INDEX (BMI) DOCUMENTED: ICD-10-PCS | Mod: CPTII,S$GLB,, | Performed by: NEUROLOGICAL SURGERY

## 2019-11-04 PROCEDURE — 99999 PR PBB SHADOW E&M-EST. PATIENT-LVL IV: ICD-10-PCS | Mod: PBBFAC,,, | Performed by: NEUROLOGICAL SURGERY

## 2019-11-04 PROCEDURE — 3075F SYST BP GE 130 - 139MM HG: CPT | Mod: CPTII,S$GLB,, | Performed by: NEUROLOGICAL SURGERY

## 2019-11-04 RX ORDER — SUMATRIPTAN 6 MG/.5ML
6 INJECTION, SOLUTION SUBCUTANEOUS ONCE AS NEEDED
Qty: 6 SYRINGE | Refills: 5 | Status: SHIPPED | OUTPATIENT
Start: 2019-11-04 | End: 2019-11-04

## 2019-11-04 RX ORDER — SUMATRIPTAN SUCCINATE 100 MG/1
100 TABLET ORAL
Qty: 12 TABLET | Refills: 5 | Status: SHIPPED | OUTPATIENT
Start: 2019-11-04 | End: 2021-07-20

## 2019-11-04 RX ORDER — KETOROLAC TROMETHAMINE 10 MG/1
10 TABLET, FILM COATED ORAL EVERY 6 HOURS
Qty: 30 TABLET | Refills: 5 | Status: SHIPPED | OUTPATIENT
Start: 2019-11-04 | End: 2020-09-24

## 2019-11-05 NOTE — PROGRESS NOTES
"Chief Complaint   Patient presents with    Migraine        Giuliana Rodas is a 49 y.o. female with a history of multiple medical diagnoses as listed below that presents for evaluation of headaches. She has been having headaches almost weekly for the last several months. The headache tends to feel like pressure as if a "cap" were on her head and at other times she has headaches that are from the front of her head down to the middle of the posterior aspect of the head. The headache that is like a ca is described as a "sharp pressure" that is 10/10 in intensity. The headache are debilitating and only allow her to lie down in a ximena quiet room as the pain worsened with lights and sounds. She has nausea very frequently with these headaches but says that she does not vomit. When the headaches are in the center of her head she feels that the pain extends into the neck and shoulders as well. It too can get up to a 10/10 but she is less sensitive to light and sounds and tends not to have nausea. The head can last from 3 hours to 3 days at a time. She has not family history of headaches. She has also been having problems sleeping so she was started on Elavil by her PCP in hopes of treating both her headaches and her insomnia. She has not had a headache in the last three weeks since she has been on the medication. She has no complaints with the medication.    Interval History  8/18/2016  She has had about 5-6 "bad" headaches since she was last seen in clinic. She has been having various headache types including tension headaches and sinus headaches as well. She has been using Robaxin which she says helps with the tension headaches and she has been using Fioricet to help with her various other headache types. She has not had much relief with tramadol as she says that she has taken the medication several times in the past and feel that it's effects have likely been lost on her. Elavil 50 mg qhs has been helping her to sleep but " she does not feel like the medication has made her excessively sedated and she does think that she could tolerate an increased dose of the medication. She has been having GI issues and has been looking to find a gastroenterologist that is within her network for gastroparesis and she has been scheduled to see dermatology for evaluation of two skin lesions that her PCP felt could be precancerous.    11/17/2016  She has still been having episodic headaches since she was last seen. She recently had a tooth extracted and was told that she had an infection underlying it as well. She has been taking Elavil as directed and has bene having some improvement in her migraines. She still tends to have frequent tension headaches that are treated very well with Robaxin. She has been seeing GI for her gastroparesis but has scheduled follow-up toward the end of the month to decide how the problem will be approached. She has not had any new problems since she was last seen in clinic.    02/16/2017  Since last being seen she says that she has been seen by GI who determined that she had a problem with bile acid production. She says that she has been doing better overall with her headaches and feels that when she has taken the Fioricet it has been beneficial to help in the relief of her headaches. She has not had any new problems but feels that the pain has been slightly more frequent than before.    05/16/2017  Headaches were well controlled when she was taking her medications consisting of Elavil and Methocarbamol as preventative medications. She has since been unable to get Robaxin because insurance is no longer covering the medication. She has not been taking it for the last month and has been having more frequent headaches since that time. She has been using Fioricet as an abortive medication which has been helpful in alleviating her pain. She has dry mouth and dry eyes as a result of her Elavil. Her dry eyes has been making  reading more difficult as she has constant tearing in the eyes.    07/18/2017  She has had at least one migraine since she was last seen in clinic while she was visiting [place] with her parents and was spending some time outside when she had a headache that began and intensified fairly quickly. Fioricet was on hand which she took and was able to relieve her headache so that she could continue her day without many issues. She has continued to have tension headaches that eminate in the neck and into the shoulders as well. She has the pain radiate across the back at times as well. She has also been having headaches across the front of the head that has been feeling of intense pressure that she feels is related to her sinus headaches. She has been working to become more active and exercise more.    10/18/2017  She continues to work with her medical team try to get better control of her symptoms.  Tension headaches has still been bothersome despite her compliance and methocarbamol.  She feels that the medication be ineffective and she is is wishing to try different medication to control her symptoms.  Despite the frequency of her tension headaches she has had fewer migraines than compared to her previous visit.  She'll been taking all medications as directed and has been tolerating well.    01/18/2018  She has been dealing with her mother going to chemotherapy treatments with has been taxing on both her mother and herself.  Headaches have still been present, but slightly better than when she was last seen in clinic.  Migraines have not been very bothersome; however, she has continued to have tension headaches relatively often.    02/28/2019  Headaches have been bothersome since she was last seen in clinic but seem to be better in the last year.  She has been tolerating her current medication regimen well.  She feels that her level of stress has been a major trigger for her symptoms which she has been unable to find  a suitable medication regimen to control these symptoms would do reliability.  She has been most plagued by an episode of shaking which occurred unexplained weight clear to patient said that she seemed to lose consciousness and was unaware of what was going on during the episode.  She has never had any episodes like this since it happened at does not recall any previous history of syncope or seizure.    04/22/2019  She has found the last 2 months the headaches seem to be less responsive to her current control medication.  Despite compliance with her Elavil therapy headaches seem to be occurring more frequently, more intensely, and seemed to be longer lasting compared to 2 months ago.  She has not been able to identify any triggers that would account for this increase in her headaches.  She has continued to have response to her abortive therapies before and she has been relying on the much more than she had been 2 months prior.  She has come to the point where her abortive therapies are not able to last as long as they did before she is concerned that with continued headache increases she would not having medication to successfully treat all the headaches she has had.    08/01/2019  She has been following closely with her GI doctor to determine what has been causing her to have gastritis and persistent GI upset.  The patient has been having very little food intake and has also been having decreased fluid intake over the last several weeks due to persistent abdominal pain.  There is suspicion that she has had excessive by will build up the stomach which is causing some her symptoms.  She has been placed on medication to try her reduce bile secretion, but the medication seemed to make her headaches become worse.  She also admits that due to decrease food and fluid intake this could also be contributing to her symptoms.  She has found significant relief from injectable sumatriptan, but the medication seems to be  somewhat too strong at times and she request injectable at a lower dose to determine if he would be just as efficacious.    09/04/2019  Despite her compliance with the medication the patient continues to have headaches approximately 4-5 times per week.  She says that she has been having 20-22 days of headache per month.  Headaches seem to be slightly less intense compared to when she was last seen in clinic, but the headaches still debilitating enough to prevent her from caring for her own activities of daily living.  Sumatriptan has been helpful in treating headaches when have occurred, but the patient feels limited by the number of injections she is able to obtain via her insurance.  She says that she tries to save the medication for bad headaches rather than trying to treat them as they occur.    11/04/2019  She currently has a migraine.  The headache has been ongoing for last several days and seems to be refractory to all of her typical abortive medication strategies.  She has tried sumatriptan without any noticeable improvement.  She cannot think of any specific triggers that would have made her symptoms come on with such intensity.     PAST MEDICAL HISTORY:  Past Medical History:   Diagnosis Date    Bile reflux gastritis     Breast cyst     Eczema     Fibrocystic breast     Fibromyalgia     Gastroparesis     dr. harden    GERD (gastroesophageal reflux disease)     Hyperglyceridemia     Hyperlipidemia     Hypertension     IC (interstitial cystitis)     dr. labadie    Psoriasis     Tension headache        PAST SURGICAL HISTORY:  Past Surgical History:   Procedure Laterality Date    BREAST BIOPSY Right     over 10 years ago/ milk duct    CHOLECYSTECTOMY      HEMORRHOID SURGERY      HYSTERECTOMY      KNEE SURGERY      OOPHORECTOMY      one ov removed       SOCIAL HISTORY:  Social History     Socioeconomic History    Marital status:      Spouse name: Not on file    Number of  children: 2    Years of education: Not on file    Highest education level: Not on file   Occupational History    Occupation:      Employer: [employer]   Social Needs    Financial resource strain: Not on file    Food insecurity:     Worry: Not on file     Inability: Not on file    Transportation needs:     Medical: Not on file     Non-medical: Not on file   Tobacco Use    Smoking status: Never Smoker    Smokeless tobacco: Never Used   Substance and Sexual Activity    Alcohol use: No    Drug use: No    Sexual activity: Yes     Partners: Male     Birth control/protection: See Surgical Hx   Lifestyle    Physical activity:     Days per week: Not on file     Minutes per session: Not on file    Stress: Not on file   Relationships    Social connections:     Talks on phone: Not on file     Gets together: Not on file     Attends [employer] service: Not on file     Active member of club or organization: Not on file     Attends meetings of clubs or organizations: Not on file     Relationship status: Not on file   Other Topics Concern    Not on file   Social History Narrative    Lives at home with  and daughter       FAMILY HISTORY:  Family History   Problem Relation Age of Onset    Hypertension Mother     Migraines Mother     Breast cancer Mother     Hypertension Father     Stroke Father     Heart disease Father     Hyperlipidemia Father     Diabetes Father     Breast cancer Paternal Grandmother     Colon cancer Neg Hx     Ovarian cancer Neg Hx     Inflammatory bowel disease Neg Hx     Celiac disease Neg Hx        ALLERGIES AND MEDICATIONS: updated and reviewed.  Review of patient's allergies indicates:   Allergen Reactions    Depo-provera [medroxyprogesterone] Anxiety    Dicyclomine Rash     Swelling of lip      Prozac [fluoxetine] Anxiety     Current Outpatient Medications   Medication Sig Dispense Refill    AJOVY 225 mg/1.5 mL injection inject once subcutaneously EVERY 28 DAYS AS  DIRECTED 1.5 mL 2    amlodipine-olmesartan (ANGEL) 5-40 mg per tablet Take 1 tablet by mouth once daily. 90 tablet 3    atenolol (TENORMIN) 50 MG tablet Take 1 tablet (50 mg total) by mouth 2 (two) times daily as needed. 180 tablet 1    azelaic acid (FINACEA) 15 % gel Apply to face nightly. Increase to BID as tolerated 50 g 2    BIFIDOBACTERIUM INFANTIS (ALIGN ORAL) Take 1 tablet by mouth once daily.      colestipol (COLESTID) 1 gram Tab Take 1 tablet (1 g total) by mouth 2 (two) times daily. 180 tablet 0    diclofenac (VOLTAREN) 50 MG EC tablet TAKE ONE TABLET BY MOUTH TWICE DAILY AS NEEDED 45 tablet 2    DULoxetine (CYMBALTA) 30 MG capsule 30 mg daily and then 30 mg bid 60 capsule 5    escitalopram oxalate (LEXAPRO) 20 MG tablet TAKE ONE TABLET BY MOUTH DAILY 30 tablet 11    fluocinonide (LIDEX) 0.05 % external solution AAA scalp qday - bid prn pruritus 60 mL 3    gemfibrozil (LOPID) 600 MG tablet Take 1 tablet (600 mg total) by mouth 2 (two) times daily before meals. 180 tablet 3    ketoconazole (NIZORAL) 2 % shampoo Wash hair with medicated shampoo at least 2x/week - let sit on scalp at least 5 minutes prior to rinsing 120 mL 5    ketorolac (TORADOL) 10 mg tablet Take 1 tablet (10 mg total) by mouth every 6 (six) hours. 30 tablet 5    lipase-protease-amylase 24,000-76,000-120,000 units (PANLIPASE) 24,000-76,000 -120,000 unit capsule Take 2 capsules by mouth 3 (three) times daily with meals. 180 capsule 2    methocarbamol (ROBAXIN) 500 MG Tab Take 500 mg by mouth 4 (four) times daily.      ondansetron (ZOFRAN) 4 MG tablet Take 1 tablet (4 mg total) by mouth every 6 (six) hours. 12 tablet 0    oxyCODONE-acetaminophen (PERCOCET) 5-325 mg per tablet Take 1 tablet by mouth every 4 (four) hours as needed for Pain. 11 tablet 0    pantoprazole (PROTONIX) 40 MG tablet Take 1 tablet (40 mg total) by mouth once daily. 90 tablet 3    pravastatin (PRAVACHOL) 20 MG tablet TAKE ONE TABLET BY MOUTH EVERY  EVENING 90 tablet 0    sucralfate (CARAFATE) 1 gram tablet Take 1 g by mouth 4 (four) times daily.      sumatriptan (IMITREX) 100 MG tablet Take 1 tablet (100 mg total) by mouth as needed for Migraine. Take at the onset of headache, may take 1 more in 1 hour if needed. (Patient not taking: Reported on 10/21/2019) 9 tablet 5    sumatriptan (IMITREX) 100 MG tablet Take 1 tablet (100 mg total) by mouth as needed for Migraine. Take at the onset of headache, may take 1 more in 1 hour if needed. 12 tablet 5    SUMAtriptan succinate 6 mg/0.5 mL Syrg Inject 6 mg into the skin once as needed (migraine. Do no exceeed 2 in 24 hours.). 6 Syringe 5    tiZANidine (ZANAFLEX) 4 MG tablet Take 1 tablet (4 mg total) by mouth every 8 (eight) hours as needed. 60 tablet 3    traZODone (DESYREL) 50 MG tablet Take 1 tablet (50 mg total) by mouth every evening. 30 tablet 11    triamcinolone acetonide 0.1% (KENALOG) 0.1 % cream AAA bid prn redness, scaling or itching 60 g 1     No current facility-administered medications for this visit.        Review of Systems   Constitutional: Negative for activity change, appetite change, fever and unexpected weight change.   HENT: Negative for trouble swallowing and voice change.    Eyes: Positive for photophobia and visual disturbance.   Respiratory: Negative for apnea and shortness of breath.    Cardiovascular: Negative for chest pain and leg swelling.   Gastrointestinal: Positive for nausea and vomiting. Negative for constipation.   Genitourinary: Negative for difficulty urinating.   Musculoskeletal: Negative for back pain, gait problem and neck pain.   Skin: Negative for color change and pallor.   Neurological: Positive for headaches. Negative for dizziness, seizures, syncope, weakness and numbness.   Hematological: Negative for adenopathy.   Psychiatric/Behavioral: Negative for agitation, confusion and decreased concentration.       Neurologic Exam     Mental Status   Oriented to person,  place, and time.   Registration: recalls 3 of 3 objects.   Attention: normal. Concentration: normal.   Speech: speech is normal   Level of consciousness: alert  Knowledge: good.     Cranial Nerves     CN II   Visual fields full to confrontation.   Right visual field deficit: none  Left visual field deficit: none     CN III, IV, VI   Pupils are equal, round, and reactive to light.  Extraocular motions are normal.   Right pupil: Size: 3 mm. Shape: regular. Accommodation: intact.   Left pupil: Size: 3 mm. Shape: regular. Accommodation: intact.   CN III: no CN III palsy  CN VI: no CN VI palsy  Nystagmus: none   Diplopia: none  Ophthalmoparesis: none  Upgaze: normal  Downgaze: normal  Conjugate gaze: present    CN V   Facial sensation intact.   Right facial sensation deficit: none  Left facial sensation deficit: none    CN VII   Facial expression full, symmetric.   Right facial weakness: none  Left facial weakness: none    CN VIII   CN VIII normal.     CN IX, X   CN IX normal.   CN X normal.   Palate: symmetric    CN XI   CN XI normal.   Right sternocleidomastoid strength: normal  Left sternocleidomastoid strength: normal  Right trapezius strength: normal  Left trapezius strength: normal    CN XII   CN XII normal.   Tongue deviation: none    Motor Exam   Muscle bulk: normal  Overall muscle tone: normal  Right arm tone: normal  Left arm tone: normal  Right leg tone: normal  Left leg tone: normal    Strength   Strength 5/5 throughout.     Sensory Exam   Right arm light touch: normal  Left arm light touch: normal  Right leg light touch: normal  Left leg light touch: normal  Right arm vibration: normal  Left arm vibration: normal  Right leg vibration: normal  Left leg vibration: normal  Right arm proprioception: normal  Left arm proprioception: normal  Right leg proprioception: normal  Left leg proprioception: normal  Right arm pinprick: normal  Left arm pinprick: normal  Right leg pinprick: normal  Left leg pinprick:  "normal    Gait, Coordination, and Reflexes     Gait  Gait: normal    Coordination   Romberg: negative  Finger to nose coordination: normal  Heel to shin coordination: normal  Tandem walking coordination: normal    Tremor   Resting tremor: absent    Reflexes   Right brachioradialis: 2+  Left brachioradialis: 2+  Right biceps: 2+  Left biceps: 2+  Right triceps: 2+  Left triceps: 2+  Right patellar: 2+  Left patellar: 2+  Right achilles: 2+  Left achilles: 2+  Right plantar: normal  Left plantar: normal      Physical Exam   Constitutional: She is oriented to person, place, and time. She appears well-developed and well-nourished.   HENT:   Head: Normocephalic and atraumatic.   Eyes: Pupils are equal, round, and reactive to light. EOM are normal.   Neck: Normal range of motion.   Cardiovascular: Normal rate and intact distal pulses.   Pulmonary/Chest: Effort normal. No apnea. No respiratory distress.   Musculoskeletal: Normal range of motion.   Neurological: She is alert and oriented to person, place, and time. She has normal strength. She has a normal Finger-Nose-Finger Test, a normal Heel to Shin Test, a normal Romberg Test and a normal Tandem Gait Test. Gait normal.   Reflex Scores:       Tricep reflexes are 2+ on the right side and 2+ on the left side.       Bicep reflexes are 2+ on the right side and 2+ on the left side.       Brachioradialis reflexes are 2+ on the right side and 2+ on the left side.       Patellar reflexes are 2+ on the right side and 2+ on the left side.       Achilles reflexes are 2+ on the right side and 2+ on the left side.  Skin: Skin is warm and dry.   Psychiatric: She has a normal mood and affect. Her speech is normal and behavior is normal. Thought content normal.   Vitals reviewed.      Vitals:    11/04/19 1518   BP: 133/65   BP Location: Left arm   Patient Position: Sitting   BP Method: Large (Automatic)   Pulse: 76   Weight: 77.7 kg (171 lb 4.8 oz)   Height: 5' 2" (1.575 m) "       Assessment & Plan:  Problem List Items Addressed This Visit     Chronic migraine without aura, with intractable migraine, so stated, with status migrainosus - Primary    Overview     Headaches have been much better with the once monthly headache control injections.  Periodic headaches have been treated with subcutaneous sumatriptan, but the medication may be slightly too strong for the patient. Lower does sumatriptan may be better tolerated offering headache control without any potential for side effects.         Relevant Medications    SUMAtriptan succinate 6 mg/0.5 mL Syrg    sumatriptan (IMITREX) 100 MG tablet    ketorolac (TORADOL) 10 mg tablet        Follow-up: Follow up in about 2 months (around 1/4/2020).  More than 50% of this 25 minute encounter was spent in counseling and coordinating care of headache.

## 2019-11-18 ENCOUNTER — TELEPHONE (OUTPATIENT)
Dept: GASTROENTEROLOGY | Facility: CLINIC | Age: 49
End: 2019-11-18

## 2019-11-18 ENCOUNTER — PATIENT OUTREACH (OUTPATIENT)
Dept: OTHER | Facility: OTHER | Age: 49
End: 2019-11-18

## 2019-11-18 ENCOUNTER — DOCUMENTATION ONLY (OUTPATIENT)
Dept: GASTROENTEROLOGY | Facility: CLINIC | Age: 49
End: 2019-11-18

## 2019-11-18 NOTE — TELEPHONE ENCOUNTER
MA contacted pt to give test results per Yaquelin. Pt verbalized understanding. MA also, let pt know we did receive those records that was requested.        ----- Message from Yaquelin Nina NP sent at 11/18/2019  8:17 AM CST -----  Her breath test was negative

## 2019-11-18 NOTE — PROGRESS NOTES
Called patient to review readings. She reports she has been having migraines very frequently and also endorses higher salt higher. Hurt meniscus and will have MRI tomorrow. Open to exercising at the gym.    Chrlothalidone dc'd in December 2017 2/2 low K. No diuretics since.   Could not tolerate amlodipine 10mg 2/2 leg swelling        Last 5 Patient Entered Readings                                      Current 30 Day Average: 153/86     Recent Readings 11/18/2019 11/14/2019 11/14/2019 11/5/2019 11/5/2019    SBP (mmHg) 159 146 159 152 142    DBP (mmHg) 84 85 94 86 85    Pulse 75 70 69 65 74        BP trending up, goal <130/80 mmHg  Patient wants to try dietary sodium restriction  HC to follow up with low sodium education      Hypertension Medications             amlodipine-olmesartan (ANGEL) 5-40 mg per tablet Take 1 tablet by mouth once daily.    atenolol (TENORMIN) 50 MG tablet Take 1 tablet (50 mg total) by mouth 2 (two) times daily as needed.

## 2019-12-02 ENCOUNTER — OFFICE VISIT (OUTPATIENT)
Dept: FAMILY MEDICINE | Facility: CLINIC | Age: 49
End: 2019-12-02
Payer: MEDICARE

## 2019-12-02 ENCOUNTER — LAB VISIT (OUTPATIENT)
Dept: LAB | Facility: HOSPITAL | Age: 49
End: 2019-12-02
Attending: FAMILY MEDICINE
Payer: MEDICARE

## 2019-12-02 ENCOUNTER — PATIENT OUTREACH (OUTPATIENT)
Dept: OTHER | Facility: OTHER | Age: 49
End: 2019-12-02

## 2019-12-02 VITALS
HEART RATE: 68 BPM | TEMPERATURE: 99 F | WEIGHT: 169.31 LBS | SYSTOLIC BLOOD PRESSURE: 120 MMHG | BODY MASS INDEX: 31.16 KG/M2 | OXYGEN SATURATION: 94 % | RESPIRATION RATE: 16 BRPM | HEIGHT: 62 IN | DIASTOLIC BLOOD PRESSURE: 80 MMHG

## 2019-12-02 DIAGNOSIS — Z01.818 PREOPERATIVE CLEARANCE: Primary | ICD-10-CM

## 2019-12-02 DIAGNOSIS — Z01.818 PREOPERATIVE CLEARANCE: ICD-10-CM

## 2019-12-02 LAB
ALBUMIN SERPL BCP-MCNC: 3.9 G/DL (ref 3.5–5.2)
ALP SERPL-CCNC: 96 U/L (ref 55–135)
ALT SERPL W/O P-5'-P-CCNC: 12 U/L (ref 10–44)
ANION GAP SERPL CALC-SCNC: 8 MMOL/L (ref 8–16)
AST SERPL-CCNC: 16 U/L (ref 10–40)
BACTERIA #/AREA URNS HPF: ABNORMAL /HPF
BASOPHILS # BLD AUTO: 0.04 K/UL (ref 0–0.2)
BASOPHILS NFR BLD: 0.8 % (ref 0–1.9)
BILIRUB SERPL-MCNC: 0.3 MG/DL (ref 0.1–1)
BILIRUB UR QL STRIP: NEGATIVE
BUN SERPL-MCNC: 14 MG/DL (ref 6–20)
CALCIUM SERPL-MCNC: 9.7 MG/DL (ref 8.7–10.5)
CHLORIDE SERPL-SCNC: 103 MMOL/L (ref 95–110)
CLARITY UR: ABNORMAL
CO2 SERPL-SCNC: 29 MMOL/L (ref 23–29)
COLOR UR: YELLOW
CREAT SERPL-MCNC: 1.2 MG/DL (ref 0.5–1.4)
DIFFERENTIAL METHOD: ABNORMAL
EOSINOPHIL # BLD AUTO: 0.1 K/UL (ref 0–0.5)
EOSINOPHIL NFR BLD: 2.4 % (ref 0–8)
ERYTHROCYTE [DISTWIDTH] IN BLOOD BY AUTOMATED COUNT: 13.3 % (ref 11.5–14.5)
EST. GFR  (AFRICAN AMERICAN): >60 ML/MIN/1.73 M^2
EST. GFR  (NON AFRICAN AMERICAN): 52 ML/MIN/1.73 M^2
GLUCOSE SERPL-MCNC: 98 MG/DL (ref 70–110)
GLUCOSE UR QL STRIP: NEGATIVE
HCT VFR BLD AUTO: 32.1 % (ref 37–48.5)
HGB BLD-MCNC: 9.6 G/DL (ref 12–16)
HGB UR QL STRIP: ABNORMAL
HYALINE CASTS #/AREA URNS LPF: 0 /LPF
IMM GRANULOCYTES # BLD AUTO: 0.01 K/UL (ref 0–0.04)
IMM GRANULOCYTES NFR BLD AUTO: 0.2 % (ref 0–0.5)
KETONES UR QL STRIP: NEGATIVE
LEUKOCYTE ESTERASE UR QL STRIP: ABNORMAL
LYMPHOCYTES # BLD AUTO: 1.9 K/UL (ref 1–4.8)
LYMPHOCYTES NFR BLD: 37 % (ref 18–48)
MCH RBC QN AUTO: 21.1 PG (ref 27–31)
MCHC RBC AUTO-ENTMCNC: 29.9 G/DL (ref 32–36)
MCV RBC AUTO: 71 FL (ref 82–98)
MICROSCOPIC COMMENT: ABNORMAL
MONOCYTES # BLD AUTO: 0.4 K/UL (ref 0.3–1)
MONOCYTES NFR BLD: 7.5 % (ref 4–15)
NEUTROPHILS # BLD AUTO: 2.6 K/UL (ref 1.8–7.7)
NEUTROPHILS NFR BLD: 52.1 % (ref 38–73)
NITRITE UR QL STRIP: NEGATIVE
NRBC BLD-RTO: 0 /100 WBC
PH UR STRIP: 5 [PH] (ref 5–8)
PLATELET # BLD AUTO: 200 K/UL (ref 150–350)
PMV BLD AUTO: 10 FL (ref 9.2–12.9)
POTASSIUM SERPL-SCNC: 3.5 MMOL/L (ref 3.5–5.1)
PROT SERPL-MCNC: 7.2 G/DL (ref 6–8.4)
PROT UR QL STRIP: ABNORMAL
RBC # BLD AUTO: 4.55 M/UL (ref 4–5.4)
RBC #/AREA URNS HPF: 8 /HPF (ref 0–4)
SODIUM SERPL-SCNC: 140 MMOL/L (ref 136–145)
SP GR UR STRIP: 1.01 (ref 1–1.03)
SQUAMOUS #/AREA URNS HPF: 4 /HPF
URN SPEC COLLECT METH UR: ABNORMAL
UROBILINOGEN UR STRIP-ACNC: NEGATIVE EU/DL
WBC # BLD AUTO: 5.06 K/UL (ref 3.9–12.7)
WBC #/AREA URNS HPF: 9 /HPF (ref 0–5)

## 2019-12-02 PROCEDURE — 99999 PR PBB SHADOW E&M-EST. PATIENT-LVL III: ICD-10-PCS | Mod: PBBFAC,,, | Performed by: FAMILY MEDICINE

## 2019-12-02 PROCEDURE — 81000 URINALYSIS NONAUTO W/SCOPE: CPT

## 2019-12-02 PROCEDURE — 99214 OFFICE O/P EST MOD 30 MIN: CPT | Mod: S$GLB,,, | Performed by: FAMILY MEDICINE

## 2019-12-02 PROCEDURE — 3079F PR MOST RECENT DIASTOLIC BLOOD PRESSURE 80-89 MM HG: ICD-10-PCS | Mod: CPTII,S$GLB,, | Performed by: FAMILY MEDICINE

## 2019-12-02 PROCEDURE — 93005 EKG 12-LEAD: ICD-10-PCS | Mod: S$GLB,,, | Performed by: FAMILY MEDICINE

## 2019-12-02 PROCEDURE — 80053 COMPREHEN METABOLIC PANEL: CPT

## 2019-12-02 PROCEDURE — 93010 EKG 12-LEAD: ICD-10-PCS | Mod: S$GLB,,, | Performed by: INTERNAL MEDICINE

## 2019-12-02 PROCEDURE — 3074F SYST BP LT 130 MM HG: CPT | Mod: CPTII,S$GLB,, | Performed by: FAMILY MEDICINE

## 2019-12-02 PROCEDURE — 3008F BODY MASS INDEX DOCD: CPT | Mod: CPTII,S$GLB,, | Performed by: FAMILY MEDICINE

## 2019-12-02 PROCEDURE — 3008F PR BODY MASS INDEX (BMI) DOCUMENTED: ICD-10-PCS | Mod: CPTII,S$GLB,, | Performed by: FAMILY MEDICINE

## 2019-12-02 PROCEDURE — 85025 COMPLETE CBC W/AUTO DIFF WBC: CPT

## 2019-12-02 PROCEDURE — 93005 ELECTROCARDIOGRAM TRACING: CPT | Mod: S$GLB,,, | Performed by: FAMILY MEDICINE

## 2019-12-02 PROCEDURE — 99214 PR OFFICE/OUTPT VISIT, EST, LEVL IV, 30-39 MIN: ICD-10-PCS | Mod: S$GLB,,, | Performed by: FAMILY MEDICINE

## 2019-12-02 PROCEDURE — 99999 PR PBB SHADOW E&M-EST. PATIENT-LVL III: CPT | Mod: PBBFAC,,, | Performed by: FAMILY MEDICINE

## 2019-12-02 PROCEDURE — 3079F DIAST BP 80-89 MM HG: CPT | Mod: CPTII,S$GLB,, | Performed by: FAMILY MEDICINE

## 2019-12-02 PROCEDURE — 3074F PR MOST RECENT SYSTOLIC BLOOD PRESSURE < 130 MM HG: ICD-10-PCS | Mod: CPTII,S$GLB,, | Performed by: FAMILY MEDICINE

## 2019-12-02 PROCEDURE — 93010 ELECTROCARDIOGRAM REPORT: CPT | Mod: S$GLB,,, | Performed by: INTERNAL MEDICINE

## 2019-12-02 NOTE — PROGRESS NOTES
Subjective:       Patient ID: Giuliana Rodas is a 49 y.o. female.    Chief Complaint: Pre-op Exam and left ear    49 year female presents for preoperative clearnace for meniscal repair in the right knee. It is scheduled for this Friday.     Past Medical History:   Diagnosis Date    Bile reflux gastritis     Breast cyst     Eczema     Fibrocystic breast     Fibromyalgia     Gastroparesis     dr. harden    GERD (gastroesophageal reflux disease)     Hyperglyceridemia     Hyperlipidemia     Hypertension     IC (interstitial cystitis)     dr. labadie    Psoriasis     Tension headache       Past Surgical History:   Procedure Laterality Date    BREAST BIOPSY Right     over 10 years ago/ milk duct    CHOLECYSTECTOMY      HEMORRHOID SURGERY      HYSTERECTOMY      KNEE SURGERY      OOPHORECTOMY      one ov removed     Family History   Problem Relation Age of Onset    Hypertension Mother     Migraines Mother     Breast cancer Mother     Hypertension Father     Stroke Father     Heart disease Father     Hyperlipidemia Father     Diabetes Father     Breast cancer Paternal Grandmother     Colon cancer Neg Hx     Ovarian cancer Neg Hx     Inflammatory bowel disease Neg Hx     Celiac disease Neg Hx      Social History     Socioeconomic History    Marital status:      Spouse name: Not on file    Number of children: 2    Years of education: Not on file    Highest education level: Not on file   Occupational History    Occupation:      Employer: [employer]   Social Needs    Financial resource strain: Not on file    Food insecurity:     Worry: Never true     Inability: Never true    Transportation needs:     Medical: No     Non-medical: No   Tobacco Use    Smoking status: Never Smoker    Smokeless tobacco: Never Used   Substance and Sexual Activity    Alcohol use: No     Frequency: Never     Drinks per session: 1 or 2     Binge frequency: Never    Drug use: No    Sexual  "activity: Yes     Partners: Male     Birth control/protection: See Surgical Hx   Lifestyle    Physical activity:     Days per week: 2 days     Minutes per session: 20 min    Stress: Only a little   Relationships    Social connections:     Talks on phone: More than three times a week     Gets together: Once a week     Attends Anglican service: Not on file     Active member of club or organization: No     Attends meetings of clubs or organizations: Patient refused     Relationship status:    Other Topics Concern    Not on file   Social History Narrative    Lives at home with  and daughter       Review of Systems   Constitutional: Negative for activity change, chills, diaphoresis and fever.   HENT: Positive for ear pain. Negative for hearing loss, rhinorrhea and trouble swallowing.    Eyes: Negative for discharge and visual disturbance.   Respiratory: Negative for cough, chest tightness, shortness of breath and wheezing.    Cardiovascular: Negative for chest pain, palpitations and leg swelling.   Gastrointestinal: Negative for blood in stool, constipation, diarrhea and vomiting.   Endocrine: Negative for polydipsia and polyuria.   Genitourinary: Negative for difficulty urinating, dysuria, frequency, hematuria and menstrual problem.   Musculoskeletal: Positive for arthralgias and neck pain. Negative for joint swelling.   Neurological: Positive for headaches. Negative for dizziness, syncope, weakness and light-headedness.   Psychiatric/Behavioral: Positive for confusion and sleep disturbance. Negative for dysphoric mood.        Sleep disturbance as her legs can't keep still. She state sit is painful. She states they jerk because they hurt.        Objective:       Vitals:    12/02/19 1507   BP: 120/80   Pulse: 68   Resp: 16   Temp: 98.6 °F (37 °C)   TempSrc: Oral   SpO2: (!) 94%   Weight: 76.8 kg (169 lb 5 oz)   Height: 5' 2" (1.575 m)       Physical Exam   Constitutional: She is oriented to person, " place, and time. She appears well-developed and well-nourished. No distress.   HENT:   Head: Normocephalic.   Right Ear: External ear normal.   Left Ear: External ear normal.   Mouth/Throat: Oropharynx is clear and moist. No oropharyngeal exudate.   Eyes: Conjunctivae are normal.   Neck: Normal range of motion. Neck supple. No thyromegaly present.   Cardiovascular: Normal rate, regular rhythm and normal heart sounds. Exam reveals no gallop and no friction rub.   No murmur heard.  Pulmonary/Chest: Effort normal and breath sounds normal. No stridor. No respiratory distress. She has no wheezes. She has no rales.   Abdominal: Soft. Bowel sounds are normal. She exhibits no distension and no mass. There is no tenderness. There is no rebound and no guarding.   Musculoskeletal: She exhibits no edema.   Lymphadenopathy:     She has no cervical adenopathy.   Neurological: She is alert and oriented to person, place, and time.   Skin: No rash noted. She is not diaphoretic.   Psychiatric: She has a normal mood and affect.       Assessment:       1. Preoperative clearance        Plan:       Giuliana was seen today for pre-op exam and left ear.    Diagnoses and all orders for this visit:    Preoperative clearance  -     CBC auto differential; Future  -     Comprehensive metabolic panel; Future  -     Urinalysis; Future  -     EKG 12-lead    ekg is normal. Will check preop labs.

## 2019-12-03 ENCOUNTER — PATIENT MESSAGE (OUTPATIENT)
Dept: FAMILY MEDICINE | Facility: CLINIC | Age: 49
End: 2019-12-03

## 2019-12-03 DIAGNOSIS — G43.711 CHRONIC MIGRAINE WITHOUT AURA, WITH INTRACTABLE MIGRAINE, SO STATED, WITH STATUS MIGRAINOSUS: ICD-10-CM

## 2019-12-03 DIAGNOSIS — N39.0 URINARY TRACT INFECTION WITHOUT HEMATURIA, SITE UNSPECIFIED: Primary | ICD-10-CM

## 2019-12-03 DIAGNOSIS — D64.9 ANEMIA, UNSPECIFIED TYPE: ICD-10-CM

## 2019-12-03 RX ORDER — SUMATRIPTAN SUCCINATE 100 MG/1
100 TABLET ORAL
Qty: 12 TABLET | Refills: 5 | Status: CANCELLED | OUTPATIENT
Start: 2019-12-03

## 2019-12-03 RX ORDER — CEPHALEXIN 500 MG/1
500 CAPSULE ORAL EVERY 12 HOURS
Qty: 10 CAPSULE | Refills: 0 | Status: SHIPPED | OUTPATIENT
Start: 2019-12-03 | End: 2020-09-24

## 2019-12-04 ENCOUNTER — LAB VISIT (OUTPATIENT)
Dept: LAB | Facility: HOSPITAL | Age: 49
End: 2019-12-04
Attending: FAMILY MEDICINE
Payer: MEDICARE

## 2019-12-04 ENCOUNTER — TELEPHONE (OUTPATIENT)
Dept: FAMILY MEDICINE | Facility: CLINIC | Age: 49
End: 2019-12-04

## 2019-12-04 DIAGNOSIS — D64.9 ANEMIA, UNSPECIFIED TYPE: ICD-10-CM

## 2019-12-04 LAB
BASOPHILS # BLD AUTO: 0.03 K/UL (ref 0–0.2)
BASOPHILS NFR BLD: 0.6 % (ref 0–1.9)
DIFFERENTIAL METHOD: ABNORMAL
EOSINOPHIL # BLD AUTO: 0.2 K/UL (ref 0–0.5)
EOSINOPHIL NFR BLD: 3 % (ref 0–8)
ERYTHROCYTE [DISTWIDTH] IN BLOOD BY AUTOMATED COUNT: 13.4 % (ref 11.5–14.5)
FERRITIN SERPL-MCNC: <1 NG/ML (ref 20–300)
HCT VFR BLD AUTO: 31.8 % (ref 37–48.5)
HGB BLD-MCNC: 9.6 G/DL (ref 12–16)
IMM GRANULOCYTES # BLD AUTO: 0.02 K/UL (ref 0–0.04)
IMM GRANULOCYTES NFR BLD AUTO: 0.4 % (ref 0–0.5)
IRON SERPL-MCNC: 14 UG/DL (ref 30–160)
LYMPHOCYTES # BLD AUTO: 1.9 K/UL (ref 1–4.8)
LYMPHOCYTES NFR BLD: 37.9 % (ref 18–48)
MCH RBC QN AUTO: 21.4 PG (ref 27–31)
MCHC RBC AUTO-ENTMCNC: 30.2 G/DL (ref 32–36)
MCV RBC AUTO: 71 FL (ref 82–98)
MONOCYTES # BLD AUTO: 0.4 K/UL (ref 0.3–1)
MONOCYTES NFR BLD: 7.8 % (ref 4–15)
NEUTROPHILS # BLD AUTO: 2.5 K/UL (ref 1.8–7.7)
NEUTROPHILS NFR BLD: 50.3 % (ref 38–73)
NRBC BLD-RTO: 0 /100 WBC
PLATELET # BLD AUTO: 213 K/UL (ref 150–350)
PMV BLD AUTO: 10.4 FL (ref 9.2–12.9)
RBC # BLD AUTO: 4.48 M/UL (ref 4–5.4)
SATURATED IRON: 3 % (ref 20–50)
TOTAL IRON BINDING CAPACITY: 536 UG/DL (ref 250–450)
TRANSFERRIN SERPL-MCNC: 362 MG/DL (ref 200–375)
WBC # BLD AUTO: 4.99 K/UL (ref 3.9–12.7)

## 2019-12-04 PROCEDURE — 83020 HEMOGLOBIN ELECTROPHORESIS: CPT

## 2019-12-04 PROCEDURE — 36415 COLL VENOUS BLD VENIPUNCTURE: CPT | Mod: PO

## 2019-12-04 PROCEDURE — 85025 COMPLETE CBC W/AUTO DIFF WBC: CPT

## 2019-12-04 PROCEDURE — 83540 ASSAY OF IRON: CPT

## 2019-12-04 PROCEDURE — 82728 ASSAY OF FERRITIN: CPT

## 2019-12-04 NOTE — TELEPHONE ENCOUNTER
----- Message from Cielo Sky sent at 12/4/2019  2:05 PM CST -----  Contact: [facility]  Type: Patient Call Back    Who called: [facility]    What is the request in detail: [facility] is requesting the status of the pt clearance.    Can the clinic reply by [facility]?    Would the patient rather a call back or a response via [facility]? Call back     Best call back number:[phone]

## 2019-12-04 NOTE — TELEPHONE ENCOUNTER
"Return call to [facility], and informed that Pt is not cleared for surgery. She asked why, I informed her that the Pt needs additional labs. She was rude and stated," Well when will she have it done,and will she be cleared after she has it done. Does she have to go back to the office again. Her surgery is scheduled on the 6th". I informed her that I cannot answer when she will be cleared. That as soon as the Pt has the labs done and the Provider reviews the labs. Then she will determine is she can be cleared for the procedure. She asked when the Pt was notified that she needed additional labs. I informed her on yesterday. She stated, "Okay" and hung up the phone.   "

## 2019-12-05 ENCOUNTER — TELEPHONE (OUTPATIENT)
Dept: FAMILY MEDICINE | Facility: CLINIC | Age: 49
End: 2019-12-05

## 2019-12-05 DIAGNOSIS — D50.9 IRON DEFICIENCY ANEMIA, UNSPECIFIED IRON DEFICIENCY ANEMIA TYPE: Primary | ICD-10-CM

## 2019-12-05 LAB
HGB A2 MFR BLD HPLC: 2.1 % (ref 2.2–3.2)
HGB FRACT BLD ELPH-IMP: ABNORMAL
HGB FRACT BLD ELPH-IMP: ABNORMAL

## 2019-12-05 NOTE — TELEPHONE ENCOUNTER
----- Message from Priscilla Corado sent at 12/5/2019  9:49 AM CST -----  Contact: [facility]  [phone]  Type: Call Back    Who called: [facility]    What is the request in detail: Calling to find out if the patient is cleared for surgery. Would like to know something today because the surgery is scheduled for tomorrow, 12-06-19. Please call ASAP.     Would the patient rather a call back or a response via [facility]? Call back    Best call back number: [phone]       Simple: Patient demonstrates quick and easy understanding

## 2019-12-05 NOTE — TELEPHONE ENCOUNTER
NOT CLEARED  Too low for surgery. Bob wants her to do iron therapy and get a colonoscopy. Iron 325 mg sold over the counter along stool softner. Iron can constipate you so it is important to take a stool softner with it. Recheck in 4 weeks.

## 2019-12-05 NOTE — TELEPHONE ENCOUNTER
----- Message from Priscilla Corado sent at 12/5/2019  9:49 AM CST -----  Contact: [facility]  [phone]  Type: Call Back    Who called: [facility]    What is the request in detail: Calling to find out if the patient is cleared for surgery. Would like to know something today because the surgery is scheduled for tomorrow, 12-06-19. Please call ASAP.     Would the patient rather a call back or a response via [facility]? Call back    Best call back number: [phone]

## 2019-12-05 NOTE — TELEPHONE ENCOUNTER
Return call to [facility], and informed that the Pt is not yet cleared for surgery. That all of her labs aren't back, and once the Provider reviews them. We will give them a call and let them know what her status is. She acknowledged understanding.

## 2019-12-05 NOTE — TELEPHONE ENCOUNTER
Call placed to [facility], and informed that the Pt is not cleared for surgery on 12-6-19 per Provider secondary to abnormal labs. She acknowledged understanding.

## 2019-12-05 NOTE — TELEPHONE ENCOUNTER
----- Message from Priscilla Corado sent at 12/5/2019  9:49 AM CST -----  Contact: [facility]  [phone]  Type: Call Back    Who called: [facility]    What is the request in detail: Calling to find out if the patient is cleared for surgery. Would like to know something today because the surgery is scheduled for tomorrow, 12-06-19. Please call ASAP.     Would the patient rather a call back or a response via [facility]? Call back    Best call back number: [phone]

## 2019-12-09 ENCOUNTER — TELEPHONE (OUTPATIENT)
Dept: FAMILY MEDICINE | Facility: CLINIC | Age: 49
End: 2019-12-09

## 2019-12-09 NOTE — TELEPHONE ENCOUNTER
Left a detailed message for patient to call clinic back/ iron supplements can cause constipation and turn stool dark but in addition to patient taking iron, she should also be taking stool softeners/ will forward message to provider for advise as well

## 2019-12-09 NOTE — TELEPHONE ENCOUNTER
----- Message from Ilana Erum sent at 12/9/2019  1:06 PM CST -----  Contact: self [phone]  .Type: Patient Call Back    Who called: self     What is the request in detail: Pt stated that her stool is black and it's been that way since Thursday off last week. She's been constipation with the iron that she's taking and went 2 days w/o bowel movement    Can the clinic reply by [facility]? Call back     Would the patient rather a call back or a response via [facility]?  Call back     Best call back number: [phone]

## 2019-12-11 NOTE — PROGRESS NOTES
Digital [facility]: Health  Follow-Up    Patient reports she had to push her knee surgery since she is anemic.  Reports she is experiencing some chest pain, but states this is from the anemia.    The history is provided by the patient.     Follow Up  Follow-up reason(s): routine education      Routine Education Topics: eating patterns and physical activity  Responding to task placed by clinician to follow up about low sodium items from [employer].      INTERVENTION(S)  recommended diet modifications, recommend physical activity, encouragement/support and goal setting    PLAN  patient verbalizes understanding    Encouraged patient to contact PCP about chest pain since she has been experiencing it for about 1 week.          Topic    Lipid (Cholesterol) Test        Last 5 Patient Entered Readings                                      Current 30 Day Average: 152/90     Recent Readings 12/10/2019 12/5/2019 12/4/2019 11/26/2019 11/22/2019    SBP (mmHg) 140 150 161 158 146    DBP (mmHg) 94 88 96 86 88    Pulse 70 66 71 66 71                      Diet Screening   Patient reports eating or drinking the following: packaged/processed foods and deli meatShe cooks for self.    Patient does the shopping for groceries.  She gets groceries from the grocery store.      Barriers to a Healthy Diet: time/convenience    Reports she has been trying to cook at home more since she understands restaurant food can have added salt.  States she made chili last night, but used beans that had 10mg of sodium and low sodium broth.  States she did not rinse the beans but she drained them.  Reviewed added sodium in packaged products.  Reviewed [employer] menu and patient plans to start getting more salads and getting oven roasted chicken breast with oil and vinegar dressing.    Intervention(s): low sodium diet education, reducing sodium intake, reading food labels and reducing dining out    Physical Activity Screening   When asked if exercising, patient  responded: no    She identified the following barriers to physical activity: pain/injury/recent surgery    Reports she has a brace that she wears when she walks since she has a torn meniscus.    Intervention(s): behavior change       RIAN

## 2019-12-20 ENCOUNTER — TELEPHONE (OUTPATIENT)
Dept: NEUROLOGY | Facility: CLINIC | Age: 49
End: 2019-12-20

## 2019-12-20 NOTE — TELEPHONE ENCOUNTER
AJOVY 225 mg/1.5 mL injection is approved until 12/31/2020.        ----- Message from Lauren Patel sent at 12/20/2019 10:24 AM CST -----  Contact: [facility]/ [facility]  [phone]  Type: Patient Call Back    Who called:  [facility]    What is the request in detail:  Fazal is needing a call back for an authorization for patient.  She's needing clinical notes as well.   Thank you  Would the patient rather a call back or a response via [facility]?   Call back    Best call back number:   [phone]    Additional Information:  Ref # [id number]

## 2019-12-31 ENCOUNTER — LAB VISIT (OUTPATIENT)
Dept: LAB | Facility: HOSPITAL | Age: 49
End: 2019-12-31
Attending: FAMILY MEDICINE
Payer: MEDICARE

## 2019-12-31 DIAGNOSIS — D50.9 IRON DEFICIENCY ANEMIA, UNSPECIFIED IRON DEFICIENCY ANEMIA TYPE: ICD-10-CM

## 2019-12-31 LAB
IRON SERPL-MCNC: 223 UG/DL (ref 30–160)
SATURATED IRON: 58 % (ref 20–50)
TOTAL IRON BINDING CAPACITY: 382 UG/DL (ref 250–450)
TRANSFERRIN SERPL-MCNC: 258 MG/DL (ref 200–375)

## 2019-12-31 PROCEDURE — 36415 COLL VENOUS BLD VENIPUNCTURE: CPT | Mod: PO

## 2019-12-31 PROCEDURE — 83540 ASSAY OF IRON: CPT

## 2020-01-02 ENCOUNTER — TELEPHONE (OUTPATIENT)
Dept: FAMILY MEDICINE | Facility: CLINIC | Age: 50
End: 2020-01-02

## 2020-01-02 NOTE — TELEPHONE ENCOUNTER
----- Message from DAISY Arreguin sent at 1/2/2020  7:38 AM CST -----  Can we find out why the cbc and ferritin werent done? Iron is better but I cant see if her hemoglobin improved

## 2020-01-04 ENCOUNTER — HOSPITAL ENCOUNTER (EMERGENCY)
Facility: HOSPITAL | Age: 50
Discharge: HOME | End: 2020-01-04
Attending: EMERGENCY MEDICINE
Payer: MEDICARE

## 2020-01-04 VITALS
TEMPERATURE: 99 F | OXYGEN SATURATION: 90 % | HEIGHT: 62 IN | WEIGHT: 163 LBS | SYSTOLIC BLOOD PRESSURE: 151 MMHG | HEART RATE: 72 BPM | BODY MASS INDEX: 30 KG/M2 | DIASTOLIC BLOOD PRESSURE: 85 MMHG | RESPIRATION RATE: 21 BRPM

## 2020-01-04 DIAGNOSIS — K21.9 GASTROESOPHAGEAL REFLUX DISEASE, ESOPHAGITIS PRESENCE NOT SPECIFIED: ICD-10-CM

## 2020-01-04 DIAGNOSIS — R07.9 ACUTE CHEST PAIN: ICD-10-CM

## 2020-01-04 DIAGNOSIS — K21.9 CHRONIC GASTROESOPHAGEAL REFLUX DISEASE: ICD-10-CM

## 2020-01-04 DIAGNOSIS — R51.9 HEADACHE ON TOP OF HEAD: Primary | ICD-10-CM

## 2020-01-04 DIAGNOSIS — G43.711 CHRONIC MIGRAINE WITHOUT AURA, WITH INTRACTABLE MIGRAINE, SO STATED, WITH STATUS MIGRAINOSUS: ICD-10-CM

## 2020-01-04 LAB
ALBUMIN SERPL BCP-MCNC: 3.9 G/DL (ref 3.5–5.2)
ALP SERPL-CCNC: 97 U/L (ref 55–135)
ALT SERPL W/O P-5'-P-CCNC: 10 U/L (ref 10–44)
ANION GAP SERPL CALC-SCNC: 9 MMOL/L (ref 8–16)
AST SERPL-CCNC: 16 U/L (ref 10–40)
BASOPHILS # BLD AUTO: 0.04 K/UL (ref 0–0.2)
BASOPHILS NFR BLD: 0.6 % (ref 0–1.9)
BILIRUB SERPL-MCNC: 0.5 MG/DL (ref 0.1–1)
BUN SERPL-MCNC: 15 MG/DL (ref 6–20)
CALCIUM SERPL-MCNC: 9.4 MG/DL (ref 8.7–10.5)
CHLORIDE SERPL-SCNC: 101 MMOL/L (ref 95–110)
CO2 SERPL-SCNC: 31 MMOL/L (ref 23–29)
CREAT SERPL-MCNC: 1.1 MG/DL (ref 0.5–1.4)
D DIMER PPP IA.FEU-MCNC: <0.19 MG/L FEU
DIFFERENTIAL METHOD: ABNORMAL
EOSINOPHIL # BLD AUTO: 0.2 K/UL (ref 0–0.5)
EOSINOPHIL NFR BLD: 3 % (ref 0–8)
ERYTHROCYTE [DISTWIDTH] IN BLOOD BY AUTOMATED COUNT: 22.3 % (ref 11.5–14.5)
EST. GFR  (AFRICAN AMERICAN): >60 ML/MIN/1.73 M^2
EST. GFR  (NON AFRICAN AMERICAN): 58 ML/MIN/1.73 M^2
GLUCOSE SERPL-MCNC: 92 MG/DL (ref 70–110)
HCT VFR BLD AUTO: 43 % (ref 37–48.5)
HGB BLD-MCNC: 13.4 G/DL (ref 12–16)
IMM GRANULOCYTES # BLD AUTO: 0.01 K/UL (ref 0–0.04)
IMM GRANULOCYTES NFR BLD AUTO: 0.1 % (ref 0–0.5)
LIPASE SERPL-CCNC: 37 U/L (ref 4–60)
LYMPHOCYTES # BLD AUTO: 1.8 K/UL (ref 1–4.8)
LYMPHOCYTES NFR BLD: 26.3 % (ref 18–48)
MCH RBC QN AUTO: 23.5 PG (ref 27–31)
MCHC RBC AUTO-ENTMCNC: 31.2 G/DL (ref 32–36)
MCV RBC AUTO: 75 FL (ref 82–98)
MONOCYTES # BLD AUTO: 0.5 K/UL (ref 0.3–1)
MONOCYTES NFR BLD: 7.3 % (ref 4–15)
NEUTROPHILS # BLD AUTO: 4.2 K/UL (ref 1.8–7.7)
NEUTROPHILS NFR BLD: 62.7 % (ref 38–73)
NRBC BLD-RTO: 0 /100 WBC
PLATELET # BLD AUTO: 168 K/UL (ref 150–350)
PMV BLD AUTO: 9.4 FL (ref 9.2–12.9)
POTASSIUM SERPL-SCNC: 3.2 MMOL/L (ref 3.5–5.1)
PROT SERPL-MCNC: 7.4 G/DL (ref 6–8.4)
RBC # BLD AUTO: 5.7 M/UL (ref 4–5.4)
SODIUM SERPL-SCNC: 141 MMOL/L (ref 136–145)
TROPONIN I SERPL DL<=0.01 NG/ML-MCNC: <0.006 NG/ML (ref 0–0.03)
WBC # BLD AUTO: 6.74 K/UL (ref 3.9–12.7)

## 2020-01-04 PROCEDURE — 80053 COMPREHEN METABOLIC PANEL: CPT

## 2020-01-04 PROCEDURE — 25000003 PHARM REV CODE 250: Performed by: EMERGENCY MEDICINE

## 2020-01-04 PROCEDURE — 85025 COMPLETE CBC W/AUTO DIFF WBC: CPT

## 2020-01-04 PROCEDURE — 63600175 PHARM REV CODE 636 W HCPCS: Performed by: EMERGENCY MEDICINE

## 2020-01-04 PROCEDURE — 96375 TX/PRO/DX INJ NEW DRUG ADDON: CPT

## 2020-01-04 PROCEDURE — 93010 EKG 12-LEAD: ICD-10-PCS | Mod: ,,, | Performed by: INTERNAL MEDICINE

## 2020-01-04 PROCEDURE — 99285 EMERGENCY DEPT VISIT HI MDM: CPT | Mod: 25

## 2020-01-04 PROCEDURE — 93010 ELECTROCARDIOGRAM REPORT: CPT | Mod: ,,, | Performed by: INTERNAL MEDICINE

## 2020-01-04 PROCEDURE — 84484 ASSAY OF TROPONIN QUANT: CPT

## 2020-01-04 PROCEDURE — 93005 ELECTROCARDIOGRAM TRACING: CPT

## 2020-01-04 PROCEDURE — 83690 ASSAY OF LIPASE: CPT

## 2020-01-04 PROCEDURE — 96376 TX/PRO/DX INJ SAME DRUG ADON: CPT

## 2020-01-04 PROCEDURE — 96374 THER/PROPH/DIAG INJ IV PUSH: CPT

## 2020-01-04 PROCEDURE — 85379 FIBRIN DEGRADATION QUANT: CPT

## 2020-01-04 RX ORDER — PANTOPRAZOLE SODIUM 40 MG/1
40 TABLET, DELAYED RELEASE ORAL
Status: COMPLETED | OUTPATIENT
Start: 2020-01-04 | End: 2020-01-04

## 2020-01-04 RX ORDER — MORPHINE SULFATE 10 MG/ML
2 INJECTION INTRAMUSCULAR; INTRAVENOUS; SUBCUTANEOUS
Status: COMPLETED | OUTPATIENT
Start: 2020-01-04 | End: 2020-01-04

## 2020-01-04 RX ORDER — KETOROLAC TROMETHAMINE 30 MG/ML
30 INJECTION, SOLUTION INTRAMUSCULAR; INTRAVENOUS
Status: COMPLETED | OUTPATIENT
Start: 2020-01-04 | End: 2020-01-04

## 2020-01-04 RX ORDER — PANTOPRAZOLE SODIUM 40 MG/1
TABLET, DELAYED RELEASE ORAL
Qty: 90 TABLET | Refills: 3 | Status: SHIPPED | OUTPATIENT
Start: 2020-01-04 | End: 2020-09-24

## 2020-01-04 RX ORDER — DIPHENHYDRAMINE HYDROCHLORIDE 50 MG/ML
50 INJECTION INTRAMUSCULAR; INTRAVENOUS
Status: COMPLETED | OUTPATIENT
Start: 2020-01-04 | End: 2020-01-04

## 2020-01-04 RX ORDER — ONDANSETRON 4 MG/1
4 TABLET, ORALLY DISINTEGRATING ORAL
Status: COMPLETED | OUTPATIENT
Start: 2020-01-04 | End: 2020-01-04

## 2020-01-04 RX ORDER — DIPHENHYDRAMINE HCL 50 MG
50 CAPSULE ORAL EVERY 6 HOURS PRN
Qty: 20 CAPSULE | Refills: 0 | Status: SHIPPED | OUTPATIENT
Start: 2020-01-04 | End: 2020-09-24

## 2020-01-04 RX ORDER — HYDRALAZINE HYDROCHLORIDE 20 MG/ML
10 INJECTION INTRAMUSCULAR; INTRAVENOUS
Status: COMPLETED | OUTPATIENT
Start: 2020-01-04 | End: 2020-01-04

## 2020-01-04 RX ORDER — MAG HYDROX/ALUMINUM HYD/SIMETH 200-200-20
60 SUSPENSION, ORAL (FINAL DOSE FORM) ORAL
Status: COMPLETED | OUTPATIENT
Start: 2020-01-04 | End: 2020-01-04

## 2020-01-04 RX ORDER — PROCHLORPERAZINE MALEATE 10 MG
10 TABLET ORAL EVERY 6 HOURS PRN
Qty: 15 TABLET | Refills: 0 | Status: SHIPPED | OUTPATIENT
Start: 2020-01-04 | End: 2020-09-24

## 2020-01-04 RX ORDER — PROCHLORPERAZINE EDISYLATE 5 MG/ML
10 INJECTION INTRAMUSCULAR; INTRAVENOUS ONCE
Status: COMPLETED | OUTPATIENT
Start: 2020-01-04 | End: 2020-01-04

## 2020-01-04 RX ADMIN — DIPHENHYDRAMINE HYDROCHLORIDE 50 MG: 50 INJECTION INTRAMUSCULAR; INTRAVENOUS at 02:01

## 2020-01-04 RX ADMIN — ALUMINUM HYDROXIDE, MAGNESIUM HYDROXIDE, AND SIMETHICONE 60 ML: 200; 200; 20 SUSPENSION ORAL at 01:01

## 2020-01-04 RX ADMIN — KETOROLAC TROMETHAMINE 30 MG: 30 INJECTION, SOLUTION INTRAMUSCULAR; INTRAVENOUS at 01:01

## 2020-01-04 RX ADMIN — MORPHINE SULFATE 2 MG: 10 INJECTION INTRAVENOUS at 01:01

## 2020-01-04 RX ADMIN — MORPHINE SULFATE 2 MG: 10 INJECTION INTRAVENOUS at 12:01

## 2020-01-04 RX ADMIN — PROCHLORPERAZINE EDISYLATE 10 MG: 5 INJECTION INTRAMUSCULAR; INTRAVENOUS at 02:01

## 2020-01-04 RX ADMIN — ONDANSETRON 4 MG: 4 TABLET, ORALLY DISINTEGRATING ORAL at 12:01

## 2020-01-04 RX ADMIN — PANTOPRAZOLE SODIUM 40 MG: 40 TABLET, DELAYED RELEASE ORAL at 12:01

## 2020-01-04 RX ADMIN — HYDRALAZINE HYDROCHLORIDE 10 MG: 20 INJECTION INTRAMUSCULAR; INTRAVENOUS at 12:01

## 2020-01-04 NOTE — ED NOTES
Discharged with personal belongings, daughter picking her up at door.  Stable.  IV removed, SR on monitor at discharge.   2 rx's given with instructions per RN verbalized understanding.

## 2020-01-04 NOTE — DISCHARGE INSTRUCTIONS
PleaseFollow-up with Cardiology this week for an opinion about her chest pain. Please follow-up with your gastroenterologist as scheduled.  Return immediately if you get worse or if new problems develop.  Please add Mylanta 30 mL every 4 hr to your regimen when you have chest discomfort.  Please avoid caffeine carbonation alcohol cigarette citrus tomato aspirin ibuprofen Naprosyn.  No late meals.  Compazine and Benadryl for head pain.

## 2020-01-04 NOTE — ED NOTES
Medicated with benadryl and compazine, IVP as directed. Pt magaly well.  States sleepy already.  Pt states her daughter reminded her that she does have a little anxiety issues.  SRup x 2 for safety

## 2020-01-04 NOTE — ED PROVIDER NOTES
"Encounter Date: 1/4/2020    SCRIBE #1 NOTE: I, Kim Mcgee, am scribing for, and in the presence of,  Phuc Fernandes MD. I have scribed the following portions of the note - Other sections scribed: HPI, ROS.       History     Chief Complaint   Patient presents with    Chest Pain     c/o left sided x1 month when taking deep breaths; also c/o headache with dizziness     This is a 49 y.o. female with a PMHx of Gastroparesis, chronic abdominal pain, Restless Leg Syndrome, Anemia, Gastritis, GERD, Hyperglyceridemia, HLD, HTN, Pancreatitis, and migraines who presents to the Emergency Department with a cc of midsternal and left upper sternal border chest pain x2 hours. The pt states that she has been experiencing her symptoms for approximately one month. She states that she feels like she has to "take a deep breath sometimes". Pt reports associated HA, abdominal pain, bilateral arm pain, and bilateral leg pain. Pt denies any fever, chills, dysuria, nausea, emesis, or rash. Symptoms are worsened by deep breathing. The pt states that she is currently taking Atenolol, Amlodipine, Losartan, and Pantoprazole. The pt is allergic to Effie, Depo-provera, Amitriptyline, Chlorthalidone, Adhesive, Prozac, and Topiramate. She states that she does not smoke or consume alcohol.        The history is provided by the patient. No  was used.     Review of patient's allergies indicates:   Allergen Reactions    Amitriptyline Hallucinations and Other (See Comments)    Chlorthalidone      Hypokalemia     Adhesive Rash    Depo-provera [medroxyprogesterone] Anxiety    Dicyclomine Rash     Swelling of lip    Swelling of lip    Prozac [fluoxetine] Anxiety    Topiramate Rash     Past Medical History:   Diagnosis Date    Bile reflux gastritis     Breast cyst     Eczema     Fibrocystic breast     Fibromyalgia     Gastroparesis     dr. harden    GERD (gastroesophageal reflux disease)     Hyperglyceridemia     " Hyperlipidemia     Hypertension     IC (interstitial cystitis)     dr. labadie    Psoriasis     Tension headache      Past Surgical History:   Procedure Laterality Date    BREAST BIOPSY Right     over 10 years ago/ milk duct    CHOLECYSTECTOMY      HEMORRHOID SURGERY      HYSTERECTOMY      KNEE SURGERY      OOPHORECTOMY      one ov removed     Family History   Problem Relation Age of Onset    Hypertension Mother     Migraines Mother     Breast cancer Mother     Hypertension Father     Stroke Father     Heart disease Father     Hyperlipidemia Father     Diabetes Father     Breast cancer Paternal Grandmother     Colon cancer Neg Hx     Ovarian cancer Neg Hx     Inflammatory bowel disease Neg Hx     Celiac disease Neg Hx      Social History     Tobacco Use    Smoking status: Never Smoker    Smokeless tobacco: Never Used   Substance Use Topics    Alcohol use: No     Frequency: Never     Drinks per session: 1 or 2     Binge frequency: Never    Drug use: No     Review of Systems   Constitutional: Negative for chills, diaphoresis and fever.   HENT: Negative for ear pain and sore throat.    Eyes: Negative for photophobia, pain, discharge, redness and itching.   Respiratory: Negative for cough and shortness of breath.    Cardiovascular: Positive for chest pain.   Gastrointestinal: Positive for abdominal pain. Negative for diarrhea, nausea and vomiting.   Genitourinary: Negative for dysuria.   Musculoskeletal: Positive for myalgias. Negative for arthralgias.   Skin: Negative for rash.   Neurological: Positive for headaches (top of head).       Physical Exam     Initial Vitals [01/04/20 1207]   BP Pulse Resp Temp SpO2   (!) 193/95 71 20 98.2 °F (36.8 °C) 97 %      MAP       --         Physical Exam  The patient was examined specifically for the following:   General:No significant distress, Good color, Warm and dry. Head and neck:Scalp atraumatic, Neck supple. Neurological:Appropriate conversation,  Gross motor deficits. Eyes:Conjugate gaze, Clear corneas. ENT: No epistaxis. Cardiac: Regular rate and rhythm, Grossly normal heart tones. Pulmonary: Wheezing, Rales. Gastrointestinal: Abdominal tenderness, Abdominal distention. Musculoskeletal: Extremity deformity, Apparent pain with range of motion of the joints. Skin: Rash.   The findings on examination were normal except for the following:  Patient has mild vague diffuse abdominal tenderness.  Lungs are clear.  The heart tones are normal.  The abdomen is soft.  ED Course   Procedures  Labs Reviewed   COMPREHENSIVE METABOLIC PANEL - Abnormal; Notable for the following components:       Result Value    Potassium 3.2 (*)     CO2 31 (*)     eGFR if non  58 (*)     All other components within normal limits   CBC W/ AUTO DIFFERENTIAL - Abnormal; Notable for the following components:    RBC 5.70 (*)     Mean Corpuscular Volume 75 (*)     Mean Corpuscular Hemoglobin 23.5 (*)     Mean Corpuscular Hemoglobin Conc 31.2 (*)     RDW 22.3 (*)     All other components within normal limits   LIPASE   TROPONIN I   D DIMER, QUANTITATIVE     EKG Readings: (Independently Interpreted)   This patient is in a normal sinus rhythm with a heart rate of 66.  The intervals are normal. Lead 3 is negative. AVF is negative. This patient has left axis deviation.  There is poor R-wave progression across the precordium.  There are no significant ST segment changes.  There are nonspecific T-wave changes.       Imaging Results          X-Ray Chest AP Portable (Final result)  Result time 01/04/20 13:07:24    Final result by Damon So MD (01/04/20 13:07:24)                 Impression:      No acute finding or significant change from the prior study      Electronically signed by: Damon So MD  Date:    01/04/2020  Time:    13:07             Narrative:    EXAMINATION:  XR CHEST AP PORTABLE    CLINICAL HISTORY:  chest pain;    TECHNIQUE:  Single frontal view of the chest was  performed.    COMPARISON:  06/19/2014    FINDINGS:  Heart size is normal.  Lungs are clear.  No pleural effusion or pneumothorax.                              Medical decision making:  This patient presents to the emergency with a history of gastritis and gastroesophageal reflux disease complaining of low sternal pressure type pain. She has had her symptoms for 2 months.  They have come off and on.  Patient has a cardiac history.  Her troponin is negative. D-dimer is negative. The patient had oxygen saturations at rest in the room around 90.  The patient is not short of breath. She occasionally feels like she has to take a deep breath.  But there is no air hunger at this time.  She complains of a headache during her evaluation that was on the top of her head is chronic and recurrent.  She is being treated for migraine headaches.  I will double her pantoprazole add Mylanta to her regimen and treat her with Compazine and Benadryl for headaches.  There are no neurologic deficits there is no history of trauma or fever the neck is supple. I doubt meningitis subarachnoid hemorrhage.  Chest x-ray is negative for pneumothorax pneumonia pleural effusion.  EKG shows no acute injury pattern.  The troponin is negative. I feel this patient can be discharged to outpatient evaluation and treatment safely.                Scribe Attestation:   Scribe #1: I performed the above scribed service and the documentation accurately describes the services I performed. I attest to the accuracy of the note.                          Clinical Impression:       ICD-10-CM ICD-9-CM   1. Headache on top of head R51 784.0   2. Acute chest pain R07.9 786.50   3. Chronic gastroesophageal reflux disease K21.9 530.81   4. Gastroesophageal reflux disease, esophagitis presence not specified K21.9 530.81             I personally performed the services described in this documentation.  All medical record  entries made by the scribe are at my direction and in  my presence.  Signed, Dr. Dena Fernandes MD  01/04/20 6269

## 2020-01-04 NOTE — ED NOTES
IV started, blood sent to lab, pt stable, then starts having more chest discomfort states pt.  Morphine 2 mg IVP given.  BP coming down with rest and medications. Daughter at bedside.

## 2020-01-04 NOTE — ED TRIAGE NOTES
Pt. Came in c/o  CP, around 1000, mid chest and to the left, rates pain 8 of 10.  Worse when she deep breathing.  Dr. Fernandes at bedside.

## 2020-01-06 ENCOUNTER — TELEPHONE (OUTPATIENT)
Dept: PHARMACY | Facility: CLINIC | Age: 50
End: 2020-01-06

## 2020-01-06 NOTE — TELEPHONE ENCOUNTER
Informed Patient  that [facility] received prescription for Ajovy and prior authorization is required.  [facility] will be back in touch once insurance determination is received.

## 2020-01-09 RX ORDER — AMLODIPINE AND OLMESARTAN MEDOXOMIL 5; 40 MG/1; MG/1
TABLET ORAL
Qty: 90 TABLET | Refills: 3 | Status: SHIPPED | OUTPATIENT
Start: 2020-01-09 | End: 2020-12-31

## 2020-01-13 ENCOUNTER — PATIENT OUTREACH (OUTPATIENT)
Dept: ADMINISTRATIVE | Facility: OTHER | Age: 50
End: 2020-01-13

## 2020-01-13 RX ORDER — ATENOLOL 50 MG/1
TABLET ORAL
Qty: 180 TABLET | Refills: 1 | Status: SHIPPED | OUTPATIENT
Start: 2020-01-13 | End: 2020-07-06

## 2020-01-15 ENCOUNTER — PATIENT OUTREACH (OUTPATIENT)
Dept: OTHER | Facility: OTHER | Age: 50
End: 2020-01-15

## 2020-01-15 NOTE — PROGRESS NOTES
"Digital Medicine: Health  Follow-Up    Following up with patient about ED visit on 1/4.  States her BP remained elevated, so daughter suggested they go to the ED.  States she has been having chest pain, but this is from gastritis.  Reports her doctor informed her that her gastroparesis is causing the gastritis and suggested she start walking.  Patient asked if she should be taking amlodipine-olmesartan in the morning instead of in the evening.  Placed routine task for pharmacist, Bela.    The history is provided by the patient.     Follow Up  Follow-up reason(s): routine education      Routine Education Topics: physical activity        INTERVENTION(S)  recommend physical activity and encouragement/support    PLAN  patient verbalizes understanding and Clinician follow-up          Topic    Lipid (Cholesterol) Test        Last 5 Patient Entered Readings                                      Current 30 Day Average: 159/94     Recent Readings 1/13/2020 1/10/2020 1/9/2020 1/4/2020 12/24/2019    SBP (mmHg) 155 149 162 178 152    DBP (mmHg) 96 93 93 100 88    Pulse 69 76 72 71 70                      Physical Activity Screening   When asked if exercising, patient responded: no    Reports she plans to start walking every day around her neighborhood.  States she will start "small," but no specific amount of time.      SDOH  "

## 2020-01-20 ENCOUNTER — OFFICE VISIT (OUTPATIENT)
Dept: GASTROENTEROLOGY | Facility: CLINIC | Age: 50
End: 2020-01-20
Payer: MEDICARE

## 2020-01-20 ENCOUNTER — PATIENT OUTREACH (OUTPATIENT)
Dept: ADMINISTRATIVE | Facility: OTHER | Age: 50
End: 2020-01-20

## 2020-01-20 VITALS
SYSTOLIC BLOOD PRESSURE: 145 MMHG | BODY MASS INDEX: 31.03 KG/M2 | DIASTOLIC BLOOD PRESSURE: 97 MMHG | HEIGHT: 62 IN | WEIGHT: 168.63 LBS | HEART RATE: 68 BPM

## 2020-01-20 DIAGNOSIS — D50.0 IRON DEFICIENCY ANEMIA DUE TO CHRONIC BLOOD LOSS: ICD-10-CM

## 2020-01-20 DIAGNOSIS — R10.84 GENERALIZED ABDOMINAL PAIN: Primary | ICD-10-CM

## 2020-01-20 DIAGNOSIS — K31.84 GASTROPARESIS: ICD-10-CM

## 2020-01-20 DIAGNOSIS — D50.0 IRON DEFICIENCY ANEMIA DUE TO CHRONIC BLOOD LOSS: Primary | ICD-10-CM

## 2020-01-20 PROCEDURE — 3008F PR BODY MASS INDEX (BMI) DOCUMENTED: ICD-10-PCS | Mod: CPTII,S$GLB,, | Performed by: INTERNAL MEDICINE

## 2020-01-20 PROCEDURE — 99999 PR PBB SHADOW E&M-EST. PATIENT-LVL III: ICD-10-PCS | Mod: PBBFAC,,, | Performed by: INTERNAL MEDICINE

## 2020-01-20 PROCEDURE — 3080F DIAST BP >= 90 MM HG: CPT | Mod: CPTII,S$GLB,, | Performed by: INTERNAL MEDICINE

## 2020-01-20 PROCEDURE — 99215 OFFICE O/P EST HI 40 MIN: CPT | Mod: S$GLB,,, | Performed by: INTERNAL MEDICINE

## 2020-01-20 PROCEDURE — 99358 PROLONG SERVICE W/O CONTACT: CPT | Mod: S$GLB,,, | Performed by: INTERNAL MEDICINE

## 2020-01-20 PROCEDURE — 3077F SYST BP >= 140 MM HG: CPT | Mod: CPTII,S$GLB,, | Performed by: INTERNAL MEDICINE

## 2020-01-20 PROCEDURE — 99358 PR PROLONGED SERV,NO CONTACT,1ST HR: ICD-10-PCS | Mod: S$GLB,,, | Performed by: INTERNAL MEDICINE

## 2020-01-20 PROCEDURE — 99215 PR OFFICE/OUTPT VISIT, EST, LEVL V, 40-54 MIN: ICD-10-PCS | Mod: S$GLB,,, | Performed by: INTERNAL MEDICINE

## 2020-01-20 PROCEDURE — 3077F PR MOST RECENT SYSTOLIC BLOOD PRESSURE >= 140 MM HG: ICD-10-PCS | Mod: CPTII,S$GLB,, | Performed by: INTERNAL MEDICINE

## 2020-01-20 PROCEDURE — 99999 PR PBB SHADOW E&M-EST. PATIENT-LVL III: CPT | Mod: PBBFAC,,, | Performed by: INTERNAL MEDICINE

## 2020-01-20 PROCEDURE — 3080F PR MOST RECENT DIASTOLIC BLOOD PRESSURE >= 90 MM HG: ICD-10-PCS | Mod: CPTII,S$GLB,, | Performed by: INTERNAL MEDICINE

## 2020-01-20 PROCEDURE — 3008F BODY MASS INDEX DOCD: CPT | Mod: CPTII,S$GLB,, | Performed by: INTERNAL MEDICINE

## 2020-01-20 RX ORDER — RABEPRAZOLE SODIUM 20 MG/1
20 TABLET, DELAYED RELEASE ORAL DAILY
Qty: 30 TABLET | Refills: 11 | Status: SHIPPED | OUTPATIENT
Start: 2020-01-20 | End: 2020-09-24

## 2020-01-20 RX ORDER — HYOSCYAMINE SULFATE 0.125 MG
125 TABLET ORAL
Qty: 90 TABLET | Refills: 5 | Status: SHIPPED | OUTPATIENT
Start: 2020-01-20 | End: 2020-09-24

## 2020-01-20 NOTE — LETTER
January 20, 2020      Giuliana Rojas MD  [address] [address]           [facility]  [address]  [facility] [address]  Phone: [phone]  Fax: [phone]          Patient: Giuliana Rodas   MR Number: [id number]   YOB: 1970   Date of Visit: 1/20/2020       Dear Dr. Giuliana Rojas:    Thank you for referring Giuliana Rodas to me for evaluation. Attached you will find relevant portions of my assessment and plan of care.    If you have questions, please do not hesitate to call me. I look forward to following Giuliana Rodas along with you.    Sincerely,    Damon Mcmahon MD    Enclosure  CC:  No Recipients    If you would like to receive this communication electronically, please contact [email or web address] or [phone] to request more information on [employer] Link access.    For providers and/or their staff who would like to refer a patient to [facility], please contact us through our one-stop-shop provider referral line, [facility], at [phone].    If you feel you have received this communication in error or would no longer like to receive these types of communications, please e-mail [email or web address]

## 2020-01-20 NOTE — PROGRESS NOTES
Subjective:       Patient ID: Giuliana Rodas is a 49 y.o. female.    Chief Complaint: GI Problem    HPI  Review of Systems   Constitutional: Positive for activity change and fatigue. Negative for appetite change, chills, diaphoresis, fever and unexpected weight change.   HENT: Negative for congestion, ear pain, mouth sores, nosebleeds, postnasal drip, rhinorrhea, sinus pressure, sore throat, trouble swallowing and voice change.    Eyes: Negative for pain.   Respiratory: Positive for shortness of breath. Negative for cough and wheezing.    Cardiovascular: Negative for chest pain, palpitations and leg swelling.   Genitourinary: Negative for difficulty urinating, dysuria, flank pain, hematuria and menstrual problem.   Musculoskeletal: Positive for arthralgias, gait problem, joint swelling and myalgias. Negative for back pain and neck pain.   Skin: Negative for rash.   Neurological: Positive for headaches. Negative for dizziness, tremors, syncope and numbness.   Hematological: Negative for adenopathy. Does not bruise/bleed easily.   Psychiatric/Behavioral: Positive for sleep disturbance. Negative for agitation, behavioral problems, confusion, decreased concentration and dysphoric mood. The patient is not nervous/anxious.        Objective:      Physical Exam   Constitutional: She is oriented to person, place, and time. She appears well-developed and well-nourished. No distress.   HENT:   Head: Normocephalic and atraumatic.   Right Ear: External ear normal.   Left Ear: External ear normal.   Nose: Nose normal.   Mouth/Throat: Oropharynx is clear and moist. No oropharyngeal exudate.   Eyes: Pupils are equal, round, and reactive to light. Conjunctivae are normal. No scleral icterus.   Neck: Normal range of motion. Neck supple. No thyromegaly present.   Cardiovascular: Normal rate, regular rhythm, normal heart sounds and intact distal pulses. Exam reveals no gallop.   No murmur heard.  Pulmonary/Chest: Effort normal and  breath sounds normal. She has no wheezes. She has no rales.   Abdominal: Soft. Normal appearance and bowel sounds are normal. She exhibits no shifting dullness, no distension, no fluid wave, no abdominal bruit and no mass. There is no hepatosplenomegaly. There is generalized tenderness.   Musculoskeletal: Normal range of motion. She exhibits no edema or tenderness.   Lymphadenopathy:     She has no cervical adenopathy.   Neurological: She is alert and oriented to person, place, and time. No cranial nerve deficit.   Skin: Skin is warm and dry. No rash noted.   Psychiatric: She has a normal mood and affect. Her behavior is normal. Judgment and thought content normal.       Assessment:       1. Generalized abdominal pain    2. Gastroparesis    3. Iron deficiency anemia due to chronic blood loss        Plan:         this is a 49-year-old lady here for a 4th opinion.  She was inappropriately booked with Yaquelin leon.  Previously she has seen Dr candelario and Carlota, and Dr. Breen    Her pains go back years if not decades.  Her chief complaint is that of upper abdominal pain or epigastric pain.  This been present for more than 10 years.  She has it on a daily basis.  It does not radiate.  It can be present before eating but it definitely seems to get worse after eating.  It can get worse even after drinking water.  She also complains of chest pain. This is worse when she takes a deep breath.  She describes frequent belching.  As long she takes her PPI the heartburn aspect of that is better.  She complains of diffuse tenderness across the lower abdomen.  In the past she has had problems with diarrhea that is not an issue right now.  Her bowel movements are fairly normal at this point.    She has a lot of issues on review of systems including fatigue fibromyalgia and headaches.    On social history she tries to avoid fruits and vegetables because those upset her stomach so she has lost start.    Right now she is on  Creon which is new and she says it helps her.  She is also on Carafate and pantoprazole.  She felt like AcipHex helped her more than any of the other PPIs.  She was allergic to Bentyl.  She has never been tried on Levsin that she remembers.    She recent was found to have a severe anemia with almost undetectable iron levels and a significantly low hemoglobin.  That corrected with oral iron.    And she has also had just within the last month EGD and colonoscopy at [place]    Assessment  1.  Chronic abdominal pain. Obviously this is a complicated issue and I have a lot of records to go over.  It is my plan to go over those records after hours tonight.  I think more than likely this is functional abdominal pain. She feels like it is her gastritis and she feels like she has issues with her pancreas as well.  2.  Gastroparesis.  I am not sure how well this is documented again I will review that on her notes and records tonight  3.  Iron deficiency anemia.  She feels like this is due to her inadequate diet.  I am not so sure that is correct.  I have some role concerned that there could be some occult GI blood loss going on to explain her profound anemia.  It is also possible that there could be a problem with iron absorption.  Now she has been ruled out for celiac disease I feel certainly based on a brief look at her records.  But I do not know if almost start an induced enteropathy has been excluded.  This may require push enteroscopy with jejunal biopsies.    Recommendation  1.  After his record review  2.  AcipHex  3.  Levsin  4.  Consider another video capsule exam and or push enteroscopy with jejunal biopsies.    40 min appointment greater half time face-to-face counseling

## 2020-01-20 NOTE — PROGRESS NOTES
After hours record review done on January 20th.  May 2019 CT scan normal  March 2018 colonoscopy normal  February 2014 enteroscopy by Dr. Breen shows a jejunal ulcer and jejunal erosions biopsies nonspecific.  February 2014 video capsule enteroscopy scope endoscopy shows jejunal atrophy no erosions seen  November 2013 upper GI small bowel series normal  August 2013 EGD normal  August 2010 upper GI small bowel series normal  2013:  TT G normal  December 2019 hemoglobin 9.6 with a ferritin less than 1 and iron saturation of 3% the hemoglobin 6 months before this was 12.  June 2019 EGD gastritis H pylori negative  January 2020 EGD all I can find in Care everywhere is the result of biopsy and the biopsy showed mild chronic gastritis but without H pylori  December 2019 colonoscopy I can't find the actual report procedure note I can only tell that no biopsies were performed  January 2018 esophageal manometry this was normal.  LES looks normal. All the could contractions demonstrate peristalsis.  There is complete bolus transfer without any incomplete or disrupted swallows.  March 2018 gastric emptying scan at 240 min areas 88% emptying this was interpreted as being normal.    With regard to her complaints.  The diagnosis of gastroparesis cannot be supported.  I remain can discern about the significant iron deficiency anemia which developed rather quickly.  I think it may be reasonable to repeat video capsule endoscopy and/or push enteroscopy with jejunal biopsies.    Total time for chart review 30 min.  More than 100 pages of records reviewed.

## 2020-01-20 NOTE — PROGRESS NOTES
Chart reviewed. Care Everywhere updates requested. Immunizations reconciled. HM updated. Per [facility] Colonoscopy completed 12/23/19. Will send SUZY to get report.

## 2020-01-21 ENCOUNTER — TELEPHONE (OUTPATIENT)
Dept: GASTROENTEROLOGY | Facility: CLINIC | Age: 50
End: 2020-01-21

## 2020-01-22 ENCOUNTER — TELEPHONE (OUTPATIENT)
Dept: GASTROENTEROLOGY | Facility: CLINIC | Age: 50
End: 2020-01-22

## 2020-01-22 NOTE — TELEPHONE ENCOUNTER
----- Message from Yaquelin Brock sent at 1/22/2020 10:24 AM CST -----  Contact: pt [phone]  Pt is calling to speak with Chanelle.  Pt only received part of the fax you sent her.  
Returned call, no answer, left message.  
6

## 2020-01-27 ENCOUNTER — OFFICE VISIT (OUTPATIENT)
Dept: FAMILY MEDICINE | Facility: CLINIC | Age: 50
End: 2020-01-27
Payer: MEDICARE

## 2020-01-27 ENCOUNTER — LAB VISIT (OUTPATIENT)
Dept: LAB | Facility: HOSPITAL | Age: 50
End: 2020-01-27
Attending: FAMILY MEDICINE
Payer: MEDICARE

## 2020-01-27 VITALS
OXYGEN SATURATION: 98 % | BODY MASS INDEX: 30.83 KG/M2 | SYSTOLIC BLOOD PRESSURE: 138 MMHG | HEIGHT: 62 IN | WEIGHT: 167.56 LBS | TEMPERATURE: 98 F | HEART RATE: 64 BPM | DIASTOLIC BLOOD PRESSURE: 84 MMHG

## 2020-01-27 DIAGNOSIS — Z01.818 PREOPERATIVE CLEARANCE: Primary | ICD-10-CM

## 2020-01-27 DIAGNOSIS — Z01.818 PREOPERATIVE CLEARANCE: ICD-10-CM

## 2020-01-27 DIAGNOSIS — G47.00 INSOMNIA, UNSPECIFIED TYPE: ICD-10-CM

## 2020-01-27 DIAGNOSIS — G43.711 CHRONIC MIGRAINE WITHOUT AURA, WITH INTRACTABLE MIGRAINE, SO STATED, WITH STATUS MIGRAINOSUS: ICD-10-CM

## 2020-01-27 LAB
ALBUMIN SERPL BCP-MCNC: 3.9 G/DL (ref 3.5–5.2)
ALP SERPL-CCNC: 98 U/L (ref 55–135)
ALT SERPL W/O P-5'-P-CCNC: 12 U/L (ref 10–44)
ANION GAP SERPL CALC-SCNC: 8 MMOL/L (ref 8–16)
AST SERPL-CCNC: 17 U/L (ref 10–40)
BASOPHILS # BLD AUTO: 0.06 K/UL (ref 0–0.2)
BASOPHILS NFR BLD: 0.9 % (ref 0–1.9)
BILIRUB SERPL-MCNC: 0.3 MG/DL (ref 0.1–1)
BUN SERPL-MCNC: 14 MG/DL (ref 6–20)
CALCIUM SERPL-MCNC: 9.8 MG/DL (ref 8.7–10.5)
CHLORIDE SERPL-SCNC: 101 MMOL/L (ref 95–110)
CO2 SERPL-SCNC: 31 MMOL/L (ref 23–29)
CREAT SERPL-MCNC: 1 MG/DL (ref 0.5–1.4)
DIFFERENTIAL METHOD: ABNORMAL
EOSINOPHIL # BLD AUTO: 0.6 K/UL (ref 0–0.5)
EOSINOPHIL NFR BLD: 9 % (ref 0–8)
ERYTHROCYTE [DISTWIDTH] IN BLOOD BY AUTOMATED COUNT: ABNORMAL % (ref 11.5–14.5)
EST. GFR  (AFRICAN AMERICAN): >60 ML/MIN/1.73 M^2
EST. GFR  (NON AFRICAN AMERICAN): >60 ML/MIN/1.73 M^2
GLUCOSE SERPL-MCNC: 94 MG/DL (ref 70–110)
HCT VFR BLD AUTO: 43.2 % (ref 37–48.5)
HGB BLD-MCNC: 13.5 G/DL (ref 12–16)
IMM GRANULOCYTES # BLD AUTO: 0.02 K/UL (ref 0–0.04)
IMM GRANULOCYTES NFR BLD AUTO: 0.3 % (ref 0–0.5)
LYMPHOCYTES # BLD AUTO: 2.3 K/UL (ref 1–4.8)
LYMPHOCYTES NFR BLD: 35.7 % (ref 18–48)
MCH RBC QN AUTO: 24.9 PG (ref 27–31)
MCHC RBC AUTO-ENTMCNC: 31.3 G/DL (ref 32–36)
MCV RBC AUTO: 80 FL (ref 82–98)
MONOCYTES # BLD AUTO: 0.5 K/UL (ref 0.3–1)
MONOCYTES NFR BLD: 7.7 % (ref 4–15)
NEUTROPHILS # BLD AUTO: 2.9 K/UL (ref 1.8–7.7)
NEUTROPHILS NFR BLD: 46.4 % (ref 38–73)
NRBC BLD-RTO: 0 /100 WBC
PLATELET # BLD AUTO: 188 K/UL (ref 150–350)
PMV BLD AUTO: 10.1 FL (ref 9.2–12.9)
POTASSIUM SERPL-SCNC: 3.7 MMOL/L (ref 3.5–5.1)
PROT SERPL-MCNC: 7.6 G/DL (ref 6–8.4)
RBC # BLD AUTO: 5.42 M/UL (ref 4–5.4)
SODIUM SERPL-SCNC: 140 MMOL/L (ref 136–145)
WBC # BLD AUTO: 6.33 K/UL (ref 3.9–12.7)

## 2020-01-27 PROCEDURE — 85025 COMPLETE CBC W/AUTO DIFF WBC: CPT

## 2020-01-27 PROCEDURE — 93010 ELECTROCARDIOGRAM REPORT: CPT | Mod: S$GLB,,, | Performed by: INTERNAL MEDICINE

## 2020-01-27 PROCEDURE — 80053 COMPREHEN METABOLIC PANEL: CPT

## 2020-01-27 PROCEDURE — 93005 ELECTROCARDIOGRAM TRACING: CPT | Mod: S$GLB,,, | Performed by: FAMILY MEDICINE

## 2020-01-27 PROCEDURE — 3079F PR MOST RECENT DIASTOLIC BLOOD PRESSURE 80-89 MM HG: ICD-10-PCS | Mod: CPTII,S$GLB,, | Performed by: FAMILY MEDICINE

## 2020-01-27 PROCEDURE — 3075F SYST BP GE 130 - 139MM HG: CPT | Mod: CPTII,S$GLB,, | Performed by: FAMILY MEDICINE

## 2020-01-27 PROCEDURE — 99999 PR PBB SHADOW E&M-EST. PATIENT-LVL III: ICD-10-PCS | Mod: PBBFAC,,, | Performed by: FAMILY MEDICINE

## 2020-01-27 PROCEDURE — 3079F DIAST BP 80-89 MM HG: CPT | Mod: CPTII,S$GLB,, | Performed by: FAMILY MEDICINE

## 2020-01-27 PROCEDURE — 99214 PR OFFICE/OUTPT VISIT, EST, LEVL IV, 30-39 MIN: ICD-10-PCS | Mod: S$GLB,,, | Performed by: FAMILY MEDICINE

## 2020-01-27 PROCEDURE — 3008F PR BODY MASS INDEX (BMI) DOCUMENTED: ICD-10-PCS | Mod: CPTII,S$GLB,, | Performed by: FAMILY MEDICINE

## 2020-01-27 PROCEDURE — 93010 EKG 12-LEAD: ICD-10-PCS | Mod: S$GLB,,, | Performed by: INTERNAL MEDICINE

## 2020-01-27 PROCEDURE — 99214 OFFICE O/P EST MOD 30 MIN: CPT | Mod: S$GLB,,, | Performed by: FAMILY MEDICINE

## 2020-01-27 PROCEDURE — 93005 EKG 12-LEAD: ICD-10-PCS | Mod: S$GLB,,, | Performed by: FAMILY MEDICINE

## 2020-01-27 PROCEDURE — 3008F BODY MASS INDEX DOCD: CPT | Mod: CPTII,S$GLB,, | Performed by: FAMILY MEDICINE

## 2020-01-27 PROCEDURE — 99999 PR PBB SHADOW E&M-EST. PATIENT-LVL III: CPT | Mod: PBBFAC,,, | Performed by: FAMILY MEDICINE

## 2020-01-27 PROCEDURE — 3075F PR MOST RECENT SYSTOLIC BLOOD PRESS GE 130-139MM HG: ICD-10-PCS | Mod: CPTII,S$GLB,, | Performed by: FAMILY MEDICINE

## 2020-01-27 RX ORDER — TIZANIDINE 4 MG/1
4 TABLET ORAL
COMMUNITY
Start: 2019-02-12 | End: 2020-09-24

## 2020-01-27 RX ORDER — CEFUROXIME AXETIL 250 MG/1
TABLET ORAL
Refills: 5 | COMMUNITY
Start: 2019-10-28 | End: 2021-07-20

## 2020-01-27 RX ORDER — TRAZODONE HYDROCHLORIDE 50 MG/1
50 TABLET ORAL NIGHTLY
Qty: 30 TABLET | Refills: 11 | Status: SHIPPED | OUTPATIENT
Start: 2020-01-27 | End: 2020-09-24

## 2020-01-27 RX ORDER — FERROUS SULFATE 325(65) MG
325 TABLET ORAL 2 TIMES DAILY
COMMUNITY
End: 2021-11-01

## 2020-01-27 RX ORDER — ESCITALOPRAM OXALATE 20 MG/1
20 TABLET ORAL DAILY
Qty: 30 TABLET | Refills: 11 | Status: SHIPPED | OUTPATIENT
Start: 2020-01-27 | End: 2020-09-24

## 2020-01-27 RX ORDER — DULOXETIN HYDROCHLORIDE 30 MG/1
CAPSULE, DELAYED RELEASE ORAL
Qty: 60 CAPSULE | Refills: 5 | Status: SHIPPED | OUTPATIENT
Start: 2020-01-27 | End: 2020-09-24

## 2020-01-27 NOTE — PROGRESS NOTES
Subjective:       Patient ID: Giuliana Rodas is a 49 y.o. female.    Chief Complaint: Pre-op Exam    49 year old female presents for an arthroscopic surgery of her right knee. She is currently receiving an camera endoscopy due to a history of anemia. She is no longer anemic with oral iron replacement. She is taking this twice a day. She is supposed to have surgery with Dr. Arce on [place]  on 2/4/2020.        Past Medical History:  No date: Bile reflux gastritis  No date: Breast cyst  No date: Eczema  No date: Fibrocystic breast  No date: Fibromyalgia  No date: Gastroparesis      Comment:  dr. harden  No date: GERD (gastroesophageal reflux disease)  No date: Hyperglyceridemia  No date: Hyperlipidemia  No date: Hypertension  No date: IC (interstitial cystitis)      Comment:  dr. labadie  No date: Psoriasis  No date: Tension headache   Past Surgical History:  No date: BREAST BIOPSY; Right      Comment:  over 10 years ago/ milk duct  No date: CHOLECYSTECTOMY  No date: HEMORRHOID SURGERY  No date: HYSTERECTOMY  No date: KNEE SURGERY  No date: OOPHORECTOMY      Comment:  one ov removed  Review of patient's family history indicates:  Problem: Hypertension      Relation: Mother          Age of Onset: (Not Specified)  Problem: Migraines      Relation: Mother          Age of Onset: (Not Specified)  Problem: Breast cancer      Relation: Mother          Age of Onset: (Not Specified)  Problem: Hypertension      Relation: Father          Age of Onset: (Not Specified)  Problem: Stroke      Relation: Father          Age of Onset: (Not Specified)  Problem: Heart disease      Relation: Father          Age of Onset: (Not Specified)  Problem: Hyperlipidemia      Relation: Father          Age of Onset: (Not Specified)  Problem: Diabetes      Relation: Father          Age of Onset: (Not Specified)  Problem: Breast cancer      Relation: Paternal Grandmother          Age of Onset: (Not Specified)  Problem: Colon cancer       Relation: Neg Hx          Age of Onset: (Not Specified)  Problem: Ovarian cancer      Relation: Neg Hx          Age of Onset: (Not Specified)  Problem: Inflammatory bowel disease      Relation: Neg Hx          Age of Onset: (Not Specified)  Problem: Celiac disease      Relation: Neg Hx          Age of Onset: (Not Specified)    Social History    Socioeconomic History      Marital status:       Spouse name: Not on file      Number of children: 2      Years of education: Not on file      Highest education level: Not on file    Occupational History      Occupation:         Employer: [employer]    Social Needs      Financial resource strain: Not on file      Food insecurity:        Worry: Never true        Inability: Never true      Transportation needs:        Medical: No        Non-medical: No    Tobacco Use      Smoking status: Never Smoker      Smokeless tobacco: Never Used    Substance and Sexual Activity      Alcohol use: No        Frequency: Never        Drinks per session: 1 or 2        Binge frequency: Never      Drug use: No      Sexual activity: Yes        Partners: Male        Birth control/protection: See Surgical Hx    Lifestyle      Physical activity:        Days per week: 2 days        Minutes per session: 20 min      Stress: Only a little    Relationships      Social connections:        Talks on phone: More than three times a week        Gets together: Once a week        Attends Bahai service: Not on file        Active member of club or organization: No        Attends meetings of clubs or organizations: Patient refused        Relationship status:     Other Topics      Concerns:        Not on file    Social History Narrative      Lives at home with  and daughter        Review of Systems   Respiratory: Positive for chest tightness. Negative for cough and shortness of breath.         Has reflux   Cardiovascular: Negative for chest pain and leg swelling.   Gastrointestinal:  "Positive for abdominal pain. Negative for diarrhea, nausea and vomiting.   Endocrine: Negative for polydipsia and polyuria.   Genitourinary: Negative for dysuria and hematuria.   Neurological: Negative for dizziness, syncope and light-headedness.       Objective:       Vitals:    01/27/20 1430   BP: 138/84   Pulse: 64   Temp: 98 °F (36.7 °C)   TempSrc: Oral   SpO2: 98%   Weight: 76 kg (167 lb 8.8 oz)   Height: 5' 2" (1.575 m)       Physical Exam   Constitutional: She appears well-developed and well-nourished. No distress.   HENT:   Head: Normocephalic.   Right Ear: External ear normal.   Left Ear: External ear normal.   Mouth/Throat: Oropharynx is clear and moist. No oropharyngeal exudate.   Eyes: Conjunctivae are normal.   Neck: Normal range of motion. Neck supple. No thyromegaly present.   Cardiovascular: Normal rate, regular rhythm and normal heart sounds. Exam reveals no gallop and no friction rub.   No murmur heard.  Pulmonary/Chest: Effort normal and breath sounds normal. No stridor. No respiratory distress. She has no wheezes. She has no rales.   Abdominal: Soft. Bowel sounds are normal. She exhibits no distension and no mass. There is no tenderness. There is no rebound and no guarding.   Musculoskeletal: She exhibits no edema.   Lymphadenopathy:     She has no cervical adenopathy.   Neurological: She is alert.   Skin: No rash noted. She is not diaphoretic.   Psychiatric: She has a normal mood and affect.       Assessment:       1. Preoperative clearance        Plan:       Giuliana was seen today for pre-op exam.    Diagnoses and all orders for this visit:    Preoperative clearance  -     EKG 12-lead  -     CBC auto differential; Future  -     Comprehensive metabolic panel; Future  EKG a week ago was abnormal when seen in the ED. EKG today was noraml. Ordered preop labs. If normal--> cleared for surgery. Endoscopy to be done on Friday.     Other orders  -     Cancel: Lipid panel; Future           "

## 2020-01-27 NOTE — TELEPHONE ENCOUNTER
Last Office Visit Info:   The patient's last visit with Giuliana Rojas MD was on 1/27/2020.    The patient's last visit in current department was on 1/27/2020.        Last CBC Results:   Lab Results   Component Value Date    WBC 6.74 01/04/2020    HGB 13.4 01/04/2020    HCT 43.0 01/04/2020     01/04/2020       Last CMP Results  Lab Results   Component Value Date     01/04/2020    K 3.2 (L) 01/04/2020     01/04/2020    CO2 31 (H) 01/04/2020    BUN 15 01/04/2020    CREATININE 1.1 01/04/2020    CALCIUM 9.4 01/04/2020    ALBUMIN 3.9 01/04/2020    AST 16 01/04/2020    ALT 10 01/04/2020       Last Lipids  Lab Results   Component Value Date    CHOL 163 11/29/2018    TRIG 208 (H) 11/29/2018    HDL 50 11/29/2018    LDLCALC 71.4 11/29/2018       Last A1C  Lab Results   Component Value Date    HGBA1C 4.7 04/06/2016       Last TSH  Lab Results   Component Value Date    TSH 2.434 09/25/2018         Current Med Refills  Medication List with Changes/Refills   Current Medications    AMLODIPINE-OLMESARTAN (ANGEL) 5-40 MG PER TABLET    TAKE ONE TABLET BY MOUTH EVERY DAY       Start Date: 1/9/2020  End Date: --    ATENOLOL (TENORMIN) 50 MG TABLET    TAKE ONE TABLET BY MOUTH TWICE DAILY AS NEEDED       Start Date: 1/13/2020 End Date: --    AZELAIC ACID (FINACEA) 15 % GEL    Apply to face nightly. Increase to BID as tolerated       Start Date: 10/29/2019End Date: --    BIFIDOBACTERIUM INFANTIS (ALIGN ORAL)    Take 1 tablet by mouth once daily.       Start Date: --        End Date: --    CEPHALEXIN (KEFLEX) 500 MG CAPSULE    Take 1 capsule (500 mg total) by mouth every 12 (twelve) hours.       Start Date: 12/3/2019 End Date: --    COLESTIPOL (COLESTID) 1 GRAM TAB    Take 1 tablet (1 g total) by mouth 2 (two) times daily.       Start Date: 10/23/2019End Date: 1/21/2020    DIPHENHYDRAMINE (BENADRYL) 50 MG CAPSULE    Take 1 capsule (50 mg total) by mouth every 6 (six) hours as needed.       Start Date: 1/4/2020  End  Date: --    DULOXETINE (CYMBALTA) 30 MG CAPSULE    30 mg daily and then 30 mg bid       Start Date: 3/18/2019 End Date: --    ESCITALOPRAM OXALATE (LEXAPRO) 20 MG TABLET    TAKE ONE TABLET BY MOUTH DAILY       Start Date: 5/20/2019 End Date: --    FERROUS SULFATE (FEOSOL) 325 MG (65 MG IRON) TAB TABLET    Take 325 mg by mouth.       Start Date: --        End Date: --    FLUCELVAX QUAD 8160-2654, PF, 60 MCG (15 MCG X 4)/0.5 ML SYRG    ADM 0.5ML IM UTD       Start Date: 11/5/2019 End Date: --    FLUOCINONIDE (LIDEX) 0.05 % EXTERNAL SOLUTION    AAA scalp qday - bid prn pruritus       Start Date: 6/10/2019 End Date: --    FREMANEZUMAB-VFRM (AJOVY) 225 MG/1.5 ML INJECTION    Inject once subcutaneously every 28 days as directed.       Start Date: 1/5/2020  End Date: --    HYOSCYAMINE (ANASPAZ,LEVSIN) 0.125 MG TAB    Take 1 tablet (125 mcg total) by mouth before meals as needed.       Start Date: 1/20/2020 End Date: 2/19/2020    KETOCONAZOLE (NIZORAL) 2 % SHAMPOO    Wash hair with medicated shampoo at least 2x/week - let sit on scalp at least 5 minutes prior to rinsing       Start Date: 6/10/2019 End Date: --    KETOROLAC (TORADOL) 10 MG TABLET    Take 1 tablet (10 mg total) by mouth every 6 (six) hours.       Start Date: 11/4/2019 End Date: --    LACTOBACILLUS RHAMNOSUS GG (CULTURELLE) 10 BILLION CELL CAPSULE    Take 1 capsule by mouth.       Start Date: --        End Date: --    LIPASE-PROTEASE-AMYLASE 24,000-76,000-120,000 UNITS (PANLIPASE) 24,000-76,000 -120,000 UNIT CAPSULE    Take 2 capsules by mouth 3 (three) times daily with meals.       Start Date: 10/28/2019End Date: 1/26/2020    ONDANSETRON (ZOFRAN) 4 MG TABLET    Take 1 tablet (4 mg total) by mouth every 6 (six) hours.       Start Date: 5/31/2019 End Date: --    PANTOPRAZOLE (PROTONIX) 40 MG TABLET    Please take 1 tablet every 12 hr when you have chest discomfort, then take 1 tablet every day.       Start Date: 1/4/2020  End Date: --    PRAVASTATIN (PRAVACHOL)  20 MG TABLET    TAKE ONE TABLET BY MOUTH EVERY EVENING       Start Date: 7/5/2019  End Date: --    PROCHLORPERAZINE (COMPAZINE) 10 MG TABLET    Take 1 tablet (10 mg total) by mouth every 6 (six) hours as needed (Headache.  Take with Benadryl 50 mg).       Start Date: 1/4/2020  End Date: --    RABEPRAZOLE (ACIPHEX) 20 MG TABLET    Take 1 tablet (20 mg total) by mouth once daily.       Start Date: 1/20/2020 End Date: 1/19/2021    SUCRALFATE (CARAFATE) 1 GRAM TABLET    Take 1 g by mouth 4 (four) times daily.       Start Date: --        End Date: --    SUMATRIPTAN (IMITREX STATDOSE) 6 MG/0.5 ML KIT    inject 6mg (0.5ml) into skin once AS NEEDED FOR MIGRAINE (MAXIMUM OF TWO IN 24 hours)       Start Date: 10/28/2019End Date: --    SUMATRIPTAN (IMITREX) 100 MG TABLET    Take 1 tablet (100 mg total) by mouth as needed for Migraine. Take at the onset of headache, may take 1 more in 1 hour if needed.       Start Date: 11/4/2019 End Date: --    TIZANIDINE (ZANAFLEX) 4 MG TABLET    Take 4 mg by mouth.       Start Date: 2/12/2019 End Date: --    TRAZODONE (DESYREL) 50 MG TABLET    Take 1 tablet (50 mg total) by mouth every evening.       Start Date: 5/22/2019 End Date: 5/21/2020    TRIAMCINOLONE ACETONIDE 0.1% (KENALOG) 0.1 % CREAM    AAA bid prn redness, scaling or itching       Start Date: 6/10/2019 End Date: --

## 2020-01-28 ENCOUNTER — PATIENT MESSAGE (OUTPATIENT)
Dept: ADMINISTRATIVE | Facility: OTHER | Age: 50
End: 2020-01-28

## 2020-01-28 ENCOUNTER — TELEPHONE (OUTPATIENT)
Dept: FAMILY MEDICINE | Facility: CLINIC | Age: 50
End: 2020-01-28

## 2020-01-28 RX ORDER — FREMANEZUMAB-VFRM 225 MG/1.5ML
INJECTION SUBCUTANEOUS
Qty: 1.5 ML | Refills: 5 | Status: SHIPPED | OUTPATIENT
Start: 2020-01-28 | End: 2020-05-13 | Stop reason: SDUPTHER

## 2020-01-28 NOTE — TELEPHONE ENCOUNTER
----- Message from Lin Waldemar sent at 1/27/2020  4:35 PM CST -----  Contact: Self [phone]  Type: Patient Call Back    Who called:Self    What is the request in detail: pt is calling in regards to getting a letter faxed over for clearance for surgery. Pt needs it faxed to [facility]. Fax number is [phone]    Can the clinic reply by [facility]? Call back    Would the patient rather a call back or a response via [facility]? Call back    Best call back number:[phone]

## 2020-01-28 NOTE — TELEPHONE ENCOUNTER
Return call to Pt, and informed her that her message was sent to the Provider, and as soon as she reviews her labs we will call her back with the Provider response. Pt acknowledged understanding.

## 2020-01-28 NOTE — TELEPHONE ENCOUNTER
----- Message from Funmialyo Mims sent at 1/28/2020 10:43 AM CST -----  Contact: pt  Type: Patient Call Back    Who called:pt    What is the request in detail:pt is requesting medical clearance. Call pt    Can the clinic reply by [facility]?    Would the patient rather a call back or a response via [facility]? call    Best call back number:[phone]    Additional Information:

## 2020-01-30 ENCOUNTER — TELEPHONE (OUTPATIENT)
Dept: PHARMACY | Facility: CLINIC | Age: 50
End: 2020-01-30

## 2020-01-30 NOTE — TELEPHONE ENCOUNTER
Call to patient to assess need for Ajovy. Verified namen and . Provider had discontinued prescription on file at [facility] and set to patient's local pharmacy. Patient states that she had no received it yet, but that her pharmacy told her that they were working on it. Explained that [facility] could work on the authorization if she needed, will follow up next week to assess if patient was able to receive medication. Patient agreed to plan.     Emile Chambers, PharmD  Clinical Pharmacist  [facility]  P: [phone]

## 2020-01-31 ENCOUNTER — CLINICAL SUPPORT (OUTPATIENT)
Dept: GASTROENTEROLOGY | Facility: CLINIC | Age: 50
End: 2020-01-31
Payer: MEDICARE

## 2020-01-31 ENCOUNTER — TELEPHONE (OUTPATIENT)
Dept: GASTROENTEROLOGY | Facility: CLINIC | Age: 50
End: 2020-01-31

## 2020-01-31 DIAGNOSIS — D50.0 IRON DEFICIENCY ANEMIA DUE TO CHRONIC BLOOD LOSS: ICD-10-CM

## 2020-01-31 PROCEDURE — 99999 PR PBB SHADOW E&M-EST. PATIENT-LVL II: CPT | Mod: PBBFAC,,,

## 2020-01-31 PROCEDURE — 99999 PR PBB SHADOW E&M-EST. PATIENT-LVL II: ICD-10-PCS | Mod: PBBFAC,,,

## 2020-01-31 NOTE — TELEPHONE ENCOUNTER
VCE performed at 8:24.     Instructions reviewed with patient. All questions answered to patient's satisfaction. Patient swallowed capsule without incident.

## 2020-02-03 ENCOUNTER — TELEPHONE (OUTPATIENT)
Dept: GASTROENTEROLOGY | Facility: CLINIC | Age: 50
End: 2020-02-03

## 2020-02-03 DIAGNOSIS — D50.0 IRON DEFICIENCY ANEMIA DUE TO CHRONIC BLOOD LOSS: Primary | ICD-10-CM

## 2020-02-03 NOTE — TELEPHONE ENCOUNTER
Spoke with patient.  Aware of recent VCE results.  Dr.James Mcmahon said to tell her the capsule showed a small polyp.  He would like to do an upper scope to see it better.    Patient given number to call the endoscopy schedulers to set up a date and time.

## 2020-03-04 ENCOUNTER — PATIENT OUTREACH (OUTPATIENT)
Dept: OTHER | Facility: OTHER | Age: 50
End: 2020-03-04

## 2020-03-12 ENCOUNTER — ANESTHESIA EVENT (OUTPATIENT)
Dept: ENDOSCOPY | Facility: HOSPITAL | Age: 50
End: 2020-03-12
Payer: MEDICARE

## 2020-03-12 ENCOUNTER — HOSPITAL ENCOUNTER (OUTPATIENT)
Facility: HOSPITAL | Age: 50
Discharge: HOME | End: 2020-03-12
Attending: INTERNAL MEDICINE | Admitting: INTERNAL MEDICINE
Payer: MEDICARE

## 2020-03-12 ENCOUNTER — ANESTHESIA (OUTPATIENT)
Dept: ENDOSCOPY | Facility: HOSPITAL | Age: 50
End: 2020-03-12
Payer: MEDICARE

## 2020-03-12 VITALS
DIASTOLIC BLOOD PRESSURE: 79 MMHG | RESPIRATION RATE: 18 BRPM | HEIGHT: 62 IN | HEART RATE: 57 BPM | BODY MASS INDEX: 30.73 KG/M2 | TEMPERATURE: 98 F | WEIGHT: 167 LBS | SYSTOLIC BLOOD PRESSURE: 145 MMHG | OXYGEN SATURATION: 99 %

## 2020-03-12 DIAGNOSIS — D64.9 ANEMIA: ICD-10-CM

## 2020-03-12 DIAGNOSIS — K21.9 GASTROESOPHAGEAL REFLUX DISEASE WITHOUT ESOPHAGITIS: Primary | ICD-10-CM

## 2020-03-12 PROCEDURE — 37000008 HC ANESTHESIA 1ST 15 MINUTES: Performed by: INTERNAL MEDICINE

## 2020-03-12 PROCEDURE — 27201012 HC FORCEPS, HOT/COLD, DISP: Performed by: INTERNAL MEDICINE

## 2020-03-12 PROCEDURE — 88305 TISSUE EXAM BY PATHOLOGIST: CPT | Mod: 26,,, | Performed by: PATHOLOGY

## 2020-03-12 PROCEDURE — E9220 PRA ENDO ANESTHESIA: HCPCS | Mod: ,,, | Performed by: NURSE ANESTHETIST, CERTIFIED REGISTERED

## 2020-03-12 PROCEDURE — 37000009 HC ANESTHESIA EA ADD 15 MINS: Performed by: INTERNAL MEDICINE

## 2020-03-12 PROCEDURE — 44361 PR SMALL BOWEL ENDOSCOPY,BIOPSY: ICD-10-PCS | Mod: ,,, | Performed by: INTERNAL MEDICINE

## 2020-03-12 PROCEDURE — 44361 SMALL BOWEL ENDOSCOPY/BIOPSY: CPT | Performed by: INTERNAL MEDICINE

## 2020-03-12 PROCEDURE — 63600175 PHARM REV CODE 636 W HCPCS: Performed by: INTERNAL MEDICINE

## 2020-03-12 PROCEDURE — 88305 TISSUE EXAM BY PATHOLOGIST: ICD-10-PCS | Mod: 26,,, | Performed by: PATHOLOGY

## 2020-03-12 PROCEDURE — 44361 SMALL BOWEL ENDOSCOPY/BIOPSY: CPT | Mod: ,,, | Performed by: INTERNAL MEDICINE

## 2020-03-12 PROCEDURE — E9220 PRA ENDO ANESTHESIA: ICD-10-PCS | Mod: ,,, | Performed by: NURSE ANESTHETIST, CERTIFIED REGISTERED

## 2020-03-12 PROCEDURE — 63600175 PHARM REV CODE 636 W HCPCS: Performed by: NURSE ANESTHETIST, CERTIFIED REGISTERED

## 2020-03-12 PROCEDURE — 88305 TISSUE EXAM BY PATHOLOGIST: CPT | Performed by: PATHOLOGY

## 2020-03-12 RX ORDER — PROPOFOL 10 MG/ML
VIAL (ML) INTRAVENOUS
Status: DISCONTINUED | OUTPATIENT
Start: 2020-03-12 | End: 2020-03-12

## 2020-03-12 RX ORDER — SODIUM CHLORIDE 9 MG/ML
INJECTION, SOLUTION INTRAVENOUS CONTINUOUS
Status: DISCONTINUED | OUTPATIENT
Start: 2020-03-12 | End: 2020-03-12 | Stop reason: HOSPADM

## 2020-03-12 RX ORDER — LIDOCAINE HYDROCHLORIDE 20 MG/ML
INJECTION INTRAVENOUS
Status: DISCONTINUED | OUTPATIENT
Start: 2020-03-12 | End: 2020-03-12

## 2020-03-12 RX ORDER — PROPOFOL 10 MG/ML
VIAL (ML) INTRAVENOUS CONTINUOUS PRN
Status: DISCONTINUED | OUTPATIENT
Start: 2020-03-12 | End: 2020-03-12

## 2020-03-12 RX ADMIN — PROPOFOL 200 MCG/KG/MIN: 10 INJECTION, EMULSION INTRAVENOUS at 09:03

## 2020-03-12 RX ADMIN — PROPOFOL 80 MG: 10 INJECTION, EMULSION INTRAVENOUS at 09:03

## 2020-03-12 RX ADMIN — PROPOFOL 20 MG: 10 INJECTION, EMULSION INTRAVENOUS at 09:03

## 2020-03-12 RX ADMIN — SODIUM CHLORIDE: 0.9 INJECTION, SOLUTION INTRAVENOUS at 09:03

## 2020-03-12 RX ADMIN — LIDOCAINE HYDROCHLORIDE 75 MG: 20 INJECTION, SOLUTION INTRAVENOUS at 09:03

## 2020-03-12 NOTE — ANESTHESIA PREPROCEDURE EVALUATION
03/12/2020  Giuliana Rodas is a 49 y.o., female.  Patient Active Problem List   Diagnosis    Hyperglyceridemia    Hypertension    Fibromyalgia    GERD (gastroesophageal reflux disease)    IBS (irritable bowel syndrome)    Hyperlipidemia    Gastroparesis    Enteritis    Abdominal pain    Flank pain    Chronic migraine without aura, with intractable migraine, so stated, with status migrainosus    Occipital neuralgia    Tension headache    Renal insufficiency    Abdominal muscle strain, initial encounter     Past Medical History:   Diagnosis Date    Bile reflux gastritis     Breast cyst     Eczema     Fibrocystic breast     Fibromyalgia     Gastroparesis     dr. harden    GERD (gastroesophageal reflux disease)     Hyperglyceridemia     Hyperlipidemia     Hypertension     IC (interstitial cystitis)     dr. labadie    Psoriasis     Tension headache          Anesthesia Evaluation    I have reviewed the Patient Summary Reports.     I have reviewed the Medications.     Review of Systems  Anesthesia Hx:  No problems with previous Anesthesia Denies Hx of Anesthetic complications  Denies Family Hx of Anesthesia complications.   Denies Personal Hx of Anesthesia complications.   Hematology/Oncology:  Hematology Normal   Oncology Normal     EENT/Dental:EENT/Dental Normal   Cardiovascular:   Exercise tolerance: good Hypertension    Pulmonary:  Pulmonary Normal    Renal/:   Chronic Renal Disease    Hepatic/GI:   No Bowel Prep. GERD    Musculoskeletal:  Musculoskeletal Normal    Neurological:   Neuromuscular Disease, Headaches    Endocrine:  Endocrine Normal    Dermatological:  Skin Normal    Psych:  Psychiatric Normal           Physical Exam  General:  Well nourished    Airway/Jaw/Neck:  Airway Findings: Mouth Opening: Normal Tongue: Normal  General Airway Assessment: Adult  Mallampati: II   TM Distance: Normal, at least 6 cm  Jaw/Neck Findings:  Neck ROM: Normal ROM     Eyes/Ears/Nose:  EYES/EARS/NOSE FINDINGS: Normal   Dental:  Dental Findings: In tact   Chest/Lungs:  Chest/Lungs Findings: Clear to auscultation, Normal Respiratory Rate     Heart/Vascular:  Heart Findings: Rate: Normal  Sounds: Normal     Abdomen:  Abdomen Findings: Normal    Musculoskeletal:  Musculoskeletal Findings: Normal   Skin:  Skin Findings: Normal    Mental Status:  Mental Status Findings:  Cooperative, Alert and Oriented         Anesthesia Plan  Type of Anesthesia, risks & benefits discussed:  Anesthesia Type:  general  Patient's Preference: General  Intra-op Monitoring Plan: standard ASA monitors  Intra-op Monitoring Plan Comments:   Post Op Pain Control Plan:   Post Op Pain Control Plan Comments:   Induction:   IV  Beta Blocker:  Patient is not currently on a Beta-Blocker (No further documentation required).       Informed Consent: Patient understands risks and agrees with Anesthesia plan.  Questions answered. Anesthesia consent signed with patient.  ASA Score: 2     Day of Surgery Review of History & Physical: I have interviewed and examined the patient. I have reviewed the patient's H&P dated:  There are no significant changes.  H&P update referred to the provider.     Anesthesia Plan Notes: NPO confirmed        Ready For Surgery From Anesthesia Perspective.

## 2020-03-12 NOTE — [employer] PATIENT INSTRUCTIONS
Discharge Summary/Instructions after an Endoscopic Procedure  Patient Name: Giuliana Rodas  Patient MRN: [id number]  Patient YOB: 1970 Thursday, March 12, 2020  Damon Mcmahon MD  RESTRICTIONS:  During your procedure today, you received medications for sedation.  These   medications may affect your judgment, balance and coordination.  Therefore,   for 24 hours, you have the following restrictions:   - DO NOT drive a car, operate machinery, make legal/financial decisions,   sign important papers or drink alcohol.    ACTIVITY:  Today: no heavy lifting, straining or running due to procedural   sedation/anesthesia.  The following day: return to full activity including work.  DIET:  Eat and drink normally unless instructed otherwise.     TREATMENT FOR COMMON SIDE EFFECTS:  - Mild abdominal pain, nausea, belching, bloating or excessive gas:  rest,   eat lightly and use a heating pad.  - Sore Throat: treat with throat lozenges and/or gargle with warm salt   water.  - Because air was used during the procedure, expelling large amounts of air   from your rectum or belching is normal.  - If a bowel prep was taken, you may not have a bowel movement for 1-3 days.    This is normal.  SYMPTOMS TO WATCH FOR AND REPORT TO YOUR PHYSICIAN:  1. Abdominal pain or bloating, other than gas cramps.  2. Chest pain.  3. Back pain.  4. Signs of infection such as: chills or fever occurring within 24 hours   after the procedure.  5. Rectal bleeding, which would show as bright red, maroon, or black stools.   (A tablespoon of blood from the rectum is not serious, especially if   hemorrhoids are present.)  6. Vomiting.  7. Weakness or dizziness.  GO DIRECTLY TO THE NEAREST EMERGENCY ROOM IF YOU HAVE ANY OF THE FOLLOWING:      Difficulty breathing              Chills and/or fever over 101 F   Persistent vomiting and/or vomiting blood   Severe abdominal pain   Severe chest pain   Black, tarry stools   Bleeding- more than one tablespoon   Any  other symptom or condition that you feel may need urgent attention  Your doctor recommends these additional instructions:  If any biopsies were taken, your doctors clinic will contact you in 1 to 2   weeks with any results.  - The patient will be observed post-procedure, until all discharge criteria   are met.   - Discharge patient to home (ambulatory).   - Resume previous diet.   - Continue present medications.   - Await pathology results.   - Return to GI clinic PRN.  For questions, problems or results please call your physician - Damon Mcmahon MD at Work:  [phone].  [facility] [place], EMERGENCY ROOM PHONE NUMBER: [phone]  IF A COMPLICATION OR EMERGENCY SITUATION ARISES AND YOU ARE UNABLE TO REACH   YOUR PHYSICIAN - GO DIRECTLY TO THE EMERGENCY ROOM.  Damon Mcmahon MD  3/12/2020 9:28:30 AM  This report has been verified and signed electronically.  [employer]

## 2020-03-12 NOTE — ANESTHESIA POSTPROCEDURE EVALUATION
Anesthesia Post Evaluation    Patient: Giuliana Rodas    Procedure(s) Performed: Procedure(s) (LRB):  EGD (ESOPHAGOGASTRODUODENOSCOPY) (N/A)    Final Anesthesia Type: general    Patient location during evaluation: PACU  Patient participation: Yes- Able to Participate  Level of consciousness: awake and alert and oriented  Post-procedure vital signs: reviewed and stable  Pain management: adequate  Airway patency: patent    PONV status at discharge: No PONV  Anesthetic complications: no      Cardiovascular status: hemodynamically stable  Respiratory status: unassisted  Hydration status: euvolemic  Follow-up not needed.          Vitals Value Taken Time   /79 3/12/2020 10:01 AM   Temp 36.8 °C (98.2 °F) 3/12/2020  8:52 AM   Pulse 57 3/12/2020 10:01 AM   Resp 18 3/12/2020 10:01 AM   SpO2 99 % 3/12/2020 10:01 AM         Event Time     Out of Recovery 10:01:40          Pain/Germania Score: Germania Score: 10 (3/12/2020 10:01 AM)

## 2020-03-12 NOTE — H&P
[facility]  History & Physical    Subjective:      Chief Complaint/Reason for Admission: iron deficiency    Giuliana Rodas is a 49 y.o. female.    Past Medical History:   Diagnosis Date    Bile reflux gastritis     Breast cyst     Eczema     Fibrocystic breast     Fibromyalgia     Gastroparesis     dr. harden    GERD (gastroesophageal reflux disease)     Hyperglyceridemia     Hyperlipidemia     Hypertension     IC (interstitial cystitis)     dr. labadie    Psoriasis     Tension headache      Past Surgical History:   Procedure Laterality Date    BREAST BIOPSY Right     over 10 years ago/ milk duct    CHOLECYSTECTOMY      HEMORRHOID SURGERY      HYSTERECTOMY      KNEE SURGERY      OOPHORECTOMY      one ov removed     Family History   Problem Relation Age of Onset    Hypertension Mother     Migraines Mother     Breast cancer Mother     Hypertension Father     Stroke Father     Heart disease Father     Hyperlipidemia Father     Diabetes Father     Breast cancer Paternal Grandmother     Colon cancer Neg Hx     Ovarian cancer Neg Hx     Inflammatory bowel disease Neg Hx     Celiac disease Neg Hx      Social History     Tobacco Use    Smoking status: Never Smoker    Smokeless tobacco: Never Used   Substance Use Topics    Alcohol use: No     Frequency: Never     Drinks per session: 1 or 2     Binge frequency: Never    Drug use: No       PTA Medications   Medication Sig    AJOVY 225 mg/1.5 mL injection inject once subcutaneously EVERY 28 DAYS AS DIRECTED    amlodipine-olmesartan (ANGEL) 5-40 mg per tablet TAKE ONE TABLET BY MOUTH EVERY DAY    atenolol (TENORMIN) 50 MG tablet TAKE ONE TABLET BY MOUTH TWICE DAILY AS NEEDED    azelaic acid (FINACEA) 15 % gel Apply to face nightly. Increase to BID as tolerated    BIFIDOBACTERIUM INFANTIS (ALIGN ORAL) Take 1 tablet by mouth once daily.    cephALEXin (KEFLEX) 500 MG capsule Take 1 capsule (500 mg total) by mouth  every 12 (twelve) hours.    colestipol (COLESTID) 1 gram Tab Take 1 tablet (1 g total) by mouth 2 (two) times daily.    diphenhydrAMINE (BENADRYL) 50 MG capsule Take 1 capsule (50 mg total) by mouth every 6 (six) hours as needed.    DULoxetine (CYMBALTA) 30 MG capsule 30 mg daily and then 30 mg bid    escitalopram oxalate (LEXAPRO) 20 MG tablet Take 1 tablet (20 mg total) by mouth once daily.    ferrous sulfate (FEOSOL) 325 mg (65 mg iron) Tab tablet Take 325 mg by mouth.    FLUCELVAX QUAD [id number], PF, 60 mcg (15 mcg x 4)/0.5 mL Syrg ADM 0.5ML IM UTD    fluocinonide (LIDEX) 0.05 % external solution AAA scalp qday - bid prn pruritus    hyoscyamine (ANASPAZ,LEVSIN) 0.125 mg Tab Take 1 tablet (125 mcg total) by mouth before meals as needed.    ketoconazole (NIZORAL) 2 % shampoo Wash hair with medicated shampoo at least 2x/week - let sit on scalp at least 5 minutes prior to rinsing    ketorolac (TORADOL) 10 mg tablet Take 1 tablet (10 mg total) by mouth every 6 (six) hours.    Lactobacillus rhamnosus GG (CULTURELLE) 10 billion cell capsule Take 1 capsule by mouth.    lipase-protease-amylase 24,000-76,000-120,000 units (PANLIPASE) 24,000-76,000 -120,000 unit capsule Take 2 capsules by mouth 3 (three) times daily with meals.    ondansetron (ZOFRAN) 4 MG tablet Take 1 tablet (4 mg total) by mouth every 6 (six) hours.    pantoprazole (PROTONIX) 40 MG tablet Please take 1 tablet every 12 hr when you have chest discomfort, then take 1 tablet every day.    pravastatin (PRAVACHOL) 20 MG tablet TAKE ONE TABLET BY MOUTH EVERY EVENING    prochlorperazine (COMPAZINE) 10 MG tablet Take 1 tablet (10 mg total) by mouth every 6 (six) hours as needed (Headache.  Take with Benadryl 50 mg).    RABEprazole (ACIPHEX) 20 mg tablet Take 1 tablet (20 mg total) by mouth once daily.    sucralfate (CARAFATE) 1 gram tablet Take 1 g by mouth 4 (four) times daily.    sumatriptan (IMITREX STATDOSE) 6 mg/0.5 mL kit inject 6mg  (0.5ml) into skin once AS NEEDED FOR MIGRAINE (MAXIMUM OF TWO IN 24 hours)    sumatriptan (IMITREX) 100 MG tablet Take 1 tablet (100 mg total) by mouth as needed for Migraine. Take at the onset of headache, may take 1 more in 1 hour if needed.    tiZANidine (ZANAFLEX) 4 MG tablet Take 4 mg by mouth.    traZODone (DESYREL) 50 MG tablet Take 1 tablet (50 mg total) by mouth every evening.    triamcinolone acetonide 0.1% (KENALOG) 0.1 % cream AAA bid prn redness, scaling or itching     Review of patient's allergies indicates:   Allergen Reactions    Amitriptyline Hallucinations and Other (See Comments)    Chlorthalidone      Hypokalemia     Adhesive Rash    Depo-provera [medroxyprogesterone] Anxiety    Dicyclomine Rash     Swelling of lip    Swelling of lip    Prozac [fluoxetine] Anxiety    Topiramate Rash        Review of Systems   Respiratory: Negative.    Cardiovascular: Negative.        Objective:      Vital Signs (Most Recent)       Vital Signs Range (Last 24H):  BP: ()/()   Arterial Line BP: ()/()     Physical Exam   Cardiovascular: Normal rate and regular rhythm.   Pulmonary/Chest: Effort normal and breath sounds normal.       Data Review:     ECG: .     Assessment:      There are no hospital problems to display for this patient.      Plan:    Indication for procedure:    ASA:II  Airway normal  Malampati class:per anes    Personal and family history negative for anesthesia problems    Plan:egd  Anesthesia plan: general

## 2020-03-12 NOTE — TRANSFER OF CARE
"Anesthesia Transfer of Care Note    Patient: Giuliana Rodas    Procedure(s) Performed: Procedure(s) (LRB):  EGD (ESOPHAGOGASTRODUODENOSCOPY) (N/A)    Patient location: GI    Anesthesia Type: general    Transport from OR: Transported from OR on room air with adequate spontaneous ventilation    Post pain: adequate analgesia    Post assessment: no apparent anesthetic complications and tolerated procedure well    Post vital signs: stable    Level of consciousness: sedated and responds to stimulation    Nausea/Vomiting: no nausea/vomiting    Complications: none    Transfer of care protocol was followed      Last vitals:   Visit Vitals  BP (!) 151/88 (BP Location: Left arm, Patient Position: Lying)   Pulse 67   Temp 36.8 °C (98.2 °F) (Temporal)   Resp 18   Ht 5' 2" (1.575 m)   Wt 75.8 kg (167 lb)   SpO2 96%   BMI 30.54 kg/m²     "

## 2020-03-13 DIAGNOSIS — R10.9 ABDOMINAL PAIN, UNSPECIFIED ABDOMINAL LOCATION: ICD-10-CM

## 2020-03-16 RX ORDER — PANCRELIPASE 24000; 76000; 120000 [USP'U]/1; [USP'U]/1; [USP'U]/1
CAPSULE, DELAYED RELEASE PELLETS ORAL
Qty: 180 CAPSULE | Refills: 2 | Status: SHIPPED | OUTPATIENT
Start: 2020-03-16 | End: 2020-09-24

## 2020-03-19 ENCOUNTER — TELEPHONE (OUTPATIENT)
Dept: ENDOSCOPY | Facility: HOSPITAL | Age: 50
End: 2020-03-19

## 2020-03-24 DIAGNOSIS — D64.9 ANEMIA, UNSPECIFIED TYPE: Primary | ICD-10-CM

## 2020-03-24 LAB — FINAL PATHOLOGIC DIAGNOSIS: NORMAL

## 2020-03-26 NOTE — PROGRESS NOTES
Digital Medicine: Health  Follow-Up    Patient reports she continues to have headaches every day, but states this has been going on for a while.    The history is provided by the patient.     Follow Up  Follow-up reason(s): routine education      Routine Education Topics: eating patterns and physical activity        INTERVENTION(S)  recommended diet modifications, recommend physical activity, encouragement/support and denied questions    PLAN  patient verbalizes understanding and continue monitoring          Topic    Lipid (Cholesterol) Test        Last 5 Patient Entered Readings                                      Current 30 Day Average: 148/82     Recent Readings 3/20/2020 3/11/2020 3/3/2020 2/24/2020 2/16/2020    SBP (mmHg) 150 141 153 159 154    DBP (mmHg) 77 80 90 87 95    Pulse 68 69 68 61 66                      Diet Screening   She cooks for self and has meals prepared by family.      States she and her  have been cooking more at home and do not add extra salt.  States she had pizza today since someone brought it for them.    Intervention(s): low sodium diet education, reducing sodium intake and reducing seasonings    Physical Activity Screening   When asked if exercising, patient responded: yes    Patient participates in the following activities: physical therapy/rehab    Reports her PT appts were cancelled due to COVID 19, but she plans to do exercises and ride her bike at home.      SDOH

## 2020-04-15 ENCOUNTER — TELEPHONE (OUTPATIENT)
Dept: FAMILY MEDICINE | Facility: CLINIC | Age: 50
End: 2020-04-15

## 2020-04-15 NOTE — TELEPHONE ENCOUNTER
----- Message from Cielo Sky sent at 4/15/2020  3:11 PM CDT -----  Contact: Patient   Type: Patient Call Back    Who called: Patient     What is the request in detail: Pt is requesting a call back in regards to a note for her employer with her underline health conditions.     Can the clinic reply by [facility]?    Would the patient rather a call back or a response via [facility]? Call back    Best call back number:[phone]

## 2020-04-16 ENCOUNTER — TELEPHONE (OUTPATIENT)
Dept: FAMILY MEDICINE | Facility: CLINIC | Age: 50
End: 2020-04-16

## 2020-04-16 NOTE — TELEPHONE ENCOUNTER
Pt states that she needs a letter to give to her job stating that she is at high risk and needs to stay home for 2 week's so she can get compensated while in quarantine.

## 2020-04-16 NOTE — TELEPHONE ENCOUNTER
----- Message from Funmilayo Mims sent at 4/16/2020 12:53 PM CDT -----  Contact: pt  Type: Patient Call Back    Who called:pt    What is the request in detail: pt returned the nurse's phone call. Call pt    Can the clinic reply by [facility]?    Would the patient rather a call back or a response via [facility]? call    Best call back number [phone] (home) [phone] (work)      Additional Information:

## 2020-04-16 NOTE — TELEPHONE ENCOUNTER
Return call to Pt, and informed of Provider respsonse. Pt states that she doesn't understanding why she can't get a letter because she is immune compromised with having chronic kidney disease and anemia. I told her that their are certain guidelines, but I would send her response to the Provider.

## 2020-04-25 ENCOUNTER — HOSPITAL ENCOUNTER (EMERGENCY)
Facility: HOSPITAL | Age: 50
Discharge: HOME | End: 2020-04-26
Attending: EMERGENCY MEDICINE
Payer: MEDICARE

## 2020-04-25 DIAGNOSIS — M79.601 RIGHT ARM PAIN: Primary | ICD-10-CM

## 2020-04-25 LAB
ALBUMIN SERPL BCP-MCNC: 3.8 G/DL (ref 3.5–5.2)
ALP SERPL-CCNC: 97 U/L (ref 55–135)
ALT SERPL W/O P-5'-P-CCNC: 13 U/L (ref 10–44)
ANION GAP SERPL CALC-SCNC: 11 MMOL/L (ref 8–16)
AST SERPL-CCNC: 17 U/L (ref 10–40)
BASOPHILS # BLD AUTO: 0.04 K/UL (ref 0–0.2)
BASOPHILS NFR BLD: 0.7 % (ref 0–1.9)
BILIRUB SERPL-MCNC: 0.2 MG/DL (ref 0.1–1)
BUN SERPL-MCNC: 15 MG/DL (ref 6–20)
CALCIUM SERPL-MCNC: 9.4 MG/DL (ref 8.7–10.5)
CHLORIDE SERPL-SCNC: 105 MMOL/L (ref 95–110)
CO2 SERPL-SCNC: 25 MMOL/L (ref 23–29)
CREAT SERPL-MCNC: 1 MG/DL (ref 0.5–1.4)
DIFFERENTIAL METHOD: ABNORMAL
EOSINOPHIL # BLD AUTO: 0.2 K/UL (ref 0–0.5)
EOSINOPHIL NFR BLD: 4.3 % (ref 0–8)
ERYTHROCYTE [DISTWIDTH] IN BLOOD BY AUTOMATED COUNT: 12 % (ref 11.5–14.5)
EST. GFR  (AFRICAN AMERICAN): >60 ML/MIN/1.73 M^2
EST. GFR  (NON AFRICAN AMERICAN): >60 ML/MIN/1.73 M^2
GLUCOSE SERPL-MCNC: 93 MG/DL (ref 70–110)
HCT VFR BLD AUTO: 43.2 % (ref 37–48.5)
HGB BLD-MCNC: 14.2 G/DL (ref 12–16)
IMM GRANULOCYTES # BLD AUTO: 0.02 K/UL (ref 0–0.04)
IMM GRANULOCYTES NFR BLD AUTO: 0.4 % (ref 0–0.5)
LYMPHOCYTES # BLD AUTO: 2.6 K/UL (ref 1–4.8)
LYMPHOCYTES NFR BLD: 48.1 % (ref 18–48)
MCH RBC QN AUTO: 28.4 PG (ref 27–31)
MCHC RBC AUTO-ENTMCNC: 32.9 G/DL (ref 32–36)
MCV RBC AUTO: 86 FL (ref 82–98)
MONOCYTES # BLD AUTO: 0.5 K/UL (ref 0.3–1)
MONOCYTES NFR BLD: 9.1 % (ref 4–15)
NEUTROPHILS # BLD AUTO: 2 K/UL (ref 1.8–7.7)
NEUTROPHILS NFR BLD: 37.4 % (ref 38–73)
NRBC BLD-RTO: 0 /100 WBC
PLATELET # BLD AUTO: 129 K/UL (ref 150–350)
PMV BLD AUTO: 9.8 FL (ref 9.2–12.9)
POTASSIUM SERPL-SCNC: 3.6 MMOL/L (ref 3.5–5.1)
PROT SERPL-MCNC: 7.3 G/DL (ref 6–8.4)
RBC # BLD AUTO: 5 M/UL (ref 4–5.4)
SODIUM SERPL-SCNC: 141 MMOL/L (ref 136–145)
TROPONIN I SERPL DL<=0.01 NG/ML-MCNC: <0.006 NG/ML (ref 0–0.03)
WBC # BLD AUTO: 5.4 K/UL (ref 3.9–12.7)

## 2020-04-25 PROCEDURE — 99285 EMERGENCY DEPT VISIT HI MDM: CPT | Mod: 25

## 2020-04-25 PROCEDURE — 63600175 PHARM REV CODE 636 W HCPCS: Performed by: EMERGENCY MEDICINE

## 2020-04-25 PROCEDURE — 84484 ASSAY OF TROPONIN QUANT: CPT

## 2020-04-25 PROCEDURE — 93010 ELECTROCARDIOGRAM REPORT: CPT | Mod: ,,, | Performed by: INTERNAL MEDICINE

## 2020-04-25 PROCEDURE — 93005 ELECTROCARDIOGRAM TRACING: CPT

## 2020-04-25 PROCEDURE — 85025 COMPLETE CBC W/AUTO DIFF WBC: CPT

## 2020-04-25 PROCEDURE — 80053 COMPREHEN METABOLIC PANEL: CPT

## 2020-04-25 PROCEDURE — 93010 EKG 12-LEAD: ICD-10-PCS | Mod: ,,, | Performed by: INTERNAL MEDICINE

## 2020-04-25 PROCEDURE — 96374 THER/PROPH/DIAG INJ IV PUSH: CPT

## 2020-04-25 RX ORDER — KETOROLAC TROMETHAMINE 30 MG/ML
15 INJECTION, SOLUTION INTRAMUSCULAR; INTRAVENOUS
Status: COMPLETED | OUTPATIENT
Start: 2020-04-25 | End: 2020-04-25

## 2020-04-25 RX ADMIN — KETOROLAC TROMETHAMINE 15 MG: 30 INJECTION, SOLUTION INTRAMUSCULAR at 10:04

## 2020-04-26 VITALS
DIASTOLIC BLOOD PRESSURE: 73 MMHG | WEIGHT: 167 LBS | OXYGEN SATURATION: 95 % | SYSTOLIC BLOOD PRESSURE: 159 MMHG | HEIGHT: 62 IN | HEART RATE: 63 BPM | BODY MASS INDEX: 30.73 KG/M2 | TEMPERATURE: 99 F | RESPIRATION RATE: 20 BRPM

## 2020-04-26 NOTE — ED PROVIDER NOTES
"Encounter Date: 4/25/2020    SCRIBE #1 NOTE: I, Dominique Hernandez, am scribing for, and in the presence of,  Dr. Kalia Márquez. I have scribed the following portions of the note - Other sections scribed: HPI, ROS, PE.       History     Chief Complaint   Patient presents with    Hypertension     Intermittent sharps pain to LV that started this morning and worsened throughout the day. Pt also reports elevated home b/p readings (173/94 & 157/111) accompanied with blurred vision. Denies HA.     Arm Pain     Giuliana Rodas is a 49 y.o. Female with PMHx of HTN, GERD, HLD, and fibromyalgia who presents to the ED complaining of acute intermittent worsening "hard tight/sharp" pain located to right upper arm x today. Patient reports x4 episodes with episodes lasting "minutes". Notes strenuous activity via gardening (clipping a tall plant) x yesterday. Pain worsens with movement. Endorses elevated BP at home with readings of 157/111 followed by 173/111. Denies relief s/p (x1) Oxycodone x 8:30pm. Denies chills, fever, nasal congestion, rhinorrhea, PND, sore throat and cough. Denies SOB and CP. Denies abdominal pain, vomiting and hematemesis. Denies arthralgias, neck pain and joint swelling. Denies HA and rash. Denies recent trauma/injury. Denies hx of symptoms. Denies SHx of tobacco, street drugs and alcohol.       The history is provided by the patient. No  was used.     Review of patient's allergies indicates:   Allergen Reactions    Amitriptyline Hallucinations and Other (See Comments)    Chlorthalidone      Hypokalemia     Adhesive Rash    Depo-provera [medroxyprogesterone] Anxiety    Dicyclomine Rash     Swelling of lip    Swelling of lip    Prozac [fluoxetine] Anxiety    Topiramate Rash     Past Medical History:   Diagnosis Date    Bile reflux gastritis     Breast cyst     Eczema     Fibrocystic breast     Fibromyalgia     Gastroparesis     dr. harden    GERD (gastroesophageal " reflux disease)     Hyperglyceridemia     Hyperlipidemia     Hypertension     IC (interstitial cystitis)     dr. labadie    Psoriasis     Tension headache      Past Surgical History:   Procedure Laterality Date    BREAST BIOPSY Right     over 10 years ago/ milk duct    CHOLECYSTECTOMY      ESOPHAGOGASTRODUODENOSCOPY N/A 3/12/2020    Procedure: EGD (ESOPHAGOGASTRODUODENOSCOPY);  Surgeon: Damon Mcmahon MD;  Location: [address]);  Service: Endoscopy;  Laterality: N/A;  use pcf scope    HEMORRHOID SURGERY      HYSTERECTOMY      KNEE SURGERY      OOPHORECTOMY      one ov removed     Family History   Problem Relation Age of Onset    Hypertension Mother     Migraines Mother     Breast cancer Mother     Hypertension Father     Stroke Father     Heart disease Father     Hyperlipidemia Father     Diabetes Father     Breast cancer Paternal Grandmother     Colon cancer Neg Hx     Ovarian cancer Neg Hx     Inflammatory bowel disease Neg Hx     Celiac disease Neg Hx      Social History     Tobacco Use    Smoking status: Never Smoker    Smokeless tobacco: Never Used   Substance Use Topics    Alcohol use: No     Frequency: Never     Drinks per session: 1 or 2     Binge frequency: Never    Drug use: No     Review of Systems   Constitutional: Negative for chills and fever.   HENT: Negative for congestion, postnasal drip, rhinorrhea and sore throat.    Eyes: Negative for redness.   Respiratory: Negative for cough and shortness of breath.    Cardiovascular: Negative for chest pain.   Gastrointestinal: Positive for nausea. Negative for abdominal pain and vomiting.   Genitourinary: Negative for dysuria.   Musculoskeletal: Positive for myalgias. Negative for arthralgias, joint swelling and neck pain.   Skin: Negative for rash.   Neurological: Negative for headaches.       Physical Exam     Initial Vitals [04/25/20 2122]   BP Pulse Resp Temp SpO2   (!) 201/96 76 20 99 °F (37.2 °C) 98 %      MAP        --         Physical Exam    Nursing note and vitals reviewed.  Constitutional: She appears well-developed and well-nourished. She is not diaphoretic. No distress.   HENT:   Head: Normocephalic and atraumatic.   Mouth/Throat: Oropharynx is clear and moist.   Eyes: Conjunctivae are normal. Right eye exhibits no discharge. Left eye exhibits no discharge. No scleral icterus.   Neck: Normal range of motion. Neck supple. No tracheal deviation present. No JVD present.   Cardiovascular: Normal rate, regular rhythm, normal heart sounds and intact distal pulses. Exam reveals no gallop and no friction rub.    No murmur heard.  Pulmonary/Chest: Breath sounds normal. No stridor. No respiratory distress. She has no wheezes. She has no rhonchi. She has no rales.   Abdominal: Soft. There is no tenderness.   Musculoskeletal: Normal range of motion. She exhibits no edema or tenderness.   Neurological: She is alert and oriented to person, place, and time. She has normal strength. GCS score is 15. GCS eye subscore is 4. GCS verbal subscore is 5. GCS motor subscore is 6.   SOUMYA with NGND's   Skin: Skin is warm and dry. Capillary refill takes less than 2 seconds. No rash noted. No erythema.   Psychiatric: She has a normal mood and affect. Her behavior is normal. Judgment and thought content normal.         ED Course   Procedures  Labs Reviewed   CBC W/ AUTO DIFFERENTIAL - Abnormal; Notable for the following components:       Result Value    Platelets 129 (*)     Gran% 37.4 (*)     Lymph% 48.1 (*)     All other components within normal limits   COMPREHENSIVE METABOLIC PANEL   TROPONIN I     EKG Readings: (Independently Interpreted)   Initial Reading: No STEMI. Rhythm: Normal Sinus Rhythm. Heart Rate: 63. Ectopy: No Ectopy.   No ST segment elevation or depression concerning for acute ischemia.          Imaging Results          X-Ray Chest AP Portable (Final result)  Result time 04/25/20 23:00:16    Final result by Lola Che MD  (04/25/20 23:00:16)                 Impression:      No acute cardiopulmonary process identified.      Electronically signed by: Lola Che MD  Date:    04/25/2020  Time:    23:00             Narrative:    EXAMINATION:  XR CHEST AP PORTABLE    CLINICAL HISTORY:  Chest Pain;    TECHNIQUE:  Single frontal view of the chest was performed.    COMPARISON:  01/04/2020.    FINDINGS:  Cardiac silhouette is normal in size.  Lungs are symmetrically expanded.  No evidence of focal consolidative process, pneumothorax, or significant effusion.  No acute osseous abnormality identified.                                 Medical Decision Making:   History:   Old Medical Records: I decided to obtain old medical records.  Initial Assessment:   49-year-old female presenting with right biceps pain.  On exam, tenderness to palpation on right bicep.  Initial blood pressure elevated.  Patient given Toradol.  Patient is concerned she is having a heart attack.  Symptoms are likely inconsistent.  Exam with tenderness to palpation right bicep, relatively low concern for ACS.  Lab workup including troponin negative.  EKG without ischemic changes.     Patient is at lower risk of ACS due to risk factors and HPI with a heart score of 2. Troponins, Cxr, ekg, and labs ordered which showed no acute findings    [email or web address]    Also considered but less likely:     PE: normal rate, no sob/recent immovilization/surgery/travel/family history  Pneumonia: chest xray negative. No fever or leukoscytosis. No cough and lungs non consistent with pna  Tamponade: unlikely due to chest xray and ekg  STEMI: No STEMI on ekg  Dissection: equal pulses bilaterally and no ripping chest pain to the back  Esophageal rupture: no dysphagia or vomiting and chest xray negative for mediastinal air  Arrhythmia: no arrhythmia on ekg  CHF: no fluid overload on Cxr and physical exam  Pneumothorax: bilateral breath sounds and no signs of  pneumothorax on chest xray      Patient given Toradol.  Symptoms improved.  Believe she is safe for discharge home.  Discussed return precautions as well as need for primary care follow-up.  No traumatic injury to arm.  No bruising, skin rash, or deformity noted to right upper extremity.  Neurovascularly intact.  No cellulitis or other signs of soft tissue infection.  Independently Interpreted Test(s):   I have ordered and independently interpreted X-rays - see prior notes.  I have ordered and independently interpreted EKG Reading(s) - see prior notes  Clinical Tests:   Lab Tests: Ordered and Reviewed  Radiological Study: Ordered and Reviewed  Medical Tests: Ordered and Reviewed    Additional MDM:   Heart Score:    History:          Slightly suspicious.  ECG:             Normal  Age:               45-65 years  Risk factors: 1-2 risk factors  Troponin:       Less than or equal to normal limit  Final Score: 2             Scribe Attestation:   Scribe #1: I performed the above scribed service and the documentation accurately describes the services I performed. I attest to the accuracy of the note.                          Clinical Impression:     1. Chest pain            Disposition:   Disposition: Discharged  Condition: Stable           I, Kalia Márquez, personally performed the services described in this documentation. All medical record entries made by the scribe were at my direction and in my presence. I have reviewed the chart and agree that the record reflects my personal performance and is accurate and complete.             Kalia Márquez MD  04/25/20 [id number]

## 2020-04-26 NOTE — DISCHARGE INSTRUCTIONS
You were seen in the emergency department for arm pain.  Your EKG, labs, exam, are all reassuring.  We are not sure what the cause of your pain is however we do not believe it is anything immediately dangerous.  Please follow-up with your primary care provider early this week.  Please return for any new or worsening chest pain, nausea, vomiting, difficulty breathing, coughing up blood, profuse sweating, dizziness, lightheadedness, numbness, weakness, or any other new or worsening concerns.

## 2020-05-04 ENCOUNTER — PATIENT MESSAGE (OUTPATIENT)
Dept: FAMILY MEDICINE | Facility: CLINIC | Age: 50
End: 2020-05-04

## 2020-05-04 ENCOUNTER — OFFICE VISIT (OUTPATIENT)
Dept: FAMILY MEDICINE | Facility: CLINIC | Age: 50
End: 2020-05-04
Payer: MEDICARE

## 2020-05-04 DIAGNOSIS — M19.049 HAND ARTHRITIS: Primary | ICD-10-CM

## 2020-05-04 DIAGNOSIS — J30.9 ALLERGIC SINUSITIS: ICD-10-CM

## 2020-05-04 PROBLEM — N28.9 RENAL INSUFFICIENCY: Status: RESOLVED | Noted: 2018-01-18 | Resolved: 2020-05-04

## 2020-05-04 PROCEDURE — 99499 UNLISTED E&M SERVICE: CPT | Mod: 95,,, | Performed by: FAMILY MEDICINE

## 2020-05-04 PROCEDURE — 99213 PR OFFICE/OUTPT VISIT, EST, LEVL III, 20-29 MIN: ICD-10-PCS | Mod: 95,,, | Performed by: FAMILY MEDICINE

## 2020-05-04 PROCEDURE — 99213 OFFICE O/P EST LOW 20 MIN: CPT | Mod: 95,,, | Performed by: FAMILY MEDICINE

## 2020-05-04 PROCEDURE — 99499 RISK ADDL DX/OHS AUDIT: ICD-10-PCS | Mod: 95,,, | Performed by: FAMILY MEDICINE

## 2020-05-04 NOTE — PROGRESS NOTES
Subjective:       Patient ID: Giuliana Rodas is a 49 y.o. female.    Chief Complaint: No chief complaint on file.    The patient location is: [place]  The chief complaint leading to consultation is: hand pains.   Visit type: audiovisual  Total time spent with patient: 20 minutes  Each patient to whom he or she provides medical services by telemedicine is:  (1) informed of the relationship between the physician and patient and the respective role of any other health care provider with respect to management of the patient; and (2) notified that he or she may decline to receive medical services by telemedicine and may withdraw from such care at any time.    Notes: 49 year old female presents with pain, swelling, and stiffness in her fingers. She states they hurt all day. She states her middle finger is now becoming a trigger finger. She has had this swelling and stiffness for the last few months. She feels she has been doing good with salt and avoiding this. She has been cooking more and not eating out. She is drinking smoothies every morning.       Review of Systems   Constitutional: Positive for fatigue. Negative for activity change and unexpected weight change.   HENT: Positive for congestion and rhinorrhea. Negative for hearing loss and trouble swallowing.    Eyes: Negative for discharge and visual disturbance.   Respiratory: Negative for chest tightness and wheezing.    Cardiovascular: Negative for chest pain and palpitations.   Gastrointestinal: Negative for blood in stool, constipation, diarrhea and vomiting.   Endocrine: Negative for polydipsia and polyuria.   Genitourinary: Negative for difficulty urinating, dysuria, hematuria and menstrual problem.   Musculoskeletal: Positive for arthralgias and joint swelling. Negative for neck pain.   Neurological: Positive for headaches. Negative for weakness.   Psychiatric/Behavioral: Negative for confusion and dysphoric mood.       Objective:      Physical Exam     Assessment:       1. Hand arthritis    2. Allergic sinusitis        Plan:       Diagnoses and all orders for this visit:    Hand arthritis  Ibuprofen 400 mg every 6 hours prn pain    Allergic sinusitis  She just need antihistamines for  post nasal drip, rhinorrhea, itchy, watery eyes. Use ither claritin (loratadine), zyrtec (cetirizine), or allegra (fexofenadine).  rotate antihistamines if it becomes less effective with either claritin (loratadine), zyrtec (cetirizine), or allegra (fexofenadine)

## 2020-05-04 NOTE — PROGRESS NOTES
Answers for HPI/ROS submitted by the patient on 5/3/2020   activity change: No  unexpected weight change: No  neck pain: No  hearing loss: No  rhinorrhea: No  trouble swallowing: No  eye discharge: No  visual disturbance: No  chest tightness: No  wheezing: No  chest pain: No  palpitations: No  blood in stool: No  constipation: No  vomiting: No  diarrhea: No  polydipsia: No  polyuria: No  difficulty urinating: No  hematuria: No  menstrual problem: No  dysuria: No  joint swelling: Yes  arthralgias: Yes  headaches: Yes  weakness: No  confusion: No  dysphoric mood: No

## 2020-05-04 NOTE — PROGRESS NOTES
Patient, Giuliana Rodas (MRN #[id number]), presented with a recent Platelet count less than 150 K/uL consistent with the definition of thrombocytopenia (ICD10 - D69.6).    Platelets   Date Value Ref Range Status   04/25/2020 129 (L) 150 - 350 K/uL Final     The patient's thrombocytopenia was monitored, evaluated, addressed and/or treated. This addendum to the medical record is made on 05/04/2020.

## 2020-05-08 ENCOUNTER — PATIENT OUTREACH (OUTPATIENT)
Dept: OTHER | Facility: OTHER | Age: 50
End: 2020-05-08

## 2020-05-13 ENCOUNTER — OFFICE VISIT (OUTPATIENT)
Dept: NEUROLOGY | Facility: CLINIC | Age: 50
End: 2020-05-13
Payer: MEDICARE

## 2020-05-13 ENCOUNTER — OFFICE VISIT (OUTPATIENT)
Dept: FAMILY MEDICINE | Facility: CLINIC | Age: 50
End: 2020-05-13
Payer: MEDICARE

## 2020-05-13 VITALS — WEIGHT: 167.13 LBS | HEIGHT: 62 IN | BODY MASS INDEX: 30.76 KG/M2

## 2020-05-13 DIAGNOSIS — A08.4 VIRAL ENTERITIS: Primary | ICD-10-CM

## 2020-05-13 DIAGNOSIS — G44.209 TENSION HEADACHE: ICD-10-CM

## 2020-05-13 DIAGNOSIS — G43.711 CHRONIC MIGRAINE WITHOUT AURA, WITH INTRACTABLE MIGRAINE, SO STATED, WITH STATUS MIGRAINOSUS: Primary | ICD-10-CM

## 2020-05-13 PROCEDURE — 99213 OFFICE O/P EST LOW 20 MIN: CPT | Mod: 95,,, | Performed by: NEUROLOGICAL SURGERY

## 2020-05-13 PROCEDURE — 3008F BODY MASS INDEX DOCD: CPT | Mod: CPTII,95,, | Performed by: NEUROLOGICAL SURGERY

## 2020-05-13 PROCEDURE — 3008F PR BODY MASS INDEX (BMI) DOCUMENTED: ICD-10-PCS | Mod: CPTII,95,, | Performed by: NEUROLOGICAL SURGERY

## 2020-05-13 PROCEDURE — 99213 PR OFFICE/OUTPT VISIT, EST, LEVL III, 20-29 MIN: ICD-10-PCS | Mod: 95,,, | Performed by: FAMILY MEDICINE

## 2020-05-13 PROCEDURE — 99213 PR OFFICE/OUTPT VISIT, EST, LEVL III, 20-29 MIN: ICD-10-PCS | Mod: 95,,, | Performed by: NEUROLOGICAL SURGERY

## 2020-05-13 PROCEDURE — 99213 OFFICE O/P EST LOW 20 MIN: CPT | Mod: 95,,, | Performed by: FAMILY MEDICINE

## 2020-05-13 RX ORDER — METHOCARBAMOL 500 MG/1
500 TABLET, FILM COATED ORAL EVERY 8 HOURS PRN
Qty: 60 TABLET | Refills: 3 | Status: SHIPPED | OUTPATIENT
Start: 2020-05-13 | End: 2021-08-16

## 2020-05-13 NOTE — PROGRESS NOTES
Answers for HPI/ROS submitted by the patient on 5/12/2020   Abdominal pain  Chronicity: new  Onset: yesterday  Onset quality: sudden  Frequency: constantly  Episode duration: 12 months  Progression since onset: unchanged  Pain location: LUQ, RUQ, epigastric region, periumbilical region  Pain - numeric: 10/10  Pain quality: burning, cramping, sharp  anorexia: Yes  arthralgias: Yes  belching: Yes  constipation: No  diarrhea: Yes  dysuria: No  fever: Yes  flatus: No  frequency: No  headaches: Yes  hematochezia: No  hematuria: No  melena: No  myalgias: Yes  nausea: Yes  weight loss: No  vomiting: No  Aggravated by: eating, movement  Relieved by: nothing  Diagnostic workup: GI consult, lower endoscopy, upper endoscopy  Pain severity: severe  Treatments tried: nothing  Improvement on treatment: no relief  abdominal surgery: Yes  colon cancer: No  Crohn's disease: No  gallstones: Yes  GERD: Yes  irritable bowel syndrome: Yes  kidney stones: No  pancreatitis: Yes  PUD: Yes  ulcerative colitis: No  UTI: Yes

## 2020-05-13 NOTE — PROGRESS NOTES
Subjective:       Patient ID: Giuliana Rodas is a 49 y.o. female.    Chief Complaint: No chief complaint on file.    The patient location is: [place]  The chief complaint leading to consultation is: abdominal pain   Visit type: audiovisual  Total time spent with patient: 20 minutes  Each patient to whom he or she provides medical services by telemedicine is:  (1) informed of the relationship between the physician and patient and the respective role of any other health care provider with respect to management of the patient; and (2) notified that he or she may decline to receive medical services by telemedicine and may withdraw from such care at any time.    Notes: 49 year old female presents with a 2 day history of feeling sick. She was having migraines and abdominal pain. She states she began having a fever up to 102.8. She states she had fever all day. She states the fever broke and the next day her symptoms broke. She has been having diarrhea all day and nausea. She has lost 4 pounds in the last 2 dys. She has sharp abdominal pain that is sharp pain in her stomach. She states it burns and is cramping as well. She has blood in her urine.she has no lower back pain. Her back pain is in the periumbilical or RUQ area. She did have some roast that didn't taste well.   She has congestion and rhinorrhea.       Abdominal Pain   This is a new problem. The current episode started yesterday. The onset quality is sudden. The problem occurs constantly. The most recent episode lasted 12 months. The problem has been unchanged. The pain is located in the LUQ, RUQ, epigastric region and periumbilical region. The pain is at a severity of 10/10. The pain is severe. The quality of the pain is burning, cramping and sharp. Associated symptoms include anorexia, arthralgias, belching, diarrhea, a fever, headaches, myalgias and nausea. Pertinent negatives include no constipation, dysuria, flatus, frequency, hematochezia, hematuria,  melena, vomiting or weight loss. The pain is aggravated by eating and movement. The pain is relieved by nothing. She has tried nothing for the symptoms. The treatment provided no relief. Prior diagnostic workup includes GI consult, lower endoscopy and upper endoscopy. Her past medical history is significant for abdominal surgery, gallstones, GERD, irritable bowel syndrome, pancreatitis and PUD. There is no history of colon cancer, Crohn's disease or ulcerative colitis. Patient's medical history includes UTI. Patient's medical history does not include kidney stones.     Past Medical History:   Diagnosis Date    Bile reflux gastritis     Breast cyst     Eczema     Fibrocystic breast     Fibromyalgia     Gastroparesis     dr. harden    GERD (gastroesophageal reflux disease)     Hyperglyceridemia     Hyperlipidemia     Hypertension     IC (interstitial cystitis)     dr. labadie    Psoriasis     Tension headache       Past Surgical History:   Procedure Laterality Date    BREAST BIOPSY Right     over 10 years ago/ milk duct    CHOLECYSTECTOMY      ESOPHAGOGASTRODUODENOSCOPY N/A 3/12/2020    Procedure: EGD (ESOPHAGOGASTRODUODENOSCOPY);  Surgeon: Damon Mcmahon MD;  Location: [facility] ([address] [place] [address]);  Service: Endoscopy;  Laterality: N/A;  use pcf scope    HEMORRHOID SURGERY      HYSTERECTOMY      KNEE SURGERY      OOPHORECTOMY      one ov removed     Family History   Problem Relation Age of Onset    Hypertension Mother     Migraines Mother     Breast cancer Mother     Hypertension Father     Stroke Father     Heart disease Father     Hyperlipidemia Father     Diabetes Father     Breast cancer Paternal Grandmother     Colon cancer Neg Hx     Ovarian cancer Neg Hx     Inflammatory bowel disease Neg Hx     Celiac disease Neg Hx      Social History     Socioeconomic History    Marital status:      Spouse name: Not on file    Number of children: 2    Years of education: Not on file     Highest education level: Not on file   Occupational History    Occupation:      Employer: [employer]   Social Needs    Financial resource strain: Not on file    Food insecurity:     Worry: Never true     Inability: Never true    Transportation needs:     Medical: No     Non-medical: No   Tobacco Use    Smoking status: Never Smoker    Smokeless tobacco: Never Used   Substance and Sexual Activity    Alcohol use: No     Frequency: Never     Drinks per session: 1 or 2     Binge frequency: Never    Drug use: No    Sexual activity: Yes     Partners: Male     Birth control/protection: See Surgical Hx   Lifestyle    Physical activity:     Days per week: 2 days     Minutes per session: 20 min    Stress: Only a little   Relationships    Social connections:     Talks on phone: More than three times a week     Gets together: Once a week     Attends Jew service: Not on file     Active member of club or organization: No     Attends meetings of clubs or organizations: Patient refused     Relationship status:    Other Topics Concern    Not on file   Social History Narrative    Lives at home with  and daughter     Current Outpatient Medications on File Prior to Visit   Medication Sig    AJOVY 225 mg/1.5 mL injection inject once subcutaneously EVERY 28 DAYS AS DIRECTED    amlodipine-olmesartan (ANGEL) 5-40 mg per tablet TAKE ONE TABLET BY MOUTH EVERY DAY    atenolol (TENORMIN) 50 MG tablet TAKE ONE TABLET BY MOUTH TWICE DAILY AS NEEDED    azelaic acid (FINACEA) 15 % gel Apply to face nightly. Increase to BID as tolerated    BIFIDOBACTERIUM INFANTIS (ALIGN ORAL) Take 1 tablet by mouth once daily.    cephALEXin (KEFLEX) 500 MG capsule Take 1 capsule (500 mg total) by mouth every 12 (twelve) hours.    colestipol (COLESTID) 1 gram Tab Take 1 tablet (1 g total) by mouth 2 (two) times daily.    CREON 24,000-76,000 -120,000 unit capsule TAKE TWO CAPSULES BY MOUTH THREE TIMES DAILY WITH meals     diphenhydrAMINE (BENADRYL) 50 MG capsule Take 1 capsule (50 mg total) by mouth every 6 (six) hours as needed.    DULoxetine (CYMBALTA) 30 MG capsule 30 mg daily and then 30 mg bid    escitalopram oxalate (LEXAPRO) 20 MG tablet Take 1 tablet (20 mg total) by mouth once daily.    ferrous sulfate (FEOSOL) 325 mg (65 mg iron) Tab tablet Take 325 mg by mouth.    FLUCELVAX QUAD [id number], PF, 60 mcg (15 mcg x 4)/0.5 mL Syrg ADM 0.5ML IM UTD    fluocinonide (LIDEX) 0.05 % external solution AAA scalp qday - bid prn pruritus    hyoscyamine (ANASPAZ,LEVSIN) 0.125 mg Tab Take 1 tablet (125 mcg total) by mouth before meals as needed.    ketoconazole (NIZORAL) 2 % shampoo Wash hair with medicated shampoo at least 2x/week - let sit on scalp at least 5 minutes prior to rinsing    ketorolac (TORADOL) 10 mg tablet Take 1 tablet (10 mg total) by mouth every 6 (six) hours.    Lactobacillus rhamnosus GG (CULTURELLE) 10 billion cell capsule Take 1 capsule by mouth.    ondansetron (ZOFRAN) 4 MG tablet Take 1 tablet (4 mg total) by mouth every 6 (six) hours.    pantoprazole (PROTONIX) 40 MG tablet Please take 1 tablet every 12 hr when you have chest discomfort, then take 1 tablet every day.    pravastatin (PRAVACHOL) 20 MG tablet TAKE ONE TABLET BY MOUTH EVERY EVENING    prochlorperazine (COMPAZINE) 10 MG tablet Take 1 tablet (10 mg total) by mouth every 6 (six) hours as needed (Headache.  Take with Benadryl 50 mg).    RABEprazole (ACIPHEX) 20 mg tablet Take 1 tablet (20 mg total) by mouth once daily.    sucralfate (CARAFATE) 1 gram tablet Take 1 g by mouth 4 (four) times daily.    sumatriptan (IMITREX STATDOSE) 6 mg/0.5 mL kit inject 6mg (0.5ml) into skin once AS NEEDED FOR MIGRAINE (MAXIMUM OF TWO IN 24 hours)    sumatriptan (IMITREX) 100 MG tablet Take 1 tablet (100 mg total) by mouth as needed for Migraine. Take at the onset of headache, may take 1 more in 1 hour if needed.    tiZANidine (ZANAFLEX) 4 MG tablet Take 4  mg by mouth.    traZODone (DESYREL) 50 MG tablet Take 1 tablet (50 mg total) by mouth every evening.    triamcinolone acetonide 0.1% (KENALOG) 0.1 % cream AAA bid prn redness, scaling or itching     No current facility-administered medications on file prior to visit.        Review of Systems   Constitutional: Positive for fever. Negative for weight loss.   Gastrointestinal: Positive for abdominal pain, anorexia, diarrhea and nausea. Negative for constipation, flatus, hematochezia, melena and vomiting.   Genitourinary: Negative for dysuria, frequency and hematuria.   Musculoskeletal: Positive for arthralgias and myalgias.   Neurological: Positive for headaches.       Objective:      Physical Exam    Assessment:       1. Viral enteritis        Plan:       Diagnoses and all orders for this visit:    Viral enteritis      The 'BRAT' diet is suggested, then progress to diet as tolerated as symptoms linn. Call if bloody stools, persistent diarrhea, vomiting, fever or abdominal pain.  She is on protonix, carafate, and zofran. She can't take bentyl. Encouraged fluids. Avoid anti-diarrheal medications now.

## 2020-05-14 ENCOUNTER — TELEPHONE (OUTPATIENT)
Dept: NEUROLOGY | Facility: CLINIC | Age: 50
End: 2020-05-14

## 2020-05-18 NOTE — PROGRESS NOTES
Digital Medicine: Health  Follow-Up    Patient reports she was suffering with migraines when BP was elevated to 173/94 mmHg and 157/111 mmHg.  States she had a fever and diarrhea last week and had virtual visits with her doctors.  States she may have COVID but reports her doctors informed her she did not qualify for the test.     The history is provided by the patient.     Follow Up  Follow-up reason(s): reading review      Readings are trending down due to lifestyle change.    Routine Education Topics: eating patterns        INTERVENTION(S)  encouragement/support and denied questions    PLAN  patient verbalizes understanding and continue monitoring          Topic    Lipid (Cholesterol) Test        Last 5 Patient Entered Readings                                      Current 30 Day Average: 142/92     Recent Readings 5/15/2020 5/7/2020 5/5/2020 4/27/2020 4/26/2020    SBP (mmHg) 133 131 146 159 145    DBP (mmHg) 80 88 87 98 101    Pulse 73 66 61 63 70                      Diet Screening   She gets groceries from the grocery store.      Reports she has been following low salt diet.  States she has been buying no salt products (no salt saltine crackers and no salt beans).    Intervention(s): reducing sodium intake and reading food labels      [facility]

## 2020-05-22 ENCOUNTER — PATIENT OUTREACH (OUTPATIENT)
Dept: OTHER | Facility: OTHER | Age: 50
End: 2020-05-22

## 2020-06-17 ENCOUNTER — CLINICAL SUPPORT (OUTPATIENT)
Dept: REHABILITATION | Facility: HOSPITAL | Age: 50
End: 2020-06-17
Attending: ORTHOPAEDIC SURGERY
Payer: MEDICARE

## 2020-06-17 DIAGNOSIS — M25.661 DECREASED ROM OF RIGHT KNEE: ICD-10-CM

## 2020-06-17 DIAGNOSIS — G89.29 CHRONIC PAIN OF RIGHT KNEE: ICD-10-CM

## 2020-06-17 DIAGNOSIS — R29.898 WEAKNESS OF RIGHT LOWER EXTREMITY: ICD-10-CM

## 2020-06-17 DIAGNOSIS — M25.561 CHRONIC PAIN OF RIGHT KNEE: ICD-10-CM

## 2020-06-17 PROCEDURE — 97161 PT EVAL LOW COMPLEX 20 MIN: CPT | Mod: PN

## 2020-06-17 NOTE — PROGRESS NOTES
Physical Therapy Initial Evaluation     Name: Giuliana Rodas  Clinic Number: [id number]    Therapy Diagnosis:   Encounter Diagnoses   Name Primary?    Chronic pain of right knee     Weakness of right lower extremity     Decreased ROM of right knee      Physician: Will Arce MD    Physician Orders: PT Eval and Treat   Medical Diagnosis from Referral: S/P right knee arthroscopy  Evaluation Date: 6/17/2020  Authorization Period Expiration: 06/16/2021  Plan of Care Expiration: 7/29/2020  Visit # / Visits authorized: 1/1    Time In: 2:30  Time Out: 3:15  Total Billable Time: 45 minutes    Precautions: Standard    Subjective   Date of onset: Pt had right knee arthroscopy February 4th.  History of current condition - Giuliana reports: She was receiving outpatient physical therapy but had to stop once corona virus started. She reports that she was able to keep up with her HEP at the beginning of quarantine but has recently stopped. History of injury: Pt tore right meniscus while trying to attempt to get into her raised bed. She reports that she thinks she twisted and that caused her meniscus to tear.      Medical History:   Past Medical History:   Diagnosis Date    Bile reflux gastritis     Breast cyst     Eczema     Fibrocystic breast     Fibromyalgia     Gastroparesis     dr. harden    GERD (gastroesophageal reflux disease)     Hyperglyceridemia     Hyperlipidemia     Hypertension     IC (interstitial cystitis)     dr. labadie    Psoriasis     Tension headache        Surgical History:   Giuliana Rodas  has a past surgical history that includes Cholecystectomy; Hysterectomy; Hemorrhoid surgery; Knee surgery; Oophorectomy; Breast biopsy (Right); and Esophagogastroduodenoscopy (N/A, 3/12/2020).    Medications:   Giuliana has a current medication list which includes the following prescription(s): amlodipine-olmesartan, atenolol, azelaic acid,  bifidobacterium infantis, cephalexin, colestipol, creon, diphenhydramine, duloxetine, escitalopram oxalate, ferrous sulfate, flucelvax quad [id number] (pf), fluocinonide, fremanezumab-vfrm, hyoscyamine, ketoconazole, ketorolac, lactobacillus rhamnosus gg, methocarbamol, ondansetron, pantoprazole, pravastatin, prochlorperazine, rabeprazole, sucralfate, sumatriptan, sumatriptan, tizanidine, trazodone, and triamcinolone acetonide 0.1%.    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[employer], [employer] work  Prior Level of Function: independent  DME owned/used: Pt has crutches at home but does not use  Current Level of Function: independent    Pain:  Current 0/10, worst 6/10, best 0/10   Location: right knee   Description: weak, stiff  Aggravating Factors: Walking  Easing Factors: moving around    Pts goals: to get stronger, decrease pain, improve flexibility    Objective       Observation: antalgic gait, decreased step length RLE, increased edema over right patella  Peter's Sign: Negative    Range of Motion: Knee   Left Right   Flexion: 135 135   Extension 0 -5     Strength: Knee   Left Right   Quadriceps 5/5 3/5 P!   Hamstrings 5/5 4+/5       Strength: Hip   Left Right   Iliopsoas 5/5 5/5   Glute Med 5/5 4/5   Ankle PF 5/5 4+/5   Ankle DF 5/5 4+/5     Joint Mobility: WNL  Palpation: tenderness at joint line and Right patellar tendon  Sensation: intact to light touch    Edema:  Left: absent  Right: minimal    Girth  "Measurement Left Right   Mid-patella 37cm 42cm       Gait: Adrianna ambulated with none.  Level of Assistance: independent  Patient displays decreased step length.   Balance: Maintains SLS 15" RLE and >30" LLE with fair balance strategies.    Functional Limitations Reports - G Codes  Category: mobility  Tool: FOTO Knee Survey  Score: 53% Limitation   Modifier  Impairment Limitation Restriction    CH  0 % impaired, limited or restricted    CI  @ least 1% but less than 20% impaired, limited or restricted    CJ  @ least 20%<40% impaired, limited or restricted    CK  @ least 40%<60% impaired, limited or restricted    CL  @ least 60% <80% impaired, limited or restricted    CM  @ least 80%<100% impaired limited or restricted    CN  100% impaired, limited or restricted     Current/  CK = 40-60% Limitation  Goal/ : CJ = 20-40% Limitation     Pt/family was provided educational information, including: role of PT, goals for PT, scheduling - pt verbalized understanding. Discussed insurance limitations with pt.     TREATMENT     Treatment Time In: 300pm  Treatment Time Out: 315PM  Total Treatment time separate from Evaluation: 15 minutes    Giuliana received therapeutic exercises to develop strength, endurance, ROM and flexibility for 15 minutes including:    LAQ x15 with 5" hold  HS with strap 30"x3  Bridges 2x10  SLR Flexion 2x10  Gastroc stretch on slantboard 30"x2  Step ups 6in 2x10    ADD next visit - Nustep warmup,TKE, clams, standing hip ext      Home Exercises and Patient Education Provided    Education provided:   - HEP    Written Home Exercises Provided: yes.  Exercises were reviewed and Giuliana was able to demonstrate them prior to the end of the session.  Giuliana demonstrated good  understanding of the education provided.     See EMR under Patient Instructions for exercises provided 6/17/2020.    Assessment     Giuliana is a 50 y.o. female referred to outpatient Physical Therapy with a medical diagnosis of S/P right " knee arthroscopy on February 4,2020. with signs and symptoms including: increased pain, decreased knee ROM, decreased LE Strength, soft tissue dysfunction, postural imbalance,impaired joint mobility, and decreased tolerance to functional activities. Incision site was clean without characteristics of infection.  Pt was progressing well with rehab when Covid-19 impacted her ability to maintain therapy. She demonstrated good AROM knee flexion but continues to lack terminal knee extension in RLE. Pt has decreased strength in right quadriceps which limits her ability to participate in functional tasks. Pt will benefit from skilled PT to address deficits stated above to allow her to return to PLOF.     Pt with good motivation to perform physical activity and responds well to cueing.    Pt prognosis is Good.   Pt will benefit from skilled outpatient Physical Therapy to address the deficits stated above and in the chart below, provide pt/family education, and to maximize pt's level of independence.     Plan of care discussed with patient: Yes  Pt's spiritual, cultural and educational needs considered and patient is agreeable to the plan of care and goals as stated below:     Anticipated Barriers for therapy: none    Medical Necessity is demonstrated by the following  History  Co-morbidities and personal factors that may impact the plan of care Co-morbidities:   HTN and Hyperlipidemia, Fibromyalgia    Personal Factors:   no deficits     moderate   Examination  Body Structures and Functions, activity limitations and participation restrictions that may impact the plan of care Body Regions:   lower extremities    Body Systems:    strength  balance  gait  edema    Participation Restrictions:   none    Activity limitations:   Learning and applying knowledge  no deficits    General Tasks and Commands  no deficits    Communication  no deficits    Mobility  no deficits    Self care  no deficits    Domestic Life  no  deficits    Interactions/Relationships  no deficits    Life Areas  no deficits    Community and Social Life  no deficits         moderate   Clinical Presentation stable and uncomplicated low   Decision Making/ Complexity Score: low     Pt's spiritual, cultural and educational needs considered and pt agreeable to plan of care and goals as stated below:     Short Term GOALS: 3 weeks. Pt agrees with goals set.  1. Patient demonstrates independence with HEP.   2. Patient demonstrates independence with Postural Awareness.   3. Patient demonstrates independence with body mechanics.   4. Patient will report pain of <5/10 at worst, on 0-10 pain scale, with all activity    Long Term GOALS: 6 weeks. Pt agrees with goals set.  1. Patient demonstrates increased right knee extension  to 0 degress to improve tolerance to functional activities pain free.   2. Patient demonstrates increased strength BLE's to 4+/5 or greater to improve tolerance to functional activities pain free.   3. Patient demonstrates improved overall function per FOTO Knee Survey to 37% Limitation or less.   4. Patient will report pain of 1/10 at worst, on 0-10 pain scale, with all activity  5. Patient will improve SLS >30 sec in RLE to return to functional activities.     PLAN       Plan of care Certification: 6/17/2020 to 7/29/2020.    Outpatient Physical Therapy 2 times weekly for 6 weeks to include the following interventions: Gait Training, Manual Therapy, Neuromuscular Re-ed, Patient Education, Therapeutic Activites, Therapeutic Exercise and Dry Needling.  Pt may be seen by PTA as part of the rehabilitation team.     Brissa Gayle, PT  6/17/2020    I have seen the patient, reviewed the therapist's plan of care, and I agree with the plan of care.      I certify the need for these services furnished under this plan of treatment and while under my care.     ___________________ ________ Physician/Referring Practitioner             ___________________________ Date of Signature

## 2020-06-23 ENCOUNTER — CLINICAL SUPPORT (OUTPATIENT)
Dept: REHABILITATION | Facility: HOSPITAL | Age: 50
End: 2020-06-23
Attending: ORTHOPAEDIC SURGERY
Payer: MEDICARE

## 2020-06-23 DIAGNOSIS — G89.29 CHRONIC PAIN OF RIGHT KNEE: Primary | ICD-10-CM

## 2020-06-23 DIAGNOSIS — M25.661 DECREASED ROM OF RIGHT KNEE: ICD-10-CM

## 2020-06-23 DIAGNOSIS — M25.561 CHRONIC PAIN OF RIGHT KNEE: Primary | ICD-10-CM

## 2020-06-23 DIAGNOSIS — R29.898 WEAKNESS OF LOWER EXTREMITY, UNSPECIFIED LATERALITY: ICD-10-CM

## 2020-06-23 PROCEDURE — 97110 THERAPEUTIC EXERCISES: CPT | Mod: PN,CQ

## 2020-06-23 NOTE — PROGRESS NOTES
"  Physical Therapy Daily Treatment Note     Name: Giuliana Rodas  Clinic Number: [id number]    Therapy Diagnosis:   Encounter Diagnoses   Name Primary?    Chronic pain of right knee Yes    Weakness of lower extremity, unspecified laterality     Decreased ROM of right knee      Physician: Will Arce MD    Visit Date: 6/23/2020    Physician Orders: PT Eval and Treat   Medical Diagnosis from Referral: S/P right knee arthroscopy  Evaluation Date: 6/17/2020  Authorization Period Expiration: 06/16/2021  Plan of Care Expiration: 7/29/2020  Visit # / Visits authorized: 2/1     Time In: 0745  Time Out: 825  Total Billable Time: 45 minutes     Precautions: Standard      Subjective     Pt reports: R knee is hurting, mostly when changing positions.  She was compliant with home exercise program.  Response to previous treatment: fair   Functional change: ongoing    Pain: 2/10 , 10/10  Location: right knee  , going from STS    Objective        Giuliana received therapeutic exercises to develop strength, endurance, ROM and flexibility for 15 minutes including:    +NuStep x5mins  +Clams YTB x30  +SAQ 1x10 p!  +Quad sets x10 p!   +Hip Add x20 with ball   +Hip abd with GTB x30  +SL hip abd x30  +SL LAQ x30  LAQ x15 with 5" hold  HS with strap 30"x3  Bridges 2x10  SLR Flexion 2x10  Gastroc stretch on slantboard 30"x2  Step ups 6in 1x10           Giuliana received the following manual therapy techniques:  were applied to the:  for  minutes, including:      Giuliana participated in neuromuscular re-education activities to improve:  for  minutes. The following activities were included:        Giuliana received the following direct contact modalities after being cleared for contraindications:     Giuliana received the following supervised modalities after being cleared for contradictions:     Giuliana received hot pack for  minutes to .    Giuliana received cold pack for 10  minutes to R knee .      Home Exercises Provided and Patient Education " Provided     Education provided:   - Cont HEP    Written Home Exercises Provided: Patient instructed to cont prior HEP.  Exercises were reviewed and Giuliana was able to demonstrate them prior to the end of the session.  Giuliana demonstrated good  understanding of the education provided.     See EMR under Patient Instructions for exercises provided prior visit.    Assessment     Pt tolerated 1st session after eval fair today. Pt experienced high levels of pain going from STS, with TKE exercises quad sets, SAQ, LAQ. Pt had some pain with SLR but was able to complete exercise.     Giuliana is progressing well towards her goals.   Pt prognosis is Fair.     Pt will continue to benefit from skilled outpatient physical therapy to address the deficits listed in the problem list box on initial evaluation, provide pt/family education and to maximize pt's level of independence in the home and community environment.     Pt's spiritual, cultural and educational needs considered and pt agreeable to plan of care and goals.     Anticipated barriers to physical therapy: none    Short Term GOALS: 3 weeks. Pt agrees with goals set.  1. Patient demonstrates independence with HEP.   2. Patient demonstrates independence with Postural Awareness.   3. Patient demonstrates independence with body mechanics.   4. Patient will report pain of <5/10 at worst, on 0-10 pain scale, with all activity     Long Term GOALS: 6 weeks. Pt agrees with goals set.  1. Patient demonstrates increased right knee extension  to 0 degress to improve tolerance to functional activities pain free.   2. Patient demonstrates increased strength BLE's to 4+/5 or greater to improve tolerance to functional activities pain free.   3. Patient demonstrates improved overall function per FOTO Knee Survey to 37% Limitation or less.   4. Patient will report pain of 1/10 at worst, on 0-10 pain scale, with all activity  5. Patient will improve SLS >30 sec in RLE to return to functional  activities      Plan     Outpatient Physical Therapy 2 times weekly for 6 weeks to include the following interventions: Gait Training, Manual Therapy, Neuromuscular Re-ed, Patient Education, Therapeutic Activites, Therapeutic Exercise and Dry Needling       Armaan Sawyer, PTA

## 2020-06-25 ENCOUNTER — CLINICAL SUPPORT (OUTPATIENT)
Dept: REHABILITATION | Facility: HOSPITAL | Age: 50
End: 2020-06-25
Attending: ORTHOPAEDIC SURGERY
Payer: MEDICARE

## 2020-06-25 DIAGNOSIS — G89.29 CHRONIC PAIN OF RIGHT KNEE: ICD-10-CM

## 2020-06-25 DIAGNOSIS — R29.898 WEAKNESS OF RIGHT LOWER EXTREMITY: ICD-10-CM

## 2020-06-25 DIAGNOSIS — M25.561 CHRONIC PAIN OF RIGHT KNEE: ICD-10-CM

## 2020-06-25 DIAGNOSIS — M25.661 DECREASED ROM OF RIGHT KNEE: ICD-10-CM

## 2020-06-25 PROCEDURE — 97110 THERAPEUTIC EXERCISES: CPT | Mod: PN,CQ

## 2020-06-25 NOTE — PROGRESS NOTES
"  Physical Therapy Daily Treatment Note     Name: Giuliana Rodas  Clinic Number: [id number]    Therapy Diagnosis:   Encounter Diagnoses   Name Primary?    Chronic pain of right knee     Weakness of right lower extremity     Decreased ROM of right knee      Physician: Will Arce MD    Visit Date: 6/25/2020    Physician Orders: PT Eval and Treat   Medical Diagnosis from Referral: S/P right knee arthroscopy  Evaluation Date: 6/17/2020  Authorization Period Expiration: 06/16/2021  Plan of Care Expiration: 7/29/2020  Visit # / Visits authorized: 3/12     Time In: 1500  Time Out: 1543  Total Billable Time: 43 minutes     Precautions: Standard      Subjective     Pt reports: running errands today and is in a little more pain. Expresses episodes of R knee giving out on her 2' weakness  She was compliant with home exercise program.  Response to previous treatment: fair   Functional change: ongoing    Pain: did not rate/10   Location: right knee     Objective        Giuliana received therapeutic exercises to develop strength, endurance, ROM and flexibility for 43 minutes including:    NuStep x5 mins  Gastroc stretch on slantboard 30"x2  +Seated HS stretch 2 x 30"  Step ups 6in 1x10      Quad sets x15 p!   Hip Add x20 with ball   Hip abd with GTB x30  Bridges 2x10  SLR Flexion 2x10  SL hip abd x30  +Heel prop with 3# x 2 minutes      NP:  LAQ  SL LAQ x30  Clams YTB x30  SAQ 1x10 p!      Giuliana received cold pack for 00  minutes to R knee .      Home Exercises Provided and Patient Education Provided     Education provided:   - Cont HEP    Written Home Exercises Provided: Patient instructed to cont prior HEP.  Exercises were reviewed and Giuliana was able to demonstrate them prior to the end of the session.  Giuliana demonstrated good  understanding of the education provided.     See EMR under Patient Instructions for exercises provided prior visit.    Assessment     Pt tolerated today's session fairly well. She presents to " clinic with reports of mild increase in pain after having a busy day running errands. No significant increase in pain this date during therex. She was able to progress in repetitions this date with no adverse response to note. She continues to lack terminal knee ext. Added heel prop this date, in which she tolerated well. She will continue to benefit from progressive LE strengthening and CKC strengthening.     Giuliana is progressing well towards her goals.   Pt prognosis is Fair.     Pt will continue to benefit from skilled outpatient physical therapy to address the deficits listed in the problem list box on initial evaluation, provide pt/family education and to maximize pt's level of independence in the home and community environment.     Pt's spiritual, cultural and educational needs considered and pt agreeable to plan of care and goals.     Anticipated barriers to physical therapy: none    Short Term GOALS: 3 weeks. Pt agrees with goals set.  1. Patient demonstrates independence with HEP.   2. Patient demonstrates independence with Postural Awareness.   3. Patient demonstrates independence with body mechanics.   4. Patient will report pain of <5/10 at worst, on 0-10 pain scale, with all activity     Long Term GOALS: 6 weeks. Pt agrees with goals set.  1. Patient demonstrates increased right knee extension  to 0 degress to improve tolerance to functional activities pain free.   2. Patient demonstrates increased strength BLE's to 4+/5 or greater to improve tolerance to functional activities pain free.   3. Patient demonstrates improved overall function per FOTO Knee Survey to 37% Limitation or less.   4. Patient will report pain of 1/10 at worst, on 0-10 pain scale, with all activity  5. Patient will improve SLS >30 sec in RLE to return to functional activities      Plan     Plan of care Certification: 6/17/2020 to 7/29/2020.      Outpatient Physical Therapy 2 times weekly for 6 weeks to include the following  interventions: Gait Training, Manual Therapy, Neuromuscular Re-ed, Patient Education, Therapeutic Activites, Therapeutic Exercise and Dry Needling       Enma Frias, PTA

## 2020-06-29 ENCOUNTER — CLINICAL SUPPORT (OUTPATIENT)
Dept: REHABILITATION | Facility: HOSPITAL | Age: 50
End: 2020-06-29
Attending: ORTHOPAEDIC SURGERY
Payer: MEDICARE

## 2020-06-29 DIAGNOSIS — M25.661 DECREASED ROM OF RIGHT KNEE: ICD-10-CM

## 2020-06-29 DIAGNOSIS — M25.561 CHRONIC PAIN OF RIGHT KNEE: ICD-10-CM

## 2020-06-29 DIAGNOSIS — R29.898 WEAKNESS OF RIGHT LOWER EXTREMITY: ICD-10-CM

## 2020-06-29 DIAGNOSIS — G89.29 CHRONIC PAIN OF RIGHT KNEE: ICD-10-CM

## 2020-06-29 PROCEDURE — 97110 THERAPEUTIC EXERCISES: CPT | Mod: PN

## 2020-06-29 NOTE — PROGRESS NOTES
"  Physical Therapy Daily Treatment Note     Name: Giuliana Rodas  Clinic Number: [id number]    Therapy Diagnosis:   Encounter Diagnoses   Name Primary?    Chronic pain of right knee     Weakness of right lower extremity     Decreased ROM of right knee      Physician: Will Arce MD    Visit Date: 6/29/2020    Physician Orders: PT Eval and Treat   Medical Diagnosis from Referral: S/P right knee arthroscopy  Evaluation Date: 6/17/2020  Authorization Period Expiration: 06/16/2021  Plan of Care Expiration: 7/29/2020  Visit # / Visits authorized: 4/12     Time In: 3:58pm  Time Out: 4:55  Total Billable Time: 40 minutes     Precautions: Standard      Subjective     Pt reports: "I am starting to feel better"  She was compliant with home exercise program.  Response to previous treatment: good  Functional change: ongoing    Pain: 3/10   Location: right knee     Objective        Giuliana received therapeutic exercises to develop strength, endurance, ROM and flexibility for 45 minutes including:    NuStep x8 mins  Gastroc stretch on slantboard 30"x2  Seated HS stretch 2 x 30"  Step ups 6in 2x10      Quad sets x15   Hip Add x20 with ball   Hip abd with GTB x30  Bridges 2x10  SLR Flexion 2x10  SL hip abd x30  Heel prop with 3# x 2 minutes  +Shuttle BLE squat 1 black 1 red cord 2X10   +SL squat 1 red cord 2x10  +TKE red TB 2x15  +SLS + Airex 30"x3      NP:  LAQ  SL LAQ x30  Clams YTB x30  SAQ 1x10 p!      Giuliana received cold pack for 10  minutes to R knee .      Home Exercises Provided and Patient Education Provided     Education provided:   - Cont HEP    Written Home Exercises Provided: Patient instructed to cont prior HEP.  Exercises were reviewed and Giuliana was able to demonstrate them prior to the end of the session.  Giuliana demonstrated good  understanding of the education provided.     See EMR under Patient Instructions for exercises provided prior visit.    Assessment     Pt tolerated today's session well. No " significant increase in pain this date during treatment session. She was able to progress to more CKC exercises without adverse effects. She displayed difficulty with attaining TKE on shuttle. SLS + Airex added this date to challenge knee proprioception. Pt demonstrated moderate ankle strategy to maintain balance. Appropriate muscle fatigue experienced at end of session.    Giuliana is progressing well towards her goals.   Pt prognosis is Fair.     Pt will continue to benefit from skilled outpatient physical therapy to address the deficits listed in the problem list box on initial evaluation, provide pt/family education and to maximize pt's level of independence in the home and community environment.     Pt's spiritual, cultural and educational needs considered and pt agreeable to plan of care and goals.     Anticipated barriers to physical therapy: none    Short Term GOALS: 3 weeks. Pt agrees with goals set.  1. Patient demonstrates independence with HEP.   Progressing  2. Patient demonstrates independence with Postural Awareness.  Progressing  3. Patient demonstrates independence with body mechanics. Progressing  4. Patient will report pain of <5/10 at worst, on 0-10 pain scale, with all activity Progressing     Long Term GOALS: 6 weeks. Pt agrees with goals set.  1. Patient demonstrates increased right knee extension  to 0 degress to improve tolerance to functional activities pain free. Progressing  2. Patient demonstrates increased strength BLE's to 4+/5 or greater to improve tolerance to functional activities pain free. Progressing  3. Patient demonstrates improved overall function per FOTO Knee Survey to 37% Limitation or less. Progressing  4. Patient will report pain of 1/10 at worst, on 0-10 pain scale, with all activity. Progressing  5. Patient will improve SLS >30 sec in RLE to return to functional activities. Progressing      Plan     Plan of care Certification: 6/17/2020 to 7/29/2020.    Continue to  strengthen RLE with CKC, improve balance, and AROM.      Brissa Gayle, PT

## 2020-06-30 ENCOUNTER — PATIENT OUTREACH (OUTPATIENT)
Dept: OTHER | Facility: OTHER | Age: 50
End: 2020-06-30

## 2020-06-30 ENCOUNTER — PATIENT OUTREACH (OUTPATIENT)
Dept: ADMINISTRATIVE | Facility: OTHER | Age: 50
End: 2020-06-30

## 2020-06-30 NOTE — PROGRESS NOTES
Digital Medicine: Health  Follow-Up    States she has an appt tomorrow with Gastro to discuss her fatty liver and gastroparesis.  States at night she has a terrible pain in her stomach.    The history is provided by the patient.   Follow Up  Follow-up reason(s): reading review      Readings are trending down due to lifestyle change.    Routine Education Topics: eating patterns and physical activity        INTERVENTION(S)  recommended diet modifications, recommend physical activity, encouragement/support and denied questions    PLAN  patient verbalizes understanding and continue monitoring          Topic    Lipid (Cholesterol) Test        Last 5 Patient Entered Readings                                      Current 30 Day Average: 148/88     Recent Readings 6/29/2020 6/25/2020 6/17/2020 6/8/2020 5/26/2020    SBP (mmHg) 135 154 151 152 148    DBP (mmHg) 89 87 93 84 88    Pulse 72 68 71 71 60                      Diet Screening       Reports she has not been eating out as much but when she does she notices her meals have a lot of added salt.  States when she is home she monitors salt intake.  Encouraged patient to ask restaurants to cook without salt or to aim or items with less sauce or seasonings.    Intervention(s): low sodium diet education, reducing sodium intake, reducing dining out and reducing seasonings    Physical Activity Screening   When asked if exercising, patient responded: yes    Patient participates in the following activities: physical therapy/rehab    States she started her 2nd week of PT this week for her knee and has noticed it feels better.  Patient has been doing at home exercises, but is hesitant to go her gym since her  told her people are not wearing masks.    Intervention(s): exercise ideas       SDOH

## 2020-07-01 ENCOUNTER — OFFICE VISIT (OUTPATIENT)
Dept: GASTROENTEROLOGY | Facility: CLINIC | Age: 50
End: 2020-07-01
Payer: MEDICARE

## 2020-07-01 ENCOUNTER — CLINICAL SUPPORT (OUTPATIENT)
Dept: REHABILITATION | Facility: HOSPITAL | Age: 50
End: 2020-07-01
Attending: ORTHOPAEDIC SURGERY
Payer: MEDICARE

## 2020-07-01 VITALS
WEIGHT: 178.81 LBS | HEART RATE: 67 BPM | BODY MASS INDEX: 32.91 KG/M2 | DIASTOLIC BLOOD PRESSURE: 89 MMHG | HEIGHT: 62 IN | SYSTOLIC BLOOD PRESSURE: 151 MMHG

## 2020-07-01 DIAGNOSIS — G89.29 CHRONIC PAIN OF RIGHT KNEE: ICD-10-CM

## 2020-07-01 DIAGNOSIS — K31.84 GASTROPARESIS: ICD-10-CM

## 2020-07-01 DIAGNOSIS — M25.661 DECREASED ROM OF RIGHT KNEE: ICD-10-CM

## 2020-07-01 DIAGNOSIS — K31.84 GASTROPARESIS: Primary | ICD-10-CM

## 2020-07-01 DIAGNOSIS — M25.561 CHRONIC PAIN OF RIGHT KNEE: ICD-10-CM

## 2020-07-01 DIAGNOSIS — K58.9 IRRITABLE BOWEL SYNDROME WITHOUT DIARRHEA: ICD-10-CM

## 2020-07-01 DIAGNOSIS — D50.0 IRON DEFICIENCY ANEMIA DUE TO CHRONIC BLOOD LOSS: ICD-10-CM

## 2020-07-01 DIAGNOSIS — R29.898 WEAKNESS OF RIGHT LOWER EXTREMITY: ICD-10-CM

## 2020-07-01 PROCEDURE — 3077F PR MOST RECENT SYSTOLIC BLOOD PRESSURE >= 140 MM HG: ICD-10-PCS | Mod: CPTII,S$GLB,, | Performed by: INTERNAL MEDICINE

## 2020-07-01 PROCEDURE — 97110 THERAPEUTIC EXERCISES: CPT | Mod: PN

## 2020-07-01 PROCEDURE — 3079F PR MOST RECENT DIASTOLIC BLOOD PRESSURE 80-89 MM HG: ICD-10-PCS | Mod: CPTII,S$GLB,, | Performed by: INTERNAL MEDICINE

## 2020-07-01 PROCEDURE — 99214 OFFICE O/P EST MOD 30 MIN: CPT | Mod: S$GLB,,, | Performed by: INTERNAL MEDICINE

## 2020-07-01 PROCEDURE — 3008F PR BODY MASS INDEX (BMI) DOCUMENTED: ICD-10-PCS | Mod: CPTII,S$GLB,, | Performed by: INTERNAL MEDICINE

## 2020-07-01 PROCEDURE — 99999 PR PBB SHADOW E&M-EST. PATIENT-LVL III: CPT | Mod: PBBFAC,,, | Performed by: INTERNAL MEDICINE

## 2020-07-01 PROCEDURE — 99214 PR OFFICE/OUTPT VISIT, EST, LEVL IV, 30-39 MIN: ICD-10-PCS | Mod: S$GLB,,, | Performed by: INTERNAL MEDICINE

## 2020-07-01 PROCEDURE — 3077F SYST BP >= 140 MM HG: CPT | Mod: CPTII,S$GLB,, | Performed by: INTERNAL MEDICINE

## 2020-07-01 PROCEDURE — 3079F DIAST BP 80-89 MM HG: CPT | Mod: CPTII,S$GLB,, | Performed by: INTERNAL MEDICINE

## 2020-07-01 PROCEDURE — 99999 PR PBB SHADOW E&M-EST. PATIENT-LVL III: ICD-10-PCS | Mod: PBBFAC,,, | Performed by: INTERNAL MEDICINE

## 2020-07-01 PROCEDURE — 3008F BODY MASS INDEX DOCD: CPT | Mod: CPTII,S$GLB,, | Performed by: INTERNAL MEDICINE

## 2020-07-01 RX ORDER — SUCRALFATE 1 G/1
1 TABLET ORAL 4 TIMES DAILY
Qty: 360 TABLET | Refills: 3 | Status: SHIPPED | OUTPATIENT
Start: 2020-07-01 | End: 2021-03-18

## 2020-07-01 RX ORDER — ONDANSETRON 4 MG/1
4 TABLET, FILM COATED ORAL EVERY 6 HOURS
Qty: 30 TABLET | Refills: 3 | OUTPATIENT
Start: 2020-07-01 | End: 2022-07-08

## 2020-07-01 RX ORDER — ONDANSETRON 4 MG/1
4 TABLET, FILM COATED ORAL EVERY 6 HOURS
Qty: 30 TABLET | Refills: 3 | Status: SHIPPED | OUTPATIENT
Start: 2020-07-01 | End: 2020-07-01 | Stop reason: SDUPTHER

## 2020-07-01 RX ORDER — NICOTINE POLACRILEX 2 MG
GUM BUCCAL
COMMUNITY
End: 2021-11-17

## 2020-07-01 NOTE — PROGRESS NOTES
Subjective:       Patient ID: Giuliana Rodas is a 50 y.o. female.    Chief Complaint: Bloated    HPI  Review of Systems   Constitutional: Positive for activity change and fatigue.         Objective:      Physical Exam  Abdominal:      General: Abdomen is flat. Bowel sounds are normal. There is no distension.      Palpations: Abdomen is soft.      Tenderness: There is no abdominal tenderness.         Assessment:       1. Gastroparesis    2. Irritable bowel syndrome without diarrhea    3. Iron deficiency anemia due to chronic blood loss        Plan:       This is a follow-up session for this 50-year-old lady.  I saw her in January as a 4th opinion and had chance to review her extensive workup.  She has iron deficiency anemia from chronic GI bleeding.  There is a diagnosis of gastroparesis.  Chronic abdominal pain.  Since that appointment I did a push enteroscopy to evaluate some of the jejunal erosion she has had in the past.  The enteroscopy was normal.  A video capsule exam had demonstrated a duodenal polyp that was not seen.  Follow-up hemoglobin was excellent thought 14.        She comes in today with some generalized abdominal discomfort.  She often wakes at night with the urge to urinate but then has this generalized to upper abdominal discomfort.  It is associated with vague nausea.  This is possibly with some fullness.  It is different from what she has had in the past.  She does not notices much during the day.  Her bowel movements are actually better.  She is not having diarrhea.  She has stop taking AcipHex but nonetheless is doing well in terms of her reflux.    Assessment  1.  Gastroparesis.  Two years ago she had a borderline gastric emptying scan with 12% retention at 4:00 hr.  I would like to repeat that.  I do not know if her nocturnal symptoms are due to gastroparesis.  She has been eating larger meals at night.  I think she would benefit from seeing the dietitian.  2.  IBS.  I think a lot of her  symptoms are due to IBS.  However her bowel movements are fairly normal at this time.  3.  Iron deficiency.  This has now normalized.  If her hemoglobin is good today, and I think we should check a every 3 months, I think she can stop the iron    Recommendation 1.  Refill Carafate  2.  Refill Zofran   3.  I will hold off on the Creon refill new not  4.  See the dietitian  5.  CBC today  6.  Try chewable multivitamins    25 min appointment greater half time face-to-face counseling   7.  Repeat gastric emptying scan

## 2020-07-01 NOTE — PROGRESS NOTES
"  Physical Therapy Daily Treatment Note     Name: Giuliana LEIVA Adrianna  Clinic Number: [id number]    Therapy Diagnosis:   Encounter Diagnoses   Name Primary?    Chronic pain of right knee     Weakness of right lower extremity     Decreased ROM of right knee      Physician: Will Arce MD    Visit Date: 7/1/2020    Physician Orders: PT Eval and Treat   Medical Diagnosis from Referral: S/P right knee arthroscopy  Evaluation Date: 6/17/2020  Authorization Period Expiration: 06/16/2021  Plan of Care Expiration: 7/29/2020  Visit # / Visits authorized: 5/12     Time In: 1:44pm  Time Out: 2:28  Total Billable Time:  minutes     Precautions: Standard      Subjective     Pt reports: Feeling stiff today from running errands  She was compliant with home exercise program.  Response to previous treatment: good  Functional change: ongoing    Pain: 0/10   Location: right knee     Objective        Giuliana received therapeutic exercises to develop strength, endurance, ROM and flexibility for 40 minutes including:    NuStep x 5 mins lvl2  Gastroc stretch on slantboard 30"x2  +HS stretch on step 30"X3  Step ups 6in 2x10      Hip Add x20 with ball   Hip abd with GTB x30  Bridges 2x10  SLR Flexion 2x15  SL hip abd x30  Heel prop with 3# x 2 minutes    Shuttle BLE squat 1 black 1 red cord 2X10   SL squat 1 red cord 2x10  SLS + Airex 30"x3   +Standing hip ext RTB  2x10  +Standing hip abd RTB 2X10  +Heel Raises x20  +Lateral step downs 4" 2x10      NP:  LAQ  Quad sets x15   TKE red TB 2x15  Seated HS stretch 2 x 30"  SL LAQ x30  Clams YTB x30  SAQ 1x10 p!      Giuliana received cold pack for 00 minutes to R knee .      Home Exercises Provided and Patient Education Provided     Education provided:   - Cont HEP    Written Home Exercises Provided: Patient instructed to cont prior HEP.  Exercises were reviewed and Giuliana was able to demonstrate them prior to the end of the session.  Giuliana demonstrated good  understanding of the education provided. "     See EMR under Patient Instructions for exercises provided prior visit.    Assessment     Pt tolerated today's session well. No significant increase in pain this date during treatment session. Lateral step downs introduced to promote eccentric quad activation. Pt tolerated addition of standing hip extension and abduction to improve hip strength and stability with movement. Pt appropriately fatigued at end of treatment session.   Giuliana is progressing well towards her goals.   Pt prognosis is Fair.     Pt will continue to benefit from skilled outpatient physical therapy to address the deficits listed in the problem list box on initial evaluation, provide pt/family education and to maximize pt's level of independence in the home and community environment.     Pt's spiritual, cultural and educational needs considered and pt agreeable to plan of care and goals.     Anticipated barriers to physical therapy: none    Short Term GOALS: 3 weeks. Pt agrees with goals set.  1. Patient demonstrates independence with HEP.   Progressing  2. Patient demonstrates independence with Postural Awareness.  Progressing  3. Patient demonstrates independence with body mechanics. Progressing  4. Patient will report pain of <5/10 at worst, on 0-10 pain scale, with all activity Progressing     Long Term GOALS: 6 weeks. Pt agrees with goals set.  1. Patient demonstrates increased right knee extension  to 0 degress to improve tolerance to functional activities pain free. Progressing  2. Patient demonstrates increased strength BLE's to 4+/5 or greater to improve tolerance to functional activities pain free. Progressing  3. Patient demonstrates improved overall function per FOTO Knee Survey to 37% Limitation or less. Progressing  4. Patient will report pain of 1/10 at worst, on 0-10 pain scale, with all activity. Progressing  5. Patient will improve SLS >30 sec in RLE to return to functional activities. Progressing      Plan     Plan of care  Certification: 6/17/2020 to 7/29/2020.    Continue to strengthen RLE with CKC, improve balance, and AROM.      Brissa Gayle, PT

## 2020-07-06 ENCOUNTER — CLINICAL SUPPORT (OUTPATIENT)
Dept: REHABILITATION | Facility: HOSPITAL | Age: 50
End: 2020-07-06
Attending: ORTHOPAEDIC SURGERY
Payer: MEDICARE

## 2020-07-06 DIAGNOSIS — M25.561 CHRONIC PAIN OF RIGHT KNEE: ICD-10-CM

## 2020-07-06 DIAGNOSIS — M25.661 DECREASED ROM OF RIGHT KNEE: ICD-10-CM

## 2020-07-06 DIAGNOSIS — G89.29 CHRONIC PAIN OF RIGHT KNEE: ICD-10-CM

## 2020-07-06 DIAGNOSIS — R29.898 WEAKNESS OF RIGHT LOWER EXTREMITY: ICD-10-CM

## 2020-07-06 PROCEDURE — 97110 THERAPEUTIC EXERCISES: CPT | Mod: PN

## 2020-07-06 NOTE — PROGRESS NOTES
"  Physical Therapy Daily Treatment Note     Name: Giuliana Rodas  Clinic Number: [id number]    Therapy Diagnosis:   Encounter Diagnoses   Name Primary?    Chronic pain of right knee     Weakness of right lower extremity     Decreased ROM of right knee      Physician: Will Arce MD    Visit Date: 7/6/2020    Physician Orders: PT Eval and Treat   Medical Diagnosis from Referral: S/P right knee arthroscopy  Evaluation Date: 6/17/2020  Authorization Period Expiration: 06/16/2021  Plan of Care Expiration: 7/29/2020  Visit # / Visits authorized: 6/12     Time In: 1:45pm  Time Out: 2:30pm  Total Billable Time: 45 minutes     Precautions: Standard      Subjective     Pt reports: Pain felt earlier today after getting up from sitting for long period of time at work  She was compliant with home exercise program.  Response to previous treatment: good  Functional change: ongoing    Pain: 3/10   Location: right knee     Objective     FOTO 7/6/2020: 39% limitation    Giuliana received therapeutic exercises to develop strength, endurance, ROM and flexibility for 40 minutes including:    +Upright Bike 5 min lvl 4.2  Gastroc stretch on slantboard 30"x3  HS stretch on step 30"X3  Step ups 6in 2x10  Heel Raises 2x15  Lateral step downs 4" 2x10    Standing hip ext RTB  2x10  Standing hip abd RTB 2X10  +Standing hip flex x10, pain   +Standing hip add 2x10  SLS + Airex 30"x3     Shuttle BLE squat 1 black 1 red cord 2X10   SL squat 1 red cord 2x10    Hip Add x20 with ball   Hip abd with GTB x30  Bridges 2x15  SLR Flexion 2x15  SL hip abd x30    NP:  Heel prop with 3# x 2 minutes  NuStep x 5 mins lvl2  LAQ  Quad sets x15   TKE red TB 2x15  Seated HS stretch 2 x 30"  SL LAQ x30  Clams YTB x30  SAQ 1x10 p!      Giuliana received cold pack for 00 minutes to R knee .      Home Exercises Provided and Patient Education Provided     Education provided:   - Cont HEP    Written Home Exercises Provided: Patient instructed to cont prior " HEP.  Exercises were reviewed and Giuliana was able to demonstrate them prior to the end of the session.  Giuliana demonstrated good  understanding of the education provided. GTB given this date (7/6/2020) to do supine hip abduction at home.    See EMR under Patient Instructions for exercises provided prior visit.    Assessment     Pt tolerated today's session well. Pt demonstrated improved ability to complete step ups without vaulting on LLE. Pt had increased pain and difficulty with standing flexion with RTB and exercise was stopped. However, she was able to tolerate addition of standing adduction with RTB. Pt had to stop during bridging exercise after 12 reps due to increase in indigestion with exercise. Pt appropriately fatigued at end of treatment session. FOTO 39% limitation this date.   Giuliana is progressing well towards her goals.   Pt prognosis is Fair.     Pt will continue to benefit from skilled outpatient physical therapy to address the deficits listed in the problem list box on initial evaluation, provide pt/family education and to maximize pt's level of independence in the home and community environment.     Pt's spiritual, cultural and educational needs considered and pt agreeable to plan of care and goals.     Anticipated barriers to physical therapy: none    Short Term GOALS: 3 weeks. Pt agrees with goals set.  1. Patient demonstrates independence with HEP.   MET 7/6/2020  2. Patient demonstrates independence with Postural Awareness.  Progressing  3. Patient demonstrates independence with body mechanics. Progressing  4. Patient will report pain of <5/10 at worst, on 0-10 pain scale, with all activity Progressing     Long Term GOALS: 6 weeks. Pt agrees with goals set.  1. Patient demonstrates increased right knee extension  to 0 degress to improve tolerance to functional activities pain free. Progressing  2. Patient demonstrates increased strength BLE's to 4+/5 or greater to improve tolerance to functional  activities pain free. Progressing  3. Patient demonstrates improved overall function per FOTO Knee Survey to 37% Limitation or less. Progressing  4. Patient will report pain of 1/10 at worst, on 0-10 pain scale, with all activity. Progressing  5. Patient will improve SLS >30 sec in RLE to return to functional activities. Progressing      Plan     Plan of care Certification: 6/17/2020 to 7/29/2020.    Continue to strengthen RLE with CKC, improve balance, and AROM.      Brissa Gayle, PT

## 2020-07-08 ENCOUNTER — HOSPITAL ENCOUNTER (OUTPATIENT)
Dept: RADIOLOGY | Facility: HOSPITAL | Age: 50
Discharge: HOME | End: 2020-07-08
Attending: INTERNAL MEDICINE
Payer: MEDICARE

## 2020-07-08 DIAGNOSIS — K31.84 GASTROPARESIS: ICD-10-CM

## 2020-07-08 PROCEDURE — 78264 NM GASTRIC EMPTYING: ICD-10-PCS | Mod: 26,,, | Performed by: RADIOLOGY

## 2020-07-08 PROCEDURE — 78264 GASTRIC EMPTYING IMG STUDY: CPT | Mod: TC

## 2020-07-08 PROCEDURE — 78264 GASTRIC EMPTYING IMG STUDY: CPT | Mod: 26,,, | Performed by: RADIOLOGY

## 2020-07-08 PROCEDURE — A9541 TC99M SULFUR COLLOID: HCPCS

## 2020-07-17 ENCOUNTER — DOCUMENTATION ONLY (OUTPATIENT)
Dept: REHABILITATION | Facility: HOSPITAL | Age: 50
End: 2020-07-17

## 2020-07-17 NOTE — PROGRESS NOTES
Missed Visit/Cancellation     Date: 07/17/2020      Canceled Number: 2  No Show Number: 1             Pt initially had visit scheduled today for 1415.  Pt with NS visit.  Pt's next scheduled appointment is 7/21 at 0915

## 2020-07-21 ENCOUNTER — CLINICAL SUPPORT (OUTPATIENT)
Dept: REHABILITATION | Facility: HOSPITAL | Age: 50
End: 2020-07-21
Attending: ORTHOPAEDIC SURGERY
Payer: MEDICARE

## 2020-07-21 DIAGNOSIS — R29.898 WEAKNESS OF RIGHT LOWER EXTREMITY: ICD-10-CM

## 2020-07-21 DIAGNOSIS — M25.561 CHRONIC PAIN OF RIGHT KNEE: ICD-10-CM

## 2020-07-21 DIAGNOSIS — G89.29 CHRONIC PAIN OF RIGHT KNEE: ICD-10-CM

## 2020-07-21 DIAGNOSIS — M25.661 DECREASED ROM OF RIGHT KNEE: ICD-10-CM

## 2020-07-21 PROCEDURE — 97110 THERAPEUTIC EXERCISES: CPT | Mod: PN

## 2020-07-21 NOTE — PROGRESS NOTES
"    Physical Therapy Daily Treatment Note     Name: Giuliana Rodas  Clinic Number: [id number]    Therapy Diagnosis:   Encounter Diagnoses   Name Primary?    Chronic pain of right knee     Weakness of right lower extremity     Decreased ROM of right knee      Physician: Will Arce MD    Visit Date: 7/21/2020    Physician Orders: PT Eval and Treat   Medical Diagnosis from Referral: S/P right knee arthroscopy  Evaluation Date: 6/17/2020  Authorization Period Expiration: 06/16/2021  Plan of Care Expiration: 7/29/2020  Visit # / Visits authorized: 7/12     Time In: 9:15am  Time Out: 10:00am  Total Billable Time: 45 minutes     Precautions: Standard      Subjective     Pt reports: "So-so" still feels weak and gives out from time to time. She will go to take a step with LLE and the RLE will give out on her.  She was compliant with home exercise program.  Response to previous treatment: good  Functional change: ongoing    Pain: 0/10   Location: right knee     Objective     FOTO 7/6/2020: 39% limitation    Giuliana received therapeutic exercises to develop strength, endurance, ROM and flexibility for 40 minutes including:    Upright Bike 5 min lvl 5.3  Gastroc stretch on slantboard 30"x3  HS stretch on step 30"X3    Heel Raises 2x15  Lateral step downs 4" 2x10    Standing hip ext RTB  3x10  Standing hip abd RTB 3X10  Standing hip flex RTB 2x10  Standing hip add 2x10   SLS + Airex 30"x3     Shuttle BLE squat 1 black 1 red cord 3X10   SL squat 1 black cord 2x10    Hip Add x20 with ball   Hip abd with GTB x30  Bridges 2x15  SLR Flexion 1# 3x10; pain with 2#  SL hip abd 1# x30    NP:  Step ups 6in 2x10  Heel prop with 3# x 2 minutes  NuStep x 5 mins lvl2  LAQ  Quad sets x15   TKE red TB 2x15  Seated HS stretch 2 x 30"  SL LAQ x30  Clams YTB x30  SAQ 1x10 p!      Giuliana received cold pack for 00 minutes to R knee .      Home Exercises Provided and Patient Education Provided     Education provided:   - Cont HEP    Written " "Home Exercises Provided: Patient instructed to cont prior HEP.  Exercises were reviewed and Giuliana was able to demonstrate them prior to the end of the session.  Giuliana demonstrated good  understanding of the education provided.   See EMR under Patient Instructions for exercises provided prior visit.    Assessment     Pt tolerated today's session well.  Pt able to tolerate resistance with standing hip flexion this date without adverse effects. Improved control with lateral step downs on 4" step. VCs to emphasize slow and controlled movement with SL squat on shuttle to help to facilitate eccentric quad control. Pt appropriately fatigued at end of treatment session.   Giuliana is progressing well towards her goals.   Pt prognosis is Fair.     Pt will continue to benefit from skilled outpatient physical therapy to address the deficits listed in the problem list box on initial evaluation, provide pt/family education and to maximize pt's level of independence in the home and community environment.     Pt's spiritual, cultural and educational needs considered and pt agreeable to plan of care and goals.     Anticipated barriers to physical therapy: none    Short Term GOALS: 3 weeks. Pt agrees with goals set.  1. Patient demonstrates independence with HEP.   MET 7/6/2020  2. Patient demonstrates independence with Postural Awareness.  Progressing  3. Patient demonstrates independence with body mechanics. Progressing  4. Patient will report pain of <5/10 at worst, on 0-10 pain scale, with all activity Progressing     Long Term GOALS: 6 weeks. Pt agrees with goals set.  1. Patient demonstrates increased right knee extension  to 0 degress to improve tolerance to functional activities pain free. Progressing  2. Patient demonstrates increased strength BLE's to 4+/5 or greater to improve tolerance to functional activities pain free. Progressing  3. Patient demonstrates improved overall function per FOTO Knee Survey to 37% Limitation or " less. Progressing  4. Patient will report pain of 1/10 at worst, on 0-10 pain scale, with all activity. Progressing  5. Patient will improve SLS >30 sec in RLE to return to functional activities. Progressing      Plan     Plan of care Certification: 6/17/2020 to 7/29/2020.    Continue to strengthen RLE with CKC, improve balance, and AROM.      Brissa Gayle, PT

## 2020-07-23 ENCOUNTER — CLINICAL SUPPORT (OUTPATIENT)
Dept: REHABILITATION | Facility: HOSPITAL | Age: 50
End: 2020-07-23
Attending: ORTHOPAEDIC SURGERY
Payer: MEDICARE

## 2020-07-23 DIAGNOSIS — M25.661 DECREASED ROM OF RIGHT KNEE: ICD-10-CM

## 2020-07-23 DIAGNOSIS — G89.29 CHRONIC PAIN OF RIGHT KNEE: ICD-10-CM

## 2020-07-23 DIAGNOSIS — M25.561 CHRONIC PAIN OF RIGHT KNEE: ICD-10-CM

## 2020-07-23 DIAGNOSIS — R29.898 WEAKNESS OF RIGHT LOWER EXTREMITY: ICD-10-CM

## 2020-07-23 PROCEDURE — 97110 THERAPEUTIC EXERCISES: CPT | Mod: PN

## 2020-07-23 NOTE — PROGRESS NOTES
"    Physical Therapy Daily Treatment Note     Name: Giuliana Rodas  Clinic Number: [id number]    Therapy Diagnosis:   Encounter Diagnoses   Name Primary?    Chronic pain of right knee     Weakness of right lower extremity     Decreased ROM of right knee      Physician: Will Arce MD    Visit Date: 7/23/2020    Physician Orders: PT Eval and Treat   Medical Diagnosis from Referral: S/P right knee arthroscopy  Evaluation Date: 6/17/2020  Authorization Period Expiration: 06/16/2021  Plan of Care Expiration: 7/29/2020  Visit # / Visits authorized: 8/12     Time In: 9:08am  Time Out: 9:46am  Total Billable Time: 38 minutes     Precautions: Standard      Subjective     Pt reports: Feels good today. No soreness after last visit. Still has a little bit of "giving out" but not nearly as bad. Pain level is around 2-3 with everyday activity. She is able to get down on her knees with decrease pain but takes a little longer to get back up.  She was compliant with home exercise program.  Response to previous treatment: good  Functional change: ongoing    Pain: 0/10   Location: right knee     Objective     FOTO 7/6/2020: 39% limitation    Giuliana received therapeutic exercises to develop strength, endurance, ROM and flexibility for 40 minutes including:    Upright Bike 5 min lvl 5.3 Seat 2  Gastroc stretch on slantboard 30"x3  HS stretch on step 30"X3    Heel Raises 2x15  Lateral step downs 6" 2110    Standing hip ext RTB  3x10  Standing hip abd RTB 3X10  Standing hip flex RTB 3x10  Standing hip add 3x10 RTB  SLS + Airex 30"x3     Shuttle BLE squat 1 black 1 red cord 3X10   SL squat 1 black cord 2x10    Hip Add x20 with ball   Hip abd with GTB x30  Bridges 2x15  SLR Flexion 1# 2x10  SL hip abd 1# x30    NP:  Step ups 6in 2x10  Heel prop with 3# x 2 minutes  NuStep x 5 mins lvl2  LAQ  Quad sets x15   TKE red TB 2x15  Seated HS stretch 2 x 30"  SL LAQ x30  Clams YTB x30  SAQ 1x10 p!      Giuliana received cold pack for 00 minutes " "to R knee .      Home Exercises Provided and Patient Education Provided     Education provided:   - Cont HEP    Written Home Exercises Provided: Patient instructed to cont prior HEP.  Exercises were reviewed and Giuliana was able to demonstrate them prior to the end of the session.  Giuliana demonstrated good  understanding of the education provided.   See EMR under Patient Instructions for exercises provided prior visit.    Assessment     Pt tolerated today's session well.  Pt able to tolerate increase in step height to 6" with lateral step down and good eccentric control for 10 reps without adverse effects. Increased difficulty with SLS on Airex pad this date 2/2 to being done at end of treatment session and pt fatigue. Continues to fatigue easily with SLR exercise and cannot tolerate more than 20 reps without pain. Pt appropriately fatigued at end of treatment session.   Giuliana is progressing well towards her goals.   Pt prognosis is Fair.     Pt will continue to benefit from skilled outpatient physical therapy to address the deficits listed in the problem list box on initial evaluation, provide pt/family education and to maximize pt's level of independence in the home and community environment.     Pt's spiritual, cultural and educational needs considered and pt agreeable to plan of care and goals.     Anticipated barriers to physical therapy: none    Short Term GOALS: 3 weeks. Pt agrees with goals set.  1. Patient demonstrates independence with HEP.   MET 7/6/2020  2. Patient demonstrates independence with Postural Awareness.  MET 7/23/2020  3. Patient demonstrates independence with body mechanics. Progressing  4. Patient will report pain of <5/10 at worst, on 0-10 pain scale, with all activity MET 7/23/2020     Long Term GOALS: 6 weeks. Pt agrees with goals set.  1. Patient demonstrates increased right knee extension  to 0 degress to improve tolerance to functional activities pain free. Progressing  2. Patient " demonstrates increased strength BLE's to 4+/5 or greater to improve tolerance to functional activities pain free. Progressing  3. Patient demonstrates improved overall function per FOTO Knee Survey to 37% Limitation or less. Progressing  4. Patient will report pain of 1/10 at worst, on 0-10 pain scale, with all activity. Progressing  5. Patient will improve SLS >30 sec in RLE to return to functional activities. Progressing      Plan     Plan of care Certification: 6/17/2020 to 7/29/2020.    Continue to strengthen RLE with eccentric and CKC, improve balance, and AROM.      Brissa Gayle, PT

## 2020-07-24 ENCOUNTER — PATIENT OUTREACH (OUTPATIENT)
Dept: ADMINISTRATIVE | Facility: OTHER | Age: 50
End: 2020-07-24

## 2020-07-27 ENCOUNTER — OFFICE VISIT (OUTPATIENT)
Dept: GASTROENTEROLOGY | Facility: CLINIC | Age: 50
End: 2020-07-27
Payer: MEDICARE

## 2020-07-27 ENCOUNTER — LAB VISIT (OUTPATIENT)
Dept: LAB | Facility: HOSPITAL | Age: 50
End: 2020-07-27
Attending: INTERNAL MEDICINE
Payer: MEDICARE

## 2020-07-27 VITALS
BODY MASS INDEX: 33.23 KG/M2 | WEIGHT: 180.56 LBS | HEART RATE: 63 BPM | HEIGHT: 62 IN | DIASTOLIC BLOOD PRESSURE: 82 MMHG | SYSTOLIC BLOOD PRESSURE: 130 MMHG

## 2020-07-27 DIAGNOSIS — K58.9 IRRITABLE BOWEL SYNDROME, UNSPECIFIED TYPE: ICD-10-CM

## 2020-07-27 DIAGNOSIS — K31.84 GASTROPARESIS: Primary | ICD-10-CM

## 2020-07-27 DIAGNOSIS — D64.9 ANEMIA, UNSPECIFIED TYPE: ICD-10-CM

## 2020-07-27 DIAGNOSIS — K31.84 GASTROPARESIS: ICD-10-CM

## 2020-07-27 LAB
BASOPHILS # BLD AUTO: 0.03 K/UL (ref 0–0.2)
BASOPHILS NFR BLD: 0.6 % (ref 0–1.9)
DIFFERENTIAL METHOD: NORMAL
EOSINOPHIL # BLD AUTO: 0.2 K/UL (ref 0–0.5)
EOSINOPHIL NFR BLD: 3.6 % (ref 0–8)
ERYTHROCYTE [DISTWIDTH] IN BLOOD BY AUTOMATED COUNT: 12.1 % (ref 11.5–14.5)
HCT VFR BLD AUTO: 44.6 % (ref 37–48.5)
HGB BLD-MCNC: 15.2 G/DL (ref 12–16)
IGA SERPL-MCNC: 341 MG/DL (ref 40–350)
IGG SERPL-MCNC: 1340 MG/DL (ref 650–1600)
IGM SERPL-MCNC: 96 MG/DL (ref 50–300)
IMM GRANULOCYTES # BLD AUTO: 0.01 K/UL (ref 0–0.04)
IMM GRANULOCYTES NFR BLD AUTO: 0.2 % (ref 0–0.5)
IRON SERPL-MCNC: 87 UG/DL (ref 30–160)
LYMPHOCYTES # BLD AUTO: 2 K/UL (ref 1–4.8)
LYMPHOCYTES NFR BLD: 39.6 % (ref 18–48)
MCH RBC QN AUTO: 29.5 PG (ref 27–31)
MCHC RBC AUTO-ENTMCNC: 34.1 G/DL (ref 32–36)
MCV RBC AUTO: 86 FL (ref 82–98)
MONOCYTES # BLD AUTO: 0.4 K/UL (ref 0.3–1)
MONOCYTES NFR BLD: 8.7 % (ref 4–15)
NEUTROPHILS # BLD AUTO: 2.3 K/UL (ref 1.8–7.7)
NEUTROPHILS NFR BLD: 47.3 % (ref 38–73)
NRBC BLD-RTO: 0 /100 WBC
PLATELET # BLD AUTO: 152 K/UL (ref 150–350)
PMV BLD AUTO: 9.2 FL (ref 9.2–12.9)
RBC # BLD AUTO: 5.16 M/UL (ref 4–5.4)
SATURATED IRON: 20 % (ref 20–50)
TOTAL IRON BINDING CAPACITY: 434 UG/DL (ref 250–450)
TRANSFERRIN SERPL-MCNC: 293 MG/DL (ref 200–375)
WBC # BLD AUTO: 4.95 K/UL (ref 3.9–12.7)

## 2020-07-27 PROCEDURE — 99999 PR PBB SHADOW E&M-EST. PATIENT-LVL V: ICD-10-PCS | Mod: PBBFAC,,, | Performed by: INTERNAL MEDICINE

## 2020-07-27 PROCEDURE — 3075F SYST BP GE 130 - 139MM HG: CPT | Mod: CPTII,S$GLB,, | Performed by: INTERNAL MEDICINE

## 2020-07-27 PROCEDURE — 3008F BODY MASS INDEX DOCD: CPT | Mod: CPTII,S$GLB,, | Performed by: INTERNAL MEDICINE

## 2020-07-27 PROCEDURE — 3008F PR BODY MASS INDEX (BMI) DOCUMENTED: ICD-10-PCS | Mod: CPTII,S$GLB,, | Performed by: INTERNAL MEDICINE

## 2020-07-27 PROCEDURE — 83540 ASSAY OF IRON: CPT

## 2020-07-27 PROCEDURE — 3079F PR MOST RECENT DIASTOLIC BLOOD PRESSURE 80-89 MM HG: ICD-10-PCS | Mod: CPTII,S$GLB,, | Performed by: INTERNAL MEDICINE

## 2020-07-27 PROCEDURE — 99214 PR OFFICE/OUTPT VISIT, EST, LEVL IV, 30-39 MIN: ICD-10-PCS | Mod: S$GLB,,, | Performed by: INTERNAL MEDICINE

## 2020-07-27 PROCEDURE — 3079F DIAST BP 80-89 MM HG: CPT | Mod: CPTII,S$GLB,, | Performed by: INTERNAL MEDICINE

## 2020-07-27 PROCEDURE — 99999 PR PBB SHADOW E&M-EST. PATIENT-LVL V: CPT | Mod: PBBFAC,,, | Performed by: INTERNAL MEDICINE

## 2020-07-27 PROCEDURE — 99214 OFFICE O/P EST MOD 30 MIN: CPT | Mod: S$GLB,,, | Performed by: INTERNAL MEDICINE

## 2020-07-27 PROCEDURE — 82784 ASSAY IGA/IGD/IGG/IGM EACH: CPT

## 2020-07-27 PROCEDURE — 36415 COLL VENOUS BLD VENIPUNCTURE: CPT

## 2020-07-27 PROCEDURE — 85025 COMPLETE CBC W/AUTO DIFF WBC: CPT

## 2020-07-27 PROCEDURE — 83516 IMMUNOASSAY NONANTIBODY: CPT

## 2020-07-27 PROCEDURE — 3075F PR MOST RECENT SYSTOLIC BLOOD PRESS GE 130-139MM HG: ICD-10-PCS | Mod: CPTII,S$GLB,, | Performed by: INTERNAL MEDICINE

## 2020-07-27 NOTE — PROGRESS NOTES
Subjective:       Patient ID: Giuliana Rodas is a 50 y.o. female.    Chief Complaint: GI Problem    HPI  Review of Systems   Constitutional: Positive for fatigue.   Neurological: Positive for headaches.         Objective:      Physical Exam  Abdominal:      General: Abdomen is flat. Bowel sounds are normal. There is no distension.      Palpations: Abdomen is soft. There is no hepatomegaly or mass.      Tenderness: There is generalized abdominal tenderness.         Assessment:       1. Gastroparesis    2. Irritable bowel syndrome, unspecified type    3. Anemia, unspecified type        Plan:         here for a follow-up visit.  50-year-old lady that since her last visit I ordered a gastric emptying scan.  It was mildly abnormal similar to her result before.    She continues with the La the same symptoms that she has had in the past.  Curiously heartburn is not a major factor right now.  And she is not even taking any PPI right now.  She just on Carafate.  Heartburn seems to be under control.  But she complains of generalized abdominal bloating and discomfort.  She describes early satiety and postprandial fullness.  She  complains of nausea but no vomiting.  Her bowels are actually doing better 2.  She has had both diarrhea and constipation in the past, now is only mild constipation.  She complains of constant soreness in her abdomen.    She does not feel like getting off Creon has made any worsening in her symptoms.  She does remember being on Reglan remotely and thinks that may have helped but she want to avoid because of potential side effects.    On review of systems he has a number of things which are concerning to her which include dry itchy scalp, I rash in her axilla, joint pain, fatigue, and headaches.  She denies any hives or angioedema.  But she is concerned about potential allergy.    Assessment  1.  Gastroparesis.  She has had 2 mildly abnormal gastric emptying scans.  I think she probably does have mild  idiopathic gastroparesis.  I would like to try treating this with diet 1st rather than Reglan.  I am not sure with all of her other medications where she be even eligible for domperidone.  She has  some concerns about celiac disease or gluten allergy.  I do not see the tTG checked, swallow do that now.  She did have jejunal biopsies that were normal previously which goes against celiac disease.  Certainly I can offer an allergy consult if she wishes.  2.  IBS.  She does not strictly meet the criteria for IBS, but I think that her chronic abdominal soreness is due to at least functional dyspepsia.  3.  Anemia.  In the past it has been documented that she has iron deficiency.  We have also documented that normalized.  I would like to recheck that now.    Recommendation 1.  Labs today 2.  Follow up with dietitian probably just doing the gastroparesis diet, assuming celiac labs are negative    25 min appointment greater half time face-to-face counseling

## 2020-07-28 ENCOUNTER — CLINICAL SUPPORT (OUTPATIENT)
Dept: REHABILITATION | Facility: HOSPITAL | Age: 50
End: 2020-07-28
Attending: ORTHOPAEDIC SURGERY
Payer: MEDICARE

## 2020-07-28 DIAGNOSIS — R29.898 WEAKNESS OF RIGHT LOWER EXTREMITY: ICD-10-CM

## 2020-07-28 DIAGNOSIS — M25.561 CHRONIC PAIN OF RIGHT KNEE: ICD-10-CM

## 2020-07-28 DIAGNOSIS — G89.29 CHRONIC PAIN OF RIGHT KNEE: ICD-10-CM

## 2020-07-28 DIAGNOSIS — M25.661 DECREASED ROM OF RIGHT KNEE: ICD-10-CM

## 2020-07-28 PROCEDURE — 97110 THERAPEUTIC EXERCISES: CPT | Mod: PN,CQ

## 2020-07-28 NOTE — PROGRESS NOTES
"    Physical Therapy Daily Treatment Note     Name: Giuliana LEIVA Adrianna  Clinic Number: [id number]    Therapy Diagnosis:   Encounter Diagnoses   Name Primary?    Chronic pain of right knee     Weakness of right lower extremity     Decreased ROM of right knee      Physician: Will Arce MD    Visit Date: 7/28/2020    Physician Orders: PT Eval and Treat   Medical Diagnosis from Referral: S/P right knee arthroscopy  Evaluation Date: 6/17/2020  Authorization Period Expiration: 06/16/2021  Plan of Care Expiration: 7/29/2020  Visit # / Visits authorized: 9/12  (FOTO next session)     Time In: 9:15  Time Out: 0957  Total Time: 42 minute  Total Billable Time: 38 minutes     Precautions: Standard      Subjective     Pt reports: Feels good today. No complaints of pain, just stiffness.   She was compliant with home exercise program.  Response to previous treatment: good  Functional change: ongoing    Pain: 0/10   Location: right knee     Objective     FOTO Next session- unable to perform today 2/2 patient having to leave for work.    Giuliana received therapeutic exercises to develop strength, endurance, ROM and flexibility for 42 minutes including:    Upright Bike 5 min lvl 5.3 Seat 2  Gastroc stretch on slantboard 30"x3  HS stretch on step 30"X3    Heel Raises 2x15  Lateral step downs 6" 2x10  +Fwd Step downs (4" step next visit)    Standing hip ext RTB  3x10  Standing hip abd RTB 3X10  Standing hip flex RTB 3x10  Standing hip add RTB 3x10  SLS + Airex 30"x3     Shuttle BLE squat 1 black 1 red cord 3X10   SL squat 1 black cord 3x10    Bridges 2x15  SLR Flexion 1# 2x10  SL hip abd 1# x30    NP:  Step ups 6in 2x10  Heel prop with 3# x 2 minutes  NuStep x 5 mins lvl2  LAQ  Quad sets x15   TKE red TB 2x15  Seated HS stretch 2 x 30"  SL LAQ x30  Clams YTB x30  SAQ 1x10 p!  Hip Add x20 with ball   Hip abd with GTB x30      Giuliana received cold pack for 00 minutes to R knee .      Home Exercises Provided and Patient Education " Provided     Education provided:   - Cont HEP    Written Home Exercises Provided: Patient instructed to cont prior HEP.  Exercises were reviewed and Giuliana was able to demonstrate them prior to the end of the session.  Giuliana demonstrated good  understanding of the education provided.   See EMR under Patient Instructions for exercises provided prior visit.    Assessment     Pt tolerated today's session well. Continues to display decreased ankle strategy with SLB on blue foam. Improved tolerance with SLRs today. However, still displays early muscle fatigue. Overall, RLE strength, ROM and pain has improved since initial evaluation. Responds positively to current treatment plan.     Giuliana is progressing well towards her goals.   Pt prognosis is Fair.     Pt will continue to benefit from skilled outpatient physical therapy to address the deficits listed in the problem list box on initial evaluation, provide pt/family education and to maximize pt's level of independence in the home and community environment.     Pt's spiritual, cultural and educational needs considered and pt agreeable to plan of care and goals.     Anticipated barriers to physical therapy: none    Short Term GOALS: 3 weeks. Pt agrees with goals set.  1. Patient demonstrates independence with HEP.   MET 7/6/2020  2. Patient demonstrates independence with Postural Awareness.  MET 7/23/2020  3. Patient demonstrates independence with body mechanics. Progressing  4. Patient will report pain of <5/10 at worst, on 0-10 pain scale, with all activity MET 7/23/2020     Long Term GOALS: 6 weeks. Pt agrees with goals set.  1. Patient demonstrates increased right knee extension  to 0 degress to improve tolerance to functional activities pain free. Progressing  2. Patient demonstrates increased strength BLE's to 4+/5 or greater to improve tolerance to functional activities pain free. Progressing  3. Patient demonstrates improved overall function per FOTO Knee Survey to  37% Limitation or less. Progressing  4. Patient will report pain of 1/10 at worst, on 0-10 pain scale, with all activity. Progressing  5. Patient will improve SLS >30 sec in RLE to return to functional activities. Progressing      Plan     Plan of care Certification: 6/17/2020 to 7/29/2020.    Continue to strengthen RLE with eccentric and CKC, improve balance, and AROM.      Opal Douglass, PTA

## 2020-07-30 ENCOUNTER — CLINICAL SUPPORT (OUTPATIENT)
Dept: REHABILITATION | Facility: HOSPITAL | Age: 50
End: 2020-07-30
Attending: ORTHOPAEDIC SURGERY
Payer: MEDICARE

## 2020-07-30 DIAGNOSIS — G89.29 CHRONIC PAIN OF RIGHT KNEE: ICD-10-CM

## 2020-07-30 DIAGNOSIS — M25.661 DECREASED ROM OF RIGHT KNEE: ICD-10-CM

## 2020-07-30 DIAGNOSIS — R29.898 WEAKNESS OF RIGHT LOWER EXTREMITY: ICD-10-CM

## 2020-07-30 DIAGNOSIS — M25.561 CHRONIC PAIN OF RIGHT KNEE: ICD-10-CM

## 2020-07-30 LAB — TTG IGA SER-ACNC: 15 UNITS

## 2020-07-30 PROCEDURE — 97110 THERAPEUTIC EXERCISES: CPT | Mod: PN

## 2020-07-30 NOTE — PROGRESS NOTES
"  Physical Therapy Discharge      Name: Giuliana Rodas  Clinic Number: [id number]    Therapy Diagnosis:   Encounter Diagnoses   Name Primary?    Chronic pain of right knee     Weakness of right lower extremity     Decreased ROM of right knee      Physician: Will Arce MD    Visit Date: 7/30/2020    Physician Orders: PT Eval and Treat   Medical Diagnosis from Referral: S/P right knee arthroscopy  Evaluation Date: 6/17/2020  Authorization Period Expiration: 06/16/2021  Plan of Care Expiration: 7/29/2020  Visit # / Visits authorized: 10/12       Time In: 9:10 (10 minutes late)  Time Out: 9:48  Total Time: 38 minute  Total Billable Time: 38 minutes     Precautions: Standard      Subjective     Pt reports: Feels good today. No complaints of pain, just stiffness.   She was compliant with home exercise program.  Response to previous treatment: good  Functional change: ongoing    Pain: 0/10   Location: right knee     Objective     7/30/2020:  Range of Motion: Knee    Left Right   Flexion: 135 135   Extension 0 0      Strength: Knee    Left Right   Quadriceps 5/5 4+/5    Hamstrings 5/5 5/5         Strength: Hip    Left Right   Iliopsoas 5/5 5/5   Glute Med 5/5 5/5   Ankle PF 5/5 5/5   Ankle DF 5/5 5/5      CMS Impairment/Limitation/Restriction for FOTO Knee Survey    Therapist reviewed FOTO scores for Giuliana Rodas on 7/30/2020.   FOTO documents entered into [employer] - see Media section.    Limitation Score: 39%     Giuliana received therapeutic exercises to develop strength, endurance, ROM and flexibility for 42 minutes including:    Upright Bike 5 min lvl 5.3 Seat 2  Gastroc stretch on slantboard 30"x3  HS stretch on step 30"X3    Heel Raises 2x15  Lateral step downs 6" 2x10  +Fwd Step downs 4" 2x10    Standing hip ext RTB  3x10  Standing hip abd RTB 3X10  Standing hip flex RTB 3x10  Standing hip add RTB 3x10  SLS + Airex 30"x3     Shuttle BLE squat 1 black 1 red cord 3X10   SL squat 1 black cord 3x10    Bridges " "2x15  SLR Flexion 1# 2x10  SL hip abd 1# x30    NP:  Step ups 6in 2x10  Heel prop with 3# x 2 minutes  NuStep x 5 mins lvl2  LAQ  Quad sets x15   TKE red TB 2x15  Seated HS stretch 2 x 30"  SL LAQ x30  Clams YTB x30  SAQ 1x10 p!  Hip Add x20 with ball   Hip abd with GTB x30      Giuliana received cold pack for 00 minutes to R knee .      Home Exercises Provided and Patient Education Provided     Education provided:   - Cont HEP    Written Home Exercises Provided: yes.  Exercises were reviewed and Giuliana was able to demonstrate them prior to the end of the session.  Giuliana demonstrated good  understanding of the education provided.   See EMR under Patient Instructions for exercises provided 7/30/2020.    Assessment     Giuliana was reassessed this date and discharged after meeting 5/5 STG and 4/5 LTG. She has demonstrated significant improvement with RLE strength and AROM. Pt has improved eccentric control with quad muscle and no longer experiences frequent weakness with functional tasks. Pt tolerated today's session well without adverse effects. Pt was able to complete all exercises with proper form and minimal cueing. She was given updated HEP to allow her to be independent with care at home. Pt educated on calling MD/PT with questions or concerns following discharge.    Giuliana is progressing well towards her goals.   Pt prognosis is Fair.   Pt's spiritual, cultural and educational needs considered and pt agreeable to plan of care and goals.     Anticipated barriers to physical therapy: none    Short Term GOALS: 3 weeks. Pt agrees with goals set.  1. Patient demonstrates independence with HEP.   MET 7/6/2020  2. Patient demonstrates independence with Postural Awareness.  MET 7/23/2020  3. Patient demonstrates independence with body mechanics. MET 7/30/2020  4. Patient will report pain of <5/10 at worst, on 0-10 pain scale, with all activity MET 7/23/2020     Long Term GOALS: 6 weeks. Pt agrees with goals set.  1. Patient " demonstrates increased right knee extension  to 0 degress to improve tolerance to functional activities pain free. MET 7/30/2020  2. Patient demonstrates increased strength BLE's to 4+/5 or greater to improve tolerance to functional activities pain free. MET 7/30/2020  3. Patient demonstrates improved overall function per FOTO Knee Survey to 37% Limitation or less. Not Met  4. Patient will report pain of 1/10 at worst, on 0-10 pain scale, with all activity. MET 7/30/2020  5. Patient will improve SLS >30 sec in RLE to return to functional activities. MET 7/30/2020      Plan     Plan of care Certification: 6/17/2020 to 7/29/2020.    Discharge from PT with independent HEP.      Brissa Gayle, PT

## 2020-08-10 ENCOUNTER — CLINICAL SUPPORT (OUTPATIENT)
Dept: GASTROENTEROLOGY | Facility: CLINIC | Age: 50
End: 2020-08-10
Payer: MEDICARE

## 2020-08-10 PROCEDURE — 99499 NO LOS: ICD-10-PCS | Mod: 95,,, | Performed by: DIETITIAN, REGISTERED

## 2020-08-10 PROCEDURE — 99499 UNLISTED E&M SERVICE: CPT | Mod: 95,,, | Performed by: DIETITIAN, REGISTERED

## 2020-08-17 ENCOUNTER — PATIENT OUTREACH (OUTPATIENT)
Dept: OTHER | Facility: OTHER | Age: 50
End: 2020-08-17

## 2020-08-17 NOTE — PROGRESS NOTES
[facility]: Health  Follow-Up    The history is provided by the patient.             Reason for review: Blood pressure not at goal        Topics Covered on Call: meal planning, physical activity and Diet          Diet-Change      Dietary Improvements:Patient reports she met with a dietician about her gastroparesis. Reports she is still monitoring sodium intake.    Dietary Indiscretions:        Intervention(s): meal planning, reading food labels and DASH diet      Physical Activity-Change      She exercises for 40 minutes per day     She added walking to Her physical activity routine.        Intervention(s): created new goal         Additional physical activity details: States she is starting to walk with her daughter around her neighborhood. States she hopes to get her bike fixed soon.      Medication Adherence-Medication Adherence not addressed.      Substance, Sleep, Stress-Not assessed      Continue current diet/physical activity routine.  Provided patient education.       Addressed any questions or concerns and patient has my contact information if needed prior to next outreach. Patient verbalizes understanding.      Explained the importance of self-monitoring and medication adherence. Encouraged the patient to communicate with their health  for lifestyle modifications to help improve or maintain a healthy lifestyle.                Topic    Lipid (Cholesterol) Test        Last 5 Patient Entered Readings                                      Current 30 Day Average: 143/84     Recent Readings 8/12/2020 8/3/2020 7/24/2020 7/14/2020 7/7/2020    SBP (mmHg) 142 144 144 131 139    DBP (mmHg) 83 80 90 78 83    Pulse 66 68 66 68 68

## 2020-08-22 NOTE — PROGRESS NOTES
Virtual Visit   The patient location is: work   The chief complaint leading to consultation is: Gastroparesis   Visit type: Virtual visit with synchronous audio and video  Total time spent with patient: 60 minutes   Each patient to whom he or she provides medical services by telemedicine is:  (1) informed of the relationship between the physician and patient and the respective role of any other health care provider with respect to management of the patient; and (2) notified that he or she may decline to receive medical services by telemedicine and may withdraw from such care at any time.    Notes: Patient lives with disabled  ( back ) and works in [employer] 3 days per week with flexible hours; 2 children ages 29 and 26; daughter is a PA     GI NUTRITIONAL ASSESSMENT  Referring Provider: Dr. Damon Rodas is a 50 y.o. female referred regarding the following problems:  Reason for Visit: Nutrition assessment and recommendations related to:   Chief Complaint   Patient presents with    Gastroparesis     Nausea and belching - Uses Milk based meal replacement in am     Gastroesophageal Reflux    Fibromyalgia     Feb 2000    Hyperglyceridemia    Migraines    RLS     11 years     S/P Pancreas Stent     Took creon for one year - did not note improvement in symptoms     Fat sensitivity s/p cholescystectomy     done to address nausea and vomiting     Iron deficiency    Interstitial Cystitis       Symptoms:   Symptoms : belching , nausea, fat intolerance   Patient Nutrition Goals :   Improvement in bowel function with better food choices    Improvement in Reflux Symptoms with better food choices and lifestyle modification   Improvement in bloating with better food choices    Medical/Surgical History  Pertinent Social History:  Social History     Tobacco Use    Smoking status: Never Smoker    Smokeless tobacco: Never Used   Substance Use Topics    Alcohol use: No     Frequency: Never  "    Drinks per session: 1 or 2     Binge frequency: Never    Drug use: No        Previous Medical History:  Past Medical History:   Diagnosis Date    Bile reflux gastritis     Breast cyst     Eczema     Fibrocystic breast     Fibromyalgia     Gastroparesis     dr. harden    GERD (gastroesophageal reflux disease)     Hyperglyceridemia     Hyperlipidemia     Hypertension     IC (interstitial cystitis)     dr. labadie    Psoriasis     Tension headache        Previous Surgical History:  Past Surgical History:   Procedure Laterality Date    BREAST BIOPSY Right     over 10 years ago/ milk duct    CHOLECYSTECTOMY      ESOPHAGOGASTRODUODENOSCOPY N/A 3/12/2020    Procedure: EGD (ESOPHAGOGASTRODUODENOSCOPY);  Surgeon: Damon Mcmahon MD;  Location: [address]);  Service: Endoscopy;  Laterality: N/A;  use pcf scope    HEMORRHOID SURGERY      HYSTERECTOMY      KNEE SURGERY      OOPHORECTOMY      one ov removed      Anthropometric Data Usual Weight: 160#  Increase of 7 # since 1/10   Estimated body mass index is 33.02 kg/m² as calculated from the following:    Height as of 7/27/20: 5' 2" (1.575 m).    Weight as of 7/27/20: 81.9 kg (180 lb 8.9 oz).  Weight History:  Wt Readings from Last 12 Encounters:   07/27/20 81.9 kg (180 lb 8.9 oz)   07/01/20 81.1 kg (178 lb 12.7 oz)   05/13/20 75.8 kg (167 lb 1.7 oz)   04/25/20 75.8 kg (167 lb)   03/12/20 75.8 kg (167 lb)   01/27/20 76 kg (167 lb 8.8 oz)   01/20/20 76.5 kg (168 lb 10.4 oz)   01/04/20 73.9 kg (163 lb)   12/02/19 76.8 kg (169 lb 5 oz)   11/04/19 77.7 kg (171 lb 4.8 oz)   10/23/19 78.2 kg (172 lb 6.4 oz)   10/21/19 79.3 kg (174 lb 13.2 oz)   ]  BMI: There is no height or weight on file to calculate BMI.  Calculated calorie needs : 1600 calories   Calculated Protein needs : 65 grams (.8 grams per kg )  Nutrition Focused Physical Findings:   Unable to assess via video    Meds  and Biochemical Data   Labs  7/27/2020 12:23 PM - [facility] " Interface    Component Value Flag Ref Range Units Status   IgG - Serum 1340   650 - 1600 mg/dL Final   Comment:   IgG Cord Blood Reference Range: 650-1600 mg/dL.   IgA 341   40 - 350 mg/dL Final   Comment:   IgA Cord Blood Reference Range: <5 mg/dL.   IgM 96   50 - 300 mg/dL Final   Comment:   IgM Cord Blood Reference Range: <25 mg/dL.   7/27/2020 12:23 PM - [facility] Interface    Component Value Flag Ref Range Units Status   Iron 87   30 - 160 ug/dL Final   Transferrin 293   200 - 375 mg/dL Final   TIBC 434   250 - 450 ug/dL Final   Saturated Iron 20   20 - 50 % Final         Lab Results   Component Value Date    WBC 4.95 07/27/2020    HGB 15.2 07/27/2020    HCT 44.6 07/27/2020    MCV 86 07/27/2020     07/27/2020     Lab Results   Component Value Date    FERRITIN <1 (L) 12/04/2019     Lab Results   Component Value Date     04/25/2020    K 3.6 04/25/2020     04/25/2020    CO2 25 04/25/2020    GLU 93 04/25/2020    BUN 15 04/25/2020    CREATININE 1.0 04/25/2020    CALCIUM 9.4 04/25/2020    PROT 7.3 04/25/2020    ALBUMIN 3.8 04/25/2020    BILITOT 0.2 04/25/2020    ALKPHOS 97 04/25/2020    AST 17 04/25/2020    ALT 13 04/25/2020     Lab Results   Component Value Date    TSH 2.434 09/25/2018     Lab Results   Component Value Date    CRP 22.8 (H) 04/05/2017     No results found for: PHOS  No results found for: PREALBUMIN  Cholesterol   Date Value Ref Range Status   11/29/2018 163 120 - 199 mg/dL Final     Comment:     The National Cholesterol Education Program (NCEP) has set the  following guidelines (reference ranges) for Cholesterol:  Optimal.....................<200 mg/dL  Borderline High.............200-239 mg/dL  High........................> or = 240 mg/dL     09/25/2018 153 120 - 199 mg/dL Final     Comment:     The National Cholesterol Education Program (NCEP) has set the  following guidelines (reference ranges) for Cholesterol:  Optimal.....................<200 mg/dL  Borderline  High.............200-239 mg/dL  High........................> or = 240 mg/dL       Iron   Date Value Ref Range Status   07/27/2020 87 30 - 160 ug/dL Final   12/31/2019 223 (H) 30 - 160 ug/dL Final     No results found for: FOLATE  No components found for: JSFDOQZY08  No components found for: ZUQEVVWD94  No components found for: VITAMIND2  No components found for: VITAMIND    Lab Results   Component Value Date    FERRITIN <1 (L) 12/04/2019     Lab Results   Component Value Date    CRP 22.8 (H) 04/05/2017     Lab Results   Component Value Date    SEDRATE 13 04/05/2017     Assessment of Lab Values:   Medication:  Current Outpatient Medications   Medication Sig    amlodipine-olmesartan (ANGEL) 5-40 mg per tablet TAKE ONE TABLET BY MOUTH EVERY DAY    atenoloL (TENORMIN) 50 MG tablet TAKE ONE TABLET BY MOUTH TWICE DAILY AS NEEDED    azelaic acid (FINACEA) 15 % gel Apply to face nightly. Increase to BID as tolerated    BIFIDOBACTERIUM INFANTIS (ALIGN ORAL) Take 1 tablet by mouth once daily.    biotin 1 mg Cap Take by mouth.    cephALEXin (KEFLEX) 500 MG capsule Take 1 capsule (500 mg total) by mouth every 12 (twelve) hours.    colestipol (COLESTID) 1 gram Tab Take 1 tablet (1 g total) by mouth 2 (two) times daily. (Patient not taking: Reported on 7/1/2020)    CREON 24,000-76,000 -120,000 unit capsule TAKE TWO CAPSULES BY MOUTH THREE TIMES DAILY WITH meals    diphenhydrAMINE (BENADRYL) 50 MG capsule Take 1 capsule (50 mg total) by mouth every 6 (six) hours as needed.    DULoxetine (CYMBALTA) 30 MG capsule 30 mg daily and then 30 mg bid    escitalopram oxalate (LEXAPRO) 20 MG tablet Take 1 tablet (20 mg total) by mouth once daily.    ferrous sulfate (FEOSOL) 325 mg (65 mg iron) Tab tablet Take 325 mg by mouth 2 (two) times daily.     FLUCELVAX QUAD 5940-8300, PF, 60 mcg (15 mcg x 4)/0.5 mL Syrg ADM 0.5ML IM UTD    fluocinonide (LIDEX) 0.05 % external solution AAA scalp qday - bid prn pruritus     fremanezumab-vfrm (AJOVY) 225 mg/1.5 mL injection inject once subcutaneously EVERY 28 DAYS AS DIRECTED    hyoscyamine (ANASPAZ,LEVSIN) 0.125 mg Tab Take 1 tablet (125 mcg total) by mouth before meals as needed.    ketoconazole (NIZORAL) 2 % shampoo Wash hair with medicated shampoo at least 2x/week - let sit on scalp at least 5 minutes prior to rinsing    ketorolac (TORADOL) 10 mg tablet Take 1 tablet (10 mg total) by mouth every 6 (six) hours.    Lactobacillus rhamnosus GG (CULTURELLE) 10 billion cell capsule Take 1 capsule by mouth.    methocarbamoL (ROBAXIN) 500 MG Tab Take 1 tablet (500 mg total) by mouth every 8 (eight) hours as needed (Headaches and muscle spasms).    ondansetron (ZOFRAN) 4 MG tablet Take 1 tablet (4 mg total) by mouth every 6 (six) hours.    pantoprazole (PROTONIX) 40 MG tablet Please take 1 tablet every 12 hr when you have chest discomfort, then take 1 tablet every day.    pravastatin (PRAVACHOL) 20 MG tablet TAKE ONE TABLET BY MOUTH EVERY EVENING    prochlorperazine (COMPAZINE) 10 MG tablet Take 1 tablet (10 mg total) by mouth every 6 (six) hours as needed (Headache.  Take with Benadryl 50 mg). (Patient not taking: Reported on 7/1/2020)    RABEprazole (ACIPHEX) 20 mg tablet Take 1 tablet (20 mg total) by mouth once daily. (Patient not taking: Reported on 7/1/2020)    sucralfate (CARAFATE) 1 gram tablet Take 1 tablet (1 g total) by mouth 4 (four) times daily.    sumatriptan (IMITREX STATDOSE) 6 mg/0.5 mL kit inject 6mg (0.5ml) into skin once AS NEEDED FOR MIGRAINE (MAXIMUM OF TWO IN 24 hours)    sumatriptan (IMITREX) 100 MG tablet Take 1 tablet (100 mg total) by mouth as needed for Migraine. Take at the onset of headache, may take 1 more in 1 hour if needed.    tiZANidine (ZANAFLEX) 4 MG tablet Take 4 mg by mouth.    traZODone (DESYREL) 50 MG tablet Take 1 tablet (50 mg total) by mouth every evening.    triamcinolone acetonide 0.1% (KENALOG) 0.1 % cream AAA bid prn redness,  "scaling or itching     No current facility-administered medications for this visit.      Nutritionally significant meds: Taking iron, not taking Colestid or Creon   Vitamin/Supplements/Herbs: Arab Instant Breakfast meal replacement with milk in place of breakfast   Potential Food drug Interaction:   Allergies:  Review of patient's allergies indicates:   Allergen Reactions    Amitriptyline Hallucinations and Other (See Comments)    Chlorthalidone      Hypokalemia     Adhesive Rash    Depo-provera [medroxyprogesterone] Anxiety    Dicyclomine Rash     Swelling of lip    Swelling of lip    Prozac [fluoxetine] Anxiety    Topiramate Rash   GES 7/1/2020 FINDINGS:  At 4 hour(s) the percentage of retention is 15 % (normal retention at 4 hours is 10% and lower).   Dietary Data  Current Diet: If consumes AdhereTx's chicken or chicken biscuit - will experience dumping and bowel urgency within 30 minutes. '  Bkfst : Chicken Biscuit ( fast food ) or CIB and milk; occasionally will add jin seeds or flax seeds and use    Lunch : sandwich   Supper : baked chicken breast       Fluid Intake /quantity: avoids coffee and chocolate and spicy foods due to Interstitial cystitis   Fruit: avoids fibrous or with seeds/skins   Vegetables: avoids raw vegetables and salads   Meal preparation/shopping: self, family member  Dining out: rarely due to 's disability  Foods poorly tolerated : raw vegetables and fruit, high fat foods   Milk tolerance : "okay"   Fiber tolerance : poor   Nuts/seeds: only as but butter  Fat tolerance : poor   Sugars and desserts  Supplements/ MVI: CIB   Review of patient's allergies indicates:   Allergen Reactions    Amitriptyline Hallucinations and Other (See Comments)    Chlorthalidone      Hypokalemia     Adhesive Rash    Depo-provera [medroxyprogesterone] Anxiety    Dicyclomine Rash     Swelling of lip    Swelling of lip    Prozac [fluoxetine] Anxiety    Topiramate Rash      Nutrition " Diagnosis: Altered GI Function related to Gastroparesis and fat intolerance s/p cholecystectomy as evidenced by nausea and belching with meals and dumping with fat intake .   Goals/Recommendations:   1. Nutrition Therapy for Gastroparesis (Nutrition Care Manual)  2. Gastroparesis (NIH)  3. Low Fiber Diet    Gastroparesis Diet Tips & Diet Intervention for Gastroparesis ([facility])   Information on GE Reflux and diet  ([place])  Following these tips may help your stomach empty faster: Eat small, frequent meals (4 to 6 times per day). Do not eat solid foods that are high in fat and do not add too much fat to foods. See the Foods Not Recommended table for foods that are high fat.   High-fat solid foods may delay the emptying of your stomach or dumping post Cholecystectomy    Do not eat foods high in fiber. Do not take fiber supplements or fiber bulking agents for constipation. Do not eat foods that increase acid reflux:   Acidic, spicy, fried and greasy foods   Caffeine   Mint  Do not drink alcohol or smoke Do not drink carbonated beverages, as they increase bloating. Chew foods well before swallowing. Solid foods in the stomach do not empty well. If you have difficulty tolerating solid foods, ground foods may be better. If symptoms are severe, semi-solid foods or liquids may need to be your main food sources. Choose liquid nutritional supplements that have less than or equal to 2 grams fiber per serving. Sit upright while eating and sit upright or walk after meals. Do not lie down for 3 to 4 hours after eating to avoid reflux or regurgitation.   If you wish to nap during the day, nap first and then eat.   Drinking fluids at meals can take up room in your stomach, and you might not get enough calories. At every meal, first eat a grain food and a protein food or dairy product if your body can tolerate it. Drink fluids with calories. It may be better to delay fluids until after the meal and drink more  between meals.    Education Provided by email and verbal discussion : Gastroparesis - [facility] ; sources of fat in foods   Patient comprehends instructions: yes   Outcome: Verbalizes understanding  Monitoring: Symptom tracking with changes in diet   Follow-up: based upon symptoms   Counseling Time: 60 minutes

## 2020-08-22 NOTE — PATIENT INSTRUCTIONS
     Gastroparesis Diet Tips      Introduction      Gastroparesis means stomach (gastro) paralysis (paresis). In gastroparesis, your stomach empties too slowly. Gastroparesis can have many causes, so symptoms range from mild (but annoying) to severe, and week-to-week or even day-to-day.  This handout is designed to give some suggestions for diet changes in the hope that symptoms will improve or even stop. Very few research studies have been done to guide us as to which foods are better tolerated by patients with gastroparesis. The suggestions are mostly based on experience and our understanding of how the stomach and different foods normally empty. Anyone with gastroparesis should see a doctor and a Registered Dietitian for advice on how to maximize their nutritional status.       The Basics      Volume    The larger the meal, the slower the stomach will empty. It is important to decrease the amount of food eaten at a meal, so you will have to eat more often. Smaller meals more often (6-8 or more if needed) may allow you to eat enough.      Liquids versus Solids   If decreasing the meal size and increasing the number of meals does not work, the next step is to switch to more liquid-type foods. Liquids empty the stomach more easily than solids do. Pureed foods may be better also.       Fiber    Fiber (found in many fruits, vegetables, and grains) may slow stomach emptying and fill the stomach up too fast. This won't leave room for foods that may be easier tolerated. A bezoar is a mixture of food fibers that may get stuck in the stomach and not empty well, like a hairball in a cat. For patients who have had a bezoar, a fiber restriction is important. This includes avoiding over-the-counter fiber/bulking medicines like Metamucil® and others.       Fat    Fat may slow stomach emptying in some patients, but many can easily consume fat in beverages. Our experience is that fat in liquid forms like whole  milk, milkshakes, nutritional supplements, etc. is often well tolerated. Unless a fat-containing food or fluid clearly causes worse symptoms, fat should not be limited. This is because people with gastroparesis often need all the calories they can get, since eating enough may be very hard to do. Liquid fat is often well tolerated, pleasurable, and it provides a great source of calories in smaller amounts.           Medications       There are quite a few medications that can slow stomach emptying. Ask your doctor if any of the medicines you are on could be slowing down your stomach emptying.       Getting Started      ; Set a goal weight you want to meet.   ; Avoid large meals.   ; Eat enough to meet your goal weight. It may be 4-8 smaller meals and snacks.   ; Avoid solid foods that are high in fat and avoid adding too much fat to foods. High fat drinks are usually ok - try them and see.    ; Eat nutritious foods first before filling up on empty calories like candy, cakes, sodas, etc.   ; Chew foods well, especially meats. Meats may be easier to eat if ground or puréed.   ; Avoid high fiber foods because they may be harder for your stomach to empty.    ; Sit up while eating and stay upright for at least 1 hour after you finish. Try taking a nice walk after meals.    ; If you have diabetes, keep your blood sugar under control. Let your doctor know if your blood sugar runs >200 mg/dL on a regular basis.      Getting your Calories      When getting enough calories is a daily struggle, make everything you eat and drink count:   ; Take medications with calorie-containing beverages like milk, juice, and sweet tea instead of water or diet drinks.   ; High calorie drinks are better than water because they provide calories and fluid. Use peach, pear, or papaya nectar, fruit juices and drinks, Hawaiian Punch®, Hi C®, lemonade, Kirby-Aid®, sweet tea, or even soda.   ; Fortify milk by adding dry milk powder: add 1 cup  powdered milk to 1 quart milk.    ; Drink whole milk if tolerated instead of skim or 2%. Use whole, condensed, or evaporated milk when preparing cream-based soups, custards, puddings, and hot cereals, smoothies, milkshakes, etc.   ; Add [place] Instant Breakfast, protein powder, dry milk powder, or other flavored powders or flavored syrups to whole milk or juices.   ; Make custards and puddings with eggs or egg substitutes like Eggbeaters®.   ; Try adding ice cream, sherbet, and sorbet to ready-made supplements such as Nutra-shakes®, Ensure® or Boost®. Peanut butter, chocolate syrup, or caramel sauce is also great in these.        What to Eat   Starches  Breads: white bread and light whole wheat bread   (no nuts, seeds, etc.), including French/Italian, bagels, English muffin, plain roll, gil bread, tortilla (flour or corn), pancake, waffle, naan, flat bread   Cereals: quick/instant oats, grits, Cream of Wheat, cream of rice, puffed wheat and rice cereals such as   Cheerios®, Sugar Pops®, Kix®, Rice Krispies®, Fruit   Loops®, Special K®, Cocoa Crispies®   Grains/Potatoes: rice (plain), pasta, macaroni (plain), bulgur wheat (couscous), barley, sweet and white potatoes (no skin, plain), yams, french fries   (baked)   Crackers/Chips: arrowroot, breadsticks, matzo, stefanie toast, oyster, pretzels, saltines, soda, zwieback,  water crackers, baked potato chips, pretzels    Meats, fish, poultry, other proteins (ground or pureed)  Beef: chipped beef, flank steak, tenderloin, skirt steak, round (bottom or top), rump   Veal: leg, loin, rib, shank, shoulder   Pork: lean pork, tenderloin, pork chops, ham   Poultry (skinless): chicken, turkey    Wild game (skinless): venison, rabbit, squirrel, pheasant, duck, goose    Fish/shellfish (fresh or frozen, plain, no breading): crab, lobster, shrimp, clams, scallops, oysters, tuna (in water)   Cheese: cottage cheese, grated parmesan   Other: eggs (no creamed or fried), egg white, egg  substitute tofu, strained baby meats (all)      Vegetables (cooked, and if necessary,   blenderized/strained)     Beets, tomato sauce, tomato juice, tomato paste or purée, carrots, strained baby vegetables (all), mushrooms, vegetable juice    Fruits and juices (cooked and, if necessary, blenderized/strained)     Fruits, applesauce, banana, peaches (canned), pears (canned), strained baby fruits (all), juices (all), fruit   drinks, fruit flavored beverages    Milk products      Milk - any as tolerated: chocolate, buttermilk, yogurt (without fruit pieces), frozen yogurt, kefir (liquid yogurt), evaporated milk, condensed milk, milk powder, custard/pudding       Soups     Broth, bouillon, strained creamed soups (with milk or water)    Beverages     Hot cocoa (made with water or milk), Kirby-Aid®, lemonade, Driscoll® and similar powdered products, Gatorade® or Powerade®, soft drinks, coffee/ coffee drinks, tea/radha    Seasonings/gravies     Cranberry sauce (smooth), fat-free gravies, Butter Buds®, mustard, ketchup, vegetable oil spray, soy sauce, teriyaki sauce, Tabasco® sauce, vanilla and other flavoring extracts, vinegar    Desserts/sweets     John food cake, animal crackers, gelatin, ginger snaps, victor manuel crackers, popsicles, plain sherbet, vanilla wafers, gum, gum drops, hard candy, jelly beans, lemon drops, marshmallows, seedless jams and jellies       What Not to Eat      The following foods have been associated with bezoar formation and may need to be avoided (see Fiber section on page 1): apples, berries, coconut, figs, oranges, persimmons, [place] sprouts, green beans, legumes, potato peels, sauerkraut.      When Solids Do Not Seem to Be Working - Try Blenderized Food       Any food can be blenderized, but solid foods will need to be thinned down with some type of liquid.       ; If you do not have a , strained baby foods will work and can be thinned down as needed with milk, soy or rice milk, water, broth,  etc.    ; Always clean the  well. Any food left in the  for more than 1-2 hours could cause food poisoning.   ; Meats, fish, poultry and ham: Blend with broths, water, milk, vegetable or V8® juice, tomato sauce, gravies.   ; Vegetables: Blend with water, tomato juice, broth, strained baby vegetables.   ; Starches: Blend potatoes, pasta, or rice with soups, broth, milk, water, gravies. Add strained baby meats, etc. to add protein if needed. Consider using hot cereals such as Cream of Wheat or rice, grits, etc. as your starch at lunch and dinner.   ; Fruits: Blend with their own juices, other fruit juices, water, strained baby fruits.   ; Cereals: Make with caloric beverage instead of water. Try whole milk (or even evaporated/condensed milk), soy or rice milk, juice, Ensure®, Boost® or store brand equivalent. Add sugars, honey, molasses, syrups, or other flavorings, butter or vegetable oil for extra calories.   ; Mixed dishes: Lasagna, macaroni and cheese, spaghetti, chili, chop suey - add adequate liquid of your choice, blend well and strain.      Additional Resources    ; [facility] website: [email or web address]  o Patient education Materials: Gastroparesis Long version  o Nutrition Articles, choose to browse by topic and scroll down to gastroparesis: Claudia Garcia. Gastroparesis & Nutrition: The Art.   Practical Gastroenterology 2011;XXXV(9):26.   Fide Garcia. Nutrition Intervention for the Patient with Gastroparesis: An Update. Practical Gastroenterology 2005;XXIX(8):29.    ; Association of Gastrointestinal Motility Disorders, Inc. (AGMD) www.agmdgimotility.org   ; Gastroparesis Dysmotility Association:  www.digestivedistress.com

## 2020-09-21 ENCOUNTER — PATIENT OUTREACH (OUTPATIENT)
Dept: OTHER | Facility: OTHER | Age: 50
End: 2020-09-21

## 2020-09-22 NOTE — PROGRESS NOTES
"Digital Medicine: Health  Follow-Up    The history is provided by the patient.                 Patient needs assistance troubleshooting: cuff exchange.    Additional Follow-up details: Patient reports she has been struggling with gastro issues.  States she wakes up in the middle of the night in pain.  Asked if patient had followed up with her doctor and states she plans to.    States she has not received her new blood pressure cuff but hopes to get it tomorrow.  States she received instructions from clinician.  States she ordered the "track" cuff instead of the "ease" cuff.  Will follow up next week to see if readings will submit.          Diet-no change to diet    No change to diet.  Patient reports eating or drinking the following: States she is not eating much, but continues to struggle with gastro issues.      Physical Activity-Not assessed    Medication Adherence-Medication Adherence not addressed.      Substance, Sleep, Stress-Not assessed      Additional monitoring needed.  Reviewed Device Techniques.     Addressed patient questions and patient has my contact information if needed prior to next outreach. Patient verbalizes understanding.      Explained the importance of self-monitoring and medication adherence. Encouraged the patient to communicate with their health  for lifestyle modifications to help improve or maintain a healthy lifestyle.                Topic    Lipid (Cholesterol) Test        Last 5 Patient Entered Readings                                      Current 30 Day Average: 145/82     Recent Readings 8/29/2020 8/18/2020 8/17/2020 8/12/2020 8/3/2020    SBP (mmHg) 145 149 120 142 144    DBP (mmHg) 82 83 80 83 80    Pulse 70 71 80 66 68               "

## 2020-09-24 ENCOUNTER — OFFICE VISIT (OUTPATIENT)
Dept: FAMILY MEDICINE | Facility: CLINIC | Age: 50
End: 2020-09-24
Payer: MEDICARE

## 2020-09-24 VITALS
DIASTOLIC BLOOD PRESSURE: 84 MMHG | HEART RATE: 68 BPM | OXYGEN SATURATION: 97 % | RESPIRATION RATE: 18 BRPM | SYSTOLIC BLOOD PRESSURE: 146 MMHG

## 2020-09-24 DIAGNOSIS — M79.641 BILATERAL HAND PAIN: ICD-10-CM

## 2020-09-24 DIAGNOSIS — I10 ESSENTIAL HYPERTENSION: ICD-10-CM

## 2020-09-24 DIAGNOSIS — M79.642 BILATERAL HAND PAIN: ICD-10-CM

## 2020-09-24 DIAGNOSIS — E66.09 CLASS 2 OBESITY DUE TO EXCESS CALORIES WITH BODY MASS INDEX (BMI) OF 36.0 TO 36.9 IN ADULT, UNSPECIFIED WHETHER SERIOUS COMORBIDITY PRESENT: ICD-10-CM

## 2020-09-24 DIAGNOSIS — M54.50 ACUTE LEFT-SIDED LOW BACK PAIN WITHOUT SCIATICA: ICD-10-CM

## 2020-09-24 DIAGNOSIS — R09.81 CHRONIC NASAL CONGESTION: ICD-10-CM

## 2020-09-24 DIAGNOSIS — G47.00 INSOMNIA, UNSPECIFIED TYPE: Primary | ICD-10-CM

## 2020-09-24 PROCEDURE — 3077F SYST BP >= 140 MM HG: CPT | Mod: CPTII,S$GLB,, | Performed by: FAMILY MEDICINE

## 2020-09-24 PROCEDURE — 99999 PR PBB SHADOW E&M-EST. PATIENT-LVL III: ICD-10-PCS | Mod: PBBFAC,,, | Performed by: FAMILY MEDICINE

## 2020-09-24 PROCEDURE — 99214 OFFICE O/P EST MOD 30 MIN: CPT | Mod: S$GLB,,, | Performed by: FAMILY MEDICINE

## 2020-09-24 PROCEDURE — 99214 PR OFFICE/OUTPT VISIT, EST, LEVL IV, 30-39 MIN: ICD-10-PCS | Mod: S$GLB,,, | Performed by: FAMILY MEDICINE

## 2020-09-24 PROCEDURE — 3079F PR MOST RECENT DIASTOLIC BLOOD PRESSURE 80-89 MM HG: ICD-10-PCS | Mod: CPTII,S$GLB,, | Performed by: FAMILY MEDICINE

## 2020-09-24 PROCEDURE — 3079F DIAST BP 80-89 MM HG: CPT | Mod: CPTII,S$GLB,, | Performed by: FAMILY MEDICINE

## 2020-09-24 PROCEDURE — 3077F PR MOST RECENT SYSTOLIC BLOOD PRESSURE >= 140 MM HG: ICD-10-PCS | Mod: CPTII,S$GLB,, | Performed by: FAMILY MEDICINE

## 2020-09-24 PROCEDURE — 99999 PR PBB SHADOW E&M-EST. PATIENT-LVL III: CPT | Mod: PBBFAC,,, | Performed by: FAMILY MEDICINE

## 2020-09-24 RX ORDER — TRAZODONE HYDROCHLORIDE 100 MG/1
100 TABLET ORAL NIGHTLY
Qty: 30 TABLET | Refills: 11 | Status: SHIPPED | OUTPATIENT
Start: 2020-09-24 | End: 2021-10-11

## 2020-09-24 RX ORDER — METHYLPREDNISOLONE 4 MG/1
TABLET ORAL
Qty: 1 PACKAGE | Refills: 0 | Status: SHIPPED | OUTPATIENT
Start: 2020-09-24 | End: 2020-10-15

## 2020-09-24 RX ORDER — HYDROCHLOROTHIAZIDE 12.5 MG/1
12.5 TABLET ORAL DAILY
Qty: 30 TABLET | Refills: 11 | Status: SHIPPED | OUTPATIENT
Start: 2020-09-24 | End: 2021-08-06

## 2020-09-24 NOTE — PROGRESS NOTES
Subjective:       Patient ID: Giuliana Rodas is a 50 y.o. female.    Chief Complaint: No chief complaint on file.    50 year old female presents with numbness in her left foot and her left leg. She states it has been going on for a week. She states she did lift some tiles and subsequently has some lower back pain as well mainly to the left side.       She has anxiety. Her finances are not doing well. Her daughter lives with her and is taking over her home. She has moved in with her pest. Her cat scratches himself and bleeds all over her furniture.     Past Medical History:   Diagnosis Date    Bile reflux gastritis     Breast cyst     Eczema     Fibrocystic breast     Fibromyalgia     Gastroparesis     dr. harden    GERD (gastroesophageal reflux disease)     Hyperglyceridemia     Hyperlipidemia     Hypertension     IC (interstitial cystitis)     dr. labadie    Psoriasis     Tension headache       Past Surgical History:   Procedure Laterality Date    BREAST BIOPSY Right     over 10 years ago/ milk duct    CHOLECYSTECTOMY      ESOPHAGOGASTRODUODENOSCOPY N/A 3/12/2020    Procedure: EGD (ESOPHAGOGASTRODUODENOSCOPY);  Surgeon: Damon Mcmahon MD;  Location: [address]);  Service: Endoscopy;  Laterality: N/A;  use pcf scope    HEMORRHOID SURGERY      HYSTERECTOMY      KNEE SURGERY      OOPHORECTOMY      one ov removed     Family History   Problem Relation Age of Onset    Hypertension Mother     Migraines Mother     Breast cancer Mother     Hypertension Father     Stroke Father     Heart disease Father     Hyperlipidemia Father     Diabetes Father     Breast cancer Paternal Grandmother     Colon cancer Neg Hx     Ovarian cancer Neg Hx     Inflammatory bowel disease Neg Hx     Celiac disease Neg Hx      Social History     Socioeconomic History    Marital status:      Spouse name: Not on file    Number of children: 2    Years of education: Not on file    Highest education  level: Not on file   Occupational History    Occupation:      Employer: [employer]   Social Needs    Financial resource strain: Not on file    Food insecurity     Worry: Never true     Inability: Never true    Transportation needs     Medical: No     Non-medical: No   Tobacco Use    Smoking status: Never Smoker    Smokeless tobacco: Never Used   Substance and Sexual Activity    Alcohol use: No     Frequency: Never     Drinks per session: 1 or 2     Binge frequency: Never    Drug use: No    Sexual activity: Yes     Partners: Male     Birth control/protection: See Surgical Hx   Lifestyle    Physical activity     Days per week: 2 days     Minutes per session: 20 min    Stress: Only a little   Relationships    Social connections     Talks on phone: More than three times a week     Gets together: Once a week     Attends Taoist service: Not on file     Active member of club or organization: No     Attends meetings of clubs or organizations: Patient refused     Relationship status:    Other Topics Concern    Not on file   Social History Narrative    Lives at home with  and daughter       Review of Systems   Constitutional: Positive for activity change. Negative for unexpected weight change.   HENT: Negative for hearing loss, rhinorrhea and trouble swallowing.    Eyes: Negative for discharge and visual disturbance.   Respiratory: Positive for chest tightness. Negative for wheezing.    Cardiovascular: Negative for chest pain and palpitations.   Gastrointestinal: Negative for blood in stool, constipation, diarrhea and vomiting.   Endocrine: Negative for polydipsia and polyuria.   Genitourinary: Negative for difficulty urinating, dysuria, hematuria and menstrual problem.   Musculoskeletal: Positive for arthralgias and joint swelling. Negative for neck pain.   Neurological: Positive for weakness. Negative for headaches.   Psychiatric/Behavioral: Positive for dysphoric mood. Negative for  confusion.         Objective:       Vitals:    09/24/20 1220   BP: (!) 146/84   Pulse: 68   Resp: 18   SpO2: 97%         Physical Exam  Musculoskeletal:      Lumbar back: She exhibits tenderness. She exhibits normal range of motion, no bony tenderness, no swelling, no deformity, no laceration, no pain and no spasm.        Back:    Neurological:      Mental Status: She is alert.         Current Outpatient Medications on File Prior to Visit   Medication Sig    amlodipine-olmesartan (ANGEL) 5-40 mg per tablet TAKE ONE TABLET BY MOUTH EVERY DAY    atenoloL (TENORMIN) 50 MG tablet TAKE ONE TABLET BY MOUTH TWICE DAILY AS NEEDED    azelaic acid (FINACEA) 15 % gel Apply to face nightly. Increase to BID as tolerated    biotin 1 mg Cap Take by mouth.    fluocinonide (LIDEX) 0.05 % external solution AAA scalp qday - bid prn pruritus    fremanezumab-vfrm (AJOVY) 225 mg/1.5 mL injection inject once subcutaneously EVERY 28 DAYS AS DIRECTED    ketoconazole (NIZORAL) 2 % shampoo Wash hair with medicated shampoo at least 2x/week - let sit on scalp at least 5 minutes prior to rinsing    ondansetron (ZOFRAN) 4 MG tablet Take 1 tablet (4 mg total) by mouth every 6 (six) hours.    sucralfate (CARAFATE) 1 gram tablet Take 1 tablet (1 g total) by mouth 4 (four) times daily.    BIFIDOBACTERIUM INFANTIS (ALIGN ORAL) Take 1 tablet by mouth once daily.    ferrous sulfate (FEOSOL) 325 mg (65 mg iron) Tab tablet Take 325 mg by mouth 2 (two) times daily.     FLUCELVAX QUAD 2114-2712, PF, 60 mcg (15 mcg x 4)/0.5 mL Syrg ADM 0.5ML IM UTD    Lactobacillus rhamnosus GG (CULTURELLE) 10 billion cell capsule Take 1 capsule by mouth.    methocarbamoL (ROBAXIN) 500 MG Tab Take 1 tablet (500 mg total) by mouth every 8 (eight) hours as needed (Headaches and muscle spasms).    sumatriptan (IMITREX STATDOSE) 6 mg/0.5 mL kit inject 6mg (0.5ml) into skin once AS NEEDED FOR MIGRAINE (MAXIMUM OF TWO IN 24 hours)    sumatriptan (IMITREX) 100 MG  tablet Take 1 tablet (100 mg total) by mouth as needed for Migraine. Take at the onset of headache, may take 1 more in 1 hour if needed.     No current facility-administered medications on file prior to visit.      Assessment:       1. Insomnia, unspecified type    2. Acute left-sided low back pain without sciatica    3. Class 2 obesity due to excess calories with body mass index (BMI) of 36.0 to 36.9 in adult, unspecified whether serious comorbidity present    4. Chronic nasal congestion    5. Bilateral hand pain    6. Essential hypertension        Plan:         Diagnoses and all orders for this visit:    Insomnia, unspecified type  -     traZODone (DESYREL) 100 MG tablet; Take 1 tablet (100 mg total) by mouth every evening.    Acute left-sided low back pain without sciatica  -     methylPREDNISolone (MEDROL DOSEPACK) 4 mg tablet; use as directed    Class 2 obesity due to excess calories with body mass index (BMI) of 36.0 to 36.9 in adult, unspecified whether serious comorbidity present  -     Lipid Panel; Future  Due for labs    Chronic nasal congestion  -     Ambulatory referral/consult to ENT; Future    Bilateral hand pain  -     Ambulatory referral/consult to Orthopedics; Future    Essential hypertension  -     hydroCHLOROthiazide (HYDRODIURIL) 12.5 MG Tab; Take 1 tablet (12.5 mg total) by mouth once daily.    Adding HCTZ FOR HER BLOOD PRESSURE.

## 2020-09-24 NOTE — PROGRESS NOTES
Answers for HPI/ROS submitted by the patient on 9/23/2020   activity change: Yes  unexpected weight change: No  neck pain: No  hearing loss: No  rhinorrhea: No  trouble swallowing: No  eye discharge: No  visual disturbance: No  chest tightness: Yes  wheezing: No  chest pain: No  palpitations: No  blood in stool: No  constipation: No  vomiting: No  diarrhea: No  polydipsia: No  polyuria: No  difficulty urinating: No  hematuria: No  menstrual problem: No  dysuria: No  joint swelling: Yes  arthralgias: Yes  headaches: No  weakness: Yes  confusion: No  dysphoric mood: Yes

## 2020-09-25 ENCOUNTER — LAB VISIT (OUTPATIENT)
Dept: LAB | Facility: HOSPITAL | Age: 50
End: 2020-09-25
Attending: FAMILY MEDICINE
Payer: MEDICARE

## 2020-09-25 DIAGNOSIS — E66.09 CLASS 2 OBESITY DUE TO EXCESS CALORIES WITH BODY MASS INDEX (BMI) OF 36.0 TO 36.9 IN ADULT, UNSPECIFIED WHETHER SERIOUS COMORBIDITY PRESENT: ICD-10-CM

## 2020-09-25 LAB
CHOLEST SERPL-MCNC: 211 MG/DL (ref 120–199)
CHOLEST/HDLC SERPL: 5.4 {RATIO} (ref 2–5)
HDLC SERPL-MCNC: 39 MG/DL (ref 40–75)
HDLC SERPL: 18.5 % (ref 20–50)
LDLC SERPL CALC-MCNC: 92 MG/DL (ref 63–159)
NONHDLC SERPL-MCNC: 172 MG/DL
TRIGL SERPL-MCNC: 400 MG/DL (ref 30–150)

## 2020-09-25 PROCEDURE — 80061 LIPID PANEL: CPT

## 2020-09-28 ENCOUNTER — PATIENT OUTREACH (OUTPATIENT)
Dept: OTHER | Facility: OTHER | Age: 50
End: 2020-09-28

## 2020-09-29 ENCOUNTER — PATIENT MESSAGE (OUTPATIENT)
Dept: FAMILY MEDICINE | Facility: CLINIC | Age: 50
End: 2020-09-29

## 2020-09-29 RX ORDER — SIMVASTATIN 40 MG/1
40 TABLET, FILM COATED ORAL NIGHTLY
Qty: 90 TABLET | Refills: 3 | Status: SHIPPED | OUTPATIENT
Start: 2020-09-29 | End: 2021-07-20

## 2020-10-06 ENCOUNTER — OFFICE VISIT (OUTPATIENT)
Dept: OTOLARYNGOLOGY | Facility: CLINIC | Age: 50
End: 2020-10-06
Payer: MEDICARE

## 2020-10-06 ENCOUNTER — PATIENT OUTREACH (OUTPATIENT)
Dept: ADMINISTRATIVE | Facility: OTHER | Age: 50
End: 2020-10-06

## 2020-10-06 VITALS
DIASTOLIC BLOOD PRESSURE: 70 MMHG | TEMPERATURE: 99 F | HEIGHT: 62 IN | WEIGHT: 178.38 LBS | BODY MASS INDEX: 32.82 KG/M2 | SYSTOLIC BLOOD PRESSURE: 144 MMHG

## 2020-10-06 DIAGNOSIS — J31.0 RHINITIS, UNSPECIFIED TYPE: Primary | ICD-10-CM

## 2020-10-06 DIAGNOSIS — R09.81 CHRONIC NASAL CONGESTION: ICD-10-CM

## 2020-10-06 DIAGNOSIS — J34.3 NASAL TURBINATE HYPERTROPHY: ICD-10-CM

## 2020-10-06 DIAGNOSIS — R09.81 NASAL CONGESTION: ICD-10-CM

## 2020-10-06 DIAGNOSIS — H69.90 DYSFUNCTION OF EUSTACHIAN TUBE, UNSPECIFIED LATERALITY: ICD-10-CM

## 2020-10-06 PROCEDURE — 99204 OFFICE O/P NEW MOD 45 MIN: CPT | Mod: S$GLB,,, | Performed by: NURSE PRACTITIONER

## 2020-10-06 PROCEDURE — 3077F SYST BP >= 140 MM HG: CPT | Mod: CPTII,S$GLB,, | Performed by: NURSE PRACTITIONER

## 2020-10-06 PROCEDURE — 3077F PR MOST RECENT SYSTOLIC BLOOD PRESSURE >= 140 MM HG: ICD-10-PCS | Mod: CPTII,S$GLB,, | Performed by: NURSE PRACTITIONER

## 2020-10-06 PROCEDURE — 3078F DIAST BP <80 MM HG: CPT | Mod: CPTII,S$GLB,, | Performed by: NURSE PRACTITIONER

## 2020-10-06 PROCEDURE — 3078F PR MOST RECENT DIASTOLIC BLOOD PRESSURE < 80 MM HG: ICD-10-PCS | Mod: CPTII,S$GLB,, | Performed by: NURSE PRACTITIONER

## 2020-10-06 PROCEDURE — 99204 PR OFFICE/OUTPT VISIT, NEW, LEVL IV, 45-59 MIN: ICD-10-PCS | Mod: S$GLB,,, | Performed by: NURSE PRACTITIONER

## 2020-10-06 PROCEDURE — 3008F BODY MASS INDEX DOCD: CPT | Mod: CPTII,S$GLB,, | Performed by: NURSE PRACTITIONER

## 2020-10-06 PROCEDURE — 3008F PR BODY MASS INDEX (BMI) DOCUMENTED: ICD-10-PCS | Mod: CPTII,S$GLB,, | Performed by: NURSE PRACTITIONER

## 2020-10-06 RX ORDER — FLUTICASONE PROPIONATE 50 MCG
2 SPRAY, SUSPENSION (ML) NASAL DAILY
Qty: 11.1 ML | Refills: 2 | Status: SHIPPED | OUTPATIENT
Start: 2020-10-06 | End: 2021-03-01

## 2020-10-06 RX ORDER — VITAMIN E 268 MG
400 CAPSULE ORAL DAILY
COMMUNITY

## 2020-10-06 RX ORDER — UBROGEPANT 50 MG/1
50 TABLET ORAL DAILY PRN
COMMUNITY
Start: 2020-08-07 | End: 2020-11-15

## 2020-10-06 RX ORDER — AMOXICILLIN 500 MG
1 CAPSULE ORAL DAILY
COMMUNITY

## 2020-10-06 NOTE — PROGRESS NOTES
Subjective:      Giuliana Rodas is a 50 y.o. female who was referred to me by Dr. Giuliana Rojas in consultation for nasal congestion.  Mrs. Rodas presents to clinic today with complaints of nasal congestion that has been present for the last 20 years. She has other health problems and is now getting around to getting it looked at. She reports dryness of her nasal passages, eyes and throat. Tests and biopsy for Sjogren's were negative. Also reports seasonal allergies, occasional sneezing and sinus pressure and Left ear discomfort. Denies rhinorrhea, ear drainage, hearing loss, dysphagia, hyposomia, and fever. She does not currently take any medicine or use anything for her congestion. She does use eye drops nightly for her dry eyes. She has used Flonase in the past but no longer does.  She states she used to get sinus infections 2 x a year but has not had any since being diagnosed with fibromyalgia in 2000.     Current sinonasal medications include none at present.  The last course of antibiotics was a long time ago.  She does not regularly use nasal decongestant sprays.  She recalls previously having allergy testing, which was reportedly positive for 2 types of grass .2014 blood test  She denies a history of asthma.  She relates a history of reflux symptoms which is not currently managed with medication.  She has previously had an EGD.  She denies a diagnosis of obstructive sleep apnea   She has not had sinonasal surgery  She has not had a tonsillectomy.  She does not recall a prior history of nasal trauma    SNOT-22 score: : (P) 53  NOSE score:: (P) 65%     Past Medical History  She has a past medical history of Bile reflux gastritis, Breast cyst, Eczema, Fibrocystic breast, Fibromyalgia, Gastroparesis, GERD (gastroesophageal reflux disease), Hyperglyceridemia, Hyperlipidemia, Hypertension, IC (interstitial cystitis), Psoriasis, and Tension headache.    Past Surgical History  She has a past surgical history  that includes Cholecystectomy; Hysterectomy; Hemorrhoid surgery; Knee surgery; Oophorectomy; Breast biopsy (Right); and Esophagogastroduodenoscopy (N/A, 3/12/2020).    Family History  Her family history includes Breast cancer in her mother and paternal grandmother; Diabetes in her father; Heart disease in her father; Hyperlipidemia in her father; Hypertension in her father and mother; Migraines in her mother; Stroke in her father.    Social History  She reports that she has never smoked. She has never used smokeless tobacco. She reports that she does not drink alcohol or use drugs.    Allergies  She is allergic to amitriptyline; chlorthalidone; adhesive; depo-provera [medroxyprogesterone]; dicyclomine; prozac [fluoxetine]; and topiramate.    Medications   She has a current medication list which includes the following prescription(s): amlodipine-olmesartan, atenolol, bifidobacterium infantis, biotin, fremanezumab-vfrm, hydrochlorothiazide, ketoconazole, lactobacillus rhamnosus gg, methocarbamol, fish oil-omega-3 fatty acids, ondansetron, pediatric multivitamin, simvastatin, sucralfate, trazodone, ubrogepant, vitamin e, azelaic acid, ferrous sulfate, flucelvax quad 3097-7016 (pf), fluocinonide, fluticasone propionate, methylprednisolone, sumatriptan, and sumatriptan.    Review of Systems  Review of Systems     Constitutional: Positive for fatigue.  Negative for fever.      HENT: Positive for sinus pressure, stuffy nose, dental problems and voice change.  Negative for ear discharge, hearing loss, nosebleeds, ringing in the ears, sinus infection and trouble swallowing.      Eyes:  Positive for eye itching and photophobia.     Respiratory:  Negative for cough, shortness of breath, sleep apnea, snoring and wheezing.      Cardiovascular:  Negative for chest pain, foot swelling, irregular heartbeat and swollen veins.     Gastrointestinal:  Positive for abdominal pain, acid reflux and heartburn.     Musc: Positive for aching  "muscles, back pain and neck pain.     Skin: Positive for rash.     Allergy: Positive for food allergies and seasonal allergies.     Endocrine: Positive for heat intolerance.     Neurological: Positive for headaches.     Hematologic: Positive for bruises/bleeds easily.     Psychiatric: Positive for decreased concentration, depression, nervous/anxious and sleep disturbance.              Objective:     BP (!) 144/70 (BP Location: Right arm, Patient Position: Sitting)   Temp 98.5 °F (36.9 °C)   Ht 5' 2" (1.575 m)   Wt 80.9 kg (178 lb 5.6 oz)   BMI 32.62 kg/m²        Constitutional:   She appears well-developed. She is cooperative. Normal speech.      Head:  Normocephalic. Salivary glands normal.      Ears:    Right Ear: No drainage or tenderness. Tympanic membrane is not perforated and not erythematous. No PE tube. No decreased hearing is noted.   Left Ear: No drainage or tenderness. Tympanic membrane is not perforated and not erythematous.  No PE tube. No decreased hearing is noted.   Small amount of cerumen removed from right ear, left ear free of cerumen.     Nose:  Mucosal edema present. No rhinorrhea, septal deviation or polyps. No epistaxis. Turbinate hypertrophy.  Turbinates normal and no turbinate masses.  Right sinus exhibits maxillary sinus tenderness (mild tenderness bilaterally). Right sinus exhibits no frontal sinus tenderness. Left sinus exhibits maxillary sinus tenderness. Left sinus exhibits no frontal sinus tenderness.     Mouth/Throat  Oropharynx clear and moist without lesions or asymmetry. She does not have dentures. Normal dentition. No oropharyngeal exudate or posterior oropharyngeal erythema.     Neck:  Neck normal without thyromegaly masses, asymmetry, normal tracheal structure, crepitus, and tenderness, trachea normal and no adenopathy. Normal range of motion present.     She has no cervical adenopathy.     Pulmonary/Chest:   Effort normal.     Psychiatric:   She has a normal mood and " affect. Her speech is normal and behavior is normal.       Procedure    None      Data Reviewed    WBC (K/uL)   Date Value   07/27/2020 4.95     Eosinophil% (%)   Date Value   07/27/2020 3.6     Eos # (K/uL)   Date Value   07/27/2020 0.2     Platelets (K/uL)   Date Value   07/27/2020 152     Glucose (mg/dL)   Date Value   04/25/2020 93     No results found for: IGE    No sinus imaging available.    Assessment:     1. Rhinitis, unspecified type    2. Chronic nasal congestion    3. Nasal turbinate hypertrophy    4. Nasal congestion         Plan:     I had a long discussion with the patient regarding her condition and the further workup and management options.    Instructed the patient on the importance and benefits of daily saline sinus rinses and how they should be performed. Stressed the importance of distilled water. Instructed her to do sinus rinse before using flonase. Info put in AVS.   Prescribed fluticasone 2 sprays in each nostril once a day.  Encouraged patient to use saline nasal spray prn through out the day to keep nostrils moist.   Left ear showed no signs of infection/irritation/damage, discomfort maybe from ETD. Informed patient that flonase will aid in that but if it does not improve, worsens or changes to contact me.  If no improvement, we could add Astelin to her daily regimen and/or get a CT. We could also get audiogram with next visit.      Follow up in about 3 months (around 1/6/2021), or if symptoms worsen or fail to improve.

## 2020-10-06 NOTE — PATIENT INSTRUCTIONS
"Information and instructions from your visit with me today:    · Start nasal irrigations with saline solution:    SALINE SINUS RINSE ([employer] Med brand): You should do a full bottle, half on one side of your nose and half on the other, 1-2 times per day (or more if able to, you cannot do this too much). Follow the instructions on the box: mix the salt packet with clean water (bottle, previously boiled, distilled, etc -- not tap water) to the line on the bottle to make the irrigation.        · Start using fluticasone nasal spray:    This medication is the same as the Flonase brand nasal spray. Use this medication as instructed on the prescription, 2 sprays on each side of your nose once daily. You need to use this medication every day regardless of symptoms, as it takes time to work and get the benefits. It does not work on an "as needed" basis like taking a decongestant. Place the tip of the medication bottle in your nose and aim slightly up and out on each side to get medication high and deep into your nose and sinuses, and not have it all deposit in the very front of your nose. You can imagine aiming towards the back of your eyeball on each side for this, as opposed to straight back to the center of your nose and head.       · Do not use nasal decongestant sprays such as Afrin or similar products.  · Use nasal saline spray as needed for moisture.     It was nice meeting you today, and I look forward to helping you feel better soon. Please don't hesitate to call if you have any other questions or concerns, or if I can be of any assistance in the meantime.     SETH Braga  [facility]  Office: [phone] [facility]   [phone] - [place]           SETH Braga  Otorhinolaryngology        "

## 2020-10-06 NOTE — LETTER
October 6, 2020      Giuliana Rojas MD  [address]  [place] [address]           [facility]  [address]   [place] [address]  Phone: [phone]          Patient: Giuliana Rodas   MR Number: [id number]   YOB: 1970   Date of Visit: 10/6/2020       Dear Dr. Giluiana Rojas:    Thank you for referring Giuliana Rodas to me for evaluation. Attached you will find relevant portions of my assessment and plan of care.    If you have questions, please do not hesitate to call me. I look forward to following Giuliana Rodas along with you.    Sincerely,    Vikki Acosta, PABLO    Enclosure  CC:  No Recipients    If you would like to receive this communication electronically, please contact [email or web address] or [phone] to request more information on [employer] access.    For providers and/or their staff who would like to refer a patient to [facility], please contact us through our one-stop-shop provider referral line, [facility], at [phone].    If you feel you have received this communication in error or would no longer like to receive these types of communications, please e-mail [email or web address]

## 2020-10-26 NOTE — PROGRESS NOTES
Digital Medicine: Health  Follow-Up    The history is provided by the patient.             Reason for review: Blood pressure at goal        Topics Covered on Call: sinus migraine    Additional Follow-up details: Brief encounter due to patient suffering from sinus migraine.  Reports she had to take off work today since she was feeling bad.            Diet-Not assessed          Physical Activity-Not assessed    Medication Adherence-Medication Adherence not addressed.      Substance, Sleep, Stress-Not assessed      Continue current diet/physical activity routine.       Addressed patient questions and patient has my contact information if needed prior to next outreach. Patient verbalizes understanding.      Explained the importance of self-monitoring and medication adherence. Encouraged the patient to communicate with their health  for lifestyle modifications to help improve or maintain a healthy lifestyle.               There are no preventive care reminders to display for this patient.      Last 5 Patient Entered Readings                                      Current 30 Day Average: 128/79     Recent Readings 10/24/2020 10/16/2020 10/8/2020 10/1/2020 9/29/2020    SBP (mmHg) 146 127 125 117 123    DBP (mmHg) 83 70 76 81 83    Pulse 65 61 65 65 60

## 2020-11-19 ENCOUNTER — TELEPHONE (OUTPATIENT)
Dept: FAMILY MEDICINE | Facility: CLINIC | Age: 50
End: 2020-11-19

## 2020-11-19 NOTE — TELEPHONE ENCOUNTER
----- Message from Ricardo Long sent at 11/19/2020 12:12 PM CST -----  Regarding: exposure to covid/ advice  Patient is at risk for COVID-19 (Coronavirus).  Patient is not presenting any symptoms. She stated that her daughter has tested positive for the virus and she would like medical advice on what her and her spouse should do, in regards to quarantining and if they should take the test so they don't have any symptoms    Patient is requesting a call back at the earliest convenience. She can be reached at: [phone]

## 2020-11-19 NOTE — TELEPHONE ENCOUNTER
Return call to Pt, and she states that her Daughter tested positive today for Covid-19. She isn't having any symptoms but was asking about what she should do. Informed that she would need to self quarantine for 14 day's, wash hands daily and keep out of her face, sanitize her home and wash her linen. If she does develop symptoms she can schedule a virtual visit with the Provider. If she wants rapid testing to rule out immediately if she is positive then she can go to the . Informed her that she can take tylenol OTC as directed if needed, to avoid Ibuprofen. She can also take cough med's OTC if she needs it. She acknowledged understanding.

## 2020-11-23 ENCOUNTER — TELEPHONE (OUTPATIENT)
Dept: FAMILY MEDICINE | Facility: CLINIC | Age: 50
End: 2020-11-23

## 2020-12-08 ENCOUNTER — PATIENT OUTREACH (OUTPATIENT)
Dept: OTHER | Facility: OTHER | Age: 50
End: 2020-12-08

## 2021-01-15 DIAGNOSIS — G43.711 CHRONIC MIGRAINE WITHOUT AURA, WITH INTRACTABLE MIGRAINE, SO STATED, WITH STATUS MIGRAINOSUS: ICD-10-CM

## 2021-01-18 RX ORDER — FREMANEZUMAB-VFRM 225 MG/1.5ML
INJECTION SUBCUTANEOUS
Qty: 1.5 ML | Refills: 5 | Status: SHIPPED | OUTPATIENT
Start: 2021-01-18 | End: 2021-02-15 | Stop reason: ALTCHOICE

## 2021-01-27 DIAGNOSIS — G43.711 CHRONIC MIGRAINE WITHOUT AURA, WITH INTRACTABLE MIGRAINE, SO STATED, WITH STATUS MIGRAINOSUS: Primary | ICD-10-CM

## 2021-01-27 DIAGNOSIS — Z78.0 MENOPAUSE: ICD-10-CM

## 2021-01-28 ENCOUNTER — HOSPITAL ENCOUNTER (OUTPATIENT)
Dept: RADIOLOGY | Facility: HOSPITAL | Age: 51
Discharge: HOME | End: 2021-01-28
Attending: OBSTETRICS & GYNECOLOGY
Payer: MEDICARE

## 2021-01-28 VITALS — BODY MASS INDEX: 34.96 KG/M2 | WEIGHT: 190 LBS | HEIGHT: 62 IN

## 2021-01-28 DIAGNOSIS — Z12.31 ENCOUNTER FOR SCREENING MAMMOGRAM FOR MALIGNANT NEOPLASM OF BREAST: ICD-10-CM

## 2021-01-28 PROCEDURE — 77063 BREAST TOMOSYNTHESIS BI: CPT | Mod: 26,,, | Performed by: RADIOLOGY

## 2021-01-28 PROCEDURE — 77063 MAMMO DIGITAL SCREENING BILAT WITH TOMO: ICD-10-PCS | Mod: 26,,, | Performed by: RADIOLOGY

## 2021-01-28 PROCEDURE — 77067 MAMMO DIGITAL SCREENING BILAT WITH TOMO: ICD-10-PCS | Mod: 26,,, | Performed by: RADIOLOGY

## 2021-01-28 PROCEDURE — 77067 SCR MAMMO BI INCL CAD: CPT | Mod: TC

## 2021-01-28 PROCEDURE — 77067 SCR MAMMO BI INCL CAD: CPT | Mod: 26,,, | Performed by: RADIOLOGY

## 2021-01-31 ENCOUNTER — PATIENT MESSAGE (OUTPATIENT)
Dept: ADMINISTRATIVE | Facility: OTHER | Age: 51
End: 2021-01-31

## 2021-02-12 ENCOUNTER — TELEPHONE (OUTPATIENT)
Dept: NEUROLOGY | Facility: CLINIC | Age: 51
End: 2021-02-12

## 2021-02-15 RX ORDER — GALCANEZUMAB 120 MG/ML
240 INJECTION, SOLUTION SUBCUTANEOUS ONCE
Qty: 2 ML | Refills: 0 | Status: SHIPPED | OUTPATIENT
Start: 2021-02-15 | End: 2021-02-15

## 2021-02-15 RX ORDER — GALCANEZUMAB 120 MG/ML
120 INJECTION, SOLUTION SUBCUTANEOUS
Qty: 1 ML | Refills: 5 | Status: SHIPPED | OUTPATIENT
Start: 2021-02-15 | End: 2021-03-18

## 2021-03-15 ENCOUNTER — HOSPITAL ENCOUNTER (OUTPATIENT)
Dept: RADIOLOGY | Facility: CLINIC | Age: 51
Discharge: HOME | End: 2021-03-15
Attending: OBSTETRICS & GYNECOLOGY
Payer: MEDICARE

## 2021-03-15 DIAGNOSIS — Z78.0 MENOPAUSE: ICD-10-CM

## 2021-03-15 PROCEDURE — 77080 DEXA BONE DENSITY SPINE HIP: ICD-10-PCS | Mod: 26,,, | Performed by: INTERNAL MEDICINE

## 2021-03-15 PROCEDURE — 77080 DXA BONE DENSITY AXIAL: CPT | Mod: 26,,, | Performed by: INTERNAL MEDICINE

## 2021-03-15 PROCEDURE — 77080 DXA BONE DENSITY AXIAL: CPT | Mod: TC,PO

## 2021-03-18 ENCOUNTER — OFFICE VISIT (OUTPATIENT)
Dept: FAMILY MEDICINE | Facility: CLINIC | Age: 51
End: 2021-03-18
Payer: MEDICARE

## 2021-03-18 VITALS
HEART RATE: 80 BPM | OXYGEN SATURATION: 99 % | TEMPERATURE: 98 F | DIASTOLIC BLOOD PRESSURE: 80 MMHG | SYSTOLIC BLOOD PRESSURE: 124 MMHG | WEIGHT: 191.81 LBS | BODY MASS INDEX: 35.3 KG/M2 | HEIGHT: 62 IN

## 2021-03-18 DIAGNOSIS — I10 ESSENTIAL HYPERTENSION: ICD-10-CM

## 2021-03-18 DIAGNOSIS — F32.9 MAJOR DEPRESSIVE DISORDER WITH CURRENT ACTIVE EPISODE, UNSPECIFIED DEPRESSION EPISODE SEVERITY, UNSPECIFIED WHETHER RECURRENT: ICD-10-CM

## 2021-03-18 DIAGNOSIS — Z12.83 SKIN CANCER SCREENING: Primary | ICD-10-CM

## 2021-03-18 PROCEDURE — 3074F SYST BP LT 130 MM HG: CPT | Mod: CPTII,S$GLB,, | Performed by: FAMILY MEDICINE

## 2021-03-18 PROCEDURE — 99999 PR PBB SHADOW E&M-EST. PATIENT-LVL IV: CPT | Mod: PBBFAC,,, | Performed by: FAMILY MEDICINE

## 2021-03-18 PROCEDURE — 99999 PR PBB SHADOW E&M-EST. PATIENT-LVL IV: ICD-10-PCS | Mod: PBBFAC,,, | Performed by: FAMILY MEDICINE

## 2021-03-18 PROCEDURE — 99499 UNLISTED E&M SERVICE: CPT | Mod: S$GLB,,, | Performed by: FAMILY MEDICINE

## 2021-03-18 PROCEDURE — 99214 OFFICE O/P EST MOD 30 MIN: CPT | Mod: S$GLB,,, | Performed by: FAMILY MEDICINE

## 2021-03-18 PROCEDURE — 99214 PR OFFICE/OUTPT VISIT, EST, LEVL IV, 30-39 MIN: ICD-10-PCS | Mod: S$GLB,,, | Performed by: FAMILY MEDICINE

## 2021-03-18 PROCEDURE — 3079F DIAST BP 80-89 MM HG: CPT | Mod: CPTII,S$GLB,, | Performed by: FAMILY MEDICINE

## 2021-03-18 PROCEDURE — 3079F PR MOST RECENT DIASTOLIC BLOOD PRESSURE 80-89 MM HG: ICD-10-PCS | Mod: CPTII,S$GLB,, | Performed by: FAMILY MEDICINE

## 2021-03-18 PROCEDURE — 3008F BODY MASS INDEX DOCD: CPT | Mod: CPTII,S$GLB,, | Performed by: FAMILY MEDICINE

## 2021-03-18 PROCEDURE — 99499 RISK ADDL DX/OHS AUDIT: ICD-10-PCS | Mod: S$GLB,,, | Performed by: FAMILY MEDICINE

## 2021-03-18 PROCEDURE — 3074F PR MOST RECENT SYSTOLIC BLOOD PRESSURE < 130 MM HG: ICD-10-PCS | Mod: CPTII,S$GLB,, | Performed by: FAMILY MEDICINE

## 2021-03-18 PROCEDURE — 3008F PR BODY MASS INDEX (BMI) DOCUMENTED: ICD-10-PCS | Mod: CPTII,S$GLB,, | Performed by: FAMILY MEDICINE

## 2021-03-19 ENCOUNTER — TELEPHONE (OUTPATIENT)
Dept: FAMILY MEDICINE | Facility: CLINIC | Age: 51
End: 2021-03-19

## 2021-03-22 ENCOUNTER — TELEPHONE (OUTPATIENT)
Dept: FAMILY MEDICINE | Facility: CLINIC | Age: 51
End: 2021-03-22

## 2021-03-24 ENCOUNTER — LAB VISIT (OUTPATIENT)
Dept: LAB | Facility: HOSPITAL | Age: 51
End: 2021-03-24
Attending: FAMILY MEDICINE
Payer: MEDICARE

## 2021-03-24 DIAGNOSIS — I10 ESSENTIAL HYPERTENSION: ICD-10-CM

## 2021-03-24 LAB
ALBUMIN SERPL BCP-MCNC: 4.4 G/DL (ref 3.5–5.2)
ALP SERPL-CCNC: 75 U/L (ref 55–135)
ALT SERPL W/O P-5'-P-CCNC: 28 U/L (ref 10–44)
ANION GAP SERPL CALC-SCNC: 10 MMOL/L (ref 8–16)
AST SERPL-CCNC: 27 U/L (ref 10–40)
BILIRUB SERPL-MCNC: 0.7 MG/DL (ref 0.1–1)
BUN SERPL-MCNC: 25 MG/DL (ref 6–20)
CALCIUM SERPL-MCNC: 10 MG/DL (ref 8.7–10.5)
CHLORIDE SERPL-SCNC: 102 MMOL/L (ref 95–110)
CHOLEST SERPL-MCNC: 153 MG/DL (ref 120–199)
CHOLEST/HDLC SERPL: 3.6 {RATIO} (ref 2–5)
CO2 SERPL-SCNC: 30 MMOL/L (ref 23–29)
CREAT SERPL-MCNC: 1.3 MG/DL (ref 0.5–1.4)
EST. GFR  (AFRICAN AMERICAN): 55 ML/MIN/1.73 M^2
EST. GFR  (NON AFRICAN AMERICAN): 47 ML/MIN/1.73 M^2
GLUCOSE SERPL-MCNC: 95 MG/DL (ref 70–110)
HDLC SERPL-MCNC: 43 MG/DL (ref 40–75)
HDLC SERPL: 28.1 % (ref 20–50)
LDLC SERPL CALC-MCNC: 65 MG/DL (ref 63–159)
NONHDLC SERPL-MCNC: 110 MG/DL
POTASSIUM SERPL-SCNC: 4.1 MMOL/L (ref 3.5–5.1)
PROT SERPL-MCNC: 8 G/DL (ref 6–8.4)
SODIUM SERPL-SCNC: 142 MMOL/L (ref 136–145)
TRIGL SERPL-MCNC: 225 MG/DL (ref 30–150)

## 2021-03-24 PROCEDURE — 80053 COMPREHEN METABOLIC PANEL: CPT | Performed by: FAMILY MEDICINE

## 2021-03-24 PROCEDURE — 80061 LIPID PANEL: CPT | Performed by: FAMILY MEDICINE

## 2021-03-25 ENCOUNTER — PATIENT MESSAGE (OUTPATIENT)
Dept: FAMILY MEDICINE | Facility: CLINIC | Age: 51
End: 2021-03-25

## 2021-04-06 ENCOUNTER — PATIENT OUTREACH (OUTPATIENT)
Dept: ADMINISTRATIVE | Facility: OTHER | Age: 51
End: 2021-04-06

## 2021-04-08 ENCOUNTER — OFFICE VISIT (OUTPATIENT)
Dept: DERMATOLOGY | Facility: CLINIC | Age: 51
End: 2021-04-08
Payer: MEDICARE

## 2021-04-08 ENCOUNTER — TELEPHONE (OUTPATIENT)
Dept: FAMILY MEDICINE | Facility: CLINIC | Age: 51
End: 2021-04-08

## 2021-04-08 DIAGNOSIS — L82.0 INFLAMED SEBORRHEIC KERATOSIS: ICD-10-CM

## 2021-04-08 DIAGNOSIS — L40.8 SEBOPSORIASIS: Primary | ICD-10-CM

## 2021-04-08 DIAGNOSIS — L73.8 SEBACEOUS HYPERPLASIA: ICD-10-CM

## 2021-04-08 DIAGNOSIS — D22.39 FIBROUS PAPULE OF NOSE: ICD-10-CM

## 2021-04-08 PROCEDURE — 99999 PR PBB SHADOW E&M-EST. PATIENT-LVL III: CPT | Mod: PBBFAC,,, | Performed by: DERMATOLOGY

## 2021-04-08 PROCEDURE — 99999 PR PBB SHADOW E&M-EST. PATIENT-LVL III: ICD-10-PCS | Mod: PBBFAC,,, | Performed by: DERMATOLOGY

## 2021-04-08 PROCEDURE — 17110 DESTRUCTION B9 LES UP TO 14: CPT | Mod: S$GLB,,, | Performed by: DERMATOLOGY

## 2021-04-08 PROCEDURE — 1126F AMNT PAIN NOTED NONE PRSNT: CPT | Mod: S$GLB,,, | Performed by: DERMATOLOGY

## 2021-04-08 PROCEDURE — 99499 RISK ADDL DX/OHS AUDIT: ICD-10-PCS | Mod: S$GLB,,, | Performed by: DERMATOLOGY

## 2021-04-08 PROCEDURE — 99214 OFFICE O/P EST MOD 30 MIN: CPT | Mod: 25,S$GLB,, | Performed by: DERMATOLOGY

## 2021-04-08 PROCEDURE — 1126F PR PAIN SEVERITY QUANTIFIED, NO PAIN PRESENT: ICD-10-PCS | Mod: S$GLB,,, | Performed by: DERMATOLOGY

## 2021-04-08 PROCEDURE — 99214 PR OFFICE/OUTPT VISIT, EST, LEVL IV, 30-39 MIN: ICD-10-PCS | Mod: 25,S$GLB,, | Performed by: DERMATOLOGY

## 2021-04-08 PROCEDURE — 17110 PR DESTRUCTION BENIGN LESIONS UP TO 14: ICD-10-PCS | Mod: S$GLB,,, | Performed by: DERMATOLOGY

## 2021-04-08 PROCEDURE — 99499 UNLISTED E&M SERVICE: CPT | Mod: S$GLB,,, | Performed by: DERMATOLOGY

## 2021-04-08 RX ORDER — KETOCONAZOLE 20 MG/ML
SHAMPOO, SUSPENSION TOPICAL
Qty: 120 ML | Refills: 11 | Status: SHIPPED | OUTPATIENT
Start: 2021-04-08 | End: 2022-05-17

## 2021-04-08 RX ORDER — MAGNESIUM 30 MG
250 TABLET ORAL ONCE
COMMUNITY

## 2021-04-08 RX ORDER — FLUOCINONIDE TOPICAL SOLUTION USP, 0.05% 0.5 MG/ML
SOLUTION TOPICAL 2 TIMES DAILY PRN
Qty: 60 ML | Refills: 3 | Status: SHIPPED | OUTPATIENT
Start: 2021-04-08

## 2021-04-08 RX ORDER — TRETINOIN 0.25 MG/G
CREAM TOPICAL NIGHTLY
Qty: 45 G | Refills: 5 | Status: SHIPPED | OUTPATIENT
Start: 2021-04-08 | End: 2023-05-26

## 2021-04-29 ENCOUNTER — OFFICE VISIT (OUTPATIENT)
Dept: FAMILY MEDICINE | Facility: CLINIC | Age: 51
End: 2021-04-29
Payer: MEDICARE

## 2021-04-29 VITALS
HEART RATE: 88 BPM | WEIGHT: 194 LBS | BODY MASS INDEX: 35.7 KG/M2 | SYSTOLIC BLOOD PRESSURE: 120 MMHG | OXYGEN SATURATION: 97 % | TEMPERATURE: 99 F | HEIGHT: 62 IN | DIASTOLIC BLOOD PRESSURE: 78 MMHG

## 2021-04-29 DIAGNOSIS — R10.2 PELVIC PAIN: Primary | ICD-10-CM

## 2021-04-29 DIAGNOSIS — N39.0 URINARY TRACT INFECTION WITHOUT HEMATURIA, SITE UNSPECIFIED: ICD-10-CM

## 2021-04-29 LAB
BILIRUB SERPL-MCNC: NEGATIVE MG/DL
BLOOD URINE, POC: 250
CLARITY, POC UA: NORMAL
COLOR, POC UA: NORMAL
GLUCOSE UR QL STRIP: NORMAL
KETONES UR QL STRIP: NEGATIVE
LEUKOCYTE ESTERASE URINE, POC: NORMAL
NITRITE, POC UA: NEGATIVE
PH, POC UA: 5
PROTEIN, POC: NORMAL
SPECIFIC GRAVITY, POC UA: 1.01
UROBILINOGEN, POC UA: NORMAL

## 2021-04-29 PROCEDURE — 87086 URINE CULTURE/COLONY COUNT: CPT | Performed by: FAMILY MEDICINE

## 2021-04-29 PROCEDURE — 99214 PR OFFICE/OUTPT VISIT, EST, LEVL IV, 30-39 MIN: ICD-10-PCS | Mod: 25,S$GLB,, | Performed by: FAMILY MEDICINE

## 2021-04-29 PROCEDURE — 81002 POCT URINE DIPSTICK WITHOUT MICROSCOPE: ICD-10-PCS | Mod: S$GLB,,, | Performed by: FAMILY MEDICINE

## 2021-04-29 PROCEDURE — 81002 URINALYSIS NONAUTO W/O SCOPE: CPT | Mod: S$GLB,,, | Performed by: FAMILY MEDICINE

## 2021-04-29 PROCEDURE — 87186 SC STD MICRODIL/AGAR DIL: CPT | Performed by: FAMILY MEDICINE

## 2021-04-29 PROCEDURE — 3008F BODY MASS INDEX DOCD: CPT | Mod: CPTII,S$GLB,, | Performed by: FAMILY MEDICINE

## 2021-04-29 PROCEDURE — 99214 OFFICE O/P EST MOD 30 MIN: CPT | Mod: 25,S$GLB,, | Performed by: FAMILY MEDICINE

## 2021-04-29 PROCEDURE — 87077 CULTURE AEROBIC IDENTIFY: CPT | Performed by: FAMILY MEDICINE

## 2021-04-29 PROCEDURE — 87147 CULTURE TYPE IMMUNOLOGIC: CPT | Performed by: FAMILY MEDICINE

## 2021-04-29 PROCEDURE — 87088 URINE BACTERIA CULTURE: CPT | Performed by: FAMILY MEDICINE

## 2021-04-29 PROCEDURE — 99999 PR PBB SHADOW E&M-EST. PATIENT-LVL IV: CPT | Mod: PBBFAC,,, | Performed by: FAMILY MEDICINE

## 2021-04-29 PROCEDURE — 3008F PR BODY MASS INDEX (BMI) DOCUMENTED: ICD-10-PCS | Mod: CPTII,S$GLB,, | Performed by: FAMILY MEDICINE

## 2021-04-29 PROCEDURE — 99999 PR PBB SHADOW E&M-EST. PATIENT-LVL IV: ICD-10-PCS | Mod: PBBFAC,,, | Performed by: FAMILY MEDICINE

## 2021-04-29 RX ORDER — CEPHALEXIN 500 MG/1
500 CAPSULE ORAL EVERY 12 HOURS
Qty: 10 CAPSULE | Refills: 0 | Status: SHIPPED | OUTPATIENT
Start: 2021-04-29 | End: 2021-05-04

## 2021-05-01 LAB
BACTERIA UR CULT: ABNORMAL
BACTERIA UR CULT: ABNORMAL

## 2021-05-07 ENCOUNTER — PATIENT MESSAGE (OUTPATIENT)
Dept: FAMILY MEDICINE | Facility: CLINIC | Age: 51
End: 2021-05-07

## 2021-05-07 RX ORDER — SULFAMETHOXAZOLE AND TRIMETHOPRIM 800; 160 MG/1; MG/1
1 TABLET ORAL 2 TIMES DAILY
Qty: 10 TABLET | Refills: 0 | Status: SHIPPED | OUTPATIENT
Start: 2021-05-07 | End: 2021-05-12

## 2021-05-10 ENCOUNTER — PATIENT MESSAGE (OUTPATIENT)
Dept: FAMILY MEDICINE | Facility: CLINIC | Age: 51
End: 2021-05-10

## 2021-06-30 RX ORDER — ATENOLOL 50 MG/1
50 TABLET ORAL 2 TIMES DAILY PRN
Qty: 180 TABLET | Refills: 0 | Status: SHIPPED | OUTPATIENT
Start: 2021-06-30 | End: 2022-03-22

## 2021-07-20 ENCOUNTER — OFFICE VISIT (OUTPATIENT)
Dept: FAMILY MEDICINE | Facility: CLINIC | Age: 51
End: 2021-07-20
Payer: MEDICARE

## 2021-07-20 VITALS
HEART RATE: 68 BPM | TEMPERATURE: 99 F | WEIGHT: 198.44 LBS | HEIGHT: 62 IN | DIASTOLIC BLOOD PRESSURE: 70 MMHG | SYSTOLIC BLOOD PRESSURE: 124 MMHG | BODY MASS INDEX: 36.52 KG/M2 | OXYGEN SATURATION: 97 %

## 2021-07-20 DIAGNOSIS — N39.0 URINARY TRACT INFECTION WITHOUT HEMATURIA, SITE UNSPECIFIED: ICD-10-CM

## 2021-07-20 DIAGNOSIS — J02.9 SORE THROAT: ICD-10-CM

## 2021-07-20 DIAGNOSIS — Z20.822 EXPOSURE TO COVID-19 VIRUS: ICD-10-CM

## 2021-07-20 DIAGNOSIS — E78.5 HYPERLIPIDEMIA, UNSPECIFIED HYPERLIPIDEMIA TYPE: ICD-10-CM

## 2021-07-20 DIAGNOSIS — R82.90 ABNORMAL URINE: Primary | ICD-10-CM

## 2021-07-20 LAB
BILIRUB SERPL-MCNC: NEGATIVE MG/DL
BLOOD URINE, POC: 250
CLARITY, POC UA: NORMAL
COLOR, POC UA: NORMAL
GLUCOSE UR QL STRIP: NORMAL
KETONES UR QL STRIP: NEGATIVE
LEUKOCYTE ESTERASE URINE, POC: NORMAL
NITRITE, POC UA: NEGATIVE
PH, POC UA: 6
PROTEIN, POC: NORMAL
SPECIFIC GRAVITY, POC UA: 1.01
UROBILINOGEN, POC UA: NORMAL

## 2021-07-20 PROCEDURE — 3078F DIAST BP <80 MM HG: CPT | Mod: CPTII,S$GLB,, | Performed by: FAMILY MEDICINE

## 2021-07-20 PROCEDURE — 99999 PR PBB SHADOW E&M-EST. PATIENT-LVL V: CPT | Mod: PBBFAC,,, | Performed by: FAMILY MEDICINE

## 2021-07-20 PROCEDURE — 3078F PR MOST RECENT DIASTOLIC BLOOD PRESSURE < 80 MM HG: ICD-10-PCS | Mod: CPTII,S$GLB,, | Performed by: FAMILY MEDICINE

## 2021-07-20 PROCEDURE — 1125F PR PAIN SEVERITY QUANTIFIED, PAIN PRESENT: ICD-10-PCS | Mod: CPTII,S$GLB,, | Performed by: FAMILY MEDICINE

## 2021-07-20 PROCEDURE — 3074F SYST BP LT 130 MM HG: CPT | Mod: CPTII,S$GLB,, | Performed by: FAMILY MEDICINE

## 2021-07-20 PROCEDURE — 87086 URINE CULTURE/COLONY COUNT: CPT | Performed by: FAMILY MEDICINE

## 2021-07-20 PROCEDURE — 99999 PR PBB SHADOW E&M-EST. PATIENT-LVL V: ICD-10-PCS | Mod: PBBFAC,,, | Performed by: FAMILY MEDICINE

## 2021-07-20 PROCEDURE — 87077 CULTURE AEROBIC IDENTIFY: CPT | Performed by: FAMILY MEDICINE

## 2021-07-20 PROCEDURE — 99214 OFFICE O/P EST MOD 30 MIN: CPT | Mod: 25,S$GLB,, | Performed by: FAMILY MEDICINE

## 2021-07-20 PROCEDURE — 3008F BODY MASS INDEX DOCD: CPT | Mod: CPTII,S$GLB,, | Performed by: FAMILY MEDICINE

## 2021-07-20 PROCEDURE — 87186 SC STD MICRODIL/AGAR DIL: CPT | Performed by: FAMILY MEDICINE

## 2021-07-20 PROCEDURE — 81002 POCT URINE DIPSTICK WITHOUT MICROSCOPE: ICD-10-PCS | Mod: S$GLB,,, | Performed by: FAMILY MEDICINE

## 2021-07-20 PROCEDURE — U0005 INFEC AGEN DETEC AMPLI PROBE: HCPCS | Performed by: FAMILY MEDICINE

## 2021-07-20 PROCEDURE — 99499 UNLISTED E&M SERVICE: CPT | Mod: S$GLB,,, | Performed by: FAMILY MEDICINE

## 2021-07-20 PROCEDURE — U0003 INFECTIOUS AGENT DETECTION BY NUCLEIC ACID (DNA OR RNA); SEVERE ACUTE RESPIRATORY SYNDROME CORONAVIRUS 2 (SARS-COV-2) (CORONAVIRUS DISEASE [COVID-19]), AMPLIFIED PROBE TECHNIQUE, MAKING USE OF HIGH THROUGHPUT TECHNOLOGIES AS DESCRIBED BY CMS-2020-01-R: HCPCS | Performed by: FAMILY MEDICINE

## 2021-07-20 PROCEDURE — 99499 RISK ADDL DX/OHS AUDIT: ICD-10-PCS | Mod: S$GLB,,, | Performed by: FAMILY MEDICINE

## 2021-07-20 PROCEDURE — 3008F PR BODY MASS INDEX (BMI) DOCUMENTED: ICD-10-PCS | Mod: CPTII,S$GLB,, | Performed by: FAMILY MEDICINE

## 2021-07-20 PROCEDURE — 3074F PR MOST RECENT SYSTOLIC BLOOD PRESSURE < 130 MM HG: ICD-10-PCS | Mod: CPTII,S$GLB,, | Performed by: FAMILY MEDICINE

## 2021-07-20 PROCEDURE — 99214 PR OFFICE/OUTPT VISIT, EST, LEVL IV, 30-39 MIN: ICD-10-PCS | Mod: 25,S$GLB,, | Performed by: FAMILY MEDICINE

## 2021-07-20 PROCEDURE — 87088 URINE BACTERIA CULTURE: CPT | Performed by: FAMILY MEDICINE

## 2021-07-20 PROCEDURE — 81002 URINALYSIS NONAUTO W/O SCOPE: CPT | Mod: S$GLB,,, | Performed by: FAMILY MEDICINE

## 2021-07-20 PROCEDURE — 1125F AMNT PAIN NOTED PAIN PRSNT: CPT | Mod: CPTII,S$GLB,, | Performed by: FAMILY MEDICINE

## 2021-07-20 PROCEDURE — 87147 CULTURE TYPE IMMUNOLOGIC: CPT | Performed by: FAMILY MEDICINE

## 2021-07-20 RX ORDER — CEPHALEXIN 500 MG/1
500 CAPSULE ORAL EVERY 12 HOURS
Qty: 10 CAPSULE | Refills: 0 | Status: SHIPPED | OUTPATIENT
Start: 2021-07-20 | End: 2021-07-25

## 2021-07-20 RX ORDER — ATORVASTATIN CALCIUM 40 MG/1
40 TABLET, FILM COATED ORAL DAILY
Qty: 90 TABLET | Refills: 3 | Status: SHIPPED | OUTPATIENT
Start: 2021-07-20 | End: 2022-07-18

## 2021-07-21 ENCOUNTER — TELEPHONE (OUTPATIENT)
Dept: FAMILY MEDICINE | Facility: CLINIC | Age: 51
End: 2021-07-21

## 2021-07-21 LAB
SARS-COV-2 RNA RESP QL NAA+PROBE: NOT DETECTED
SARS-COV-2- CYCLE NUMBER: -1

## 2021-07-22 LAB
BACTERIA UR CULT: ABNORMAL
BACTERIA UR CULT: ABNORMAL

## 2021-08-03 ENCOUNTER — PATIENT MESSAGE (OUTPATIENT)
Dept: FAMILY MEDICINE | Facility: CLINIC | Age: 51
End: 2021-08-03

## 2021-08-04 ENCOUNTER — OFFICE VISIT (OUTPATIENT)
Dept: FAMILY MEDICINE | Facility: CLINIC | Age: 51
End: 2021-08-04
Payer: MEDICARE

## 2021-08-04 DIAGNOSIS — J35.8 TONSIL STONE: ICD-10-CM

## 2021-08-04 DIAGNOSIS — M54.50 ACUTE LOW BACK PAIN WITHOUT SCIATICA, UNSPECIFIED BACK PAIN LATERALITY: ICD-10-CM

## 2021-08-04 DIAGNOSIS — R35.0 URINARY FREQUENCY: Primary | ICD-10-CM

## 2021-08-04 PROCEDURE — 1160F PR REVIEW ALL MEDS BY PRESCRIBER/CLIN PHARMACIST DOCUMENTED: ICD-10-PCS | Mod: CPTII,95,, | Performed by: FAMILY MEDICINE

## 2021-08-04 PROCEDURE — 99214 PR OFFICE/OUTPT VISIT, EST, LEVL IV, 30-39 MIN: ICD-10-PCS | Mod: 95,,, | Performed by: FAMILY MEDICINE

## 2021-08-04 PROCEDURE — 1159F MED LIST DOCD IN RCRD: CPT | Mod: CPTII,95,, | Performed by: FAMILY MEDICINE

## 2021-08-04 PROCEDURE — 1159F PR MEDICATION LIST DOCUMENTED IN MEDICAL RECORD: ICD-10-PCS | Mod: CPTII,95,, | Performed by: FAMILY MEDICINE

## 2021-08-04 PROCEDURE — 1160F RVW MEDS BY RX/DR IN RCRD: CPT | Mod: CPTII,95,, | Performed by: FAMILY MEDICINE

## 2021-08-04 PROCEDURE — 99214 OFFICE O/P EST MOD 30 MIN: CPT | Mod: 95,,, | Performed by: FAMILY MEDICINE

## 2021-08-06 ENCOUNTER — PATIENT MESSAGE (OUTPATIENT)
Dept: FAMILY MEDICINE | Facility: CLINIC | Age: 51
End: 2021-08-06

## 2021-11-01 ENCOUNTER — OFFICE VISIT (OUTPATIENT)
Dept: FAMILY MEDICINE | Facility: CLINIC | Age: 51
End: 2021-11-01
Payer: MEDICARE

## 2021-11-01 ENCOUNTER — LAB VISIT (OUTPATIENT)
Dept: LAB | Facility: HOSPITAL | Age: 51
End: 2021-11-01
Attending: FAMILY MEDICINE
Payer: MEDICARE

## 2021-11-01 VITALS
BODY MASS INDEX: 35.86 KG/M2 | OXYGEN SATURATION: 96 % | RESPIRATION RATE: 16 BRPM | WEIGHT: 194.88 LBS | HEIGHT: 62 IN | TEMPERATURE: 98 F | DIASTOLIC BLOOD PRESSURE: 87 MMHG | HEART RATE: 59 BPM | SYSTOLIC BLOOD PRESSURE: 135 MMHG

## 2021-11-01 DIAGNOSIS — R79.9 ABNORMAL BLOOD CHEMISTRY TEST: Primary | ICD-10-CM

## 2021-11-01 DIAGNOSIS — N17.9 ACUTE KIDNEY INJURY: ICD-10-CM

## 2021-11-01 DIAGNOSIS — R79.9 ABNORMAL BLOOD CHEMISTRY TEST: ICD-10-CM

## 2021-11-01 LAB
ANION GAP SERPL CALC-SCNC: 11 MMOL/L (ref 8–16)
BUN SERPL-MCNC: 23 MG/DL (ref 6–20)
CALCIUM SERPL-MCNC: 10.7 MG/DL (ref 8.7–10.5)
CHLORIDE SERPL-SCNC: 102 MMOL/L (ref 95–110)
CO2 SERPL-SCNC: 26 MMOL/L (ref 23–29)
CREAT SERPL-MCNC: 1.2 MG/DL (ref 0.5–1.4)
EST. GFR  (AFRICAN AMERICAN): 60 ML/MIN/1.73 M^2
EST. GFR  (NON AFRICAN AMERICAN): 52 ML/MIN/1.73 M^2
GLUCOSE SERPL-MCNC: 97 MG/DL (ref 70–110)
POTASSIUM SERPL-SCNC: 3.8 MMOL/L (ref 3.5–5.1)
SODIUM SERPL-SCNC: 139 MMOL/L (ref 136–145)

## 2021-11-01 PROCEDURE — 1159F MED LIST DOCD IN RCRD: CPT | Mod: CPTII,S$GLB,, | Performed by: FAMILY MEDICINE

## 2021-11-01 PROCEDURE — 3079F DIAST BP 80-89 MM HG: CPT | Mod: CPTII,S$GLB,, | Performed by: FAMILY MEDICINE

## 2021-11-01 PROCEDURE — 80048 BASIC METABOLIC PNL TOTAL CA: CPT | Performed by: FAMILY MEDICINE

## 2021-11-01 PROCEDURE — 99999 PR PBB SHADOW E&M-EST. PATIENT-LVL IV: CPT | Mod: PBBFAC,,, | Performed by: FAMILY MEDICINE

## 2021-11-01 PROCEDURE — 3075F PR MOST RECENT SYSTOLIC BLOOD PRESS GE 130-139MM HG: ICD-10-PCS | Mod: CPTII,S$GLB,, | Performed by: FAMILY MEDICINE

## 2021-11-01 PROCEDURE — 3079F PR MOST RECENT DIASTOLIC BLOOD PRESSURE 80-89 MM HG: ICD-10-PCS | Mod: CPTII,S$GLB,, | Performed by: FAMILY MEDICINE

## 2021-11-01 PROCEDURE — 99214 OFFICE O/P EST MOD 30 MIN: CPT | Mod: S$GLB,,, | Performed by: FAMILY MEDICINE

## 2021-11-01 PROCEDURE — 3008F BODY MASS INDEX DOCD: CPT | Mod: CPTII,S$GLB,, | Performed by: FAMILY MEDICINE

## 2021-11-01 PROCEDURE — 4010F ACE/ARB THERAPY RXD/TAKEN: CPT | Mod: CPTII,S$GLB,, | Performed by: FAMILY MEDICINE

## 2021-11-01 PROCEDURE — 1159F PR MEDICATION LIST DOCUMENTED IN MEDICAL RECORD: ICD-10-PCS | Mod: CPTII,S$GLB,, | Performed by: FAMILY MEDICINE

## 2021-11-01 PROCEDURE — 3075F SYST BP GE 130 - 139MM HG: CPT | Mod: CPTII,S$GLB,, | Performed by: FAMILY MEDICINE

## 2021-11-01 PROCEDURE — 99214 PR OFFICE/OUTPT VISIT, EST, LEVL IV, 30-39 MIN: ICD-10-PCS | Mod: S$GLB,,, | Performed by: FAMILY MEDICINE

## 2021-11-01 PROCEDURE — 3008F PR BODY MASS INDEX (BMI) DOCUMENTED: ICD-10-PCS | Mod: CPTII,S$GLB,, | Performed by: FAMILY MEDICINE

## 2021-11-01 PROCEDURE — 4010F PR ACE/ARB THEARPY RXD/TAKEN: ICD-10-PCS | Mod: CPTII,S$GLB,, | Performed by: FAMILY MEDICINE

## 2021-11-01 PROCEDURE — 99999 PR PBB SHADOW E&M-EST. PATIENT-LVL IV: ICD-10-PCS | Mod: PBBFAC,,, | Performed by: FAMILY MEDICINE

## 2021-11-01 PROCEDURE — 83036 HEMOGLOBIN GLYCOSYLATED A1C: CPT | Performed by: FAMILY MEDICINE

## 2021-11-02 LAB
ESTIMATED AVG GLUCOSE: 114 MG/DL (ref 68–131)
HBA1C MFR BLD: 5.6 % (ref 4–5.6)

## 2021-11-05 ENCOUNTER — PES CALL (OUTPATIENT)
Dept: ADMINISTRATIVE | Facility: CLINIC | Age: 51
End: 2021-11-05
Payer: MEDICARE

## 2021-11-17 ENCOUNTER — OFFICE VISIT (OUTPATIENT)
Dept: FAMILY MEDICINE | Facility: CLINIC | Age: 51
End: 2021-11-17
Payer: MEDICARE

## 2021-11-17 VITALS
DIASTOLIC BLOOD PRESSURE: 80 MMHG | SYSTOLIC BLOOD PRESSURE: 124 MMHG | RESPIRATION RATE: 16 BRPM | BODY MASS INDEX: 35.7 KG/M2 | HEART RATE: 70 BPM | TEMPERATURE: 99 F | OXYGEN SATURATION: 97 % | HEIGHT: 62 IN | WEIGHT: 194 LBS

## 2021-11-17 DIAGNOSIS — R10.9 ACUTE LEFT FLANK PAIN: Primary | ICD-10-CM

## 2021-11-17 LAB
BACTERIA #/AREA URNS HPF: ABNORMAL /HPF
BILIRUB SERPL-MCNC: NEGATIVE MG/DL
BILIRUB UR QL STRIP: NEGATIVE
BLOOD URINE, POC: 250
CLARITY UR: ABNORMAL
CLARITY, POC UA: ABNORMAL
COLOR UR: YELLOW
COLOR, POC UA: YELLOW
GLUCOSE UR QL STRIP: NEGATIVE
GLUCOSE UR QL STRIP: NEGATIVE
HGB UR QL STRIP: ABNORMAL
KETONES UR QL STRIP: NEGATIVE
KETONES UR QL STRIP: NEGATIVE
LEUKOCYTE ESTERASE UR QL STRIP: NEGATIVE
LEUKOCYTE ESTERASE URINE, POC: ABNORMAL
MICROSCOPIC COMMENT: ABNORMAL
NITRITE UR QL STRIP: NEGATIVE
NITRITE, POC UA: NEGATIVE
PH UR STRIP: 7 [PH] (ref 5–8)
PH, POC UA: 6
PROT UR QL STRIP: NEGATIVE
PROTEIN, POC: ABNORMAL
RBC #/AREA URNS HPF: 63 /HPF (ref 0–4)
SP GR UR STRIP: 1.01 (ref 1–1.03)
SPECIFIC GRAVITY, POC UA: 6
SQUAMOUS #/AREA URNS HPF: 5 /HPF
UNIDENT CRYS URNS QL MICRO: ABNORMAL
URN SPEC COLLECT METH UR: ABNORMAL
UROBILINOGEN UR STRIP-ACNC: NEGATIVE EU/DL
UROBILINOGEN, POC UA: NEGATIVE
WBC #/AREA URNS HPF: 61 /HPF (ref 0–5)
WBC CLUMPS URNS QL MICRO: ABNORMAL
YEAST URNS QL MICRO: ABNORMAL

## 2021-11-17 PROCEDURE — 1160F RVW MEDS BY RX/DR IN RCRD: CPT | Mod: CPTII,S$GLB,, | Performed by: NURSE PRACTITIONER

## 2021-11-17 PROCEDURE — 1159F MED LIST DOCD IN RCRD: CPT | Mod: CPTII,S$GLB,, | Performed by: NURSE PRACTITIONER

## 2021-11-17 PROCEDURE — 3008F PR BODY MASS INDEX (BMI) DOCUMENTED: ICD-10-PCS | Mod: CPTII,S$GLB,, | Performed by: NURSE PRACTITIONER

## 2021-11-17 PROCEDURE — 81002 POCT URINE DIPSTICK WITHOUT MICROSCOPE: ICD-10-PCS | Mod: S$GLB,,, | Performed by: NURSE PRACTITIONER

## 2021-11-17 PROCEDURE — 99999 PR PBB SHADOW E&M-EST. PATIENT-LVL V: CPT | Mod: PBBFAC,,, | Performed by: NURSE PRACTITIONER

## 2021-11-17 PROCEDURE — 87086 URINE CULTURE/COLONY COUNT: CPT | Performed by: NURSE PRACTITIONER

## 2021-11-17 PROCEDURE — 3079F PR MOST RECENT DIASTOLIC BLOOD PRESSURE 80-89 MM HG: ICD-10-PCS | Mod: CPTII,S$GLB,, | Performed by: NURSE PRACTITIONER

## 2021-11-17 PROCEDURE — 1159F PR MEDICATION LIST DOCUMENTED IN MEDICAL RECORD: ICD-10-PCS | Mod: CPTII,S$GLB,, | Performed by: NURSE PRACTITIONER

## 2021-11-17 PROCEDURE — 3044F HG A1C LEVEL LT 7.0%: CPT | Mod: CPTII,S$GLB,, | Performed by: NURSE PRACTITIONER

## 2021-11-17 PROCEDURE — 99999 PR PBB SHADOW E&M-EST. PATIENT-LVL V: ICD-10-PCS | Mod: PBBFAC,,, | Performed by: NURSE PRACTITIONER

## 2021-11-17 PROCEDURE — 1160F PR REVIEW ALL MEDS BY PRESCRIBER/CLIN PHARMACIST DOCUMENTED: ICD-10-PCS | Mod: CPTII,S$GLB,, | Performed by: NURSE PRACTITIONER

## 2021-11-17 PROCEDURE — 3074F SYST BP LT 130 MM HG: CPT | Mod: CPTII,S$GLB,, | Performed by: NURSE PRACTITIONER

## 2021-11-17 PROCEDURE — 3079F DIAST BP 80-89 MM HG: CPT | Mod: CPTII,S$GLB,, | Performed by: NURSE PRACTITIONER

## 2021-11-17 PROCEDURE — 3008F BODY MASS INDEX DOCD: CPT | Mod: CPTII,S$GLB,, | Performed by: NURSE PRACTITIONER

## 2021-11-17 PROCEDURE — 81002 URINALYSIS NONAUTO W/O SCOPE: CPT | Mod: S$GLB,,, | Performed by: NURSE PRACTITIONER

## 2021-11-17 PROCEDURE — 87077 CULTURE AEROBIC IDENTIFY: CPT | Performed by: NURSE PRACTITIONER

## 2021-11-17 PROCEDURE — 99213 PR OFFICE/OUTPT VISIT, EST, LEVL III, 20-29 MIN: ICD-10-PCS | Mod: 25,S$GLB,, | Performed by: NURSE PRACTITIONER

## 2021-11-17 PROCEDURE — 81000 URINALYSIS NONAUTO W/SCOPE: CPT | Performed by: NURSE PRACTITIONER

## 2021-11-17 PROCEDURE — 3074F PR MOST RECENT SYSTOLIC BLOOD PRESSURE < 130 MM HG: ICD-10-PCS | Mod: CPTII,S$GLB,, | Performed by: NURSE PRACTITIONER

## 2021-11-17 PROCEDURE — 87186 SC STD MICRODIL/AGAR DIL: CPT | Performed by: NURSE PRACTITIONER

## 2021-11-17 PROCEDURE — 87088 URINE BACTERIA CULTURE: CPT | Performed by: NURSE PRACTITIONER

## 2021-11-17 PROCEDURE — 96372 THER/PROPH/DIAG INJ SC/IM: CPT | Mod: S$GLB,,, | Performed by: NURSE PRACTITIONER

## 2021-11-17 PROCEDURE — 99213 OFFICE O/P EST LOW 20 MIN: CPT | Mod: 25,S$GLB,, | Performed by: NURSE PRACTITIONER

## 2021-11-17 PROCEDURE — 96372 PR INJECTION,THERAP/PROPH/DIAG2ST, IM OR SUBCUT: ICD-10-PCS | Mod: S$GLB,,, | Performed by: NURSE PRACTITIONER

## 2021-11-17 PROCEDURE — 4010F PR ACE/ARB THEARPY RXD/TAKEN: ICD-10-PCS | Mod: CPTII,S$GLB,, | Performed by: NURSE PRACTITIONER

## 2021-11-17 PROCEDURE — 3044F PR MOST RECENT HEMOGLOBIN A1C LEVEL <7.0%: ICD-10-PCS | Mod: CPTII,S$GLB,, | Performed by: NURSE PRACTITIONER

## 2021-11-17 PROCEDURE — 4010F ACE/ARB THERAPY RXD/TAKEN: CPT | Mod: CPTII,S$GLB,, | Performed by: NURSE PRACTITIONER

## 2021-11-17 RX ORDER — CEFTRIAXONE 1 G/1
1 INJECTION, POWDER, FOR SOLUTION INTRAMUSCULAR; INTRAVENOUS
Status: COMPLETED | OUTPATIENT
Start: 2021-11-17 | End: 2021-11-17

## 2021-11-17 RX ORDER — CALCIUM CITRATE/VITAMIN D3 315MG-6.25
TABLET ORAL
COMMUNITY

## 2021-11-17 RX ORDER — PEDIATRIC MULTIVITAMIN NO.238
TABLET,CHEWABLE ORAL
COMMUNITY

## 2021-11-17 RX ORDER — DOXYCYCLINE 100 MG/1
100 CAPSULE ORAL EVERY 12 HOURS
Qty: 14 CAPSULE | Refills: 0 | Status: SHIPPED | OUTPATIENT
Start: 2021-11-18 | End: 2021-11-25

## 2021-11-17 RX ADMIN — CEFTRIAXONE 1 G: 1 INJECTION, POWDER, FOR SOLUTION INTRAMUSCULAR; INTRAVENOUS at 02:11

## 2021-11-18 ENCOUNTER — TELEPHONE (OUTPATIENT)
Dept: FAMILY MEDICINE | Facility: CLINIC | Age: 51
End: 2021-11-18
Payer: MEDICARE

## 2021-11-18 DIAGNOSIS — B37.49 CANDIDAL UTI (URINARY TRACT INFECTION): Primary | ICD-10-CM

## 2021-11-18 RX ORDER — FLUCONAZOLE 150 MG/1
150 TABLET ORAL DAILY
Qty: 7 TABLET | Refills: 0 | Status: SHIPPED | OUTPATIENT
Start: 2021-11-18 | End: 2021-11-22 | Stop reason: CLARIF

## 2021-11-19 LAB — BACTERIA UR CULT: ABNORMAL

## 2021-11-22 ENCOUNTER — TELEPHONE (OUTPATIENT)
Dept: FAMILY MEDICINE | Facility: CLINIC | Age: 51
End: 2021-11-22

## 2021-11-22 ENCOUNTER — HOSPITAL ENCOUNTER (OUTPATIENT)
Dept: RADIOLOGY | Facility: HOSPITAL | Age: 51
Discharge: HOME | End: 2021-11-22
Attending: NURSE PRACTITIONER
Payer: MEDICARE

## 2021-11-22 ENCOUNTER — OFFICE VISIT (OUTPATIENT)
Dept: FAMILY MEDICINE | Facility: CLINIC | Age: 51
End: 2021-11-22
Payer: MEDICARE

## 2021-11-22 ENCOUNTER — PATIENT MESSAGE (OUTPATIENT)
Dept: FAMILY MEDICINE | Facility: CLINIC | Age: 51
End: 2021-11-22

## 2021-11-22 VITALS
WEIGHT: 192.44 LBS | DIASTOLIC BLOOD PRESSURE: 86 MMHG | BODY MASS INDEX: 35.41 KG/M2 | RESPIRATION RATE: 16 BRPM | TEMPERATURE: 99 F | HEART RATE: 57 BPM | SYSTOLIC BLOOD PRESSURE: 138 MMHG | OXYGEN SATURATION: 95 % | HEIGHT: 62 IN

## 2021-11-22 DIAGNOSIS — R10.31 ACUTE ABDOMINAL PAIN IN RIGHT LOWER QUADRANT: ICD-10-CM

## 2021-11-22 DIAGNOSIS — K57.92 DIVERTICULITIS: Primary | ICD-10-CM

## 2021-11-22 DIAGNOSIS — R10.31 ACUTE ABDOMINAL PAIN IN RIGHT LOWER QUADRANT: Primary | ICD-10-CM

## 2021-11-22 DIAGNOSIS — R10.31 RIGHT LOWER QUADRANT PAIN: ICD-10-CM

## 2021-11-22 PROCEDURE — 74177 CT ABDOMEN PELVIS WITH CONTRAST: ICD-10-PCS | Mod: 26,,, | Performed by: RADIOLOGY

## 2021-11-22 PROCEDURE — 25500020 PHARM REV CODE 255: Performed by: NURSE PRACTITIONER

## 2021-11-22 PROCEDURE — 96372 THER/PROPH/DIAG INJ SC/IM: CPT | Mod: S$GLB,,, | Performed by: NURSE PRACTITIONER

## 2021-11-22 PROCEDURE — 74177 CT ABD & PELVIS W/CONTRAST: CPT | Mod: TC

## 2021-11-22 PROCEDURE — 99999 PR PBB SHADOW E&M-EST. PATIENT-LVL V: CPT | Mod: PBBFAC,,, | Performed by: NURSE PRACTITIONER

## 2021-11-22 PROCEDURE — 99999 PR PBB SHADOW E&M-EST. PATIENT-LVL V: ICD-10-PCS | Mod: PBBFAC,,, | Performed by: NURSE PRACTITIONER

## 2021-11-22 PROCEDURE — 74177 CT ABD & PELVIS W/CONTRAST: CPT | Mod: 26,,, | Performed by: RADIOLOGY

## 2021-11-22 PROCEDURE — 99214 PR OFFICE/OUTPT VISIT, EST, LEVL IV, 30-39 MIN: ICD-10-PCS | Mod: 25,S$GLB,, | Performed by: NURSE PRACTITIONER

## 2021-11-22 PROCEDURE — 4010F ACE/ARB THERAPY RXD/TAKEN: CPT | Mod: CPTII,S$GLB,, | Performed by: NURSE PRACTITIONER

## 2021-11-22 PROCEDURE — 4010F PR ACE/ARB THEARPY RXD/TAKEN: ICD-10-PCS | Mod: CPTII,S$GLB,, | Performed by: NURSE PRACTITIONER

## 2021-11-22 PROCEDURE — 96372 PR INJECTION,THERAP/PROPH/DIAG2ST, IM OR SUBCUT: ICD-10-PCS | Mod: S$GLB,,, | Performed by: NURSE PRACTITIONER

## 2021-11-22 PROCEDURE — 99214 OFFICE O/P EST MOD 30 MIN: CPT | Mod: 25,S$GLB,, | Performed by: NURSE PRACTITIONER

## 2021-11-22 RX ORDER — METRONIDAZOLE 500 MG/1
500 TABLET ORAL EVERY 12 HOURS
Qty: 20 TABLET | Refills: 0 | Status: SHIPPED | OUTPATIENT
Start: 2021-11-22 | End: 2021-12-02

## 2021-11-22 RX ORDER — KETOROLAC TROMETHAMINE 30 MG/ML
60 INJECTION, SOLUTION INTRAMUSCULAR; INTRAVENOUS
Status: COMPLETED | OUTPATIENT
Start: 2021-11-22 | End: 2021-11-22

## 2021-11-22 RX ORDER — CIPROFLOXACIN 500 MG/1
500 TABLET ORAL EVERY 12 HOURS
Qty: 20 TABLET | Refills: 0 | Status: SHIPPED | OUTPATIENT
Start: 2021-11-22 | End: 2021-12-02

## 2021-11-22 RX ADMIN — KETOROLAC TROMETHAMINE 60 MG: 30 INJECTION, SOLUTION INTRAMUSCULAR; INTRAVENOUS at 11:11

## 2021-11-22 RX ADMIN — IOHEXOL 15 ML: 300 INJECTION, SOLUTION INTRAVENOUS at 01:11

## 2021-11-22 RX ADMIN — IOHEXOL 85 ML: 350 INJECTION, SOLUTION INTRAVENOUS at 01:11

## 2021-11-23 ENCOUNTER — PATIENT MESSAGE (OUTPATIENT)
Dept: FAMILY MEDICINE | Facility: CLINIC | Age: 51
End: 2021-11-23
Payer: MEDICARE

## 2021-11-24 ENCOUNTER — TELEPHONE (OUTPATIENT)
Dept: FAMILY MEDICINE | Facility: CLINIC | Age: 51
End: 2021-11-24
Payer: MEDICARE

## 2021-12-13 ENCOUNTER — TELEPHONE (OUTPATIENT)
Dept: FAMILY MEDICINE | Facility: CLINIC | Age: 51
End: 2021-12-13
Payer: MEDICARE

## 2021-12-13 DIAGNOSIS — B37.31 VAGINAL YEAST INFECTION: Primary | ICD-10-CM

## 2021-12-13 RX ORDER — TERCONAZOLE 80 MG/1
80 SUPPOSITORY VAGINAL NIGHTLY
Qty: 3 SUPPOSITORY | Refills: 0 | Status: SHIPPED | OUTPATIENT
Start: 2021-12-13 | End: 2022-06-07

## 2021-12-13 RX ORDER — FLUCONAZOLE 150 MG/1
150 TABLET ORAL
Qty: 3 TABLET | Refills: 0 | Status: SHIPPED | OUTPATIENT
Start: 2021-12-13 | End: 2021-12-20

## 2021-12-29 ENCOUNTER — LAB VISIT (OUTPATIENT)
Dept: LAB | Facility: HOSPITAL | Age: 51
End: 2021-12-29
Attending: FAMILY MEDICINE
Payer: MEDICARE

## 2021-12-29 DIAGNOSIS — I10 PRIMARY HYPERTENSION: ICD-10-CM

## 2021-12-29 LAB
ANION GAP SERPL CALC-SCNC: 9 MMOL/L (ref 8–16)
BUN SERPL-MCNC: 18 MG/DL (ref 6–20)
CALCIUM SERPL-MCNC: 9.8 MG/DL (ref 8.7–10.5)
CHLORIDE SERPL-SCNC: 104 MMOL/L (ref 95–110)
CO2 SERPL-SCNC: 30 MMOL/L (ref 23–29)
CREAT SERPL-MCNC: 1.1 MG/DL (ref 0.5–1.4)
EST. GFR  (AFRICAN AMERICAN): >60 ML/MIN/1.73 M^2
EST. GFR  (NON AFRICAN AMERICAN): 57 ML/MIN/1.73 M^2
GLUCOSE SERPL-MCNC: 89 MG/DL (ref 70–110)
POTASSIUM SERPL-SCNC: 3.8 MMOL/L (ref 3.5–5.1)
SODIUM SERPL-SCNC: 143 MMOL/L (ref 136–145)

## 2021-12-29 PROCEDURE — 36415 COLL VENOUS BLD VENIPUNCTURE: CPT | Mod: PO | Performed by: FAMILY MEDICINE

## 2021-12-29 PROCEDURE — 80048 BASIC METABOLIC PNL TOTAL CA: CPT | Performed by: FAMILY MEDICINE

## 2022-01-01 ENCOUNTER — HOSPITAL ENCOUNTER (EMERGENCY)
Facility: HOSPITAL | Age: 52
Discharge: HOME | End: 2022-01-01
Attending: EMERGENCY MEDICINE
Payer: MEDICARE

## 2022-01-01 ENCOUNTER — PATIENT MESSAGE (OUTPATIENT)
Dept: ADMINISTRATIVE | Facility: OTHER | Age: 52
End: 2022-01-01
Payer: MEDICARE

## 2022-01-01 VITALS
SYSTOLIC BLOOD PRESSURE: 146 MMHG | OXYGEN SATURATION: 96 % | DIASTOLIC BLOOD PRESSURE: 77 MMHG | HEART RATE: 80 BPM | WEIGHT: 187 LBS | TEMPERATURE: 100 F | HEIGHT: 62 IN | RESPIRATION RATE: 18 BRPM | BODY MASS INDEX: 34.41 KG/M2

## 2022-01-01 DIAGNOSIS — U07.1 COVID-19: Primary | ICD-10-CM

## 2022-01-01 LAB
CTP QC/QA: YES
CTP QC/QA: YES
POC MOLECULAR INFLUENZA A AGN: NEGATIVE
POC MOLECULAR INFLUENZA B AGN: NEGATIVE
SARS-COV-2 RDRP RESP QL NAA+PROBE: POSITIVE

## 2022-01-01 PROCEDURE — 99282 EMERGENCY DEPT VISIT SF MDM: CPT | Mod: 25

## 2022-01-01 PROCEDURE — U0002 COVID-19 LAB TEST NON-CDC: HCPCS | Performed by: EMERGENCY MEDICINE

## 2022-01-01 PROCEDURE — 87502 INFLUENZA DNA AMP PROBE: CPT

## 2022-01-01 NOTE — Clinical Note
"Giuliana"Toan Rodas was seen and treated in our emergency department on 1/1/2022.     COVID-19 is present in our communities across the state. There is limited testing for COVID at this time, so not all patients can be tested. In this situation, your employee meets the following criteria:    Giuliana Rodas has met the criteria for COVID-19 testing and has a POSITIVE result. She can return to work once they are asymptomatic for 72 hours without the use of fever reducing medications AND at least ten days from the first positive result.     If you have any questions or concerns, or if I can be of further assistance, please do not hesitate to contact me.    Sincerely,             Jose Armando Lovell DNP"

## 2022-01-01 NOTE — ED PROVIDER NOTES
Encounter Date: 1/1/2022    SCRIBE #1 NOTE: I, Jesus Voss, am scribing for, and in the presence of,  Jose Armando Lovell DNP. I have scribed the following portions of the note - Other sections scribed: HPI, ROS, PE.       History     Chief Complaint   Patient presents with    COVID-19 Concerns     Pt comes in POV with c/o fever, throat pain, headaches x3 days. Exposed to COVID19. She has been vaccinated x2 against COVID.     Giuliana Rodas is a 51 y. o female with a PMHx of HTN, HLP, and GERD, that comes to the ED complaining of Covid-19 related symptoms. Patient complains of a constant headache, a fever with a temperature of 103.1, sore throat, nausea, ear pain, itchiness of the eyes, and generalized body aches beginning wednesday. Patient reports feeling dehydrated but endorses drinking plenty of water. Patient states she was exposed to someone with Covid-19 on Monday. Patient is currently overdue for second dose of Covid-19 vaccine. Denies vomiting or diarrhea. No alleviating or exacerbating factors noted. Allergic to Dicyclomine, amitriptyline, prozac, topiramate, adhesive, and depo-provera.    The history is provided by the patient. No  was used.     Review of patient's allergies indicates:   Allergen Reactions    Dicyclomine Edema             Adhesive Rash    Amitriptyline Hallucinations    Depo-provera [medroxyprogesterone] Anxiety    Prozac [fluoxetine] Anxiety    Topiramate Rash     Past Medical History:   Diagnosis Date    Bile reflux gastritis     Breast cyst     Eczema     Fibrocystic breast     Fibromyalgia     Gastroparesis     dr. harden    GERD (gastroesophageal reflux disease)     Hyperglyceridemia     Hyperlipidemia     Hypertension     IC (interstitial cystitis)     dr. labadie    Psoriasis     Tension headache      Past Surgical History:   Procedure Laterality Date    BREAST BIOPSY Right     over 10 years ago/ milk duct    CHOLECYSTECTOMY       ESOPHAGOGASTRODUODENOSCOPY N/A 3/12/2020    Procedure: EGD (ESOPHAGOGASTRODUODENOSCOPY);  Surgeon: Damon Mcmahon MD;  Location: [facility] ([address] [place] [address]);  Service: Endoscopy;  Laterality: N/A;  use pcf scope    HEMORRHOID SURGERY      HYSTERECTOMY      KNEE SURGERY      OOPHORECTOMY      one ov removed     Family History   Problem Relation Age of Onset    Hypertension Mother     Migraines Mother     Breast cancer Mother     Hypertension Father     Stroke Father     Heart disease Father     Hyperlipidemia Father     Diabetes Father     Breast cancer Paternal Grandmother     Colon cancer Neg Hx     Ovarian cancer Neg Hx     Inflammatory bowel disease Neg Hx     Celiac disease Neg Hx      Social History     Tobacco Use    Smoking status: Never Smoker    Smokeless tobacco: Never Used   Substance Use Topics    Alcohol use: No    Drug use: No     Review of Systems   Constitutional: Positive for fever. Negative for chills and fatigue.   HENT: Positive for ear pain and sore throat. Negative for congestion, ear discharge, postnasal drip, rhinorrhea, sinus pressure, sneezing and voice change.    Eyes: Positive for itching. Negative for discharge.   Respiratory: Negative for cough, shortness of breath and wheezing.    Cardiovascular: Negative for chest pain, palpitations and leg swelling.   Gastrointestinal: Positive for nausea. Negative for abdominal pain, constipation, diarrhea and vomiting.   Endocrine: Negative for polydipsia, polyphagia and polyuria.   Genitourinary: Negative for dysuria, frequency, hematuria, urgency, vaginal bleeding, vaginal discharge and vaginal pain.   Musculoskeletal: Positive for myalgias. Negative for arthralgias.   Skin: Negative for rash and wound.   Neurological: Positive for headaches. Negative for dizziness, seizures, syncope, weakness and numbness.   Hematological: Negative for adenopathy. Does not bruise/bleed easily.   Psychiatric/Behavioral: Negative for self-injury and  suicidal ideas. The patient is not nervous/anxious.        Physical Exam     Initial Vitals [01/01/22 0805]   BP Pulse Resp Temp SpO2   (!) 146/77 80 18 100.3 °F (37.9 °C) 96 %      MAP       --         Physical Exam    Nursing note and vitals reviewed.  Constitutional: She appears well-developed and well-nourished.   HENT:   Head: Normocephalic and atraumatic.   Right Ear: External ear normal.   Left Ear: External ear normal.   Nose: Nose normal.   Eyes: Conjunctivae and EOM are normal. Pupils are equal, round, and reactive to light. Right eye exhibits no discharge. Left eye exhibits no discharge.   Neck:   Normal range of motion.  Pulmonary/Chest: Effort normal and breath sounds normal. No respiratory distress. She has no decreased breath sounds. She has no wheezes. She has no rhonchi. She has no rales.   Abdominal: She exhibits no distension.   Musculoskeletal:         General: Normal range of motion.      Cervical back: Normal range of motion.     Neurological: She is alert and oriented to person, place, and time.   Skin: Skin is dry. Capillary refill takes less than 2 seconds.         ED Course   Procedures  Labs Reviewed   SARS-COV-2 RDRP GENE - Abnormal; Notable for the following components:       Result Value    POC Rapid COVID Positive (*)     All other components within normal limits   POCT INFLUENZA A/B MOLECULAR          Imaging Results    None          Medications - No data to display  Medical Decision Making:   History:   Old Medical Records: I decided to obtain old medical records.  Differential Diagnosis:   This includes, but it is not limited to: influenza, covid-19, pneumonia.  Clinical Tests:   Lab Tests: Ordered and Reviewed       APC / Resident Notes:   This is an evaluation of a 51 y.o. female that presents to the Emergency Department for sx. The patient is a non-toxic, febrile, and well appearing female. On physical exam: No meningeal signs or cervical lymphadenopathy. Breath sounds clear  without adventitious breath sounds, tachypnea or respiratory distress. Room air pulse ox of 96%. No hypoxia or dyspnea on exertion. Speaking in full sentences with no pauses or respiratory distress. Vital Signs Are Reassuring.     RESULTS: COVID-19: Positive    COVID-19 Antibody Treatment FDA EUA Documentation  The patient does not meet Monoclonal Antibody criteria.  Will enroll into the COVID Home Symptom Monitoring program.    My overall impression is COVID-19 Positive. I considered, but at this time, do not suspect OM, OE, strep pharyngitis, influenza, meningitis, pneumonia, bacterial sinusitis, or significant dehydration requiring IV fluids or admission. The patient is maintaining oxygen saturations > 95% on room air and does not require supplemental oxygen.     The patient will be discharged home with supportive care and recommendations for quarantine. Return precautions discussed to return if patient develops fevers, SOB, or other worsening symptoms. The diagnosis, treatment plan, instructions for follow-up and reevaluation with Primary Care as well as ED return precautions were discussed and understanding was verbalized. All questions or concerns have been addressed.      Scribe Attestation:   Scribe #1: I performed the above scribed service and the documentation accurately describes the services I performed. I attest to the accuracy of the note.        ED Course as of 01/01/22 1841   Sat Jan 01, 2022   0815 BP(!): 146/77 [VC]   0815 Temp: 100.3 °F (37.9 °C) [VC]   0815 Temp src: Oral [VC]   0815 Pulse: 80 [VC]   0815 Resp: 18 [VC]   0815 SpO2: 96 % [VC]   0844 POC Molecular Influenza A Ag: Negative [VC]   0844 POC Molecular Influenza B Ag: Negative [VC]   0844 SARS-CoV-2 RNA, Amplification, Qual(!): Positive [VC]      ED Course User Index  [VC] Jose Armando Lovell DNP             Clinical Impression:   Final diagnoses:  [U07.1] COVID-19 (Primary)       I, Jose Armando Lovell DNP ACNP-BC FNP-C ENP-C ,  personally performed the services described in this documentation. All medical record entries made by the scribe were at my direction and in my presence. I have reviewed the chart and agree that the record reflects my personal performance and is accurate and complete.     ED Disposition Condition    Discharge Stable        ED Prescriptions     None        Follow-up Information     Follow up With Specialties Details Why Contact Info    Giuliana Rojas MD Family Medicine Schedule an appointment as soon as possible for a visit   [address]  [place] [address]  [phone]             Jose Armando Lovell, [facility]  01/01/22 6639

## 2022-01-01 NOTE — DISCHARGE INSTRUCTIONS
Zofran for nausea and vomiting.  Push fluids.  Alternate tylenol/ibuprofen every 3h as directed on packages.    Use Delsym, over the counter for cough, as directed on package.   Flonase as directed on package.     Return to the Emergency Department for any worsening, change in condition, or any emergent concerns.     Please isolate yourself at home.  You may leave home and/or return to work once the following conditions are met:    If you were not hospitalized and are not moderately to severely immunocompromised:   More than 5 days since symptoms first appeared AND  More than 24 hours fever free without medications AND  Symptoms are improving  Continue to wear a mask around others for 5 additional days.    If you were hospitalized OR are moderately to severely immunocompromised:  More than 20 days since symptoms first appeared  More than 24 hours fever free without medications  Symptoms have improved    If you had no symptoms but tested positive:  More than 5 days since the date of the first positive test (20 days if moderately to severely immunocompromised). If you develop symptoms, then use the guidelines above.  Continue to wear a mask around others for 5 additional days.

## 2022-01-02 ENCOUNTER — PATIENT MESSAGE (OUTPATIENT)
Dept: FAMILY MEDICINE | Facility: CLINIC | Age: 52
End: 2022-01-02
Payer: MEDICARE

## 2022-01-03 ENCOUNTER — TELEPHONE (OUTPATIENT)
Dept: FAMILY MEDICINE | Facility: CLINIC | Age: 52
End: 2022-01-03
Payer: MEDICARE

## 2022-01-03 NOTE — TELEPHONE ENCOUNTER
----- Message from Priscilla Corado sent at 1/3/2022 10:54 AM CST -----  Contact: Patient [phone]  Type: Patient Call Back    Who called: Patient     What is the request in detail: Tested Positive for COVID19 over the weekend. States she has severe sore throat. Would like to know if she can get antibiotics or something sent in to pharmacy for the problem? Please call.   .  [facility] - [place] - [address]  [address]  [place] [address]  Phone: [phone] Fax: [phone]    Would the patient rather a call back or a response via [facility]? Call back    Best call back number: [phone]

## 2022-01-25 ENCOUNTER — PATIENT MESSAGE (OUTPATIENT)
Dept: ADMINISTRATIVE | Facility: OTHER | Age: 52
End: 2022-01-25
Payer: MEDICARE

## 2022-03-28 ENCOUNTER — OFFICE VISIT (OUTPATIENT)
Dept: FAMILY MEDICINE | Facility: CLINIC | Age: 52
End: 2022-03-28
Payer: MEDICARE

## 2022-03-28 VITALS
TEMPERATURE: 99 F | HEART RATE: 65 BPM | SYSTOLIC BLOOD PRESSURE: 114 MMHG | BODY MASS INDEX: 34.08 KG/M2 | WEIGHT: 185.19 LBS | OXYGEN SATURATION: 98 % | DIASTOLIC BLOOD PRESSURE: 70 MMHG | HEIGHT: 62 IN

## 2022-03-28 DIAGNOSIS — N89.8 VAGINAL IRRITATION: ICD-10-CM

## 2022-03-28 DIAGNOSIS — R30.0 DYSURIA: Primary | ICD-10-CM

## 2022-03-28 LAB
BILIRUB SERPL-MCNC: NEGATIVE MG/DL
BLOOD URINE, POC: 250
CLARITY, POC UA: NORMAL
COLOR, POC UA: NORMAL
GLUCOSE UR QL STRIP: NORMAL
KETONES UR QL STRIP: NEGATIVE
LEUKOCYTE ESTERASE URINE, POC: NEGATIVE
NITRITE, POC UA: NEGATIVE
PH, POC UA: 5
PROTEIN, POC: NEGATIVE
SPECIFIC GRAVITY, POC UA: 1
UROBILINOGEN, POC UA: NORMAL

## 2022-03-28 PROCEDURE — 87086 URINE CULTURE/COLONY COUNT: CPT | Performed by: FAMILY MEDICINE

## 2022-03-28 PROCEDURE — 3008F BODY MASS INDEX DOCD: CPT | Mod: CPTII,S$GLB,, | Performed by: FAMILY MEDICINE

## 2022-03-28 PROCEDURE — 99214 OFFICE O/P EST MOD 30 MIN: CPT | Mod: S$GLB,,, | Performed by: FAMILY MEDICINE

## 2022-03-28 PROCEDURE — 81002 URINALYSIS NONAUTO W/O SCOPE: CPT | Mod: S$GLB,,, | Performed by: FAMILY MEDICINE

## 2022-03-28 PROCEDURE — 4010F PR ACE/ARB THEARPY RXD/TAKEN: ICD-10-PCS | Mod: CPTII,S$GLB,, | Performed by: FAMILY MEDICINE

## 2022-03-28 PROCEDURE — 99999 PR PBB SHADOW E&M-EST. PATIENT-LVL III: ICD-10-PCS | Mod: PBBFAC,,, | Performed by: FAMILY MEDICINE

## 2022-03-28 PROCEDURE — 3074F PR MOST RECENT SYSTOLIC BLOOD PRESSURE < 130 MM HG: ICD-10-PCS | Mod: CPTII,S$GLB,, | Performed by: FAMILY MEDICINE

## 2022-03-28 PROCEDURE — 3078F DIAST BP <80 MM HG: CPT | Mod: CPTII,S$GLB,, | Performed by: FAMILY MEDICINE

## 2022-03-28 PROCEDURE — 81002 POCT URINE DIPSTICK WITHOUT MICROSCOPE: ICD-10-PCS | Mod: S$GLB,,, | Performed by: FAMILY MEDICINE

## 2022-03-28 PROCEDURE — 4010F ACE/ARB THERAPY RXD/TAKEN: CPT | Mod: CPTII,S$GLB,, | Performed by: FAMILY MEDICINE

## 2022-03-28 PROCEDURE — 3074F SYST BP LT 130 MM HG: CPT | Mod: CPTII,S$GLB,, | Performed by: FAMILY MEDICINE

## 2022-03-28 PROCEDURE — 87481 CANDIDA DNA AMP PROBE: CPT | Mod: 59 | Performed by: FAMILY MEDICINE

## 2022-03-28 PROCEDURE — 3078F PR MOST RECENT DIASTOLIC BLOOD PRESSURE < 80 MM HG: ICD-10-PCS | Mod: CPTII,S$GLB,, | Performed by: FAMILY MEDICINE

## 2022-03-28 PROCEDURE — 99214 PR OFFICE/OUTPT VISIT, EST, LEVL IV, 30-39 MIN: ICD-10-PCS | Mod: S$GLB,,, | Performed by: FAMILY MEDICINE

## 2022-03-28 PROCEDURE — 3008F PR BODY MASS INDEX (BMI) DOCUMENTED: ICD-10-PCS | Mod: CPTII,S$GLB,, | Performed by: FAMILY MEDICINE

## 2022-03-28 PROCEDURE — 99999 PR PBB SHADOW E&M-EST. PATIENT-LVL III: CPT | Mod: PBBFAC,,, | Performed by: FAMILY MEDICINE

## 2022-03-28 RX ORDER — FLUCONAZOLE 150 MG/1
150 TABLET ORAL ONCE
Qty: 1 TABLET | Refills: 0 | Status: SHIPPED | OUTPATIENT
Start: 2022-03-28 | End: 2022-03-28

## 2022-03-30 LAB
BACTERIA UR CULT: NORMAL
BACTERIAL VAGINOSIS DNA: NEGATIVE
CANDIDA GLABRATA DNA: NEGATIVE
CANDIDA KRUSEI DNA: NEGATIVE
CANDIDA RRNA VAG QL PROBE: NEGATIVE
T VAGINALIS RRNA GENITAL QL PROBE: NEGATIVE

## 2022-04-15 NOTE — PROGRESS NOTES
"Subjective:       Patient ID: Giuliana Rodas is a 51 y.o. female.    Chief Complaint: Discuss possible UTI/ Vaginal Itching    Female  Problem  The patient's primary symptoms include genital itching and pelvic pain. The patient's pertinent negatives include no genital lesions, genital odor, genital rash or vaginal bleeding. This is a new problem. The current episode started in the past 7 days. The problem occurs daily. The problem has been unchanged. She is not pregnant. Associated symptoms include dysuria. Pertinent negatives include no fever, flank pain, hematuria or nausea. Nothing aggravates the symptoms. She has tried nothing for the symptoms. The treatment provided no relief.     Review of Systems   Constitutional: Negative for fever.   Gastrointestinal: Negative for nausea.   Genitourinary: Positive for dysuria and pelvic pain. Negative for flank pain and hematuria.         Objective:       Vitals:    03/28/22 1522   BP: 114/70   Pulse: 65   Temp: 98.5 °F (36.9 °C)   TempSrc: Oral   SpO2: 98%   Weight: 84 kg (185 lb 3 oz)   Height: 5' 2" (1.575 m)       Physical Exam  Constitutional:       General: She is not in acute distress.     Appearance: She is well-developed. She is not diaphoretic.   HENT:      Head: Normocephalic and atraumatic.   Cardiovascular:      Rate and Rhythm: Normal rate and regular rhythm.      Heart sounds: Normal heart sounds. No murmur heard.    No friction rub. No gallop.   Pulmonary:      Effort: Pulmonary effort is normal. No respiratory distress.      Breath sounds: Normal breath sounds. No stridor. No wheezing or rales.   Chest:      Chest wall: No tenderness.   Abdominal:      Hernia: There is no hernia in the left inguinal area or right inguinal area.   Genitourinary:     Labia:         Right: No rash or tenderness.         Left: No rash, tenderness or lesion.       Vagina: Vaginal discharge present. No erythema, tenderness, lesions or prolapsed vaginal walls. "         Musculoskeletal:      Cervical back: Normal range of motion and neck supple.   Lymphadenopathy:      Lower Body: No right inguinal adenopathy. No left inguinal adenopathy.         Assessment:       Problem List Items Addressed This Visit    None     Visit Diagnoses     Dysuria    -  Primary    Relevant Orders    POCT URINE DIPSTICK WITHOUT MICROSCOPE (Completed)    Urine culture (Completed)    Vaginal irritation        Relevant Orders    Vaginosis Screen by DNA Probe (Completed)          Plan:       Giuliana was seen today for discuss possible uti/ vaginal itching.    Diagnoses and all orders for this visit:    Dysuria  -     POCT URINE DIPSTICK WITHOUT MICROSCOPE  -     Urine culture    Vaginal irritation  -     Vaginosis Screen by DNA Probe  -     fluconazole (DIFLUCAN) 150 MG Tab; Take 1 tablet (150 mg total) by mouth once. for 1 dose

## 2022-04-28 LAB
LEFT EYE DM RETINOPATHY: NEGATIVE
RIGHT EYE DM RETINOPATHY: NEGATIVE

## 2022-05-03 ENCOUNTER — HOSPITAL ENCOUNTER (OUTPATIENT)
Dept: RADIOLOGY | Facility: HOSPITAL | Age: 52
Discharge: HOME | End: 2022-05-03
Attending: OBSTETRICS & GYNECOLOGY
Payer: MEDICARE

## 2022-05-03 ENCOUNTER — PATIENT OUTREACH (OUTPATIENT)
Dept: ADMINISTRATIVE | Facility: HOSPITAL | Age: 52
End: 2022-05-03
Payer: MEDICARE

## 2022-05-03 VITALS — WEIGHT: 185.19 LBS | HEIGHT: 62 IN | BODY MASS INDEX: 34.08 KG/M2

## 2022-05-03 DIAGNOSIS — Z12.31 VISIT FOR SCREENING MAMMOGRAM: ICD-10-CM

## 2022-05-03 PROCEDURE — 77063 BREAST TOMOSYNTHESIS BI: CPT | Mod: 26,,, | Performed by: RADIOLOGY

## 2022-05-03 PROCEDURE — 77063 BREAST TOMOSYNTHESIS BI: CPT | Mod: TC

## 2022-05-03 PROCEDURE — 77063 MAMMO DIGITAL SCREENING BILAT WITH TOMO: ICD-10-PCS | Mod: 26,,, | Performed by: RADIOLOGY

## 2022-05-03 PROCEDURE — 77067 SCR MAMMO BI INCL CAD: CPT | Mod: 26,,, | Performed by: RADIOLOGY

## 2022-05-03 PROCEDURE — 77067 MAMMO DIGITAL SCREENING BILAT WITH TOMO: ICD-10-PCS | Mod: 26,,, | Performed by: RADIOLOGY

## 2022-05-16 DIAGNOSIS — L40.8 SEBOPSORIASIS: ICD-10-CM

## 2022-05-16 NOTE — TELEPHONE ENCOUNTER
Last Office Visit Info:   The patient's last visit with Giuliana Rojas MD was on 3/28/2022.    The patient's last visit in current department was on Visit date not found.        Last CBC Results:   Lab Results   Component Value Date    WBC 4.95 07/27/2020    HGB 15.2 07/27/2020    HCT 44.6 07/27/2020     07/27/2020       Last CMP Results  Lab Results   Component Value Date     12/29/2021    K 3.8 12/29/2021     12/29/2021    CO2 30 (H) 12/29/2021    BUN 18 12/29/2021    CREATININE 1.1 12/29/2021    CALCIUM 9.8 12/29/2021    ALBUMIN 4.4 03/24/2021    AST 27 03/24/2021    ALT 28 03/24/2021       Last Lipids  Lab Results   Component Value Date    CHOL 153 03/24/2021    TRIG 225 (H) 03/24/2021    HDL 43 03/24/2021    LDLCALC 65.0 03/24/2021       Last A1C  Lab Results   Component Value Date    HGBA1C 5.6 11/01/2021       Last TSH  Lab Results   Component Value Date    TSH 2.434 09/25/2018             Current Med Refills  Medication List with Changes/Refills   Current Medications    AMLODIPINE-OLMESARTAN (ANGEL) 5-40 MG PER TABLET    TAKE ONE CAPSULE BY MOUTH EVERY DAY       Start Date: 12/26/2021End Date: --    ATENOLOL (TENORMIN) 50 MG TABLET    TAKE ONE TABLET BY MOUTH TWICE DAILY AS NEEDED       Start Date: 3/22/2022 End Date: --    ATORVASTATIN (LIPITOR) 40 MG TABLET    Take 1 tablet (40 mg total) by mouth once daily.       Start Date: 7/20/2021 End Date: 7/20/2022    BIFIDOBACTERIUM INFANTIS (ALIGN ORAL)    Take 1 tablet by mouth once daily.       Start Date: --        End Date: --    FLUOCINONIDE (LIDEX) 0.05 % EXTERNAL SOLUTION    Apply topically 2 (two) times daily as needed (rash, itching at scalp). Avoid use on face, body folds, groin/genitalia.       Start Date: 4/8/2021  End Date: --    FLUTICASONE PROPIONATE (FLONASE) 50 MCG/ACTUATION NASAL SPRAY    SPRAY twice IN EACH NOSTRIL DAILY       Start Date: 4/5/2022  End Date: --    HYDROCHLOROTHIAZIDE (HYDRODIURIL) 12.5 MG TAB    TAKE ONE  TABLET BY MOUTH DAILY       Start Date: 8/6/2021  End Date: --    KETOCONAZOLE (NIZORAL) 2 % SHAMPOO    Wash hair with medicated shampoo at least 2-3x/week - let sit on scalp at least 5 minutes prior to rinsing       Start Date: 4/8/2021  End Date: --    MAGNESIUM 30 MG TAB    Take 250 mg by mouth once.        Start Date: --        End Date: --    METHOCARBAMOL (ROBAXIN) 500 MG TAB    TAKE ONE TABLET BY MOUTH EVERY 8 HOURS AS NEEDED FOR HEADACHE AND MUSCLE SPASM       Start Date: 8/16/2021 End Date: --    MULTIVIT WITH MIN-FOLIC ACID 200 MCG CHEW    Take by mouth.       Start Date: --        End Date: --    MULTIVIT-MIN-FOLIC ACID-BIOTIN (HAIR,SKIN AND NAILS,FA-BIOTIN,) 66.7-1,000 MCG TAB    Take by mouth.       Start Date: --        End Date: --    OMEGA-3 FATTY ACIDS/FISH OIL (FISH OIL-OMEGA-3 FATTY ACIDS) 300-1,000 MG CAPSULE    Take 1 capsule by mouth once daily.       Start Date: --        End Date: --    ONDANSETRON (ZOFRAN) 4 MG TABLET    Take 1 tablet (4 mg total) by mouth every 6 (six) hours.       Start Date: 7/1/2020  End Date: --    TERCONAZOLE (TERAZOL 3) 80 MG VAGINAL SUPPOSITORY    Place 1 suppository (80 mg total) vaginally every evening.       Start Date: 12/13/2021End Date: --    TRAZODONE (DESYREL) 100 MG TABLET    TAKE ONE TABLET BY MOUTH EVERY EVENING       Start Date: 10/11/2021End Date: --    TRETINOIN (RETIN-A) 0.025 % CREAM    Apply topically nightly. Apply pea-sized amount to entire face nightly. Start slow, up-titrate as tolerated.       Start Date: 4/8/2021  End Date: --    VITAMIN E 400 UNIT CAPSULE    Take 400 Units by mouth once daily.       Start Date: --        End Date: --

## 2022-05-17 RX ORDER — KETOCONAZOLE 20 MG/ML
SHAMPOO, SUSPENSION TOPICAL
Qty: 120 ML | Refills: 11 | Status: SHIPPED | OUTPATIENT
Start: 2022-05-17 | End: 2023-06-23

## 2022-06-07 ENCOUNTER — OFFICE VISIT (OUTPATIENT)
Dept: FAMILY MEDICINE | Facility: CLINIC | Age: 52
End: 2022-06-07
Payer: MEDICARE

## 2022-06-07 VITALS
SYSTOLIC BLOOD PRESSURE: 110 MMHG | BODY MASS INDEX: 32.62 KG/M2 | WEIGHT: 177.25 LBS | RESPIRATION RATE: 16 BRPM | DIASTOLIC BLOOD PRESSURE: 80 MMHG | HEART RATE: 65 BPM | OXYGEN SATURATION: 95 % | TEMPERATURE: 98 F | HEIGHT: 62 IN

## 2022-06-07 DIAGNOSIS — D64.9 ANEMIA, UNSPECIFIED TYPE: ICD-10-CM

## 2022-06-07 DIAGNOSIS — R21 RASH AND NONSPECIFIC SKIN ERUPTION: Primary | ICD-10-CM

## 2022-06-07 DIAGNOSIS — D69.6 THROMBOCYTOPENIA, UNSPECIFIED: ICD-10-CM

## 2022-06-07 DIAGNOSIS — I10 PRIMARY HYPERTENSION: ICD-10-CM

## 2022-06-07 DIAGNOSIS — R73.9 HYPERGLYCEMIA, UNSPECIFIED: ICD-10-CM

## 2022-06-07 DIAGNOSIS — E78.5 HYPERLIPIDEMIA, UNSPECIFIED HYPERLIPIDEMIA TYPE: ICD-10-CM

## 2022-06-07 DIAGNOSIS — E78.1 HYPERGLYCERIDEMIA: ICD-10-CM

## 2022-06-07 PROBLEM — N30.10 INTERSTITIAL CYSTITIS: Status: ACTIVE | Noted: 2021-05-16

## 2022-06-07 PROBLEM — S39.011A ABDOMINAL MUSCLE STRAIN, INITIAL ENCOUNTER: Status: RESOLVED | Noted: 2018-12-28 | Resolved: 2022-06-07

## 2022-06-07 PROBLEM — N18.31 CHRONIC KIDNEY DISEASE, STAGE 3A: Status: ACTIVE | Noted: 2022-06-07

## 2022-06-07 PROBLEM — N18.31 CHRONIC KIDNEY DISEASE, STAGE 3A: Status: RESOLVED | Noted: 2022-06-07 | Resolved: 2022-06-07

## 2022-06-07 PROCEDURE — 1159F MED LIST DOCD IN RCRD: CPT | Mod: CPTII,S$GLB,, | Performed by: NURSE PRACTITIONER

## 2022-06-07 PROCEDURE — 99499 UNLISTED E&M SERVICE: CPT | Mod: S$GLB,,, | Performed by: NURSE PRACTITIONER

## 2022-06-07 PROCEDURE — 3079F DIAST BP 80-89 MM HG: CPT | Mod: CPTII,S$GLB,, | Performed by: NURSE PRACTITIONER

## 2022-06-07 PROCEDURE — 3079F PR MOST RECENT DIASTOLIC BLOOD PRESSURE 80-89 MM HG: ICD-10-PCS | Mod: CPTII,S$GLB,, | Performed by: NURSE PRACTITIONER

## 2022-06-07 PROCEDURE — 99214 OFFICE O/P EST MOD 30 MIN: CPT | Mod: S$GLB,,, | Performed by: NURSE PRACTITIONER

## 2022-06-07 PROCEDURE — 3008F BODY MASS INDEX DOCD: CPT | Mod: CPTII,S$GLB,, | Performed by: NURSE PRACTITIONER

## 2022-06-07 PROCEDURE — 1159F PR MEDICATION LIST DOCUMENTED IN MEDICAL RECORD: ICD-10-PCS | Mod: CPTII,S$GLB,, | Performed by: NURSE PRACTITIONER

## 2022-06-07 PROCEDURE — 99214 PR OFFICE/OUTPT VISIT, EST, LEVL IV, 30-39 MIN: ICD-10-PCS | Mod: S$GLB,,, | Performed by: NURSE PRACTITIONER

## 2022-06-07 PROCEDURE — 4010F PR ACE/ARB THEARPY RXD/TAKEN: ICD-10-PCS | Mod: CPTII,S$GLB,, | Performed by: NURSE PRACTITIONER

## 2022-06-07 PROCEDURE — 3074F PR MOST RECENT SYSTOLIC BLOOD PRESSURE < 130 MM HG: ICD-10-PCS | Mod: CPTII,S$GLB,, | Performed by: NURSE PRACTITIONER

## 2022-06-07 PROCEDURE — 1160F PR REVIEW ALL MEDS BY PRESCRIBER/CLIN PHARMACIST DOCUMENTED: ICD-10-PCS | Mod: CPTII,S$GLB,, | Performed by: NURSE PRACTITIONER

## 2022-06-07 PROCEDURE — 3074F SYST BP LT 130 MM HG: CPT | Mod: CPTII,S$GLB,, | Performed by: NURSE PRACTITIONER

## 2022-06-07 PROCEDURE — 1160F RVW MEDS BY RX/DR IN RCRD: CPT | Mod: CPTII,S$GLB,, | Performed by: NURSE PRACTITIONER

## 2022-06-07 PROCEDURE — 99999 PR PBB SHADOW E&M-EST. PATIENT-LVL V: CPT | Mod: PBBFAC,,, | Performed by: NURSE PRACTITIONER

## 2022-06-07 PROCEDURE — 99999 PR PBB SHADOW E&M-EST. PATIENT-LVL V: ICD-10-PCS | Mod: PBBFAC,,, | Performed by: NURSE PRACTITIONER

## 2022-06-07 PROCEDURE — 99499 RISK ADDL DX/OHS AUDIT: ICD-10-PCS | Mod: S$GLB,,, | Performed by: NURSE PRACTITIONER

## 2022-06-07 PROCEDURE — 3008F PR BODY MASS INDEX (BMI) DOCUMENTED: ICD-10-PCS | Mod: CPTII,S$GLB,, | Performed by: NURSE PRACTITIONER

## 2022-06-07 PROCEDURE — 4010F ACE/ARB THERAPY RXD/TAKEN: CPT | Mod: CPTII,S$GLB,, | Performed by: NURSE PRACTITIONER

## 2022-06-07 RX ORDER — CONJUGATED ESTROGENS 0.62 MG/G
CREAM VAGINAL
COMMUNITY
Start: 2022-04-28 | End: 2024-01-04

## 2022-06-07 NOTE — PROGRESS NOTES
Subjective:      Patient ID: Giuliana Rodas is a 52 y.o. female.  Pt presents with multiple complaints. Began with a rash ~2mths ago that started on her chest and has spread over her entire body. Started after she started swimming in a saltwater pool and coincide with sudden diet change to keto.     Rash  This is a new problem. The current episode started more than 1 month ago. The problem is unchanged. The rash is diffuse. The rash is characterized by redness, itchiness and dryness. It is unknown if there was an exposure to a precipitant. Pertinent negatives include no anorexia, congestion, cough, diarrhea, eye pain, facial edema, fatigue, fever, joint pain, nail changes, rhinorrhea, shortness of breath, sore throat or vomiting. Past treatments include moisturizer and antihistamine. The treatment provided moderate relief. There is no history of allergies, asthma, eczema or varicella.     Review of Systems   Constitutional: Negative for activity change, appetite change, chills, diaphoresis, fatigue, fever and unexpected weight change.   HENT: Negative for nasal congestion, rhinorrhea and sore throat.    Eyes: Negative for pain.   Respiratory: Negative for cough, chest tightness and shortness of breath.    Cardiovascular: Negative for chest pain, palpitations, leg swelling and claudication.   Gastrointestinal: Negative for abdominal pain, anorexia, change in bowel habit, constipation, diarrhea, nausea, vomiting and change in bowel habit.   Genitourinary: Negative.  Negative for difficulty urinating and dysuria.   Musculoskeletal: Positive for arthralgias (chronic pain, improving), myalgias and neck stiffness. Negative for back pain, gait problem, joint pain, joint swelling, leg pain, neck pain and joint deformity.   Integumentary:  Positive for rash. Negative for nail changes.   Neurological: Negative for headaches.   Hematological: Negative.    Psychiatric/Behavioral: Negative.    All other systems reviewed and are  "negative.        Objective:     Vitals:    06/07/22 1356   BP: 110/80   Pulse: 65   Resp: 16   Temp: 98.1 °F (36.7 °C)   TempSrc: Oral   SpO2: 95%   Weight: 80.4 kg (177 lb 4 oz)   Height: 5' 2" (1.575 m)     Physical Exam  Vitals and nursing note reviewed.   Constitutional:       General: She is not in acute distress.     Appearance: Normal appearance. She is well-developed, well-groomed and overweight. She is not ill-appearing.   HENT:      Head: Normocephalic and atraumatic.      Right Ear: External ear normal.      Left Ear: External ear normal.      Nose: Nose normal.      Mouth/Throat:      Lips: Pink.      Mouth: Mucous membranes are moist.   Eyes:      General: Lids are normal. Vision grossly intact. Gaze aligned appropriately.      Conjunctiva/sclera: Conjunctivae normal.      Pupils: Pupils are equal, round, and reactive to light.   Neck:      Trachea: Trachea and phonation normal.   Cardiovascular:      Rate and Rhythm: Normal rate and regular rhythm.      Pulses: Normal pulses.      Heart sounds: Normal heart sounds.   Pulmonary:      Effort: Pulmonary effort is normal. No accessory muscle usage or respiratory distress.      Breath sounds: Normal breath sounds and air entry.   Abdominal:      General: Abdomen is flat. Bowel sounds are normal. There is no distension.      Palpations: Abdomen is soft.      Tenderness: There is no abdominal tenderness.   Musculoskeletal:      Cervical back: Normal range of motion and neck supple.      Right lower leg: No edema.      Left lower leg: No edema.   Skin:     General: Skin is warm and dry.      Findings: No rash.   Neurological:      General: No focal deficit present.      Mental Status: She is alert and oriented to person, place, and time. Mental status is at baseline.      Cranial Nerves: No cranial nerve deficit.      Sensory: Sensation is intact.      Motor: Motor function is intact.      Coordination: Coordination is intact.      Gait: Gait is intact. "   Psychiatric:         Attention and Perception: Attention and perception normal.         Mood and Affect: Mood and affect normal.         Speech: Speech normal.         Behavior: Behavior normal. Behavior is cooperative.         Thought Content: Thought content normal.         Cognition and Memory: Cognition and memory normal.         Judgment: Judgment normal.       Assessment and Plan:     1. Rash and nonspecific skin eruption  Aveeno baths  Benadryl prn for itching.  Follow up prn  Observe for signs of superimposed infection and systemic symptoms.  Reassurance was given to the patient.  Referral to Dermatology if unimproved.  Skin moisturizer.  Tylenol or Ibuprofen for pain, fever.  Watch for signs of fever or worsening of the rash.  - Ambulatory referral/consult to Dermatology; Future    2. Primary hypertension  Controlled on current regimen  - CBC Auto Differential; Future  - Comprehensive Metabolic Panel; Future  - TSH; Future  - Vitamin D; Future    3. Hyperlipidemia, unspecified hyperlipidemia type  Limit the cholesterol in your diet to less than 300 mg per day.   Fats should contribute no more than 20 to 35% of your daily calories.   Less than 7 to 10% of your calories should come from saturated fat.   Avoid saturated fat products e.g., Butter, some oils, meat, and poultry fat contain a lot of saturated fat.   Check food labels for fat and cholesterol content. Choose the foods with less fat per serving.   Limit the amount of butter and margarine you eat.   Use salad dressings and margarine made with polyunsaturated and monounsaturated fats.   Use egg whites or egg substitutes rather than whole eggs.   Replace whole-milk dairy products with nonfat or low-fat milk, cheese, spreads, and yogurt.   Eat skinless chicken, turkey, fish, and meatless entrees more often than red meat.   Choose lean cuts of meat and trim off all visible fat. Keep portion sizes moderate.   Avoid fatty desserts such as ice cream,  cream-filled cakes, and cheesecakes. Choose fresh fruits, nonfat frozen yogurt, Popsicles, etc.   Reduce the amount of fried foods, vending machine food, and fast food you eat.   Eat fruits and vegetables (especially fresh fruits and leafy vegetables), beans, and whole grains daily. The fiber in these foods helps lower cholesterol.   Look for low-fat or nonfat varieties of the foods you like to eat, or look for substitutes.   You may need to exercise 60 minutes a day to prevent weight gain and 90 minutes a day to lose weight.  - Lipid Panel; Future  - TSH; Future  - Vitamin D; Future    4. Hyperglyceridemia  The patient is asked to make an attempt to improve diet and exercise patterns to aid in medical management of this problem.  - Hemoglobin A1C; Future  - TSH; Future  - Vitamin D; Future    5. Anemia, unspecified type  No acute hemorrhage   - Iron and TIBC; Future  - Vitamin B12; Future    6. Thrombocytopenia, unspecified   No acute hemorrhage  - Vitamin B12; Future    7. Body mass index (BMI) 32.0-32.9, adult   The patient is asked to make an attempt to improve diet and exercise patterns to aid in medical management of this problem.  - Vitamin D; Future           JESUS Rowell, FNP-C  Family/Internal Medicine  [facility]

## 2022-06-08 ENCOUNTER — LAB VISIT (OUTPATIENT)
Dept: LAB | Facility: HOSPITAL | Age: 52
End: 2022-06-08
Attending: NURSE PRACTITIONER
Payer: MEDICARE

## 2022-06-08 DIAGNOSIS — D64.9 ANEMIA, UNSPECIFIED TYPE: ICD-10-CM

## 2022-06-08 DIAGNOSIS — D69.6 THROMBOCYTOPENIA, UNSPECIFIED: ICD-10-CM

## 2022-06-08 DIAGNOSIS — E78.5 HYPERLIPIDEMIA, UNSPECIFIED HYPERLIPIDEMIA TYPE: ICD-10-CM

## 2022-06-08 DIAGNOSIS — I10 PRIMARY HYPERTENSION: ICD-10-CM

## 2022-06-08 DIAGNOSIS — R73.9 HYPERGLYCEMIA, UNSPECIFIED: ICD-10-CM

## 2022-06-08 DIAGNOSIS — E78.1 HYPERGLYCERIDEMIA: ICD-10-CM

## 2022-06-08 LAB
ALBUMIN SERPL BCP-MCNC: 4.3 G/DL (ref 3.5–5.2)
ALP SERPL-CCNC: 68 U/L (ref 55–135)
ALT SERPL W/O P-5'-P-CCNC: 32 U/L (ref 10–44)
ANION GAP SERPL CALC-SCNC: 12 MMOL/L (ref 8–16)
AST SERPL-CCNC: 31 U/L (ref 10–40)
BASOPHILS # BLD AUTO: 0.03 K/UL (ref 0–0.2)
BASOPHILS NFR BLD: 0.7 % (ref 0–1.9)
BILIRUB SERPL-MCNC: 0.8 MG/DL (ref 0.1–1)
BUN SERPL-MCNC: 20 MG/DL (ref 6–20)
CALCIUM SERPL-MCNC: 10.5 MG/DL (ref 8.7–10.5)
CHLORIDE SERPL-SCNC: 103 MMOL/L (ref 95–110)
CHOLEST SERPL-MCNC: 172 MG/DL (ref 120–199)
CHOLEST/HDLC SERPL: 4.1 {RATIO} (ref 2–5)
CO2 SERPL-SCNC: 27 MMOL/L (ref 23–29)
CREAT SERPL-MCNC: 1.2 MG/DL (ref 0.5–1.4)
DIFFERENTIAL METHOD: ABNORMAL
EOSINOPHIL # BLD AUTO: 0.2 K/UL (ref 0–0.5)
EOSINOPHIL NFR BLD: 4.4 % (ref 0–8)
ERYTHROCYTE [DISTWIDTH] IN BLOOD BY AUTOMATED COUNT: 12.8 % (ref 11.5–14.5)
EST. GFR  (AFRICAN AMERICAN): 59 ML/MIN/1.73 M^2
EST. GFR  (NON AFRICAN AMERICAN): 51 ML/MIN/1.73 M^2
ESTIMATED AVG GLUCOSE: 103 MG/DL (ref 68–131)
GLUCOSE SERPL-MCNC: 105 MG/DL (ref 70–110)
HBA1C MFR BLD: 5.2 % (ref 4–5.6)
HCT VFR BLD AUTO: 44.8 % (ref 37–48.5)
HDLC SERPL-MCNC: 42 MG/DL (ref 40–75)
HDLC SERPL: 24.4 % (ref 20–50)
HGB BLD-MCNC: 14.9 G/DL (ref 12–16)
IMM GRANULOCYTES # BLD AUTO: 0 K/UL (ref 0–0.04)
IMM GRANULOCYTES NFR BLD AUTO: 0 % (ref 0–0.5)
IRON SERPL-MCNC: 99 UG/DL (ref 30–160)
LDLC SERPL CALC-MCNC: 89.2 MG/DL (ref 63–159)
LYMPHOCYTES # BLD AUTO: 1.9 K/UL (ref 1–4.8)
LYMPHOCYTES NFR BLD: 42 % (ref 18–48)
MCH RBC QN AUTO: 29.9 PG (ref 27–31)
MCHC RBC AUTO-ENTMCNC: 33.3 G/DL (ref 32–36)
MCV RBC AUTO: 90 FL (ref 82–98)
MONOCYTES # BLD AUTO: 0.4 K/UL (ref 0.3–1)
MONOCYTES NFR BLD: 8 % (ref 4–15)
NEUTROPHILS # BLD AUTO: 2 K/UL (ref 1.8–7.7)
NEUTROPHILS NFR BLD: 44.9 % (ref 38–73)
NONHDLC SERPL-MCNC: 130 MG/DL
NRBC BLD-RTO: 0 /100 WBC
PLATELET # BLD AUTO: 140 K/UL (ref 150–450)
PMV BLD AUTO: 10.6 FL (ref 9.2–12.9)
POTASSIUM SERPL-SCNC: 4 MMOL/L (ref 3.5–5.1)
PROT SERPL-MCNC: 8.2 G/DL (ref 6–8.4)
RBC # BLD AUTO: 4.99 M/UL (ref 4–5.4)
SATURATED IRON: 26 % (ref 20–50)
SODIUM SERPL-SCNC: 142 MMOL/L (ref 136–145)
TOTAL IRON BINDING CAPACITY: 386 UG/DL (ref 250–450)
TRANSFERRIN SERPL-MCNC: 261 MG/DL (ref 200–375)
TRIGL SERPL-MCNC: 204 MG/DL (ref 30–150)
TSH SERPL DL<=0.005 MIU/L-ACNC: 1.6 UIU/ML (ref 0.4–4)
WBC # BLD AUTO: 4.52 K/UL (ref 3.9–12.7)

## 2022-06-08 PROCEDURE — 82306 VITAMIN D 25 HYDROXY: CPT | Performed by: NURSE PRACTITIONER

## 2022-06-08 PROCEDURE — 82607 VITAMIN B-12: CPT | Performed by: NURSE PRACTITIONER

## 2022-06-08 PROCEDURE — 80053 COMPREHEN METABOLIC PANEL: CPT | Performed by: NURSE PRACTITIONER

## 2022-06-08 PROCEDURE — 84466 ASSAY OF TRANSFERRIN: CPT | Performed by: NURSE PRACTITIONER

## 2022-06-08 PROCEDURE — 85025 COMPLETE CBC W/AUTO DIFF WBC: CPT | Performed by: NURSE PRACTITIONER

## 2022-06-08 PROCEDURE — 80061 LIPID PANEL: CPT | Performed by: NURSE PRACTITIONER

## 2022-06-08 PROCEDURE — 36415 COLL VENOUS BLD VENIPUNCTURE: CPT | Mod: PO | Performed by: NURSE PRACTITIONER

## 2022-06-08 PROCEDURE — 83036 HEMOGLOBIN GLYCOSYLATED A1C: CPT | Performed by: NURSE PRACTITIONER

## 2022-06-08 PROCEDURE — 84443 ASSAY THYROID STIM HORMONE: CPT | Performed by: NURSE PRACTITIONER

## 2022-06-09 ENCOUNTER — TELEPHONE (OUTPATIENT)
Dept: FAMILY MEDICINE | Facility: CLINIC | Age: 52
End: 2022-06-09
Payer: MEDICARE

## 2022-06-09 LAB
25(OH)D3+25(OH)D2 SERPL-MCNC: 84 NG/ML (ref 30–96)
VIT B12 SERPL-MCNC: 630 PG/ML (ref 210–950)

## 2022-06-09 NOTE — LETTER
June 9, 2022    Giuliana Rodas  [address] [place] [address]  Alejandra LUCAS [address]             [place] [address], [facility]  [place] [address]  Phone: [phone]  Fax: [phone] Dear Ms. Rodas:    Sorry we were unable to contact you to schedule your Dermatology appointment. Please give the referral department a call at [phone].        If you have any questions or concerns, please don't hesitate to call.    Sincerely,        Lisa Christiansen MA

## 2022-07-07 ENCOUNTER — OFFICE VISIT (OUTPATIENT)
Dept: FAMILY MEDICINE | Facility: CLINIC | Age: 52
End: 2022-07-07
Payer: MEDICARE

## 2022-07-07 VITALS
TEMPERATURE: 98 F | BODY MASS INDEX: 32.57 KG/M2 | RESPIRATION RATE: 16 BRPM | SYSTOLIC BLOOD PRESSURE: 122 MMHG | HEART RATE: 56 BPM | HEIGHT: 62 IN | DIASTOLIC BLOOD PRESSURE: 66 MMHG | OXYGEN SATURATION: 97 % | WEIGHT: 177 LBS

## 2022-07-07 DIAGNOSIS — K52.9 COLITIS: Primary | ICD-10-CM

## 2022-07-07 LAB
BILIRUB UR QL STRIP: NEGATIVE
CLARITY UR: CLEAR
COLOR UR: YELLOW
GLUCOSE UR QL STRIP: NEGATIVE
HGB UR QL STRIP: ABNORMAL
KETONES UR QL STRIP: NEGATIVE
LEUKOCYTE ESTERASE UR QL STRIP: ABNORMAL
MICROSCOPIC COMMENT: ABNORMAL
NITRITE UR QL STRIP: NEGATIVE
PH UR STRIP: 5 [PH] (ref 5–8)
PROT UR QL STRIP: NEGATIVE
RBC #/AREA URNS HPF: 5 /HPF (ref 0–4)
SP GR UR STRIP: 1.01 (ref 1–1.03)
SQUAMOUS #/AREA URNS HPF: 2 /HPF
URN SPEC COLLECT METH UR: ABNORMAL
UROBILINOGEN UR STRIP-ACNC: NEGATIVE EU/DL
WBC #/AREA URNS HPF: 2 /HPF (ref 0–5)

## 2022-07-07 PROCEDURE — 4010F ACE/ARB THERAPY RXD/TAKEN: CPT | Mod: CPTII,S$GLB,, | Performed by: NURSE PRACTITIONER

## 2022-07-07 PROCEDURE — 3008F PR BODY MASS INDEX (BMI) DOCUMENTED: ICD-10-PCS | Mod: CPTII,S$GLB,, | Performed by: NURSE PRACTITIONER

## 2022-07-07 PROCEDURE — 99214 PR OFFICE/OUTPT VISIT, EST, LEVL IV, 30-39 MIN: ICD-10-PCS | Mod: S$GLB,,, | Performed by: NURSE PRACTITIONER

## 2022-07-07 PROCEDURE — 1160F RVW MEDS BY RX/DR IN RCRD: CPT | Mod: CPTII,S$GLB,, | Performed by: NURSE PRACTITIONER

## 2022-07-07 PROCEDURE — 99214 OFFICE O/P EST MOD 30 MIN: CPT | Mod: S$GLB,,, | Performed by: NURSE PRACTITIONER

## 2022-07-07 PROCEDURE — 3044F HG A1C LEVEL LT 7.0%: CPT | Mod: CPTII,S$GLB,, | Performed by: NURSE PRACTITIONER

## 2022-07-07 PROCEDURE — 1159F MED LIST DOCD IN RCRD: CPT | Mod: CPTII,S$GLB,, | Performed by: NURSE PRACTITIONER

## 2022-07-07 PROCEDURE — 1160F PR REVIEW ALL MEDS BY PRESCRIBER/CLIN PHARMACIST DOCUMENTED: ICD-10-PCS | Mod: CPTII,S$GLB,, | Performed by: NURSE PRACTITIONER

## 2022-07-07 PROCEDURE — 99999 PR PBB SHADOW E&M-EST. PATIENT-LVL V: ICD-10-PCS | Mod: PBBFAC,,, | Performed by: NURSE PRACTITIONER

## 2022-07-07 PROCEDURE — 3044F PR MOST RECENT HEMOGLOBIN A1C LEVEL <7.0%: ICD-10-PCS | Mod: CPTII,S$GLB,, | Performed by: NURSE PRACTITIONER

## 2022-07-07 PROCEDURE — 81000 URINALYSIS NONAUTO W/SCOPE: CPT | Performed by: NURSE PRACTITIONER

## 2022-07-07 PROCEDURE — 87086 URINE CULTURE/COLONY COUNT: CPT | Performed by: NURSE PRACTITIONER

## 2022-07-07 PROCEDURE — 3078F PR MOST RECENT DIASTOLIC BLOOD PRESSURE < 80 MM HG: ICD-10-PCS | Mod: CPTII,S$GLB,, | Performed by: NURSE PRACTITIONER

## 2022-07-07 PROCEDURE — 4010F PR ACE/ARB THEARPY RXD/TAKEN: ICD-10-PCS | Mod: CPTII,S$GLB,, | Performed by: NURSE PRACTITIONER

## 2022-07-07 PROCEDURE — 3008F BODY MASS INDEX DOCD: CPT | Mod: CPTII,S$GLB,, | Performed by: NURSE PRACTITIONER

## 2022-07-07 PROCEDURE — 3078F DIAST BP <80 MM HG: CPT | Mod: CPTII,S$GLB,, | Performed by: NURSE PRACTITIONER

## 2022-07-07 PROCEDURE — 3074F SYST BP LT 130 MM HG: CPT | Mod: CPTII,S$GLB,, | Performed by: NURSE PRACTITIONER

## 2022-07-07 PROCEDURE — 99999 PR PBB SHADOW E&M-EST. PATIENT-LVL V: CPT | Mod: PBBFAC,,, | Performed by: NURSE PRACTITIONER

## 2022-07-07 PROCEDURE — 3074F PR MOST RECENT SYSTOLIC BLOOD PRESSURE < 130 MM HG: ICD-10-PCS | Mod: CPTII,S$GLB,, | Performed by: NURSE PRACTITIONER

## 2022-07-07 PROCEDURE — 1159F PR MEDICATION LIST DOCUMENTED IN MEDICAL RECORD: ICD-10-PCS | Mod: CPTII,S$GLB,, | Performed by: NURSE PRACTITIONER

## 2022-07-07 RX ORDER — METRONIDAZOLE 500 MG/1
500 TABLET ORAL EVERY 12 HOURS
Qty: 20 TABLET | Refills: 0 | Status: SHIPPED | OUTPATIENT
Start: 2022-07-07 | End: 2022-07-17

## 2022-07-07 RX ORDER — CIPROFLOXACIN 500 MG/1
500 TABLET ORAL EVERY 12 HOURS
Qty: 20 TABLET | Refills: 0 | Status: SHIPPED | OUTPATIENT
Start: 2022-07-07 | End: 2022-07-17

## 2022-07-07 RX ORDER — CEFDINIR 300 MG/1
300 CAPSULE ORAL 2 TIMES DAILY
COMMUNITY
Start: 2022-07-02 | End: 2022-07-07 | Stop reason: CLARIF

## 2022-07-07 NOTE — PROGRESS NOTES
Subjective:      Patient ID: Giuliana Rodas is a 52 y.o. female.  Pt presents to clinic for UC f/u, treated 5 days ago for UTI with ceftin, today reports rapid worsening abdominal pain with fever.     Abdominal Pain  This is a recurrent problem. The current episode started in the past 7 days. The onset quality is sudden. The problem occurs constantly. The most recent episode lasted 6 days. The problem has been rapidly worsening. The pain is located in the LLQ, RLQ and left flank. The pain is at a severity of 8/10. The pain is moderate. The quality of the pain is burning and sharp. Associated symptoms include diarrhea, frequency, headaches and hematuria. Pertinent negatives include no anorexia, arthralgias, belching, constipation, dysuria, fever, flatus, hematochezia, melena, myalgias, nausea, vomiting or weight loss. The pain is aggravated by being still and movement. The pain is relieved by nothing. She has tried antibiotics for the symptoms. The treatment provided mild relief. Her past medical history is significant for abdominal surgery, GERD, irritable bowel syndrome, pancreatitis and PUD. Patient's medical history includes UTI.     Review of Systems   Constitutional: Negative for activity change, appetite change, fever, unexpected weight change and weight loss.   Respiratory: Negative for chest tightness and shortness of breath.    Cardiovascular: Negative for chest pain and palpitations.   Gastrointestinal: Positive for abdominal distention, abdominal pain and diarrhea. Negative for anorexia, blood in stool, constipation, flatus, hematochezia, melena, nausea and vomiting.   Genitourinary: Positive for flank pain, frequency and hematuria. Negative for bladder incontinence, decreased urine volume, difficulty urinating, dysuria, enuresis, menstrual problem, nocturia and urgency.   Musculoskeletal: Negative for arthralgias and myalgias.   Neurological: Positive for headaches. Negative for dizziness, vertigo,  "tremors, seizures, syncope, facial asymmetry, speech difficulty, weakness, light-headedness, numbness, disturbances in coordination, memory loss and coordination difficulties.   All other systems reviewed and are negative.        Objective:     Vitals:    07/07/22 1604   BP: 122/66   Pulse: (!) 56   Resp: 16   Temp: 98.2 °F (36.8 °C)   TempSrc: Oral   SpO2: 97%   Weight: 80.3 kg (177 lb 0.5 oz)   Height: 5' 2" (1.575 m)     Physical Exam  Vitals and nursing note reviewed.   Constitutional:       General: She is not in acute distress.     Appearance: Normal appearance. She is well-developed, well-groomed and overweight. She is not ill-appearing.   HENT:      Head: Normocephalic and atraumatic.      Right Ear: External ear normal.      Left Ear: External ear normal.      Nose: Nose normal.      Mouth/Throat:      Lips: Pink.      Mouth: Mucous membranes are moist.   Eyes:      General: Lids are normal. Vision grossly intact. Gaze aligned appropriately.      Extraocular Movements: Extraocular movements intact.      Conjunctiva/sclera: Conjunctivae normal.      Pupils: Pupils are equal, round, and reactive to light.   Neck:      Trachea: Trachea and phonation normal.   Cardiovascular:      Rate and Rhythm: Normal rate and regular rhythm.      Heart sounds: Normal heart sounds.   Pulmonary:      Effort: Pulmonary effort is normal. No respiratory distress.      Breath sounds: Normal breath sounds and air entry.   Abdominal:      General: Abdomen is flat. Bowel sounds are normal. There is no distension.      Palpations: Abdomen is soft.      Tenderness: There is abdominal tenderness in the left lower quadrant. There is guarding. There is no right CVA tenderness, left CVA tenderness or rebound.   Musculoskeletal:      Cervical back: Normal range of motion and neck supple.   Skin:     General: Skin is warm and dry.      Findings: No rash.   Neurological:      General: No focal deficit present.      Mental Status: She is " alert and oriented to person, place, and time. Mental status is at baseline.   Psychiatric:         Attention and Perception: Attention and perception normal.         Mood and Affect: Mood and affect normal.         Speech: Speech normal.         Behavior: Behavior normal. Behavior is cooperative.         Thought Content: Thought content normal.         Cognition and Memory: Cognition and memory normal.         Judgment: Judgment normal.       Results for orders placed or performed in visit on 07/07/22   Urine Culture High Risk    Specimen: Urine, Clean Catch   Result Value Ref Range    Urine Culture, Routine No growth    Urinalysis   Result Value Ref Range    Specimen UA Urine, Clean Catch     Color, UA Yellow Yellow, Straw, Elizabeth    Appearance, UA Clear Clear    pH, UA 5.0 5.0 - 8.0    Specific Gravity, UA 1.010 1.005 - 1.030    Protein, UA Negative Negative    Glucose, UA Negative Negative    Ketones, UA Negative Negative    Bilirubin (UA) Negative Negative    Occult Blood UA 2+ (A) Negative    Nitrite, UA Negative Negative    Urobilinogen, UA Negative <2.0 EU/dL    Leukocytes, UA 1+ (A) Negative   Urinalysis Microscopic   Result Value Ref Range    RBC, UA 5 (H) 0 - 4 /hpf    WBC, UA 2 0 - 5 /hpf    Squam Epithel, UA 2 /hpf    Microscopic Comment SEE COMMENT      Assessment and Plan:     1. Colitis  No acute UTI suspected, likely recurrent diverticulitis flare  Recommend rest and increased fluids.  Antibiotics as ordered below. Side effects of the antibiotics were discussed, including the possibility of rash, photosensitivity, gastrointestinal upset, and incompatibility with alcohol.  Patient is instructed to avoid nuts and seeds.  She will call if her symptoms worsen, new problems develop, intractable nausea/vomiting occurs, fever of 101.0 (orally) or greater occurs, or if all symptoms are not dramatically improved in 48 hours and completely resolved in 5 days. The patient understands that hospitalization for  intravenous fluids and/or antibiotics may be necessary if her symptoms worsen or persist.   Follow up if fails to improve or worsen.   - Urinalysis  - Urine Culture High Risk  - ciprofloxacin HCl (CIPRO) 500 MG tablet; Take 1 tablet (500 mg total) by mouth every 12 (twelve) hours. for 10 days  Dispense: 20 tablet; Refill: 0  - metroNIDAZOLE (FLAGYL) 500 MG tablet; Take 1 tablet (500 mg total) by mouth every 12 (twelve) hours. for 10 days  Dispense: 20 tablet; Refill: 0           JESUS Rowell, FNP-C  Family/Internal Medicine  [facility]

## 2022-07-08 ENCOUNTER — HOSPITAL ENCOUNTER (EMERGENCY)
Facility: HOSPITAL | Age: 52
Discharge: HOME | End: 2022-07-08
Attending: EMERGENCY MEDICINE | Admitting: EMERGENCY MEDICINE
Payer: MEDICARE

## 2022-07-08 VITALS
WEIGHT: 171 LBS | HEART RATE: 75 BPM | BODY MASS INDEX: 31.47 KG/M2 | RESPIRATION RATE: 20 BRPM | SYSTOLIC BLOOD PRESSURE: 129 MMHG | HEIGHT: 62 IN | DIASTOLIC BLOOD PRESSURE: 68 MMHG | TEMPERATURE: 99 F | OXYGEN SATURATION: 96 %

## 2022-07-08 DIAGNOSIS — R10.9 ABDOMINAL PAIN: ICD-10-CM

## 2022-07-08 DIAGNOSIS — N28.1 RENAL CYST: ICD-10-CM

## 2022-07-08 DIAGNOSIS — K57.32 DIVERTICULITIS LARGE INTESTINE W/O PERFORATION OR ABSCESS W/O BLEEDING: ICD-10-CM

## 2022-07-08 DIAGNOSIS — R31.9 HEMATURIA, UNSPECIFIED TYPE: ICD-10-CM

## 2022-07-08 DIAGNOSIS — R10.32 LEFT LOWER QUADRANT ABDOMINAL PAIN: Primary | ICD-10-CM

## 2022-07-08 LAB
ALBUMIN SERPL BCP-MCNC: 4.3 G/DL (ref 3.5–5.2)
ALP SERPL-CCNC: 70 U/L (ref 55–135)
ALT SERPL W/O P-5'-P-CCNC: 24 U/L (ref 10–44)
ANION GAP SERPL CALC-SCNC: 11 MMOL/L (ref 8–16)
AST SERPL-CCNC: 25 U/L (ref 10–40)
BASOPHILS # BLD AUTO: 0.04 K/UL (ref 0–0.2)
BASOPHILS NFR BLD: 0.6 % (ref 0–1.9)
BILIRUB SERPL-MCNC: 1 MG/DL (ref 0.1–1)
BILIRUB UR QL STRIP: NEGATIVE
BUN SERPL-MCNC: 21 MG/DL (ref 6–20)
CALCIUM SERPL-MCNC: 10.2 MG/DL (ref 8.7–10.5)
CHLORIDE SERPL-SCNC: 103 MMOL/L (ref 95–110)
CLARITY UR: CLEAR
CO2 SERPL-SCNC: 25 MMOL/L (ref 23–29)
COLOR UR: YELLOW
CREAT SERPL-MCNC: 1.1 MG/DL (ref 0.5–1.4)
CTP QC/QA: YES
DIFFERENTIAL METHOD: ABNORMAL
EOSINOPHIL # BLD AUTO: 0.3 K/UL (ref 0–0.5)
EOSINOPHIL NFR BLD: 4.6 % (ref 0–8)
ERYTHROCYTE [DISTWIDTH] IN BLOOD BY AUTOMATED COUNT: 12.6 % (ref 11.5–14.5)
EST. GFR  (AFRICAN AMERICAN): >60 ML/MIN/1.73 M^2
EST. GFR  (NON AFRICAN AMERICAN): 57 ML/MIN/1.73 M^2
GLUCOSE SERPL-MCNC: 104 MG/DL (ref 70–110)
GLUCOSE UR QL STRIP: NEGATIVE
HCT VFR BLD AUTO: 43 % (ref 37–48.5)
HGB BLD-MCNC: 14.7 G/DL (ref 12–16)
HGB UR QL STRIP: ABNORMAL
IMM GRANULOCYTES # BLD AUTO: 0.02 K/UL (ref 0–0.04)
IMM GRANULOCYTES NFR BLD AUTO: 0.3 % (ref 0–0.5)
KETONES UR QL STRIP: NEGATIVE
LACTATE SERPL-SCNC: 0.8 MMOL/L (ref 0.5–2.2)
LACTATE SERPL-SCNC: 0.9 MMOL/L (ref 0.5–2.2)
LEUKOCYTE ESTERASE UR QL STRIP: NEGATIVE
LIPASE SERPL-CCNC: 60 U/L (ref 4–60)
LYMPHOCYTES # BLD AUTO: 0.8 K/UL (ref 1–4.8)
LYMPHOCYTES NFR BLD: 13 % (ref 18–48)
MCH RBC QN AUTO: 29.8 PG (ref 27–31)
MCHC RBC AUTO-ENTMCNC: 34.2 G/DL (ref 32–36)
MCV RBC AUTO: 87 FL (ref 82–98)
MICROSCOPIC COMMENT: ABNORMAL
MONOCYTES # BLD AUTO: 0.6 K/UL (ref 0.3–1)
MONOCYTES NFR BLD: 8.8 % (ref 4–15)
NEUTROPHILS # BLD AUTO: 4.6 K/UL (ref 1.8–7.7)
NEUTROPHILS NFR BLD: 72.7 % (ref 38–73)
NITRITE UR QL STRIP: NEGATIVE
NRBC BLD-RTO: 0 /100 WBC
PH UR STRIP: 7 [PH] (ref 5–8)
PLATELET # BLD AUTO: 116 K/UL (ref 150–450)
PMV BLD AUTO: 9.6 FL (ref 9.2–12.9)
POTASSIUM SERPL-SCNC: 3.9 MMOL/L (ref 3.5–5.1)
PROCALCITONIN SERPL IA-MCNC: 0.03 NG/ML
PROT SERPL-MCNC: 8.2 G/DL (ref 6–8.4)
PROT UR QL STRIP: ABNORMAL
RBC # BLD AUTO: 4.93 M/UL (ref 4–5.4)
RBC #/AREA URNS HPF: >100 /HPF (ref 0–4)
SARS-COV-2 RDRP RESP QL NAA+PROBE: NEGATIVE
SODIUM SERPL-SCNC: 139 MMOL/L (ref 136–145)
SP GR UR STRIP: 1.01 (ref 1–1.03)
SQUAMOUS #/AREA URNS HPF: 2 /HPF
URN SPEC COLLECT METH UR: ABNORMAL
UROBILINOGEN UR STRIP-ACNC: NEGATIVE EU/DL
WBC # BLD AUTO: 6.37 K/UL (ref 3.9–12.7)
WBC #/AREA URNS HPF: 2 /HPF (ref 0–5)

## 2022-07-08 PROCEDURE — 96375 TX/PRO/DX INJ NEW DRUG ADDON: CPT

## 2022-07-08 PROCEDURE — 63600175 PHARM REV CODE 636 W HCPCS: Performed by: EMERGENCY MEDICINE

## 2022-07-08 PROCEDURE — 96365 THER/PROPH/DIAG IV INF INIT: CPT | Mod: XU

## 2022-07-08 PROCEDURE — 84145 PROCALCITONIN (PCT): CPT | Performed by: EMERGENCY MEDICINE

## 2022-07-08 PROCEDURE — 25500020 PHARM REV CODE 255: Performed by: EMERGENCY MEDICINE

## 2022-07-08 PROCEDURE — 85025 COMPLETE CBC W/AUTO DIFF WBC: CPT | Performed by: EMERGENCY MEDICINE

## 2022-07-08 PROCEDURE — 93010 ELECTROCARDIOGRAM REPORT: CPT | Mod: ,,, | Performed by: INTERNAL MEDICINE

## 2022-07-08 PROCEDURE — 93010 EKG 12-LEAD: ICD-10-PCS | Mod: ,,, | Performed by: INTERNAL MEDICINE

## 2022-07-08 PROCEDURE — 96361 HYDRATE IV INFUSION ADD-ON: CPT

## 2022-07-08 PROCEDURE — U0002 COVID-19 LAB TEST NON-CDC: HCPCS | Performed by: EMERGENCY MEDICINE

## 2022-07-08 PROCEDURE — 25000003 PHARM REV CODE 250: Performed by: EMERGENCY MEDICINE

## 2022-07-08 PROCEDURE — 83690 ASSAY OF LIPASE: CPT | Performed by: EMERGENCY MEDICINE

## 2022-07-08 PROCEDURE — 80053 COMPREHEN METABOLIC PANEL: CPT | Performed by: EMERGENCY MEDICINE

## 2022-07-08 PROCEDURE — 87040 BLOOD CULTURE FOR BACTERIA: CPT | Performed by: EMERGENCY MEDICINE

## 2022-07-08 PROCEDURE — 83605 ASSAY OF LACTIC ACID: CPT | Performed by: EMERGENCY MEDICINE

## 2022-07-08 PROCEDURE — 93005 ELECTROCARDIOGRAM TRACING: CPT

## 2022-07-08 PROCEDURE — 99285 EMERGENCY DEPT VISIT HI MDM: CPT | Mod: 25

## 2022-07-08 PROCEDURE — 81000 URINALYSIS NONAUTO W/SCOPE: CPT | Performed by: EMERGENCY MEDICINE

## 2022-07-08 PROCEDURE — 96376 TX/PRO/DX INJ SAME DRUG ADON: CPT

## 2022-07-08 RX ORDER — ACETAMINOPHEN 500 MG
1000 TABLET ORAL
Status: COMPLETED | OUTPATIENT
Start: 2022-07-08 | End: 2022-07-08

## 2022-07-08 RX ORDER — MORPHINE SULFATE 4 MG/ML
4 INJECTION, SOLUTION INTRAMUSCULAR; INTRAVENOUS
Status: COMPLETED | OUTPATIENT
Start: 2022-07-08 | End: 2022-07-08

## 2022-07-08 RX ORDER — POLYETHYLENE GLYCOL 3350 17 G/17G
17 POWDER, FOR SOLUTION ORAL DAILY
Qty: 235 G | Refills: 0 | Status: ON HOLD | OUTPATIENT
Start: 2022-07-08 | End: 2022-07-30 | Stop reason: HOSPADM

## 2022-07-08 RX ORDER — MORPHINE SULFATE 15 MG/1
15 TABLET ORAL EVERY 6 HOURS PRN
Qty: 12 TABLET | Refills: 0 | Status: ON HOLD | OUTPATIENT
Start: 2022-07-08 | End: 2022-07-30 | Stop reason: HOSPADM

## 2022-07-08 RX ORDER — ONDANSETRON 2 MG/ML
4 INJECTION INTRAMUSCULAR; INTRAVENOUS
Status: COMPLETED | OUTPATIENT
Start: 2022-07-08 | End: 2022-07-08

## 2022-07-08 RX ORDER — ONDANSETRON 4 MG/1
4 TABLET, FILM COATED ORAL EVERY 8 HOURS PRN
Qty: 12 TABLET | Refills: 0 | Status: ON HOLD | OUTPATIENT
Start: 2022-07-08 | End: 2022-07-30 | Stop reason: SDUPTHER

## 2022-07-08 RX ADMIN — MORPHINE SULFATE 4 MG: 4 INJECTION INTRAVENOUS at 12:07

## 2022-07-08 RX ADMIN — ONDANSETRON 4 MG: 2 INJECTION INTRAMUSCULAR; INTRAVENOUS at 12:07

## 2022-07-08 RX ADMIN — IOHEXOL 75 ML: 350 INJECTION, SOLUTION INTRAVENOUS at 09:07

## 2022-07-08 RX ADMIN — SODIUM CHLORIDE, SODIUM LACTATE, POTASSIUM CHLORIDE, AND CALCIUM CHLORIDE 1000 ML: .6; .31; .03; .02 INJECTION, SOLUTION INTRAVENOUS at 08:07

## 2022-07-08 RX ADMIN — ACETAMINOPHEN 1000 MG: 500 TABLET ORAL at 11:07

## 2022-07-08 RX ADMIN — PIPERACILLIN AND TAZOBACTAM 4.5 G: 4; .5 INJECTION, POWDER, LYOPHILIZED, FOR SOLUTION INTRAVENOUS; PARENTERAL at 08:07

## 2022-07-08 RX ADMIN — MORPHINE SULFATE 4 MG: 4 INJECTION INTRAVENOUS at 09:07

## 2022-07-08 NOTE — ED TRIAGE NOTES
Patient presents to ED via personal vehicle for complaints of abdominal pain.  Reports being diagnosed with diverticulitis and currently taking antibiotics but back pain got worse last night and has low grade temp.

## 2022-07-08 NOTE — ED PROVIDER NOTES
"Encounter Date: 7/8/2022    SCRIBE #1 NOTE: I, Ara Coles, am scribing for, and in the presence of,  Teja Mora Jr., MD. I have scribed the following portions of the note - Other sections scribed: HPI, ROS.       History     Chief Complaint   Patient presents with    Abdominal Pain    Back Pain     Patient reports left lower abdominal pain and bilateral back pain that started 1 weeks ago. She was seen by her PCP and was dx with diverticulitis and was prescribed ciprofloxacin and flagyl, which was started this morning. Patient states that she came to the Ed because she had a fever of 100.8 this morning and her pain has worsened.      This 52 y.o female, with a medical history of Bile reflux gastritis, Fibromyalgia, Gastroparesis, Gastroesophageal reflux disease, Hyperglyceridemia, Hyperlipidemia, Hypertension, and Interstitial cystitis, presents to the ED c/o acute, severe (8/10) abdominal pain that began  6 days ago. Pt reports that she initially began to experience pain to the left lower quadrant of the abdomen, however, it has since worsened and is now located across the lower abdomen radiating to the back. She describes the pain as "sharp" and constant, noting that it waxes and wanes in intensity. She states that she has also been experiencing associated abdominal bloating, nausea, diarrhea and urinary frequency. Pt notes that she was initially placed on an antibiotic after initial onset as she attributed the symptoms to a possible UTI, however, she took the medication for 5 days without any improvement. She states that she visited her PCP yesterday and was diagnosed with a diverticulitis flare. She reports that she was placed on Ciprofloxacin and Flagyl and has since taken 2 doses of each medication. There are no other associated symptoms at this time.     The history is provided by the patient.     Review of patient's allergies indicates:   Allergen Reactions    Dicyclomine Edema             " Adhesive Rash    Amitriptyline Hallucinations    Depo-provera [medroxyprogesterone] Anxiety    Prozac [fluoxetine] Anxiety    Topiramate Rash     Past Medical History:   Diagnosis Date    Bile reflux gastritis     Breast cyst     Eczema     Fibrocystic breast     Fibromyalgia     Gastroparesis     dr. harden    GERD (gastroesophageal reflux disease)     Hyperglyceridemia     Hyperlipidemia     Hypertension     IC (interstitial cystitis)     dr. labadie    Psoriasis     Tension headache      Past Surgical History:   Procedure Laterality Date    BREAST BIOPSY Right     over 10 years ago/ milk duct    CHOLECYSTECTOMY      ESOPHAGOGASTRODUODENOSCOPY N/A 3/12/2020    Procedure: EGD (ESOPHAGOGASTRODUODENOSCOPY);  Surgeon: Damon Mcmahon MD;  Location: [facility] ([address] [place] [address]);  Service: Endoscopy;  Laterality: N/A;  use pcf scope    HEMORRHOID SURGERY      HYSTERECTOMY      KNEE SURGERY      OOPHORECTOMY      one ov removed     Family History   Problem Relation Age of Onset    Hypertension Mother     Migraines Mother     Breast cancer Mother     Hypertension Father     Stroke Father     Heart disease Father     Hyperlipidemia Father     Diabetes Father     Breast cancer Paternal Grandmother     Colon cancer Neg Hx     Ovarian cancer Neg Hx     Inflammatory bowel disease Neg Hx     Celiac disease Neg Hx      Social History     Tobacco Use    Smoking status: Never Smoker    Smokeless tobacco: Never Used   Substance Use Topics    Alcohol use: No    Drug use: No     Review of Systems   Constitutional: Negative for fever.   HENT: Negative for sore throat.    Eyes: Negative for visual disturbance.   Respiratory: Negative for shortness of breath.    Cardiovascular: Negative for chest pain.   Gastrointestinal: Positive for abdominal pain (lower; radiating to the back), diarrhea and nausea.        (+) abdominal bloating   Genitourinary: Positive for frequency. Negative for dysuria.    Musculoskeletal: Positive for back pain.   Skin: Negative for rash.   Neurological: Negative for weakness.       Physical Exam     Initial Vitals [07/08/22 0758]   BP Pulse Resp Temp SpO2   130/79 78 18 99.5 °F (37.5 °C) (!) 94 %      MAP       --         Physical Exam    Nursing note and vitals reviewed.  Constitutional: She appears well-developed and well-nourished. She is not diaphoretic. No distress.   HENT:   Head: Normocephalic and atraumatic.   Right Ear: External ear normal.   Left Ear: External ear normal.   Nose: Nose normal.   Mouth/Throat: Oropharynx is clear and moist.   Eyes: Conjunctivae and EOM are normal. Pupils are equal, round, and reactive to light. Right eye exhibits no discharge. Left eye exhibits no discharge. No scleral icterus.   Neck: Neck supple.   Normal range of motion.  Cardiovascular: Normal rate, regular rhythm, normal heart sounds and intact distal pulses.   No murmur heard.  Pulmonary/Chest: Breath sounds normal. No respiratory distress. She has no wheezes. She has no rhonchi. She has no rales.   Abdominal: Abdomen is soft. Bowel sounds are normal. She exhibits no distension and no mass. There is abdominal tenderness.   Patient's abdomen is soft and nondistended.  No hernias or masses.  There is left lower quadrant and suprapubic  abdominal tenderness.  There is mild discomfort with palpation of the right lower quadrant.  There is no epigastric or right or left upper quadrant tenderness to palpation. There is no rebound and no guarding.   Musculoskeletal:         General: No tenderness or edema. Normal range of motion.      Cervical back: Normal range of motion and neck supple.     Neurological: She is alert and oriented to person, place, and time. She has normal strength. No cranial nerve deficit or sensory deficit.   Skin: Skin is warm and dry. No rash noted. No erythema. No pallor.   Psychiatric: She has a normal mood and affect. Her behavior is normal. Judgment and thought  content normal.         ED Course   Procedures  Labs Reviewed   CBC W/ AUTO DIFFERENTIAL - Abnormal; Notable for the following components:       Result Value    Platelets 116 (*)     Lymph # 0.8 (*)     Lymph % 13.0 (*)     All other components within normal limits   COMPREHENSIVE METABOLIC PANEL - Abnormal; Notable for the following components:    BUN 21 (*)     eGFR if non  57 (*)     All other components within normal limits   URINALYSIS, REFLEX TO URINE CULTURE - Abnormal; Notable for the following components:    Protein, UA Trace (*)     Occult Blood UA 3+ (*)     All other components within normal limits    Narrative:     Specimen Source->Urine   URINALYSIS MICROSCOPIC - Abnormal; Notable for the following components:    RBC, UA >100 (*)     All other components within normal limits    Narrative:     Specimen Source->Urine   CULTURE, BLOOD    Narrative:     Aerobic and anaerobic   CULTURE, BLOOD    Narrative:     Aerobic and anaerobic   LACTIC ACID, PLASMA   LIPASE   PROCALCITONIN   LACTIC ACID, PLASMA   SARS-COV-2 RDRP GENE          Imaging Results           CT Abdomen Pelvis With Contrast (Final result)  Result time 07/08/22 11:11:23    Final result by Sammy Mcgregor MD (07/08/22 11:11:23)                 Impression:      Acute uncomplicated sigmoid colon diverticulitis.    Prior cholecystectomy and hysterectomy.    Bilateral simple and mildly complex renal cysts.    Additional stable/incidental findings discussed in the body of the report.    This report was flagged in Epic as abnormal.      Electronically signed by: Sammy Mcgregor MD  Date:    07/08/2022  Time:    11:11             Narrative:    EXAMINATION:  CT ABDOMEN PELVIS WITH CONTRAST    CLINICAL HISTORY:  LLQ abdominal pain;    TECHNIQUE:  Low dose axial images, sagittal and coronal reformations were obtained from the lung bases to the pubic symphysis following the IV administration of 75 mL of Omnipaque 350 .  Oral contrast was  "not given.    COMPARISON:  11/22/2021.    FINDINGS:  Lower Chest:    Lung bases are clear.  Heart size is normal.    Abdomen:    Liver is normal in size and contour.  No focal hepatic lesion.  Gallbladder is surgically absent.  Stable mild distension of the common bile duct likely related to cholecystectomy status.  No intrahepatic biliary duct dilatation.  There are prominent duodenal diverticula.    Spleen is borderline enlarged and stable in appearance.  Adrenal glands and pancreas are unremarkable.    The kidneys are symmetric.  No hydronephrosis. Simple and mildly complex bilateral renal cystic lesions.  Bilateral extrarenal pelves.    No small bowel obstruction.  Multiple colonic diverticula most pronounced in the descending and sigmoid colon.  There is mild sigmoid colon wall thickening and perisigmoid inflammatory changes.  No extraluminal gas or organized fluid collection.  Normal appendix.    No bulky retroperitoneal lymphadenopathy.    Abdominal aorta is normal in caliber with mild atherosclerosis.    Pelvis:    Urinary bladder and rectum are unremarkable.  Stable postoperative changes in the rectum.  No free fluid in the pelvis.  No pelvic lymphadenopathy.    Bones and soft tissues:    No aggressive osseous lesions.  Stable small sclerotic lesions in the sacrum.  Extraperitoneal soft tissues are negative for acute finding.                               X-Ray Chest AP Portable (Final result)  Result time 07/08/22 09:10:14    Final result by Sammy Mcgregor MD (07/08/22 09:10:14)                 Impression:      No acute cardiopulmonary finding identified on this single view.      Electronically signed by: Sammy Mcgregor MD  Date:    07/08/2022  Time:    09:10             Narrative:    EXAMINATION:  XR CHEST AP PORTABLE    CLINICAL HISTORY:  Provided history is "Sepsis;  ".    TECHNIQUE:  One view of the chest.    COMPARISON:  04/25/2020.    FINDINGS:  Cardiac wires overlie the chest.  " Cardiomediastinal silhouette is borderline enlarged and similar to the prior study.  Lung volumes are relatively low.  No focal consolidation.  No sizable pleural effusion.  No pneumothorax.                              X-Rays:   Independently Interpreted Readings:   Other Readings:  Chest x-ray reviewed by me reveals no evidence of straight, consolidation, pleural effusion, pulmonary edema, or pneumothorax.    Medications   lactated ringers bolus 1,000 mL (0 mLs Intravenous Stopped 7/8/22 0956)   piperacillin-tazobactam 4.5 g in dextrose 5 % 100 mL IVPB (ready to mix system) (0 g Intravenous Stopped 7/8/22 0919)   morphine injection 4 mg (4 mg Intravenous Given 7/8/22 0934)   iohexoL (OMNIPAQUE 350) injection 75 mL (75 mLs Intravenous Given 7/8/22 0958)   acetaminophen tablet 1,000 mg (1,000 mg Oral Given 7/8/22 1156)   morphine injection 4 mg (4 mg Intravenous Given 7/8/22 1216)   ondansetron injection 4 mg (4 mg Intravenous Given 7/8/22 1216)     Medical Decision Making:   ED Management:  This is the emergent evaluation of a 52-year-old female presents emergency department with bilateral lower quadrant suprapubic abdominal tenderness that started his left lower quadrant tenderness initially.  It now  radiates to the back.  Symptoms have been going on for 5 days.  Differential diagnosis at the time of initial evaluation included, but was not limited to:  Uncomplicated diverticulitis, diverticular abscess, perforation, pyelonephritis, viral illness.  Code sepsis called from triage.    This patient does not have leukocytosis or elevated lactate.  No significant anemia.  No metabolic derangement.  The patient has no evidence of urinary tract infection.  CT of the abdomen and pelvis with IV contrast reveals acute, uncomplicated diverticulitis.  No evidence of abscess or perforation.  No evidence of peritonitis on abdominal examination.  The patient appears uncomfortable has responded well to medications in the  emergency department.  She has only been on 1 day (2 doses) of ciprofloxacin and metronidazole.  Given the above evaluation and results and diagnostic studies, I do feel this patient should continue outpatient therapy with symptomatic treatment as well as continuing on the above antibiotic regimen.  I think that she is stable for discharge at this time.  I did advised to follow-up closely with her PCP and return if she gets worse or new symptoms develop such as fever, chills, severe worsening of abdominal pain, intractable vomiting.          Scribe Attestation:   Scribe #1: I performed the above scribed service and the documentation accurately describes the services I performed. I attest to the accuracy of the note.                 Clinical Impression:   Final diagnoses:  [R10.9] Abdominal pain  [R10.32] Left lower quadrant abdominal pain (Primary)  [K57.32] Diverticulitis large intestine w/o perforation or abscess w/o bleeding  [N28.1] Renal cyst  [R31.9] Hematuria, unspecified type          ED Disposition Condition    Discharge Stable        ED Prescriptions     Medication Sig Dispense Start Date End Date Auth. Provider    morphine (MSIR) 15 MG tablet Take 1 tablet (15 mg total) by mouth every 6 (six) hours as needed for Pain. 12 tablet 7/8/2022  Teja Mora Jr., MD    ondansetron (ZOFRAN) 4 MG tablet Take 1 tablet (4 mg total) by mouth every 8 (eight) hours as needed. 12 tablet 7/8/2022  Teja Mora Jr., MD    polyethylene glycol (GLYCOLAX) 17 gram/dose powder Take 17 g by mouth once daily. 235 g 7/8/2022  Teja Mora Jr., MD        Follow-up Information     Follow up With Specialties Details Why Contact Info    Giuliana Rojas MD Family Medicine Schedule an appointment as soon as possible for a visit in 3 days  [address]  [place] [id number] [phone]             I, Teja Mora MD, personally performed the services described in this documentation. All medical record  entries made by the scribe were at my direction and in my presence. I have reviewed the chart and agree that the record reflects my personal performance and is accurate and complete.     Teja Mora Jr., MD  07/08/22 7255

## 2022-07-09 LAB — BACTERIA UR CULT: NO GROWTH

## 2022-07-11 ENCOUNTER — OFFICE VISIT (OUTPATIENT)
Dept: FAMILY MEDICINE | Facility: CLINIC | Age: 52
End: 2022-07-11
Payer: MEDICARE

## 2022-07-11 VITALS
OXYGEN SATURATION: 95 % | BODY MASS INDEX: 32.33 KG/M2 | RESPIRATION RATE: 16 BRPM | DIASTOLIC BLOOD PRESSURE: 70 MMHG | HEIGHT: 62 IN | SYSTOLIC BLOOD PRESSURE: 110 MMHG | WEIGHT: 175.69 LBS | TEMPERATURE: 98 F | HEART RATE: 61 BPM

## 2022-07-11 DIAGNOSIS — K57.92 ACUTE DIVERTICULITIS: Primary | ICD-10-CM

## 2022-07-11 PROCEDURE — 3044F PR MOST RECENT HEMOGLOBIN A1C LEVEL <7.0%: ICD-10-PCS | Mod: CPTII,S$GLB,, | Performed by: NURSE PRACTITIONER

## 2022-07-11 PROCEDURE — 4010F ACE/ARB THERAPY RXD/TAKEN: CPT | Mod: CPTII,S$GLB,, | Performed by: NURSE PRACTITIONER

## 2022-07-11 PROCEDURE — 3078F PR MOST RECENT DIASTOLIC BLOOD PRESSURE < 80 MM HG: ICD-10-PCS | Mod: CPTII,S$GLB,, | Performed by: NURSE PRACTITIONER

## 2022-07-11 PROCEDURE — 99214 OFFICE O/P EST MOD 30 MIN: CPT | Mod: S$GLB,,, | Performed by: NURSE PRACTITIONER

## 2022-07-11 PROCEDURE — 3078F DIAST BP <80 MM HG: CPT | Mod: CPTII,S$GLB,, | Performed by: NURSE PRACTITIONER

## 2022-07-11 PROCEDURE — 99999 PR PBB SHADOW E&M-EST. PATIENT-LVL V: CPT | Mod: PBBFAC,,, | Performed by: NURSE PRACTITIONER

## 2022-07-11 PROCEDURE — 3074F SYST BP LT 130 MM HG: CPT | Mod: CPTII,S$GLB,, | Performed by: NURSE PRACTITIONER

## 2022-07-11 PROCEDURE — 3074F PR MOST RECENT SYSTOLIC BLOOD PRESSURE < 130 MM HG: ICD-10-PCS | Mod: CPTII,S$GLB,, | Performed by: NURSE PRACTITIONER

## 2022-07-11 PROCEDURE — 1159F MED LIST DOCD IN RCRD: CPT | Mod: CPTII,S$GLB,, | Performed by: NURSE PRACTITIONER

## 2022-07-11 PROCEDURE — 3044F HG A1C LEVEL LT 7.0%: CPT | Mod: CPTII,S$GLB,, | Performed by: NURSE PRACTITIONER

## 2022-07-11 PROCEDURE — 1159F PR MEDICATION LIST DOCUMENTED IN MEDICAL RECORD: ICD-10-PCS | Mod: CPTII,S$GLB,, | Performed by: NURSE PRACTITIONER

## 2022-07-11 PROCEDURE — 99999 PR PBB SHADOW E&M-EST. PATIENT-LVL V: ICD-10-PCS | Mod: PBBFAC,,, | Performed by: NURSE PRACTITIONER

## 2022-07-11 PROCEDURE — 4010F PR ACE/ARB THEARPY RXD/TAKEN: ICD-10-PCS | Mod: CPTII,S$GLB,, | Performed by: NURSE PRACTITIONER

## 2022-07-11 PROCEDURE — 3008F BODY MASS INDEX DOCD: CPT | Mod: CPTII,S$GLB,, | Performed by: NURSE PRACTITIONER

## 2022-07-11 PROCEDURE — 1160F RVW MEDS BY RX/DR IN RCRD: CPT | Mod: CPTII,S$GLB,, | Performed by: NURSE PRACTITIONER

## 2022-07-11 PROCEDURE — 1160F PR REVIEW ALL MEDS BY PRESCRIBER/CLIN PHARMACIST DOCUMENTED: ICD-10-PCS | Mod: CPTII,S$GLB,, | Performed by: NURSE PRACTITIONER

## 2022-07-11 PROCEDURE — 3008F PR BODY MASS INDEX (BMI) DOCUMENTED: ICD-10-PCS | Mod: CPTII,S$GLB,, | Performed by: NURSE PRACTITIONER

## 2022-07-11 PROCEDURE — 99214 PR OFFICE/OUTPT VISIT, EST, LEVL IV, 30-39 MIN: ICD-10-PCS | Mod: S$GLB,,, | Performed by: NURSE PRACTITIONER

## 2022-07-12 LAB
BACTERIA BLD CULT: NORMAL
BACTERIA BLD CULT: NORMAL

## 2022-07-13 NOTE — PROGRESS NOTES
"Subjective:      Patient ID: Giuliana Rodas is a 52 y.o. female.  Pt presents to clinic for ER f/u. Was evaluated in clinic 4 days ago and started treatment for diverticulitis. Pain acutely worsened with fever so she went to ER the following day, CT confirmed diverticulitis without abscess or perforation. Was d/c home with morphine for pain. Today reports mild improvement. Is continuing cipro/flagyl twice daily and monitoring diet. Keeping up with fluid intake but endorses decreased solid intake. Is having diarrhea without hematochezia or melana. Did have nausea and vomiting at onset of pain, treated with zofran and no repeat episodes.     Review of Systems   Constitutional: Positive for activity change, appetite change, chills, diaphoresis, fatigue and fever. Negative for unexpected weight change.   Respiratory: Negative for chest tightness and shortness of breath.    Cardiovascular: Negative for chest pain, palpitations, leg swelling and claudication.   Gastrointestinal: Positive for abdominal distention, abdominal pain, diarrhea and reflux. Negative for anal bleeding, blood in stool, constipation, nausea, rectal pain, vomiting and fecal incontinence.   Genitourinary: Negative.  Negative for difficulty urinating and dysuria.   Musculoskeletal: Positive for arthralgias, back pain and myalgias. Negative for gait problem, joint swelling, leg pain and joint deformity.   Neurological: Negative for headaches.   All other systems reviewed and are negative.        Objective:     Vitals:    07/11/22 1427   BP: 110/70   Pulse: 61   Resp: 16   Temp: 98.4 °F (36.9 °C)   TempSrc: Oral   SpO2: 95%   Weight: 79.7 kg (175 lb 11.3 oz)   Height: 5' 2" (1.575 m)     Physical Exam  Vitals and nursing note reviewed.   Constitutional:       General: She is not in acute distress.     Appearance: Normal appearance. She is well-developed, well-groomed and overweight. She is not ill-appearing.   HENT:      Head: Normocephalic and " atraumatic.      Right Ear: External ear normal.      Left Ear: External ear normal.      Nose: Nose normal.      Mouth/Throat:      Lips: Pink.      Mouth: Mucous membranes are moist.   Eyes:      General: Lids are normal. Vision grossly intact. Gaze aligned appropriately.      Conjunctiva/sclera: Conjunctivae normal.      Pupils: Pupils are equal, round, and reactive to light.   Neck:      Trachea: Trachea and phonation normal.   Cardiovascular:      Rate and Rhythm: Normal rate and regular rhythm.      Heart sounds: Normal heart sounds.   Pulmonary:      Effort: Pulmonary effort is normal. No accessory muscle usage or respiratory distress.      Breath sounds: Normal breath sounds and air entry.   Abdominal:      General: Abdomen is flat. Bowel sounds are normal. There is distension (mildly ).      Palpations: Abdomen is soft.      Tenderness: There is abdominal tenderness in the left lower quadrant. There is guarding (involuntary). There is no right CVA tenderness, left CVA tenderness or rebound.   Musculoskeletal:      Cervical back: Normal range of motion and neck supple.   Skin:     General: Skin is warm and dry.      Findings: No rash.   Neurological:      General: No focal deficit present.      Mental Status: She is alert and oriented to person, place, and time. Mental status is at baseline.   Psychiatric:         Attention and Perception: Attention and perception normal.         Mood and Affect: Mood and affect normal.         Speech: Speech normal.         Behavior: Behavior normal. Behavior is cooperative.         Thought Content: Thought content normal.         Cognition and Memory: Cognition and memory normal.         Judgment: Judgment normal.       Assessment and Plan:     1. Acute diverticulitis  Recommend rest and increased fluids.  Antibiotics as ordered below. Side effects of the antibiotics were discussed, including the possibility of rash, photosensitivity, gastrointestinal upset, and  incompatibility with alcohol.  Patient is instructed to avoid nuts and seeds.  She will call if her symptoms worsen, new problems develop, intractable nausea/vomiting occurs, fever of 101.0 (orally) or greater occurs, or if all symptoms are not dramatically improved in 48 hours and completely resolved in 5 days. The patient understands that hospitalization for intravenous fluids and/or antibiotics may be necessary if her symptoms worsen or persist.   Follow up if fails to improve or worsen              JESUS Rowell, FNP-C  Family/Internal Medicine  [facility]

## 2022-07-24 ENCOUNTER — HOSPITAL ENCOUNTER (INPATIENT)
Facility: HOSPITAL | Age: 52
LOS: 6 days | Discharge: HOME | DRG: 392 | End: 2022-07-30
Attending: INTERNAL MEDICINE | Admitting: INTERNAL MEDICINE
Payer: MEDICARE

## 2022-07-24 DIAGNOSIS — K57.92 ACUTE DIVERTICULITIS: Primary | ICD-10-CM

## 2022-07-24 DIAGNOSIS — I10 ESSENTIAL HYPERTENSION: ICD-10-CM

## 2022-07-24 DIAGNOSIS — I95.9 HYPOTENSION: ICD-10-CM

## 2022-07-24 DIAGNOSIS — N17.9 AKI (ACUTE KIDNEY INJURY): ICD-10-CM

## 2022-07-24 LAB
ALBUMIN SERPL BCP-MCNC: 3.8 G/DL (ref 3.5–5.2)
ALP SERPL-CCNC: 52 U/L (ref 55–135)
ALT SERPL W/O P-5'-P-CCNC: 24 U/L (ref 10–44)
ANION GAP SERPL CALC-SCNC: 10 MMOL/L (ref 8–16)
ANION GAP SERPL CALC-SCNC: 11 MMOL/L (ref 8–16)
AST SERPL-CCNC: 23 U/L (ref 10–40)
BACTERIA #/AREA URNS HPF: ABNORMAL /HPF
BASOPHILS # BLD AUTO: 0.02 K/UL (ref 0–0.2)
BASOPHILS NFR BLD: 0.2 % (ref 0–1.9)
BILIRUB SERPL-MCNC: 0.9 MG/DL (ref 0.1–1)
BILIRUB UR QL STRIP: NEGATIVE
BUN SERPL-MCNC: 27 MG/DL (ref 6–20)
BUN SERPL-MCNC: 27 MG/DL (ref 6–30)
CALCIUM SERPL-MCNC: 9.8 MG/DL (ref 8.7–10.5)
CHLORIDE SERPL-SCNC: 101 MMOL/L (ref 95–110)
CHLORIDE SERPL-SCNC: 99 MMOL/L (ref 95–110)
CLARITY UR: ABNORMAL
CO2 SERPL-SCNC: 27 MMOL/L (ref 23–29)
COLOR UR: ABNORMAL
CREAT SERPL-MCNC: 1.7 MG/DL (ref 0.5–1.4)
CREAT SERPL-MCNC: 1.8 MG/DL (ref 0.5–1.4)
CTP QC/QA: YES
DIFFERENTIAL METHOD: ABNORMAL
EOSINOPHIL # BLD AUTO: 0 K/UL (ref 0–0.5)
EOSINOPHIL NFR BLD: 0.1 % (ref 0–8)
ERYTHROCYTE [DISTWIDTH] IN BLOOD BY AUTOMATED COUNT: 12.5 % (ref 11.5–14.5)
EST. GFR  (AFRICAN AMERICAN): 39 ML/MIN/1.73 M^2
EST. GFR  (NON AFRICAN AMERICAN): 34 ML/MIN/1.73 M^2
GLUCOSE SERPL-MCNC: 105 MG/DL (ref 70–110)
GLUCOSE SERPL-MCNC: 106 MG/DL (ref 70–110)
GLUCOSE UR QL STRIP: NEGATIVE
HCT VFR BLD AUTO: 38.3 % (ref 37–48.5)
HCT VFR BLD CALC: 40 %PCV (ref 36–54)
HGB BLD-MCNC: 13.1 G/DL (ref 12–16)
HGB UR QL STRIP: ABNORMAL
HYALINE CASTS #/AREA URNS LPF: 0 /LPF
IMM GRANULOCYTES # BLD AUTO: 0.07 K/UL (ref 0–0.04)
IMM GRANULOCYTES NFR BLD AUTO: 0.6 % (ref 0–0.5)
KETONES UR QL STRIP: NEGATIVE
LACTATE SERPL-SCNC: 1.1 MMOL/L (ref 0.5–2.2)
LEUKOCYTE ESTERASE UR QL STRIP: ABNORMAL
LIPASE SERPL-CCNC: 55 U/L (ref 4–60)
LYMPHOCYTES # BLD AUTO: 0.6 K/UL (ref 1–4.8)
LYMPHOCYTES NFR BLD: 4.9 % (ref 18–48)
MCH RBC QN AUTO: 30.2 PG (ref 27–31)
MCHC RBC AUTO-ENTMCNC: 34.2 G/DL (ref 32–36)
MCV RBC AUTO: 88 FL (ref 82–98)
MICROSCOPIC COMMENT: ABNORMAL
MONOCYTES # BLD AUTO: 0.6 K/UL (ref 0.3–1)
MONOCYTES NFR BLD: 4.8 % (ref 4–15)
NEUTROPHILS # BLD AUTO: 11.3 K/UL (ref 1.8–7.7)
NEUTROPHILS NFR BLD: 89.4 % (ref 38–73)
NITRITE UR QL STRIP: NEGATIVE
NRBC BLD-RTO: 0 /100 WBC
PH UR STRIP: 6 [PH] (ref 5–8)
PLATELET # BLD AUTO: 121 K/UL (ref 150–450)
PMV BLD AUTO: 10 FL (ref 9.2–12.9)
POC IONIZED CALCIUM: 1.23 MMOL/L (ref 1.06–1.42)
POC TCO2 (MEASURED): 25 MMOL/L (ref 23–29)
POTASSIUM BLD-SCNC: 3.6 MMOL/L (ref 3.5–5.1)
POTASSIUM SERPL-SCNC: 3.7 MMOL/L (ref 3.5–5.1)
PROT SERPL-MCNC: 7.4 G/DL (ref 6–8.4)
PROT UR QL STRIP: ABNORMAL
RBC # BLD AUTO: 4.34 M/UL (ref 4–5.4)
RBC #/AREA URNS HPF: >100 /HPF (ref 0–4)
SAMPLE: ABNORMAL
SARS-COV-2 RDRP RESP QL NAA+PROBE: NEGATIVE
SODIUM BLD-SCNC: 133 MMOL/L (ref 136–145)
SODIUM SERPL-SCNC: 136 MMOL/L (ref 136–145)
SP GR UR STRIP: 1.01 (ref 1–1.03)
SQUAMOUS #/AREA URNS HPF: 3 /HPF
TROPONIN I SERPL DL<=0.01 NG/ML-MCNC: 0.01 NG/ML (ref 0–0.03)
URN SPEC COLLECT METH UR: ABNORMAL
UROBILINOGEN UR STRIP-ACNC: NEGATIVE EU/DL
WBC # BLD AUTO: 12.59 K/UL (ref 3.9–12.7)
WBC #/AREA URNS HPF: 9 /HPF (ref 0–5)

## 2022-07-24 PROCEDURE — 83690 ASSAY OF LIPASE: CPT | Performed by: INTERNAL MEDICINE

## 2022-07-24 PROCEDURE — 84295 ASSAY OF SERUM SODIUM: CPT

## 2022-07-24 PROCEDURE — 11000001 HC ACUTE MED/SURG PRIVATE ROOM

## 2022-07-24 PROCEDURE — 80047 BASIC METABLC PNL IONIZED CA: CPT

## 2022-07-24 PROCEDURE — 81000 URINALYSIS NONAUTO W/SCOPE: CPT | Performed by: INTERNAL MEDICINE

## 2022-07-24 PROCEDURE — 96365 THER/PROPH/DIAG IV INF INIT: CPT

## 2022-07-24 PROCEDURE — 99900035 HC TECH TIME PER 15 MIN (STAT)

## 2022-07-24 PROCEDURE — 63600175 PHARM REV CODE 636 W HCPCS: Performed by: INTERNAL MEDICINE

## 2022-07-24 PROCEDURE — 93005 ELECTROCARDIOGRAM TRACING: CPT

## 2022-07-24 PROCEDURE — 80053 COMPREHEN METABOLIC PANEL: CPT | Performed by: INTERNAL MEDICINE

## 2022-07-24 PROCEDURE — 25500020 PHARM REV CODE 255: Performed by: INTERNAL MEDICINE

## 2022-07-24 PROCEDURE — 25000003 PHARM REV CODE 250: Performed by: INTERNAL MEDICINE

## 2022-07-24 PROCEDURE — 96361 HYDRATE IV INFUSION ADD-ON: CPT

## 2022-07-24 PROCEDURE — 96375 TX/PRO/DX INJ NEW DRUG ADDON: CPT

## 2022-07-24 PROCEDURE — U0002 COVID-19 LAB TEST NON-CDC: HCPCS | Performed by: INTERNAL MEDICINE

## 2022-07-24 PROCEDURE — 87040 BLOOD CULTURE FOR BACTERIA: CPT | Performed by: INTERNAL MEDICINE

## 2022-07-24 PROCEDURE — 84132 ASSAY OF SERUM POTASSIUM: CPT

## 2022-07-24 PROCEDURE — 85025 COMPLETE CBC W/AUTO DIFF WBC: CPT | Performed by: INTERNAL MEDICINE

## 2022-07-24 PROCEDURE — 82565 ASSAY OF CREATININE: CPT

## 2022-07-24 PROCEDURE — 82330 ASSAY OF CALCIUM: CPT

## 2022-07-24 PROCEDURE — 93010 EKG 12-LEAD: ICD-10-PCS | Mod: ,,, | Performed by: INTERNAL MEDICINE

## 2022-07-24 PROCEDURE — 85014 HEMATOCRIT: CPT

## 2022-07-24 PROCEDURE — 83605 ASSAY OF LACTIC ACID: CPT | Performed by: INTERNAL MEDICINE

## 2022-07-24 PROCEDURE — 99285 EMERGENCY DEPT VISIT HI MDM: CPT | Mod: 25

## 2022-07-24 PROCEDURE — 93010 ELECTROCARDIOGRAM REPORT: CPT | Mod: ,,, | Performed by: INTERNAL MEDICINE

## 2022-07-24 PROCEDURE — 84484 ASSAY OF TROPONIN QUANT: CPT | Performed by: INTERNAL MEDICINE

## 2022-07-24 PROCEDURE — S0030 INJECTION, METRONIDAZOLE: HCPCS | Performed by: INTERNAL MEDICINE

## 2022-07-24 RX ORDER — OXYCODONE AND ACETAMINOPHEN 7.5; 325 MG/1; MG/1
1 TABLET ORAL EVERY 4 HOURS PRN
Status: DISCONTINUED | OUTPATIENT
Start: 2022-07-24 | End: 2022-07-24

## 2022-07-24 RX ORDER — MORPHINE SULFATE 15 MG/1
15 TABLET ORAL EVERY 6 HOURS PRN
Status: DISCONTINUED | OUTPATIENT
Start: 2022-07-24 | End: 2022-07-27

## 2022-07-24 RX ORDER — ATORVASTATIN CALCIUM 40 MG/1
40 TABLET, FILM COATED ORAL DAILY
Status: DISCONTINUED | OUTPATIENT
Start: 2022-07-24 | End: 2022-07-30 | Stop reason: HOSPADM

## 2022-07-24 RX ORDER — TROLAMINE SALICYLATE 10 G/100G
CREAM TOPICAL
COMMUNITY

## 2022-07-24 RX ORDER — SODIUM CHLORIDE 9 MG/ML
INJECTION, SOLUTION INTRAVENOUS CONTINUOUS
Status: DISCONTINUED | OUTPATIENT
Start: 2022-07-24 | End: 2022-07-26

## 2022-07-24 RX ORDER — FLUTICASONE PROPIONATE 50 MCG
2 SPRAY, SUSPENSION (ML) NASAL DAILY
Status: DISCONTINUED | OUTPATIENT
Start: 2022-07-24 | End: 2022-07-30 | Stop reason: HOSPADM

## 2022-07-24 RX ORDER — ACETAMINOPHEN 500 MG
1000 TABLET ORAL
Status: COMPLETED | OUTPATIENT
Start: 2022-07-24 | End: 2022-07-24

## 2022-07-24 RX ORDER — METRONIDAZOLE 500 MG/100ML
500 INJECTION, SOLUTION INTRAVENOUS
Status: DISCONTINUED | OUTPATIENT
Start: 2022-07-24 | End: 2022-07-26

## 2022-07-24 RX ORDER — CIPROFLOXACIN 2 MG/ML
400 INJECTION, SOLUTION INTRAVENOUS
Status: DISCONTINUED | OUTPATIENT
Start: 2022-07-24 | End: 2022-07-25

## 2022-07-24 RX ORDER — AMLODIPINE BESYLATE 5 MG/1
10 TABLET ORAL DAILY
Status: DISCONTINUED | OUTPATIENT
Start: 2022-07-24 | End: 2022-07-30 | Stop reason: HOSPADM

## 2022-07-24 RX ORDER — ONDANSETRON 2 MG/ML
8 INJECTION INTRAMUSCULAR; INTRAVENOUS EVERY 6 HOURS PRN
Status: DISCONTINUED | OUTPATIENT
Start: 2022-07-24 | End: 2022-07-30 | Stop reason: HOSPADM

## 2022-07-24 RX ORDER — POLYETHYLENE GLYCOL 3350 17 G/17G
17 POWDER, FOR SOLUTION ORAL DAILY
Refills: 0 | Status: DISCONTINUED | OUTPATIENT
Start: 2022-07-24 | End: 2022-07-26

## 2022-07-24 RX ORDER — CHOLECALCIFEROL (VITAMIN D3) 25 MCG
1000 TABLET ORAL DAILY
COMMUNITY

## 2022-07-24 RX ORDER — KETOROLAC TROMETHAMINE 30 MG/ML
15 INJECTION, SOLUTION INTRAMUSCULAR; INTRAVENOUS
Status: COMPLETED | OUTPATIENT
Start: 2022-07-24 | End: 2022-07-24

## 2022-07-24 RX ORDER — METHYLPREDNISOLONE 4 MG
TABLET, DOSE PACK ORAL
COMMUNITY

## 2022-07-24 RX ORDER — METHOCARBAMOL 500 MG/1
500 TABLET, FILM COATED ORAL 3 TIMES DAILY
Status: DISCONTINUED | OUTPATIENT
Start: 2022-07-24 | End: 2022-07-27

## 2022-07-24 RX ORDER — ENOXAPARIN SODIUM 100 MG/ML
40 INJECTION SUBCUTANEOUS EVERY 24 HOURS
Status: DISCONTINUED | OUTPATIENT
Start: 2022-07-24 | End: 2022-07-25

## 2022-07-24 RX ADMIN — ONDANSETRON 8 MG: 2 INJECTION INTRAMUSCULAR; INTRAVENOUS at 11:07

## 2022-07-24 RX ADMIN — SODIUM CHLORIDE: 0.9 INJECTION, SOLUTION INTRAVENOUS at 12:07

## 2022-07-24 RX ADMIN — OXYCODONE AND ACETAMINOPHEN 1 TABLET: 7.5; 325 TABLET ORAL at 11:07

## 2022-07-24 RX ADMIN — SODIUM CHLORIDE 1000 ML: 9 INJECTION, SOLUTION INTRAVENOUS at 01:07

## 2022-07-24 RX ADMIN — ATORVASTATIN CALCIUM 40 MG: 40 TABLET, FILM COATED ORAL at 12:07

## 2022-07-24 RX ADMIN — METRONIDAZOLE 500 MG: 500 INJECTION, SOLUTION INTRAVENOUS at 01:07

## 2022-07-24 RX ADMIN — CIPROFLOXACIN 400 MG: 2 INJECTION, SOLUTION INTRAVENOUS at 12:07

## 2022-07-24 RX ADMIN — ACETAMINOPHEN 1000 MG: 500 TABLET ORAL at 03:07

## 2022-07-24 RX ADMIN — METHOCARBAMOL 500 MG: 500 TABLET ORAL at 09:07

## 2022-07-24 RX ADMIN — IOHEXOL 85 ML: 350 INJECTION, SOLUTION INTRAVENOUS at 05:07

## 2022-07-24 RX ADMIN — METRONIDAZOLE 500 MG: 500 INJECTION, SOLUTION INTRAVENOUS at 09:07

## 2022-07-24 RX ADMIN — METHOCARBAMOL 500 MG: 500 TABLET ORAL at 04:07

## 2022-07-24 RX ADMIN — ENOXAPARIN SODIUM 40 MG: 100 INJECTION SUBCUTANEOUS at 04:07

## 2022-07-24 RX ADMIN — KETOROLAC TROMETHAMINE 15 MG: 30 INJECTION, SOLUTION INTRAMUSCULAR; INTRAVENOUS at 03:07

## 2022-07-24 RX ADMIN — SODIUM CHLORIDE 2000 ML: 0.9 INJECTION, SOLUTION INTRAVENOUS at 03:07

## 2022-07-24 RX ADMIN — PROMETHAZINE HYDROCHLORIDE 25 MG: 25 INJECTION INTRAMUSCULAR; INTRAVENOUS at 04:07

## 2022-07-24 RX ADMIN — MORPHINE SULFATE 15 MG: 15 TABLET ORAL at 10:07

## 2022-07-24 RX ADMIN — PIPERACILLIN SODIUM, TAZOBACTAM SODIUM 4.5 G: 4; .5 INJECTION, POWDER, LYOPHILIZED, FOR SOLUTION INTRAVENOUS at 06:07

## 2022-07-24 NOTE — SUBJECTIVE & OBJECTIVE
Past Medical History:   Diagnosis Date    Bile reflux gastritis     Breast cyst     Eczema     Fibrocystic breast     Fibromyalgia     Gastroparesis     dr. harden    GERD (gastroesophageal reflux disease)     Hyperglyceridemia     Hyperlipidemia     Hypertension     IC (interstitial cystitis)     dr. labadie    Psoriasis     Tension headache        Past Surgical History:   Procedure Laterality Date    BREAST BIOPSY Right     over 10 years ago/ milk duct    CHOLECYSTECTOMY      ESOPHAGOGASTRODUODENOSCOPY N/A 3/12/2020    Procedure: EGD (ESOPHAGOGASTRODUODENOSCOPY);  Surgeon: Damon Mcmahon MD;  Location: [address]);  Service: Endoscopy;  Laterality: N/A;  use pcf scope    HEMORRHOID SURGERY      HYSTERECTOMY      KNEE SURGERY      OOPHORECTOMY      one ov removed       Review of patient's allergies indicates:   Allergen Reactions    Chlorthalidone Swelling     Hypokalemia     Dicyclomine Edema             Adhesive Rash    Amitriptyline Hallucinations    Depo-provera [medroxyprogesterone] Anxiety    Prozac [fluoxetine] Anxiety    Topiramate Rash       No current facility-administered medications on file prior to encounter.     Current Outpatient Medications on File Prior to Encounter   Medication Sig    amlodipine-olmesartan (ANGEL) 5-40 mg per tablet TAKE ONE CAPSULE BY MOUTH EVERY DAY    atenoloL (TENORMIN) 50 MG tablet TAKE ONE TABLET BY MOUTH TWICE DAILY AS NEEDED    atorvastatin (LIPITOR) 40 MG tablet TAKE 1 TABLET BY MOUTH EVERY DAY    BIFIDOBACTERIUM INFANTIS (ALIGN ORAL) Take 1 tablet by mouth once daily.    coal tar (NEUTROGENA T-GEL) 0.5 % shampoo Apply topically nightly as needed.    fluticasone propionate (FLONASE) 50 mcg/actuation nasal spray SPRAY twice IN EACH NOSTRIL DAILY    glucosamine sulfate (GLUCOSAMINE) 500 mg Tab Take by mouth.    hydroCHLOROthiazide (HYDRODIURIL) 12.5 MG Tab TAKE ONE TABLET BY MOUTH DAILY    inulin (FIBER GUMMIES ORAL) Take by mouth.    ketoconazole (NIZORAL) 2 %  shampoo wash hair AT least TWO OR THREE TIMES weekly. let sit on scalp AT least 5 MINUTES prior to rinsing    magnesium 30 mg Tab Take 250 mg by mouth once.     menthol/camphor (ICY HOT ADVANCED TOP) Apply topically.    methocarbamoL (ROBAXIN) 500 MG Tab TAKE ONE TABLET BY MOUTH EVERY 8 HOURS AS NEEDED FOR HEADACHE AND MUSCLE SPASM    morphine (MSIR) 15 MG tablet Take 1 tablet (15 mg total) by mouth every 6 (six) hours as needed for Pain.    multivit with min-folic acid 200 mcg Chew Take by mouth.    multivit-min-folic acid-biotin (HAIR,SKIN AND NAILS,FA-BIOTIN,) 66.7-1,000 mcg Tab Take by mouth.    olopatadine HCl (PATADAY OPHT) Apply to eye.    omega-3 fatty acids/fish oil (FISH OIL-OMEGA-3 FATTY ACIDS) 300-1,000 mg capsule Take 1 capsule by mouth once daily.    ondansetron (ZOFRAN) 4 MG tablet Take 1 tablet (4 mg total) by mouth every 8 (eight) hours as needed.    propylene glycol (SYSTANE BALANCE OPHT) Apply to eye.    tretinoin (RETIN-A) 0.025 % cream Apply topically nightly. Apply pea-sized amount to entire face nightly. Start slow, up-titrate as tolerated.    vitamin D (VITAMIN D3) 1000 units Tab Take 1,000 Units by mouth once daily.    vitamin E 400 UNIT capsule Take 400 Units by mouth once daily.    BIOFREEZE, MENTHOL, TOP Apply topically.    fluocinonide (LIDEX) 0.05 % external solution Apply topically 2 (two) times daily as needed (rash, itching at scalp). Avoid use on face, body folds, groin/genitalia.    polyethylene glycol (GLYCOLAX) 17 gram/dose powder Take 17 g by mouth once daily.    PREMARIN vaginal cream PRN    trolamine salicylate (ASPERCREME) 10 % cream Apply topically as needed.    UNABLE TO FIND Silymarin Complex, Liver Health     Family History       Problem Relation (Age of Onset)    Breast cancer Mother, Paternal Grandmother    Diabetes Father    Heart disease Father    Hyperlipidemia Father    Hypertension Mother, Father    Migraines Mother    Stroke Father          Tobacco Use    Smoking  status: Never Smoker    Smokeless tobacco: Never Used   Substance and Sexual Activity    Alcohol use: No    Drug use: No    Sexual activity: Yes     Partners: Male     Birth control/protection: See Surgical Hx     Review of Systems   Constitutional:  Negative for activity change, appetite change, chills, diaphoresis, fatigue and fever.   HENT:  Negative for congestion, dental problem and drooling.    Eyes:  Negative for pain, discharge, redness and itching.   Respiratory:  Negative for apnea, cough, chest tightness and shortness of breath.    Cardiovascular:  Negative for chest pain and leg swelling.   Gastrointestinal:  Positive for abdominal pain. Negative for abdominal distention, diarrhea, nausea and vomiting.   Genitourinary:  Negative for difficulty urinating and dyspareunia.   Musculoskeletal:  Negative for arthralgias and back pain.   Skin:  Negative for color change and pallor.   Neurological:  Negative for dizziness and facial asymmetry.   Psychiatric/Behavioral:  Negative for agitation and behavioral problems.    Objective:     Vital Signs (Most Recent):  Temp: 98.8 °F (37.1 °C) (07/24/22 1116)  Pulse: 79 (07/24/22 1100)  Resp: 18 (07/24/22 1101)  BP: (!) 114/57 (07/24/22 1100)  SpO2: 100 % (07/24/22 1100)   Vital Signs (24h Range):  Temp:  [98.6 °F (37 °C)-103.1 °F (39.5 °C)] 98.8 °F (37.1 °C)  Pulse:  [66-86] 79  Resp:  [17-23] 18  SpO2:  [87 %-100 %] 100 %  BP: ()/(49-61) 114/57     Weight: 76.7 kg (169 lb)  Body mass index is 30.91 kg/m².    Physical Exam  Vitals and nursing note reviewed.   Constitutional:       General: She is not in acute distress.     Appearance: She is not ill-appearing, toxic-appearing or diaphoretic.   HENT:      Head: Normocephalic and atraumatic.      Right Ear: There is no impacted cerumen.      Mouth/Throat:      Mouth: Mucous membranes are moist.   Eyes:      Conjunctiva/sclera: Conjunctivae normal.   Cardiovascular:      Rate and Rhythm: Normal rate and regular  rhythm.   Pulmonary:      Effort: No respiratory distress.      Breath sounds: No wheezing.   Abdominal:      General: There is no distension.      Palpations: There is no mass.      Tenderness: There is no abdominal tenderness.   Skin:     General: Skin is warm and dry.   Neurological:      Mental Status: She is alert and oriented to person, place, and time.   Psychiatric:         Mood and Affect: Mood normal.         Behavior: Behavior normal.         Thought Content: Thought content normal.           Significant Labs: All pertinent labs within the past 24 hours have been reviewed.  BMP:   Recent Labs   Lab 07/24/22 0331         K 3.7   CL 99   CO2 27   BUN 27*   CREATININE 1.7*   CALCIUM 9.8     CBC:   Recent Labs   Lab 07/24/22 0331 07/24/22 0331   WBC 12.59  --    HGB 13.1  --    HCT 38.3 40   *  --        Significant Imaging: CT abdomen as above

## 2022-07-24 NOTE — ED NOTES
O2 sats dropped to 87% on RA.  Pt was placed on 2L NC and O2 increased to 100%.  Dr. Olvera notified.

## 2022-07-24 NOTE — H&P
[facility]  History & Physical    Patient Name: Giuliana Rodas  MRN: [id number]  Patient Class: IP- Inpatient  Admission Date: 7/24/2022  Attending Physician: Lalito Olvera MD   Primary Care Provider: Giuliana Rojas MD         Patient information was obtained from patient and ER records.     Subjective:     Principal Problem:Acute diverticulitis    Chief Complaint:   Chief Complaint   Patient presents with    Multiple Complaints     Pt c/o LLQ abdominal pain, fever, blood in urine, nausea, & generalized weakness starting today; pt was seen by PCP & at ED 2 weeks ago for same complaints and was dx with diverticulitis; pt took Excedrin at 5pm, Morphine at 7pm, & Zofran at 11pm with no relief;        HPI: Mrs. Rodas is a 51 yo F who presents with a CC of LLQ abdominal pain since yesterday.  She was seen on 7/8/22 for diverticulitis and sent home on Cipro and Flagyl. She returns now again with the same complaint as well as fever. She was found to have diverticulitis of the LLQ about the same from 7/8/22.      Past Medical History:   Diagnosis Date    Bile reflux gastritis     Breast cyst     Eczema     Fibrocystic breast     Fibromyalgia     Gastroparesis     dr. harden    GERD (gastroesophageal reflux disease)     Hyperglyceridemia     Hyperlipidemia     Hypertension     IC (interstitial cystitis)     dr. labadie    Psoriasis     Tension headache        Past Surgical History:   Procedure Laterality Date    BREAST BIOPSY Right     over 10 years ago/ milk duct    CHOLECYSTECTOMY      ESOPHAGOGASTRODUODENOSCOPY N/A 3/12/2020    Procedure: EGD (ESOPHAGOGASTRODUODENOSCOPY);  Surgeon: Damon Mcmahon MD;  Location: [address];  Service: Endoscopy;  Laterality: N/A;  use pcf scope    HEMORRHOID SURGERY      HYSTERECTOMY      KNEE SURGERY      OOPHORECTOMY      one ov removed       Review of patient's allergies indicates:   Allergen Reactions     Chlorthalidone Swelling     Hypokalemia     Dicyclomine Edema             Adhesive Rash    Amitriptyline Hallucinations    Depo-provera [medroxyprogesterone] Anxiety    Prozac [fluoxetine] Anxiety    Topiramate Rash       No current facility-administered medications on file prior to encounter.     Current Outpatient Medications on File Prior to Encounter   Medication Sig    amlodipine-olmesartan (ANGEL) 5-40 mg per tablet TAKE ONE CAPSULE BY MOUTH EVERY DAY    atenoloL (TENORMIN) 50 MG tablet TAKE ONE TABLET BY MOUTH TWICE DAILY AS NEEDED    atorvastatin (LIPITOR) 40 MG tablet TAKE 1 TABLET BY MOUTH EVERY DAY    BIFIDOBACTERIUM INFANTIS (ALIGN ORAL) Take 1 tablet by mouth once daily.    coal tar (NEUTROGENA T-GEL) 0.5 % shampoo Apply topically nightly as needed.    fluticasone propionate (FLONASE) 50 mcg/actuation nasal spray SPRAY twice IN EACH NOSTRIL DAILY    glucosamine sulfate (GLUCOSAMINE) 500 mg Tab Take by mouth.    hydroCHLOROthiazide (HYDRODIURIL) 12.5 MG Tab TAKE ONE TABLET BY MOUTH DAILY    inulin (FIBER GUMMIES ORAL) Take by mouth.    ketoconazole (NIZORAL) 2 % shampoo wash hair AT least TWO OR THREE TIMES weekly. let sit on scalp AT least 5 MINUTES prior to rinsing    magnesium 30 mg Tab Take 250 mg by mouth once.     menthol/camphor (ICY HOT ADVANCED TOP) Apply topically.    methocarbamoL (ROBAXIN) 500 MG Tab TAKE ONE TABLET BY MOUTH EVERY 8 HOURS AS NEEDED FOR HEADACHE AND MUSCLE SPASM    morphine (MSIR) 15 MG tablet Take 1 tablet (15 mg total) by mouth every 6 (six) hours as needed for Pain.    multivit with min-folic acid 200 mcg Chew Take by mouth.    multivit-min-folic acid-biotin (HAIR,SKIN AND NAILS,FA-BIOTIN,) 66.7-1,000 mcg Tab Take by mouth.    olopatadine HCl (PATADAY OPHT) Apply to eye.    omega-3 fatty acids/fish oil (FISH OIL-OMEGA-3 FATTY ACIDS) 300-1,000 mg capsule Take 1 capsule by mouth once daily.    ondansetron (ZOFRAN) 4 MG tablet Take 1 tablet (4 mg total)  by mouth every 8 (eight) hours as needed.    propylene glycol (SYSTANE BALANCE OPHT) Apply to eye.    tretinoin (RETIN-A) 0.025 % cream Apply topically nightly. Apply pea-sized amount to entire face nightly. Start slow, up-titrate as tolerated.    vitamin D (VITAMIN D3) 1000 units Tab Take 1,000 Units by mouth once daily.    vitamin E 400 UNIT capsule Take 400 Units by mouth once daily.    BIOFREEZE, MENTHOL, TOP Apply topically.    fluocinonide (LIDEX) 0.05 % external solution Apply topically 2 (two) times daily as needed (rash, itching at scalp). Avoid use on face, body folds, groin/genitalia.    polyethylene glycol (GLYCOLAX) 17 gram/dose powder Take 17 g by mouth once daily.    PREMARIN vaginal cream PRN    trolamine salicylate (ASPERCREME) 10 % cream Apply topically as needed.    UNABLE TO FIND Silymarin Complex, Liver Health     Family History       Problem Relation (Age of Onset)    Breast cancer Mother, Paternal Grandmother    Diabetes Father    Heart disease Father    Hyperlipidemia Father    Hypertension Mother, Father    Migraines Mother    Stroke Father          Tobacco Use    Smoking status: Never Smoker    Smokeless tobacco: Never Used   Substance and Sexual Activity    Alcohol use: No    Drug use: No    Sexual activity: Yes     Partners: Male     Birth control/protection: See Surgical Hx     Review of Systems   Constitutional:  Negative for activity change, appetite change, chills, diaphoresis, fatigue and fever.   HENT:  Negative for congestion, dental problem and drooling.    Eyes:  Negative for pain, discharge, redness and itching.   Respiratory:  Negative for apnea, cough, chest tightness and shortness of breath.    Cardiovascular:  Negative for chest pain and leg swelling.   Gastrointestinal:  Positive for abdominal pain. Negative for abdominal distention, diarrhea, nausea and vomiting.   Genitourinary:  Negative for difficulty urinating and dyspareunia.   Musculoskeletal:   Negative for arthralgias and back pain.   Skin:  Negative for color change and pallor.   Neurological:  Negative for dizziness and facial asymmetry.   Psychiatric/Behavioral:  Negative for agitation and behavioral problems.    Objective:     Vital Signs (Most Recent):  Temp: 98.8 °F (37.1 °C) (07/24/22 1116)  Pulse: 79 (07/24/22 1100)  Resp: 18 (07/24/22 1101)  BP: (!) 114/57 (07/24/22 1100)  SpO2: 100 % (07/24/22 1100)   Vital Signs (24h Range):  Temp:  [98.6 °F (37 °C)-103.1 °F (39.5 °C)] 98.8 °F (37.1 °C)  Pulse:  [66-86] 79  Resp:  [17-23] 18  SpO2:  [87 %-100 %] 100 %  BP: ()/(49-61) 114/57     Weight: 76.7 kg (169 lb)  Body mass index is 30.91 kg/m².    Physical Exam  Vitals and nursing note reviewed.   Constitutional:       General: She is not in acute distress.     Appearance: She is not ill-appearing, toxic-appearing or diaphoretic.   HENT:      Head: Normocephalic and atraumatic.      Right Ear: There is no impacted cerumen.      Mouth/Throat:      Mouth: Mucous membranes are moist.   Eyes:      Conjunctiva/sclera: Conjunctivae normal.   Cardiovascular:      Rate and Rhythm: Normal rate and regular rhythm.   Pulmonary:      Effort: No respiratory distress.      Breath sounds: No wheezing.   Abdominal:      General: There is no distension.      Palpations: There is no mass.      Tenderness: There is no abdominal tenderness.   Skin:     General: Skin is warm and dry.   Neurological:      Mental Status: She is alert and oriented to person, place, and time.   Psychiatric:         Mood and Affect: Mood normal.         Behavior: Behavior normal.         Thought Content: Thought content normal.           Significant Labs: All pertinent labs within the past 24 hours have been reviewed.  BMP:   Recent Labs   Lab 07/24/22  0331         K 3.7   CL 99   CO2 27   BUN 27*   CREATININE 1.7*   CALCIUM 9.8     CBC:   Recent Labs   Lab 07/24/22  0331 07/24/22 0331   WBC 12.59  --    HGB 13.1  --    HCT  38.3 40   *  --        Significant Imaging: CT abdomen as above     Assessment/Plan:     * Acute diverticulitis  Cipro/flagyl. Sepsis not present.  Cipro/Flagyl.  Non-ill appearing and fairly benign exam.        CORNELIO (acute kidney injury)  Patient with acute kidney injury likely due to pre-renal azotemia CORNELIO is currently stable. Labs reviewed- Renal function/electrolytes with Estimated Creatinine Clearance: 36.3 mL/min (A) (based on SCr of 1.7 mg/dL (H)). according to latest data. Monitor urine output and serial BMP and adjust therapy as needed. Avoid nephrotoxins and renally dose meds for GFR listed above.     IV fluids. BMP in am       Hyperlipidemia  Resume home meds       GERD (gastroesophageal reflux disease)  No acute issues       Hypertension  Benign essential        VTE Risk Mitigation (From admission, onward)         Ordered     enoxaparin injection 40 mg  Daily         07/24/22 8981                   Jose Manuel Monreal MD  Department of Hospital Medicine   [facility] [place] - Emergency Dept

## 2022-07-24 NOTE — ED PROVIDER NOTES
Encounter Date: 7/24/2022       History     Chief Complaint   Patient presents with    Multiple Complaints     Pt c/o LLQ abdominal pain, fever, blood in urine, nausea, & generalized weakness starting today; pt was seen by PCP & at ED 2 weeks ago for same complaints and was dx with diverticulitis; pt took Excedrin at 5pm, Morphine at 7pm, & Zofran at 11pm with no relief;     52-year-old female presents to the emergency department complaining of left lower quadrant abdominal pain and intermittent fever x1 day.  She also states she has had hematuria, nausea and generalized weakness x1 day.  She was seen in the ED 2 weeks ago similar complaints and diagnosed with diverticulitis.  She states she told morphine and Zofran at home after pain increased.    The history is provided by the patient. No  was used.     Review of patient's allergies indicates:   Allergen Reactions    Chlorthalidone Swelling     Hypokalemia     Dicyclomine Edema             Adhesive Rash    Amitriptyline Hallucinations    Depo-provera [medroxyprogesterone] Anxiety    Prozac [fluoxetine] Anxiety    Topiramate Rash     Past Medical History:   Diagnosis Date    Bile reflux gastritis     Breast cyst     Eczema     Fibrocystic breast     Fibromyalgia     Gastroparesis     dr. harden    GERD (gastroesophageal reflux disease)     Hyperglyceridemia     Hyperlipidemia     Hypertension     IC (interstitial cystitis)     dr. labadie    Psoriasis     Tension headache      Past Surgical History:   Procedure Laterality Date    BREAST BIOPSY Right     over 10 years ago/ milk duct    CHOLECYSTECTOMY      ESOPHAGOGASTRODUODENOSCOPY N/A 3/12/2020    Procedure: EGD (ESOPHAGOGASTRODUODENOSCOPY);  Surgeon: Damon Mcmahon MD;  Location: [address];  Service: Endoscopy;  Laterality: N/A;  use pcf scope    HEMORRHOID SURGERY      HYSTERECTOMY      KNEE SURGERY      OOPHORECTOMY      one ov removed     Family  History   Problem Relation Age of Onset    Hypertension Mother     Migraines Mother     Breast cancer Mother     Hypertension Father     Stroke Father     Heart disease Father     Hyperlipidemia Father     Diabetes Father     Breast cancer Paternal Grandmother     Colon cancer Neg Hx     Ovarian cancer Neg Hx     Inflammatory bowel disease Neg Hx     Celiac disease Neg Hx      Social History     Tobacco Use    Smoking status: Never Smoker    Smokeless tobacco: Never Used   Substance Use Topics    Alcohol use: No    Drug use: No     Review of Systems   Constitutional: Positive for fatigue and fever.   Respiratory: Negative for shortness of breath.    Cardiovascular: Negative for chest pain.   Gastrointestinal: Positive for abdominal pain and nausea. Negative for blood in stool and diarrhea.   Genitourinary: Positive for hematuria.   Neurological: Negative for seizures and syncope.   All other systems reviewed and are negative.      Physical Exam     Initial Vitals [07/24/22 0115]   BP Pulse Resp Temp SpO2   (!) 103/58 86 19 (!) 103.1 °F (39.5 °C) 95 %      MAP       --         Physical Exam    Nursing note and vitals reviewed.  Constitutional: She is not diaphoretic. No distress.   HENT:   Head: Normocephalic and atraumatic.   Right Ear: External ear normal.   Left Ear: External ear normal.   Mouth/Throat: Oropharynx is clear and moist.   Eyes: Conjunctivae and EOM are normal.   Neck:   Normal range of motion.  Cardiovascular: Normal rate, regular rhythm and intact distal pulses.   Pulmonary/Chest: Breath sounds normal. No respiratory distress.   Abdominal: Abdomen is soft. Bowel sounds are normal. She exhibits no distension. There is abdominal tenderness (Left lower quadrant tenderness to palpation without peritoneal signs).   Musculoskeletal:         General: Normal range of motion.      Cervical back: Normal range of motion.     Neurological: She is alert and oriented to person, place, and time.  She has normal strength.   Skin: Skin is warm and dry. Capillary refill takes less than 2 seconds.   Psychiatric: She has a normal mood and affect. Thought content normal.         ED Course   Procedures  Labs Reviewed   CBC W/ AUTO DIFFERENTIAL - Abnormal; Notable for the following components:       Result Value    Platelets 121 (*)     Immature Granulocytes 0.6 (*)     Gran # (ANC) 11.3 (*)     Immature Grans (Abs) 0.07 (*)     Lymph # 0.6 (*)     Gran % 89.4 (*)     Lymph % 4.9 (*)     All other components within normal limits   COMPREHENSIVE METABOLIC PANEL - Abnormal; Notable for the following components:    BUN 27 (*)     Creatinine 1.7 (*)     Alkaline Phosphatase 52 (*)     eGFR if  39 (*)     eGFR if non  34 (*)     All other components within normal limits   URINALYSIS, REFLEX TO URINE CULTURE - Abnormal; Notable for the following components:    Color, UA Brown (*)     Appearance, UA Cloudy (*)     Protein, UA 2+ (*)     Occult Blood UA 3+ (*)     Leukocytes, UA 1+ (*)     All other components within normal limits    Narrative:     Specimen Source->Urine   URINALYSIS MICROSCOPIC - Abnormal; Notable for the following components:    RBC, UA >100 (*)     WBC, UA 9 (*)     Bacteria Few (*)     All other components within normal limits    Narrative:     Specimen Source->Urine   ISTAT PROCEDURE - Abnormal; Notable for the following components:    POC Creatinine 1.8 (*)     POC Sodium 133 (*)     All other components within normal limits   LIPASE   LACTIC ACID, PLASMA   TROPONIN I   SARS-COV-2 RDRP GENE     EKG Readings: (Independently Interpreted)   Initial Reading: No STEMI. Rhythm: Normal Sinus Rhythm. Heart Rate: 80. ST Segments: Normal ST Segments. T Waves: Normal.     ECG Results          EKG 12-LEAD (Final result)  Result time 07/25/22 11:25:34    Final result by Unknown User (07/25/22 11:25:34)                                Imaging Results          CT Abdomen Pelvis With  Contrast (Final result)  Result time 07/24/22 05:58:18    Final result by Basilio Lerner MD (07/24/22 05:58:18)                 Impression:      Persistent acute uncomplicated LEFT lower quadrant diverticulitis without significant detrimental change from 07/08/2022 no evidence for abscess.      Electronically signed by: Basilio Lerner MD  Date:    07/24/2022  Time:    05:58             Narrative:    EXAMINATION:  CT ABDOMEN PELVIS WITH CONTRAST    CLINICAL HISTORY:  LLQ abdominal pain;    TECHNIQUE:  Low dose axial images, sagittal and coronal reformations were obtained from the lung bases to the pubic symphysis following the IV administration of 85 mL of Omnipaque 350    COMPARISON:  07/08/2022    FINDINGS:  Abdomen:    - Lower thorax:Negative    - Lung bases: No infiltrates and no nodules.    - Liver: No focal mass.    - Gallbladder: Cholecystectomy    - Bile Ducts: Stable prominence of bile ducts.    - Spleen: Splenic cyst/pseudocyst..    - Kidneys: No solid mass or hydronephrosis.  Bilateral renal cysts.  Bilateral extrarenal pelvis ease.    - Adrenals: Unremarkable.    - Pancreas: No mass or peripancreatic fat stranding.    - Retroperitoneum:  No significant adenopathy.    - Vascular: No abdominal aortic aneurysm.    - Abdominal wall:  Unremarkable.    Pelvis:    No pelvic mass, adenopathy, or free fluid.    Bowel/Mesentery: Duodenal diverticulum.    Diverticulosis.  At the level LEFT lower quadrant, iliac crest level, extending for approximately 4 cm, associated with diverticulosis is pericolonic inflammation, level of the junction of descending colon and sigmoid colon most consistent with diverticulitis.  No evidence for diverticular abscess.  Trace fluid along the mesenteric margin associated.    Bones:  No acute osseous abnormality and no suspicious lytic or blastic lesion.                                 Medications   ondansetron injection 8 mg (8 mg Intravenous Given 7/25/22 2010)   amLODIPine tablet  10 mg (10 mg Oral Given 7/26/22 1138)   atorvastatin tablet 40 mg (40 mg Oral Given 7/26/22 1137)   fluticasone propionate 50 mcg/actuation nasal spray 100 mcg (100 mcg Each Nostril Given 7/26/22 1138)   methocarbamoL tablet 500 mg (500 mg Oral Given 7/26/22 2028)   morphine tablet 15 mg (15 mg Oral Given 7/25/22 1327)   promethazine (PHENERGAN) 25 mg in dextrose 5 % 50 mL IVPB (0 mg Intravenous Stopped 7/26/22 1725)   enoxaparin injection 30 mg (30 mg Subcutaneous Given 7/26/22 1703)   butalbital-acetaminophen-caffeine -40 mg per tablet 1 tablet (1 tablet Oral Given 7/25/22 1018)   acetaminophen tablet 650 mg (has no administration in time range)   HYDROmorphone (PF) injection 1 mg (1 mg Intravenous Given 7/27/22 0403)   sodium bicarbonate 150 mEq in dextrose 5 % 1,000 mL infusion ( Intravenous New Bag 7/26/22 2244)   calcium carbonate 500 mg/5 mL (1,250 mg/5 mL) suspension 1,000 mg (1,000 mg Oral Given 7/26/22 1703)   piperacillin-tazobactam 4.5 g in dextrose 5 % 100 mL IVPB (ready to mix system) (0 g Intravenous Stopped 7/26/22 2133)   acetaminophen tablet 1,000 mg (1,000 mg Oral Given 7/24/22 0328)   ketorolac injection 15 mg (15 mg Intravenous Given 7/24/22 0329)   sodium chloride 0.9% bolus 2,000 mL (0 mLs Intravenous Stopped 7/24/22 0530)   piperacillin-tazobactam 4.5 g in dextrose 5 % 100 mL IVPB (ready to mix system) (0 g Intravenous Stopped 7/24/22 0652)   iohexoL (OMNIPAQUE 350) injection 85 mL (85 mLs Intravenous Given 7/24/22 0532)   sodium chloride 0.9% bolus 1,000 mL (0 mLs Intravenous Stopped 7/24/22 1415)     Medical Decision Making:   Initial Assessment:   52-year-old female presents to the emergency department complaining of left lower quadrant abdominal pain and intermittent fever x1 day.  She also states she has had hematuria, nausea and generalized weakness x1 day.  She was seen in the ED 2 weeks ago similar complaints and diagnosed with diverticulitis.  She states she told morphine and  Zofran at home after pain increased.  ED Management:  Course of ED stay:  1. Cardiovascular-patient has been hemodynamically stable except for borderline hypotension.  Blood pressure improved and has been stable after normal saline bolus x2 L.  She states she took her antihypertensive medications prior to come to the emergency department and does not believe she took more than 1 dose.  EKG was normal.  Troponin was ordered.  Patient has denied chest pain.  2. Pulmonary-patient has had normal O2 sats on room air.  3. Hematology/infectious disease-patient was initially febrile (103.1) Tylenol was given and fever has resolved.  Urinalysis reveals signs of infection.  CT of the abdomen shows persistent diverticulitis.  Patient received Zosyn in the emergency department.  Plan is for admission to hospitalist service for further IV antibiotic treatment of persistent diverticulitis/UTI.                      Clinical Impression:   Final diagnoses:  [I95.9] Hypotension  [K57.92] Acute diverticulitis (Primary)  [N17.9] CORNELIO (acute kidney injury)          ED Disposition Condition    Admit               Lalito Olvera MD  07/27/22 0455

## 2022-07-24 NOTE — HPI
Mrs. Rodas is a 53 yo F who presents with a CC of LLQ abdominal pain since yesterday.  She was seen on 7/8/22 for diverticulitis and sent home on Cipro and Flagyl. She returns now again with the same complaint as well as fever. She was found to have diverticulitis of the LLQ about the same from 7/8/22.

## 2022-07-24 NOTE — ASSESSMENT & PLAN NOTE
Patient with acute kidney injury likely due to pre-renal azotemia CORNELIO is currently stable. Labs reviewed- Renal function/electrolytes with Estimated Creatinine Clearance: 36.3 mL/min (A) (based on SCr of 1.7 mg/dL (H)). according to latest data. Monitor urine output and serial BMP and adjust therapy as needed. Avoid nephrotoxins and renally dose meds for GFR listed above.     IV fluids. BMP in am

## 2022-07-24 NOTE — ED TRIAGE NOTES
Pt presents to ED via personal transportation c/o LLQ abd pain, fever, nausea, weakness, non bloody diarrhea.  Pt also c/o blood in urine that started this morning.  Denies cp, sob, emesis or any other symptoms.  Pt reports she was seen in the ED 2 weeks ago for same symptoms and dx with diverticulitis.  Pt reports taking morphine, Zofran and Excedrin with no relief.

## 2022-07-24 NOTE — ED NOTES
"Pressure bagged NS bolus, placed pt in Trendelenburg. Pt reports feeling dizzy, HA, and increase in abd pain to RLQ. Reports pain as "cramping". Pt still alert and oriented x4.   "

## 2022-07-25 LAB
ANION GAP SERPL CALC-SCNC: 11 MMOL/L (ref 8–16)
BUN SERPL-MCNC: 35 MG/DL (ref 6–20)
CALCIUM SERPL-MCNC: 7.7 MG/DL (ref 8.7–10.5)
CHLORIDE SERPL-SCNC: 106 MMOL/L (ref 95–110)
CO2 SERPL-SCNC: 18 MMOL/L (ref 23–29)
CREAT SERPL-MCNC: 3.1 MG/DL (ref 0.5–1.4)
EST. GFR  (AFRICAN AMERICAN): 19 ML/MIN/1.73 M^2
EST. GFR  (NON AFRICAN AMERICAN): 16 ML/MIN/1.73 M^2
GLUCOSE SERPL-MCNC: 108 MG/DL (ref 70–110)
POTASSIUM SERPL-SCNC: 3.5 MMOL/L (ref 3.5–5.1)
SODIUM SERPL-SCNC: 135 MMOL/L (ref 136–145)

## 2022-07-25 PROCEDURE — 25000242 PHARM REV CODE 250 ALT 637 W/ HCPCS: Performed by: INTERNAL MEDICINE

## 2022-07-25 PROCEDURE — 25000003 PHARM REV CODE 250: Performed by: NURSE PRACTITIONER

## 2022-07-25 PROCEDURE — 11000001 HC ACUTE MED/SURG PRIVATE ROOM

## 2022-07-25 PROCEDURE — S0030 INJECTION, METRONIDAZOLE: HCPCS | Performed by: INTERNAL MEDICINE

## 2022-07-25 PROCEDURE — 36415 COLL VENOUS BLD VENIPUNCTURE: CPT | Performed by: INTERNAL MEDICINE

## 2022-07-25 PROCEDURE — 63600175 PHARM REV CODE 636 W HCPCS: Performed by: INTERNAL MEDICINE

## 2022-07-25 PROCEDURE — 25000003 PHARM REV CODE 250: Performed by: INTERNAL MEDICINE

## 2022-07-25 PROCEDURE — 80048 BASIC METABOLIC PNL TOTAL CA: CPT | Performed by: INTERNAL MEDICINE

## 2022-07-25 RX ORDER — BUTALBITAL, ACETAMINOPHEN AND CAFFEINE 50; 325; 40 MG/1; MG/1; MG/1
1 TABLET ORAL EVERY 4 HOURS PRN
Status: DISCONTINUED | OUTPATIENT
Start: 2022-07-25 | End: 2022-07-30 | Stop reason: HOSPADM

## 2022-07-25 RX ORDER — HYDROMORPHONE HYDROCHLORIDE 2 MG/ML
1 INJECTION, SOLUTION INTRAMUSCULAR; INTRAVENOUS; SUBCUTANEOUS EVERY 4 HOURS PRN
Status: DISCONTINUED | OUTPATIENT
Start: 2022-07-25 | End: 2022-07-26

## 2022-07-25 RX ORDER — CIPROFLOXACIN 2 MG/ML
400 INJECTION, SOLUTION INTRAVENOUS
Status: DISCONTINUED | OUTPATIENT
Start: 2022-07-26 | End: 2022-07-26

## 2022-07-25 RX ORDER — ENOXAPARIN SODIUM 100 MG/ML
30 INJECTION SUBCUTANEOUS EVERY 24 HOURS
Status: DISCONTINUED | OUTPATIENT
Start: 2022-07-25 | End: 2022-07-30 | Stop reason: HOSPADM

## 2022-07-25 RX ORDER — ACETAMINOPHEN 325 MG/1
650 TABLET ORAL EVERY 6 HOURS PRN
Status: DISCONTINUED | OUTPATIENT
Start: 2022-07-25 | End: 2022-07-25

## 2022-07-25 RX ORDER — ACETAMINOPHEN 325 MG/1
650 TABLET ORAL EVERY 6 HOURS PRN
Status: DISCONTINUED | OUTPATIENT
Start: 2022-07-25 | End: 2022-07-29

## 2022-07-25 RX ADMIN — MORPHINE SULFATE 15 MG: 15 TABLET ORAL at 01:07

## 2022-07-25 RX ADMIN — CIPROFLOXACIN 400 MG: 2 INJECTION, SOLUTION INTRAVENOUS at 12:07

## 2022-07-25 RX ADMIN — SODIUM CHLORIDE: 0.9 INJECTION, SOLUTION INTRAVENOUS at 12:07

## 2022-07-25 RX ADMIN — SODIUM CHLORIDE: 0.9 INJECTION, SOLUTION INTRAVENOUS at 05:07

## 2022-07-25 RX ADMIN — ENOXAPARIN SODIUM 30 MG: 30 INJECTION SUBCUTANEOUS at 05:07

## 2022-07-25 RX ADMIN — ONDANSETRON 8 MG: 2 INJECTION INTRAMUSCULAR; INTRAVENOUS at 08:07

## 2022-07-25 RX ADMIN — BUTALBITAL, ACETAMINOPHEN AND CAFFEINE 1 TABLET: 50; 325; 40 TABLET ORAL at 10:07

## 2022-07-25 RX ADMIN — ATORVASTATIN CALCIUM 40 MG: 40 TABLET, FILM COATED ORAL at 08:07

## 2022-07-25 RX ADMIN — METRONIDAZOLE 500 MG: 500 INJECTION, SOLUTION INTRAVENOUS at 04:07

## 2022-07-25 RX ADMIN — METHOCARBAMOL 500 MG: 500 TABLET ORAL at 08:07

## 2022-07-25 RX ADMIN — FLUTICASONE PROPIONATE 100 MCG: 50 SPRAY, METERED NASAL at 08:07

## 2022-07-25 RX ADMIN — SODIUM CHLORIDE: 0.9 INJECTION, SOLUTION INTRAVENOUS at 04:07

## 2022-07-25 RX ADMIN — METHOCARBAMOL 500 MG: 500 TABLET ORAL at 09:07

## 2022-07-25 RX ADMIN — HYDROMORPHONE HYDROCHLORIDE 1 MG: 2 INJECTION, SOLUTION INTRAMUSCULAR; INTRAVENOUS; SUBCUTANEOUS at 10:07

## 2022-07-25 RX ADMIN — HYDROMORPHONE HYDROCHLORIDE 1 MG: 2 INJECTION, SOLUTION INTRAMUSCULAR; INTRAVENOUS; SUBCUTANEOUS at 05:07

## 2022-07-25 RX ADMIN — METHOCARBAMOL 500 MG: 500 TABLET ORAL at 03:07

## 2022-07-25 RX ADMIN — METRONIDAZOLE 500 MG: 500 INJECTION, SOLUTION INTRAVENOUS at 01:07

## 2022-07-25 RX ADMIN — METRONIDAZOLE 500 MG: 500 INJECTION, SOLUTION INTRAVENOUS at 08:07

## 2022-07-25 RX ADMIN — PROMETHAZINE HYDROCHLORIDE 25 MG: 25 INJECTION INTRAMUSCULAR; INTRAVENOUS at 10:07

## 2022-07-25 RX ADMIN — SODIUM CHLORIDE: 0.9 INJECTION, SOLUTION INTRAVENOUS at 09:07

## 2022-07-25 RX ADMIN — ACETAMINOPHEN 650 MG: 325 TABLET ORAL at 04:07

## 2022-07-25 NOTE — PLAN OF CARE
Problem: Adult Inpatient Plan of Care  Goal: Plan of Care Review  Outcome: Ongoing, Progressing  Goal: Patient-Specific Goal (Individualized)  Outcome: Ongoing, Progressing  Goal: Absence of Hospital-Acquired Illness or Injury  Outcome: Ongoing, Progressing  Goal: Optimal Comfort and Wellbeing  Outcome: Ongoing, Progressing     Problem: Fluid and Electrolyte Imbalance (Acute Kidney Injury/Impairment)  Goal: Fluid and Electrolyte Balance  Outcome: Ongoing, Progressing     POC reviewed with patient. All questions and concerns addressed. Fall/safety precautions implemented and maintained. IVF continued. IV abx administered. Pain management provided. No acute events noted this shift. Please see flowsheet for full assessment and vitals. Bed locked in lowest position. Call bell within reach. Will continue to monitor.

## 2022-07-25 NOTE — PROGRESS NOTES
Pharmacist Renal Dose Adjustment Note    Giuliana Rodas is a 52 y.o. female being treated with the medication Cipro    Patient Data:    Vital Signs (Most Recent):  Temp: 99.7 °F (37.6 °C) (07/25/22 0716)  Pulse: 79 (07/25/22 0716)  Resp: 17 (07/25/22 0716)  BP: (!) 95/53 (07/25/22 0716)  SpO2: (!) 93 % (07/25/22 0716)   Vital Signs (72h Range):  Temp:  [98.6 °F (37 °C)-103.1 °F (39.5 °C)]   Pulse:  [66-96]   Resp:  [16-23]   BP: ()/(45-61)   SpO2:  [87 %-100 %]      Recent Labs   Lab 07/24/22  0331 07/25/22  0435   CREATININE 1.7* 3.1*     Serum creatinine: 3.1 mg/dL (H) 07/25/22 0435  Estimated creatinine clearance: 20.8 mL/min (A)    Medication:Cipro 400 mg IV every 12 hours will be changed to Cipro 400 mg IV every 24 hours    Pharmacist's Name: Asher Finnegan Jr  Pharmacist's Extension: [phone]

## 2022-07-25 NOTE — PROGRESS NOTES
Pharmacist Renal Dose Adjustment Note    Giuliana Rodas is a 52 y.o. female being treated with the medication Lovenox    Patient Data:    Vital Signs (Most Recent):  Temp: 99.7 °F (37.6 °C) (07/25/22 0716)  Pulse: 79 (07/25/22 0716)  Resp: 17 (07/25/22 0716)  BP: (!) 95/53 (07/25/22 0716)  SpO2: (!) 93 % (07/25/22 0716)   Vital Signs (72h Range):  Temp:  [98.6 °F (37 °C)-103.1 °F (39.5 °C)]   Pulse:  [66-96]   Resp:  [16-23]   BP: ()/(45-61)   SpO2:  [87 %-100 %]      Recent Labs   Lab 07/24/22  0331 07/25/22  0435   CREATININE 1.7* 3.1*     Serum creatinine: 3.1 mg/dL (H) 07/25/22 0435  Estimated creatinine clearance: 20.8 mL/min (A)    Medication:Lovenox 40 mg daily will be changed to Lovenox 30 mg daily    Pharmacist's Name: Asher Finnegan Jr  Pharmacist's Extension: [phone]

## 2022-07-25 NOTE — PROGRESS NOTES
[facility] Medicine  Progress Note    Patient Name: Giuliana Rodas  MRN: [id number]  Patient Class: IP- Inpatient   Admission Date: 7/24/2022  Length of Stay: 1 days  Attending Physician: Jose Manuel Archibald MD  Primary Care Provider: Giuliana Rojas MD        Subjective:     Principal Problem:Acute diverticulitis        HPI:  Mrs. Rodas is a 51 yo F who presents with a CC of LLQ abdominal pain since yesterday.  She was seen on 7/8/22 for diverticulitis and sent home on Cipro and Flagyl. She returns now again with the same complaint as well as fever. She was found to have diverticulitis of the LLQ about the same from 7/8/22.      Overview/Hospital Course:  Patient admitted for diverticulitis.  Started on Cipro/Flag.       Interval History:  No new issues     Review of Systems   Constitutional:  Negative for activity change, appetite change, chills, diaphoresis, fatigue and fever.   HENT:  Negative for congestion, dental problem and drooling.    Eyes:  Negative for pain, discharge, redness and itching.   Respiratory:  Negative for apnea, cough, chest tightness and shortness of breath.    Cardiovascular:  Negative for chest pain and leg swelling.   Gastrointestinal:  Positive for abdominal pain. Negative for abdominal distention, diarrhea, nausea and vomiting.   Genitourinary:  Negative for difficulty urinating and dyspareunia.   Musculoskeletal:  Negative for arthralgias and back pain.   Skin:  Negative for color change and pallor.   Neurological:  Negative for dizziness and facial asymmetry.   Psychiatric/Behavioral:  Negative for agitation and behavioral problems.    Objective:     Vital Signs (Most Recent):  Temp: 99.7 °F (37.6 °C) (07/25/22 0716)  Pulse: 79 (07/25/22 0716)  Resp: 17 (07/25/22 0716)  BP: (!) 95/53 (07/25/22 0716)  SpO2: (!) 93 % (07/25/22 0716)   Vital Signs (24h Range):  Temp:  [98.8 °F (37.1 °C)-103.1 °F (39.5 °C)] 99.7 °F (37.6 °C)  Pulse:  [77-96] 79  Resp:  [16-21]  17  SpO2:  [93 %-100 %] 93 %  BP: ()/(45-60) 95/53     Weight: 83.4 kg (183 lb 13.8 oz)  Body mass index is 33.63 kg/m².    Intake/Output Summary (Last 24 hours) at 7/25/2022 1012  Last data filed at 7/25/2022 0816  Gross per 24 hour   Intake 2851.37 ml   Output 2 ml   Net 2849.37 ml      Physical Exam  Vitals and nursing note reviewed.   Constitutional:       General: She is not in acute distress.     Appearance: She is not ill-appearing, toxic-appearing or diaphoretic.   HENT:      Head: Normocephalic and atraumatic.      Right Ear: There is no impacted cerumen.      Mouth/Throat:      Mouth: Mucous membranes are moist.   Eyes:      Conjunctiva/sclera: Conjunctivae normal.   Cardiovascular:      Rate and Rhythm: Normal rate and regular rhythm.   Pulmonary:      Effort: No respiratory distress.      Breath sounds: No wheezing.   Abdominal:      General: There is no distension.      Palpations: There is no mass.      Tenderness: There is no abdominal tenderness.   Skin:     General: Skin is warm and dry.   Neurological:      Mental Status: She is alert and oriented to person, place, and time.   Psychiatric:         Mood and Affect: Mood normal.         Behavior: Behavior normal.         Thought Content: Thought content normal.       Significant Labs: All pertinent labs within the past 24 hours have been reviewed.  BMP:   Recent Labs   Lab 07/25/22  0435      *   K 3.5      CO2 18*   BUN 35*   CREATININE 3.1*   CALCIUM 7.7*     CBC:   Recent Labs   Lab 07/24/22  0331 07/24/22  0331   WBC 12.59  --    HGB 13.1  --    HCT 38.3 40   *  --        Significant Imaging:       Assessment/Plan:      * Acute diverticulitis  Cipro/flagyl. Sepsis not present.  Cipro/Flagyl.  Non-ill appearing and fairly benign exam.        CORNELIO (acute kidney injury)  Patient with acute kidney injury likely due to pre-renal azotemia CORNELIO is currently stable. Labs reviewed- Renal function/electrolytes with Estimated  Creatinine Clearance: 20.8 mL/min (A) (based on SCr of 3.1 mg/dL (H)). according to latest data. Monitor urine output and serial BMP and adjust therapy as needed. Avoid nephrotoxins and renally dose meds for GFR listed above.     IV fluids. BMP in am   Will increase IV fluids. If not improved on 7/26 would consult nephrology      Hyperlipidemia  Resume home meds       GERD (gastroesophageal reflux disease)  No acute issues       Hypertension  Benign essential    Now with hypotension. IV fluids.         VTE Risk Mitigation (From admission, onward)         Ordered     enoxaparin injection 30 mg  Daily         07/25/22 0855                Discharge Planning   CARIE:      Code Status: Prior   Is the patient medically ready for discharge?:     Reason for patient still in hospital (select all that apply): Patient unstable                     Jose Manuel Monreal MD  Department of Hospital Medicine   [facility] Surg

## 2022-07-25 NOTE — SUBJECTIVE & OBJECTIVE
Interval History:  No new issues     Review of Systems   Constitutional:  Negative for activity change, appetite change, chills, diaphoresis, fatigue and fever.   HENT:  Negative for congestion, dental problem and drooling.    Eyes:  Negative for pain, discharge, redness and itching.   Respiratory:  Negative for apnea, cough, chest tightness and shortness of breath.    Cardiovascular:  Negative for chest pain and leg swelling.   Gastrointestinal:  Positive for abdominal pain. Negative for abdominal distention, diarrhea, nausea and vomiting.   Genitourinary:  Negative for difficulty urinating and dyspareunia.   Musculoskeletal:  Negative for arthralgias and back pain.   Skin:  Negative for color change and pallor.   Neurological:  Negative for dizziness and facial asymmetry.   Psychiatric/Behavioral:  Negative for agitation and behavioral problems.    Objective:     Vital Signs (Most Recent):  Temp: 99.7 °F (37.6 °C) (07/25/22 0716)  Pulse: 79 (07/25/22 0716)  Resp: 17 (07/25/22 0716)  BP: (!) 95/53 (07/25/22 0716)  SpO2: (!) 93 % (07/25/22 0716)   Vital Signs (24h Range):  Temp:  [98.8 °F (37.1 °C)-103.1 °F (39.5 °C)] 99.7 °F (37.6 °C)  Pulse:  [77-96] 79  Resp:  [16-21] 17  SpO2:  [93 %-100 %] 93 %  BP: ()/(45-60) 95/53     Weight: 83.4 kg (183 lb 13.8 oz)  Body mass index is 33.63 kg/m².    Intake/Output Summary (Last 24 hours) at 7/25/2022 1012  Last data filed at 7/25/2022 0816  Gross per 24 hour   Intake 2851.37 ml   Output 2 ml   Net 2849.37 ml      Physical Exam  Vitals and nursing note reviewed.   Constitutional:       General: She is not in acute distress.     Appearance: She is not ill-appearing, toxic-appearing or diaphoretic.   HENT:      Head: Normocephalic and atraumatic.      Right Ear: There is no impacted cerumen.      Mouth/Throat:      Mouth: Mucous membranes are moist.   Eyes:      Conjunctiva/sclera: Conjunctivae normal.   Cardiovascular:      Rate and Rhythm: Normal rate and regular  rhythm.   Pulmonary:      Effort: No respiratory distress.      Breath sounds: No wheezing.   Abdominal:      General: There is no distension.      Palpations: There is no mass.      Tenderness: There is no abdominal tenderness.   Skin:     General: Skin is warm and dry.   Neurological:      Mental Status: She is alert and oriented to person, place, and time.   Psychiatric:         Mood and Affect: Mood normal.         Behavior: Behavior normal.         Thought Content: Thought content normal.       Significant Labs: All pertinent labs within the past 24 hours have been reviewed.  BMP:   Recent Labs   Lab 07/25/22  0435      *   K 3.5      CO2 18*   BUN 35*   CREATININE 3.1*   CALCIUM 7.7*     CBC:   Recent Labs   Lab 07/24/22  0331 07/24/22  0331   WBC 12.59  --    HGB 13.1  --    HCT 38.3 40   *  --        Significant Imaging:

## 2022-07-25 NOTE — HOSPITAL COURSE
Ms Giuliana Rodas was admitted with acute diverticulitis. She did not improve with PO cipro/flagyl. Upon admit, cipro flagyl were resumed IV along with IVF. Her pain had not improved, and she has developed worsening renal failure. Transitioned to zosyn. Nephrology and GI consulted. Abdominal pain improving, diarrhea decreased frequency, nausea improved. Tolerating bland diet.  Renal function improved to Cr 2.3. Overall improving and stable for discharge to home. Change to augmentin to complete course for acute diverticulitis. Follow up with GI. Hold amlodipine-olmesartan and HCTZ for CORNELIO not fully resolved. Start amlodipine 10mg daily in place. Follow up with PCP for BMP recheck. All questions answered.

## 2022-07-25 NOTE — ASSESSMENT & PLAN NOTE
Patient with acute kidney injury likely due to pre-renal azotemia CORNELIO is currently stable. Labs reviewed- Renal function/electrolytes with Estimated Creatinine Clearance: 20.8 mL/min (A) (based on SCr of 3.1 mg/dL (H)). according to latest data. Monitor urine output and serial BMP and adjust therapy as needed. Avoid nephrotoxins and renally dose meds for GFR listed above.     IV fluids. BMP in am   Will increase IV fluids. If not improved on 7/26 would consult nephrology

## 2022-07-26 DIAGNOSIS — I10 ESSENTIAL HYPERTENSION: ICD-10-CM

## 2022-07-26 PROBLEM — R33.9 URINARY RETENTION: Status: ACTIVE | Noted: 2022-07-26

## 2022-07-26 PROBLEM — D69.6 THROMBOCYTOPENIA: Status: ACTIVE | Noted: 2022-07-26

## 2022-07-26 PROBLEM — E66.811 CLASS 1 OBESITY IN ADULT: Status: ACTIVE | Noted: 2022-07-26

## 2022-07-26 PROBLEM — I95.9 HYPOTENSION: Status: RESOLVED | Noted: 2022-07-24 | Resolved: 2022-07-26

## 2022-07-26 PROBLEM — E66.9 CLASS 1 OBESITY IN ADULT: Status: ACTIVE | Noted: 2022-07-26

## 2022-07-26 LAB
ANION GAP SERPL CALC-SCNC: 9 MMOL/L (ref 8–16)
ANION GAP SERPL CALC-SCNC: 9 MMOL/L (ref 8–16)
BASOPHILS # BLD AUTO: 0.01 K/UL (ref 0–0.2)
BASOPHILS NFR BLD: 0.3 % (ref 0–1.9)
BUN SERPL-MCNC: 36 MG/DL (ref 6–20)
BUN SERPL-MCNC: 36 MG/DL (ref 6–20)
CALCIUM SERPL-MCNC: 7.5 MG/DL (ref 8.7–10.5)
CALCIUM SERPL-MCNC: 7.5 MG/DL (ref 8.7–10.5)
CHLORIDE SERPL-SCNC: 112 MMOL/L (ref 95–110)
CHLORIDE SERPL-SCNC: 112 MMOL/L (ref 95–110)
CO2 SERPL-SCNC: 18 MMOL/L (ref 23–29)
CO2 SERPL-SCNC: 18 MMOL/L (ref 23–29)
CREAT SERPL-MCNC: 3.9 MG/DL (ref 0.5–1.4)
CREAT SERPL-MCNC: 3.9 MG/DL (ref 0.5–1.4)
CREAT UR-MCNC: 67.9 MG/DL (ref 15–325)
DIFFERENTIAL METHOD: ABNORMAL
EOSINOPHIL # BLD AUTO: 0.1 K/UL (ref 0–0.5)
EOSINOPHIL NFR BLD: 1.5 % (ref 0–8)
EOSINOPHIL URNS QL WRIGHT STN: NORMAL
ERYTHROCYTE [DISTWIDTH] IN BLOOD BY AUTOMATED COUNT: 13.4 % (ref 11.5–14.5)
EST. GFR  (AFRICAN AMERICAN): 14 ML/MIN/1.73 M^2
EST. GFR  (AFRICAN AMERICAN): 14 ML/MIN/1.73 M^2
EST. GFR  (NON AFRICAN AMERICAN): 12 ML/MIN/1.73 M^2
EST. GFR  (NON AFRICAN AMERICAN): 12 ML/MIN/1.73 M^2
GLUCOSE SERPL-MCNC: 80 MG/DL (ref 70–110)
GLUCOSE SERPL-MCNC: 80 MG/DL (ref 70–110)
HCT VFR BLD AUTO: 29.2 % (ref 37–48.5)
HGB BLD-MCNC: 9.5 G/DL (ref 12–16)
IMM GRANULOCYTES # BLD AUTO: 0.02 K/UL (ref 0–0.04)
IMM GRANULOCYTES NFR BLD AUTO: 0.5 % (ref 0–0.5)
LACTATE SERPL-SCNC: 0.4 MMOL/L (ref 0.5–2.2)
LYMPHOCYTES # BLD AUTO: 0.8 K/UL (ref 1–4.8)
LYMPHOCYTES NFR BLD: 20.9 % (ref 18–48)
MCH RBC QN AUTO: 30.3 PG (ref 27–31)
MCHC RBC AUTO-ENTMCNC: 32.5 G/DL (ref 32–36)
MCV RBC AUTO: 93 FL (ref 82–98)
MONOCYTES # BLD AUTO: 0.4 K/UL (ref 0.3–1)
MONOCYTES NFR BLD: 9.5 % (ref 4–15)
NEUTROPHILS # BLD AUTO: 2.7 K/UL (ref 1.8–7.7)
NEUTROPHILS NFR BLD: 67.3 % (ref 38–73)
NRBC BLD-RTO: 0 /100 WBC
PLATELET # BLD AUTO: 81 K/UL (ref 150–450)
PMV BLD AUTO: 10.7 FL (ref 9.2–12.9)
POTASSIUM SERPL-SCNC: 3.6 MMOL/L (ref 3.5–5.1)
POTASSIUM SERPL-SCNC: 3.6 MMOL/L (ref 3.5–5.1)
RBC # BLD AUTO: 3.14 M/UL (ref 4–5.4)
SODIUM SERPL-SCNC: 139 MMOL/L (ref 136–145)
SODIUM SERPL-SCNC: 139 MMOL/L (ref 136–145)
SODIUM UR-SCNC: 48 MMOL/L (ref 20–250)
WBC # BLD AUTO: 3.98 K/UL (ref 3.9–12.7)

## 2022-07-26 PROCEDURE — 36415 COLL VENOUS BLD VENIPUNCTURE: CPT | Performed by: INTERNAL MEDICINE

## 2022-07-26 PROCEDURE — S0030 INJECTION, METRONIDAZOLE: HCPCS | Performed by: INTERNAL MEDICINE

## 2022-07-26 PROCEDURE — 80048 BASIC METABOLIC PNL TOTAL CA: CPT | Performed by: INTERNAL MEDICINE

## 2022-07-26 PROCEDURE — 84300 ASSAY OF URINE SODIUM: CPT | Performed by: HOSPITALIST

## 2022-07-26 PROCEDURE — 25000003 PHARM REV CODE 250: Performed by: INTERNAL MEDICINE

## 2022-07-26 PROCEDURE — 83605 ASSAY OF LACTIC ACID: CPT | Performed by: INTERNAL MEDICINE

## 2022-07-26 PROCEDURE — 11000001 HC ACUTE MED/SURG PRIVATE ROOM

## 2022-07-26 PROCEDURE — 63600175 PHARM REV CODE 636 W HCPCS: Performed by: HOSPITALIST

## 2022-07-26 PROCEDURE — 36415 COLL VENOUS BLD VENIPUNCTURE: CPT | Performed by: HOSPITALIST

## 2022-07-26 PROCEDURE — 25000003 PHARM REV CODE 250: Performed by: HOSPITALIST

## 2022-07-26 PROCEDURE — 63600175 PHARM REV CODE 636 W HCPCS: Performed by: INTERNAL MEDICINE

## 2022-07-26 PROCEDURE — 85025 COMPLETE CBC W/AUTO DIFF WBC: CPT | Performed by: HOSPITALIST

## 2022-07-26 PROCEDURE — 87205 SMEAR GRAM STAIN: CPT | Performed by: HOSPITALIST

## 2022-07-26 PROCEDURE — 82570 ASSAY OF URINE CREATININE: CPT | Performed by: HOSPITALIST

## 2022-07-26 RX ORDER — CALCIUM CARBONATE 1250 MG/5ML
1000 SUSPENSION ORAL
Status: DISPENSED | OUTPATIENT
Start: 2022-07-26 | End: 2022-07-29

## 2022-07-26 RX ORDER — HYDROMORPHONE HYDROCHLORIDE 2 MG/ML
1 INJECTION, SOLUTION INTRAMUSCULAR; INTRAVENOUS; SUBCUTANEOUS EVERY 4 HOURS PRN
Status: DISCONTINUED | OUTPATIENT
Start: 2022-07-26 | End: 2022-07-27

## 2022-07-26 RX ORDER — HYDROCHLOROTHIAZIDE 12.5 MG/1
TABLET ORAL
Qty: 30 TABLET | Refills: 11 | Status: SHIPPED | OUTPATIENT
Start: 2022-07-26 | End: 2022-07-30 | Stop reason: SDUPTHER

## 2022-07-26 RX ADMIN — AMLODIPINE BESYLATE 10 MG: 5 TABLET ORAL at 11:07

## 2022-07-26 RX ADMIN — HYDROMORPHONE HYDROCHLORIDE 1 MG: 2 INJECTION, SOLUTION INTRAMUSCULAR; INTRAVENOUS; SUBCUTANEOUS at 10:07

## 2022-07-26 RX ADMIN — SODIUM BICARBONATE: 84 INJECTION, SOLUTION INTRAVENOUS at 10:07

## 2022-07-26 RX ADMIN — METRONIDAZOLE 500 MG: 500 INJECTION, SOLUTION INTRAVENOUS at 04:07

## 2022-07-26 RX ADMIN — PROMETHAZINE HYDROCHLORIDE 25 MG: 25 INJECTION INTRAMUSCULAR; INTRAVENOUS at 08:07

## 2022-07-26 RX ADMIN — SODIUM BICARBONATE: 84 INJECTION, SOLUTION INTRAVENOUS at 06:07

## 2022-07-26 RX ADMIN — HYDROMORPHONE HYDROCHLORIDE 1 MG: 2 INJECTION, SOLUTION INTRAMUSCULAR; INTRAVENOUS; SUBCUTANEOUS at 08:07

## 2022-07-26 RX ADMIN — CIPROFLOXACIN 400 MG: 2 INJECTION, SOLUTION INTRAVENOUS at 12:07

## 2022-07-26 RX ADMIN — CALCIUM CARBONATE 1000 MG: 1250 SUSPENSION ORAL at 12:07

## 2022-07-26 RX ADMIN — METHOCARBAMOL 500 MG: 500 TABLET ORAL at 02:07

## 2022-07-26 RX ADMIN — ATORVASTATIN CALCIUM 40 MG: 40 TABLET, FILM COATED ORAL at 11:07

## 2022-07-26 RX ADMIN — FLUTICASONE PROPIONATE 100 MCG: 50 SPRAY, METERED NASAL at 11:07

## 2022-07-26 RX ADMIN — METRONIDAZOLE 500 MG: 500 INJECTION, SOLUTION INTRAVENOUS at 12:07

## 2022-07-26 RX ADMIN — CALCIUM CARBONATE 1000 MG: 1250 SUSPENSION ORAL at 05:07

## 2022-07-26 RX ADMIN — SODIUM CHLORIDE: 0.9 INJECTION, SOLUTION INTRAVENOUS at 12:07

## 2022-07-26 RX ADMIN — PROMETHAZINE HYDROCHLORIDE 25 MG: 25 INJECTION INTRAMUSCULAR; INTRAVENOUS at 05:07

## 2022-07-26 RX ADMIN — METHOCARBAMOL 500 MG: 500 TABLET ORAL at 11:07

## 2022-07-26 RX ADMIN — ENOXAPARIN SODIUM 30 MG: 30 INJECTION SUBCUTANEOUS at 05:07

## 2022-07-26 RX ADMIN — HYDROMORPHONE HYDROCHLORIDE 1 MG: 2 INJECTION, SOLUTION INTRAMUSCULAR; INTRAVENOUS; SUBCUTANEOUS at 05:07

## 2022-07-26 RX ADMIN — SODIUM BICARBONATE: 84 INJECTION, SOLUTION INTRAVENOUS at 08:07

## 2022-07-26 RX ADMIN — PIPERACILLIN AND TAZOBACTAM 4.5 G: 4; .5 INJECTION, POWDER, FOR SOLUTION INTRAVENOUS at 05:07

## 2022-07-26 RX ADMIN — METHOCARBAMOL 500 MG: 500 TABLET ORAL at 08:07

## 2022-07-26 RX ADMIN — HYDROMORPHONE HYDROCHLORIDE 1 MG: 2 INJECTION, SOLUTION INTRAMUSCULAR; INTRAVENOUS; SUBCUTANEOUS at 02:07

## 2022-07-26 NOTE — SUBJECTIVE & OBJECTIVE
Interval History: Complains of headache. Also abdominal pain, diffuse, not improved. With nausea and heaving. Hiccups present. With diarrhea. Poor PO intake due to these symptoms. Urinary retention noted today.     Review of Systems   Constitutional:  Positive for appetite change. Negative for chills and fever.   Respiratory:  Negative for shortness of breath.    Cardiovascular:  Negative for chest pain and leg swelling.   Gastrointestinal:  Positive for abdominal distention, abdominal pain, diarrhea and nausea. Negative for constipation and vomiting.   Genitourinary:  Positive for difficulty urinating.   Musculoskeletal:  Negative for arthralgias.   Neurological:  Positive for weakness. Negative for numbness.   Psychiatric/Behavioral:  Negative for confusion.    Objective:     Vital Signs (Most Recent):  Temp: 99.2 °F (37.3 °C) (07/26/22 1101)  Pulse: 76 (07/26/22 1101)  Resp: 20 (07/26/22 1405)  BP: (!) 111/56 (07/26/22 1101)  SpO2: 96 % (07/26/22 1101)   Vital Signs (24h Range):  Temp:  [98.3 °F (36.8 °C)-99.9 °F (37.7 °C)] 99.2 °F (37.3 °C)  Pulse:  [76-87] 76  Resp:  [16-20] 20  SpO2:  [96 %-99 %] 96 %  BP: (111-136)/(56-60) 111/56     Weight: 83.4 kg (183 lb 13.8 oz)  Body mass index is 33.63 kg/m².    Intake/Output Summary (Last 24 hours) at 7/26/2022 1635  Last data filed at 7/26/2022 0834  Gross per 24 hour   Intake 2826.88 ml   Output --   Net 2826.88 ml      Physical Exam  Vitals and nursing note reviewed.   Constitutional:       General: She is not in acute distress.     Appearance: She is ill-appearing. She is not toxic-appearing.   HENT:      Head: Normocephalic and atraumatic.      Nose: Nose normal.      Mouth/Throat:      Mouth: Mucous membranes are moist.   Cardiovascular:      Rate and Rhythm: Normal rate and regular rhythm.      Pulses: Normal pulses.      Heart sounds: Normal heart sounds. No murmur heard.    No gallop.   Pulmonary:      Effort: Pulmonary effort is normal. No respiratory  distress.      Breath sounds: No wheezing or rales.      Comments: Room air  Abdominal:      General: Bowel sounds are normal. There is distension.      Palpations: There is no mass.      Tenderness: There is abdominal tenderness. There is no guarding or rebound.   Musculoskeletal:      Right lower leg: No edema.      Left lower leg: No edema.   Skin:     General: Skin is warm and dry.   Neurological:      Mental Status: She is alert and oriented to person, place, and time.       Significant Labs: All pertinent labs within the past 24 hours have been reviewed.    Significant Imaging: I have reviewed all pertinent imaging results/findings within the past 24 hours.

## 2022-07-26 NOTE — ASSESSMENT & PLAN NOTE
Presented with CORNELIO. Cr on admit 1.7, baseline normal. Worsening daily.   - suspect prolonged prerenal state + post renal  - FENa 2%- intrinsic  - UA with RBC, no eos  - CT on admit with no mass/hydronephrosis  - Nephrology consulted  - on IVF with bicarb  - with urinary retention, did require in/out cath today with dark urine produced- if still retaining later, will need balderrama  - BMP in AM

## 2022-07-26 NOTE — CONSULTS
"                                         Renal Consult    Date of Admission:  7/24/2022  2:45 AM        Chief Complaint:   Chief Complaint   Patient presents with    Multiple Complaints     Pt c/o LLQ abdominal pain, fever, blood in urine, nausea, & generalized weakness starting today; pt was seen by PCP & at ED 2 weeks ago for same complaints and was dx with diverticulitis; pt took Excedrin at 5pm, Morphine at 7pm, & Zofran at 11pm with no relief;       HPI: 51 y/o female with a PMHx. Significant for: GERD, Gastritis, Fibromyalgia, HLD, psoriasis and gastroparesis who presented to [facility] ED with a CC of LLQ abdominal pain and intermittent fever since Saturday.  As per H&P: "She was seen on 7/8/22 for diverticulitis and sent home on Cipro and Flagyl. She returns now again with the same complaint as well as fever. She was found to have diverticulitis of the LLQ about the same from 7/8/22.  Initial Labs: creatinine 1.7 currently 3.9    PMH:  Past Medical History:   Diagnosis Date    Bile reflux gastritis     Breast cyst     Eczema     Fibrocystic breast     Fibromyalgia     Gastroparesis     dr. harden    GERD (gastroesophageal reflux disease)     Hyperglyceridemia     Hyperlipidemia     Hypertension     IC (interstitial cystitis)     dr. labadie    Psoriasis     Tension headache        PSH:  Past Surgical History:   Procedure Laterality Date    BREAST BIOPSY Right     over 10 years ago/ milk duct    CHOLECYSTECTOMY      ESOPHAGOGASTRODUODENOSCOPY N/A 3/12/2020    Procedure: EGD (ESOPHAGOGASTRODUODENOSCOPY);  Surgeon: Damon Mcmahon MD;  Location: [address];  Service: Endoscopy;  Laterality: N/A;  use pcf scope    HEMORRHOID SURGERY      HYSTERECTOMY      KNEE SURGERY      OOPHORECTOMY      one ov removed       Allergies:  Review of patient's allergies indicates:   Allergen Reactions    Chlorthalidone Swelling     Hypokalemia     Dicyclomine Edema             Adhesive Rash    " Amitriptyline Hallucinations    Depo-provera [medroxyprogesterone] Anxiety    Prozac [fluoxetine] Anxiety    Topiramate Rash       No current facility-administered medications on file prior to encounter.     Current Outpatient Medications on File Prior to Encounter   Medication Sig Dispense Refill    amlodipine-olmesartan (ANGEL) 5-40 mg per tablet TAKE ONE CAPSULE BY MOUTH EVERY DAY 90 tablet 3    atenoloL (TENORMIN) 50 MG tablet TAKE ONE TABLET BY MOUTH TWICE DAILY AS NEEDED 180 tablet 1    atorvastatin (LIPITOR) 40 MG tablet TAKE 1 TABLET BY MOUTH EVERY DAY 90 tablet 3    BIFIDOBACTERIUM INFANTIS (ALIGN ORAL) Take 1 tablet by mouth once daily.      coal tar (NEUTROGENA T-GEL) 0.5 % shampoo Apply topically nightly as needed.      fluticasone propionate (FLONASE) 50 mcg/actuation nasal spray SPRAY twice IN EACH NOSTRIL DAILY 16 g 2    glucosamine sulfate (GLUCOSAMINE) 500 mg Tab Take by mouth.      hydroCHLOROthiazide (HYDRODIURIL) 12.5 MG Tab TAKE ONE TABLET BY MOUTH DAILY 30 tablet 11    inulin (FIBER GUMMIES ORAL) Take by mouth.      ketoconazole (NIZORAL) 2 % shampoo wash hair AT least TWO OR THREE TIMES weekly. let sit on scalp AT least 5 MINUTES prior to rinsing 120 mL 11    magnesium 30 mg Tab Take 250 mg by mouth once.       menthol/camphor (ICY HOT ADVANCED TOP) Apply topically.      methocarbamoL (ROBAXIN) 500 MG Tab TAKE ONE TABLET BY MOUTH EVERY 8 HOURS AS NEEDED FOR HEADACHE AND MUSCLE SPASM 60 tablet 3    morphine (MSIR) 15 MG tablet Take 1 tablet (15 mg total) by mouth every 6 (six) hours as needed for Pain. 12 tablet 0    multivit with min-folic acid 200 mcg Chew Take by mouth.      multivit-min-folic acid-biotin (HAIR,SKIN AND NAILS,FA-BIOTIN,) 66.7-1,000 mcg Tab Take by mouth.      olopatadine HCl (PATADAY OPHT) Apply to eye.      omega-3 fatty acids/fish oil (FISH OIL-OMEGA-3 FATTY ACIDS) 300-1,000 mg capsule Take 1 capsule by mouth once daily.      ondansetron (ZOFRAN) 4 MG  tablet Take 1 tablet (4 mg total) by mouth every 8 (eight) hours as needed. 12 tablet 0    propylene glycol (SYSTANE BALANCE OPHT) Apply to eye.      tretinoin (RETIN-A) 0.025 % cream Apply topically nightly. Apply pea-sized amount to entire face nightly. Start slow, up-titrate as tolerated. 45 g 5    vitamin D (VITAMIN D3) 1000 units Tab Take 1,000 Units by mouth once daily.      vitamin E 400 UNIT capsule Take 400 Units by mouth once daily.      BIOFREEZE, MENTHOL, TOP Apply topically.      fluocinonide (LIDEX) 0.05 % external solution Apply topically 2 (two) times daily as needed (rash, itching at scalp). Avoid use on face, body folds, groin/genitalia. 60 mL 3    polyethylene glycol (GLYCOLAX) 17 gram/dose powder Take 17 g by mouth once daily. 235 g 0    PREMARIN vaginal cream PRN      trolamine salicylate (ASPERCREME) 10 % cream Apply topically as needed.      UNABLE TO FIND Silymarin Complex, Liver Health         Medications:  Current Facility-Administered Medications   Medication Dose Route Frequency Provider Last Rate Last Admin    acetaminophen tablet 650 mg  650 mg Oral Q6H PRN Jose Manuel Archibald MD        amLODIPine tablet 10 mg  10 mg Oral Daily Maria A Obrien MD        atorvastatin tablet 40 mg  40 mg Oral Daily Jose Manuel Archibald MD   40 mg at 07/25/22 0812    butalbital-acetaminophen-caffeine -40 mg per tablet 1 tablet  1 tablet Oral Q4H PRN Jose Manuel Archibald MD   1 tablet at 07/25/22 1018    ciprofloxacin (CIPRO)400mg/200ml D5W IVPB 400 mg  400 mg Intravenous Q24H Jose Manuel Archibald MD   Stopped at 07/26/22 0116    enoxaparin injection 30 mg  30 mg Subcutaneous Daily Jose Manuel Archibald MD   30 mg at 07/25/22 1700    fluticasone propionate 50 mcg/actuation nasal spray 100 mcg  2 spray Each Nostril Daily Jose Manuel Archibald MD   100 mcg at 07/25/22 0810    HYDROmorphone (PF) injection 1 mg  1 mg Intravenous Q4H PRN Maria A Obrien MD        methocarbamoL tablet  500 mg  500 mg Oral TID Jose Manuel Archibald MD   500 mg at 07/25/22 2100    metronidazole IVPB 500 mg  500 mg Intravenous Q8H Jose Manuel Archibald MD   Stopped at 07/26/22 0513    morphine tablet 15 mg  15 mg Oral Q6H PRN Jose Manuel Archibald MD   15 mg at 07/25/22 1327    ondansetron injection 8 mg  8 mg Intravenous Q6H PRN Jose Manuel Archibald MD   8 mg at 07/25/22 2010    promethazine (PHENERGAN) 25 mg in dextrose 5 % 50 mL IVPB  25 mg Intravenous Q6H PRN Jose Manuel Archibald MD   Stopped at 07/25/22 2235    sodium bicarbonate 150 mEq in dextrose 5 % 1,000 mL infusion   Intravenous Continuous Maria A Obrien MD           FamHx:  Family History   Problem Relation Age of Onset    Hypertension Mother     Migraines Mother     Breast cancer Mother     Hypertension Father     Stroke Father     Heart disease Father     Hyperlipidemia Father     Diabetes Father     Breast cancer Paternal Grandmother     Colon cancer Neg Hx     Ovarian cancer Neg Hx     Inflammatory bowel disease Neg Hx     Celiac disease Neg Hx        SocHx:  Social History     Socioeconomic History    Marital status:     Number of children: 2   Occupational History    Occupation:      Employer: [employer]   Tobacco Use    Smoking status: Never Smoker    Smokeless tobacco: Never Used   Substance and Sexual Activity    Alcohol use: No    Drug use: No    Sexual activity: Yes     Partners: Male     Birth control/protection: See Surgical Hx   Social History Narrative    Lives at home with  and daughter     Social Determinants of Health     Financial Resource Strain: Medium Risk    Difficulty of Paying Living Expenses: Somewhat hard   Food Insecurity: Food Insecurity Present    Worried About Running Out of Food in the Last Year: Sometimes true    Ran Out of Food in the Last Year: Never true   Transportation Needs: No Transportation Needs    Lack of Transportation (Medical): No    Lack of Transportation  (Non-Medical): No   Physical Activity: Sufficiently Active    Days of Exercise per Week: 3 days    Minutes of Exercise per Session: 60 min   Stress: No Stress Concern Present    Feeling of Stress : Not at all   Social Connections: Unknown    Frequency of Communication with Friends and Family: More than three times a week    Frequency of Social Gatherings with Friends and Family: Once a week    Active Member of Clubs or Organizations: Yes    Attends Club or Organization Meetings: 1 to 4 times per year    Marital Status:    Housing Stability: Low Risk     Unable to Pay for Housing in the Last Year: No    Number of Places Lived in the Last Year: 1    Unstable Housing in the Last Year: No           Review of Systems: see H&P       Physical Exam:  Vitals:   Vitals:    07/26/22 0718   BP: 123/60   Pulse: 87   Resp: 17   Temp: 98.3 °F (36.8 °C)       I/O last 3 completed shifts:  In: 4354.4 [P.O.:1080; I.V.:2224.8; IV Piggyback:1049.6]  Out: -   No intake/output data recorded.    General: No apparent distress.   Neck: supple   Lungs: Unlabored breathing  Heart: RRR  Abdomen: n/a  Ext: n/a  Neurologic: Awake and alert, oriented x 3       Laboratories:    No results for input(s): WBC, RBC, HGB, HCT, PLT, MCV, MCH, MCHC in the last 24 hours.    Recent Labs   Lab 07/26/22  0350   CALCIUM 7.5*  7.5*     139   K 3.6  3.6   CO2 18*  18*   *  112*   BUN 36*  36*   CREATININE 3.9*  3.9*       No results for input(s): COLORU, CLARITYU, SPECGRAV, PHUR, PROTEINUA, GLUCOSEU, BLOODU, WBCU, RBCU, BACTERIA, MUCUS in the last 24 hours.    Invalid input(s):  BILIRUBINCON    Microbiology Results (last 7 days)     Procedure Component Value Units Date/Time    Blood culture [[id number]] Collected: 07/24/22 0557    Order Status: Completed Specimen: Blood from Peripheral, Hand, Left Updated: 07/26/22 0703     Blood Culture, Routine No Growth to date      No Growth to date      No Growth to date    Blood culture  [[id number]] Collected: 07/24/22 0549    Order Status: Completed Specimen: Blood from Peripheral, Hand, Right Updated: 07/26/22 0703     Blood Culture, Routine No Growth to date      No Growth to date      No Growth to date            Diagnostic Tests:    CT Abdomen with contrast:  Impression:       Persistent acute uncomplicated LEFT lower quadrant diverticulitis without significant detrimental change from 07/08/2022 no evidence for abscess.       Electronically signed by: Basilio Lerner MD   Date: 07/24/2022   Time: 05:58             Assessment:    51 y/o female admitted with:    - LLQ diverticulitis  - CORNELIO et? Urinary retention present (> 40cc)  - NAGMA  - Hypocalcemia due to HCO3- ggt      Plan:    - Bladder scans  - I&O cath. As needed  - Antib. Per admitting  - Replace Ca++ orally  - Surgery following  - Urine for FeNa+ and EOS  - Will follow Hdy-[id number]-[id number]-acid/base and UO  - Other problems per admitting    Above plan discussed with Pte. And .

## 2022-07-26 NOTE — ASSESSMENT & PLAN NOTE
Body mass index is 33.63 kg/m². Morbid obesity complicates all aspects of disease management from diagnostic modalities to treatment. Weight loss encouraged and health benefits explained to patient.

## 2022-07-26 NOTE — PLAN OF CARE
"[facility] - Med Surg  Initial Discharge Assessment       Primary Care Provider: Giuliana Rojas MD    Admission Diagnosis: CORNELIO (acute kidney injury) [N17.9]  Hypotension [I95.9]  Acute diverticulitis [K57.92]    Admission Date: 7/24/2022  Expected Discharge Date:     Discharge Barriers Identified: None    Payor: [employer]ARE / Plan: [employer] 65 / Product Type: Medicare Advantage /     Extended Emergency Contact Information  Primary Emergency Contact: Riaz Rodas  Address: [address] [place] [address]           [place] [address] [facility]  Home Phone: [phone]  Mobile Phone: [phone]  Relation: Spouse  Secondary Emergency Contact: Tali Rodas   [facility]  Home Phone: [phone]  Mobile Phone: [phone]  Relation: Relative  Mother: TOSHA Trinidad  Home Phone: [phone]    Discharge Plan A: Home with family  Discharge Plan B: Home Health      [facility] - [place] - [address]  [address]  [place] [address]  Phone: [phone] Fax: [phone]      Initial Assessment (most recent)     Adult Discharge Assessment - 07/26/22 1639        Discharge Assessment    Assessment Type Discharge Planning Assessment     Confirmed/corrected address, phone number and insurance Yes     Confirmed Demographics Correct on Facesheet     Source of Information patient     When was your last doctors appointment? --   does not recall    Communicated CARIE with patient/caregiver Yes     Reason For Admission "Stomach problems"     Lives With spouse     Do you expect to return to your current living situation? Yes     Do you have help at home or someone to help you manage your care at home? Yes     Who are your caregiver(s) and their phone number(s)? spouse     Prior to hospitilization cognitive status: Alert/Oriented     Current cognitive status: Alert/Oriented     Walking or Climbing Stairs Difficulty none     Dressing/Bathing Difficulty none     " Home Layout Able to live on 1st floor     Equipment Currently Used at Home none     Readmission within 30 days? No     Patient currently being followed by outpatient case management? No     Do you currently have service(s) that help you manage your care at home? No     Do you have prescription coverage? Yes     Coverage PHN     Do you have any problems affording any of your prescribed medications? No     Is the patient taking medications as prescribed? yes     Who is going to help you get home at discharge? spouse     How do you get to doctors appointments? car, drives self     Are you on dialysis? No     Do you take coumadin? No     Discharge Plan A Home with family     Discharge Plan B Home Health     DME Needed Upon Discharge  none     Discharge Plan discussed with: Patient     Discharge Barriers Identified None        Relationship/Environment    Name(s) of Who Lives With Patient Riaz

## 2022-07-26 NOTE — PLAN OF CARE
07/26/22 1601   Medicare Message   Important Message from Medicare regarding Discharge Appeal Rights Given to patient/caregiver;Explained to patient/caregiver;Signed/date by patient/caregiver   Date IMM was signed 07/26/22   Time IMM was signed 1204

## 2022-07-26 NOTE — TELEPHONE ENCOUNTER
Last Office Visit Info:   The patient's last visit with Giuliana Rojas MD was on 3/28/2022.    The patient's last visit in current department was on 7/11/2022.        Last CBC Results:   Lab Results   Component Value Date    WBC 3.98 07/26/2022    HGB 9.5 (L) 07/26/2022    HCT 29.2 (L) 07/26/2022    PLT 81 (L) 07/26/2022       Last CMP Results  Lab Results   Component Value Date     07/26/2022     07/26/2022    K 3.6 07/26/2022    K 3.6 07/26/2022     (H) 07/26/2022     (H) 07/26/2022    CO2 18 (L) 07/26/2022    CO2 18 (L) 07/26/2022    BUN 36 (H) 07/26/2022    BUN 36 (H) 07/26/2022    CREATININE 3.9 (H) 07/26/2022    CREATININE 3.9 (H) 07/26/2022    CALCIUM 7.5 (L) 07/26/2022    CALCIUM 7.5 (L) 07/26/2022    ALBUMIN 3.8 07/24/2022    AST 23 07/24/2022    ALT 24 07/24/2022       Last Lipids  Lab Results   Component Value Date    CHOL 172 06/08/2022    TRIG 204 (H) 06/08/2022    HDL 42 06/08/2022    LDLCALC 89.2 06/08/2022       Last A1C  Lab Results   Component Value Date    HGBA1C 5.2 06/08/2022       Last TSH  Lab Results   Component Value Date    TSH 1.598 06/08/2022             Current Med Refills  Medication List with Changes/Refills   Current Medications    AMLODIPINE-OLMESARTAN (ANGEL) 5-40 MG PER TABLET    TAKE ONE CAPSULE BY MOUTH EVERY DAY       Start Date: 12/26/2021End Date: --    ATENOLOL (TENORMIN) 50 MG TABLET    TAKE ONE TABLET BY MOUTH TWICE DAILY AS NEEDED       Start Date: 3/22/2022 End Date: --    ATORVASTATIN (LIPITOR) 40 MG TABLET    TAKE 1 TABLET BY MOUTH EVERY DAY       Start Date: 7/18/2022 End Date: --    BIFIDOBACTERIUM INFANTIS (ALIGN ORAL)    Take 1 tablet by mouth once daily.       Start Date: --        End Date: --    BIOFREEZE, MENTHOL, TOP    Apply topically.       Start Date: --        End Date: --    COAL TAR (NEUTROGENA T-GEL) 0.5 % SHAMPOO    Apply topically nightly as needed.       Start Date: --        End Date: --    FLUOCINONIDE (LIDEX) 0.05 %  EXTERNAL SOLUTION    Apply topically 2 (two) times daily as needed (rash, itching at scalp). Avoid use on face, body folds, groin/genitalia.       Start Date: 4/8/2021  End Date: --    FLUTICASONE PROPIONATE (FLONASE) 50 MCG/ACTUATION NASAL SPRAY    SPRAY twice IN EACH NOSTRIL DAILY       Start Date: 4/5/2022  End Date: --    GLUCOSAMINE SULFATE (GLUCOSAMINE) 500 MG TAB    Take by mouth.       Start Date: --        End Date: --    HYDROCHLOROTHIAZIDE (HYDRODIURIL) 12.5 MG TAB    TAKE ONE TABLET BY MOUTH DAILY       Start Date: 8/6/2021  End Date: --    INULIN (FIBER GUMMIES ORAL)    Take by mouth.       Start Date: --        End Date: --    KETOCONAZOLE (NIZORAL) 2 % SHAMPOO    wash hair AT least TWO OR THREE TIMES weekly. let sit on scalp AT least 5 MINUTES prior to rinsing       Start Date: 5/17/2022 End Date: --    MAGNESIUM 30 MG TAB    Take 250 mg by mouth once.        Start Date: --        End Date: --    MENTHOL/CAMPHOR (ICY HOT ADVANCED TOP)    Apply topically.       Start Date: --        End Date: --    METHOCARBAMOL (ROBAXIN) 500 MG TAB    TAKE ONE TABLET BY MOUTH EVERY 8 HOURS AS NEEDED FOR HEADACHE AND MUSCLE SPASM       Start Date: 8/16/2021 End Date: --    MORPHINE (MSIR) 15 MG TABLET    Take 1 tablet (15 mg total) by mouth every 6 (six) hours as needed for Pain.       Start Date: 7/8/2022  End Date: --    MULTIVIT WITH MIN-FOLIC ACID 200 MCG CHEW    Take by mouth.       Start Date: --        End Date: --    MULTIVIT-MIN-FOLIC ACID-BIOTIN (HAIR,SKIN AND NAILS,FA-BIOTIN,) 66.7-1,000 MCG TAB    Take by mouth.       Start Date: --        End Date: --    OLOPATADINE HCL (PATADAY OPHT)    Apply to eye.       Start Date: --        End Date: --    OMEGA-3 FATTY ACIDS/FISH OIL (FISH OIL-OMEGA-3 FATTY ACIDS) 300-1,000 MG CAPSULE    Take 1 capsule by mouth once daily.       Start Date: --        End Date: --    ONDANSETRON (ZOFRAN) 4 MG TABLET    Take 1 tablet (4 mg total) by mouth every 8 (eight) hours as  needed.       Start Date: 7/8/2022  End Date: --    POLYETHYLENE GLYCOL (GLYCOLAX) 17 GRAM/DOSE POWDER    Take 17 g by mouth once daily.       Start Date: 7/8/2022  End Date: --    PREMARIN VAGINAL CREAM    PRN       Start Date: 4/28/2022 End Date: --    PROPYLENE GLYCOL (SYSTANE BALANCE OPHT)    Apply to eye.       Start Date: --        End Date: --    TRETINOIN (RETIN-A) 0.025 % CREAM    Apply topically nightly. Apply pea-sized amount to entire face nightly. Start slow, up-titrate as tolerated.       Start Date: 4/8/2021  End Date: --    TROLAMINE SALICYLATE (ASPERCREME) 10 % CREAM    Apply topically as needed.       Start Date: --        End Date: --    UNABLE TO FIND    Silymarin Complex, Liver Health       Start Date: --        End Date: --    VITAMIN D (VITAMIN D3) 1000 UNITS TAB    Take 1,000 Units by mouth once daily.       Start Date: --        End Date: --    VITAMIN E 400 UNIT CAPSULE    Take 400 Units by mouth once daily.       Start Date: --        End Date: --

## 2022-07-26 NOTE — TELEPHONE ENCOUNTER
No new care gaps identified.  [facility] Embedded Care Gaps. Reference number: [id number]. 7/26/2022   8:31:55 AM CDT

## 2022-07-26 NOTE — PLAN OF CARE
Problem: Adult Inpatient Plan of Care  Goal: Plan of Care Review  Outcome: Ongoing, Progressing  Flowsheets (Taken 7/26/2022 0310)  Plan of Care Reviewed With: patient    Patient remains free from falls and injury this shift. Patient still having diarrhea stools. Patient's pain and nausea controlled with PRN medications. Patient states that nausea and pain seem to be more severe after eating. Recommended for patient to sip on liquids, patient verbalized understanding. IVF and IV antibiotics administered as ordered. Patient resting comfortably. Able to make needs known. Plan of care continued.

## 2022-07-27 PROBLEM — E87.6 HYPOKALEMIA: Status: ACTIVE | Noted: 2022-07-27

## 2022-07-27 LAB
ANION GAP SERPL CALC-SCNC: 11 MMOL/L (ref 8–16)
BASOPHILS # BLD AUTO: 0.01 K/UL (ref 0–0.2)
BASOPHILS NFR BLD: 0.3 % (ref 0–1.9)
BUN SERPL-MCNC: 32 MG/DL (ref 6–20)
CALCIUM SERPL-MCNC: 8.1 MG/DL (ref 8.7–10.5)
CHLORIDE SERPL-SCNC: 108 MMOL/L (ref 95–110)
CO2 SERPL-SCNC: 23 MMOL/L (ref 23–29)
CREAT SERPL-MCNC: 3.6 MG/DL (ref 0.5–1.4)
DIFFERENTIAL METHOD: ABNORMAL
EOSINOPHIL # BLD AUTO: 0.2 K/UL (ref 0–0.5)
EOSINOPHIL NFR BLD: 4.7 % (ref 0–8)
ERYTHROCYTE [DISTWIDTH] IN BLOOD BY AUTOMATED COUNT: 13 % (ref 11.5–14.5)
EST. GFR  (AFRICAN AMERICAN): 16 ML/MIN/1.73 M^2
EST. GFR  (NON AFRICAN AMERICAN): 14 ML/MIN/1.73 M^2
GLUCOSE SERPL-MCNC: 107 MG/DL (ref 70–110)
HCT VFR BLD AUTO: 27.8 % (ref 37–48.5)
HGB BLD-MCNC: 9.4 G/DL (ref 12–16)
IMM GRANULOCYTES # BLD AUTO: 0.03 K/UL (ref 0–0.04)
IMM GRANULOCYTES NFR BLD AUTO: 0.8 % (ref 0–0.5)
LYMPHOCYTES # BLD AUTO: 0.8 K/UL (ref 1–4.8)
LYMPHOCYTES NFR BLD: 20.8 % (ref 18–48)
MCH RBC QN AUTO: 30 PG (ref 27–31)
MCHC RBC AUTO-ENTMCNC: 33.8 G/DL (ref 32–36)
MCV RBC AUTO: 89 FL (ref 82–98)
MONOCYTES # BLD AUTO: 0.3 K/UL (ref 0.3–1)
MONOCYTES NFR BLD: 9.4 % (ref 4–15)
NEUTROPHILS # BLD AUTO: 2.3 K/UL (ref 1.8–7.7)
NEUTROPHILS NFR BLD: 64 % (ref 38–73)
NRBC BLD-RTO: 0 /100 WBC
PLATELET # BLD AUTO: 81 K/UL (ref 150–450)
PMV BLD AUTO: 10.4 FL (ref 9.2–12.9)
POTASSIUM SERPL-SCNC: 3.1 MMOL/L (ref 3.5–5.1)
RBC # BLD AUTO: 3.13 M/UL (ref 4–5.4)
SODIUM SERPL-SCNC: 142 MMOL/L (ref 136–145)
WBC # BLD AUTO: 3.6 K/UL (ref 3.9–12.7)

## 2022-07-27 PROCEDURE — 25000003 PHARM REV CODE 250: Performed by: INTERNAL MEDICINE

## 2022-07-27 PROCEDURE — 25000003 PHARM REV CODE 250: Performed by: HOSPITALIST

## 2022-07-27 PROCEDURE — 85025 COMPLETE CBC W/AUTO DIFF WBC: CPT | Performed by: HOSPITALIST

## 2022-07-27 PROCEDURE — 63600175 PHARM REV CODE 636 W HCPCS: Performed by: HOSPITALIST

## 2022-07-27 PROCEDURE — 11000001 HC ACUTE MED/SURG PRIVATE ROOM

## 2022-07-27 PROCEDURE — 99222 1ST HOSP IP/OBS MODERATE 55: CPT | Mod: ,,, | Performed by: INTERNAL MEDICINE

## 2022-07-27 PROCEDURE — 99222 PR INITIAL HOSPITAL CARE,LEVL II: ICD-10-PCS | Mod: ,,, | Performed by: INTERNAL MEDICINE

## 2022-07-27 PROCEDURE — 36415 COLL VENOUS BLD VENIPUNCTURE: CPT | Performed by: INTERNAL MEDICINE

## 2022-07-27 PROCEDURE — 51702 INSERT TEMP BLADDER CATH: CPT

## 2022-07-27 PROCEDURE — 80048 BASIC METABOLIC PNL TOTAL CA: CPT | Performed by: INTERNAL MEDICINE

## 2022-07-27 PROCEDURE — 63600175 PHARM REV CODE 636 W HCPCS: Performed by: INTERNAL MEDICINE

## 2022-07-27 RX ORDER — HYDROMORPHONE HYDROCHLORIDE 2 MG/ML
0.5 INJECTION, SOLUTION INTRAMUSCULAR; INTRAVENOUS; SUBCUTANEOUS EVERY 4 HOURS PRN
Status: DISCONTINUED | OUTPATIENT
Start: 2022-07-27 | End: 2022-07-28

## 2022-07-27 RX ORDER — METHOCARBAMOL 500 MG/1
500 TABLET, FILM COATED ORAL 3 TIMES DAILY PRN
Status: DISCONTINUED | OUTPATIENT
Start: 2022-07-27 | End: 2022-07-30 | Stop reason: HOSPADM

## 2022-07-27 RX ORDER — OXYCODONE AND ACETAMINOPHEN 10; 325 MG/1; MG/1
1 TABLET ORAL EVERY 4 HOURS PRN
Status: DISCONTINUED | OUTPATIENT
Start: 2022-07-27 | End: 2022-07-28

## 2022-07-27 RX ORDER — SODIUM CHLORIDE 450 MG/100ML
INJECTION, SOLUTION INTRAVENOUS CONTINUOUS
Status: ACTIVE | OUTPATIENT
Start: 2022-07-27 | End: 2022-07-28

## 2022-07-27 RX ORDER — CETIRIZINE HYDROCHLORIDE 5 MG/1
5 TABLET ORAL DAILY
Status: DISCONTINUED | OUTPATIENT
Start: 2022-07-27 | End: 2022-07-30 | Stop reason: HOSPADM

## 2022-07-27 RX ADMIN — PROMETHAZINE HYDROCHLORIDE 25 MG: 25 INJECTION INTRAMUSCULAR; INTRAVENOUS at 05:07

## 2022-07-27 RX ADMIN — PROMETHAZINE HYDROCHLORIDE 25 MG: 25 INJECTION INTRAMUSCULAR; INTRAVENOUS at 09:07

## 2022-07-27 RX ADMIN — CALCIUM CARBONATE 1000 MG: 1250 SUSPENSION ORAL at 08:07

## 2022-07-27 RX ADMIN — CALCIUM CARBONATE 1000 MG: 1250 SUSPENSION ORAL at 12:07

## 2022-07-27 RX ADMIN — METHOCARBAMOL 500 MG: 500 TABLET ORAL at 08:07

## 2022-07-27 RX ADMIN — BUTALBITAL, ACETAMINOPHEN AND CAFFEINE 1 TABLET: 50; 325; 40 TABLET ORAL at 02:07

## 2022-07-27 RX ADMIN — SODIUM CHLORIDE: 0.45 INJECTION, SOLUTION INTRAVENOUS at 04:07

## 2022-07-27 RX ADMIN — PIPERACILLIN AND TAZOBACTAM 4.5 G: 4; .5 INJECTION, POWDER, FOR SOLUTION INTRAVENOUS at 05:07

## 2022-07-27 RX ADMIN — HYDROMORPHONE HYDROCHLORIDE 0.5 MG: 2 INJECTION, SOLUTION INTRAMUSCULAR; INTRAVENOUS; SUBCUTANEOUS at 12:07

## 2022-07-27 RX ADMIN — POTASSIUM BICARBONATE 20 MEQ: 391 TABLET, EFFERVESCENT ORAL at 08:07

## 2022-07-27 RX ADMIN — CETIRIZINE HYDROCHLORIDE 5 MG: 5 TABLET ORAL at 12:07

## 2022-07-27 RX ADMIN — PROMETHAZINE HYDROCHLORIDE 25 MG: 25 INJECTION INTRAMUSCULAR; INTRAVENOUS at 02:07

## 2022-07-27 RX ADMIN — ENOXAPARIN SODIUM 30 MG: 30 INJECTION SUBCUTANEOUS at 05:07

## 2022-07-27 RX ADMIN — BUTALBITAL, ACETAMINOPHEN AND CAFFEINE 1 TABLET: 50; 325; 40 TABLET ORAL at 05:07

## 2022-07-27 RX ADMIN — FLUTICASONE PROPIONATE 100 MCG: 50 SPRAY, METERED NASAL at 08:07

## 2022-07-27 RX ADMIN — ATORVASTATIN CALCIUM 40 MG: 40 TABLET, FILM COATED ORAL at 08:07

## 2022-07-27 RX ADMIN — HYDROMORPHONE HYDROCHLORIDE 1 MG: 2 INJECTION, SOLUTION INTRAMUSCULAR; INTRAVENOUS; SUBCUTANEOUS at 04:07

## 2022-07-27 RX ADMIN — AMLODIPINE BESYLATE 10 MG: 5 TABLET ORAL at 08:07

## 2022-07-27 RX ADMIN — SODIUM CHLORIDE: 0.45 INJECTION, SOLUTION INTRAVENOUS at 08:07

## 2022-07-27 NOTE — SUBJECTIVE & OBJECTIVE
Interval History: Feeling better today. No headache. Pain improved. Still poor appetite and nausea. Mother at bedside. Stanford placed yesterday for urinary retention.     Review of Systems   Constitutional:  Positive for appetite change. Negative for chills and fever.   Respiratory:  Negative for shortness of breath.    Cardiovascular:  Negative for chest pain and leg swelling.   Gastrointestinal:  Positive for abdominal distention, abdominal pain, diarrhea and nausea. Negative for constipation and vomiting.   Musculoskeletal:  Negative for arthralgias.   Neurological:  Positive for weakness. Negative for numbness and headaches.   Psychiatric/Behavioral:  Negative for confusion.    Objective:     Vital Signs (Most Recent):  Temp: 97.7 °F (36.5 °C) (07/27/22 0716)  Pulse: 63 (07/27/22 0716)  Resp: 18 (07/27/22 0716)  BP: (!) 141/64 (07/27/22 0716)  SpO2: 96 % (07/27/22 0716)   Vital Signs (24h Range):  Temp:  [97.5 °F (36.4 °C)-99.2 °F (37.3 °C)] 97.7 °F (36.5 °C)  Pulse:  [63-81] 63  Resp:  [18-20] 18  SpO2:  [93 %-96 %] 96 %  BP: (111-141)/(56-66) 141/64     Weight: 83.4 kg (183 lb 13.8 oz)  Body mass index is 33.63 kg/m².    Intake/Output Summary (Last 24 hours) at 7/27/2022 1044  Last data filed at 7/27/2022 1026  Gross per 24 hour   Intake 2836.71 ml   Output 2050 ml   Net 786.71 ml        Physical Exam  Vitals and nursing note reviewed.   Constitutional:       General: She is not in acute distress.     Appearance: She is not ill-appearing or toxic-appearing.   HENT:      Head: Normocephalic and atraumatic.      Nose: Nose normal.      Mouth/Throat:      Mouth: Mucous membranes are moist.   Cardiovascular:      Rate and Rhythm: Normal rate and regular rhythm.      Pulses: Normal pulses.      Heart sounds: Normal heart sounds. No murmur heard.    No gallop.   Pulmonary:      Effort: Pulmonary effort is normal. No respiratory distress.      Breath sounds: No wheezing or rales.      Comments: Room air  Abdominal:       General: Bowel sounds are normal. There is distension.      Palpations: There is no mass.      Tenderness: There is no abdominal tenderness. There is no guarding or rebound.   Genitourinary:     Comments: balderrama  Musculoskeletal:      Right lower leg: No edema.      Left lower leg: No edema.   Skin:     General: Skin is warm and dry.   Neurological:      Mental Status: She is alert and oriented to person, place, and time.       Significant Labs: All pertinent labs within the past 24 hours have been reviewed.    Significant Imaging: I have reviewed all pertinent imaging results/findings within the past 24 hours.

## 2022-07-27 NOTE — ASSESSMENT & PLAN NOTE
Presented with CORNELIO. Cr on admit 1.7, baseline normal. Worsened to 3.9, now improving   - suspect prolonged prerenal state + post renal  - FENa 2%- intrinsic  - UA with RBC, no eos  - CT on admit with no mass/hydronephrosis  - Nephrology consulted  - on IVF   - with urinary retention. Stanford placed 7/26- voiding trial prior to DC  - BMP in AM

## 2022-07-27 NOTE — PROGRESS NOTES
[facility]  Progress Note    Patient Name: Giuliana Rodas  MRN: [id number]  Patient Class: IP- Inpatient   Admission Date: 7/24/2022  Length of Stay: 3 days  Attending Physician: Maria A Obrien MD  Primary Care Provider: Giuliana Rojas MD        Subjective:     Principal Problem:Acute diverticulitis        HPI:  Mrs. Rodas is a 51 yo F who presents with a CC of LLQ abdominal pain since yesterday.  She was seen on 7/8/22 for diverticulitis and sent home on Cipro and Flagyl. She returns now again with the same complaint as well as fever. She was found to have diverticulitis of the LLQ about the same from 7/8/22.      Overview/Hospital Course:  Ms Giuliana Rodas was admitted with acute diverticulitis. She did not improve with PO cipro/flagyl. Upon admit, cipro flagyl were resumed IV along with IVF. Her pain had not improved, and she has developed worsening renal failure. Transitioned to zosyn. Nephrology and GI consulted. Pain now improving. Renal function improving.       Interval History: Feeling better today. No headache. Pain improved. Still poor appetite and nausea. Mother at bedside. Stanford placed yesterday for urinary retention.     Review of Systems   Constitutional:  Positive for appetite change. Negative for chills and fever.   Respiratory:  Negative for shortness of breath.    Cardiovascular:  Negative for chest pain and leg swelling.   Gastrointestinal:  Positive for abdominal distention, abdominal pain, diarrhea and nausea. Negative for constipation and vomiting.   Musculoskeletal:  Negative for arthralgias.   Neurological:  Positive for weakness. Negative for numbness and headaches.   Psychiatric/Behavioral:  Negative for confusion.    Objective:     Vital Signs (Most Recent):  Temp: 97.7 °F (36.5 °C) (07/27/22 0716)  Pulse: 63 (07/27/22 0716)  Resp: 18 (07/27/22 0716)  BP: (!) 141/64 (07/27/22 0716)  SpO2: 96 % (07/27/22 0716)   Vital Signs (24h Range):  Temp:  [97.5 °F  (36.4 °C)-99.2 °F (37.3 °C)] 97.7 °F (36.5 °C)  Pulse:  [63-81] 63  Resp:  [18-20] 18  SpO2:  [93 %-96 %] 96 %  BP: (111-141)/(56-66) 141/64     Weight: 83.4 kg (183 lb 13.8 oz)  Body mass index is 33.63 kg/m².    Intake/Output Summary (Last 24 hours) at 7/27/2022 1044  Last data filed at 7/27/2022 1026  Gross per 24 hour   Intake 2836.71 ml   Output 2050 ml   Net 786.71 ml        Physical Exam  Vitals and nursing note reviewed.   Constitutional:       General: She is not in acute distress.     Appearance: She is not ill-appearing or toxic-appearing.   HENT:      Head: Normocephalic and atraumatic.      Nose: Nose normal.      Mouth/Throat:      Mouth: Mucous membranes are moist.   Cardiovascular:      Rate and Rhythm: Normal rate and regular rhythm.      Pulses: Normal pulses.      Heart sounds: Normal heart sounds. No murmur heard.    No gallop.   Pulmonary:      Effort: Pulmonary effort is normal. No respiratory distress.      Breath sounds: No wheezing or rales.      Comments: Room air  Abdominal:      General: Bowel sounds are normal. There is distension.      Palpations: There is no mass.      Tenderness: There is no abdominal tenderness. There is no guarding or rebound.   Genitourinary:     Comments: balderrama  Musculoskeletal:      Right lower leg: No edema.      Left lower leg: No edema.   Skin:     General: Skin is warm and dry.   Neurological:      Mental Status: She is alert and oriented to person, place, and time.       Significant Labs: All pertinent labs within the past 24 hours have been reviewed.    Significant Imaging: I have reviewed all pertinent imaging results/findings within the past 24 hours.      Assessment/Plan:      * Acute diverticulitis  Not improved on cipro/flagyl x2.   - changed to zosyn on 7/26. Improving symptoms.   - CT on admit with no abscess or perforation. Unable to repeat with contrast due to CORNELIO  - continue IVF per Nephro  - PRN nausea Rx  - PRN opioids- wean dilaudid  - GI  consulted, appreciate recs. Needs outpatient follow up for cscope  - continue liquid diet for now      Hypokalemia  Replace and monitor      Urinary retention  Noted 7/26. Stanford placed  - voiding trial prior to DC  - no UTI      Class 1 obesity in adult  Body mass index is 33.63 kg/m². Morbid obesity complicates all aspects of disease management from diagnostic modalities to treatment. Weight loss encouraged and health benefits explained to patient.         Thrombocytopenia  Noted on admit. Worsening to 81, now stabilized   - will continue to monitor for now. No signs of bleeding       CORNELIO (acute kidney injury)  Presented with CORNELIO. Cr on admit 1.7, baseline normal. Worsened to 3.9, now improving   - suspect prolonged prerenal state + post renal  - FENa 2%- intrinsic  - UA with RBC, no eos  - CT on admit with no mass/hydronephrosis  - Nephrology consulted  - on IVF   - with urinary retention. Stanford placed 7/26- voiding trial prior to DC  - BMP in AM    Anemia  Normocytic anemia, Hgb 13 on admit decreasing with no signs of bleeding  - monitor       Hyperlipidemia  Continue statin    GERD (gastroesophageal reflux disease)  No acute issues       Hypertension  Continue amlodipine         VTE Risk Mitigation (From admission, onward)         Ordered     enoxaparin injection 30 mg  Daily         07/25/22 0855                Discharge Planning   CARIE:      Code Status: Full Code   Is the patient medically ready for discharge?:     Reason for patient still in hospital (select all that apply): Patient trending condition  Discharge Plan A: Home with family                  Maria A Obrien MD  Department of Hospital Medicine   [facility] - Med Surg

## 2022-07-27 NOTE — PROGRESS NOTES
Renal Progress Note    Date of Admission:  7/24/2022  2:45 AM    Length of Stay: 3  Days    Subjective: c.o. abd. pain    Objective:    Current Facility-Administered Medications   Medication    acetaminophen tablet 650 mg    amLODIPine tablet 10 mg    atorvastatin tablet 40 mg    butalbital-acetaminophen-caffeine -40 mg per tablet 1 tablet    calcium carbonate 500 mg/5 mL (1,250 mg/5 mL) suspension 1,000 mg    enoxaparin injection 30 mg    fluticasone propionate 50 mcg/actuation nasal spray 100 mcg    HYDROmorphone (PF) injection 1 mg    methocarbamoL tablet 500 mg    morphine tablet 15 mg    ondansetron injection 8 mg    piperacillin-tazobactam 4.5 g in dextrose 5 % 100 mL IVPB (ready to mix system)    promethazine (PHENERGAN) 25 mg in dextrose 5 % 50 mL IVPB    sodium bicarbonate 150 mEq in dextrose 5 % 1,000 mL infusion       Vitals:    07/27/22 0002 07/27/22 0403 07/27/22 0447 07/27/22 0716   BP: (!) 112/59  130/63 (!) 141/64   BP Location: Right arm  Right arm    Patient Position: Lying  Lying Lying   Pulse: 73  81 63   Resp: 20 18 20 18   Temp: 98.7 °F (37.1 °C)  98.4 °F (36.9 °C) 97.7 °F (36.5 °C)   TempSrc: Oral  Oral Oral   SpO2: (!) 93%  (!) 93% 96%   Weight:       Height:           I/O last 3 completed shifts:  In: 2946.9 [P.O.:840; I.V.:1454.2; IV Piggyback:652.7]  Out: 1500 [Urine:1500]  No intake/output data recorded.      Physical Exam:     General: NAD  Neck: supple  Heart: RRR  Lungs: unlabored breathing  Abdomen: n/a Stanford to gravity draining blood tinged urine  Limbs: n/a  Neurologic: AAO x 3       Laboratories:    Recent Labs   Lab 07/27/22  0433   WBC 3.60*   RBC 3.13*   HGB 9.4*   HCT 27.8*   PLT 81*   MCV 89   MCH 30.0   MCHC 33.8       Recent Labs   Lab 07/27/22  0433   CALCIUM 8.1*      K 3.1*   CO2 23      BUN 32*   CREATININE 3.6*       No results for input(s): COLORU, CLARITYU, SPECGRAV, PHUR, PROTEINUA, GLUCOSEU, BLOODU, WBCU, RBCU,  BACTERIA, MUCUS in the last 24 hours.    Invalid input(s):  BILIRUBINCON    Microbiology Results (last 7 days)     Procedure Component Value Units Date/Time    Blood culture [[id number]] Collected: 07/24/22 0549    Order Status: Completed Specimen: Blood from Peripheral, Hand, Right Updated: 07/27/22 0703     Blood Culture, Routine No Growth to date      No Growth to date      No Growth to date      No Growth to date    Blood culture [[id number]] Collected: 07/24/22 0557    Order Status: Completed Specimen: Blood from Peripheral, Hand, Left Updated: 07/27/22 0703     Blood Culture, Routine No Growth to date      No Growth to date      No Growth to date      No Growth to date            Diagnostic Tests:     CT Abdomen:    - Kidneys: No solid mass or hydronephrosis.  Bilateral renal cysts.  Bilateral extrarenal pelvis ease.     - Adrenals: Unremarkable.     Diverticulosis.  At the level LEFT lower quadrant, iliac crest level, extending for approximately 4 cm, associated with diverticulosis is pericolonic inflammation, level of the junction of descending colon and sigmoid colon most consistent with diverticulitis.  No evidence for diverticular abscess.  Trace fluid along the mesenteric margin associated.     Bones:  No acute osseous abnormality and no suspicious lytic or blastic lesion.    Impression:       Persistent acute uncomplicated LEFT lower quadrant diverticulitis without significant detrimental change from 07/08/2022 no evidence for abscess.       Electronically signed by: Basilio Lerner MD   Date: 07/24/2022   Time: 05:58           Assessment:    53 y/o female admitted with:    - LLQ diverticulitis  - CORNELIO with Urinary retention   - NAGMA RESOLVED  - Mild hypoK+  - Hypocalcemia improving        Plan:     - Stanford to gravity  - IV Antib. Per admitting  - Stop HCO3-  - IVFs to 0.45% saline  - Replace K+ orally  - Replacing Ca++ orally  - GI and Surgery following  - Will follow [id number]-acid/base and UO  -  Other problems per admitting

## 2022-07-27 NOTE — CONSULTS
[facility] Consultation Note    Reason for Consult:      PCP:   Giuliana Rojas   No address on file    LOS: 3        Initial History of Present Illness (HPI):  This is a 52 y.o. female consulted for diverticulitis.  Went on Keto diet earlier this month and eating a lot of extra okra and then   Got diverticulitis 7/8. Treated with cipro/flagyl with improvement but then had another flareup of pain with headaches and came back. CT shows no resolution.    Last colon 2018 did have diverticula in sigmoid.  Has seen Dr evans in past    Pain currently at 5/10, tolerating liquids        ROS:  Constitutional: No fevers, chills, No weight loss  ENT: No allergies  CV: No chest pain  Pulm: No cough, No shortness of breath  Ophtho: No vision changes  GI: see HPI  Derm: No rash  Heme: No lymphadenopathy, No bruising  MSK: No arthritis  : No dysuria, No hematuria  Endo: No hot or cold intolerance  Neuro: No syncope, No seizure  Psych: No anxiety, No depression    Medical History:  has a past medical history of Bile reflux gastritis, Breast cyst, Eczema, Fibrocystic breast, Fibromyalgia, Gastroparesis, GERD (gastroesophageal reflux disease), Hyperglyceridemia, Hyperlipidemia, Hypertension, IC (interstitial cystitis), Psoriasis, and Tension headache.    Surgical History:  has a past surgical history that includes Cholecystectomy; Hysterectomy; Hemorrhoid surgery; Knee surgery; Oophorectomy; Breast biopsy (Right); and Esophagogastroduodenoscopy (N/A, 3/12/2020).    Family History: family history includes Breast cancer in her mother and paternal grandmother; Diabetes in her father; Heart disease in her father; Hyperlipidemia in her father; Hypertension in her father and mother; Migraines in her mother; Stroke in her father..     Social History:  reports that she has never smoked. She has never used smokeless tobacco. She reports that she does not drink alcohol and does not use drugs.    Review of patient's  allergies indicates:   Allergen Reactions    Chlorthalidone Swelling     Hypokalemia     Dicyclomine Edema             Adhesive Rash    Amitriptyline Hallucinations    Depo-provera [medroxyprogesterone] Anxiety    Prozac [fluoxetine] Anxiety    Topiramate Rash       No current facility-administered medications on file prior to encounter.     Current Outpatient Medications on File Prior to Encounter   Medication Sig Dispense Refill    amlodipine-olmesartan (ANGEL) 5-40 mg per tablet TAKE ONE CAPSULE BY MOUTH EVERY DAY 90 tablet 3    atenoloL (TENORMIN) 50 MG tablet TAKE ONE TABLET BY MOUTH TWICE DAILY AS NEEDED 180 tablet 1    atorvastatin (LIPITOR) 40 MG tablet TAKE 1 TABLET BY MOUTH EVERY DAY 90 tablet 3    BIFIDOBACTERIUM INFANTIS (ALIGN ORAL) Take 1 tablet by mouth once daily.      coal tar (NEUTROGENA T-GEL) 0.5 % shampoo Apply topically nightly as needed.      fluticasone propionate (FLONASE) 50 mcg/actuation nasal spray SPRAY twice IN EACH NOSTRIL DAILY 16 g 2    glucosamine sulfate (GLUCOSAMINE) 500 mg Tab Take by mouth.      inulin (FIBER GUMMIES ORAL) Take by mouth.      ketoconazole (NIZORAL) 2 % shampoo wash hair AT least TWO OR THREE TIMES weekly. let sit on scalp AT least 5 MINUTES prior to rinsing 120 mL 11    magnesium 30 mg Tab Take 250 mg by mouth once.       menthol/camphor (ICY HOT ADVANCED TOP) Apply topically.      methocarbamoL (ROBAXIN) 500 MG Tab TAKE ONE TABLET BY MOUTH EVERY 8 HOURS AS NEEDED FOR HEADACHE AND MUSCLE SPASM 60 tablet 3    morphine (MSIR) 15 MG tablet Take 1 tablet (15 mg total) by mouth every 6 (six) hours as needed for Pain. 12 tablet 0    multivit with min-folic acid 200 mcg Chew Take by mouth.      multivit-min-folic acid-biotin (HAIR,SKIN AND NAILS,FA-BIOTIN,) 66.7-1,000 mcg Tab Take by mouth.      olopatadine HCl (PATADAY OPHT) Apply to eye.      omega-3 fatty acids/fish oil (FISH OIL-OMEGA-3 FATTY ACIDS) 300-1,000 mg capsule Take 1 capsule by  "mouth once daily.      ondansetron (ZOFRAN) 4 MG tablet Take 1 tablet (4 mg total) by mouth every 8 (eight) hours as needed. 12 tablet 0    propylene glycol (SYSTANE BALANCE OPHT) Apply to eye.      tretinoin (RETIN-A) 0.025 % cream Apply topically nightly. Apply pea-sized amount to entire face nightly. Start slow, up-titrate as tolerated. 45 g 5    vitamin D (VITAMIN D3) 1000 units Tab Take 1,000 Units by mouth once daily.      vitamin E 400 UNIT capsule Take 400 Units by mouth once daily.      BIOFREEZE, MENTHOL, TOP Apply topically.      fluocinonide (LIDEX) 0.05 % external solution Apply topically 2 (two) times daily as needed (rash, itching at scalp). Avoid use on face, body folds, groin/genitalia. 60 mL 3    polyethylene glycol (GLYCOLAX) 17 gram/dose powder Take 17 g by mouth once daily. 235 g 0    PREMARIN vaginal cream PRN      trolamine salicylate (ASPERCREME) 10 % cream Apply topically as needed.      UNABLE TO FIND Silymarin Complex, Liver Health          Objective Findings:    Vital Signs:  BP (!) 141/64 (Patient Position: Lying)   Pulse 63   Temp 97.7 °F (36.5 °C) (Oral)   Resp 18   Ht 5' 2" (1.575 m)   Wt 83.4 kg (183 lb 13.8 oz)   SpO2 96%   Breastfeeding No   BMI 33.63 kg/m²   Body mass index is 33.63 kg/m².    Physical Exam:  General Appearance: Well appearing in no acute distress  Head:   Normocephalic, without obvious abnormality  Eyes:    No scleral icterus, EOMI  ENT: Neck supple, Lips, mucosa, and tongue normal; teeth and gums normal  Lungs: CTA bilaterally in anterior and posterior fields, no wheezes, no crackles.  Heart:  Regular rate and rhythm, S1, S2 normal, no murmurs heard  Abdomen: Soft, mild tenderness to deep palpation, non distended with positive bowel sounds in all four quadrants. No hepatosplenomegaly, ascites, or mass  Extremities: 2+ pulses, no clubbing, cyanosis or edema  Skin: No rash  Neurologic: CN II-XII intact      Labs:  Lab Results   Component Value " Date    WBC 3.60 (L) 07/27/2022    HGB 9.4 (L) 07/27/2022    HCT 27.8 (L) 07/27/2022    PLT 81 (L) 07/27/2022    CHOL 172 06/08/2022    TRIG 204 (H) 06/08/2022    HDL 42 06/08/2022    ALT 24 07/24/2022    AST 23 07/24/2022     07/27/2022    K 3.1 (L) 07/27/2022     07/27/2022    CREATININE 3.6 (H) 07/27/2022    BUN 32 (H) 07/27/2022    CO2 23 07/27/2022    TSH 1.598 06/08/2022    INR 1.1 04/13/2014    HGBA1C 5.2 06/08/2022       No results found for: HPYLORINTERP  No results found for: HPYLORIANTIG        Imaging:  Reviewed, left uncomplicated diverticulitis    Endoscopy:      Colon 2018 - left tics, erythema at IC valve with metro gi    Assessment:  1. Acute diverticulitis    2. Hypotension    3. CORNELIO (acute kidney injury)           Recommendations:  1. Continue IV antibiotics for now  2. Monitor renal function  3. Continue liquid renal diet today, consider advancing to low residue tomorrow  4. Will need outpatient colonoscopy > 4 weeks      Thank you so much for allowing me to participate in the care of Giuliana Breen MD

## 2022-07-27 NOTE — ASSESSMENT & PLAN NOTE
Noted on admit. Worsening to 81, now stabilized   - will continue to monitor for now. No signs of bleeding

## 2022-07-27 NOTE — PLAN OF CARE
Problem: Adult Inpatient Plan of Care  Goal: Plan of Care Review  Outcome: Ongoing, Progressing  Goal: Patient-Specific Goal (Individualized)  Outcome: Ongoing, Progressing  Goal: Absence of Hospital-Acquired Illness or Injury  Outcome: Ongoing, Progressing  Goal: Readiness for Transition of Care  Outcome: Ongoing, Progressing     Problem: Fluid and Electrolyte Imbalance (Acute Kidney Injury/Impairment)  Goal: Fluid and Electrolyte Balance  Outcome: Ongoing, Progressing  Intervention: Monitor and Manage Fluid and Electrolyte Balance  Flowsheets (Taken 7/27/2022 7851)  Fluid/Electrolyte Management: electrolyte-binding therapy initiated     Problem: Renal Function Impairment (Acute Kidney Injury/Impairment)  Goal: Effective Renal Function  Outcome: Ongoing, Progressing     Problem: Pain (Intestinal Obstruction)  Goal: Acceptable Pain Control  Outcome: Ongoing, Progressing

## 2022-07-27 NOTE — NURSING
pt awake, alert, and oriented x4. pt ambulatory with   standby assist. pt still having diarrhea. balderrama patent   and intact draining tea colored urine. ABX adm as ordered.   sodium bicarb cont at 100ml/hr. bed alarm activated for pt   safety. hourly rounds made. will continue to monitor.

## 2022-07-27 NOTE — PLAN OF CARE
Problem: Adult Inpatient Plan of Care  Goal: Plan of Care Review  Outcome: Ongoing, Progressing  Goal: Patient-Specific Goal (Individualized)  Outcome: Ongoing, Progressing  Goal: Absence of Hospital-Acquired Illness or Injury  Outcome: Ongoing, Progressing  Goal: Optimal Comfort and Wellbeing  Outcome: Ongoing, Progressing  Goal: Readiness for Transition of Care  Outcome: Ongoing, Progressing     Problem: Fluid and Electrolyte Imbalance (Acute Kidney Injury/Impairment)  Goal: Fluid and Electrolyte Balance  Outcome: Ongoing, Progressing     Problem: Oral Intake Inadequate (Acute Kidney Injury/Impairment)  Goal: Optimal Nutrition Intake  Outcome: Ongoing, Progressing     Problem: Renal Function Impairment (Acute Kidney Injury/Impairment)  Goal: Effective Renal Function  Outcome: Ongoing, Progressing     Problem: Infection (Intestinal Obstruction)  Goal: Absence of Infection Signs and Symptoms  Outcome: Ongoing, Progressing     Problem: Nausea and Vomiting (Intestinal Obstruction)  Goal: Nausea and Vomiting Relief  Outcome: Ongoing, Progressing     Problem: Pain (Intestinal Obstruction)  Goal: Acceptable Pain Control  Outcome: Ongoing, Progressing     Problem: Infection  Goal: Absence of Infection Signs and Symptoms  Outcome: Ongoing, Progressing

## 2022-07-27 NOTE — NURSING
[facility]  Nurses Note -- 4 Eyes      7/27/2022       Skin assessed on: 7/27/22      [x] No Pressure Injuries Present    [x]Prevention Measures Documented    [] Yes LDA  for Pressure Injury Previously documented     [] Yes New Pressure Injury Discovered   [] LDA for New Pressure Injury Added      Attending RN:  Katie Jaramillo, JEREMIE     Second RN:  Adeline GALLEGOS PCT

## 2022-07-27 NOTE — ASSESSMENT & PLAN NOTE
Not improved on cipro/flagyl x2.   - changed to zosyn on 7/26. Improving symptoms.   - CT on admit with no abscess or perforation. Unable to repeat with contrast due to CORNELIO  - continue IVF per Nephro  - PRN nausea Rx  - PRN opioids- wean dilaudid  - GI consulted, appreciate recs. Needs outpatient follow up for cscope  - continue liquid diet for now

## 2022-07-28 LAB
ANION GAP SERPL CALC-SCNC: 13 MMOL/L (ref 8–16)
BACTERIA BLD CULT: NORMAL
BACTERIA BLD CULT: NORMAL
BASOPHILS # BLD AUTO: 0.02 K/UL (ref 0–0.2)
BASOPHILS NFR BLD: 0.6 % (ref 0–1.9)
BUN SERPL-MCNC: 24 MG/DL (ref 6–20)
CALCIUM SERPL-MCNC: 8.1 MG/DL (ref 8.7–10.5)
CHLORIDE SERPL-SCNC: 106 MMOL/L (ref 95–110)
CO2 SERPL-SCNC: 22 MMOL/L (ref 23–29)
CREAT SERPL-MCNC: 3 MG/DL (ref 0.5–1.4)
DIFFERENTIAL METHOD: ABNORMAL
EOSINOPHIL # BLD AUTO: 0.1 K/UL (ref 0–0.5)
EOSINOPHIL NFR BLD: 3.4 % (ref 0–8)
ERYTHROCYTE [DISTWIDTH] IN BLOOD BY AUTOMATED COUNT: 12.7 % (ref 11.5–14.5)
EST. GFR  (AFRICAN AMERICAN): 20 ML/MIN/1.73 M^2
EST. GFR  (NON AFRICAN AMERICAN): 17 ML/MIN/1.73 M^2
GLUCOSE SERPL-MCNC: 87 MG/DL (ref 70–110)
HCT VFR BLD AUTO: 27.5 % (ref 37–48.5)
HGB BLD-MCNC: 9.6 G/DL (ref 12–16)
IMM GRANULOCYTES # BLD AUTO: 0.02 K/UL (ref 0–0.04)
IMM GRANULOCYTES NFR BLD AUTO: 0.6 % (ref 0–0.5)
LYMPHOCYTES # BLD AUTO: 0.8 K/UL (ref 1–4.8)
LYMPHOCYTES NFR BLD: 22.7 % (ref 18–48)
MCH RBC QN AUTO: 30.5 PG (ref 27–31)
MCHC RBC AUTO-ENTMCNC: 34.9 G/DL (ref 32–36)
MCV RBC AUTO: 87 FL (ref 82–98)
MONOCYTES # BLD AUTO: 0.4 K/UL (ref 0.3–1)
MONOCYTES NFR BLD: 9.8 % (ref 4–15)
NEUTROPHILS # BLD AUTO: 2.3 K/UL (ref 1.8–7.7)
NEUTROPHILS NFR BLD: 62.9 % (ref 38–73)
NRBC BLD-RTO: 0 /100 WBC
PLATELET # BLD AUTO: 92 K/UL (ref 150–450)
PMV BLD AUTO: 9.5 FL (ref 9.2–12.9)
POTASSIUM SERPL-SCNC: 3.1 MMOL/L (ref 3.5–5.1)
RBC # BLD AUTO: 3.15 M/UL (ref 4–5.4)
SODIUM SERPL-SCNC: 141 MMOL/L (ref 136–145)
WBC # BLD AUTO: 3.57 K/UL (ref 3.9–12.7)

## 2022-07-28 PROCEDURE — 63600175 PHARM REV CODE 636 W HCPCS: Performed by: INTERNAL MEDICINE

## 2022-07-28 PROCEDURE — 99233 SBSQ HOSP IP/OBS HIGH 50: CPT | Mod: ,,, | Performed by: NURSE PRACTITIONER

## 2022-07-28 PROCEDURE — 63600175 PHARM REV CODE 636 W HCPCS: Performed by: HOSPITALIST

## 2022-07-28 PROCEDURE — 99233 PR SUBSEQUENT HOSPITAL CARE,LEVL III: ICD-10-PCS | Mod: ,,, | Performed by: NURSE PRACTITIONER

## 2022-07-28 PROCEDURE — 25000003 PHARM REV CODE 250: Performed by: INTERNAL MEDICINE

## 2022-07-28 PROCEDURE — 25000003 PHARM REV CODE 250: Performed by: HOSPITALIST

## 2022-07-28 PROCEDURE — 85025 COMPLETE CBC W/AUTO DIFF WBC: CPT | Performed by: HOSPITALIST

## 2022-07-28 PROCEDURE — 36415 COLL VENOUS BLD VENIPUNCTURE: CPT | Performed by: INTERNAL MEDICINE

## 2022-07-28 PROCEDURE — 11000001 HC ACUTE MED/SURG PRIVATE ROOM

## 2022-07-28 PROCEDURE — 80048 BASIC METABOLIC PNL TOTAL CA: CPT | Performed by: INTERNAL MEDICINE

## 2022-07-28 RX ORDER — POTASSIUM CHLORIDE 20 MEQ/1
40 TABLET, EXTENDED RELEASE ORAL ONCE
Status: COMPLETED | OUTPATIENT
Start: 2022-07-28 | End: 2022-07-28

## 2022-07-28 RX ORDER — OXYCODONE AND ACETAMINOPHEN 5; 325 MG/1; MG/1
1 TABLET ORAL EVERY 4 HOURS PRN
Status: DISCONTINUED | OUTPATIENT
Start: 2022-07-28 | End: 2022-07-29

## 2022-07-28 RX ORDER — OXYCODONE AND ACETAMINOPHEN 10; 325 MG/1; MG/1
1 TABLET ORAL EVERY 4 HOURS PRN
Status: DISCONTINUED | OUTPATIENT
Start: 2022-07-28 | End: 2022-07-29

## 2022-07-28 RX ORDER — MUPIROCIN 20 MG/G
OINTMENT TOPICAL 2 TIMES DAILY
Status: DISCONTINUED | OUTPATIENT
Start: 2022-07-28 | End: 2022-07-30 | Stop reason: HOSPADM

## 2022-07-28 RX ORDER — SODIUM CHLORIDE 9 MG/ML
INJECTION, SOLUTION INTRAVENOUS CONTINUOUS
Status: DISCONTINUED | OUTPATIENT
Start: 2022-07-28 | End: 2022-07-29

## 2022-07-28 RX ADMIN — CETIRIZINE HYDROCHLORIDE 5 MG: 5 TABLET ORAL at 08:07

## 2022-07-28 RX ADMIN — SODIUM CHLORIDE: 0.9 INJECTION, SOLUTION INTRAVENOUS at 01:07

## 2022-07-28 RX ADMIN — PIPERACILLIN AND TAZOBACTAM 4.5 G: 4; .5 INJECTION, POWDER, FOR SOLUTION INTRAVENOUS at 05:07

## 2022-07-28 RX ADMIN — ATORVASTATIN CALCIUM 40 MG: 40 TABLET, FILM COATED ORAL at 08:07

## 2022-07-28 RX ADMIN — POTASSIUM CHLORIDE 40 MEQ: 1500 TABLET, EXTENDED RELEASE ORAL at 08:07

## 2022-07-28 RX ADMIN — CALCIUM CARBONATE 1000 MG: 1250 SUSPENSION ORAL at 08:07

## 2022-07-28 RX ADMIN — BUTALBITAL, ACETAMINOPHEN AND CAFFEINE 1 TABLET: 50; 325; 40 TABLET ORAL at 05:07

## 2022-07-28 RX ADMIN — FLUTICASONE PROPIONATE 100 MCG: 50 SPRAY, METERED NASAL at 08:07

## 2022-07-28 RX ADMIN — BUTALBITAL, ACETAMINOPHEN AND CAFFEINE 1 TABLET: 50; 325; 40 TABLET ORAL at 08:07

## 2022-07-28 RX ADMIN — POTASSIUM BICARBONATE 25 MEQ: 977.5 TABLET, EFFERVESCENT ORAL at 05:07

## 2022-07-28 RX ADMIN — AMLODIPINE BESYLATE 10 MG: 5 TABLET ORAL at 08:07

## 2022-07-28 RX ADMIN — CALCIUM CARBONATE 1000 MG: 1250 SUSPENSION ORAL at 05:07

## 2022-07-28 RX ADMIN — CALCIUM CARBONATE 1000 MG: 1250 SUSPENSION ORAL at 12:07

## 2022-07-28 RX ADMIN — ENOXAPARIN SODIUM 30 MG: 30 INJECTION SUBCUTANEOUS at 05:07

## 2022-07-28 RX ADMIN — ONDANSETRON 8 MG: 2 INJECTION INTRAMUSCULAR; INTRAVENOUS at 04:07

## 2022-07-28 RX ADMIN — MUPIROCIN: 20 OINTMENT TOPICAL at 08:07

## 2022-07-28 RX ADMIN — SODIUM CHLORIDE: 0.45 INJECTION, SOLUTION INTRAVENOUS at 12:07

## 2022-07-28 NOTE — PLAN OF CARE
Problem: Adult Inpatient Plan of Care  Goal: Plan of Care Review  Outcome: Ongoing, Progressing  Goal: Patient-Specific Goal (Individualized)  Outcome: Ongoing, Progressing  Goal: Absence of Hospital-Acquired Illness or Injury  Outcome: Ongoing, Progressing     Problem: Fluid and Electrolyte Imbalance (Acute Kidney Injury/Impairment)  Goal: Fluid and Electrolyte Balance  Outcome: Ongoing, Progressing  Intervention: Monitor and Manage Fluid and Electrolyte Balance  Flowsheets (Taken 7/28/2022 0321)  Fluid/Electrolyte Management:   fluids provided   intravenous fluid replacement initiated     Problem: Renal Function Impairment (Acute Kidney Injury/Impairment)  Goal: Effective Renal Function  Outcome: Ongoing, Progressing     Problem: Nausea and Vomiting (Intestinal Obstruction)  Goal: Nausea and Vomiting Relief  Outcome: Ongoing, Progressing     Problem: Pain (Intestinal Obstruction)  Goal: Acceptable Pain Control  Outcome: Ongoing, Progressing

## 2022-07-28 NOTE — NURSING
pt awake, alert, and oriented x4. able to make needs known.   balderrama intact/patent draining tea colored urine. fluids and ABX   cont. bed alarm activated for pt safety. call light within reach.   hourly rounds made. will continue to monitor.  no c/o pain/sob/distress at this time

## 2022-07-28 NOTE — PROGRESS NOTES
[facility] Progress Note        CC: abdominal pain    HPI 52 y.o. female consulted for diverticulitis.  Went on Keto diet earlier this month and eating a lot of extra okra and then   Got diverticulitis 7/8. Treated with cipro/flagyl with improvement but then had another flareup of pain with headaches and came back. CT shows no resolution.     Last colon 2018 did have diverticula in sigmoid.  Has seen Dr evans in past    Pain is improved from yesterday. Nausea is improving. No recent vomiting.      Past Medical History  Past Medical History:   Diagnosis Date    Bile reflux gastritis     Breast cyst     Eczema     Fibrocystic breast     Fibromyalgia     Gastroparesis     dr. harden    GERD (gastroesophageal reflux disease)     Hyperglyceridemia     Hyperlipidemia     Hypertension     IC (interstitial cystitis)     dr. labadie    Psoriasis     Tension headache        Review of Systems   Constitutional: No fevers, chills, No weight loss  ENT: No allergies  CV: No chest pain  Pulm: No cough, No shortness of breath  Ophtho: No vision changes  GI: see HPI  Derm: No rash  Heme: No lymphadenopathy, No bruising  MSK: No arthritis  : No dysuria, No hematuria  Endo: No hot or cold intolerance  Neuro: No syncope, No seizure  Psych: No anxiety, No depression      Physical Examination    Temp:  [97.7 °F (36.5 °C)-99 °F (37.2 °C)]   Pulse:  [72-82]   Resp:  [16-18]   BP: (128-151)/(58-67)   SpO2:  [93 %-96 %]     Physical Exam   General Appearance: Well appearing in no acute distress  Head:   Normocephalic, without obvious abnormality  Eyes:    No scleral icterus, EOMI  ENT: Neck supple, Lips, mucosa, and tongue normal; teeth and gums normal  Lungs: CTA bilaterally in anterior and posterior fields, no wheezes, no crackles.  Heart:  Regular rate and rhythm, S1, S2 normal, no murmurs heard  Abdomen: Soft, mild tenderness to deep palpation, non distended with positive bowel sounds in all four quadrants.  No hepatosplenomegaly, ascites, or mass  Extremities: 2+ pulses, no clubbing, cyanosis or edema  Skin: No rash  Neurologic: CN II-XII intact       Labs:  Lab Results   Component Value Date    WBC 3.57 (L) 07/28/2022    HGB 9.6 (L) 07/28/2022    HCT 27.5 (L) 07/28/2022    MCV 87 07/28/2022    PLT 92 (L) 07/28/2022         CMP  Sodium   Date Value Ref Range Status   07/28/2022 141 136 - 145 mmol/L Final     Potassium   Date Value Ref Range Status   07/28/2022 3.1 (L) 3.5 - 5.1 mmol/L Final     Chloride   Date Value Ref Range Status   07/28/2022 106 95 - 110 mmol/L Final     CO2   Date Value Ref Range Status   07/28/2022 22 (L) 23 - 29 mmol/L Final     Glucose   Date Value Ref Range Status   07/28/2022 87 70 - 110 mg/dL Final     BUN   Date Value Ref Range Status   07/28/2022 24 (H) 6 - 20 mg/dL Final     Creatinine   Date Value Ref Range Status   07/28/2022 3.0 (H) 0.5 - 1.4 mg/dL Final     Calcium   Date Value Ref Range Status   07/28/2022 8.1 (L) 8.7 - 10.5 mg/dL Final     Total Protein   Date Value Ref Range Status   07/24/2022 7.4 6.0 - 8.4 g/dL Final     Albumin   Date Value Ref Range Status   07/24/2022 3.8 3.5 - 5.2 g/dL Final     Total Bilirubin   Date Value Ref Range Status   07/24/2022 0.9 0.1 - 1.0 mg/dL Final     Comment:     For infants and newborns, interpretation of results should be based  on gestational age, weight and in agreement with clinical  observations.    Premature Infant recommended reference ranges:  Up to 24 hours.............<8.0 mg/dL  Up to 48 hours............<12.0 mg/dL  3-5 days..................<15.0 mg/dL  6-29 days.................<15.0 mg/dL       Alkaline Phosphatase   Date Value Ref Range Status   07/24/2022 52 (L) 55 - 135 U/L Final     AST   Date Value Ref Range Status   07/24/2022 23 10 - 40 U/L Final     ALT   Date Value Ref Range Status   07/24/2022 24 10 - 44 U/L Final     Anion Gap   Date Value Ref Range Status   07/28/2022 13 8 - 16 mmol/L Final     eGFR if African  American   Date Value Ref Range Status   07/28/2022 20 (A) >60 mL/min/1.73 m^2 Final     eGFR if non    Date Value Ref Range Status   07/28/2022 17 (A) >60 mL/min/1.73 m^2 Final     Comment:     Calculation used to obtain the estimated glomerular filtration  rate (eGFR) is the CKD-EPI equation.          Imaging:    I have personally reviewed and interpreted these images.        Assessment:   Patient is a .52 y.o. y/o .female with  has a past medical history of Bile reflux gastritis, Breast cyst, Eczema, Fibrocystic breast, Fibromyalgia, Gastroparesis, GERD (gastroesophageal reflux disease), Hyperglyceridemia, Hyperlipidemia, Hypertension, IC (interstitial cystitis), Psoriasis, and Tension headache.  Consulted to the GI service for diverticulitis.    Plan:  1.Transition to PO Augmentin 250 mg every 12hrs for total of 14 days  2. Monitor renal function  3. Consider advancing to low residue today  4. F/u with Dr Breen in GI clinic. Will need outpatient colonoscopy > 4 weeks    -Will continue to follow    Thank you so much for allowing us to participate in the care of Giuliana Rodas. Please contact us if you have any additional questions.    Melissa Gleason NP  Gastroenterology  [facility]

## 2022-07-28 NOTE — ASSESSMENT & PLAN NOTE
Presented with CORNELIO. Cr on admit 1.7, baseline normal. Worsened to 3.9, now improving   - suspect prolonged prerenal state + post renal  - FENa 2%- intrinsic  - UA with RBC, no eos  - CT on admit with no mass/hydronephrosis  - Nephrology consulted  - on IVF while appetite is poor  - with urinary retention. Stanford placed 7/26- voiding trial prior to DC  - BMP in AM

## 2022-07-28 NOTE — SUBJECTIVE & OBJECTIVE
Interval History: Abdominal pain better. Poor appetite. Drinking liquids. Still having diarrhea but decreased frequency.     Review of Systems   Constitutional:  Positive for appetite change. Negative for chills and fever.   Respiratory:  Negative for shortness of breath.    Cardiovascular:  Negative for chest pain and leg swelling.   Gastrointestinal:  Positive for abdominal distention, abdominal pain, diarrhea and nausea. Negative for constipation and vomiting.   Musculoskeletal:  Negative for arthralgias.   Neurological:  Positive for weakness. Negative for numbness and headaches.   Psychiatric/Behavioral:  Negative for confusion.    Objective:     Vital Signs (Most Recent):  Temp: 98.7 °F (37.1 °C) (07/28/22 1126)  Pulse: 77 (07/28/22 1126)  Resp: 16 (07/28/22 1126)  BP: 139/70 (07/28/22 1126)  SpO2: (!) 93 % (07/28/22 1126)   Vital Signs (24h Range):  Temp:  [97.7 °F (36.5 °C)-99 °F (37.2 °C)] 98.7 °F (37.1 °C)  Pulse:  [72-82] 77  Resp:  [16-17] 16  SpO2:  [93 %-96 %] 93 %  BP: (128-151)/(58-70) 139/70     Weight: 83.4 kg (183 lb 13.8 oz)  Body mass index is 33.63 kg/m².    Intake/Output Summary (Last 24 hours) at 7/28/2022 1226  Last data filed at 7/28/2022 0935  Gross per 24 hour   Intake 3289.49 ml   Output 2300 ml   Net 989.49 ml        Physical Exam  Vitals and nursing note reviewed.   Constitutional:       General: She is not in acute distress.     Appearance: She is not ill-appearing or toxic-appearing.   HENT:      Head: Normocephalic and atraumatic.      Nose: Nose normal.      Mouth/Throat:      Mouth: Mucous membranes are moist.   Cardiovascular:      Rate and Rhythm: Normal rate and regular rhythm.      Pulses: Normal pulses.      Heart sounds: Normal heart sounds. No murmur heard.    No gallop.   Pulmonary:      Effort: Pulmonary effort is normal. No respiratory distress.      Breath sounds: No wheezing or rales.      Comments: Room air  Abdominal:      General: Bowel sounds are normal. There is  no distension.      Palpations: There is no mass.      Tenderness: There is no abdominal tenderness. There is no guarding or rebound.   Genitourinary:     Comments: balderrama  Musculoskeletal:      Right lower leg: No edema.      Left lower leg: No edema.   Skin:     General: Skin is warm and dry.   Neurological:      Mental Status: She is alert and oriented to person, place, and time.       Significant Labs: All pertinent labs within the past 24 hours have been reviewed.    Significant Imaging: I have reviewed all pertinent imaging results/findings within the past 24 hours.

## 2022-07-28 NOTE — ASSESSMENT & PLAN NOTE
Not improved on cipro/flagyl x2.   - changed to zosyn on 7/26. Improving symptoms. Will plan augmentin at discharge per GI recs  - CT on admit with no abscess or perforation. Unable to repeat with contrast due to CORNELIO  - continue IVF per Nephro  - PRN nausea Rx  - PRN opioids- DC dilaudid, wean percocet  - GI consulted, appreciate recs. Needs outpatient follow up for cscope  - continue liquid diet for now, advance to low residue tomorrow

## 2022-07-28 NOTE — PLAN OF CARE
Problem: Adult Inpatient Plan of Care  Goal: Plan of Care Review  Outcome: Ongoing, Not Progressing  Goal: Patient-Specific Goal (Individualized)  Outcome: Ongoing, Not Progressing  Goal: Absence of Hospital-Acquired Illness or Injury  Outcome: Ongoing, Not Progressing  Goal: Optimal Comfort and Wellbeing  Outcome: Ongoing, Not Progressing  Goal: Readiness for Transition of Care  Outcome: Ongoing, Not Progressing     Problem: Fluid and Electrolyte Imbalance (Acute Kidney Injury/Impairment)  Goal: Fluid and Electrolyte Balance  Outcome: Ongoing, Not Progressing     Problem: Oral Intake Inadequate (Acute Kidney Injury/Impairment)  Goal: Optimal Nutrition Intake  Outcome: Ongoing, Not Progressing     Problem: Renal Function Impairment (Acute Kidney Injury/Impairment)  Goal: Effective Renal Function  Outcome: Ongoing, Not Progressing     Problem: Infection (Intestinal Obstruction)  Goal: Absence of Infection Signs and Symptoms  Outcome: Ongoing, Not Progressing     Problem: Nausea and Vomiting (Intestinal Obstruction)  Goal: Nausea and Vomiting Relief  Outcome: Ongoing, Not Progressing     Problem: Pain (Intestinal Obstruction)  Goal: Acceptable Pain Control  Outcome: Ongoing, Not Progressing     Problem: Infection  Goal: Absence of Infection Signs and Symptoms  Outcome: Ongoing, Not Progressing

## 2022-07-28 NOTE — PROGRESS NOTES
[facility] Medicine  Progress Note    Patient Name: Giuliana Rodas  MRN: [id number]  Patient Class: IP- Inpatient   Admission Date: 7/24/2022  Length of Stay: 4 days  Attending Physician: Maria A Obrien MD  Primary Care Provider: Giuliana Rojas MD        Subjective:     Principal Problem:Acute diverticulitis        HPI:  Mrs. Rodas is a 53 yo F who presents with a CC of LLQ abdominal pain since yesterday.  She was seen on 7/8/22 for diverticulitis and sent home on Cipro and Flagyl. She returns now again with the same complaint as well as fever. She was found to have diverticulitis of the LLQ about the same from 7/8/22.      Overview/Hospital Course:  Ms Giuliana Rodas was admitted with acute diverticulitis. She did not improve with PO cipro/flagyl. Upon admit, cipro flagyl were resumed IV along with IVF. Her pain had not improved, and she has developed worsening renal failure. Transitioned to zosyn. Nephrology and GI consulted. Abdominal pain now improving, diarrhea decreased frequency, nausea improved, but still poor appetite. Renal function improving.       Interval History: Abdominal pain better. Poor appetite. Drinking liquids. Still having diarrhea but decreased frequency.     Review of Systems   Constitutional:  Positive for appetite change. Negative for chills and fever.   Respiratory:  Negative for shortness of breath.    Cardiovascular:  Negative for chest pain and leg swelling.   Gastrointestinal:  Positive for abdominal distention, abdominal pain, diarrhea and nausea. Negative for constipation and vomiting.   Musculoskeletal:  Negative for arthralgias.   Neurological:  Positive for weakness. Negative for numbness and headaches.   Psychiatric/Behavioral:  Negative for confusion.    Objective:     Vital Signs (Most Recent):  Temp: 98.7 °F (37.1 °C) (07/28/22 1126)  Pulse: 77 (07/28/22 1126)  Resp: 16 (07/28/22 1126)  BP: 139/70 (07/28/22 1126)  SpO2: (!) 93 % (07/28/22 1126)    Vital Signs (24h Range):  Temp:  [97.7 °F (36.5 °C)-99 °F (37.2 °C)] 98.7 °F (37.1 °C)  Pulse:  [72-82] 77  Resp:  [16-17] 16  SpO2:  [93 %-96 %] 93 %  BP: (128-151)/(58-70) 139/70     Weight: 83.4 kg (183 lb 13.8 oz)  Body mass index is 33.63 kg/m².    Intake/Output Summary (Last 24 hours) at 7/28/2022 1226  Last data filed at 7/28/2022 0935  Gross per 24 hour   Intake 3289.49 ml   Output 2300 ml   Net 989.49 ml        Physical Exam  Vitals and nursing note reviewed.   Constitutional:       General: She is not in acute distress.     Appearance: She is not ill-appearing or toxic-appearing.   HENT:      Head: Normocephalic and atraumatic.      Nose: Nose normal.      Mouth/Throat:      Mouth: Mucous membranes are moist.   Cardiovascular:      Rate and Rhythm: Normal rate and regular rhythm.      Pulses: Normal pulses.      Heart sounds: Normal heart sounds. No murmur heard.    No gallop.   Pulmonary:      Effort: Pulmonary effort is normal. No respiratory distress.      Breath sounds: No wheezing or rales.      Comments: Room air  Abdominal:      General: Bowel sounds are normal. There is no distension.      Palpations: There is no mass.      Tenderness: There is no abdominal tenderness. There is no guarding or rebound.   Genitourinary:     Comments: balderrama  Musculoskeletal:      Right lower leg: No edema.      Left lower leg: No edema.   Skin:     General: Skin is warm and dry.   Neurological:      Mental Status: She is alert and oriented to person, place, and time.       Significant Labs: All pertinent labs within the past 24 hours have been reviewed.    Significant Imaging: I have reviewed all pertinent imaging results/findings within the past 24 hours.      Assessment/Plan:      * Acute diverticulitis  Not improved on cipro/flagyl x2.   - changed to zosyn on 7/26. Improving symptoms. Will plan augmentin at discharge per GI recs  - CT on admit with no abscess or perforation. Unable to repeat with contrast due to  CORNELIO  - continue IVF per Nephro  - PRN nausea Rx  - PRN opioids- DC dilaudid, wean percocet  - GI consulted, appreciate recs. Needs outpatient follow up for cscope  - continue liquid diet for now, advance to low residue tomorrow       Hypokalemia  Replace and monitor      Urinary retention  Noted 7/26. Stanford placed  - voiding trial tomorrow  - no UTI      Class 1 obesity in adult  Body mass index is 33.63 kg/m². Morbid obesity complicates all aspects of disease management from diagnostic modalities to treatment. Weight loss encouraged and health benefits explained to patient.         Thrombocytopenia  Noted on admit. Worsening to 81, now improving     CORNELIO (acute kidney injury)  Presented with CORNELIO. Cr on admit 1.7, baseline normal. Worsened to 3.9, now improving   - suspect prolonged prerenal state + post renal  - FENa 2%- intrinsic  - UA with RBC, no eos  - CT on admit with no mass/hydronephrosis  - Nephrology consulted  - on IVF while appetite is poor  - with urinary retention. Stanford placed 7/26- voiding trial prior to DC  - BMP in AM    Anemia  Normocytic anemia, Hgb 13 on admit decreased with no signs of bleeding        Hyperlipidemia  Continue statin    GERD (gastroesophageal reflux disease)  No acute issues       Hypertension  Continue amlodipine         VTE Risk Mitigation (From admission, onward)         Ordered     enoxaparin injection 30 mg  Daily         07/25/22 0855                Discharge Planning   CARIE:      Code Status: Full Code   Is the patient medically ready for discharge?:     Reason for patient still in hospital (select all that apply): Patient trending condition  Discharge Plan A: Home with family                  Maria A Obrien MD  Department of Hospital Medicine   [facility] [place] - Med Surg

## 2022-07-28 NOTE — PROGRESS NOTES
Renal Progress Note    Date of Admission:  7/24/2022  2:45 AM    Length of Stay: 4  Days    Subjective: abd. Pain better, tolerating clear liquids    Objective:    Current Facility-Administered Medications   Medication    0.45% NaCl infusion    acetaminophen tablet 650 mg    amLODIPine tablet 10 mg    atorvastatin tablet 40 mg    butalbital-acetaminophen-caffeine -40 mg per tablet 1 tablet    calcium carbonate 500 mg/5 mL (1,250 mg/5 mL) suspension 1,000 mg    cetirizine tablet 5 mg    enoxaparin injection 30 mg    fluticasone propionate 50 mcg/actuation nasal spray 100 mcg    HYDROmorphone (PF) injection 0.5 mg    methocarbamoL tablet 500 mg    ondansetron injection 8 mg    oxyCODONE-acetaminophen  mg per tablet 1 tablet    piperacillin-tazobactam 4.5 g in dextrose 5 % 100 mL IVPB (ready to mix system)    promethazine (PHENERGAN) 25 mg in dextrose 5 % 50 mL IVPB       Vitals:    07/27/22 1631 07/27/22 1915 07/27/22 2325 07/28/22 0356   BP: (!) 129/59 (!) 151/67 (!) 128/58 135/61   BP Location:  Right arm Right arm Right arm   Patient Position: Lying Lying Lying Lying   Pulse: 72 82 75 82   Resp: 16 17 17 17   Temp: 97.7 °F (36.5 °C) 98.5 °F (36.9 °C) 98.8 °F (37.1 °C) 98.5 °F (36.9 °C)   TempSrc: Oral Oral Oral Oral   SpO2: 96% 96% (!) 93% 96%   Weight:       Height:           I/O last 3 completed shifts:  In: 5127.1 [P.O.:1320; I.V.:3486.2; IV Piggyback:320.9]  Out: 3450 [Urine:3450]  I/O this shift:  In: -   Out: 900 [Urine:900]      Physical Exam:     General: NAD  Neck: supple  Heart: RRR  Lungs: unlabored breathing  Abdomen: n/a Stanford to gravity urine clearing  Limbs: n/a  Neurologic: AAO x 3       Laboratories:    Recent Labs   Lab 07/28/22  0411   WBC 3.57*   RBC 3.15*   HGB 9.6*   HCT 27.5*   PLT 92*   MCV 87   MCH 30.5   MCHC 34.9       No results for input(s): GLUCOSE, CALCIUM, PROT, NA, K, CO2, CL, BUN, CREATININE, ALKPHOS, ALT, AST, BILITOT in the last  24 hours.    Invalid input(s):  ALBUMIN    No results for input(s): COLORU, CLARITYU, SPECGRAV, PHUR, PROTEINUA, GLUCOSEU, BLOODU, WBCU, RBCU, BACTERIA, MUCUS in the last 24 hours.    Invalid input(s):  BILIRUBINCON    Microbiology Results (last 7 days)     Procedure Component Value Units Date/Time    Blood culture [[id number]] Collected: 07/24/22 0549    Order Status: Completed Specimen: Blood from Peripheral, Hand, Right Updated: 07/27/22 0703     Blood Culture, Routine No Growth to date      No Growth to date      No Growth to date      No Growth to date    Blood culture [[id number]] Collected: 07/24/22 0557    Order Status: Completed Specimen: Blood from Peripheral, Hand, Left Updated: 07/27/22 0703     Blood Culture, Routine No Growth to date      No Growth to date      No Growth to date      No Growth to date            Diagnostic Tests:     CT Abdomen:    - Kidneys: No solid mass or hydronephrosis.  Bilateral renal cysts.  Bilateral extrarenal pelvis ease.     - Adrenals: Unremarkable.     Diverticulosis.  At the level LEFT lower quadrant, iliac crest level, extending for approximately 4 cm, associated with diverticulosis is pericolonic inflammation, level of the junction of descending colon and sigmoid colon most consistent with diverticulitis.  No evidence for diverticular abscess.  Trace fluid along the mesenteric margin associated.     Bones:  No acute osseous abnormality and no suspicious lytic or blastic lesion.    Impression:       Persistent acute uncomplicated LEFT lower quadrant diverticulitis without significant detrimental change from 07/08/2022 no evidence for abscess.       Electronically signed by: Basilio Lerner MD   Date: 07/24/2022   Time: 05:58           Assessment:    51 y/o female admitted with:    - LLQ diverticulitis  - CORNELIO with Urinary retention   - NAGMA RESOLVED  - Mild hypoK+  - Hypocalcemia improving  - Stanford related hematuria        Plan:     - Stanford to gravity  - IV Antib. Per  admitting  - IVFs   - Replace K+ orally  - Replacing Ca++ orally  - GI and Surgery following  - Will follow Nhc-[id number]-rtsow-acid/base and UO  - Other problems per admitting

## 2022-07-28 NOTE — CONSULTS
Food & Nutrition  Education    Diet Education: Diverticulitis  Time Spent: 15 Minutes  Learners: Patient      Nutrition Education provided with handouts:   IBD/UC, Low Fiber, High Fiber Nutrition Therapy  + Clinical Reference attachments to d/c documents      Comments: Discussed with patient current symptoms - states still some nausea/abdominal pain/Diarrhea but feeling better - with decreased frequency. Reports that appetite and PO started to decrease approximately 4 weeks ago when diarrhea started (every time she urinated she would have diarrhea). Stated increase in symptoms and pain prompted pending ED visit ~ 2-3 days before admit.     Discussed difference between active flare and inactive condition Diverticulitis/osis. Edu her that During non-inflamed (flare) state diet goal it is higher fiber via increasing vegetable, fruits, whole grains and having a balance of soluble and insoluable fiber to mitigate chances of constipation as pt has hx of gastroparesis. Edu her that as she increases fiber intake ensure to increase hydration as well to assist in avoiding constipation. Pt was concerned about Nuts/seeds/berries etc. Advised her that previous school of thought was to avoid these items - no longer the case in times of remission.    Covered diet when noticing symptoms of active flare - Edu to allow for bowel rest such as what team is doing currently - clear liquids, foods that are low fiber an bland to avoid further inflammation. As symptoms subside slowly advancing back to full solids and then increasing fiber intake again slowly.     Provided handouts and pt verbalized understanding.    All questions and concerns answered. Dietitian's contact information provided.   Please Re-consult as needed  Thanks!

## 2022-07-29 LAB
ANION GAP SERPL CALC-SCNC: 9 MMOL/L (ref 8–16)
BUN SERPL-MCNC: 18 MG/DL (ref 6–20)
CALCIUM SERPL-MCNC: 8.3 MG/DL (ref 8.7–10.5)
CHLORIDE SERPL-SCNC: 110 MMOL/L (ref 95–110)
CO2 SERPL-SCNC: 25 MMOL/L (ref 23–29)
CREAT SERPL-MCNC: 2.5 MG/DL (ref 0.5–1.4)
EST. GFR  (AFRICAN AMERICAN): 24 ML/MIN/1.73 M^2
EST. GFR  (NON AFRICAN AMERICAN): 21 ML/MIN/1.73 M^2
GLUCOSE SERPL-MCNC: 85 MG/DL (ref 70–110)
POTASSIUM SERPL-SCNC: 3.3 MMOL/L (ref 3.5–5.1)
SODIUM SERPL-SCNC: 144 MMOL/L (ref 136–145)

## 2022-07-29 PROCEDURE — 25000003 PHARM REV CODE 250: Performed by: INTERNAL MEDICINE

## 2022-07-29 PROCEDURE — 25000003 PHARM REV CODE 250: Performed by: HOSPITALIST

## 2022-07-29 PROCEDURE — 99233 SBSQ HOSP IP/OBS HIGH 50: CPT | Mod: ,,, | Performed by: NURSE PRACTITIONER

## 2022-07-29 PROCEDURE — 63600175 PHARM REV CODE 636 W HCPCS: Performed by: HOSPITALIST

## 2022-07-29 PROCEDURE — 36415 COLL VENOUS BLD VENIPUNCTURE: CPT | Performed by: INTERNAL MEDICINE

## 2022-07-29 PROCEDURE — 63600175 PHARM REV CODE 636 W HCPCS: Performed by: INTERNAL MEDICINE

## 2022-07-29 PROCEDURE — 80048 BASIC METABOLIC PNL TOTAL CA: CPT | Performed by: INTERNAL MEDICINE

## 2022-07-29 PROCEDURE — 99233 PR SUBSEQUENT HOSPITAL CARE,LEVL III: ICD-10-PCS | Mod: ,,, | Performed by: NURSE PRACTITIONER

## 2022-07-29 PROCEDURE — 11000001 HC ACUTE MED/SURG PRIVATE ROOM

## 2022-07-29 RX ORDER — CALCIUM CARBONATE 1250 MG/5ML
1000 SUSPENSION ORAL
Status: DISCONTINUED | OUTPATIENT
Start: 2022-07-29 | End: 2022-07-30 | Stop reason: HOSPADM

## 2022-07-29 RX ORDER — ACETAMINOPHEN 325 MG/1
650 TABLET ORAL EVERY 6 HOURS PRN
Status: DISCONTINUED | OUTPATIENT
Start: 2022-07-29 | End: 2022-07-30 | Stop reason: HOSPADM

## 2022-07-29 RX ORDER — POTASSIUM CHLORIDE 20 MEQ/1
40 TABLET, EXTENDED RELEASE ORAL ONCE
Status: COMPLETED | OUTPATIENT
Start: 2022-07-29 | End: 2022-07-29

## 2022-07-29 RX ORDER — HYDROCODONE BITARTRATE AND ACETAMINOPHEN 5; 325 MG/1; MG/1
1 TABLET ORAL EVERY 6 HOURS PRN
Status: DISCONTINUED | OUTPATIENT
Start: 2022-07-29 | End: 2022-07-30 | Stop reason: HOSPADM

## 2022-07-29 RX ADMIN — MUPIROCIN: 20 OINTMENT TOPICAL at 09:07

## 2022-07-29 RX ADMIN — PIPERACILLIN AND TAZOBACTAM 4.5 G: 4; .5 INJECTION, POWDER, FOR SOLUTION INTRAVENOUS at 05:07

## 2022-07-29 RX ADMIN — BUTALBITAL, ACETAMINOPHEN AND CAFFEINE 1 TABLET: 50; 325; 40 TABLET ORAL at 08:07

## 2022-07-29 RX ADMIN — CALCIUM CARBONATE 1000 MG: 1250 SUSPENSION ORAL at 09:07

## 2022-07-29 RX ADMIN — POTASSIUM CHLORIDE 40 MEQ: 1500 TABLET, EXTENDED RELEASE ORAL at 09:07

## 2022-07-29 RX ADMIN — ENOXAPARIN SODIUM 30 MG: 30 INJECTION SUBCUTANEOUS at 06:07

## 2022-07-29 RX ADMIN — AMLODIPINE BESYLATE 10 MG: 5 TABLET ORAL at 09:07

## 2022-07-29 RX ADMIN — CALCIUM CARBONATE 1000 MG: 1250 SUSPENSION ORAL at 06:07

## 2022-07-29 RX ADMIN — PIPERACILLIN AND TAZOBACTAM 4.5 G: 4; .5 INJECTION, POWDER, FOR SOLUTION INTRAVENOUS at 12:07

## 2022-07-29 RX ADMIN — MUPIROCIN: 20 OINTMENT TOPICAL at 08:07

## 2022-07-29 RX ADMIN — CETIRIZINE HYDROCHLORIDE 5 MG: 5 TABLET ORAL at 09:07

## 2022-07-29 RX ADMIN — ATORVASTATIN CALCIUM 40 MG: 40 TABLET, FILM COATED ORAL at 09:07

## 2022-07-29 RX ADMIN — SODIUM CHLORIDE: 0.9 INJECTION, SOLUTION INTRAVENOUS at 12:07

## 2022-07-29 RX ADMIN — PIPERACILLIN AND TAZOBACTAM 4.5 G: 4; .5 INJECTION, POWDER, FOR SOLUTION INTRAVENOUS at 08:07

## 2022-07-29 RX ADMIN — ONDANSETRON 8 MG: 2 INJECTION INTRAMUSCULAR; INTRAVENOUS at 05:07

## 2022-07-29 RX ADMIN — CALCIUM CARBONATE 1000 MG: 1250 SUSPENSION ORAL at 12:07

## 2022-07-29 RX ADMIN — FLUTICASONE PROPIONATE 100 MCG: 50 SPRAY, METERED NASAL at 09:07

## 2022-07-29 NOTE — PROGRESS NOTES
Attempted to schedule follow up appointment with Dr Breen (GI) no availability.  Secure message sent to staff request patient be called with appointment day and time.

## 2022-07-29 NOTE — SUBJECTIVE & OBJECTIVE
Interval History: Seen up walking the room this morning. Tolerating bland diet. Minimal nausea. Abdominal pain improving. Had loose Bmx1 today    Review of Systems   Constitutional:  Negative for appetite change, chills and fever.   Respiratory:  Negative for shortness of breath.    Cardiovascular:  Negative for chest pain and leg swelling.   Gastrointestinal:  Positive for abdominal pain and nausea. Negative for abdominal distention, constipation, diarrhea and vomiting.   Musculoskeletal:  Negative for arthralgias.   Neurological:  Negative for weakness, numbness and headaches.   Psychiatric/Behavioral:  Negative for confusion.    Objective:     Vital Signs (Most Recent):  Temp: 99 °F (37.2 °C) (07/29/22 1140)  Pulse: 71 (07/29/22 1140)  Resp: 16 (07/29/22 1140)  BP: (!) 155/74 (07/29/22 1140)  SpO2: 96 % (07/29/22 1140)   Vital Signs (24h Range):  Temp:  [98.1 °F (36.7 °C)-99.4 °F (37.4 °C)] 99 °F (37.2 °C)  Pulse:  [69-80] 71  Resp:  [16-19] 16  SpO2:  [93 %-96 %] 96 %  BP: (143-162)/(70-76) 155/74     Weight: 83.4 kg (183 lb 13.8 oz)  Body mass index is 33.63 kg/m².    Intake/Output Summary (Last 24 hours) at 7/29/2022 1352  Last data filed at 7/29/2022 1308  Gross per 24 hour   Intake 1303.81 ml   Output 2700 ml   Net -1396.19 ml        Physical Exam  Vitals and nursing note reviewed.   Constitutional:       General: She is not in acute distress.     Appearance: She is not ill-appearing or toxic-appearing.   HENT:      Head: Normocephalic and atraumatic.      Nose: Nose normal.      Mouth/Throat:      Mouth: Mucous membranes are moist.   Cardiovascular:      Rate and Rhythm: Normal rate and regular rhythm.      Pulses: Normal pulses.      Heart sounds: Normal heart sounds. No murmur heard.    No gallop.   Pulmonary:      Effort: Pulmonary effort is normal. No respiratory distress.      Breath sounds: No wheezing or rales.      Comments: Room air  Abdominal:      General: Bowel sounds are normal. There is no  distension.      Palpations: There is no mass.      Tenderness: There is no abdominal tenderness. There is no guarding or rebound.   Musculoskeletal:      Right lower leg: No edema.      Left lower leg: No edema.   Skin:     General: Skin is warm and dry.   Neurological:      Mental Status: She is alert. Mental status is at baseline.       Significant Labs: All pertinent labs within the past 24 hours have been reviewed.    Significant Imaging: I have reviewed all pertinent imaging results/findings within the past 24 hours.

## 2022-07-29 NOTE — ASSESSMENT & PLAN NOTE
Presented with CORNELIO. Cr on admit 1.7, baseline normal. Worsened to 3.9, now improving   - suspect prolonged prerenal state + post renal  - FENa 2%- intrinsic  - UA with RBC, no eos  - CT on admit with no mass/hydronephrosis  - Nephrology consulted  - DC IVF  - with urinary retention. Voiding trial 7/29- urinating normally   - BMP in AM

## 2022-07-29 NOTE — ASSESSMENT & PLAN NOTE
Not improved on cipro/flagyl x2.   - changed to zosyn on 7/26. Improving symptoms. Will plan augmentin at discharge per GI recs  - CT on admit with no abscess or perforation. Unable to repeat with contrast due to CORNELIO  - DC IVF  - PRN nausea Rx  - PRN opioids- wean opioids  - GI consulted, appreciate recs. Needs outpatient follow up for cscope  - bland diet

## 2022-07-29 NOTE — PROGRESS NOTES
[facility]  Progress Note    Patient Name: Giuliana Rodas  MRN: [id number]  Patient Class: IP- Inpatient   Admission Date: 7/24/2022  Length of Stay: 5 days  Attending Physician: Maria A Obrien MD  Primary Care Provider: Giuliana Rojas MD        Subjective:     Principal Problem:Acute diverticulitis        HPI:  Mrs. Rodas is a 51 yo F who presents with a CC of LLQ abdominal pain since yesterday.  She was seen on 7/8/22 for diverticulitis and sent home on Cipro and Flagyl. She returns now again with the same complaint as well as fever. She was found to have diverticulitis of the LLQ about the same from 7/8/22.      Overview/Hospital Course:  Ms Giuliana Rodas was admitted with acute diverticulitis. She did not improve with PO cipro/flagyl. Upon admit, cipro flagyl were resumed IV along with IVF. Her pain had not improved, and she has developed worsening renal failure. Transitioned to zosyn. Nephrology and GI consulted. Abdominal pain improving, diarrhea decreased frequency, nausea improved. Tolerating bland diet.  Renal function improving.       Interval History: Seen up walking the room this morning. Tolerating bland diet. Minimal nausea. Abdominal pain improving. Had loose Bmx1 today    Review of Systems   Constitutional:  Negative for appetite change, chills and fever.   Respiratory:  Negative for shortness of breath.    Cardiovascular:  Negative for chest pain and leg swelling.   Gastrointestinal:  Positive for abdominal pain and nausea. Negative for abdominal distention, constipation, diarrhea and vomiting.   Musculoskeletal:  Negative for arthralgias.   Neurological:  Negative for weakness, numbness and headaches.   Psychiatric/Behavioral:  Negative for confusion.    Objective:     Vital Signs (Most Recent):  Temp: 99 °F (37.2 °C) (07/29/22 1140)  Pulse: 71 (07/29/22 1140)  Resp: 16 (07/29/22 1140)  BP: (!) 155/74 (07/29/22 1140)  SpO2: 96 % (07/29/22 1140)   Vital Signs (24h  Range):  Temp:  [98.1 °F (36.7 °C)-99.4 °F (37.4 °C)] 99 °F (37.2 °C)  Pulse:  [69-80] 71  Resp:  [16-19] 16  SpO2:  [93 %-96 %] 96 %  BP: (143-162)/(70-76) 155/74     Weight: 83.4 kg (183 lb 13.8 oz)  Body mass index is 33.63 kg/m².    Intake/Output Summary (Last 24 hours) at 7/29/2022 1352  Last data filed at 7/29/2022 1308  Gross per 24 hour   Intake 1303.81 ml   Output 2700 ml   Net -1396.19 ml        Physical Exam  Vitals and nursing note reviewed.   Constitutional:       General: She is not in acute distress.     Appearance: She is not ill-appearing or toxic-appearing.   HENT:      Head: Normocephalic and atraumatic.      Nose: Nose normal.      Mouth/Throat:      Mouth: Mucous membranes are moist.   Cardiovascular:      Rate and Rhythm: Normal rate and regular rhythm.      Pulses: Normal pulses.      Heart sounds: Normal heart sounds. No murmur heard.    No gallop.   Pulmonary:      Effort: Pulmonary effort is normal. No respiratory distress.      Breath sounds: No wheezing or rales.      Comments: Room air  Abdominal:      General: Bowel sounds are normal. There is no distension.      Palpations: There is no mass.      Tenderness: There is no abdominal tenderness. There is no guarding or rebound.   Musculoskeletal:      Right lower leg: No edema.      Left lower leg: No edema.   Skin:     General: Skin is warm and dry.   Neurological:      Mental Status: She is alert. Mental status is at baseline.       Significant Labs: All pertinent labs within the past 24 hours have been reviewed.    Significant Imaging: I have reviewed all pertinent imaging results/findings within the past 24 hours.      Assessment/Plan:      * Acute diverticulitis  Not improved on cipro/flagyl x2.   - changed to zosyn on 7/26. Improving symptoms. Will plan augmentin at discharge per GI recs  - CT on admit with no abscess or perforation. Unable to repeat with contrast due to CORNELIO  - DC IVF  - PRN nausea Rx  - PRN opioids- wean opioids  -  GI consulted, appreciate recs. Needs outpatient follow up for cscope  - bland diet      Hypokalemia  Replace and monitor      Urinary retention  Noted 7/26. Stanford placed  - resolved     Class 1 obesity in adult  Body mass index is 33.63 kg/m². Morbid obesity complicates all aspects of disease management from diagnostic modalities to treatment. Weight loss encouraged and health benefits explained to patient.         Thrombocytopenia  Noted on admit. Worsening to 81, now improving     CORNELIO (acute kidney injury)  Presented with CORNELIO. Cr on admit 1.7, baseline normal. Worsened to 3.9, now improving   - suspect prolonged prerenal state + post renal  - FENa 2%- intrinsic  - UA with RBC, no eos  - CT on admit with no mass/hydronephrosis  - Nephrology consulted  - DC IVF  - with urinary retention. Voiding trial 7/29- urinating normally   - BMP in AM    Anemia  Normocytic anemia, Hgb 13 on admit decreased with no signs of bleeding        Hyperlipidemia  Continue statin    GERD (gastroesophageal reflux disease)  No acute issues       Hypertension  Continue amlodipine         VTE Risk Mitigation (From admission, onward)         Ordered     enoxaparin injection 30 mg  Daily         07/25/22 0855                Discharge Planning   CARIE:      Code Status: Full Code   Is the patient medically ready for discharge?:     Reason for patient still in hospital (select all that apply): Patient trending condition  Discharge Plan A: Home with family                  Maria A Obrien MD  Department of Hospital Medicine   [facility]

## 2022-07-29 NOTE — NURSING
Ms. Rodas was safe and free of falls during the shift. Cont. Pain meds for headache along with fluids and IV abx.

## 2022-07-29 NOTE — PLAN OF CARE
Problem: Fluid and Electrolyte Imbalance (Acute Kidney Injury/Impairment)  Goal: Fluid and Electrolyte Balance  Outcome: Ongoing, Progressing     Problem: Renal Function Impairment (Acute Kidney Injury/Impairment)  Goal: Effective Renal Function  Outcome: Ongoing, Progressing     Problem: Nausea and Vomiting (Intestinal Obstruction)  Goal: Nausea and Vomiting Relief  Outcome: Ongoing, Progressing     Problem: Pain (Intestinal Obstruction)  Goal: Acceptable Pain Control  Outcome: Ongoing, Progressing

## 2022-07-29 NOTE — PROGRESS NOTES
Renal Progress Note    Date of Admission:  7/24/2022  2:45 AM    Length of Stay: 5  Days    Subjective: Abd. Has not completely subsided, tolerating p.o.    Objective:    Current Facility-Administered Medications   Medication    0.9%  NaCl infusion    acetaminophen tablet 650 mg    amLODIPine tablet 10 mg    atorvastatin tablet 40 mg    butalbital-acetaminophen-caffeine -40 mg per tablet 1 tablet    calcium carbonate 500 mg/5 mL (1,250 mg/5 mL) suspension 1,000 mg    cetirizine tablet 5 mg    enoxaparin injection 30 mg    fluticasone propionate 50 mcg/actuation nasal spray 100 mcg    methocarbamoL tablet 500 mg    mupirocin 2 % ointment    ondansetron injection 8 mg    oxyCODONE-acetaminophen  mg per tablet 1 tablet    oxyCODONE-acetaminophen 5-325 mg per tablet 1 tablet    piperacillin-tazobactam 4.5 g in dextrose 5 % 100 mL IVPB (ready to mix system)    promethazine (PHENERGAN) 25 mg in dextrose 5 % 50 mL IVPB       Vitals:    07/28/22 1631 07/28/22 1932 07/28/22 2339 07/29/22 0516   BP: (!) 154/74 (!) 143/72 (!) 148/70 (!) 162/76   BP Location: Right arm Left arm Right arm Right arm   Patient Position: Lying Lying Lying Lying   Pulse: 80 77 69 77   Resp: 17 18 19 18   Temp: 99.4 °F (37.4 °C) 98.1 °F (36.7 °C) 98.5 °F (36.9 °C) 98.3 °F (36.8 °C)   TempSrc: Oral Oral Oral Oral   SpO2: 95% (!) 94% (!) 94% (!) 93%   Weight:       Height:           I/O last 3 completed shifts:  In: 7007 [P.O.:1320; I.V.:5171.7; IV Piggyback:515.3]  Out: 4350 [Urine:4350]  I/O this shift:  In: 240 [P.O.:240]  Out: 550 [Urine:550]      Physical Exam:     General: NAD  Neck: supple  Heart: RRR  Lungs: unlabored breathing  Abdomen: n/a Stanford to gravity urine clearing  Limbs: n/a  Neurologic: AAO x 3       Laboratories:    No results for input(s): WBC, RBC, HGB, HCT, PLT, MCV, MCH, MCHC in the last 24 hours.    No results for input(s): GLUCOSE, CALCIUM, PROT, NA, K, CO2, CL, BUN,  CREATININE, ALKPHOS, ALT, AST, BILITOT in the last 24 hours.    Invalid input(s):  ALBUMIN    No results for input(s): COLORU, CLARITYU, SPECGRAV, PHUR, PROTEINUA, GLUCOSEU, BLOODU, WBCU, RBCU, BACTERIA, MUCUS in the last 24 hours.    Invalid input(s):  BILIRUBINCON    Microbiology Results (last 7 days)     Procedure Component Value Units Date/Time    Blood culture [[id number]] Collected: 07/24/22 0549    Order Status: Completed Specimen: Blood from Peripheral, Hand, Right Updated: 07/28/22 0703     Blood Culture, Routine No Growth after 4 days.     Blood culture [[id number]] Collected: 07/24/22 0557    Order Status: Completed Specimen: Blood from Peripheral, Hand, Left Updated: 07/28/22 0703     Blood Culture, Routine No Growth after 4 days.             Diagnostic Tests:     CT Abdomen:    - Kidneys: No solid mass or hydronephrosis.  Bilateral renal cysts.  Bilateral extrarenal pelvis ease.     - Adrenals: Unremarkable.     Diverticulosis.  At the level LEFT lower quadrant, iliac crest level, extending for approximately 4 cm, associated with diverticulosis is pericolonic inflammation, level of the junction of descending colon and sigmoid colon most consistent with diverticulitis.  No evidence for diverticular abscess.  Trace fluid along the mesenteric margin associated.     Bones:  No acute osseous abnormality and no suspicious lytic or blastic lesion.    Impression:       Persistent acute uncomplicated LEFT lower quadrant diverticulitis without significant detrimental change from 07/08/2022 no evidence for abscess.       Electronically signed by: Basilio Lerner MD   Date: 07/24/2022   Time: 05:58           Assessment:    53 y/o female admitted with:    - LLQ diverticulitis  - CORNELIO with Urinary retention   - NAGMA RESOLVED  - Mild hypoK+  - Hypocalcemia improving  - Stanford related hematuria        Plan:     - Stanford to gravity  - IV Antib. Per admitting  - IVFs   - Replace K+ orally  - Replacing Ca++ orally  - GI and  Surgery following  - Will follow Bxu-mgctc-ojxgq-acid/base and UO  - Other problems per admitting

## 2022-07-29 NOTE — PROGRESS NOTES
[facility] Progress Note        CC: abdominal pain    HPI 52 y.o. female consulted for diverticulitis.  Went on Keto diet earlier this month and eating a lot of extra okra and then   Got diverticulitis 7/8. Treated with cipro/flagyl with improvement but then had another flareup of pain with headaches and came back. CT shows no resolution.     Last colon 2018 did have diverticula in sigmoid.  Has seen Dr evans in past    Pain is resolved.. Nausea is improving. No recent vomiting. Diet advanced to bland diet.      Past Medical History  Past Medical History:   Diagnosis Date    Bile reflux gastritis     Breast cyst     Eczema     Fibrocystic breast     Fibromyalgia     Gastroparesis     dr. harden    GERD (gastroesophageal reflux disease)     Hyperglyceridemia     Hyperlipidemia     Hypertension     IC (interstitial cystitis)     dr. labadie    Psoriasis     Tension headache        Review of Systems   Constitutional: No fevers, chills, No weight loss  ENT: No allergies  CV: No chest pain  Pulm: No cough, No shortness of breath  Ophtho: No vision changes  GI: see HPI  Derm: No rash  Heme: No lymphadenopathy, No bruising  MSK: No arthritis  : No dysuria, No hematuria  Endo: No hot or cold intolerance  Neuro: No syncope, No seizure  Psych: No anxiety, No depression      Physical Examination    Temp:  [98.1 °F (36.7 °C)-99.4 °F (37.4 °C)]   Pulse:  [69-80]   Resp:  [16-19]   BP: (143-162)/(70-76)   SpO2:  [93 %-95 %]     Physical Exam   General Appearance: Well appearing in no acute distress  Head:   Normocephalic, without obvious abnormality  Eyes:    No scleral icterus, EOMI  ENT: Neck supple, Lips, mucosa, and tongue normal; teeth and gums normal  Lungs: CTA bilaterally in anterior and posterior fields, no wheezes, no crackles.  Heart:  Regular rate and rhythm, S1, S2 normal, no murmurs heard  Abdomen: Soft, mild tenderness to deep palpation, non distended with positive bowel sounds in  all four quadrants. No hepatosplenomegaly, ascites, or mass  Extremities: 2+ pulses, no clubbing, cyanosis or edema  Skin: No rash  Neurologic: CN II-XII intact       Labs:  Lab Results   Component Value Date    WBC 3.57 (L) 07/28/2022    HGB 9.6 (L) 07/28/2022    HCT 27.5 (L) 07/28/2022    MCV 87 07/28/2022    PLT 92 (L) 07/28/2022         CMP  Sodium   Date Value Ref Range Status   07/29/2022 144 136 - 145 mmol/L Final     Potassium   Date Value Ref Range Status   07/29/2022 3.3 (L) 3.5 - 5.1 mmol/L Final     Chloride   Date Value Ref Range Status   07/29/2022 110 95 - 110 mmol/L Final     CO2   Date Value Ref Range Status   07/29/2022 25 23 - 29 mmol/L Final     Glucose   Date Value Ref Range Status   07/29/2022 85 70 - 110 mg/dL Final     BUN   Date Value Ref Range Status   07/29/2022 18 6 - 20 mg/dL Final     Creatinine   Date Value Ref Range Status   07/29/2022 2.5 (H) 0.5 - 1.4 mg/dL Final     Calcium   Date Value Ref Range Status   07/29/2022 8.3 (L) 8.7 - 10.5 mg/dL Final     Total Protein   Date Value Ref Range Status   07/24/2022 7.4 6.0 - 8.4 g/dL Final     Albumin   Date Value Ref Range Status   07/24/2022 3.8 3.5 - 5.2 g/dL Final     Total Bilirubin   Date Value Ref Range Status   07/24/2022 0.9 0.1 - 1.0 mg/dL Final     Comment:     For infants and newborns, interpretation of results should be based  on gestational age, weight and in agreement with clinical  observations.    Premature Infant recommended reference ranges:  Up to 24 hours.............<8.0 mg/dL  Up to 48 hours............<12.0 mg/dL  3-5 days..................<15.0 mg/dL  6-29 days.................<15.0 mg/dL       Alkaline Phosphatase   Date Value Ref Range Status   07/24/2022 52 (L) 55 - 135 U/L Final     AST   Date Value Ref Range Status   07/24/2022 23 10 - 40 U/L Final     ALT   Date Value Ref Range Status   07/24/2022 24 10 - 44 U/L Final     Anion Gap   Date Value Ref Range Status   07/29/2022 9 8 - 16 mmol/L Final     eGFR if     Date Value Ref Range Status   07/29/2022 24 (A) >60 mL/min/1.73 m^2 Final     eGFR if non    Date Value Ref Range Status   07/29/2022 21 (A) >60 mL/min/1.73 m^2 Final     Comment:     Calculation used to obtain the estimated glomerular filtration  rate (eGFR) is the CKD-EPI equation.              Assessment:   Patient is a .52 y.o. y/o .female with  has a past medical history of Bile reflux gastritis, Breast cyst, Eczema, Fibrocystic breast, Fibromyalgia, Gastroparesis, GERD (gastroesophageal reflux disease), Hyperglyceridemia, Hyperlipidemia, Hypertension, IC (interstitial cystitis), Psoriasis, and Tension headache.  Consulted to the GI service for diverticulitis.    Plan:  1.Continue PO Augmentin 250 mg every 12hrs for total of 14 days  2 Renal function improving  3. Consider advanced to bland diet today  4. F/u with Dr Breen in GI clinic. Will need outpatient colonoscopy > 4 weeks    -Will continue to follow    Thank you so much for allowing us to participate in the care of Giuliana Rodas. Please contact us if you have any additional questions.    Melissa Gleason NP  Gastroenterology  [facility] [place] - [facility] S[facility]

## 2022-07-30 VITALS
HEART RATE: 78 BPM | SYSTOLIC BLOOD PRESSURE: 146 MMHG | HEIGHT: 62 IN | DIASTOLIC BLOOD PRESSURE: 78 MMHG | TEMPERATURE: 99 F | RESPIRATION RATE: 19 BRPM | BODY MASS INDEX: 33.84 KG/M2 | WEIGHT: 183.88 LBS | OXYGEN SATURATION: 95 %

## 2022-07-30 LAB
ANION GAP SERPL CALC-SCNC: 11 MMOL/L (ref 8–16)
BUN SERPL-MCNC: 15 MG/DL (ref 6–20)
CALCIUM SERPL-MCNC: 8.8 MG/DL (ref 8.7–10.5)
CHLORIDE SERPL-SCNC: 108 MMOL/L (ref 95–110)
CO2 SERPL-SCNC: 26 MMOL/L (ref 23–29)
CREAT SERPL-MCNC: 2.3 MG/DL (ref 0.5–1.4)
EST. GFR  (AFRICAN AMERICAN): 27 ML/MIN/1.73 M^2
EST. GFR  (NON AFRICAN AMERICAN): 23 ML/MIN/1.73 M^2
GLUCOSE SERPL-MCNC: 92 MG/DL (ref 70–110)
POTASSIUM SERPL-SCNC: 3.3 MMOL/L (ref 3.5–5.1)
SODIUM SERPL-SCNC: 145 MMOL/L (ref 136–145)

## 2022-07-30 PROCEDURE — 80048 BASIC METABOLIC PNL TOTAL CA: CPT | Performed by: INTERNAL MEDICINE

## 2022-07-30 PROCEDURE — 63600175 PHARM REV CODE 636 W HCPCS: Performed by: INTERNAL MEDICINE

## 2022-07-30 PROCEDURE — 63600175 PHARM REV CODE 636 W HCPCS: Performed by: HOSPITALIST

## 2022-07-30 PROCEDURE — 25000003 PHARM REV CODE 250: Performed by: HOSPITALIST

## 2022-07-30 PROCEDURE — 25000003 PHARM REV CODE 250: Performed by: INTERNAL MEDICINE

## 2022-07-30 PROCEDURE — 36415 COLL VENOUS BLD VENIPUNCTURE: CPT | Performed by: INTERNAL MEDICINE

## 2022-07-30 RX ORDER — POTASSIUM CHLORIDE 20 MEQ/1
40 TABLET, EXTENDED RELEASE ORAL ONCE
Status: DISCONTINUED | OUTPATIENT
Start: 2022-07-30 | End: 2022-07-30

## 2022-07-30 RX ORDER — ONDANSETRON 4 MG/1
4 TABLET, FILM COATED ORAL EVERY 8 HOURS PRN
Qty: 12 TABLET | Refills: 0 | Status: SHIPPED | OUTPATIENT
Start: 2022-07-30 | End: 2024-01-04

## 2022-07-30 RX ORDER — AMLODIPINE AND OLMESARTAN MEDOXOMIL 5; 40 MG/1; MG/1
TABLET ORAL
Qty: 90 TABLET | Refills: 3 | Status: SHIPPED | OUTPATIENT
Start: 2022-07-30 | End: 2023-09-26

## 2022-07-30 RX ORDER — POTASSIUM CHLORIDE 20 MEQ/1
40 TABLET, EXTENDED RELEASE ORAL ONCE
Status: COMPLETED | OUTPATIENT
Start: 2022-07-30 | End: 2022-07-30

## 2022-07-30 RX ORDER — HYDROCHLOROTHIAZIDE 12.5 MG/1
TABLET ORAL
Qty: 30 TABLET | Refills: 11 | Status: SHIPPED | OUTPATIENT
Start: 2022-07-30 | End: 2022-12-29

## 2022-07-30 RX ORDER — AMLODIPINE BESYLATE 10 MG/1
10 TABLET ORAL DAILY
Qty: 30 TABLET | Refills: 0 | Status: SHIPPED | OUTPATIENT
Start: 2022-07-30 | End: 2022-08-24

## 2022-07-30 RX ORDER — AMOXICILLIN AND CLAVULANATE POTASSIUM 500; 125 MG/1; MG/1
1 TABLET, FILM COATED ORAL 2 TIMES DAILY
Qty: 14 TABLET | Refills: 0 | Status: SHIPPED | OUTPATIENT
Start: 2022-07-30 | End: 2022-08-06

## 2022-07-30 RX ADMIN — CALCIUM CARBONATE 1000 MG: 1250 SUSPENSION ORAL at 08:07

## 2022-07-30 RX ADMIN — PIPERACILLIN AND TAZOBACTAM 4.5 G: 4; .5 INJECTION, POWDER, FOR SOLUTION INTRAVENOUS at 04:07

## 2022-07-30 RX ADMIN — CETIRIZINE HYDROCHLORIDE 5 MG: 5 TABLET ORAL at 08:07

## 2022-07-30 RX ADMIN — ONDANSETRON 8 MG: 2 INJECTION INTRAMUSCULAR; INTRAVENOUS at 12:07

## 2022-07-30 RX ADMIN — POTASSIUM CHLORIDE 40 MEQ: 1500 TABLET, EXTENDED RELEASE ORAL at 08:07

## 2022-07-30 RX ADMIN — BUTALBITAL, ACETAMINOPHEN AND CAFFEINE 1 TABLET: 50; 325; 40 TABLET ORAL at 04:07

## 2022-07-30 RX ADMIN — MUPIROCIN: 20 OINTMENT TOPICAL at 08:07

## 2022-07-30 RX ADMIN — AMLODIPINE BESYLATE 10 MG: 5 TABLET ORAL at 08:07

## 2022-07-30 RX ADMIN — ATORVASTATIN CALCIUM 40 MG: 40 TABLET, FILM COATED ORAL at 08:07

## 2022-07-30 NOTE — PLAN OF CARE
Problem: Fluid and Electrolyte Imbalance (Acute Kidney Injury/Impairment)  Goal: Fluid and Electrolyte Balance  Outcome: Ongoing, Progressing     Problem: Infection (Intestinal Obstruction)  Goal: Absence of Infection Signs and Symptoms  Outcome: Ongoing, Progressing     Problem: Pain (Intestinal Obstruction)  Goal: Acceptable Pain Control  Outcome: Ongoing, Progressing

## 2022-07-30 NOTE — PLAN OF CARE
[facility] [place] - Med Surg  Discharge Final Note    Primary Care Provider: Giuliana Rojas MD    Expected Discharge Date: 7/30/2022     All Case Management (CM) needs have been addressed; Nurse Annie   informed that patient is cleared from CM standpoint    MD requesting repeat BMP during PCP follow up visit; Epic message sent to Dr Rojas office to inform of need      Final Discharge Note (most recent)     Final Note - 07/30/22 1023        Final Note    Assessment Type Final Discharge Note     Anticipated Discharge Disposition Home or Self Care     What phone number can be called within the next 1-3 days to see how you are doing after discharge? [phone]     Hospital Resources/Appts/Education Provided Provided patient/caregiver with written discharge plan information;Appointments scheduled and added to AVS        Post-Acute Status    Post-Acute Authorization Other     Other Status No Post-Acute Service Needs     Discharge Delays None known at this time                 Important Message from Medicare  Important Message from Medicare regarding Discharge Appeal Rights: Given to patient/caregiver, Explained to patient/caregiver, Signed/date by patient/caregiver     Date IMM was signed: 07/29/22  Time IMM was signed: 1155    Contact Info     Fitz Breen MD   Specialty: Gastroenterology    [address] [facility] [address]   Phone: [phone]       Next Steps: Follow up    Instructions: SOMEONE FROM THE OFFICE WILL CALL YOU WITH AN APPOINTMENT.    ADDRESS:  [address]    Giuliana Rojas MD   Specialty: Family Medicine   Relationship: PCP - General  Hypertension Digital Medicine Responsible Provider    [address] [facility] [address]   Phone: [phone]       Next Steps: Follow up on 8/5/2022    Instructions: Please inquire about repeat BMP (lab) during this visit

## 2022-07-30 NOTE — NURSING
Ms. Rodas was safe and free of falls during the shift. No acute changes overnight.She is still on IV ABX. Her balderrama is no longer in place and IV fluids were discharged during the previous shift. Urine is still tea colored. Will continue to monitor.

## 2022-07-30 NOTE — DISCHARGE SUMMARY
[facility] Medicine  Discharge Summary      Patient Name: Giuliana Rodas  MRN: [id number]  Patient Class: IP- Inpatient  Admission Date: 7/24/2022  Hospital Length of Stay: 6 days  Discharge Date and Time:  07/30/2022 8:16 AM  Attending Physician: Matthew Bautista MD   Discharging Provider: Matthew Bautista MD  Primary Care Provider: Giuliana Rojas MD      HPI:   Mrs. Rodas is a 51 yo F who presents with a CC of LLQ abdominal pain since yesterday.  She was seen on 7/8/22 for diverticulitis and sent home on Cipro and Flagyl. She returns now again with the same complaint as well as fever. She was found to have diverticulitis of the LLQ about the same from 7/8/22.      * No surgery found *      Hospital Course:   Ms Giuliana Rodas was admitted with acute diverticulitis. She did not improve with PO cipro/flagyl. Upon admit, cipro flagyl were resumed IV along with IVF. Her pain had not improved, and she has developed worsening renal failure. Transitioned to zosyn. Nephrology and GI consulted. Abdominal pain improving, diarrhea decreased frequency, nausea improved. Tolerating bland diet.  Renal function improved to Cr 2.3. Overall improving and stable for discharge to home. Change to augmentin to complete course for acute diverticulitis. Follow up with GI. Hold amlodipine-olmesartan and HCTZ for CORNELIO not fully resolved. Start amlodipine 10mg daily in place. Follow up with PCP for BMP recheck. All questions answered.        Goals of Care Treatment Preferences:  Code Status: Full Code      Consults:   Consults (From admission, onward)        Status Ordering Provider     Inpatient consult to Registered Dietitian/Nutritionist  Once        Provider:  (Not yet assigned)    Completed MATTHEW BAUTISTA     Inpatient consult to Gastroenterology  Once        Provider:  (Not yet assigned)    Completed MATTHEW BAUTISTA     Inpatient consult to Nephrology  Once        Provider:  Antione Finn MD     Acknowledged MATTHEW BAUTISTA          No new Assessment & Plan notes have been filed under this hospital service since the last note was generated.  Service: Hospital Medicine    Final Active Diagnoses:    Diagnosis Date Noted POA    PRINCIPAL PROBLEM:  Acute diverticulitis [K57.92] 07/24/2022 Yes    Hypokalemia [E87.6] 07/27/2022 No    Thrombocytopenia [D69.6] 07/26/2022 Yes    Class 1 obesity in adult [E66.9] 07/26/2022 Yes    Urinary retention [R33.9] 07/26/2022 No    CORNELIO (acute kidney injury) [N17.9] 07/24/2022 Yes    Anemia [D64.9] 03/12/2020 Yes    Hyperlipidemia [E78.5] 09/24/2013 Yes    GERD (gastroesophageal reflux disease) [K21.9] 08/20/2013 Yes    Hypertension [I10]  Yes      Problems Resolved During this Admission:       Discharged Condition: good    Disposition: Home or Self Care    Follow Up:   Follow-up Information     Fitz Breen MD Follow up.    Specialty: Gastroenterology  Why: SOMEONE FROM THE OFFICE WILL CALL YOU WITH AN APPOINTMENT.    ADDRESS:  [address]  Contact information:  [address] [facility] [address]  [phone]                       Patient Instructions:      Diet Cardiac   Order Comments: Heart healthy low residue diet     Notify your health care provider if you experience any of the following:  temperature >100.4     Notify your health care provider if you experience any of the following:  persistent nausea and vomiting or diarrhea     Notify your health care provider if you experience any of the following:  severe uncontrolled pain     Notify your health care provider if you experience any of the following:  redness, tenderness, or signs of infection (pain, swelling, redness, odor or green/yellow discharge around incision site)     Notify your health care provider if you experience any of the following:  difficulty breathing or increased cough     Notify your health care provider if you experience any of the following:  severe persistent  headache     Notify your health care provider if you experience any of the following:  worsening rash     Notify your health care provider if you experience any of the following:  persistent dizziness, light-headedness, or visual disturbances     Notify your health care provider if you experience any of the following:  increased confusion or weakness     Activity as tolerated       Significant Diagnostic Studies: Labs: All labs within the past 24 hours have been reviewed    Pending Diagnostic Studies:     None         Medications:  Reconciled Home Medications:      Medication List      START taking these medications    amLODIPine 10 MG tablet  Commonly known as: NORVASC  Take 1 tablet (10 mg total) by mouth once daily.     amoxicillin-clavulanate 500-125mg 500-125 mg Tab  Commonly known as: AUGMENTIN  Take 1 tablet (500 mg total) by mouth 2 (two) times daily. for 7 days        CHANGE how you take these medications    amlodipine-olmesartan 5-40 mg per tablet  Commonly known as: ANGEL  TAKE ONE CAPSULE BY MOUTH EVERY DAY. HOLD until follow up with PCP  What changed:   · how much to take  · how to take this  · when to take this  · additional instructions     hydroCHLOROthiazide 12.5 MG Tab  Commonly known as: HYDRODIURIL  TAKE ONE TABLET BY MOUTH DAILY. HOLD until follow up with PCP  What changed:   · how much to take  · how to take this  · when to take this  · additional instructions     ondansetron 4 MG tablet  Commonly known as: ZOFRAN  Take 1 tablet (4 mg total) by mouth every 8 (eight) hours as needed for Nausea.  What changed: reasons to take this        CONTINUE taking these medications    ALIGN ORAL  Take 1 tablet by mouth once daily.     atenoloL 50 MG tablet  Commonly known as: TENORMIN  TAKE ONE TABLET BY MOUTH TWICE DAILY AS NEEDED     atorvastatin 40 MG tablet  Commonly known as: LIPITOR  TAKE 1 TABLET BY MOUTH EVERY DAY     BIOFREEZE (MENTHOL) TOP  Apply topically.     coal tar 0.5 % shampoo  Commonly  known as: NEUTROGENA T-GEL  Apply topically nightly as needed.     fish oil-omega-3 fatty acids 300-1,000 mg capsule  Take 1 capsule by mouth once daily.     fluocinonide 0.05 % external solution  Commonly known as: LIDEX  Apply topically 2 (two) times daily as needed (rash, itching at scalp). Avoid use on face, body folds, groin/genitalia.     fluticasone propionate 50 mcg/actuation nasal spray  Commonly known as: FLONASE  SPRAY twice IN EACH NOSTRIL DAILY     GLUCOSAMINE 500 mg Tab  Generic drug: glucosamine sulfate  Take by mouth.     HAIR,SKIN AND NAILS(FA-BIOTIN) 66.7-1,000 mcg Tab  Generic drug: multivit-min-folic acid-biotin  Take by mouth.     ICY HOT ADVANCED TOP  Apply topically.     ketoconazole 2 % shampoo  Commonly known as: NIZORAL  wash hair AT least TWO OR THREE TIMES weekly. let sit on scalp AT least 5 MINUTES prior to rinsing     magnesium 30 mg Tab  Take 250 mg by mouth once.     methocarbamoL 500 MG Tab  Commonly known as: ROBAXIN  TAKE ONE TABLET BY MOUTH EVERY 8 HOURS AS NEEDED FOR HEADACHE AND MUSCLE SPASM     multivit with min-folic acid 200 mcg Chew  Take by mouth.     PATADAY OPHT  Apply to eye.     PREMARIN vaginal cream  Generic drug: conjugated estrogens  PRN     SYSTANE BALANCE OPHT  Apply to eye.     tretinoin 0.025 % cream  Commonly known as: RETIN-A  Apply topically nightly. Apply pea-sized amount to entire face nightly. Start slow, up-titrate as tolerated.     trolamine salicylate 10 % cream  Commonly known as: ASPERCREME  Apply topically as needed.     vitamin D 1000 units Tab  Commonly known as: VITAMIN D3  Take 1,000 Units by mouth once daily.     vitamin E 400 UNIT capsule  Take 400 Units by mouth once daily.        STOP taking these medications    FIBER GUMMIES ORAL     morphine 15 MG tablet  Commonly known as: MSIR     polyethylene glycol 17 gram/dose powder  Commonly known as: GLYCOLAX     UNABLE TO FIND            Indwelling Lines/Drains at time of discharge:    Lines/Drains/Airways     None                 Time spent on the discharge of patient: 35 minutes         Maria A Obrien MD  Department of Hospital Medicine  [facility]

## 2022-07-30 NOTE — PLAN OF CARE
WRITTEN HEALTHCARE DISCHARGE INFORMATION      Things that YOU are responsible for to Manage Your Care At Home:  1. Getting your prescriptions filled.  2. Taking you medications as directed. DO NOT MISS ANY DOSES!  3. Going to your follow-up doctor appointments. This is important because it allows the doctor to monitor your progress and to determine if any changes need to be made to your treatment plan.     If you are unable to make your follow up appointments, please call the number listed and reschedule this appointment.      After discharge, if you need assistance, you can call [facility] On Call Nurse Care Line for 24/7 assistance at [phone]     If you are experience any signs or symptoms, Call your doctor or Call 911 and come to your nearest Emergency Room.     Thank you for choosing [facility] and allowing us to care for you.        You should receive a call from [facility] Discharge Department within 48-72 hours to help manage your care after discharge. Please try to make sure that you answer your phone for this important phone call.      Follow-up Information     Fitz Breen MD Follow up.    Specialty: Gastroenterology  Why: SOMEONE FROM THE OFFICE WILL CALL YOU WITH AN APPOINTMENT.    ADDRESS:  [address]  Contact information:  [address]  [facility] [address]  [phone]             Giuliana Rojas MD Follow up on 8/5/2022.    Specialty: Family Medicine  Why: Please inquire about repeat BMP (lab) during this visit  Contact information:  [address]  [place] [address]  [phone]

## 2022-07-30 NOTE — NURSING
Patient discharged per MD order.  IV removed.  Catheter tip intact.  No distress noted.  Discharge instructions explained.  AVS given to patient .  Patient verbalized understanding.  VSS.  Afebrile.  No complaints of pain, N/V, diarrhea or SOB. Pt informed where to   Rx.  Patient transport requested.

## 2022-07-30 NOTE — PROGRESS NOTES
Renal Progress Note    Date of Admission:  7/24/2022  2:45 AM    Length of Stay: 6  Days    Subjective: n/a    Objective:    Current Facility-Administered Medications   Medication    acetaminophen tablet 650 mg    amLODIPine tablet 10 mg    atorvastatin tablet 40 mg    butalbital-acetaminophen-caffeine -40 mg per tablet 1 tablet    calcium carbonate 500 mg/5 mL (1,250 mg/5 mL) suspension 1,000 mg    cetirizine tablet 5 mg    enoxaparin injection 30 mg    fluticasone propionate 50 mcg/actuation nasal spray 100 mcg    HYDROcodone-acetaminophen 5-325 mg per tablet 1 tablet    methocarbamoL tablet 500 mg    mupirocin 2 % ointment    ondansetron injection 8 mg    piperacillin-tazobactam 4.5 g in dextrose 5 % 100 mL IVPB (ready to mix system)    promethazine (PHENERGAN) 25 mg in dextrose 5 % 50 mL IVPB       Vitals:    07/30/22 0736 07/30/22 0752 07/30/22 0755 07/30/22 0837   BP: (!) 189/86 (!) 173/81 (!) 169/82 (!) 146/78   BP Location: Right arm   Right arm   Patient Position: Lying  Lying Lying   Pulse: 70 69 69 78   Resp: 19      Temp: 98.8 °F (37.1 °C)      TempSrc: Oral      SpO2: 95%      Weight:       Height:           I/O last 3 completed shifts:  In: 2093.1 [P.O.:960; I.V.:850.2; IV Piggyback:283]  Out: 1900 [Urine:1900]  I/O this shift:  In: 120 [P.O.:120]  Out: -       Physical Exam: n/a      Laboratories:    No results for input(s): WBC, RBC, HGB, HCT, PLT, MCV, MCH, MCHC in the last 24 hours.    Recent Labs   Lab 07/30/22  0454   CALCIUM 8.8      K 3.3*   CO2 26      BUN 15   CREATININE 2.3*       No results for input(s): COLORU, CLARITYU, SPECGRAV, PHUR, PROTEINUA, GLUCOSEU, BLOODU, WBCU, RBCU, BACTERIA, MUCUS in the last 24 hours.    Invalid input(s):  BILIRUBINCON    Microbiology Results (last 7 days)     Procedure Component Value Units Date/Time    Blood culture [[id number]] Collected: 07/24/22 0549    Order Status: Completed Specimen: Blood from  Peripheral, Hand, Right Updated: 07/28/22 0703     Blood Culture, Routine No Growth after 4 days.     Blood culture [[id number]] Collected: 07/24/22 0557    Order Status: Completed Specimen: Blood from Peripheral, Hand, Left Updated: 07/28/22 0703     Blood Culture, Routine No Growth after 4 days.             Diagnostic Tests:     CT Abdomen:    - Kidneys: No solid mass or hydronephrosis.  Bilateral renal cysts.  Bilateral extrarenal pelvis ease.     - Adrenals: Unremarkable.     Diverticulosis.  At the level LEFT lower quadrant, iliac crest level, extending for approximately 4 cm, associated with diverticulosis is pericolonic inflammation, level of the junction of descending colon and sigmoid colon most consistent with diverticulitis.  No evidence for diverticular abscess.  Trace fluid along the mesenteric margin associated.     Bones:  No acute osseous abnormality and no suspicious lytic or blastic lesion.    Impression:       Persistent acute uncomplicated LEFT lower quadrant diverticulitis without significant detrimental change from 07/08/2022 no evidence for abscess.       Electronically signed by: Basilio Lerner MD   Date: 07/24/2022   Time: 05:58           Assessment:    51 y/o female admitted with:    - LLQ diverticulitis  - CORNELIO with Urinary retention   - NAGMA RESOLVED  - Mild hypoK+  - Hypocalcemia improving  - [facility] related hematuria        Plan:       - Discharge in progress  - Antib. Per admitting

## 2022-07-30 NOTE — DISCHARGE INSTRUCTIONS
You were diagnosed with acute diverticulitis. Continue antibiotic treatment with augmentin upon discharge. Use zofran as needed for nausea. Hold silymarin liver complex and fiber supplements as these can worsen nausea and diarrhea. These can be resumed when acute diverticulitis resolved. Follow up with GI for colonoscopy after diverticulitis has healed.     You were diagnosed with acute kidney injury. This is from dehydration from acute diverticulitis. Your kidney function is improving but not yet back to normal. HOLD home amlodipine-olmesartan and HCTZ. These can worsen kidney function. START amlodipine 10mg daily to use instead (you were given 1 month prescription). Follow up with primary care doctor for repeat kidney function check in 1 week. If back to normal, you should be able to resume your normal blood pressure medications.     WRITTEN HEALTHCARE DISCHARGE INFORMATION      Things that YOU are responsible for to Manage Your Care At Home:  1. Getting your prescriptions filled.  2. Taking you medications as directed. DO NOT MISS ANY DOSES!  3. Going to your follow-up doctor appointments. This is important because it allows the doctor to monitor your progress and to determine if any changes need to be made to your treatment plan.     If you are unable to make your follow up appointments, please call the number listed and reschedule this appointment.      After discharge, if you need assistance, you can call [facility] On [facility] Care Line for 24/7 assistance at [phone]     If you are experience any signs or symptoms, Call your doctor or Call 911 and come to your nearest Emergency Room.     Thank you for choosing [facility] and allowing us to care for you.        You should receive a call from [facility] Discharge Department within 48-72 hours to help manage your care after discharge. Please try to make sure that you answer your phone for this important phone call.       Follow-up Information       Fitz Breen MD  Follow up.    Specialty: Gastroenterology  Why: SOMEONE FROM THE OFFICE WILL CALL YOU WITH AN APPOINTMENT.    ADDRESS:  [address]  Contact information:  [address]  [place] [address]  [phone]               Giuliana Roajs MD Follow up on 8/5/2022.    Specialty: Family Medicine  Why: Please inquire about repeat BMP (lab) during this visit  Contact information:  [address] [address]  [phone]

## 2022-07-31 ENCOUNTER — NURSE TRIAGE (OUTPATIENT)
Dept: ADMINISTRATIVE | Facility: CLINIC | Age: 52
End: 2022-07-31
Payer: MEDICARE

## 2022-08-01 ENCOUNTER — TELEPHONE (OUTPATIENT)
Dept: GASTROENTEROLOGY | Facility: CLINIC | Age: 52
End: 2022-08-01
Payer: MEDICARE

## 2022-08-01 ENCOUNTER — NURSE TRIAGE (OUTPATIENT)
Dept: ADMINISTRATIVE | Facility: CLINIC | Age: 52
End: 2022-08-01
Payer: MEDICARE

## 2022-08-01 ENCOUNTER — TELEPHONE (OUTPATIENT)
Dept: FAMILY MEDICINE | Facility: CLINIC | Age: 52
End: 2022-08-01

## 2022-08-01 ENCOUNTER — HOSPITAL ENCOUNTER (EMERGENCY)
Facility: HOSPITAL | Age: 52
Discharge: HOME | End: 2022-08-01
Attending: EMERGENCY MEDICINE
Payer: MEDICARE

## 2022-08-01 VITALS
HEIGHT: 62 IN | RESPIRATION RATE: 18 BRPM | WEIGHT: 185 LBS | SYSTOLIC BLOOD PRESSURE: 166 MMHG | OXYGEN SATURATION: 95 % | DIASTOLIC BLOOD PRESSURE: 78 MMHG | TEMPERATURE: 98 F | BODY MASS INDEX: 34.04 KG/M2 | HEART RATE: 78 BPM

## 2022-08-01 DIAGNOSIS — G44.209 TENSION HEADACHE: Primary | ICD-10-CM

## 2022-08-01 DIAGNOSIS — R07.9 CHEST PAIN: ICD-10-CM

## 2022-08-01 DIAGNOSIS — Z51.81 MEDICATION MONITORING ENCOUNTER: Primary | ICD-10-CM

## 2022-08-01 LAB
ALBUMIN SERPL BCP-MCNC: 3.2 G/DL (ref 3.5–5.2)
ALP SERPL-CCNC: 60 U/L (ref 55–135)
ALT SERPL W/O P-5'-P-CCNC: 32 U/L (ref 10–44)
ANION GAP SERPL CALC-SCNC: 11 MMOL/L (ref 8–16)
AST SERPL-CCNC: 32 U/L (ref 10–40)
BASOPHILS # BLD AUTO: 0.02 K/UL (ref 0–0.2)
BASOPHILS NFR BLD: 0.5 % (ref 0–1.9)
BILIRUB SERPL-MCNC: 0.4 MG/DL (ref 0.1–1)
BNP SERPL-MCNC: 204 PG/ML (ref 0–99)
BUN SERPL-MCNC: 10 MG/DL (ref 6–20)
C DIFF GDH STL QL: NEGATIVE
C DIFF TOX A+B STL QL IA: NEGATIVE
CALCIUM SERPL-MCNC: 9.5 MG/DL (ref 8.7–10.5)
CHLORIDE SERPL-SCNC: 105 MMOL/L (ref 95–110)
CO2 SERPL-SCNC: 28 MMOL/L (ref 23–29)
CREAT SERPL-MCNC: 1.9 MG/DL (ref 0.5–1.4)
DIFFERENTIAL METHOD: ABNORMAL
EOSINOPHIL # BLD AUTO: 0.2 K/UL (ref 0–0.5)
EOSINOPHIL NFR BLD: 4.5 % (ref 0–8)
ERYTHROCYTE [DISTWIDTH] IN BLOOD BY AUTOMATED COUNT: 12.4 % (ref 11.5–14.5)
EST. GFR  (NO RACE VARIABLE): 31 ML/MIN/1.73 M^2
GLUCOSE SERPL-MCNC: 83 MG/DL (ref 70–110)
HCT VFR BLD AUTO: 29.3 % (ref 37–48.5)
HGB BLD-MCNC: 10.2 G/DL (ref 12–16)
IMM GRANULOCYTES # BLD AUTO: 0.09 K/UL (ref 0–0.04)
IMM GRANULOCYTES NFR BLD AUTO: 2.4 % (ref 0–0.5)
LYMPHOCYTES # BLD AUTO: 1 K/UL (ref 1–4.8)
LYMPHOCYTES NFR BLD: 26.2 % (ref 18–48)
MCH RBC QN AUTO: 29.7 PG (ref 27–31)
MCHC RBC AUTO-ENTMCNC: 34.8 G/DL (ref 32–36)
MCV RBC AUTO: 85 FL (ref 82–98)
MONOCYTES # BLD AUTO: 0.4 K/UL (ref 0.3–1)
MONOCYTES NFR BLD: 9.9 % (ref 4–15)
NEUTROPHILS # BLD AUTO: 2.1 K/UL (ref 1.8–7.7)
NEUTROPHILS NFR BLD: 56.5 % (ref 38–73)
NRBC BLD-RTO: 0 /100 WBC
PLATELET # BLD AUTO: 82 K/UL (ref 150–450)
PMV BLD AUTO: 8.8 FL (ref 9.2–12.9)
POTASSIUM SERPL-SCNC: 3.4 MMOL/L (ref 3.5–5.1)
PROT SERPL-MCNC: 6.9 G/DL (ref 6–8.4)
RBC # BLD AUTO: 3.43 M/UL (ref 4–5.4)
SODIUM SERPL-SCNC: 144 MMOL/L (ref 136–145)
TROPONIN I SERPL DL<=0.01 NG/ML-MCNC: 0.01 NG/ML (ref 0–0.03)
WBC # BLD AUTO: 3.74 K/UL (ref 3.9–12.7)

## 2022-08-01 PROCEDURE — 99285 EMERGENCY DEPT VISIT HI MDM: CPT | Mod: 25

## 2022-08-01 PROCEDURE — 84484 ASSAY OF TROPONIN QUANT: CPT | Performed by: EMERGENCY MEDICINE

## 2022-08-01 PROCEDURE — 96375 TX/PRO/DX INJ NEW DRUG ADDON: CPT | Mod: 59

## 2022-08-01 PROCEDURE — 96376 TX/PRO/DX INJ SAME DRUG ADON: CPT

## 2022-08-01 PROCEDURE — 63600175 PHARM REV CODE 636 W HCPCS: Performed by: EMERGENCY MEDICINE

## 2022-08-01 PROCEDURE — 25000003 PHARM REV CODE 250: Performed by: EMERGENCY MEDICINE

## 2022-08-01 PROCEDURE — 93010 ELECTROCARDIOGRAM REPORT: CPT | Mod: ,,, | Performed by: INTERNAL MEDICINE

## 2022-08-01 PROCEDURE — 85025 COMPLETE CBC W/AUTO DIFF WBC: CPT | Performed by: EMERGENCY MEDICINE

## 2022-08-01 PROCEDURE — 96361 HYDRATE IV INFUSION ADD-ON: CPT

## 2022-08-01 PROCEDURE — 93010 EKG 12-LEAD: ICD-10-PCS | Mod: ,,, | Performed by: INTERNAL MEDICINE

## 2022-08-01 PROCEDURE — 87449 NOS EACH ORGANISM AG IA: CPT | Performed by: EMERGENCY MEDICINE

## 2022-08-01 PROCEDURE — 96374 THER/PROPH/DIAG INJ IV PUSH: CPT

## 2022-08-01 PROCEDURE — 93005 ELECTROCARDIOGRAM TRACING: CPT

## 2022-08-01 PROCEDURE — 80053 COMPREHEN METABOLIC PANEL: CPT | Performed by: EMERGENCY MEDICINE

## 2022-08-01 PROCEDURE — 83880 ASSAY OF NATRIURETIC PEPTIDE: CPT | Performed by: EMERGENCY MEDICINE

## 2022-08-01 RX ORDER — PROMETHAZINE HYDROCHLORIDE 25 MG/1
12.5 TABLET ORAL EVERY 6 HOURS PRN
Qty: 10 TABLET | Refills: 0 | Status: SHIPPED | OUTPATIENT
Start: 2022-08-01 | End: 2024-01-04

## 2022-08-01 RX ORDER — PROCHLORPERAZINE EDISYLATE 5 MG/ML
10 INJECTION INTRAMUSCULAR; INTRAVENOUS ONCE
Status: COMPLETED | OUTPATIENT
Start: 2022-08-01 | End: 2022-08-01

## 2022-08-01 RX ORDER — DIPHENHYDRAMINE HYDROCHLORIDE 50 MG/ML
25 INJECTION INTRAMUSCULAR; INTRAVENOUS
Status: COMPLETED | OUTPATIENT
Start: 2022-08-01 | End: 2022-08-01

## 2022-08-01 RX ORDER — DEXAMETHASONE SODIUM PHOSPHATE 4 MG/ML
8 INJECTION, SOLUTION INTRA-ARTICULAR; INTRALESIONAL; INTRAMUSCULAR; INTRAVENOUS; SOFT TISSUE
Status: COMPLETED | OUTPATIENT
Start: 2022-08-01 | End: 2022-08-01

## 2022-08-01 RX ORDER — DROPERIDOL 2.5 MG/ML
2.5 INJECTION, SOLUTION INTRAMUSCULAR; INTRAVENOUS ONCE
Status: COMPLETED | OUTPATIENT
Start: 2022-08-01 | End: 2022-08-01

## 2022-08-01 RX ORDER — BUTALBITAL, ACETAMINOPHEN AND CAFFEINE 50; 325; 40 MG/1; MG/1; MG/1
1 TABLET ORAL EVERY 4 HOURS PRN
Qty: 30 TABLET | Refills: 0 | Status: SHIPPED | OUTPATIENT
Start: 2022-08-01 | End: 2022-08-31

## 2022-08-01 RX ORDER — MORPHINE SULFATE 4 MG/ML
4 INJECTION, SOLUTION INTRAMUSCULAR; INTRAVENOUS
Status: COMPLETED | OUTPATIENT
Start: 2022-08-01 | End: 2022-08-01

## 2022-08-01 RX ORDER — LABETALOL HYDROCHLORIDE 5 MG/ML
10 INJECTION, SOLUTION INTRAVENOUS
Status: COMPLETED | OUTPATIENT
Start: 2022-08-01 | End: 2022-08-01

## 2022-08-01 RX ORDER — ONDANSETRON 2 MG/ML
4 INJECTION INTRAMUSCULAR; INTRAVENOUS
Status: COMPLETED | OUTPATIENT
Start: 2022-08-01 | End: 2022-08-01

## 2022-08-01 RX ORDER — HYDROMORPHONE HYDROCHLORIDE 2 MG/ML
1 INJECTION, SOLUTION INTRAMUSCULAR; INTRAVENOUS; SUBCUTANEOUS
Status: COMPLETED | OUTPATIENT
Start: 2022-08-01 | End: 2022-08-01

## 2022-08-01 RX ADMIN — LABETALOL HYDROCHLORIDE 10 MG: 5 INJECTION INTRAVENOUS at 01:08

## 2022-08-01 RX ADMIN — ONDANSETRON 4 MG: 2 INJECTION INTRAMUSCULAR; INTRAVENOUS at 12:08

## 2022-08-01 RX ADMIN — DROPERIDOL 2.5 MG: 2.5 INJECTION, SOLUTION INTRAMUSCULAR; INTRAVENOUS at 11:08

## 2022-08-01 RX ADMIN — MORPHINE SULFATE 4 MG: 4 INJECTION INTRAVENOUS at 12:08

## 2022-08-01 RX ADMIN — SODIUM CHLORIDE 1000 ML: 0.9 INJECTION, SOLUTION INTRAVENOUS at 01:08

## 2022-08-01 RX ADMIN — DIPHENHYDRAMINE HYDROCHLORIDE 25 MG: 50 INJECTION, SOLUTION INTRAMUSCULAR; INTRAVENOUS at 12:08

## 2022-08-01 RX ADMIN — HYDROMORPHONE HYDROCHLORIDE 1 MG: 2 INJECTION, SOLUTION INTRAMUSCULAR; INTRAVENOUS; SUBCUTANEOUS at 02:08

## 2022-08-01 RX ADMIN — DIPHENHYDRAMINE HYDROCHLORIDE 25 MG: 50 INJECTION, SOLUTION INTRAMUSCULAR; INTRAVENOUS at 09:08

## 2022-08-01 RX ADMIN — PROCHLORPERAZINE EDISYLATE 10 MG: 5 INJECTION INTRAMUSCULAR; INTRAVENOUS at 09:08

## 2022-08-01 RX ADMIN — DEXAMETHASONE SODIUM PHOSPHATE 8 MG: 4 INJECTION INTRA-ARTICULAR; INTRALESIONAL; INTRAMUSCULAR; INTRAVENOUS; SOFT TISSUE at 02:08

## 2022-08-01 RX ADMIN — SODIUM CHLORIDE 1000 ML: 0.9 INJECTION, SOLUTION INTRAVENOUS at 09:08

## 2022-08-01 NOTE — TELEPHONE ENCOUNTER
----- Message from Dayami Ocasio MA sent at 7/29/2022  3:09 PM CDT -----  Regarding: FW: f/u in GI clinc with Dr Breen  She is an est patient of Dr Mcmahon. Can you see if you can get her in?  Anna  ----- Message -----  From: Celestina Noble LPN  Sent: 7/29/2022   1:41 PM CDT  To: Dayami Ocasio MA  Subject: FW: f/u in GI clinc with Dr Breen                  Patient wants only Dr Breen we have no days with him do you have anything to offer?  ----- Message -----  From: Melissa Gleason NP  Sent: 7/29/2022  11:34 AM CDT  To: [facility] Gastroenterology Clinical Support  Subject: f/u in GI clinc with Dr Breen                      Good Morning,    Would you please schedule this patient for a f/u in the GI clinic for her recurrent diverticulitis with Dr Breen, once she is discharged. Patient is requesting Dr Breen specifically.     Thanks,  Melissa Gleason NP

## 2022-08-01 NOTE — TELEPHONE ENCOUNTER
----- Message from Chloe Douglass LPN sent at 8/1/2022  9:13 AM CDT -----  Regarding: FW: post discarge follow up request    ----- Message -----  From: Sharmila Cook RN  Sent: 7/30/2022  10:20 AM CDT  To: [facility] Staff  Subject: post discarge follow up request                  Patient has a follow up appointment with Dr Rojas 8/5/22; Discharging MD would like to make sure she gets a follow up BMP during that visit    Thanks

## 2022-08-01 NOTE — TELEPHONE ENCOUNTER
Caller c/o CP 4/10, caller states she feels like she has a rapid HR, caller states HR is 61 and BP is 159/85.caller states CP last longer than 5 minutes and feels like a tightness or heaviness at this time.  Pt advised per protocol and verbalized understanding.    Reason for Disposition   [1] Chest pain lasts > 5 minutes AND [2] age > 44    Additional Information   Negative: SEVERE difficulty breathing (e.g., struggling for each breath, speaks in single words)   Negative: Difficult to awaken or acting confused (e.g., disoriented, slurred speech)   Negative: Shock suspected (e.g., cold/pale/clammy skin, too weak to stand, low BP, rapid pulse)   Negative: Passed out (i.e., fainted, collapsed and was not responding)    Protocols used: CHEST PAIN-A-AH

## 2022-08-01 NOTE — TELEPHONE ENCOUNTER
Pt escalated for responding to post discharge text for day 1. Pt spoke to nurse last night about symptoms. Was advised to call EMS but pt did not. Pt having migraine and nausea. Pt has daughter that helped relieve migraine. Her BP was high but has come down. Still having HA but not as bad. Still having some nausea and abdominal tightness. Took BP medication atenolol at 7 this morning. BP currently 166/86. Pt is having some chest tightness. Denies SOB or numbness or weakness to one side. Advised dispo is ED now for evaluation. Pt verbalized understanding and will go. Advised to call back for further concerns.    Reason for Disposition   [1] Systolic BP  >= 160 OR Diastolic >= 100 AND [2] cardiac or neurologic symptoms (e.g., chest pain, difficulty breathing, unsteady gait, blurred vision)    Additional Information   Negative: Difficult to awaken or acting confused (e.g., disoriented, slurred speech)   Negative: SEVERE difficulty breathing (e.g., struggling for each breath, speaks in single words)   Negative: [1] Weakness of the face, arm or leg on one side of the body AND [2] new-onset   Negative: [1] Numbness (i.e., loss of sensation) of the face, arm or leg on one side of the body AND [2] new-onset   Negative: [1] Chest pain lasts > 5 minutes AND [2] history of heart disease  (i.e., heart attack, bypass surgery, angina, angioplasty, CHF)   Negative: [1] Chest pain AND [2] took nitrogylcerin AND [3] pain was not relieved   Negative: Sounds like a life-threatening emergency to the triager    Protocols used: BLOOD PRESSURE - HIGH-A-AH

## 2022-08-01 NOTE — TELEPHONE ENCOUNTER
----- Message from Modesta Stockton MD sent at 7/30/2022 12:13 PM CDT -----  Regarding: GI Clinic follow up  Can you arrange for follow up for this patient with Dr. Breen?  Next available.  If he does not have any openings then ok with any provider in about 2-4 weeks.  Thanks!    Modesta

## 2022-08-01 NOTE — ED PROVIDER NOTES
Encounter Date: 8/1/2022    SCRIBE #1 NOTE: I, Binu Pete, am scribing for, and in the presence of, Akil Rodrigez MD.       History     Chief Complaint   Patient presents with    Chest Pain     Pt c/o chest tightness accompanied by nausea and migraine since yesterday. Pt states she ws recently discharged from ED due to diverticulitis and is still having diarrhea. Pt denies vomiting or SOB. Pt states she took atenolol this morning for BP.      Giuliana Rodas is a 52 y.o. female with a PMHx of fibromyalgia, gastroparesis, GERD, HLD, HTN, and tension headaches who presents to the Emergency Department for evaluation of a 2 day history of an acute, constant right sided headache with associated photophobia and worsening nausea. Patient explains she was discharged from the hospital 3 days ago for diverticulitis. She endorses compliance with her antibiotic regimen. She reports she was recently taken off of her HTN medications as she was becoming too hypotensive. Patient states that she frequently suffers from similar tension/migraine headaches that usually resolve with robaxin and Excedrin; however, her current headache is persisting. Patient notes that she also developed chest pressure yesterday. She explains that she has had ongoing non bloody diarrhea with an intermittent cramping abdominal pain since being discharged 3 days ago. Patient denies any fever, chills, urinary complaints, or other associated symptoms.    The history is provided by the patient.     Review of patient's allergies indicates:   Allergen Reactions    Chlorthalidone Swelling     Hypokalemia     Dicyclomine Edema             Adhesive Rash    Amitriptyline Hallucinations    Depo-provera [medroxyprogesterone] Anxiety    Prozac [fluoxetine] Anxiety    Topiramate Rash     Past Medical History:   Diagnosis Date    Bile reflux gastritis     Breast cyst     Eczema     Fibrocystic breast     Fibromyalgia     Gastroparesis       dereck    GERD (gastroesophageal reflux disease)     Hyperglyceridemia     Hyperlipidemia     Hypertension     IC (interstitial cystitis)     dr. labadie    Psoriasis     Tension headache      Past Surgical History:   Procedure Laterality Date    BREAST BIOPSY Right     over 10 years ago/ milk duct    CHOLECYSTECTOMY      ESOPHAGOGASTRODUODENOSCOPY N/A 3/12/2020    Procedure: EGD (ESOPHAGOGASTRODUODENOSCOPY);  Surgeon: Damon Mcmahon MD;  Location: [address]);  Service: Endoscopy;  Laterality: N/A;  use pcf scope    HEMORRHOID SURGERY      HYSTERECTOMY      KNEE SURGERY      OOPHORECTOMY      one ov removed     Family History   Problem Relation Age of Onset    Hypertension Mother     Migraines Mother     Breast cancer Mother     Hypertension Father     Stroke Father     Heart disease Father     Hyperlipidemia Father     Diabetes Father     Breast cancer Paternal Grandmother     Colon cancer Neg Hx     Ovarian cancer Neg Hx     Inflammatory bowel disease Neg Hx     Celiac disease Neg Hx      Social History     Tobacco Use    Smoking status: Never Smoker    Smokeless tobacco: Never Used   Substance Use Topics    Alcohol use: No    Drug use: No     Review of Systems   Constitutional: Negative.    HENT: Negative.    Eyes: Positive for photophobia.   Respiratory: Negative.    Cardiovascular: Positive for chest pain.   Gastrointestinal: Positive for abdominal pain, diarrhea and nausea.   Genitourinary: Negative.    Musculoskeletal: Negative.    Skin: Negative.    Neurological: Positive for headaches.       Physical Exam     Initial Vitals [08/01/22 0844]   BP Pulse Resp Temp SpO2   (!) 198/75 64 18 98.4 °F (36.9 °C) 98 %      MAP       --         Physical Exam    Nursing note and vitals reviewed.  Constitutional: She appears well-developed. She is not diaphoretic. She appears distressed (mildly).   HENT:   Head: Normocephalic and atraumatic.   Nose: Nose normal.   Bilateral  posterior occipital lobe tenderness   Eyes: EOM are normal. Pupils are equal, round, and reactive to light.   Neck: Neck supple. No JVD present.   Normal range of motion.  Cardiovascular: Normal rate, regular rhythm, normal heart sounds and intact distal pulses.   Pulmonary/Chest: Breath sounds normal. No stridor. No respiratory distress. She has no wheezes. She has no rales.   Abdominal: Abdomen is soft. Bowel sounds are normal. She exhibits no distension. There is abdominal tenderness (ttp epigastrically and left lower quadrant.). There is no rebound and no guarding.   Musculoskeletal:         General: No tenderness or edema. Normal range of motion.      Cervical back: Normal range of motion and neck supple.     Neurological: She is alert and oriented to person, place, and time. She has normal strength.   Skin: Skin is warm and dry. Capillary refill takes less than 2 seconds. No rash noted. No erythema.         ED Course   Procedures  Labs Reviewed   CBC W/ AUTO DIFFERENTIAL - Abnormal; Notable for the following components:       Result Value    WBC 3.74 (*)     RBC 3.43 (*)     Hemoglobin 10.2 (*)     Hematocrit 29.3 (*)     Platelets 82 (*)     MPV 8.8 (*)     Immature Granulocytes 2.4 (*)     Immature Grans (Abs) 0.09 (*)     All other components within normal limits   COMPREHENSIVE METABOLIC PANEL - Abnormal; Notable for the following components:    Potassium 3.4 (*)     Creatinine 1.9 (*)     Albumin 3.2 (*)     eGFR 31 (*)     All other components within normal limits   B-TYPE NATRIURETIC PEPTIDE - Abnormal; Notable for the following components:     (*)     All other components within normal limits   CLOSTRIDIUM DIFFICILE   TROPONIN I        ECG Results          EKG 12-lead (Final result)  Result time 08/01/22 16:59:41    Final result by Interface, Lab In [facility] (08/01/22 16:59:41)                 Narrative:    Test Reason : R07.9,    Vent. Rate : 069 BPM     Atrial Rate : 069 BPM     P-R Int : 174  ms          QRS Dur : 078 ms      QT Int : 440 ms       P-R-T Axes : 064 058 057 degrees     QTc Int : 471 ms    Normal sinus rhythm  Low voltage QRS  Cannot rule out Anterior infarct ,age undetermined  Abnormal ECG  When compared with ECG of 24-JUL-2022 05:01,  Significant changes have occurred  Confirmed by Basilio Low MD ([id number]) on 8/1/2022 4:59:34 PM    Referred By: [email or web address]   SELF           Confirmed By:Basilio Low MD                            Imaging Results          CT Head Without Contrast (Final result)  Result time 08/01/22 13:45:59    Final result by Marcio Houston MD (08/01/22 13:45:59)                 Impression:      No acute intracranial abnormality.  Specifically, no intracranial hemorrhage.      Electronically signed by: Marcio Houston MD  Date:    08/01/2022  Time:    13:45             Narrative:    EXAMINATION:  CT HEAD WITHOUT CONTRAST    CLINICAL HISTORY:  Headache, new or worsening (Age >= 50y);    TECHNIQUE:  Low dose axial CT images obtained throughout the head without intravenous contrast. Sagittal and coronal reconstructions were performed.    COMPARISON:  Head CT 04/13/2014    FINDINGS:  Intracranial compartment:    Ventricles and sulci are normal in size for age without evidence of hydrocephalus. No extra-axial blood or fluid collections.    The brain parenchyma appears normal. No parenchymal mass, hemorrhage, edema or major vascular distribution infarct.    Skull/extracranial contents (limited evaluation): No fracture.  Mild mucosal thickening within posterior left ethmoidal air cell and right sphenoid sinus.  Mastoid air cells and remaining imaged paranasal sinuses are essentially clear.  Imaged portions of the orbits are within normal limits.                               X-Ray Chest AP Portable (Final result)  Result time 08/01/22 09:23:47    Final result by Jose R Amaral MD (08/01/22 09:23:47)                 Impression:      Costophrenic angles appear minimally blunted.   Small layering pleural effusions cannot be excluded.      Electronically signed by: Jose R Amaral MD  Date:    08/01/2022  Time:    09:23             Narrative:    EXAMINATION:  XR CHEST AP PORTABLE    CLINICAL HISTORY:  Chest Pain;    TECHNIQUE:  Single frontal view of the chest was performed.    COMPARISON:  07/08/2022.    FINDINGS:  The heart and mediastinal structures appear unchanged.  Pulmonary vasculature is within normal limits.  The lungs are free of focal consolidations.  There is no evidence for pneumothorax.  There may be minimal blunting of the costophrenic angles and small layering pleural effusions cannot be excluded.  Bony structures appear intact.                                 Medications   prochlorperazine injection Soln 10 mg (10 mg Intravenous Given 8/1/22 0918)   diphenhydrAMINE injection 25 mg (25 mg Intravenous Given 8/1/22 0917)   sodium chloride 0.9% bolus 1,000 mL (0 mLs Intravenous Stopped 8/1/22 1018)   droperidoL injection 2.5 mg (2.5 mg Intravenous Given 8/1/22 1159)   diphenhydrAMINE injection 25 mg (25 mg Intravenous Given 8/1/22 1201)   morphine injection 4 mg (4 mg Intravenous Given 8/1/22 1257)   ondansetron injection 4 mg (4 mg Intravenous Given 8/1/22 1257)   sodium chloride 0.9% bolus 1,000 mL (0 mLs Intravenous Stopped 8/1/22 1441)   labetaloL injection 10 mg (10 mg Intravenous Given 8/1/22 1340)   HYDROmorphone (PF) injection 1 mg (1 mg Intravenous Given 8/1/22 1423)   dexamethasone injection 8 mg (8 mg Intravenous Given 8/1/22 1424)     Medical Decision Making:   History:   Old Medical Records: I decided to obtain old medical records.  Clinical Tests:   Lab Tests: Reviewed and Ordered  Radiological Study: Ordered and Reviewed  Medical Tests: Reviewed and Ordered    MDM:    52 y.o.female with PMHx as noted above presents with abdominal pain, chest pain, headache. Physical exam as noted above.  ED workup remarkable for CBC/CMP: Hgb 10.2, Cr 1.9, , CXR unremarkbale, EKG  - normal sinus rhythm rate of 69 beats per minute, low-voltage QRS, nonspecific ST and T-wave changes noted, similar to prior EKG, no STEMI, u/a - unremarkble.  Pt presentation consistent with tension headache, appears to be more chronic in nature, possibly related to recent hospitalization and recent diagnosis diverticulitis.  C diff ordered and pending however diarrhea has continued.  Patient is currently compliant with her Augmentin antibiotic prescription.  Treated for tension headache here with symptomatic improvement, vitals are remaining stable.  Abdominal exam remains benign, cardiac workup is negative. At this time given patient's history, physical exam, and ED workup do not suspect acute MI/ACS, pancreatitis, abscess, intracranial hemorrhage, acute CVA/TIA, cholecystitis, appendicitis, ectopic pregnancy, SBO, acute hepatitis, pyelonephritis, ureterolithiasis, or any further malignant cause.  Discussed diagnosis and further treatment with patient including f/u.  Return precautions given and all questions answered.  Patient in understanding of plan.  Pt discharged to home improved and stable.            Scribe Attestation:   Scribe #1: I performed the above scribed service and the documentation accurately describes the services I performed. I attest to the accuracy of the note.                 Clinical Impression:   Final diagnoses:  [R07.9] Chest pain  [G44.209] Tension headache (Primary)          ED Disposition Condition    Discharge Stable        ED Prescriptions     Medication Sig Dispense Start Date End Date Auth. Provider    butalbital-acetaminophen-caffeine -40 mg (FIORICET, ESGIC) -40 mg per tablet Take 1 tablet by mouth every 4 (four) hours as needed for Pain or Headaches. 30 tablet 8/1/2022 8/31/2022 Akil Rodrigez MD    promethazine (PHENERGAN) 25 MG tablet Take 0.5 tablets (12.5 mg total) by mouth every 6 (six) hours as needed for Nausea. 10 tablet 8/1/2022  Akil Rodrigez  MD        Follow-up Information     Follow up With Specialties Details Why Contact Info    [facility] [place] Emergency Dept Emergency Medicine Go to  If symptoms worsen [address] [place] [address] [phone]    Giuliana Rojas MD Family Medicine Go in 1 week As needed [address] [place] [address]  [phone]           I, Akil Rodrigez M.D., personally performed the services described in this documentation. All medical record entries made by the scribe were at my direction and in my presence. I have reviewed the chart and agree that the record reflects my personal performance and is accurate and complete.       Akil Rodrigez MD  08/03/22 [id number]

## 2022-08-02 ENCOUNTER — PATIENT OUTREACH (OUTPATIENT)
Dept: ADMINISTRATIVE | Facility: CLINIC | Age: 52
End: 2022-08-02
Payer: MEDICARE

## 2022-08-05 ENCOUNTER — OFFICE VISIT (OUTPATIENT)
Dept: FAMILY MEDICINE | Facility: CLINIC | Age: 52
End: 2022-08-05
Payer: MEDICARE

## 2022-08-05 ENCOUNTER — LAB VISIT (OUTPATIENT)
Dept: LAB | Facility: HOSPITAL | Age: 52
End: 2022-08-05
Attending: FAMILY MEDICINE
Payer: MEDICARE

## 2022-08-05 VITALS
WEIGHT: 172.19 LBS | SYSTOLIC BLOOD PRESSURE: 136 MMHG | BODY MASS INDEX: 31.68 KG/M2 | OXYGEN SATURATION: 97 % | HEART RATE: 60 BPM | DIASTOLIC BLOOD PRESSURE: 70 MMHG | HEIGHT: 62 IN | TEMPERATURE: 99 F

## 2022-08-05 DIAGNOSIS — Z51.81 MEDICATION MONITORING ENCOUNTER: ICD-10-CM

## 2022-08-05 DIAGNOSIS — N17.9 ACUTE RENAL FAILURE, UNSPECIFIED ACUTE RENAL FAILURE TYPE: ICD-10-CM

## 2022-08-05 DIAGNOSIS — Z09 HOSPITAL DISCHARGE FOLLOW-UP: Primary | ICD-10-CM

## 2022-08-05 LAB
ANION GAP SERPL CALC-SCNC: 8 MMOL/L (ref 8–16)
BUN SERPL-MCNC: 15 MG/DL (ref 6–20)
CALCIUM SERPL-MCNC: 9.5 MG/DL (ref 8.7–10.5)
CHLORIDE SERPL-SCNC: 103 MMOL/L (ref 95–110)
CO2 SERPL-SCNC: 31 MMOL/L (ref 23–29)
CREAT SERPL-MCNC: 1.7 MG/DL (ref 0.5–1.4)
EST. GFR  (NO RACE VARIABLE): 35 ML/MIN/1.73 M^2
GLUCOSE SERPL-MCNC: 75 MG/DL (ref 70–110)
POTASSIUM SERPL-SCNC: 3.5 MMOL/L (ref 3.5–5.1)
SODIUM SERPL-SCNC: 142 MMOL/L (ref 136–145)

## 2022-08-05 PROCEDURE — 4010F PR ACE/ARB THEARPY RXD/TAKEN: ICD-10-PCS | Mod: CPTII,S$GLB,, | Performed by: FAMILY MEDICINE

## 2022-08-05 PROCEDURE — 99999 PR PBB SHADOW E&M-EST. PATIENT-LVL III: ICD-10-PCS | Mod: PBBFAC,,, | Performed by: FAMILY MEDICINE

## 2022-08-05 PROCEDURE — 80048 BASIC METABOLIC PNL TOTAL CA: CPT | Performed by: FAMILY MEDICINE

## 2022-08-05 PROCEDURE — 3008F BODY MASS INDEX DOCD: CPT | Mod: CPTII,S$GLB,, | Performed by: FAMILY MEDICINE

## 2022-08-05 PROCEDURE — 1160F RVW MEDS BY RX/DR IN RCRD: CPT | Mod: CPTII,S$GLB,, | Performed by: FAMILY MEDICINE

## 2022-08-05 PROCEDURE — 3078F DIAST BP <80 MM HG: CPT | Mod: CPTII,S$GLB,, | Performed by: FAMILY MEDICINE

## 2022-08-05 PROCEDURE — 1111F PR DISCHARGE MEDS RECONCILED W/ CURRENT OUTPATIENT MED LIST: ICD-10-PCS | Mod: CPTII,S$GLB,, | Performed by: FAMILY MEDICINE

## 2022-08-05 PROCEDURE — 3075F PR MOST RECENT SYSTOLIC BLOOD PRESS GE 130-139MM HG: ICD-10-PCS | Mod: CPTII,S$GLB,, | Performed by: FAMILY MEDICINE

## 2022-08-05 PROCEDURE — 1159F PR MEDICATION LIST DOCUMENTED IN MEDICAL RECORD: ICD-10-PCS | Mod: CPTII,S$GLB,, | Performed by: FAMILY MEDICINE

## 2022-08-05 PROCEDURE — 3044F PR MOST RECENT HEMOGLOBIN A1C LEVEL <7.0%: ICD-10-PCS | Mod: CPTII,S$GLB,, | Performed by: FAMILY MEDICINE

## 2022-08-05 PROCEDURE — 99214 OFFICE O/P EST MOD 30 MIN: CPT | Mod: S$GLB,,, | Performed by: FAMILY MEDICINE

## 2022-08-05 PROCEDURE — 36415 COLL VENOUS BLD VENIPUNCTURE: CPT | Mod: PO | Performed by: FAMILY MEDICINE

## 2022-08-05 PROCEDURE — 4010F ACE/ARB THERAPY RXD/TAKEN: CPT | Mod: CPTII,S$GLB,, | Performed by: FAMILY MEDICINE

## 2022-08-05 PROCEDURE — 1159F MED LIST DOCD IN RCRD: CPT | Mod: CPTII,S$GLB,, | Performed by: FAMILY MEDICINE

## 2022-08-05 PROCEDURE — 3078F PR MOST RECENT DIASTOLIC BLOOD PRESSURE < 80 MM HG: ICD-10-PCS | Mod: CPTII,S$GLB,, | Performed by: FAMILY MEDICINE

## 2022-08-05 PROCEDURE — 99214 PR OFFICE/OUTPT VISIT, EST, LEVL IV, 30-39 MIN: ICD-10-PCS | Mod: S$GLB,,, | Performed by: FAMILY MEDICINE

## 2022-08-05 PROCEDURE — 3075F SYST BP GE 130 - 139MM HG: CPT | Mod: CPTII,S$GLB,, | Performed by: FAMILY MEDICINE

## 2022-08-05 PROCEDURE — 3044F HG A1C LEVEL LT 7.0%: CPT | Mod: CPTII,S$GLB,, | Performed by: FAMILY MEDICINE

## 2022-08-05 PROCEDURE — 1160F PR REVIEW ALL MEDS BY PRESCRIBER/CLIN PHARMACIST DOCUMENTED: ICD-10-PCS | Mod: CPTII,S$GLB,, | Performed by: FAMILY MEDICINE

## 2022-08-05 PROCEDURE — 99999 PR PBB SHADOW E&M-EST. PATIENT-LVL III: CPT | Mod: PBBFAC,,, | Performed by: FAMILY MEDICINE

## 2022-08-05 PROCEDURE — 1111F DSCHRG MED/CURRENT MED MERGE: CPT | Mod: CPTII,S$GLB,, | Performed by: FAMILY MEDICINE

## 2022-08-05 PROCEDURE — 3008F PR BODY MASS INDEX (BMI) DOCUMENTED: ICD-10-PCS | Mod: CPTII,S$GLB,, | Performed by: FAMILY MEDICINE

## 2022-08-05 NOTE — PROGRESS NOTES
Subjective:       Patient ID: Giuliana Rodas is a 52 y.o. female.    Chief Complaint: Hospital Follow Up and Headache    52 year old female present for hospital follow up. She was hospitalized for diverticulitis. She was noted to have acute kidney failure while hospitalized, which was likely due to diarrhea as she states he had diarrhea for a month. She states her diarrhea has improved and she now has less abdominal cramping. She is eating bread, mashed potatoes, grits, eggs.she also went back to the hospital for the headache. She was in the hospital and feels sleeping in the hospital has caused neck pain and headaches. She states she had a migraine headache so bad she was crying. Her daughter helped manipulate her muscles as she is a physical therapist. She had to go to the ED as it didn't resolve with these measures. She states she was given morphine twice, which didn't help. She states the dilaudid helped significantly.  She is drinking water with a little tea here and there. Her urine is still light tea colored. Overall she feels much better.   \  Past Medical History:   Diagnosis Date    Bile reflux gastritis     Breast cyst     Eczema     Fibrocystic breast     Fibromyalgia     Gastroparesis     dr. harden    GERD (gastroesophageal reflux disease)     Hyperglyceridemia     Hyperlipidemia     Hypertension     IC (interstitial cystitis)     dr. labadie    Psoriasis     Tension headache       Past Surgical History:   Procedure Laterality Date    BREAST BIOPSY Right     over 10 years ago/ milk duct    CHOLECYSTECTOMY      ESOPHAGOGASTRODUODENOSCOPY N/A 3/12/2020    Procedure: EGD (ESOPHAGOGASTRODUODENOSCOPY);  Surgeon: Damon Mcmahon MD;  Location: [address]);  Service: Endoscopy;  Laterality: N/A;  use pcf scope    HEMORRHOID SURGERY      HYSTERECTOMY      KNEE SURGERY      OOPHORECTOMY      one ov removed     Family History   Problem Relation Age of Onset    Hypertension Mother      Migraines Mother     Breast cancer Mother     Hypertension Father     Stroke Father     Heart disease Father     Hyperlipidemia Father     Diabetes Father     Breast cancer Paternal Grandmother     Colon cancer Neg Hx     Ovarian cancer Neg Hx     Inflammatory bowel disease Neg Hx     Celiac disease Neg Hx      Social History     Socioeconomic History    Marital status:     Number of children: 2   Occupational History    Occupation:      Employer: [employer]   Tobacco Use    Smoking status: Never Smoker    Smokeless tobacco: Never Used   Substance and Sexual Activity    Alcohol use: No    Drug use: No    Sexual activity: Yes     Partners: Male     Birth control/protection: See Surgical Hx   Social History Narrative    Lives at home with  and daughter     Social Determinants of Health     Financial Resource Strain: Medium Risk    Difficulty of Paying Living Expenses: Somewhat hard   Food Insecurity: Food Insecurity Present    Worried About Running Out of Food in the Last Year: Sometimes true    Ran Out of Food in the Last Year: Never true   Transportation Needs: No Transportation Needs    Lack of Transportation (Medical): No    Lack of Transportation (Non-Medical): No   Physical Activity: Sufficiently Active    Days of Exercise per Week: 3 days    Minutes of Exercise per Session: 60 min   Stress: No Stress Concern Present    Feeling of Stress : Not at all   Social Connections: Unknown    Frequency of Communication with Friends and Family: More than three times a week    Frequency of Social Gatherings with Friends and Family: Once a week    Active Member of Clubs or Organizations: Yes    Attends Club or Organization Meetings: 1 to 4 times per year    Marital Status:    Housing Stability: Low Risk     Unable to Pay for Housing in the Last Year: No    Number of Places Lived in the Last Year: 1    Unstable Housing in the Last Year: No       Review of  "Systems      Objective:       Vitals:    08/05/22 0959   BP: 136/70   Pulse: 60   Temp: 98.7 °F (37.1 °C)   TempSrc: Oral   SpO2: 97%   Weight: 78.1 kg (172 lb 2.9 oz)   Height: 5' 2" (1.575 m)       Physical Exam  Constitutional:       Appearance: Normal appearance.   Cardiovascular:      Rate and Rhythm: Normal rate and regular rhythm.      Heart sounds: Normal heart sounds. No murmur heard.    No friction rub. No gallop.   Pulmonary:      Effort: Pulmonary effort is normal. No respiratory distress.      Breath sounds: Normal breath sounds. No stridor. No wheezing or rhonchi.   Abdominal:      General: Abdomen is flat. Bowel sounds are normal. There is no distension.      Palpations: Abdomen is soft. There is no mass.      Tenderness: There is no abdominal tenderness. There is no guarding or rebound.      Hernia: No hernia is present.   Musculoskeletal:      Cervical back: Normal range of motion and neck supple.   Neurological:      Mental Status: She is alert.   Psychiatric:         Mood and Affect: Mood normal.         Behavior: Behavior normal.         Assessment:       Problem List Items Addressed This Visit    None     Visit Diagnoses     Hospital discharge follow-up    -  Primary    Acute renal failure, unspecified acute renal failure type              Plan:       Giuliana was seen today for hospital follow up and headache.    Diagnoses and all orders for this visit:    Hospital discharge follow-up    Acute renal failure, unspecified acute renal failure type       clinically she is doing better. Will recheck BMP  Today. If not normal repeat in 2 weeks.     "

## 2022-08-10 DIAGNOSIS — G47.00 INSOMNIA, UNSPECIFIED TYPE: ICD-10-CM

## 2022-08-10 RX ORDER — TRAZODONE HYDROCHLORIDE 100 MG/1
TABLET ORAL
Qty: 30 TABLET | Refills: 11 | OUTPATIENT
Start: 2022-08-10

## 2022-08-10 NOTE — TELEPHONE ENCOUNTER
No new care gaps identified.  [facility] Embedded Care Gaps. Reference number: [id number]. 8/10/2022   8:02:00 AM CDT

## 2022-08-10 NOTE — TELEPHONE ENCOUNTER
Refill Decision Note   Giuliana Adrianna  is requesting a refill authorization.  Brief Assessment and Rationale for Refill:  Quick Discontinue     Medication Therapy Plan:  D/C 7/7/2022 PT. NO LONGER TAKING    Medication Reconciliation Completed: No   Comments:     No Care Gaps recommended.     Note composed:8:40 AM 08/10/2022

## 2022-08-16 NOTE — TELEPHONE ENCOUNTER
No new care gaps identified.  [facility] Embedded Care Gaps. Reference number: [id number]. 8/16/2022   1:39:57 PM CDT

## 2022-08-16 NOTE — TELEPHONE ENCOUNTER
----- Message from Miguelina Mark Anthony sent at 8/16/2022  1:20 PM CDT -----  Regarding: self [phone]  Type: RX Refill Request    Who Called:  self    Have you contacted your pharmacy: yes    Refill or New Rx: refill    RX Name and Strength:butalbital-acetaminophen-caffeine -40 mg (FIORICET, ESGIC) -40 mg per tablet      Preferred Pharmacy with phone number:  [facility] - [place] - [address]  [address]  [place] [address]  Phone: [phone] Fax: [phone]     Local or Mail Order:local    Would the patient rather a call back or a response via [facility]?  Call back    Best Call Back Number:[phone]

## 2022-08-17 RX ORDER — BUTALBITAL, ACETAMINOPHEN AND CAFFEINE 50; 325; 40 MG/1; MG/1; MG/1
1 TABLET ORAL EVERY 4 HOURS PRN
Qty: 30 TABLET | Refills: 0 | OUTPATIENT
Start: 2022-08-17 | End: 2022-09-16

## 2022-08-18 ENCOUNTER — PATIENT MESSAGE (OUTPATIENT)
Dept: FAMILY MEDICINE | Facility: CLINIC | Age: 52
End: 2022-08-18
Payer: MEDICARE

## 2022-09-08 NOTE — PROGRESS NOTES
[facility] Consultation Note    Reason for Consult:  The primary encounter diagnosis was Acute diverticulitis. A diagnosis of Gastroparesis was also pertinent to this visit.    PCP:   Giuliana Rojas       Referring MD:  No referring provider defined for this encounter.      Interval History 09/09/2022  Had hospitalization for diverticulitis  Overall doing better  Had one episode of RLQ pain radiating down her leg, felt like a strain, kind of went away on its own    Original HPI:  This is a 52 y.o. female here as she reports as a second opinion, but this is at least a fourth opinion. She has been seen my Dr. Breen in 2014. Then Dr. Ulloa at [facility]. Then another Israel Haddad MD, possibly Dr. Van in June 2019 . Reports at her June 2019 visit she was told she should be seen at [facility] due to her complex GI history.  She is a new pt.     Previous work up - pt did not have her records sent over,  Did not bring with her to this appt either  GES 3/16/18  Manometry 01/2017 - She is unsure what the results were.   EUS  Egd and colonoscopy  VCE 2014 here at [facility]  Enteroscopy 2014 here t [facility]  GES     Has never had a breath test to r/o SIBO    Today patient reports she has lot of belching and nausea no vomiting. She does have gastroparesis.  Reports she has been on Reglan in the past.  Stopped 2 months ago because she reports she was told to stop by 1 of her physicians due to the potential of EPS.  She is unsure if her symptoms changed when she got off the reglan since she has fibromyalgia and other diagnosis that affect her GI tract .  She does have a history of C diff that was treated January 2019.  She is still having diarrhea but she has always had diarrhea.  She states the watery diarrhea resolved.  Now she has 1-2 bowel movements per day but on a bad day she may have up to 4.  She denies any overt signs of GI bleeding.  She has been taking her Protonix at night, she states bc she  suffers at night.  She has not tried taking her Protonix in the morning.    Treatments tried:  Protonix  Carafate  Creon for a year because she had pancreatitis due to cholecystectomy. She reports it did help with pancreatitis. MAY have helped with gas and belching.   Probiotics every day.  Allergic to Bentyl  Reglan  Omeprazole failed  Tums does not help  Gasx helps some   On duloxetine and Lexapro currently  Has not tried Fdgard or Ibgard.      ROS:  Constitutional: + feverish, chills, No weight loss  ENT: + allergies, +throat clearing  CV: + chest pain  Pulm: No cough, No shortness of breath  Ophtho: + vision changes  GI: see HPI  Derm: + rash under arm pits  Heme:+ bruising  MSK: +Fibromyalgia  : No dysuria, + microscopic hematuria due to IC  Endo: No hot or cold intolerance  Neuro: No syncope, No seizure  Psych: + anxiety, No depression    Medical History:  has a past medical history of Bile reflux gastritis, Breast cyst, Eczema, Fibrocystic breast, Fibromyalgia, Gastroparesis, GERD (gastroesophageal reflux disease), Hyperglyceridemia, Hyperlipidemia, Hypertension, IC (interstitial cystitis), Psoriasis, and Tension headache.    Surgical History:  has a past surgical history that includes Cholecystectomy; Hysterectomy; Hemorrhoid surgery; Knee surgery; Oophorectomy; Breast biopsy (Right); and Esophagogastroduodenoscopy (N/A, 3/12/2020).    Family History: family history includes Breast cancer in her mother and paternal grandmother; Diabetes in her father; Heart disease in her father; Hyperlipidemia in her father; Hypertension in her father and mother; Migraines in her mother; Stroke in her father..     Social History:  reports that she has never smoked. She has never used smokeless tobacco. She reports that she does not drink alcohol and does not use drugs.    Review of patient's allergies indicates:   Allergen Reactions    Chlorthalidone Swelling     Hypokalemia     Dicyclomine Edema             Adhesive Rash     Amitriptyline Hallucinations    Depo-provera [medroxyprogesterone] Anxiety    Prozac [fluoxetine] Anxiety    Topiramate Rash       Current Outpatient Medications on File Prior to Visit   Medication Sig Dispense Refill    amlodipine-olmesartan (ANGEL) 5-40 mg per tablet TAKE ONE CAPSULE BY MOUTH EVERY DAY. HOLD until follow up with PCP 90 tablet 3    atenoloL (TENORMIN) 50 MG tablet TAKE ONE TABLET BY MOUTH TWICE DAILY AS NEEDED 180 tablet 1    atorvastatin (LIPITOR) 40 MG tablet TAKE 1 TABLET BY MOUTH EVERY DAY 90 tablet 3    BIFIDOBACTERIUM INFANTIS (ALIGN ORAL) Take 1 tablet by mouth once daily.      BIOFREEZE, MENTHOL, TOP Apply topically.      coal tar (NEUTROGENA T-GEL) 0.5 % shampoo Apply topically nightly as needed.      fluocinonide (LIDEX) 0.05 % external solution Apply topically 2 (two) times daily as needed (rash, itching at scalp). Avoid use on face, body folds, groin/genitalia. 60 mL 3    fluticasone propionate (FLONASE) 50 mcg/actuation nasal spray SPRAY twice IN EACH NOSTRIL DAILY 16 g 2    glucosamine sulfate 500 mg Tab Take by mouth.      ketoconazole (NIZORAL) 2 % shampoo wash hair AT least TWO OR THREE TIMES weekly. let sit on scalp AT least 5 MINUTES prior to rinsing 120 mL 11    magnesium 30 mg Tab Take 250 mg by mouth once.       menthol/camphor (ICY HOT ADVANCED TOP) Apply topically.      methocarbamoL (ROBAXIN) 500 MG Tab TAKE ONE TABLET BY MOUTH EVERY 8 HOURS AS NEEDED FOR HEADACHE AND MUSCLE SPASM 60 tablet 3    multivit with min-folic acid 200 mcg Chew Take by mouth.      multivit-min-folic acid-biotin (HAIR,SKIN AND NAILS,FA-BIOTIN,) 66.7-1,000 mcg Tab Take by mouth.      olopatadine HCl (PATADAY OPHT) Apply to eye.      omega-3 fatty acids/fish oil (FISH OIL-OMEGA-3 FATTY ACIDS) 300-1,000 mg capsule Take 1 capsule by mouth once daily.      ondansetron (ZOFRAN) 4 MG tablet Take 1 tablet (4 mg total) by mouth every 8 (eight) hours as needed for Nausea. 12 tablet 0    PREMARIN vaginal  "cream PRN      promethazine (PHENERGAN) 25 MG tablet Take 0.5 tablets (12.5 mg total) by mouth every 6 (six) hours as needed for Nausea. 10 tablet 0    propylene glycol (SYSTANE BALANCE OPHT) Apply to eye.      tretinoin (RETIN-A) 0.025 % cream Apply topically nightly. Apply pea-sized amount to entire face nightly. Start slow, up-titrate as tolerated. 45 g 5    trolamine salicylate (ASPERCREME) 10 % cream Apply topically as needed.      vitamin D (VITAMIN D3) 1000 units Tab Take 1,000 Units by mouth once daily.      vitamin E 400 UNIT capsule Take 400 Units by mouth once daily.      hydroCHLOROthiazide (HYDRODIURIL) 12.5 MG Tab TAKE ONE TABLET BY MOUTH DAILY. HOLD until follow up with PCP (Patient not taking: Reported on 9/9/2022) 30 tablet 11     No current facility-administered medications on file prior to visit.         Objective Findings:    Vital Signs:  /79   Pulse 67   Ht 5' 2" (1.575 m)   Wt 78.8 kg (173 lb 11.6 oz)   BMI 31.77 kg/m²   Body mass index is 31.77 kg/m².    Physical Exam:  General Appearance: Well appearing in no acute distress  Head:   Normocephalic, without obvious abnormality  Eyes:    No scleral icterus  ENT: Neck supple  Lungs: CTA bilaterally in anterior and posterior fields, no wheezes, no crackles.  Heart:  Regular rate and rhythm, S1, S2 normal, no murmurs heard  Abdomen: Soft, non distended with positive bowel sounds in all four quadrants. + generalized tenderness  Extremities: No edema  Skin: No rash  Neurologic: AAO x 3      Labs reviewed:  Lab Results   Component Value Date    WBC 3.74 (L) 08/01/2022    HGB 10.2 (L) 08/01/2022    HCT 29.3 (L) 08/01/2022    PLT 82 (L) 08/01/2022    CRP 22.8 (H) 04/05/2017    CHOL 172 06/08/2022    TRIG 204 (H) 06/08/2022    HDL 42 06/08/2022    ALT 32 08/01/2022    AST 32 08/01/2022     08/05/2022    K 3.5 08/05/2022     08/05/2022    CREATININE 1.7 (H) 08/05/2022    BUN 15 08/05/2022    CO2 31 (H) 08/05/2022    TSH 1.598 " 06/08/2022    INR 1.1 04/13/2014    HGBA1C 5.2 06/08/2022       Imaging you had:  5/2019 CT of her abdomen and pelvis with contrast  1. No acute intra-abdominal abnormality identified  2. Status post cholecystectomy; chronic, mild dilatation of common bile duct.  3. Bilateral renal cysts.  No urinary stone or obstruction.  4. Status post hysterectomy.    Endoscopies reviewed:    Colonoscopy 3/2018. [facility]. In media Tab  Diverticulosis in the sigmoid colon.  Erythema in the IC valve.  Do not have any path reports.  No repeat recommendation. Air trapped after colon    Enteroscopy 2/2014  - Jejunal ulcer. Biopsied.                        - Normal examined duodenum.                        - Normal esophagus.                        - Normal stomach.    VCE 2014 with Dr. Breen  - Atrophic mucosa in the jejunum. Biopsies nonspecific for celiac or crohn's. Celiac serology in Nov 2013 was neg.    Addendum: EGD 6/18/2019 at  reviewed for epigastric abd pain and heart burn  Normal esophagus, regular z line, normal duodenum, moderate inflammation and congestion in stomach.    Assessment:    Ms. Enrique is a 49 y.o. WF with:    1. Acute diverticulitis    2. Gastroparesis           Recommendations:  1.  Reintroduce foods slowly now  2. Repeat colon as diverticulitis has resolved  3. Recheck labs with elevated Cr which was resolving        Order summary:           Thank you so much for allowing me to participate in the care of Giuliana Breen MD

## 2022-09-09 ENCOUNTER — LAB VISIT (OUTPATIENT)
Dept: LAB | Facility: HOSPITAL | Age: 52
End: 2022-09-09
Attending: INTERNAL MEDICINE
Payer: MEDICARE

## 2022-09-09 ENCOUNTER — PATIENT MESSAGE (OUTPATIENT)
Dept: GASTROENTEROLOGY | Facility: CLINIC | Age: 52
End: 2022-09-09

## 2022-09-09 ENCOUNTER — OFFICE VISIT (OUTPATIENT)
Dept: GASTROENTEROLOGY | Facility: CLINIC | Age: 52
End: 2022-09-09
Payer: MEDICARE

## 2022-09-09 VITALS
WEIGHT: 173.75 LBS | HEART RATE: 67 BPM | DIASTOLIC BLOOD PRESSURE: 79 MMHG | SYSTOLIC BLOOD PRESSURE: 130 MMHG | BODY MASS INDEX: 31.97 KG/M2 | HEIGHT: 62 IN

## 2022-09-09 DIAGNOSIS — K57.92 ACUTE DIVERTICULITIS: ICD-10-CM

## 2022-09-09 DIAGNOSIS — K31.84 GASTROPARESIS: ICD-10-CM

## 2022-09-09 DIAGNOSIS — K57.92 ACUTE DIVERTICULITIS: Primary | ICD-10-CM

## 2022-09-09 LAB
ALBUMIN SERPL BCP-MCNC: 3.8 G/DL (ref 3.5–5.2)
ALP SERPL-CCNC: 86 U/L (ref 55–135)
ALT SERPL W/O P-5'-P-CCNC: 20 U/L (ref 10–44)
ANION GAP SERPL CALC-SCNC: 7 MMOL/L (ref 8–16)
ANION GAP SERPL CALC-SCNC: 7 MMOL/L (ref 8–16)
AST SERPL-CCNC: 23 U/L (ref 10–40)
BILIRUB SERPL-MCNC: 0.4 MG/DL (ref 0.1–1)
BUN SERPL-MCNC: 25 MG/DL (ref 6–20)
BUN SERPL-MCNC: 25 MG/DL (ref 6–20)
CALCIUM SERPL-MCNC: 10 MG/DL (ref 8.7–10.5)
CALCIUM SERPL-MCNC: 10 MG/DL (ref 8.7–10.5)
CHLORIDE SERPL-SCNC: 104 MMOL/L (ref 95–110)
CHLORIDE SERPL-SCNC: 104 MMOL/L (ref 95–110)
CO2 SERPL-SCNC: 30 MMOL/L (ref 23–29)
CO2 SERPL-SCNC: 30 MMOL/L (ref 23–29)
CREAT SERPL-MCNC: 1.2 MG/DL (ref 0.5–1.4)
CREAT SERPL-MCNC: 1.2 MG/DL (ref 0.5–1.4)
EST. GFR  (NO RACE VARIABLE): 54 ML/MIN/1.73 M^2
EST. GFR  (NO RACE VARIABLE): 54 ML/MIN/1.73 M^2
GLUCOSE SERPL-MCNC: 81 MG/DL (ref 70–110)
GLUCOSE SERPL-MCNC: 81 MG/DL (ref 70–110)
POTASSIUM SERPL-SCNC: 4.3 MMOL/L (ref 3.5–5.1)
POTASSIUM SERPL-SCNC: 4.3 MMOL/L (ref 3.5–5.1)
PROT SERPL-MCNC: 7.7 G/DL (ref 6–8.4)
SODIUM SERPL-SCNC: 141 MMOL/L (ref 136–145)
SODIUM SERPL-SCNC: 141 MMOL/L (ref 136–145)

## 2022-09-09 PROCEDURE — 99214 PR OFFICE/OUTPT VISIT, EST, LEVL IV, 30-39 MIN: ICD-10-PCS | Mod: S$GLB,,, | Performed by: INTERNAL MEDICINE

## 2022-09-09 PROCEDURE — 80053 COMPREHEN METABOLIC PANEL: CPT | Performed by: INTERNAL MEDICINE

## 2022-09-09 PROCEDURE — 3078F PR MOST RECENT DIASTOLIC BLOOD PRESSURE < 80 MM HG: ICD-10-PCS | Mod: CPTII,S$GLB,, | Performed by: INTERNAL MEDICINE

## 2022-09-09 PROCEDURE — 3008F BODY MASS INDEX DOCD: CPT | Mod: CPTII,S$GLB,, | Performed by: INTERNAL MEDICINE

## 2022-09-09 PROCEDURE — 99999 PR PBB SHADOW E&M-EST. PATIENT-LVL V: ICD-10-PCS | Mod: PBBFAC,,, | Performed by: INTERNAL MEDICINE

## 2022-09-09 PROCEDURE — 3075F PR MOST RECENT SYSTOLIC BLOOD PRESS GE 130-139MM HG: ICD-10-PCS | Mod: CPTII,S$GLB,, | Performed by: INTERNAL MEDICINE

## 2022-09-09 PROCEDURE — 99214 OFFICE O/P EST MOD 30 MIN: CPT | Mod: S$GLB,,, | Performed by: INTERNAL MEDICINE

## 2022-09-09 PROCEDURE — 4010F ACE/ARB THERAPY RXD/TAKEN: CPT | Mod: CPTII,S$GLB,, | Performed by: INTERNAL MEDICINE

## 2022-09-09 PROCEDURE — 99999 PR PBB SHADOW E&M-EST. PATIENT-LVL V: CPT | Mod: PBBFAC,,, | Performed by: INTERNAL MEDICINE

## 2022-09-09 PROCEDURE — 3075F SYST BP GE 130 - 139MM HG: CPT | Mod: CPTII,S$GLB,, | Performed by: INTERNAL MEDICINE

## 2022-09-09 PROCEDURE — 3044F PR MOST RECENT HEMOGLOBIN A1C LEVEL <7.0%: ICD-10-PCS | Mod: CPTII,S$GLB,, | Performed by: INTERNAL MEDICINE

## 2022-09-09 PROCEDURE — 4010F PR ACE/ARB THEARPY RXD/TAKEN: ICD-10-PCS | Mod: CPTII,S$GLB,, | Performed by: INTERNAL MEDICINE

## 2022-09-09 PROCEDURE — 1159F PR MEDICATION LIST DOCUMENTED IN MEDICAL RECORD: ICD-10-PCS | Mod: CPTII,S$GLB,, | Performed by: INTERNAL MEDICINE

## 2022-09-09 PROCEDURE — 3044F HG A1C LEVEL LT 7.0%: CPT | Mod: CPTII,S$GLB,, | Performed by: INTERNAL MEDICINE

## 2022-09-09 PROCEDURE — 1159F MED LIST DOCD IN RCRD: CPT | Mod: CPTII,S$GLB,, | Performed by: INTERNAL MEDICINE

## 2022-09-09 PROCEDURE — 3008F PR BODY MASS INDEX (BMI) DOCUMENTED: ICD-10-PCS | Mod: CPTII,S$GLB,, | Performed by: INTERNAL MEDICINE

## 2022-09-09 PROCEDURE — 3078F DIAST BP <80 MM HG: CPT | Mod: CPTII,S$GLB,, | Performed by: INTERNAL MEDICINE

## 2022-09-09 PROCEDURE — 36415 COLL VENOUS BLD VENIPUNCTURE: CPT | Performed by: INTERNAL MEDICINE

## 2022-09-17 NOTE — TELEPHONE ENCOUNTER
No new care gaps identified.  [facility] Embedded Care Gaps. Reference number: [id number]. 9/17/2022   8:02:25 AM CDT

## 2022-09-18 RX ORDER — ATENOLOL 50 MG/1
TABLET ORAL
Qty: 180 TABLET | Refills: 3 | Status: SHIPPED | OUTPATIENT
Start: 2022-09-18 | End: 2023-09-22

## 2022-09-18 NOTE — TELEPHONE ENCOUNTER
Refill Authorization Note   Giuliana Adrianna  is requesting a refill authorization.  Brief Assessment and Rationale for Refill:        Medication Therapy Plan:           Comments:     No Care Gaps recommended.     Note composed:6:56 AM 09/18/2022

## 2022-09-29 ENCOUNTER — OFFICE VISIT (OUTPATIENT)
Dept: FAMILY MEDICINE | Facility: CLINIC | Age: 52
End: 2022-09-29
Payer: MEDICARE

## 2022-09-29 VITALS
BODY MASS INDEX: 32.46 KG/M2 | HEIGHT: 62 IN | DIASTOLIC BLOOD PRESSURE: 70 MMHG | SYSTOLIC BLOOD PRESSURE: 124 MMHG | OXYGEN SATURATION: 98 % | WEIGHT: 176.38 LBS | TEMPERATURE: 99 F | HEART RATE: 68 BPM

## 2022-09-29 DIAGNOSIS — R21 ERUPTION OF SCALP: Primary | ICD-10-CM

## 2022-09-29 DIAGNOSIS — L98.9 SKIN LESION OF HAND: ICD-10-CM

## 2022-09-29 DIAGNOSIS — I10 PRIMARY HYPERTENSION: ICD-10-CM

## 2022-09-29 PROBLEM — Z87.19 HISTORY OF DIVERTICULITIS OF COLON: Status: ACTIVE | Noted: 2022-07-24

## 2022-09-29 PROBLEM — N17.9 AKI (ACUTE KIDNEY INJURY): Status: RESOLVED | Noted: 2022-07-24 | Resolved: 2022-09-29

## 2022-09-29 PROCEDURE — 1159F MED LIST DOCD IN RCRD: CPT | Mod: CPTII,S$GLB,, | Performed by: NURSE PRACTITIONER

## 2022-09-29 PROCEDURE — 4010F ACE/ARB THERAPY RXD/TAKEN: CPT | Mod: CPTII,S$GLB,, | Performed by: NURSE PRACTITIONER

## 2022-09-29 PROCEDURE — 99999 PR PBB SHADOW E&M-EST. PATIENT-LVL V: CPT | Mod: PBBFAC,,, | Performed by: NURSE PRACTITIONER

## 2022-09-29 PROCEDURE — 3078F PR MOST RECENT DIASTOLIC BLOOD PRESSURE < 80 MM HG: ICD-10-PCS | Mod: CPTII,S$GLB,, | Performed by: NURSE PRACTITIONER

## 2022-09-29 PROCEDURE — 4010F PR ACE/ARB THEARPY RXD/TAKEN: ICD-10-PCS | Mod: CPTII,S$GLB,, | Performed by: NURSE PRACTITIONER

## 2022-09-29 PROCEDURE — 99214 OFFICE O/P EST MOD 30 MIN: CPT | Mod: S$GLB,,, | Performed by: NURSE PRACTITIONER

## 2022-09-29 PROCEDURE — 3008F PR BODY MASS INDEX (BMI) DOCUMENTED: ICD-10-PCS | Mod: CPTII,S$GLB,, | Performed by: NURSE PRACTITIONER

## 2022-09-29 PROCEDURE — 3074F SYST BP LT 130 MM HG: CPT | Mod: CPTII,S$GLB,, | Performed by: NURSE PRACTITIONER

## 2022-09-29 PROCEDURE — 3074F PR MOST RECENT SYSTOLIC BLOOD PRESSURE < 130 MM HG: ICD-10-PCS | Mod: CPTII,S$GLB,, | Performed by: NURSE PRACTITIONER

## 2022-09-29 PROCEDURE — 99214 PR OFFICE/OUTPT VISIT, EST, LEVL IV, 30-39 MIN: ICD-10-PCS | Mod: S$GLB,,, | Performed by: NURSE PRACTITIONER

## 2022-09-29 PROCEDURE — 1160F PR REVIEW ALL MEDS BY PRESCRIBER/CLIN PHARMACIST DOCUMENTED: ICD-10-PCS | Mod: CPTII,S$GLB,, | Performed by: NURSE PRACTITIONER

## 2022-09-29 PROCEDURE — 3044F PR MOST RECENT HEMOGLOBIN A1C LEVEL <7.0%: ICD-10-PCS | Mod: CPTII,S$GLB,, | Performed by: NURSE PRACTITIONER

## 2022-09-29 PROCEDURE — 3008F BODY MASS INDEX DOCD: CPT | Mod: CPTII,S$GLB,, | Performed by: NURSE PRACTITIONER

## 2022-09-29 PROCEDURE — 1159F PR MEDICATION LIST DOCUMENTED IN MEDICAL RECORD: ICD-10-PCS | Mod: CPTII,S$GLB,, | Performed by: NURSE PRACTITIONER

## 2022-09-29 PROCEDURE — 1160F RVW MEDS BY RX/DR IN RCRD: CPT | Mod: CPTII,S$GLB,, | Performed by: NURSE PRACTITIONER

## 2022-09-29 PROCEDURE — 99999 PR PBB SHADOW E&M-EST. PATIENT-LVL V: ICD-10-PCS | Mod: PBBFAC,,, | Performed by: NURSE PRACTITIONER

## 2022-09-29 PROCEDURE — 3044F HG A1C LEVEL LT 7.0%: CPT | Mod: CPTII,S$GLB,, | Performed by: NURSE PRACTITIONER

## 2022-09-29 PROCEDURE — 3078F DIAST BP <80 MM HG: CPT | Mod: CPTII,S$GLB,, | Performed by: NURSE PRACTITIONER

## 2022-09-29 NOTE — PROGRESS NOTES
"Subjective:      Patient ID: Giuliana Rodas is a 52 y.o. female.  Pt presents to clinic with acute scalp tenderness that began 4 days ago. Denies new medication or product use and has not been in the sun. Continues to use nizoral shampoo 2-3x per week. Denies hair shedding or new thinning. Also with regrowing wart to right hand. States area has been removed with liquid nitrogen in the past but continues to return.     Review of Systems   Constitutional:  Negative for activity change, appetite change, fever and unexpected weight change.   HENT: Negative.     Eyes: Negative.    Respiratory:  Negative for cough, chest tightness and shortness of breath.    Cardiovascular:  Negative for chest pain, palpitations, leg swelling and claudication.   Gastrointestinal:  Negative for abdominal pain, constipation, diarrhea, nausea and vomiting.   Endocrine: Negative.    Genitourinary: Negative.  Negative for difficulty urinating and dysuria.   Musculoskeletal:  Positive for arthralgias and myalgias. Negative for back pain and gait problem.        Chronic fibromyalgia and joint pain without acute exacerbation   Integumentary:  Positive for mole/lesion. Negative for color change, rash and wound.   Allergic/Immunologic: Negative.    Neurological: Negative.  Negative for weakness and headaches.   All other systems reviewed and are negative.      Objective:     Vitals:    09/29/22 1432   BP: 124/70   Pulse: 68   Temp: 98.7 °F (37.1 °C)   TempSrc: Oral   SpO2: 98%   Weight: 80 kg (176 lb 5.9 oz)   Height: 5' 2" (1.575 m)     Physical Exam  Vitals and nursing note reviewed.   Constitutional:       General: She is not in acute distress.     Appearance: Normal appearance. She is well-developed, well-groomed and overweight. She is not ill-appearing.   HENT:      Head: Normocephalic and atraumatic. Hair is abnormal (see picture of scalp).      Right Ear: External ear normal.      Left Ear: External ear normal.      Nose: Nose normal.      " Mouth/Throat:      Lips: Pink.      Mouth: Mucous membranes are moist.   Eyes:      General: Lids are normal. Vision grossly intact. Gaze aligned appropriately.      Conjunctiva/sclera: Conjunctivae normal.      Pupils: Pupils are equal, round, and reactive to light.   Neck:      Trachea: Phonation normal.   Cardiovascular:      Rate and Rhythm: Normal rate and regular rhythm.      Heart sounds: Normal heart sounds.   Pulmonary:      Effort: Pulmonary effort is normal. No accessory muscle usage or respiratory distress.      Breath sounds: Normal breath sounds and air entry.   Abdominal:      General: Abdomen is flat. Bowel sounds are normal. There is no distension.      Palpations: Abdomen is soft.      Tenderness: There is no abdominal tenderness.   Musculoskeletal:      Cervical back: Neck supple.      Right lower leg: No edema.      Left lower leg: No edema.   Skin:     General: Skin is warm and dry.      Findings: Lesion (small monochromic wart on dorsal hand with surrounding scarring) and rash (see picuture of scalp) present.   Neurological:      General: No focal deficit present.      Mental Status: She is alert and oriented to person, place, and time. Mental status is at baseline.      Sensory: Sensation is intact.      Motor: Motor function is intact.      Coordination: Coordination is intact.      Gait: Gait is intact.   Psychiatric:         Attention and Perception: Attention and perception normal.         Mood and Affect: Mood and affect normal.         Speech: Speech normal.         Behavior: Behavior normal. Behavior is cooperative.         Thought Content: Thought content normal.         Cognition and Memory: Cognition and memory normal.         Judgment: Judgment normal.         Assessment and Plan:     1. Eruption of scalp  Noninfectious tender macular rash to crown of scalp without follicular involvement  F/u with derm for further eval   - Ambulatory referral/consult to Dermatology; Future    2.  Skin lesion of hand  Noninfectious uncomplicated recurrent skin lesion  f/u with derm for further eval   - Ambulatory referral/consult to Dermatology; Future    3. Primary hypertension  Controlled on amlodipine/olmesartan/atenolol, ok to continue to HOLD HCTZ to keep BP <130/80          JESUS Rowell, FNP-C  Family/Internal Medicine  [facility]

## 2022-10-04 ENCOUNTER — TELEPHONE (OUTPATIENT)
Dept: FAMILY MEDICINE | Facility: CLINIC | Age: 52
End: 2022-10-04
Payer: MEDICARE

## 2022-10-12 ENCOUNTER — CLINICAL SUPPORT (OUTPATIENT)
Dept: ENDOSCOPY | Facility: HOSPITAL | Age: 52
End: 2022-10-12
Attending: INTERNAL MEDICINE
Payer: MEDICARE

## 2022-10-12 ENCOUNTER — TELEPHONE (OUTPATIENT)
Dept: ENDOSCOPY | Facility: HOSPITAL | Age: 52
End: 2022-10-12

## 2022-10-12 DIAGNOSIS — K57.92 ACUTE DIVERTICULITIS: ICD-10-CM

## 2022-10-12 RX ORDER — POLYETHYLENE GLYCOL 3350, SODIUM SULFATE ANHYDROUS, SODIUM BICARBONATE, SODIUM CHLORIDE, POTASSIUM CHLORIDE 236; 22.74; 6.74; 5.86; 2.97 G/4L; G/4L; G/4L; G/4L; G/4L
4 POWDER, FOR SOLUTION ORAL ONCE
Qty: 4000 ML | Refills: 0 | Status: SHIPPED | OUTPATIENT
Start: 2022-10-12 | End: 2022-10-12

## 2022-11-07 ENCOUNTER — ANESTHESIA EVENT (OUTPATIENT)
Dept: ENDOSCOPY | Facility: HOSPITAL | Age: 52
End: 2022-11-07
Payer: MEDICARE

## 2022-11-07 RX ORDER — SODIUM CHLORIDE 9 MG/ML
INJECTION, SOLUTION INTRAVENOUS CONTINUOUS
Status: CANCELLED | OUTPATIENT
Start: 2022-11-07

## 2022-11-08 ENCOUNTER — HOSPITAL ENCOUNTER (OUTPATIENT)
Facility: HOSPITAL | Age: 52
Discharge: HOME | End: 2022-11-08
Attending: INTERNAL MEDICINE | Admitting: INTERNAL MEDICINE
Payer: MEDICARE

## 2022-11-08 ENCOUNTER — ANESTHESIA (OUTPATIENT)
Dept: ENDOSCOPY | Facility: HOSPITAL | Age: 52
End: 2022-11-08
Payer: MEDICARE

## 2022-11-08 VITALS
DIASTOLIC BLOOD PRESSURE: 85 MMHG | HEART RATE: 50 BPM | RESPIRATION RATE: 16 BRPM | SYSTOLIC BLOOD PRESSURE: 168 MMHG | OXYGEN SATURATION: 99 % | TEMPERATURE: 98 F

## 2022-11-08 DIAGNOSIS — Z12.11 SPECIAL SCREENING FOR MALIGNANT NEOPLASMS, COLON: Primary | ICD-10-CM

## 2022-11-08 PROCEDURE — 45380 PR COLONOSCOPY,BIOPSY: ICD-10-PCS | Mod: PT,,, | Performed by: INTERNAL MEDICINE

## 2022-11-08 PROCEDURE — 88305 TISSUE EXAM BY PATHOLOGIST: CPT | Mod: 26,,, | Performed by: PATHOLOGY

## 2022-11-08 PROCEDURE — 27201012 HC FORCEPS, HOT/COLD, DISP: Performed by: INTERNAL MEDICINE

## 2022-11-08 PROCEDURE — 45380 COLONOSCOPY AND BIOPSY: CPT | Mod: PT | Performed by: INTERNAL MEDICINE

## 2022-11-08 PROCEDURE — 63600175 PHARM REV CODE 636 W HCPCS: Performed by: STUDENT IN AN ORGANIZED HEALTH CARE EDUCATION/TRAINING PROGRAM

## 2022-11-08 PROCEDURE — D9220A PRA ANESTHESIA: ICD-10-PCS | Mod: PT,CRNA,, | Performed by: STUDENT IN AN ORGANIZED HEALTH CARE EDUCATION/TRAINING PROGRAM

## 2022-11-08 PROCEDURE — 25000003 PHARM REV CODE 250: Performed by: STUDENT IN AN ORGANIZED HEALTH CARE EDUCATION/TRAINING PROGRAM

## 2022-11-08 PROCEDURE — 88305 TISSUE EXAM BY PATHOLOGIST: CPT | Performed by: PATHOLOGY

## 2022-11-08 PROCEDURE — 45380 COLONOSCOPY AND BIOPSY: CPT | Mod: PT,,, | Performed by: INTERNAL MEDICINE

## 2022-11-08 PROCEDURE — D9220A PRA ANESTHESIA: Mod: PT,CRNA,, | Performed by: STUDENT IN AN ORGANIZED HEALTH CARE EDUCATION/TRAINING PROGRAM

## 2022-11-08 PROCEDURE — D9220A PRA ANESTHESIA: Mod: PT,ANES,, | Performed by: ANESTHESIOLOGY

## 2022-11-08 PROCEDURE — 37000009 HC ANESTHESIA EA ADD 15 MINS: Performed by: INTERNAL MEDICINE

## 2022-11-08 PROCEDURE — 37000008 HC ANESTHESIA 1ST 15 MINUTES: Performed by: INTERNAL MEDICINE

## 2022-11-08 PROCEDURE — 88305 TISSUE EXAM BY PATHOLOGIST: ICD-10-PCS | Mod: 26,,, | Performed by: PATHOLOGY

## 2022-11-08 PROCEDURE — D9220A PRA ANESTHESIA: ICD-10-PCS | Mod: PT,ANES,, | Performed by: ANESTHESIOLOGY

## 2022-11-08 RX ORDER — LIDOCAINE HYDROCHLORIDE 20 MG/ML
INJECTION, SOLUTION EPIDURAL; INFILTRATION; INTRACAUDAL; PERINEURAL
Status: DISCONTINUED
Start: 2022-11-08 | End: 2022-11-08 | Stop reason: HOSPADM

## 2022-11-08 RX ORDER — SODIUM CHLORIDE 9 MG/ML
INJECTION, SOLUTION INTRAVENOUS CONTINUOUS
Status: CANCELLED | OUTPATIENT
Start: 2022-11-08

## 2022-11-08 RX ORDER — LIDOCAINE HYDROCHLORIDE 20 MG/ML
INJECTION INTRAVENOUS
Status: DISCONTINUED | OUTPATIENT
Start: 2022-11-08 | End: 2022-11-08

## 2022-11-08 RX ORDER — PROPOFOL 10 MG/ML
VIAL (ML) INTRAVENOUS
Status: DISCONTINUED | OUTPATIENT
Start: 2022-11-08 | End: 2022-11-08

## 2022-11-08 RX ORDER — PROPOFOL 10 MG/ML
INJECTION, EMULSION INTRAVENOUS
Status: DISCONTINUED
Start: 2022-11-08 | End: 2022-11-08 | Stop reason: HOSPADM

## 2022-11-08 RX ADMIN — SODIUM CHLORIDE: 0.9 INJECTION, SOLUTION INTRAVENOUS at 08:11

## 2022-11-08 RX ADMIN — PROPOFOL 50 MG: 10 INJECTION, EMULSION INTRAVENOUS at 08:11

## 2022-11-08 RX ADMIN — PROPOFOL 100 MG: 10 INJECTION, EMULSION INTRAVENOUS at 08:11

## 2022-11-08 RX ADMIN — LIDOCAINE HYDROCHLORIDE 100 MG: 20 INJECTION, SOLUTION INTRAVENOUS at 08:11

## 2022-11-08 NOTE — TRANSFER OF CARE
Anesthesia Transfer of Care Note    Patient: Giuliana Rodas    Procedure(s) Performed: Procedure(s) (LRB):  COLONOSCOPY (N/A)    Patient location: GI    Anesthesia Type: general    Transport from OR: Transported from OR on room air with adequate spontaneous ventilation    Post pain: adequate analgesia    Post assessment: no apparent anesthetic complications and tolerated procedure well    Post vital signs: stable    Level of consciousness: awake and alert    Nausea/Vomiting: no nausea/vomiting    Complications: none    Transfer of care protocol was followed      Last vitals:   Visit Vitals  /74 (BP Location: Left arm, Patient Position: Lying)   Pulse (!) 59   Temp 36.5 °C (97.7 °F) (Axillary)   Resp 16   SpO2 99%   Breastfeeding No

## 2022-11-08 NOTE — ANESTHESIA POSTPROCEDURE EVALUATION
Anesthesia Post Evaluation    Patient: Giuliana Rodas    Procedure(s) Performed: Procedure(s) (LRB):  COLONOSCOPY (N/A)    Final Anesthesia Type: general      Patient location during evaluation: GI PACU  Patient participation: Yes- Able to Participate  Level of consciousness: awake and alert  Post-procedure vital signs: reviewed and stable  Pain management: adequate  Airway patency: patent    PONV status at discharge: No PONV  Anesthetic complications: no      Cardiovascular status: blood pressure returned to baseline  Respiratory status: unassisted and spontaneous ventilation  Hydration status: euvolemic  Follow-up not needed.          Vitals Value Taken Time   /85 11/08/22 0927   Temp 36.5 °C (97.7 °F) 11/08/22 0851   Pulse 50 11/08/22 0927   Resp 16 11/08/22 0927   SpO2 99 % 11/08/22 0927         Event Time   Out of Recovery 09:57:17         Pain/Germania Score: Germania Score: 8 (11/8/2022  9:28 AM)

## 2022-11-08 NOTE — PROVATION PATIENT INSTRUCTIONS
Discharge Summary/Instructions after an Endoscopic Procedure  Patient Name: Giuliana Rodas  Patient MRN: [id number]  Patient YOB: 1970 Tuesday, November 8, 2022  Damon Mcmahon MD  Dear patient,  As a result of recent federal legislation (The Federal Cures Act), you may   receive lab or pathology results from your procedure in your [facility]   account before your physician is able to contact you. Your physician or   their representative will relay the results to you with their   recommendations at their soonest availability.  Thank you,  RESTRICTIONS:  During your procedure today, you received medications for sedation.  These   medications may affect your judgment, balance and coordination.  Therefore,   for 24 hours, you have the following restrictions:   - DO NOT drive a car, operate machinery, make legal/financial decisions,   sign important papers or drink alcohol.    ACTIVITY:  Today: no heavy lifting, straining or running due to procedural   sedation/anesthesia.  The following day: return to full activity including work.  DIET:  Eat and drink normally unless instructed otherwise.     TREATMENT FOR COMMON SIDE EFFECTS:  - Mild abdominal pain, nausea, belching, bloating or excessive gas:  rest,   eat lightly and use a heating pad.  - Sore Throat: treat with throat lozenges and/or gargle with warm salt   water.  - Because air was used during the procedure, expelling large amounts of air   from your rectum or belching is normal.  - If a bowel prep was taken, you may not have a bowel movement for 1-3 days.    This is normal.  SYMPTOMS TO WATCH FOR AND REPORT TO YOUR PHYSICIAN:  1. Abdominal pain or bloating, other than gas cramps.  2. Chest pain.  3. Back pain.  4. Signs of infection such as: chills or fever occurring within 24 hours   after the procedure.  5. Rectal bleeding, which would show as bright red, maroon, or black stools.   (A tablespoon of blood from the rectum is not serious, especially if    hemorrhoids are present.)  6. Vomiting.  7. Weakness or dizziness.  GO DIRECTLY TO THE NEAREST EMERGENCY ROOM IF YOU HAVE ANY OF THE FOLLOWING:      Difficulty breathing              Chills and/or fever over 101 F   Persistent vomiting and/or vomiting blood   Severe abdominal pain   Severe chest pain   Black, tarry stools   Bleeding- more than one tablespoon   Any other symptom or condition that you feel may need urgent attention  Your doctor recommends these additional instructions:  If any biopsies were taken, your doctors clinic will contact you in 1 to 2   weeks with any results.  - Patient has a contact number available for emergencies.  The signs and   symptoms of potential delayed complications were discussed with the   patient.  Return to normal activities tomorrow.  Written discharge   instructions were provided to the patient.   - Discharge patient to home (ambulatory).   - Resume previous diet.   - Continue present medications.   - Await pathology results.   - Repeat colonoscopy in 10 years for screening purposes.  For questions, problems or results please call your physician - Damon Mcmahon MD at Work:  [phone].  [facility] can be reached at [phone]     IF A COMPLICATION OR EMERGENCY SITUATION ARISES AND YOU ARE UNABLE TO REACH   YOUR PHYSICIAN - GO DIRECTLY TO THE EMERGENCY ROOM.  Damon Mcmahon MD  11/8/2022 8:50:08 AM  This report has been verified and signed electronically.  Dear patient,  As a result of recent federal legislation (The Federal Cures Act), you may   receive lab or pathology results from your procedure in your [facility]   account before your physician is able to contact you. Your physician or   their representative will relay the results to you with their   recommendations at their soonest availability.  Thank you,  PROVATION

## 2022-11-08 NOTE — OR NURSING
Patient is awake and oriented. Dr explains Diagnoses of diverticulosis to patient and maintaining a high fiber diet. Patient understands and verbalizes her dianosis and aftercare.

## 2022-11-08 NOTE — ANESTHESIA PREPROCEDURE EVALUATION
11/08/2022  Giuliana Rodas is a 52 y.o., female.  Pre-operative evaluation for Procedure(s) (LRB):  COLONOSCOPY (N/A)    NPO >8 food; 2h prep  METS >4    There were no vitals filed for this visit.      Patient Active Problem List   Diagnosis    Hypertension    Fibromyalgia    GERD (gastroesophageal reflux disease)    IBS (irritable bowel syndrome)    Hyperlipidemia    Gastroparesis    Chronic migraine without aura, with intractable migraine, so stated, with status migrainosus    Occipital neuralgia    Tension headache    Anemia    Chronic pain of right knee    Lower extremity weakness    Decreased ROM of right knee    Interstitial cystitis    History of diverticulitis of colon    Thrombocytopenia    Class 1 obesity in adult    Urinary retention    Hypokalemia       Review of patient's allergies indicates:   Allergen Reactions    Chlorthalidone Swelling     Hypokalemia     Dicyclomine Edema             Adhesive Rash    Amitriptyline Hallucinations    Depo-provera [medroxyprogesterone] Anxiety    Prozac [fluoxetine] Anxiety    Topiramate Rash       No current facility-administered medications on file prior to encounter.     Current Outpatient Medications on File Prior to Encounter   Medication Sig Dispense Refill    amlodipine-olmesartan (ANGEL) 5-40 mg per tablet TAKE ONE CAPSULE BY MOUTH EVERY DAY. HOLD until follow up with PCP 90 tablet 3    atenoloL (TENORMIN) 50 MG tablet TAKE ONE TABLET BY MOUTH TWICE DAILY AS NEEDED 180 tablet 3    atorvastatin (LIPITOR) 40 MG tablet TAKE 1 TABLET BY MOUTH EVERY DAY 90 tablet 3    BIFIDOBACTERIUM INFANTIS (ALIGN ORAL) Take 1 tablet by mouth once daily.      BIOFREEZE, MENTHOL, TOP Apply topically.      coal tar (NEUTROGENA T-GEL) 0.5 % shampoo Apply topically nightly as needed.      fluocinonide (LIDEX) 0.05 % external solution Apply  topically 2 (two) times daily as needed (rash, itching at scalp). Avoid use on face, body folds, groin/genitalia. 60 mL 3    glucosamine sulfate 500 mg Tab Take by mouth.      hydroCHLOROthiazide (HYDRODIURIL) 12.5 MG Tab TAKE ONE TABLET BY MOUTH DAILY. HOLD until follow up with PCP (Patient not taking: Reported on 9/9/2022) 30 tablet 11    ketoconazole (NIZORAL) 2 % shampoo wash hair AT least TWO OR THREE TIMES weekly. let sit on scalp AT least 5 MINUTES prior to rinsing 120 mL 11    magnesium 30 mg Tab Take 250 mg by mouth once.       menthol/camphor (ICY HOT ADVANCED TOP) Apply topically.      methocarbamoL (ROBAXIN) 500 MG Tab TAKE ONE TABLET BY MOUTH EVERY 8 HOURS AS NEEDED FOR HEADACHE AND MUSCLE SPASM 60 tablet 3    multivit with min-folic acid 200 mcg Chew Take by mouth.      multivit-min-folic acid-biotin (HAIR,SKIN AND NAILS,FA-BIOTIN,) 66.7-1,000 mcg Tab Take by mouth.      olopatadine HCl (PATADAY OPHT) Apply to eye.      omega-3 fatty acids/fish oil (FISH OIL-OMEGA-3 FATTY ACIDS) 300-1,000 mg capsule Take 1 capsule by mouth once daily.      ondansetron (ZOFRAN) 4 MG tablet Take 1 tablet (4 mg total) by mouth every 8 (eight) hours as needed for Nausea. 12 tablet 0    PREMARIN vaginal cream PRN      promethazine (PHENERGAN) 25 MG tablet Take 0.5 tablets (12.5 mg total) by mouth every 6 (six) hours as needed for Nausea. 10 tablet 0    propylene glycol (SYSTANE BALANCE OPHT) Apply to eye.      tretinoin (RETIN-A) 0.025 % cream Apply topically nightly. Apply pea-sized amount to entire face nightly. Start slow, up-titrate as tolerated. 45 g 5    trolamine salicylate (ASPERCREME) 10 % cream Apply topically as needed.      vitamin D (VITAMIN D3) 1000 units Tab Take 1,000 Units by mouth once daily.      vitamin E 400 UNIT capsule Take 400 Units by mouth once daily.         Past Surgical History:   Procedure Laterality Date    BREAST BIOPSY Right     over 10 years ago/ milk duct     CHOLECYSTECTOMY      ESOPHAGOGASTRODUODENOSCOPY N/A 3/12/2020    Procedure: EGD (ESOPHAGOGASTRODUODENOSCOPY);  Surgeon: Damon Mcmahon MD;  Location: [address]);  Service: Endoscopy;  Laterality: N/A;  use pcf scope    HEMORRHOID SURGERY      HYSTERECTOMY      KNEE SURGERY      OOPHORECTOMY      one ov removed       Social History     Socioeconomic History    Marital status:     Number of children: 2   Occupational History    Occupation:      Employer: [employer]   Tobacco Use    Smoking status: Never    Smokeless tobacco: Never   Substance and Sexual Activity    Alcohol use: No    Drug use: No    Sexual activity: Yes     Partners: Male     Birth control/protection: See Surgical Hx   Social History Narrative    Lives at home with  and daughter     Social Determinants of Health     Financial Resource Strain: Medium Risk    Difficulty of Paying Living Expenses: Somewhat hard   Food Insecurity: Food Insecurity Present    Worried About Running Out of Food in the Last Year: Sometimes true    Ran Out of Food in the Last Year: Never true   Transportation Needs: No Transportation Needs    Lack of Transportation (Medical): No    Lack of Transportation (Non-Medical): No   Physical Activity: Sufficiently Active    Days of Exercise per Week: 3 days    Minutes of Exercise per Session: 60 min   Stress: No Stress Concern Present    Feeling of Stress : Not at all   Social Connections: Unknown    Frequency of Communication with Friends and Family: More than three times a week    Frequency of Social Gatherings with Friends and Family: Once a week    Active Member of Clubs or Organizations: Yes    Attends Club or Organization Meetings: 1 to 4 times per year    Marital Status:    Housing Stability: Low Risk     Unable to Pay for Housing in the Last Year: No    Number of Places Lived in the Last Year: 1    Unstable Housing in the Last Year: No         Lab Results    Component Value Date    WBC 3.74 (L) 08/01/2022    HGB 10.2 (L) 08/01/2022    HCT 29.3 (L) 08/01/2022    MCV 85 08/01/2022    PLT 82 (L) 08/01/2022       CMP  Sodium   Date Value Ref Range Status   09/09/2022 141 136 - 145 mmol/L Final   09/09/2022 141 136 - 145 mmol/L Final     Potassium   Date Value Ref Range Status   09/09/2022 4.3 3.5 - 5.1 mmol/L Final   09/09/2022 4.3 3.5 - 5.1 mmol/L Final     Chloride   Date Value Ref Range Status   09/09/2022 104 95 - 110 mmol/L Final   09/09/2022 104 95 - 110 mmol/L Final     CO2   Date Value Ref Range Status   09/09/2022 30 (H) 23 - 29 mmol/L Final   09/09/2022 30 (H) 23 - 29 mmol/L Final     Glucose   Date Value Ref Range Status   09/09/2022 81 70 - 110 mg/dL Final   09/09/2022 81 70 - 110 mg/dL Final     BUN   Date Value Ref Range Status   09/09/2022 25 (H) 6 - 20 mg/dL Final   09/09/2022 25 (H) 6 - 20 mg/dL Final     Creatinine   Date Value Ref Range Status   09/09/2022 1.2 0.5 - 1.4 mg/dL Final   09/09/2022 1.2 0.5 - 1.4 mg/dL Final     Calcium   Date Value Ref Range Status   09/09/2022 10.0 8.7 - 10.5 mg/dL Final   09/09/2022 10.0 8.7 - 10.5 mg/dL Final     Total Protein   Date Value Ref Range Status   09/09/2022 7.7 6.0 - 8.4 g/dL Final     Albumin   Date Value Ref Range Status   09/09/2022 3.8 3.5 - 5.2 g/dL Final     Total Bilirubin   Date Value Ref Range Status   09/09/2022 0.4 0.1 - 1.0 mg/dL Final     Comment:     For infants and newborns, interpretation of results should be based  on gestational age, weight and in agreement with clinical  observations.    Premature Infant recommended reference ranges:  Up to 24 hours.............<8.0 mg/dL  Up to 48 hours............<12.0 mg/dL  3-5 days..................<15.0 mg/dL  6-29 days.................<15.0 mg/dL       Alkaline Phosphatase   Date Value Ref Range Status   09/09/2022 86 55 - 135 U/L Final     AST   Date Value Ref Range Status   09/09/2022 23 10 - 40 U/L Final     ALT   Date Value Ref Range Status    09/09/2022 20 10 - 44 U/L Final     Anion Gap   Date Value Ref Range Status   09/09/2022 7 (L) 8 - 16 mmol/L Final   09/09/2022 7 (L) 8 - 16 mmol/L Final     eGFR if    Date Value Ref Range Status   07/30/2022 27 (A) >60 mL/min/1.73 m^2 Final     eGFR if non    Date Value Ref Range Status   07/30/2022 23 (A) >60 mL/min/1.73 m^2 Final     Comment:     Calculation used to obtain the estimated glomerular filtration  rate (eGFR) is the CKD-EPI equation.            Diagnostic Studies:      EKG:  Vent. Rate : 069 BPM     Atrial Rate : 069 BPM      P-R Int : 174 ms          QRS Dur : 078 ms       QT Int : 440 ms       P-R-T Axes : 064 058 057 degrees      QTc Int : 471 ms     Normal sinus rhythm   Low voltage QRS   Cannot rule out Anterior infarct ,age undetermined   Abnormal ECG   When compared with ECG of 24-JUL-2022 05:01,   Significant changes have occurred   Confirmed by Basilio Low MD ([id number]) on 8/1/2022 4:59:34 PM     2D Echo:  No results found for this or any previous visit.      Pre-op Assessment    I have reviewed the Patient Summary Reports.    I have reviewed the NPO Status.   I have reviewed the Medications.     Review of Systems  Anesthesia Hx:  No problems with previous Anesthesia  History of prior surgery of interest to airway management or planning:  Denies Personal Hx of Anesthesia complications.   Social:  Non-Smoker    Hematology/Oncology:        Hematology Comments: Plt 82K; thrombocytopenia   EENT/Dental:EENT/Dental Normal   Cardiovascular:   Exercise tolerance: good Hypertension hyperlipidemia ECG has been reviewed.    Pulmonary:  Pulmonary Normal    Hepatic/GI:   GERD IBS; gastroparesis   Neurological:   Neuromuscular Disease, Headaches fibromyalgia  Chronic Pain Syndrome   Endocrine:  Obesity / BMI > 30      Physical Exam  General: Cooperative, Alert and Oriented    Airway:  Mallampati: III   Mouth Opening: Normal  TM Distance: > 6  cm      Dental:  Intact        Anesthesia Plan  Type of Anesthesia, risks & benefits discussed:    Anesthesia Type: Gen Natural Airway, Gen ETT  Intra-op Monitoring Plan: Standard ASA Monitors  Induction:  IV  Informed Consent: Informed consent signed with the Patient and all parties understand the risks and agree with anesthesia plan.  All questions answered.   ASA Score: 2    Ready For Surgery From Anesthesia Perspective.     .

## 2022-11-08 NOTE — H&P
[facility] Endoscopy  Gastroenterology  H&P    Patient Name: Giuliana Rodas  MRN: [id number]  Admission Date: 11/8/2022  Code Status: Prior    Attending Provider: damon evans  Primary Care Physician: Giuliana Rojas MD  Principal Problem:<principal problem not specified>    Subjective:     History of Present Illness: history polyps, recent diverticulitis    Past Medical History:   Diagnosis Date    Bile reflux gastritis     Breast cyst     Eczema     Fibrocystic breast     Fibromyalgia     Gastroparesis     dr. harden    GERD (gastroesophageal reflux disease)     Hyperglyceridemia     Hyperlipidemia     Hypertension     IC (interstitial cystitis)     dr. labadie    Psoriasis     Tension headache        Past Surgical History:   Procedure Laterality Date    BREAST BIOPSY Right     over 10 years ago/ milk duct    CHOLECYSTECTOMY      ESOPHAGOGASTRODUODENOSCOPY N/A 3/12/2020    Procedure: EGD (ESOPHAGOGASTRODUODENOSCOPY);  Surgeon: Damon Evans MD;  Location: [address];  Service: Endoscopy;  Laterality: N/A;  use pcf scope    HEMORRHOID SURGERY      HYSTERECTOMY      KNEE SURGERY      OOPHORECTOMY      one ov removed       Review of patient's allergies indicates:   Allergen Reactions    Chlorthalidone Swelling     Hypokalemia     Dicyclomine Edema             Adhesive Rash    Amitriptyline Hallucinations    Depo-provera [medroxyprogesterone] Anxiety    Prozac [fluoxetine] Anxiety    Topiramate Rash     Family History       Problem Relation (Age of Onset)    Breast cancer Mother, Paternal Grandmother    Diabetes Father    Heart disease Father    Hyperlipidemia Father    Hypertension Mother, Father    Migraines Mother    Stroke Father          Tobacco Use    Smoking status: Never    Smokeless tobacco: Never   Substance and Sexual Activity    Alcohol use: No    Drug use: No    Sexual activity: Yes     Partners: Male     Birth control/protection: See Surgical Hx     Review of Systems   Respiratory: Negative.      Cardiovascular: Negative.    Objective:     Vital Signs (Most Recent):  Temp: 98.1 °F (36.7 °C) (11/08/22 0755)  Pulse: 66 (11/08/22 0755)  Resp: 17 (11/08/22 0755)  BP: (!) 168/80 (11/08/22 0757)  SpO2: 96 % (11/08/22 0755)   Vital Signs (24h Range):  Temp:  [98.1 °F (36.7 °C)] 98.1 °F (36.7 °C)  Pulse:  [66] 66  Resp:  [17] 17  SpO2:  [96 %] 96 %  BP: (168)/(80) 168/80        There is no height or weight on file to calculate BMI.    No intake or output data in the 24 hours ending 11/08/22 0817    Lines/Drains/Airways       Peripheral Intravenous Line  Duration                  Peripheral IV - Single Lumen 08/01/22 0900 20 G Anterior;Distal;Right Forearm 99 days         Peripheral IV - Single Lumen 11/08/22 0806 22 G Right Hand <1 day                    Physical Exam  Cardiovascular:      Rate and Rhythm: Normal rate and regular rhythm.   Pulmonary:      Effort: Pulmonary effort is normal.      Breath sounds: Normal breath sounds.       Significant Labs:      Significant Imaging:      Assessment/Plan:     There are no hospital problems to display for this patient.      Indication for procedure:    ASA:II  Airway normal  Malampati class:    Personal and family history negative for anesthesia problems    Plan:colon  Anesthesia plan: general      Damon Mcmahon MD  Gastroenterology  [facility] [place] - Endoscopy

## 2022-11-10 LAB
FINAL PATHOLOGIC DIAGNOSIS: NORMAL
GROSS: NORMAL
Lab: NORMAL

## 2022-11-11 ENCOUNTER — PATIENT MESSAGE (OUTPATIENT)
Dept: GASTROENTEROLOGY | Facility: CLINIC | Age: 52
End: 2022-11-11
Payer: MEDICARE

## 2022-12-29 ENCOUNTER — OFFICE VISIT (OUTPATIENT)
Dept: FAMILY MEDICINE | Facility: CLINIC | Age: 52
End: 2022-12-29
Payer: MEDICARE

## 2022-12-29 VITALS
HEIGHT: 62 IN | BODY MASS INDEX: 34.89 KG/M2 | OXYGEN SATURATION: 96 % | WEIGHT: 189.63 LBS | HEART RATE: 65 BPM | TEMPERATURE: 99 F | SYSTOLIC BLOOD PRESSURE: 138 MMHG | DIASTOLIC BLOOD PRESSURE: 80 MMHG

## 2022-12-29 DIAGNOSIS — E78.5 HYPERLIPIDEMIA, UNSPECIFIED HYPERLIPIDEMIA TYPE: ICD-10-CM

## 2022-12-29 DIAGNOSIS — M79.646 THUMB PAIN, UNSPECIFIED LATERALITY: ICD-10-CM

## 2022-12-29 DIAGNOSIS — I10 PRIMARY HYPERTENSION: Primary | ICD-10-CM

## 2022-12-29 PROCEDURE — 3008F PR BODY MASS INDEX (BMI) DOCUMENTED: ICD-10-PCS | Mod: CPTII,S$GLB,, | Performed by: FAMILY MEDICINE

## 2022-12-29 PROCEDURE — 99999 PR PBB SHADOW E&M-EST. PATIENT-LVL V: ICD-10-PCS | Mod: PBBFAC,,, | Performed by: FAMILY MEDICINE

## 2022-12-29 PROCEDURE — 1159F MED LIST DOCD IN RCRD: CPT | Mod: CPTII,S$GLB,, | Performed by: FAMILY MEDICINE

## 2022-12-29 PROCEDURE — 4010F PR ACE/ARB THEARPY RXD/TAKEN: ICD-10-PCS | Mod: CPTII,S$GLB,, | Performed by: FAMILY MEDICINE

## 2022-12-29 PROCEDURE — 3044F PR MOST RECENT HEMOGLOBIN A1C LEVEL <7.0%: ICD-10-PCS | Mod: CPTII,S$GLB,, | Performed by: FAMILY MEDICINE

## 2022-12-29 PROCEDURE — 99214 PR OFFICE/OUTPT VISIT, EST, LEVL IV, 30-39 MIN: ICD-10-PCS | Mod: S$GLB,,, | Performed by: FAMILY MEDICINE

## 2022-12-29 PROCEDURE — 3079F PR MOST RECENT DIASTOLIC BLOOD PRESSURE 80-89 MM HG: ICD-10-PCS | Mod: CPTII,S$GLB,, | Performed by: FAMILY MEDICINE

## 2022-12-29 PROCEDURE — 1159F PR MEDICATION LIST DOCUMENTED IN MEDICAL RECORD: ICD-10-PCS | Mod: CPTII,S$GLB,, | Performed by: FAMILY MEDICINE

## 2022-12-29 PROCEDURE — 99214 OFFICE O/P EST MOD 30 MIN: CPT | Mod: S$GLB,,, | Performed by: FAMILY MEDICINE

## 2022-12-29 PROCEDURE — 3079F DIAST BP 80-89 MM HG: CPT | Mod: CPTII,S$GLB,, | Performed by: FAMILY MEDICINE

## 2022-12-29 PROCEDURE — 99999 PR PBB SHADOW E&M-EST. PATIENT-LVL V: CPT | Mod: PBBFAC,,, | Performed by: FAMILY MEDICINE

## 2022-12-29 PROCEDURE — 3075F SYST BP GE 130 - 139MM HG: CPT | Mod: CPTII,S$GLB,, | Performed by: FAMILY MEDICINE

## 2022-12-29 PROCEDURE — 3044F HG A1C LEVEL LT 7.0%: CPT | Mod: CPTII,S$GLB,, | Performed by: FAMILY MEDICINE

## 2022-12-29 PROCEDURE — 4010F ACE/ARB THERAPY RXD/TAKEN: CPT | Mod: CPTII,S$GLB,, | Performed by: FAMILY MEDICINE

## 2022-12-29 PROCEDURE — 3075F PR MOST RECENT SYSTOLIC BLOOD PRESS GE 130-139MM HG: ICD-10-PCS | Mod: CPTII,S$GLB,, | Performed by: FAMILY MEDICINE

## 2022-12-29 PROCEDURE — 3008F BODY MASS INDEX DOCD: CPT | Mod: CPTII,S$GLB,, | Performed by: FAMILY MEDICINE

## 2022-12-29 RX ORDER — METHOCARBAMOL 500 MG/1
TABLET, FILM COATED ORAL
Qty: 60 TABLET | Refills: 3 | Status: CANCELLED | OUTPATIENT
Start: 2022-12-29

## 2022-12-29 NOTE — PROGRESS NOTES
Subjective:       Patient ID: Giuliana Rodas is a 52 y.o. female.    Chief Complaint: Thumb Pain on right hand    52 year old female presntes with right thumb pain. He has no swelling or inflammation. She has no redness. She noticed this after removing staples from paperwork at work. She has not taken anything for this.     Past Medical History:   Diagnosis Date    Bile reflux gastritis     Breast cyst     Eczema     Fibrocystic breast     Fibromyalgia     Gastroparesis     dr. harden    GERD (gastroesophageal reflux disease)     Hyperglyceridemia     Hyperlipidemia     Hypertension     IC (interstitial cystitis)     dr. labadie    Psoriasis     Tension headache       Past Surgical History:   Procedure Laterality Date    BREAST BIOPSY Right     over 10 years ago/ milk duct    CHOLECYSTECTOMY      COLONOSCOPY N/A 11/8/2022    Procedure: COLONOSCOPY;  Surgeon: Damon Mcmahon MD;  Location: [facility];  Service: Endoscopy;  Laterality: N/A;  vacc-inst portal-tb-smith or ray    ESOPHAGOGASTRODUODENOSCOPY N/A 3/12/2020    Procedure: EGD (ESOPHAGOGASTRODUODENOSCOPY);  Surgeon: Damon Mcmahon MD;  Location: [facility] ([address] [place] [address]);  Service: Endoscopy;  Laterality: N/A;  use pcf scope    HEMORRHOID SURGERY      HYSTERECTOMY      KNEE SURGERY      OOPHORECTOMY      one ov removed     Family History   Problem Relation Age of Onset    Hypertension Mother     Migraines Mother     Breast cancer Mother     Hypertension Father     Stroke Father     Heart disease Father     Hyperlipidemia Father     Diabetes Father     Breast cancer Paternal Grandmother     Colon cancer Neg Hx     Ovarian cancer Neg Hx     Inflammatory bowel disease Neg Hx     Celiac disease Neg Hx      Social History     Socioeconomic History    Marital status:     Number of children: 2   Occupational History    Occupation:      Employer: [employer]   Tobacco Use    Smoking status: Never    Smokeless tobacco: Never   Substance and Sexual Activity     Alcohol use: No    Drug use: No    Sexual activity: Yes     Partners: Male     Birth control/protection: See Surgical Hx   Social History Narrative    Lives at home with  and daughter     Social Determinants of Health     Financial Resource Strain: Medium Risk    Difficulty of Paying Living Expenses: Somewhat hard   Food Insecurity: Food Insecurity Present    Worried About Running Out of Food in the Last Year: Sometimes true    Ran Out of Food in the Last Year: Never true   Transportation Needs: No Transportation Needs    Lack of Transportation (Medical): No    Lack of Transportation (Non-Medical): No   Physical Activity: Sufficiently Active    Days of Exercise per Week: 3 days    Minutes of Exercise per Session: 60 min   Stress: No Stress Concern Present    Feeling of Stress : Not at all   Social Connections: Unknown    Frequency of Communication with Friends and Family: More than three times a week    Frequency of Social Gatherings with Friends and Family: Once a week    Active Member of Clubs or Organizations: Yes    Attends Club or Organization Meetings: 1 to 4 times per year    Marital Status:    Housing Stability: Low Risk     Unable to Pay for Housing in the Last Year: No    Number of Places Lived in the Last Year: 1    Unstable Housing in the Last Year: No       Review of Systems      Objective:      Vitals:    12/29/22 0943   BP: 138/80   Pulse: 65   Temp: 98.7 °F (37.1 °C)       Physical Exam  Constitutional:       Appearance: Normal appearance.   HENT:      Head: Normocephalic and atraumatic.   Musculoskeletal:      Right hand: Tenderness present. No bony tenderness. Normal strength. There is no disruption of two-point discrimination. Normal capillary refill. Normal pulse.   Neurological:      Mental Status: She is alert.       Assessment:       Problem List Items Addressed This Visit       Hypertension - Primary    Relevant Orders    CBC Auto Differential    Comprehensive Metabolic Panel     Lipid Panel    Hyperlipidemia    Relevant Orders    CBC Auto Differential    Comprehensive Metabolic Panel    Lipid Panel     Other Visit Diagnoses       Thumb pain, unspecified laterality                  Plan:       Giuliana was seen today for thumb pain on right hand.    Diagnoses and all orders for this visit:    Primary hypertension  -     CBC Auto Differential; Future  -     Comprehensive Metabolic Panel; Future  -     Lipid Panel; Future  Stable AND DUE FOR LABS     Hyperlipidemia, unspecified hyperlipidemia type  -     CBC Auto Differential; Future  -     Comprehensive Metabolic Panel; Future  -     Lipid Panel; Future    Thumb pain, unspecified laterality  Tylenol prn pain due to kidney disease.

## 2022-12-30 ENCOUNTER — LAB VISIT (OUTPATIENT)
Dept: LAB | Facility: HOSPITAL | Age: 52
End: 2022-12-30
Attending: FAMILY MEDICINE
Payer: MEDICARE

## 2022-12-30 ENCOUNTER — TELEPHONE (OUTPATIENT)
Dept: FAMILY MEDICINE | Facility: CLINIC | Age: 52
End: 2022-12-30
Payer: MEDICARE

## 2022-12-30 DIAGNOSIS — I10 PRIMARY HYPERTENSION: ICD-10-CM

## 2022-12-30 DIAGNOSIS — E78.5 HYPERLIPIDEMIA, UNSPECIFIED HYPERLIPIDEMIA TYPE: ICD-10-CM

## 2022-12-30 LAB
ALBUMIN SERPL BCP-MCNC: 3.8 G/DL (ref 3.5–5.2)
ALP SERPL-CCNC: 78 U/L (ref 55–135)
ALT SERPL W/O P-5'-P-CCNC: 29 U/L (ref 10–44)
ANION GAP SERPL CALC-SCNC: 9 MMOL/L (ref 8–16)
AST SERPL-CCNC: 26 U/L (ref 10–40)
BASOPHILS # BLD AUTO: 0.03 K/UL (ref 0–0.2)
BASOPHILS NFR BLD: 0.7 % (ref 0–1.9)
BILIRUB SERPL-MCNC: 0.4 MG/DL (ref 0.1–1)
BUN SERPL-MCNC: 21 MG/DL (ref 6–20)
CALCIUM SERPL-MCNC: 9.3 MG/DL (ref 8.7–10.5)
CHLORIDE SERPL-SCNC: 107 MMOL/L (ref 95–110)
CHOLEST SERPL-MCNC: 163 MG/DL (ref 120–199)
CHOLEST/HDLC SERPL: 3.5 {RATIO} (ref 2–5)
CO2 SERPL-SCNC: 27 MMOL/L (ref 23–29)
CREAT SERPL-MCNC: 1 MG/DL (ref 0.5–1.4)
DIFFERENTIAL METHOD: ABNORMAL
EOSINOPHIL # BLD AUTO: 0.1 K/UL (ref 0–0.5)
EOSINOPHIL NFR BLD: 3.2 % (ref 0–8)
ERYTHROCYTE [DISTWIDTH] IN BLOOD BY AUTOMATED COUNT: 12.1 % (ref 11.5–14.5)
EST. GFR  (NO RACE VARIABLE): >60 ML/MIN/1.73 M^2
GLUCOSE SERPL-MCNC: 98 MG/DL (ref 70–110)
HCT VFR BLD AUTO: 42.9 % (ref 37–48.5)
HDLC SERPL-MCNC: 46 MG/DL (ref 40–75)
HDLC SERPL: 28.2 % (ref 20–50)
HGB BLD-MCNC: 14.4 G/DL (ref 12–16)
IMM GRANULOCYTES # BLD AUTO: 0.01 K/UL (ref 0–0.04)
IMM GRANULOCYTES NFR BLD AUTO: 0.2 % (ref 0–0.5)
LDLC SERPL CALC-MCNC: 79.2 MG/DL (ref 63–159)
LYMPHOCYTES # BLD AUTO: 1.7 K/UL (ref 1–4.8)
LYMPHOCYTES NFR BLD: 39.2 % (ref 18–48)
MCH RBC QN AUTO: 28.6 PG (ref 27–31)
MCHC RBC AUTO-ENTMCNC: 33.6 G/DL (ref 32–36)
MCV RBC AUTO: 85 FL (ref 82–98)
MONOCYTES # BLD AUTO: 0.4 K/UL (ref 0.3–1)
MONOCYTES NFR BLD: 8.2 % (ref 4–15)
NEUTROPHILS # BLD AUTO: 2.1 K/UL (ref 1.8–7.7)
NEUTROPHILS NFR BLD: 48.5 % (ref 38–73)
NONHDLC SERPL-MCNC: 117 MG/DL
NRBC BLD-RTO: 0 /100 WBC
PLATELET # BLD AUTO: 131 K/UL (ref 150–450)
PMV BLD AUTO: 9.8 FL (ref 9.2–12.9)
POTASSIUM SERPL-SCNC: 4.1 MMOL/L (ref 3.5–5.1)
PROT SERPL-MCNC: 7.6 G/DL (ref 6–8.4)
RBC # BLD AUTO: 5.03 M/UL (ref 4–5.4)
SODIUM SERPL-SCNC: 143 MMOL/L (ref 136–145)
TRIGL SERPL-MCNC: 189 MG/DL (ref 30–150)
WBC # BLD AUTO: 4.41 K/UL (ref 3.9–12.7)

## 2022-12-30 PROCEDURE — 36415 COLL VENOUS BLD VENIPUNCTURE: CPT | Mod: PO | Performed by: FAMILY MEDICINE

## 2022-12-30 PROCEDURE — 80061 LIPID PANEL: CPT | Performed by: FAMILY MEDICINE

## 2022-12-30 PROCEDURE — 80053 COMPREHEN METABOLIC PANEL: CPT | Performed by: FAMILY MEDICINE

## 2022-12-30 PROCEDURE — 85025 COMPLETE CBC W/AUTO DIFF WBC: CPT | Performed by: FAMILY MEDICINE

## 2022-12-30 NOTE — TELEPHONE ENCOUNTER
----- Message from Kendy Barnes sent at 12/30/2022  9:32 AM CST -----  Regarding: Prescription  Patient came into the office and requested a prescription for Robaxin. She can be reached at [phone]

## 2022-12-30 NOTE — TELEPHONE ENCOUNTER
Per Dr. Rojas I explained that she leonor have to call Dr. SANDHYA Santacruz md for refill. Patient stated good understanding.

## 2023-01-04 ENCOUNTER — OFFICE VISIT (OUTPATIENT)
Dept: DERMATOLOGY | Facility: CLINIC | Age: 53
End: 2023-01-04
Payer: MEDICARE

## 2023-01-04 DIAGNOSIS — L71.9 ROSACEA: ICD-10-CM

## 2023-01-04 DIAGNOSIS — D22.9 MULTIPLE BENIGN NEVI: ICD-10-CM

## 2023-01-04 DIAGNOSIS — L81.4 LENTIGO: ICD-10-CM

## 2023-01-04 DIAGNOSIS — Z12.83 SCREENING EXAM FOR SKIN CANCER: ICD-10-CM

## 2023-01-04 DIAGNOSIS — B07.9 VERRUCA VULGARIS: Primary | ICD-10-CM

## 2023-01-04 DIAGNOSIS — L82.1 SEBORRHEIC KERATOSES: ICD-10-CM

## 2023-01-04 DIAGNOSIS — H01.139 ECZEMATOUS DERMATITIS OF EYELID, UNSPECIFIED LATERALITY: ICD-10-CM

## 2023-01-04 PROCEDURE — 1159F MED LIST DOCD IN RCRD: CPT | Mod: CPTII,S$GLB,, | Performed by: STUDENT IN AN ORGANIZED HEALTH CARE EDUCATION/TRAINING PROGRAM

## 2023-01-04 PROCEDURE — 99999 PR PBB SHADOW E&M-EST. PATIENT-LVL III: CPT | Mod: PBBFAC,,, | Performed by: STUDENT IN AN ORGANIZED HEALTH CARE EDUCATION/TRAINING PROGRAM

## 2023-01-04 PROCEDURE — 1160F RVW MEDS BY RX/DR IN RCRD: CPT | Mod: CPTII,S$GLB,, | Performed by: STUDENT IN AN ORGANIZED HEALTH CARE EDUCATION/TRAINING PROGRAM

## 2023-01-04 PROCEDURE — 99214 OFFICE O/P EST MOD 30 MIN: CPT | Mod: 25,S$GLB,, | Performed by: STUDENT IN AN ORGANIZED HEALTH CARE EDUCATION/TRAINING PROGRAM

## 2023-01-04 PROCEDURE — 99999 PR PBB SHADOW E&M-EST. PATIENT-LVL III: ICD-10-PCS | Mod: PBBFAC,,, | Performed by: STUDENT IN AN ORGANIZED HEALTH CARE EDUCATION/TRAINING PROGRAM

## 2023-01-04 PROCEDURE — 99214 PR OFFICE/OUTPT VISIT, EST, LEVL IV, 30-39 MIN: ICD-10-PCS | Mod: 25,S$GLB,, | Performed by: STUDENT IN AN ORGANIZED HEALTH CARE EDUCATION/TRAINING PROGRAM

## 2023-01-04 PROCEDURE — 1160F PR REVIEW ALL MEDS BY PRESCRIBER/CLIN PHARMACIST DOCUMENTED: ICD-10-PCS | Mod: CPTII,S$GLB,, | Performed by: STUDENT IN AN ORGANIZED HEALTH CARE EDUCATION/TRAINING PROGRAM

## 2023-01-04 PROCEDURE — 17110 DESTRUCTION B9 LES UP TO 14: CPT | Mod: S$GLB,,, | Performed by: STUDENT IN AN ORGANIZED HEALTH CARE EDUCATION/TRAINING PROGRAM

## 2023-01-04 PROCEDURE — 1159F PR MEDICATION LIST DOCUMENTED IN MEDICAL RECORD: ICD-10-PCS | Mod: CPTII,S$GLB,, | Performed by: STUDENT IN AN ORGANIZED HEALTH CARE EDUCATION/TRAINING PROGRAM

## 2023-01-04 PROCEDURE — 17110 PR DESTRUCTION BENIGN LESIONS UP TO 14: ICD-10-PCS | Mod: S$GLB,,, | Performed by: STUDENT IN AN ORGANIZED HEALTH CARE EDUCATION/TRAINING PROGRAM

## 2023-01-04 RX ORDER — METRONIDAZOLE 10 MG/G
GEL TOPICAL 2 TIMES DAILY
Qty: 60 G | Refills: 2 | Status: SHIPPED | OUTPATIENT
Start: 2023-01-04 | End: 2024-01-04

## 2023-01-04 RX ORDER — KETOCONAZOLE 20 MG/G
CREAM TOPICAL DAILY
Qty: 60 G | Refills: 1 | Status: SHIPPED | OUTPATIENT
Start: 2023-01-04

## 2023-01-04 RX ORDER — HYDROCORTISONE 25 MG/G
CREAM TOPICAL 2 TIMES DAILY
Qty: 28 G | Refills: 2 | Status: SHIPPED | OUTPATIENT
Start: 2023-01-04

## 2023-01-04 NOTE — PATIENT INSTRUCTIONS

## 2023-01-04 NOTE — PROGRESS NOTES
Subjective:       Patient ID:  Giuliana Rodas is a 52 y.o. female who presents for   Chief Complaint   Patient presents with    Skin Check     History of Present Illness: The patient presents for follow up of skin check.    The patient was last seen on: 04/21/2021 by  for psoriasis- present c/o psorisasis to eyelid - and skin lesion    Other skin complaints:     Use TAC to eyelids intermittently    Review of Systems   Constitutional:  Negative for fever, chills, fatigue and malaise.   Skin:  Positive for activity-related sunscreen use. Negative for daily sunscreen use.   Hematologic/Lymphatic: Bruises/bleeds easily.      Objective:    Physical Exam   Constitutional: She appears well-developed and well-nourished. No distress.   Neurological: She is alert and oriented to person, place, and time. She is not disoriented.   Psychiatric: She has a normal mood and affect.   Skin:   Areas Examined (abnormalities noted in diagram):   Scalp / Hair Palpated and Inspected  Head / Face Inspection Performed  Neck Inspection Performed  Chest / Axilla Inspection Performed  Abdomen Inspection Performed  Genitals / Buttocks / Groin Inspection Performed  Back Inspection Performed  RUE Inspected  LUE Inspection Performed  RLE Inspected  LLE Inspection Performed  Nails and Digits Inspection Performed                 Diagram Legend     Erythematous scaling macule/papule c/w actinic keratosis       Vascular papule c/w angioma      Pigmented verrucoid papule/plaque c/w seborrheic keratosis      Yellow umbilicated papule c/w sebaceous hyperplasia      Irregularly shaped tan macule c/w lentigo     1-2 mm smooth white papules consistent with Milia      Movable subcutaneous cyst with punctum c/w epidermal inclusion cyst      Subcutaneous movable cyst c/w pilar cyst      Firm pink to brown papule c/w dermatofibroma      Pedunculated fleshy papule(s) c/w skin tag(s)      Evenly pigmented macule c/w junctional nevus     Mildly  variegated pigmented, slightly irregular-bordered macule c/w mildly atypical nevus      Flesh colored to evenly pigmented papule c/w intradermal nevus       Pink pearly papule/plaque c/w basal cell carcinoma      Erythematous hyperkeratotic cursted plaque c/w SCC      Surgical scar with no sign of skin cancer recurrence      Open and closed comedones      Inflammatory papules and pustules      Verrucoid papule consistent consistent with wart     Erythematous eczematous patches and plaques     Dystrophic onycholytic nail with subungual debris c/w onychomycosis     Umbilicated papule    Erythematous-base heme-crusted tan verrucoid plaque consistent with inflamed seborrheic keratosis     Erythematous Silvery Scaling Plaque c/w Psoriasis     See annotation      Assessment / Plan:        Verruca vulgaris  Cryosurgery procedure note:    Verbal consent from the patient is obtained including, but not limited to, risk of hypopigmentation/hyperpigmentation, scar, recurrence of lesion. Liquid nitrogen cryosurgery is applied to 1 verruca , as detailed in the physical exam, to produce a freeze injury. 2 consecutive freeze thaw cycles are applied to each verruca. The patient is aware that blisters (possibly blood blisters) may form.      Eczematous dermatitis of eyelid vs seb derm / sebopsoriasis  - do not use triamcinolone to eyelids. Discussed RBSE of potent TCS to face  -     ketoconazole (NIZORAL) 2 % cream; Apply topically once daily. For use on eyelids, ears, etc daily  Dispense: 60 g; Refill: 1  -     hydrocortisone 2.5 % cream; Apply topically 2 (two) times daily. For use when flaring on eyelids for up to 2 weeks then take a 1 week break or decrease to BIW PRN  Dispense: 28 g; Refill: 2    Rosacea--few papules and pustules on central face  - start metrogel BID    Multiple benign nevi  Reassurance given to patient. No treatment is necessary.     Lentigo  Reassurance given to patient. No treatment is necessary.   Treatment  of benign, asymptomatic lesions may be considered cosmetic.    Seborrheic keratoses  Reassurance given to patient. No treatment is necessary.   Treatment of benign, asymptomatic lesions may be considered cosmetic.    Screening exam for skin cancer  Total body skin examination performed today including at least 12 points as noted in physical examination. No lesions suspicious for malignancy noted.    Recommend daily sun protection/avoidance, use of at least SPF 30, broad spectrum sunscreen (OTC drug), skin self examinations, and routine physician surveillance to optimize early detection             Follow up in about 1 year (around 1/4/2024).

## 2023-01-15 ENCOUNTER — OFFICE VISIT (OUTPATIENT)
Dept: URGENT CARE | Facility: CLINIC | Age: 53
End: 2023-01-15
Payer: MEDICARE

## 2023-01-15 VITALS
SYSTOLIC BLOOD PRESSURE: 142 MMHG | HEIGHT: 62 IN | HEART RATE: 74 BPM | BODY MASS INDEX: 34.78 KG/M2 | WEIGHT: 189 LBS | OXYGEN SATURATION: 96 % | RESPIRATION RATE: 18 BRPM | DIASTOLIC BLOOD PRESSURE: 86 MMHG | TEMPERATURE: 99 F

## 2023-01-15 DIAGNOSIS — J02.9 SORE THROAT: ICD-10-CM

## 2023-01-15 DIAGNOSIS — J01.90 ACUTE BACTERIAL SINUSITIS: Primary | ICD-10-CM

## 2023-01-15 DIAGNOSIS — B96.89 ACUTE BACTERIAL SINUSITIS: Primary | ICD-10-CM

## 2023-01-15 LAB
CTP QC/QA: YES
SARS-COV-2 AG RESP QL IA.RAPID: NEGATIVE

## 2023-01-15 PROCEDURE — 3077F SYST BP >= 140 MM HG: CPT | Mod: CPTII,S$GLB,, | Performed by: EMERGENCY MEDICINE

## 2023-01-15 PROCEDURE — 3079F DIAST BP 80-89 MM HG: CPT | Mod: CPTII,S$GLB,, | Performed by: EMERGENCY MEDICINE

## 2023-01-15 PROCEDURE — 87811 SARS CORONAVIRUS 2 ANTIGEN POCT, MANUAL READ: ICD-10-PCS | Mod: QW,S$GLB,, | Performed by: EMERGENCY MEDICINE

## 2023-01-15 PROCEDURE — 3079F PR MOST RECENT DIASTOLIC BLOOD PRESSURE 80-89 MM HG: ICD-10-PCS | Mod: CPTII,S$GLB,, | Performed by: EMERGENCY MEDICINE

## 2023-01-15 PROCEDURE — 96372 PR INJECTION,THERAP/PROPH/DIAG2ST, IM OR SUBCUT: ICD-10-PCS | Mod: S$GLB,,, | Performed by: EMERGENCY MEDICINE

## 2023-01-15 PROCEDURE — 1159F PR MEDICATION LIST DOCUMENTED IN MEDICAL RECORD: ICD-10-PCS | Mod: CPTII,S$GLB,, | Performed by: EMERGENCY MEDICINE

## 2023-01-15 PROCEDURE — 87811 SARS-COV-2 COVID19 W/OPTIC: CPT | Mod: QW,S$GLB,, | Performed by: EMERGENCY MEDICINE

## 2023-01-15 PROCEDURE — 1159F MED LIST DOCD IN RCRD: CPT | Mod: CPTII,S$GLB,, | Performed by: EMERGENCY MEDICINE

## 2023-01-15 PROCEDURE — 99214 PR OFFICE/OUTPT VISIT, EST, LEVL IV, 30-39 MIN: ICD-10-PCS | Mod: 25,S$GLB,, | Performed by: EMERGENCY MEDICINE

## 2023-01-15 PROCEDURE — 3077F PR MOST RECENT SYSTOLIC BLOOD PRESSURE >= 140 MM HG: ICD-10-PCS | Mod: CPTII,S$GLB,, | Performed by: EMERGENCY MEDICINE

## 2023-01-15 PROCEDURE — 3008F PR BODY MASS INDEX (BMI) DOCUMENTED: ICD-10-PCS | Mod: CPTII,S$GLB,, | Performed by: EMERGENCY MEDICINE

## 2023-01-15 PROCEDURE — 99214 OFFICE O/P EST MOD 30 MIN: CPT | Mod: 25,S$GLB,, | Performed by: EMERGENCY MEDICINE

## 2023-01-15 PROCEDURE — 96372 THER/PROPH/DIAG INJ SC/IM: CPT | Mod: S$GLB,,, | Performed by: EMERGENCY MEDICINE

## 2023-01-15 PROCEDURE — 3008F BODY MASS INDEX DOCD: CPT | Mod: CPTII,S$GLB,, | Performed by: EMERGENCY MEDICINE

## 2023-01-15 RX ORDER — AMOXICILLIN AND CLAVULANATE POTASSIUM 875; 125 MG/1; MG/1
1 TABLET, FILM COATED ORAL EVERY 12 HOURS
Qty: 20 TABLET | Refills: 0 | Status: SHIPPED | OUTPATIENT
Start: 2023-01-15 | End: 2023-01-15 | Stop reason: SDUPTHER

## 2023-01-15 RX ORDER — DEXAMETHASONE SODIUM PHOSPHATE 100 MG/10ML
10 INJECTION INTRAMUSCULAR; INTRAVENOUS
Status: COMPLETED | OUTPATIENT
Start: 2023-01-15 | End: 2023-01-15

## 2023-01-15 RX ORDER — AMOXICILLIN AND CLAVULANATE POTASSIUM 875; 125 MG/1; MG/1
1 TABLET, FILM COATED ORAL EVERY 12 HOURS
Qty: 20 TABLET | Refills: 0 | Status: SHIPPED | OUTPATIENT
Start: 2023-01-15 | End: 2023-01-25

## 2023-01-15 RX ADMIN — DEXAMETHASONE SODIUM PHOSPHATE 10 MG: 100 INJECTION INTRAMUSCULAR; INTRAVENOUS at 09:01

## 2023-01-15 NOTE — PROGRESS NOTES
"Subjective:       Patient ID: Giuliana Rodas is a 52 y.o. female.    Vitals:  height is 5' 2" (1.575 m) and weight is 85.7 kg (189 lb). Her oral temperature is 98.5 °F (36.9 °C). Her blood pressure is 142/86 (abnormal) and her pulse is 74. Her respiration is 18 and oxygen saturation is 96%.     Chief Complaint: Sore Throat and Headache    Pt complains of sore throat, headaches, and nasal congestion. Symptoms started about less than a week ago and are unchanged. Pt took dayquil and nyquil for treatment with little to mild. Mucinex was taken this morning and pt added that it has seemly been working. Possible exposure to Covid/ flu     Patient is a 52-year-old female with over 1 week history of URI symptoms including sinus congestion mild cough sore throat which did get better however worsened yesterday.  She does not know whether she was exposed to anyone with COVID however did take the week off.  She presents now with sinus pain and facial pain as well as left ear pain.  No fevers no chills.  Physical exam shows tenderness to palpation of the sinuses and clear fluid to the left TM.  Lungs are clear and vital signs normal.  Given the fact that this is over a week of symptoms with worsening sinus pain will cover with antibiotics.  She is requesting steroid shot and given a Decadron shot here in clinic.  Encouraged over-the-counter Claritin and Coricidin HBP secondary to blood pressure history.  She will follow-up with primary care physician    Sore Throat   This is a new problem. The current episode started in the past 7 days. The problem has been gradually improving. The maximum temperature recorded prior to her arrival was more than 104 F (fever felt, taken lst night at 9pm). Fever duration: not felt. The pain is at a severity of 5/10. Associated symptoms include ear pain and headaches. Pertinent negatives include no abdominal pain, coughing, diarrhea, neck pain, shortness of breath or vomiting. The treatment " provided mild relief.   Headache   Associated symptoms include ear pain, sinus pressure and a sore throat. Pertinent negatives include no abdominal pain, back pain, coughing, dizziness, eye pain, fever, nausea, neck pain or vomiting.       ROS    Constitution: Negative for chills, fatigue and fever.   HENT:  Positive for ear pain, sinus pain, sinus pressure and sore throat.    Neck: Negative for neck pain and neck stiffness.   Cardiovascular:  Negative for chest pain, palpitations and sob on exertion.   Eyes:  Negative for eye pain and vision loss.   Respiratory:  Negative for cough, shortness of breath and asthma.    Gastrointestinal:  Negative for abdominal pain, nausea, vomiting and diarrhea.   Genitourinary:  Negative for dysuria, frequency and hematuria.   Musculoskeletal:  Negative for pain, abnormal ROM of joint and back pain.   Skin:  Negative for rash and wound.   Allergic/Immunologic: Negative for seasonal allergies and asthma.   Neurological:  Positive for headaches. Negative for dizziness, light-headedness and altered mental status.   Psychiatric/Behavioral:  Negative for altered mental status and confusion.      Objective:      Physical Exam   Constitutional: She is oriented to person, place, and time. She appears well-developed. She is cooperative.  Non-toxic appearance. She does not appear ill. No distress.   HENT:   Head: Normocephalic and atraumatic.      Comments: TTP LEFT MAXILLARY SINUSES AND TO LESSER DEGREE RIGHT. CLEAR FLUID LEFT TM. OP CLEAR  Ears:   Right Ear: Hearing, tympanic membrane, external ear and ear canal normal.   Left Ear: Hearing, tympanic membrane, external ear and ear canal normal.   Nose: Congestion present. No mucosal edema, rhinorrhea or nasal deformity. No epistaxis. Right sinus exhibits no maxillary sinus tenderness and no frontal sinus tenderness. Left sinus exhibits no maxillary sinus tenderness and no frontal sinus tenderness.   Mouth/Throat: Uvula is midline,  oropharynx is clear and moist and mucous membranes are normal. No trismus in the jaw. Normal dentition. No uvula swelling. No oropharyngeal exudate, posterior oropharyngeal edema or posterior oropharyngeal erythema.   Eyes: Conjunctivae and lids are normal. No scleral icterus.   Neck: Trachea normal and phonation normal. Neck supple. No edema present. No erythema present. No neck rigidity present.   Cardiovascular: Normal rate, regular rhythm, normal heart sounds and normal pulses.   Pulmonary/Chest: Effort normal and breath sounds normal. No respiratory distress. She has no decreased breath sounds. She has no rhonchi.   Abdominal: Normal appearance.   Musculoskeletal: Normal range of motion.         General: No deformity. Normal range of motion.   Neurological: She is alert and oriented to person, place, and time. She exhibits normal muscle tone. Coordination normal.   Skin: Skin is warm, dry, intact, not diaphoretic and not pale.   Psychiatric: Her speech is normal and behavior is normal. Judgment and thought content normal.   Nursing note and vitals reviewed.      COVID SWAB NEGATIVE    Assessment:       1. Acute bacterial sinusitis    2. Sore throat          Plan:         Acute bacterial sinusitis    Sore throat  -     SARS Coronavirus 2 Antigen, POCT Manual Read    Other orders  -     dexAMETHasone injection 10 mg  -     amoxicillin-clavulanate 875-125mg (AUGMENTIN) 875-125 mg per tablet; Take 1 tablet by mouth every 12 (twelve) hours. for 10 days  Dispense: 20 tablet; Refill: 0         Patient Instructions   COVID SWAB NEGATIVE   REST AND HYDRATE WITH PLENTY OF FLUIDS  TYLENOL AND IBUPROFEN FOR SORE THROAT OR BODY ACHES  DECADRON SHOT GIVEN IN CLINIC   AUGMENTIN ANTIBIOTIC PRESCRIPTION FOR PERSISTENT SINUSITIS SYMPTOMS OVER A WEEK  SEE SINUSITIS SHEET   TAKE OVER-THE-COUNTER CLARITIN AND CORICIDIN HBP FOR SYMPTOMATIC RELIEF SECONDARY TO YOUR HIGH BLOOD PRESSURE   FOLLOW-UP WITH YOUR PRIMARY CARE PHYSICIAN.

## 2023-01-15 NOTE — PATIENT INSTRUCTIONS
COVID SWAB NEGATIVE   REST AND HYDRATE WITH PLENTY OF FLUIDS  TYLENOL AND IBUPROFEN FOR SORE THROAT OR BODY ACHES  DECADRON SHOT GIVEN IN CLINIC   AUGMENTIN ANTIBIOTIC PRESCRIPTION FOR PERSISTENT SINUSITIS SYMPTOMS OVER A WEEK  SEE SINUSITIS SHEET   TAKE OVER-THE-COUNTER CLARITIN AND CORICIDIN HBP FOR SYMPTOMATIC RELIEF SECONDARY TO YOUR HIGH BLOOD PRESSURE   FOLLOW-UP WITH YOUR PRIMARY CARE PHYSICIAN.

## 2023-01-30 ENCOUNTER — TELEPHONE (OUTPATIENT)
Dept: GASTROENTEROLOGY | Facility: CLINIC | Age: 53
End: 2023-01-30
Payer: MEDICARE

## 2023-01-30 ENCOUNTER — PATIENT MESSAGE (OUTPATIENT)
Dept: GASTROENTEROLOGY | Facility: CLINIC | Age: 53
End: 2023-01-30
Payer: MEDICARE

## 2023-01-30 ENCOUNTER — LAB VISIT (OUTPATIENT)
Dept: LAB | Facility: HOSPITAL | Age: 53
End: 2023-01-30
Attending: INTERNAL MEDICINE
Payer: MEDICARE

## 2023-01-30 ENCOUNTER — DOCUMENTATION ONLY (OUTPATIENT)
Dept: GASTROENTEROLOGY | Facility: HOSPITAL | Age: 53
End: 2023-01-30
Payer: MEDICARE

## 2023-01-30 DIAGNOSIS — K58.9 IRRITABLE BOWEL SYNDROME, UNSPECIFIED TYPE: Primary | ICD-10-CM

## 2023-01-30 DIAGNOSIS — K58.9 IRRITABLE BOWEL SYNDROME, UNSPECIFIED TYPE: ICD-10-CM

## 2023-01-30 LAB
BASOPHILS # BLD AUTO: 0.04 K/UL (ref 0–0.2)
BASOPHILS NFR BLD: 0.6 % (ref 0–1.9)
CRP SERPL-MCNC: 27.6 MG/L (ref 0–8.2)
DIFFERENTIAL METHOD: ABNORMAL
EOSINOPHIL # BLD AUTO: 0.2 K/UL (ref 0–0.5)
EOSINOPHIL NFR BLD: 2.3 % (ref 0–8)
ERYTHROCYTE [DISTWIDTH] IN BLOOD BY AUTOMATED COUNT: 13 % (ref 11.5–14.5)
HCT VFR BLD AUTO: 39.9 % (ref 37–48.5)
HGB BLD-MCNC: 13.4 G/DL (ref 12–16)
IMM GRANULOCYTES # BLD AUTO: 0.02 K/UL (ref 0–0.04)
IMM GRANULOCYTES NFR BLD AUTO: 0.3 % (ref 0–0.5)
LYMPHOCYTES # BLD AUTO: 2.1 K/UL (ref 1–4.8)
LYMPHOCYTES NFR BLD: 30.7 % (ref 18–48)
MCH RBC QN AUTO: 28.8 PG (ref 27–31)
MCHC RBC AUTO-ENTMCNC: 33.6 G/DL (ref 32–36)
MCV RBC AUTO: 86 FL (ref 82–98)
MONOCYTES # BLD AUTO: 0.5 K/UL (ref 0.3–1)
MONOCYTES NFR BLD: 7.6 % (ref 4–15)
NEUTROPHILS # BLD AUTO: 4.1 K/UL (ref 1.8–7.7)
NEUTROPHILS NFR BLD: 58.5 % (ref 38–73)
NRBC BLD-RTO: 0 /100 WBC
PLATELET # BLD AUTO: 138 K/UL (ref 150–450)
PMV BLD AUTO: 9.4 FL (ref 9.2–12.9)
RBC # BLD AUTO: 4.65 M/UL (ref 4–5.4)
WBC # BLD AUTO: 6.94 K/UL (ref 3.9–12.7)

## 2023-01-30 PROCEDURE — 86140 C-REACTIVE PROTEIN: CPT | Performed by: INTERNAL MEDICINE

## 2023-01-30 PROCEDURE — 85025 COMPLETE CBC W/AUTO DIFF WBC: CPT | Performed by: INTERNAL MEDICINE

## 2023-01-30 PROCEDURE — 36415 COLL VENOUS BLD VENIPUNCTURE: CPT | Performed by: INTERNAL MEDICINE

## 2023-01-30 RX ORDER — AMOXICILLIN AND CLAVULANATE POTASSIUM 875; 125 MG/1; MG/1
1 TABLET, FILM COATED ORAL 2 TIMES DAILY
Qty: 20 TABLET | Refills: 1 | Status: SHIPPED | OUTPATIENT
Start: 2023-01-30 | End: 2023-05-26

## 2023-01-30 NOTE — TELEPHONE ENCOUNTER
----- Message from Damon Mcmahon MD sent at 1/30/2023  2:39 PM CST -----  Contact: pt  Urgent call.  Abdominal pain and bloating x2 days   Similar to diverticulitis in the past  No fever    Recommended labs now to include CBC CRP   Recommend liquid diet  Will call in prescription for Augmentin  Stay if touch if there is any worsening  ----- Message -----  From: Gi Blandon MA  Sent: 1/30/2023   9:34 AM CST  To: Damon Mcmahon MD    Spoke with patient. One week ago she started with abdominal cramping, bloating, and tightness in abdomen.  Aching back as well.   Went away for a couple of days and came back.  Same symptoms but now with LLQ abdominal pain.   Pain is a 9.  No fever.   Chanelle  ----- Message -----  From: Juanjose Romo  Sent: 1/30/2023   9:20 AM CST  To: Lisandro [facility] Staff    Pt requesting urgent call back RE: Pt states she is having a bad flare up, would like to speak with someone soon as possible    Confirmed contact below:  Contact Name:Giuliana Rodas  Phone Number: [phone]

## 2023-01-30 NOTE — TELEPHONE ENCOUNTER
----- Message from Damon Mcmahon MD sent at 1/30/2023  2:38 PM CST -----  Contact: pt  Ordered labs can she have them at [place]?  ----- Message -----  From: Gi Blandon MA  Sent: 1/30/2023   9:34 AM CST  To: Damon Mcmahon MD    Spoke with patient. One week ago she started with abdominal cramping, bloating, and tightness in abdomen.  Aching back as well.   Went away for a couple of days and came back.  Same symptoms but now with LLQ abdominal pain.   Pain is a 9.  No fever.   Chanelle  ----- Message -----  From: Juanjose Romo  Sent: 1/30/2023   9:20 AM CST  To: Lisandro AVILA Staff    Pt requesting urgent call back RE: Pt states she is having a bad flare up, would like to speak with someone soon as possible    Confirmed contact below:  Contact Name:Giuliana Rodas  Phone Number: [phone]

## 2023-01-30 NOTE — TELEPHONE ENCOUNTER
Spoke with patient.  Scheduled for lab work today for 3:30 at the [facility] on [facility].  Chanelle

## 2023-01-30 NOTE — TELEPHONE ENCOUNTER
Spoke with patient.  Scheduled for lab work today at [facility] location for 3:30.   Chanelle

## 2023-01-31 ENCOUNTER — PATIENT MESSAGE (OUTPATIENT)
Dept: GASTROENTEROLOGY | Facility: CLINIC | Age: 53
End: 2023-01-31
Payer: MEDICARE

## 2023-02-08 ENCOUNTER — TELEPHONE (OUTPATIENT)
Dept: FAMILY MEDICINE | Facility: CLINIC | Age: 53
End: 2023-02-08
Payer: MEDICARE

## 2023-02-08 ENCOUNTER — OFFICE VISIT (OUTPATIENT)
Dept: FAMILY MEDICINE | Facility: CLINIC | Age: 53
End: 2023-02-08
Payer: MEDICARE

## 2023-02-08 VITALS
BODY MASS INDEX: 35.17 KG/M2 | OXYGEN SATURATION: 99 % | RESPIRATION RATE: 16 BRPM | TEMPERATURE: 99 F | HEIGHT: 62 IN | HEART RATE: 59 BPM | DIASTOLIC BLOOD PRESSURE: 76 MMHG | WEIGHT: 191.13 LBS | SYSTOLIC BLOOD PRESSURE: 130 MMHG

## 2023-02-08 DIAGNOSIS — J32.9 RECURRENT SINUS INFECTIONS: Primary | ICD-10-CM

## 2023-02-08 PROCEDURE — 3008F PR BODY MASS INDEX (BMI) DOCUMENTED: ICD-10-PCS | Mod: CPTII,S$GLB,, | Performed by: NURSE PRACTITIONER

## 2023-02-08 PROCEDURE — 99999 PR PBB SHADOW E&M-EST. PATIENT-LVL III: ICD-10-PCS | Mod: PBBFAC,,, | Performed by: NURSE PRACTITIONER

## 2023-02-08 PROCEDURE — 1160F PR REVIEW ALL MEDS BY PRESCRIBER/CLIN PHARMACIST DOCUMENTED: ICD-10-PCS | Mod: CPTII,S$GLB,, | Performed by: NURSE PRACTITIONER

## 2023-02-08 PROCEDURE — 99214 PR OFFICE/OUTPT VISIT, EST, LEVL IV, 30-39 MIN: ICD-10-PCS | Mod: S$GLB,,, | Performed by: NURSE PRACTITIONER

## 2023-02-08 PROCEDURE — 99214 OFFICE O/P EST MOD 30 MIN: CPT | Mod: S$GLB,,, | Performed by: NURSE PRACTITIONER

## 2023-02-08 PROCEDURE — 3075F PR MOST RECENT SYSTOLIC BLOOD PRESS GE 130-139MM HG: ICD-10-PCS | Mod: CPTII,S$GLB,, | Performed by: NURSE PRACTITIONER

## 2023-02-08 PROCEDURE — 99999 PR PBB SHADOW E&M-EST. PATIENT-LVL III: CPT | Mod: PBBFAC,,, | Performed by: NURSE PRACTITIONER

## 2023-02-08 PROCEDURE — 3078F DIAST BP <80 MM HG: CPT | Mod: CPTII,S$GLB,, | Performed by: NURSE PRACTITIONER

## 2023-02-08 PROCEDURE — 3078F PR MOST RECENT DIASTOLIC BLOOD PRESSURE < 80 MM HG: ICD-10-PCS | Mod: CPTII,S$GLB,, | Performed by: NURSE PRACTITIONER

## 2023-02-08 PROCEDURE — 3008F BODY MASS INDEX DOCD: CPT | Mod: CPTII,S$GLB,, | Performed by: NURSE PRACTITIONER

## 2023-02-08 PROCEDURE — 3075F SYST BP GE 130 - 139MM HG: CPT | Mod: CPTII,S$GLB,, | Performed by: NURSE PRACTITIONER

## 2023-02-08 PROCEDURE — 1160F RVW MEDS BY RX/DR IN RCRD: CPT | Mod: CPTII,S$GLB,, | Performed by: NURSE PRACTITIONER

## 2023-02-08 PROCEDURE — 1159F PR MEDICATION LIST DOCUMENTED IN MEDICAL RECORD: ICD-10-PCS | Mod: CPTII,S$GLB,, | Performed by: NURSE PRACTITIONER

## 2023-02-08 PROCEDURE — 1159F MED LIST DOCD IN RCRD: CPT | Mod: CPTII,S$GLB,, | Performed by: NURSE PRACTITIONER

## 2023-02-08 RX ORDER — LEVOCETIRIZINE DIHYDROCHLORIDE 5 MG/1
5 TABLET, FILM COATED ORAL NIGHTLY
Qty: 90 TABLET | Refills: 3 | Status: SHIPPED | OUTPATIENT
Start: 2023-02-08 | End: 2024-02-05

## 2023-02-08 RX ORDER — METRONIDAZOLE 500 MG/1
500 TABLET ORAL EVERY 12 HOURS
Qty: 20 TABLET | Refills: 0 | Status: SHIPPED | OUTPATIENT
Start: 2023-02-08 | End: 2023-05-26

## 2023-02-08 RX ORDER — BUDESONIDE 0.5 MG/2ML
1 INHALANT ORAL 2 TIMES DAILY
Qty: 240 ML | Refills: 11 | Status: SHIPPED | OUTPATIENT
Start: 2023-02-08 | End: 2023-07-10

## 2023-02-08 NOTE — TELEPHONE ENCOUNTER
[facility] is requesting clarification on BUDESONIDE 0.6 MG/NORMAL SALINE 50 ML NASAL IRRIGATION. They say they do not have the budesonide in the 0.6 mg. They have .25, .5, & 1 mg that is usually mixed with 2 ml of sterile solution for nebulizer.     Is there an alternative you would like to prescribe or is this something we would send to another pharmacy to be compounded. Please advise.

## 2023-02-16 NOTE — PROGRESS NOTES
Subjective:      Patient ID: Giuliana Rodas is a 52 y.o. female.  Pt presents to clinic with recurrent sinus issues that began over a chloe a ago. Went to  a few weeks ago and was treated with Augmentin. Did have some improvement with resurgence.     Otalgia   There is pain in the right ear. This is a new problem. The current episode started today. The problem occurs constantly. The problem has been rapidly worsening. There has been no fever. The fever has been present for Less than 1 day. The pain is at a severity of 5/10. The pain is moderate. Associated symptoms include headaches, neck pain and rhinorrhea. Pertinent negatives include no abdominal pain, coughing, diarrhea, drainage, ear discharge, hearing loss, rash, sore throat or vomiting. She has tried antibiotics for the symptoms. The treatment provided no relief. There is no history of a chronic ear infection, hearing loss or a tympanostomy tube.   Review of Systems   Constitutional:  Positive for fatigue. Negative for activity change, appetite change, chills, diaphoresis, fever and unexpected weight change.   HENT:  Positive for nasal congestion, ear pain, postnasal drip, rhinorrhea, sinus pressure/congestion and sneezing. Negative for ear discharge, hearing loss, sore throat, trouble swallowing and voice change.    Respiratory:  Negative for cough, chest tightness and shortness of breath.    Cardiovascular:  Negative for chest pain, palpitations, leg swelling and claudication.   Gastrointestinal:  Negative for abdominal pain, change in bowel habit, constipation, diarrhea, nausea, vomiting and change in bowel habit.   Genitourinary:  Negative for difficulty urinating and dysuria.   Musculoskeletal:  Positive for neck pain. Negative for arthralgias, back pain, gait problem, myalgias and neck stiffness.   Integumentary:  Negative for rash.   Neurological:  Positive for headaches.   Psychiatric/Behavioral: Negative.     All other systems reviewed and are  "negative.      Objective:     Vitals:    02/08/23 0838   BP: 130/76   Pulse: (!) 59   Resp: 16   Temp: 99 °F (37.2 °C)   TempSrc: Oral   SpO2: 99%   Weight: 86.7 kg (191 lb 2.2 oz)   Height: 5' 2" (1.575 m)     Physical Exam  Vitals and nursing note reviewed.   Constitutional:       General: She is not in acute distress.     Appearance: Normal appearance. She is well-developed, well-groomed and overweight. She is not ill-appearing.   HENT:      Head: Normocephalic and atraumatic.      Right Ear: Tympanic membrane, ear canal and external ear normal.      Left Ear: Tympanic membrane, ear canal and external ear normal.      Nose: Mucosal edema, congestion and rhinorrhea present.      Right Sinus: Maxillary sinus tenderness present.      Left Sinus: Maxillary sinus tenderness present.      Mouth/Throat:      Lips: Pink.      Mouth: Mucous membranes are moist.      Pharynx: Oropharynx is clear. Uvula midline.   Eyes:      General: Lids are normal. Vision grossly intact. Gaze aligned appropriately.      Conjunctiva/sclera: Conjunctivae normal.      Pupils: Pupils are equal, round, and reactive to light.   Neck:      Trachea: Phonation normal.   Cardiovascular:      Rate and Rhythm: Normal rate and regular rhythm.      Heart sounds: Normal heart sounds.   Pulmonary:      Effort: Pulmonary effort is normal. No accessory muscle usage or respiratory distress.      Breath sounds: Normal breath sounds and air entry.   Abdominal:      General: Abdomen is flat. Bowel sounds are normal. There is no distension.      Palpations: Abdomen is soft.      Tenderness: There is no abdominal tenderness.   Musculoskeletal:      Cervical back: Neck supple.      Right lower leg: No edema.      Left lower leg: No edema.   Lymphadenopathy:      Cervical: No cervical adenopathy.   Skin:     General: Skin is warm and dry.      Findings: No rash.   Neurological:      General: No focal deficit present.      Mental Status: She is alert and oriented " to person, place, and time. Mental status is at baseline.      Sensory: Sensation is intact.      Motor: Motor function is intact.      Coordination: Coordination is intact.      Gait: Gait is intact.   Psychiatric:         Attention and Perception: Attention and perception normal.         Mood and Affect: Mood and affect normal.         Speech: Speech normal.         Behavior: Behavior normal. Behavior is cooperative.         Thought Content: Thought content normal.         Cognition and Memory: Cognition and memory normal.         Judgment: Judgment normal.     Assessment and Plan:     1. Recurrent sinus infections  Symptomatic therapy suggested: rest, increase fluids, gargle prn for sore throat, apply heat to sinuses prn, use mist of vaporizer prn, OTC acetaminophen, ibuprofen, cough suppressant of choice, and call prn if symptoms persist or worsen. Call or return to clinic prn if these symptoms worsen or fail to improve as anticipated.           JESUS Rowell, FNP-C  Family/Internal Medicine  [facility]

## 2023-03-06 ENCOUNTER — LAB VISIT (OUTPATIENT)
Dept: LAB | Facility: HOSPITAL | Age: 53
End: 2023-03-06
Attending: INTERNAL MEDICINE
Payer: MEDICARE

## 2023-03-06 ENCOUNTER — OFFICE VISIT (OUTPATIENT)
Dept: GASTROENTEROLOGY | Facility: CLINIC | Age: 53
End: 2023-03-06
Payer: MEDICARE

## 2023-03-06 ENCOUNTER — TELEPHONE (OUTPATIENT)
Dept: GASTROENTEROLOGY | Facility: CLINIC | Age: 53
End: 2023-03-06
Payer: MEDICARE

## 2023-03-06 ENCOUNTER — PATIENT MESSAGE (OUTPATIENT)
Dept: GASTROENTEROLOGY | Facility: CLINIC | Age: 53
End: 2023-03-06

## 2023-03-06 VITALS
HEART RATE: 61 BPM | WEIGHT: 189.63 LBS | DIASTOLIC BLOOD PRESSURE: 88 MMHG | HEIGHT: 62 IN | SYSTOLIC BLOOD PRESSURE: 153 MMHG | BODY MASS INDEX: 34.89 KG/M2

## 2023-03-06 DIAGNOSIS — R10.32 LEFT LOWER QUADRANT PAIN: Primary | ICD-10-CM

## 2023-03-06 DIAGNOSIS — K57.92 DIVERTICULITIS: ICD-10-CM

## 2023-03-06 DIAGNOSIS — R10.32 LEFT LOWER QUADRANT PAIN: ICD-10-CM

## 2023-03-06 LAB
CREAT SERPL-MCNC: 0.9 MG/DL (ref 0.5–1.4)
EST. GFR  (NO RACE VARIABLE): >60 ML/MIN/1.73 M^2

## 2023-03-06 PROCEDURE — 1159F PR MEDICATION LIST DOCUMENTED IN MEDICAL RECORD: ICD-10-PCS | Mod: CPTII,S$GLB,, | Performed by: INTERNAL MEDICINE

## 2023-03-06 PROCEDURE — 1160F PR REVIEW ALL MEDS BY PRESCRIBER/CLIN PHARMACIST DOCUMENTED: ICD-10-PCS | Mod: CPTII,S$GLB,, | Performed by: INTERNAL MEDICINE

## 2023-03-06 PROCEDURE — 3077F PR MOST RECENT SYSTOLIC BLOOD PRESSURE >= 140 MM HG: ICD-10-PCS | Mod: CPTII,S$GLB,, | Performed by: INTERNAL MEDICINE

## 2023-03-06 PROCEDURE — 99999 PR PBB SHADOW E&M-EST. PATIENT-LVL V: CPT | Mod: PBBFAC,,, | Performed by: INTERNAL MEDICINE

## 2023-03-06 PROCEDURE — 3079F PR MOST RECENT DIASTOLIC BLOOD PRESSURE 80-89 MM HG: ICD-10-PCS | Mod: CPTII,S$GLB,, | Performed by: INTERNAL MEDICINE

## 2023-03-06 PROCEDURE — 3079F DIAST BP 80-89 MM HG: CPT | Mod: CPTII,S$GLB,, | Performed by: INTERNAL MEDICINE

## 2023-03-06 PROCEDURE — 3008F BODY MASS INDEX DOCD: CPT | Mod: CPTII,S$GLB,, | Performed by: INTERNAL MEDICINE

## 2023-03-06 PROCEDURE — 82565 ASSAY OF CREATININE: CPT | Performed by: INTERNAL MEDICINE

## 2023-03-06 PROCEDURE — 3008F PR BODY MASS INDEX (BMI) DOCUMENTED: ICD-10-PCS | Mod: CPTII,S$GLB,, | Performed by: INTERNAL MEDICINE

## 2023-03-06 PROCEDURE — 3077F SYST BP >= 140 MM HG: CPT | Mod: CPTII,S$GLB,, | Performed by: INTERNAL MEDICINE

## 2023-03-06 PROCEDURE — 1159F MED LIST DOCD IN RCRD: CPT | Mod: CPTII,S$GLB,, | Performed by: INTERNAL MEDICINE

## 2023-03-06 PROCEDURE — 1160F RVW MEDS BY RX/DR IN RCRD: CPT | Mod: CPTII,S$GLB,, | Performed by: INTERNAL MEDICINE

## 2023-03-06 PROCEDURE — 99214 OFFICE O/P EST MOD 30 MIN: CPT | Mod: S$GLB,,, | Performed by: INTERNAL MEDICINE

## 2023-03-06 PROCEDURE — 36415 COLL VENOUS BLD VENIPUNCTURE: CPT | Performed by: INTERNAL MEDICINE

## 2023-03-06 PROCEDURE — 99999 PR PBB SHADOW E&M-EST. PATIENT-LVL V: ICD-10-PCS | Mod: PBBFAC,,, | Performed by: INTERNAL MEDICINE

## 2023-03-06 PROCEDURE — 99214 PR OFFICE/OUTPT VISIT, EST, LEVL IV, 30-39 MIN: ICD-10-PCS | Mod: S$GLB,,, | Performed by: INTERNAL MEDICINE

## 2023-03-06 RX ORDER — AMOXICILLIN AND CLAVULANATE POTASSIUM 875; 125 MG/1; MG/1
1 TABLET, FILM COATED ORAL EVERY 8 HOURS
Qty: 21 TABLET | Refills: 0 | Status: SHIPPED | OUTPATIENT
Start: 2023-03-06 | End: 2023-03-13

## 2023-03-06 NOTE — PROGRESS NOTES
"[facility]    CC: abdominal pain    HPI 52 y.o. female with past medical history of recurrent diverticulitis (3-4 episodes) who presents 3 days of acute onset, moderate left lower quadrant abdominal pain with mild nausea and fever.  She also has abdominal cramping and some back pain associated with her abdominal pain.  Her last colonoscopy was 11/8/22 and showed diverticulosis in the sigmoid colon.    Past Medical History  Past Medical History:   Diagnosis Date    Bile reflux gastritis     Breast cyst     Eczema     Fibrocystic breast     Fibromyalgia     Gastroparesis     dr. harden    GERD (gastroesophageal reflux disease)     Hyperglyceridemia     Hyperlipidemia     Hypertension     IC (interstitial cystitis)     dr. labadie    Psoriasis     Tension headache          Review of Systems  General ROS: negative for chills, fever or weight loss  Cardiovascular ROS: no chest pain or dyspnea on exertion  Gastrointestinal ROS: positive for abdominal pain, nausea.  No melena    Physical Examination  BP (!) 153/88   Pulse 61   Ht 5' 2" (1.575 m)   Wt 86 kg (189 lb 9.5 oz)   BMI 34.68 kg/m²   General appearance: alert, cooperative, no distress  HENT: Normocephalic, atraumatic, neck symmetrical, no nasal discharge   Lungs: clear to auscultation bilaterally, no dullness to percussion bilaterally  Heart: regular rate and rhythm without rub; no displacement of the PMI   Abdomen: soft, mildly tender to palpation, no rebound or guarding; bowel sounds normoactive; no organomegaly  Extremities: extremities symmetric; no clubbing, cyanosis, or edema  Neurologic: Alert and oriented X 3, normal strength, normal coordination and gait    Labs:  Lab Results   Component Value Date    WBC 6.94 01/30/2023    HGB 13.4 01/30/2023    HCT 39.9 01/30/2023    MCV 86 01/30/2023     (L) 01/30/2023         CMP  Sodium   Date Value Ref Range Status   12/30/2022 143 136 - 145 mmol/L Final     Potassium   Date Value Ref " Range Status   12/30/2022 4.1 3.5 - 5.1 mmol/L Final     Chloride   Date Value Ref Range Status   12/30/2022 107 95 - 110 mmol/L Final     CO2   Date Value Ref Range Status   12/30/2022 27 23 - 29 mmol/L Final     Glucose   Date Value Ref Range Status   12/30/2022 98 70 - 110 mg/dL Final     BUN   Date Value Ref Range Status   12/30/2022 21 (H) 6 - 20 mg/dL Final     Creatinine   Date Value Ref Range Status   12/30/2022 1.0 0.5 - 1.4 mg/dL Final     Calcium   Date Value Ref Range Status   12/30/2022 9.3 8.7 - 10.5 mg/dL Final     Total Protein   Date Value Ref Range Status   12/30/2022 7.6 6.0 - 8.4 g/dL Final     Albumin   Date Value Ref Range Status   12/30/2022 3.8 3.5 - 5.2 g/dL Final     Total Bilirubin   Date Value Ref Range Status   12/30/2022 0.4 0.1 - 1.0 mg/dL Final     Comment:     For infants and newborns, interpretation of results should be based  on gestational age, weight and in agreement with clinical  observations.    Premature Infant recommended reference ranges:  Up to 24 hours.............<8.0 mg/dL  Up to 48 hours............<12.0 mg/dL  3-5 days..................<15.0 mg/dL  6-29 days.................<15.0 mg/dL       Alkaline Phosphatase   Date Value Ref Range Status   12/30/2022 78 55 - 135 U/L Final     AST   Date Value Ref Range Status   12/30/2022 26 10 - 40 U/L Final     ALT   Date Value Ref Range Status   12/30/2022 29 10 - 44 U/L Final     Anion Gap   Date Value Ref Range Status   12/30/2022 9 8 - 16 mmol/L Final     eGFR   Date Value Ref Range Status   12/30/2022 >60 >60 mL/min/1.73 m^2 Final         Assessment:   1. Left lower quadrant pain    2. Diverticulitis        Plan:  -Start Augmentin three times per day for 7 days.  -Start a clear or full liquid diet for a few days then ok to advance as tolerated.  -CT abdomen and pelvis with creatinine level ordered to evaluate for diverticulitis.    Modesta Stockton MD

## 2023-03-06 NOTE — TELEPHONE ENCOUNTER
----- Message from Kenia Norris sent at 3/6/2023  8:27 AM CST -----  Giuliana Rodas calling regarding Appointment Access for having diverticulitis flare-up and is wanting to be seen as soon as possible, call back [phone]...PT last seen Dr. Fitz Breen

## 2023-03-07 ENCOUNTER — HOSPITAL ENCOUNTER (OUTPATIENT)
Dept: RADIOLOGY | Facility: HOSPITAL | Age: 53
Discharge: HOME | End: 2023-03-07
Attending: INTERNAL MEDICINE
Payer: MEDICARE

## 2023-03-07 DIAGNOSIS — R10.32 LEFT LOWER QUADRANT PAIN: ICD-10-CM

## 2023-03-07 PROCEDURE — 74177 CT ABD & PELVIS W/CONTRAST: CPT | Mod: 26,,, | Performed by: RADIOLOGY

## 2023-03-07 PROCEDURE — 25500020 PHARM REV CODE 255: Performed by: INTERNAL MEDICINE

## 2023-03-07 PROCEDURE — A9698 NON-RAD CONTRAST MATERIALNOC: HCPCS | Performed by: INTERNAL MEDICINE

## 2023-03-07 PROCEDURE — 74177 CT ABDOMEN PELVIS WITH CONTRAST: ICD-10-PCS | Mod: 26,,, | Performed by: RADIOLOGY

## 2023-03-07 PROCEDURE — 74177 CT ABD & PELVIS W/CONTRAST: CPT | Mod: TC

## 2023-03-07 RX ADMIN — BARIUM SULFATE 450 ML: 20 SUSPENSION ORAL at 08:03

## 2023-03-07 RX ADMIN — IOHEXOL 75 ML: 350 INJECTION, SOLUTION INTRAVENOUS at 08:03

## 2023-04-25 ENCOUNTER — OFFICE VISIT (OUTPATIENT)
Dept: FAMILY MEDICINE | Facility: CLINIC | Age: 53
End: 2023-04-25
Payer: MEDICARE

## 2023-04-25 VITALS
HEART RATE: 65 BPM | SYSTOLIC BLOOD PRESSURE: 126 MMHG | OXYGEN SATURATION: 95 % | WEIGHT: 189.63 LBS | TEMPERATURE: 99 F | HEIGHT: 62 IN | BODY MASS INDEX: 34.89 KG/M2 | DIASTOLIC BLOOD PRESSURE: 84 MMHG

## 2023-04-25 DIAGNOSIS — D69.6 THROMBOCYTOPENIA: ICD-10-CM

## 2023-04-25 DIAGNOSIS — R53.83 FATIGUE, UNSPECIFIED TYPE: ICD-10-CM

## 2023-04-25 DIAGNOSIS — I70.0 AORTIC ATHEROSCLEROSIS: ICD-10-CM

## 2023-04-25 DIAGNOSIS — M79.675 TOE PAIN, LEFT: Primary | ICD-10-CM

## 2023-04-25 DIAGNOSIS — E66.01 MORBID OBESITY: ICD-10-CM

## 2023-04-25 PROCEDURE — 99214 OFFICE O/P EST MOD 30 MIN: CPT | Mod: S$GLB,,, | Performed by: FAMILY MEDICINE

## 2023-04-25 PROCEDURE — 99499 UNLISTED E&M SERVICE: CPT | Mod: S$GLB,,, | Performed by: FAMILY MEDICINE

## 2023-04-25 PROCEDURE — 3079F PR MOST RECENT DIASTOLIC BLOOD PRESSURE 80-89 MM HG: ICD-10-PCS | Mod: CPTII,,, | Performed by: FAMILY MEDICINE

## 2023-04-25 PROCEDURE — 99215 OFFICE O/P EST HI 40 MIN: CPT | Mod: PO | Performed by: FAMILY MEDICINE

## 2023-04-25 PROCEDURE — 3074F PR MOST RECENT SYSTOLIC BLOOD PRESSURE < 130 MM HG: ICD-10-PCS | Mod: CPTII,,, | Performed by: FAMILY MEDICINE

## 2023-04-25 PROCEDURE — 3008F PR BODY MASS INDEX (BMI) DOCUMENTED: ICD-10-PCS | Mod: CPTII,,, | Performed by: FAMILY MEDICINE

## 2023-04-25 PROCEDURE — 3008F BODY MASS INDEX DOCD: CPT | Mod: CPTII,,, | Performed by: FAMILY MEDICINE

## 2023-04-25 PROCEDURE — 99499 RISK ADDL DX/OHS AUDIT: ICD-10-PCS | Mod: S$GLB,,, | Performed by: FAMILY MEDICINE

## 2023-04-25 PROCEDURE — 4010F ACE/ARB THERAPY RXD/TAKEN: CPT | Mod: CPTII,,, | Performed by: FAMILY MEDICINE

## 2023-04-25 PROCEDURE — 99214 PR OFFICE/OUTPT VISIT, EST, LEVL IV, 30-39 MIN: ICD-10-PCS | Mod: S$GLB,,, | Performed by: FAMILY MEDICINE

## 2023-04-25 PROCEDURE — 3079F DIAST BP 80-89 MM HG: CPT | Mod: CPTII,,, | Performed by: FAMILY MEDICINE

## 2023-04-25 PROCEDURE — 3074F SYST BP LT 130 MM HG: CPT | Mod: CPTII,,, | Performed by: FAMILY MEDICINE

## 2023-04-25 PROCEDURE — 4010F PR ACE/ARB THEARPY RXD/TAKEN: ICD-10-PCS | Mod: CPTII,,, | Performed by: FAMILY MEDICINE

## 2023-04-25 NOTE — PROGRESS NOTES
Subjective     Patient ID: Giuliana Rodas is a 52 y.o. female.    Chief Complaint: Weight Loss and Foot Problem    52 year-old female presents for concerns about weight loss. She is eating carbs and is gaining weight. She state she doesn't want to do keto as she has a fatty liver.     She has a bone spur on the top of her 1st left toe. She states it is achy. She is also having issues with ingrown toenails on the right 2nd toe. She would like to see podiatry.       Past Medical History:  No date: Bile reflux gastritis  No date: Breast cyst  No date: Eczema  No date: Fibrocystic breast  No date: Fibromyalgia  No date: Gastroparesis      Comment:  dr. harden  No date: GERD (gastroesophageal reflux disease)  No date: Hyperglyceridemia  No date: Hyperlipidemia  No date: Hypertension  No date: IC (interstitial cystitis)      Comment:  dr. labadie  No date: Psoriasis  No date: Tension headache   Past Surgical History:  No date: BREAST BIOPSY; Right      Comment:  over 10 years ago/ milk duct  No date: CHOLECYSTECTOMY  11/8/2022: COLONOSCOPY; N/A      Comment:  Procedure: COLONOSCOPY;  Surgeon: Damon Mcmahon MD;                 Location: [facility];  Service: Endoscopy;  Laterality:                N/A;  vacc-inst portal-tb-smith or ray  3/12/2020: ESOPHAGOGASTRODUODENOSCOPY; N/A      Comment:  Procedure: EGD (ESOPHAGOGASTRODUODENOSCOPY);  Surgeon:                Damon Mcmahon MD;  Location: [facility] ([address] [place] [address]);                 Service: Endoscopy;  Laterality: N/A;  use pcf scope  No date: HEMORRHOID SURGERY  No date: HYSTERECTOMY  No date: KNEE SURGERY  No date: OOPHORECTOMY      Comment:  one ov removed  Review of patient's family history indicates:    Social History    Socioeconomic History      Marital status:       Number of children: 2    Occupational History      Occupation:         Employer: [employer]    Tobacco Use      Smoking status: Never      Smokeless tobacco: Never    Substance and Sexual  "Activity      Alcohol use: No      Drug use: No      Sexual activity: Yes        Partners: Male        Birth control/protection: See Surgical Hx    Social History Narrative      Lives at home with  and daughter    Social Determinants of Health  Financial Resource Strain: Medium Risk      Difficulty of Paying Living Expenses: Somewhat hard  Food Insecurity: Food Insecurity Present      Worried About Running Out of Food in the Last Year: Sometimes true      Ran Out of Food in the Last Year: Never true  Transportation Needs: No Transportation Needs      Lack of Transportation (Medical): No      Lack of Transportation (Non-Medical): No  Physical Activity: Sufficiently Active      Days of Exercise per Week: 3 days      Minutes of Exercise per Session: 60 min  Stress: No Stress Concern Present      Feeling of Stress : Not at all  Social Connections: Unknown      Frequency of Communication with Friends and Family: More than three times a week      Frequency of Social Gatherings with Friends and Family: Once a week      Active Member of Clubs or Organizations: Yes      Attends Club or Organization Meetings: 1 to 4 times per year      Marital Status:   Housing Stability: Low Risk       Unable to Pay for Housing in the Last Year: No      Number of Places Lived in the Last Year: 1      Unstable Housing in the Last Year: No        Review of Systems       Objective     Vitals:    04/25/23 1541   BP: 126/84   Pulse: 65   Temp: 98.6 °F (37 °C)   TempSrc: Oral   SpO2: 95%   Weight: 86 kg (189 lb 9.5 oz)   Height: 5' 2" (1.575 m)       Physical Exam  Constitutional:       Appearance: Normal appearance.   HENT:      Head: Normocephalic and atraumatic.   Neurological:      Mental Status: She is alert.          Assessment and Plan     Problem List Items Addressed This Visit       Thrombocytopenia    Aortic atherosclerosis     Other Visit Diagnoses       Toe pain, left    -  Primary    Relevant Orders    Ambulatory " referral/consult to Podiatry    Fatigue, unspecified type        Relevant Orders    CBC Auto Differential (Completed)    TSH (Completed)    Comprehensive Metabolic Panel (Completed)    Morbid obesity                Giuliana was seen today for weight loss and foot problem.    Diagnoses and all orders for this visit:    Toe pain, left  -     Ambulatory referral/consult to Podiatry; Future  Referral to podiatry.likely tendonitis.    Fatigue, unspecified type  -     CBC Auto Differential; Future  -     TSH; Future  -     Comprehensive Metabolic Panel; Future  Due for labs for fatigue    Aortic atherosclerosis  Due for cholesterol    Thrombocytopenia  Stable and due formlabs  Morbid obesity     Avoid sugary foods and drinks. Minimize carbohydrates, yellow/white foods like bread, pasta, rice, potatoes.

## 2023-04-27 ENCOUNTER — LAB VISIT (OUTPATIENT)
Dept: LAB | Facility: HOSPITAL | Age: 53
End: 2023-04-27
Attending: FAMILY MEDICINE
Payer: MEDICARE

## 2023-04-27 DIAGNOSIS — R53.83 FATIGUE, UNSPECIFIED TYPE: ICD-10-CM

## 2023-04-27 LAB
ALBUMIN SERPL BCP-MCNC: 4.1 G/DL (ref 3.5–5.2)
ALP SERPL-CCNC: 84 U/L (ref 55–135)
ALT SERPL W/O P-5'-P-CCNC: 29 U/L (ref 10–44)
ANION GAP SERPL CALC-SCNC: 7 MMOL/L (ref 8–16)
AST SERPL-CCNC: 24 U/L (ref 10–40)
BASOPHILS # BLD AUTO: 0.05 K/UL (ref 0–0.2)
BASOPHILS NFR BLD: 1 % (ref 0–1.9)
BILIRUB SERPL-MCNC: 0.7 MG/DL (ref 0.1–1)
BUN SERPL-MCNC: 22 MG/DL (ref 6–20)
CALCIUM SERPL-MCNC: 10.1 MG/DL (ref 8.7–10.5)
CHLORIDE SERPL-SCNC: 106 MMOL/L (ref 95–110)
CO2 SERPL-SCNC: 29 MMOL/L (ref 23–29)
CREAT SERPL-MCNC: 1.1 MG/DL (ref 0.5–1.4)
DIFFERENTIAL METHOD: ABNORMAL
EOSINOPHIL # BLD AUTO: 0.2 K/UL (ref 0–0.5)
EOSINOPHIL NFR BLD: 3.7 % (ref 0–8)
ERYTHROCYTE [DISTWIDTH] IN BLOOD BY AUTOMATED COUNT: 12.6 % (ref 11.5–14.5)
EST. GFR  (NO RACE VARIABLE): 60 ML/MIN/1.73 M^2
GLUCOSE SERPL-MCNC: 100 MG/DL (ref 70–110)
HCT VFR BLD AUTO: 44.1 % (ref 37–48.5)
HGB BLD-MCNC: 14.6 G/DL (ref 12–16)
IMM GRANULOCYTES # BLD AUTO: 0.01 K/UL (ref 0–0.04)
IMM GRANULOCYTES NFR BLD AUTO: 0.2 % (ref 0–0.5)
LYMPHOCYTES # BLD AUTO: 1.9 K/UL (ref 1–4.8)
LYMPHOCYTES NFR BLD: 38.3 % (ref 18–48)
MCH RBC QN AUTO: 28 PG (ref 27–31)
MCHC RBC AUTO-ENTMCNC: 33.1 G/DL (ref 32–36)
MCV RBC AUTO: 85 FL (ref 82–98)
MONOCYTES # BLD AUTO: 0.4 K/UL (ref 0.3–1)
MONOCYTES NFR BLD: 7.3 % (ref 4–15)
NEUTROPHILS # BLD AUTO: 2.4 K/UL (ref 1.8–7.7)
NEUTROPHILS NFR BLD: 49.5 % (ref 38–73)
NRBC BLD-RTO: 0 /100 WBC
PLATELET # BLD AUTO: 146 K/UL (ref 150–450)
PMV BLD AUTO: 10.2 FL (ref 9.2–12.9)
POTASSIUM SERPL-SCNC: 4 MMOL/L (ref 3.5–5.1)
PROT SERPL-MCNC: 7.8 G/DL (ref 6–8.4)
RBC # BLD AUTO: 5.21 M/UL (ref 4–5.4)
SODIUM SERPL-SCNC: 142 MMOL/L (ref 136–145)
TSH SERPL DL<=0.005 MIU/L-ACNC: 2.27 UIU/ML (ref 0.4–4)
WBC # BLD AUTO: 4.91 K/UL (ref 3.9–12.7)

## 2023-04-27 PROCEDURE — 85025 COMPLETE CBC W/AUTO DIFF WBC: CPT | Performed by: FAMILY MEDICINE

## 2023-04-27 PROCEDURE — 80053 COMPREHEN METABOLIC PANEL: CPT | Performed by: FAMILY MEDICINE

## 2023-04-27 PROCEDURE — 84443 ASSAY THYROID STIM HORMONE: CPT | Performed by: FAMILY MEDICINE

## 2023-04-27 PROCEDURE — 36415 COLL VENOUS BLD VENIPUNCTURE: CPT | Mod: PO | Performed by: FAMILY MEDICINE

## 2023-05-01 ENCOUNTER — HOSPITAL ENCOUNTER (OUTPATIENT)
Dept: RADIOLOGY | Facility: HOSPITAL | Age: 53
Discharge: HOME | End: 2023-05-01
Attending: PODIATRIST
Payer: MEDICARE

## 2023-05-01 ENCOUNTER — OFFICE VISIT (OUTPATIENT)
Dept: PODIATRY | Facility: CLINIC | Age: 53
End: 2023-05-01
Payer: MEDICARE

## 2023-05-01 VITALS — BODY MASS INDEX: 34.78 KG/M2 | HEIGHT: 62 IN | WEIGHT: 189 LBS

## 2023-05-01 DIAGNOSIS — M20.10 VALGUS DEFORMITY OF GREAT TOE, UNSPECIFIED LATERALITY: ICD-10-CM

## 2023-05-01 DIAGNOSIS — M79.675 TOE PAIN, LEFT: ICD-10-CM

## 2023-05-01 DIAGNOSIS — M19.079 ARTHRITIS OF FOOT: ICD-10-CM

## 2023-05-01 DIAGNOSIS — L60.0 INGROWN NAIL: Primary | ICD-10-CM

## 2023-05-01 DIAGNOSIS — L60.0 INGROWN NAIL: ICD-10-CM

## 2023-05-01 PROCEDURE — 99204 OFFICE O/P NEW MOD 45 MIN: CPT | Mod: S$GLB,,, | Performed by: PODIATRIST

## 2023-05-01 PROCEDURE — 99204 PR OFFICE/OUTPT VISIT, NEW, LEVL IV, 45-59 MIN: ICD-10-PCS | Mod: S$GLB,,, | Performed by: PODIATRIST

## 2023-05-01 PROCEDURE — 73630 X-RAY EXAM OF FOOT: CPT | Mod: 26,LT,, | Performed by: RADIOLOGY

## 2023-05-01 PROCEDURE — 4010F ACE/ARB THERAPY RXD/TAKEN: CPT | Mod: CPTII,S$GLB,, | Performed by: PODIATRIST

## 2023-05-01 PROCEDURE — 3008F PR BODY MASS INDEX (BMI) DOCUMENTED: ICD-10-PCS | Mod: CPTII,S$GLB,, | Performed by: PODIATRIST

## 2023-05-01 PROCEDURE — 99999 PR PBB SHADOW E&M-EST. PATIENT-LVL V: CPT | Mod: PBBFAC,,, | Performed by: PODIATRIST

## 2023-05-01 PROCEDURE — 99999 PR PBB SHADOW E&M-EST. PATIENT-LVL V: ICD-10-PCS | Mod: PBBFAC,,, | Performed by: PODIATRIST

## 2023-05-01 PROCEDURE — 1159F PR MEDICATION LIST DOCUMENTED IN MEDICAL RECORD: ICD-10-PCS | Mod: CPTII,S$GLB,, | Performed by: PODIATRIST

## 2023-05-01 PROCEDURE — 1159F MED LIST DOCD IN RCRD: CPT | Mod: CPTII,S$GLB,, | Performed by: PODIATRIST

## 2023-05-01 PROCEDURE — 3008F BODY MASS INDEX DOCD: CPT | Mod: CPTII,S$GLB,, | Performed by: PODIATRIST

## 2023-05-01 PROCEDURE — 73630 XR FOOT COMPLETE 3 VIEW LEFT: ICD-10-PCS | Mod: 26,LT,, | Performed by: RADIOLOGY

## 2023-05-01 PROCEDURE — 73630 X-RAY EXAM OF FOOT: CPT | Mod: TC,FY,PO,LT

## 2023-05-01 PROCEDURE — 4010F PR ACE/ARB THEARPY RXD/TAKEN: ICD-10-PCS | Mod: CPTII,S$GLB,, | Performed by: PODIATRIST

## 2023-05-01 RX ORDER — LIDOCAINE HYDROCHLORIDE 20 MG/ML
JELLY TOPICAL
Qty: 30 ML | Refills: 2 | Status: SHIPPED | OUTPATIENT
Start: 2023-05-01

## 2023-05-01 RX ORDER — TOBRAMYCIN 3 MG/ML
SOLUTION/ DROPS OPHTHALMIC
Qty: 5 ML | Refills: 3 | Status: SHIPPED | OUTPATIENT
Start: 2023-05-01 | End: 2023-07-10

## 2023-05-01 NOTE — PROGRESS NOTES
Subjective:      Patient ID: Giuliana Rodas is a 52 y.o. female.    Chief Complaint: Foot Problem (Left foot ) and Nail Problem    Sharp throbbing pain left big toe joint with bump.  Gradual onset, worsening over past several weeks, aggravated by increased weight bearing, shoe gear, pressure.  No previous medical treatment.  OTC pain med not helping.     Cc2 sharh throbbing pain right 2nd toe/nail.  Gradual onset, worsening over past several weeks, aggravated by increased weight bearing, shoe gear, pressure.  No previous medical treatment.  OTC pain med not helping.     Denies trauma, surgery.    Review of Systems   Constitutional: Negative for chills, diaphoresis, fever, malaise/fatigue and night sweats.   Cardiovascular:  Negative for claudication, cyanosis, leg swelling and syncope.   Skin:  Positive for nail changes. Negative for color change, dry skin, rash, suspicious lesions and unusual hair distribution.   Musculoskeletal:  Positive for joint pain. Negative for falls, joint swelling, muscle cramps, muscle weakness and stiffness.   Gastrointestinal:  Negative for constipation, diarrhea, nausea and vomiting.   Neurological:  Negative for brief paralysis, disturbances in coordination, focal weakness, numbness, paresthesias, sensory change and tremors.         Objective:      Physical Exam  Constitutional:       General: She is not in acute distress.     Appearance: She is well-developed. She is not diaphoretic.   Cardiovascular:      Pulses:           Popliteal pulses are 2+ on the right side and 2+ on the left side.        Dorsalis pedis pulses are 2+ on the right side and 2+ on the left side.        Posterior tibial pulses are 2+ on the right side and 2+ on the left side.      Comments: Capillary refill 3 seconds all toes/distal feet, all toes/both feet warm to touch.      Negative lymphadenopathy bilateral popliteal fossa and tarsal tunnel.      Negavie lower extremity edema bilateral.    Musculoskeletal:       Right ankle: No swelling, deformity, ecchymosis or lacerations. Normal range of motion. Normal pulse.      Right Achilles Tendon: Normal. No defects. Yap's test negative.      Comments: Pain to palpation and end range of motion left 1st mtpj with visible and palpable periarticular exostoses, and reduced range of motion with mild crepitus.  Mile valgus position left hallux.    Otherwise, Normal angle, base, station of gait. All ten toes without clubbing, cyanosis, or signs of ischemia.  No pain to palpation bilateral lower extremities.  Range of motion, stability, muscle strength, and muscle tone normal bilateral feet and legs.      Lymphadenopathy:      Lower Body: No right inguinal adenopathy. No left inguinal adenopathy.      Comments: Negative lymphadenopathy bilateral popliteal fossa and tarsal tunnel.    Negative lymphangitic streaking bilateral feet/ankles/legs.   Skin:     General: Skin is warm and dry.      Capillary Refill: Capillary refill takes 2 to 3 seconds.      Coloration: Skin is not pale.      Findings: No abrasion, bruising, burn, ecchymosis, erythema, laceration, lesion or rash.      Nails: There is no clubbing.      Comments: Visible and palpable ingrowth of toenail medial border right 2nd toe with pain to palpation, and focal localized erythema and edema,  without ulceration, drainage, pus, tracking, fluctuance, malodor, or cardinal signs infection.    Otherwise, Skin is normal age and health appropriate color, turgor, texture, and temperature bilateral lower extremities without ulceration, hyperpigmentation, discoloration, masses nodules or cords palpated.  No ecchymosis, erythema, edema, or cardinal signs of infection bilateral lower extremities.    Neurological:      Mental Status: She is alert and oriented to person, place, and time.      Sensory: No sensory deficit.      Motor: No tremor, atrophy or abnormal muscle tone.      Gait: Gait normal.      Deep Tendon Reflexes:       Reflex Scores:       Patellar reflexes are 2+ on the right side and 2+ on the left side.       Achilles reflexes are 2+ on the right side and 2+ on the left side.     Comments: Negative tinel sign to percussion sural, superficial peroneal, deep peroneal, saphenous, and posterior tibial nerves right and left ankles and feet.     Psychiatric:         Behavior: Behavior is cooperative.           Assessment:       Encounter Diagnoses   Name Primary?    Toe pain, left     Ingrown nail Yes    Valgus deformity of great toe, unspecified laterality     Arthritis of foot          Plan:       Giuliana was seen today for foot problem and nail problem.    Diagnoses and all orders for this visit:    Ingrown nail  -     X-Ray Foot Complete Left; Future    Toe pain, left  -     Ambulatory referral/consult to Podiatry  -     X-Ray Foot Complete Left; Future    Valgus deformity of great toe, unspecified laterality  -     X-Ray Foot Complete Left; Future    Arthritis of foot  -     X-Ray Foot Complete Left; Future    Other orders  -     tobramycin sulfate 0.3% (TOBREX) 0.3 % ophthalmic solution; 1-2 drops topically twice daily to affected toe(s).  -     LIDOcaine HCL 2% (XYLOCAINE) 2 % jelly; Apply topically as needed. Apply topically once nightly to affected part of foot/feet.      I counseled the patient on her conditions, their implications and medical management.        With the patient's permission, I debrided right 2nd toenail with a sterile nipper and curette, removing all offending nail and debris.  Patient tolerated the procedure well and related significant relief.    Discussed conservative treatment with shoes of adequate dimensions, material, and style to alleviate symptoms and delay or prevent surgical intervention.    Topcical tobramycin bid.    Cover right 2nd toe all times with band aid/similar changing daily.    Xrays left WB    Topical lidocaine once niglthly left - pain.        Follow up if symptoms worsen or fail  to improve.

## 2023-05-19 PROBLEM — I70.0 AORTIC ATHEROSCLEROSIS: Status: ACTIVE | Noted: 2023-05-19

## 2023-05-26 ENCOUNTER — OFFICE VISIT (OUTPATIENT)
Dept: FAMILY MEDICINE | Facility: CLINIC | Age: 53
End: 2023-05-26
Payer: MEDICARE

## 2023-05-26 VITALS
HEART RATE: 60 BPM | OXYGEN SATURATION: 98 % | HEIGHT: 62 IN | TEMPERATURE: 99 F | SYSTOLIC BLOOD PRESSURE: 128 MMHG | WEIGHT: 196 LBS | DIASTOLIC BLOOD PRESSURE: 80 MMHG | RESPIRATION RATE: 16 BRPM | BODY MASS INDEX: 36.07 KG/M2

## 2023-05-26 DIAGNOSIS — R10.32 LLQ PAIN: Primary | ICD-10-CM

## 2023-05-26 PROCEDURE — 99214 PR OFFICE/OUTPT VISIT, EST, LEVL IV, 30-39 MIN: ICD-10-PCS | Mod: S$GLB,,, | Performed by: NURSE PRACTITIONER

## 2023-05-26 PROCEDURE — 3079F DIAST BP 80-89 MM HG: CPT | Mod: CPTII,S$GLB,, | Performed by: NURSE PRACTITIONER

## 2023-05-26 PROCEDURE — 4010F ACE/ARB THERAPY RXD/TAKEN: CPT | Mod: CPTII,S$GLB,, | Performed by: NURSE PRACTITIONER

## 2023-05-26 PROCEDURE — 1160F PR REVIEW ALL MEDS BY PRESCRIBER/CLIN PHARMACIST DOCUMENTED: ICD-10-PCS | Mod: CPTII,S$GLB,, | Performed by: NURSE PRACTITIONER

## 2023-05-26 PROCEDURE — 3074F SYST BP LT 130 MM HG: CPT | Mod: CPTII,S$GLB,, | Performed by: NURSE PRACTITIONER

## 2023-05-26 PROCEDURE — 99214 OFFICE O/P EST MOD 30 MIN: CPT | Mod: S$GLB,,, | Performed by: NURSE PRACTITIONER

## 2023-05-26 PROCEDURE — 3008F PR BODY MASS INDEX (BMI) DOCUMENTED: ICD-10-PCS | Mod: CPTII,S$GLB,, | Performed by: NURSE PRACTITIONER

## 2023-05-26 PROCEDURE — 99999 PR PBB SHADOW E&M-EST. PATIENT-LVL III: ICD-10-PCS | Mod: PBBFAC,,, | Performed by: NURSE PRACTITIONER

## 2023-05-26 PROCEDURE — 99999 PR PBB SHADOW E&M-EST. PATIENT-LVL III: CPT | Mod: PBBFAC,,, | Performed by: NURSE PRACTITIONER

## 2023-05-26 PROCEDURE — 3074F PR MOST RECENT SYSTOLIC BLOOD PRESSURE < 130 MM HG: ICD-10-PCS | Mod: CPTII,S$GLB,, | Performed by: NURSE PRACTITIONER

## 2023-05-26 PROCEDURE — 1159F MED LIST DOCD IN RCRD: CPT | Mod: CPTII,S$GLB,, | Performed by: NURSE PRACTITIONER

## 2023-05-26 PROCEDURE — 3079F PR MOST RECENT DIASTOLIC BLOOD PRESSURE 80-89 MM HG: ICD-10-PCS | Mod: CPTII,S$GLB,, | Performed by: NURSE PRACTITIONER

## 2023-05-26 PROCEDURE — 3008F BODY MASS INDEX DOCD: CPT | Mod: CPTII,S$GLB,, | Performed by: NURSE PRACTITIONER

## 2023-05-26 PROCEDURE — 1159F PR MEDICATION LIST DOCUMENTED IN MEDICAL RECORD: ICD-10-PCS | Mod: CPTII,S$GLB,, | Performed by: NURSE PRACTITIONER

## 2023-05-26 PROCEDURE — 4010F PR ACE/ARB THEARPY RXD/TAKEN: ICD-10-PCS | Mod: CPTII,S$GLB,, | Performed by: NURSE PRACTITIONER

## 2023-05-26 PROCEDURE — 1160F RVW MEDS BY RX/DR IN RCRD: CPT | Mod: CPTII,S$GLB,, | Performed by: NURSE PRACTITIONER

## 2023-05-26 RX ORDER — AMOXICILLIN AND CLAVULANATE POTASSIUM 875; 125 MG/1; MG/1
1 TABLET, FILM COATED ORAL EVERY 12 HOURS
Qty: 20 TABLET | Refills: 0 | Status: SHIPPED | OUTPATIENT
Start: 2023-05-26 | End: 2023-06-05

## 2023-05-29 NOTE — PROGRESS NOTES
"Subjective:      Patient ID: Giuliana Rodas is a 53 y.o. female.  Pt with hx of diverticulitis presents to clinic with acute LLQ pain with diarrhea that began after eating nuts and seeds a few days ago.     Review of Systems   Constitutional:  Negative for activity change, appetite change, fever and unexpected weight change.   Respiratory:  Negative for chest tightness and shortness of breath.    Cardiovascular:  Negative for chest pain and palpitations.   Gastrointestinal:  Positive for abdominal distention, abdominal pain, change in bowel habit, diarrhea and change in bowel habit. Negative for anal bleeding, blood in stool, constipation, nausea, rectal pain, vomiting, reflux and fecal incontinence.   Genitourinary:  Negative for difficulty urinating, dysuria and menstrual problem.   Musculoskeletal:  Negative for arthralgias, back pain, gait problem and myalgias.   Integumentary:  Negative for rash.   Neurological:  Negative for headaches.   Psychiatric/Behavioral: Negative.     All other systems reviewed and are negative.      Objective:     Vitals:    05/26/23 1029   BP: 128/80   Pulse: 60   Resp: 16   Temp: 98.5 °F (36.9 °C)   TempSrc: Oral   SpO2: 98%   Weight: 88.9 kg (195 lb 15.8 oz)   Height: 5' 2" (1.575 m)     Physical Exam  Vitals and nursing note reviewed.   Constitutional:       General: She is not in acute distress.     Appearance: Normal appearance. She is well-developed and well-groomed. She is not ill-appearing.   HENT:      Head: Normocephalic and atraumatic.      Right Ear: External ear normal.      Left Ear: External ear normal.      Nose: Nose normal.      Mouth/Throat:      Lips: Pink.      Mouth: Mucous membranes are moist.   Eyes:      General: Lids are normal. Vision grossly intact. Gaze aligned appropriately.      Conjunctiva/sclera: Conjunctivae normal.      Pupils: Pupils are equal, round, and reactive to light.   Neck:      Trachea: Phonation normal.   Cardiovascular:      Rate and " Rhythm: Normal rate and regular rhythm.      Heart sounds: Normal heart sounds.   Pulmonary:      Effort: Pulmonary effort is normal. No accessory muscle usage or respiratory distress.      Breath sounds: Normal breath sounds and air entry.   Abdominal:      General: Abdomen is flat. Bowel sounds are normal. There is no distension.      Palpations: Abdomen is soft.      Tenderness: There is abdominal tenderness in the left lower quadrant. There is guarding. There is no right CVA tenderness, left CVA tenderness or rebound.   Musculoskeletal:      Cervical back: Neck supple.      Right lower leg: No edema.      Left lower leg: No edema.   Skin:     General: Skin is warm and dry.      Findings: No rash.   Neurological:      General: No focal deficit present.      Mental Status: She is alert and oriented to person, place, and time. Mental status is at baseline.      Sensory: Sensation is intact.      Motor: Motor function is intact.      Coordination: Coordination is intact.      Gait: Gait is intact.   Psychiatric:         Attention and Perception: Attention and perception normal.         Mood and Affect: Mood and affect normal.         Speech: Speech normal.         Behavior: Behavior normal. Behavior is cooperative.         Thought Content: Thought content normal.         Cognition and Memory: Cognition and memory normal.         Judgment: Judgment normal.     Assessment and Plan:     1. LLQ pain  Recommend rest and increased fluids.  Antibiotics as ordered below. Side effects of the antibiotics were discussed, including the possibility of rash, photosensitivity, gastrointestinal upset, and incompatibility with alcohol.  Patient is instructed to avoid nuts and seeds.  She will call if her symptoms worsen, new problems develop, intractable nausea/vomiting occurs, fever of 101.0 (orally) or greater occurs, or if all symptoms are not dramatically improved in 48 hours and completely resolved in 5 days. The patient  understands that hospitalization for intravenous fluids and/or antibiotics may be necessary if her symptoms worsen or persist.   Follow up if fails to improve or worsen   - amoxicillin-clavulanate 875-125mg (AUGMENTIN) 875-125 mg per tablet; Take 1 tablet by mouth every 12 (twelve) hours. for 10 days  Dispense: 20 tablet; Refill: 0           JESUS Rowell, FNP-C  Family/Internal Medicine  [facility]

## 2023-06-23 DIAGNOSIS — I10 PRIMARY HYPERTENSION: ICD-10-CM

## 2023-06-23 DIAGNOSIS — L40.8 SEBOPSORIASIS: ICD-10-CM

## 2023-06-23 RX ORDER — HYDROCHLOROTHIAZIDE 12.5 MG/1
TABLET ORAL
Qty: 90 TABLET | Refills: 0 | Status: SHIPPED | OUTPATIENT
Start: 2023-06-23 | End: 2023-09-18

## 2023-06-23 RX ORDER — KETOCONAZOLE 20 MG/ML
SHAMPOO, SUSPENSION TOPICAL
Qty: 120 ML | Refills: 11 | Status: SHIPPED | OUTPATIENT
Start: 2023-06-23

## 2023-06-23 NOTE — TELEPHONE ENCOUNTER
No care due was identified.  [facility] Embedded Care Due Messages. Reference number: [id number].   6/23/2023 11:42:53 AM CDT

## 2023-06-23 NOTE — TELEPHONE ENCOUNTER
Refill Routing Note   Medication(s) are not appropriate for processing by [facility] for the following reason(s):      New or recently adjusted medication  No active prescription written by PCP    [facility] action(s):  Defer None identified            Appointments  past 12m or future 3m with PCP    Date Provider   Last Visit   4/25/2023 Giuliana Rojas MD   Next Visit   Visit date not found Giuliana Rojas MD   ED visits in past 90 days: 0        Note composed:12:07 PM 06/23/2023

## 2023-07-03 ENCOUNTER — LAB VISIT (OUTPATIENT)
Dept: LAB | Facility: HOSPITAL | Age: 53
End: 2023-07-03
Attending: FAMILY MEDICINE
Payer: MEDICARE

## 2023-07-03 DIAGNOSIS — I10 PRIMARY HYPERTENSION: ICD-10-CM

## 2023-07-03 LAB
ANION GAP SERPL CALC-SCNC: 10 MMOL/L (ref 8–16)
BUN SERPL-MCNC: 28 MG/DL (ref 6–20)
CALCIUM SERPL-MCNC: 9.9 MG/DL (ref 8.7–10.5)
CHLORIDE SERPL-SCNC: 105 MMOL/L (ref 95–110)
CO2 SERPL-SCNC: 26 MMOL/L (ref 23–29)
CREAT SERPL-MCNC: 1 MG/DL (ref 0.5–1.4)
EST. GFR  (NO RACE VARIABLE): >60 ML/MIN/1.73 M^2
GLUCOSE SERPL-MCNC: 99 MG/DL (ref 70–110)
POTASSIUM SERPL-SCNC: 3.7 MMOL/L (ref 3.5–5.1)
SODIUM SERPL-SCNC: 141 MMOL/L (ref 136–145)

## 2023-07-03 PROCEDURE — 36415 COLL VENOUS BLD VENIPUNCTURE: CPT | Mod: PO | Performed by: FAMILY MEDICINE

## 2023-07-03 PROCEDURE — 80048 BASIC METABOLIC PNL TOTAL CA: CPT | Performed by: FAMILY MEDICINE

## 2023-07-10 ENCOUNTER — OFFICE VISIT (OUTPATIENT)
Dept: FAMILY MEDICINE | Facility: CLINIC | Age: 53
End: 2023-07-10
Payer: MEDICARE

## 2023-07-10 VITALS
DIASTOLIC BLOOD PRESSURE: 80 MMHG | WEIGHT: 200.38 LBS | BODY MASS INDEX: 36.87 KG/M2 | SYSTOLIC BLOOD PRESSURE: 136 MMHG | HEIGHT: 62 IN | OXYGEN SATURATION: 97 % | RESPIRATION RATE: 16 BRPM | HEART RATE: 64 BPM | TEMPERATURE: 99 F

## 2023-07-10 DIAGNOSIS — R10.9 ACUTE LEFT FLANK PAIN: Primary | ICD-10-CM

## 2023-07-10 LAB
BACTERIA #/AREA URNS HPF: NORMAL /HPF
BILIRUB SERPL-MCNC: NEGATIVE MG/DL
BILIRUB UR QL STRIP: NEGATIVE
BLOOD URINE, POC: 250
CLARITY UR: CLEAR
CLARITY, POC UA: CLEAR
COLOR UR: YELLOW
COLOR, POC UA: YELLOW
GLUCOSE UR QL STRIP: NEGATIVE
GLUCOSE UR QL STRIP: NEGATIVE
HGB UR QL STRIP: ABNORMAL
HYALINE CASTS #/AREA URNS LPF: 0 /LPF
KETONES UR QL STRIP: NEGATIVE
KETONES UR QL STRIP: NEGATIVE
LEUKOCYTE ESTERASE UR QL STRIP: ABNORMAL
LEUKOCYTE ESTERASE URINE, POC: ABNORMAL
MICROSCOPIC COMMENT: NORMAL
NITRITE UR QL STRIP: NEGATIVE
NITRITE, POC UA: NEGATIVE
NON-SQ EPI CELLS #/AREA URNS HPF: 0 /HPF
PH UR STRIP: 6 [PH] (ref 5–8)
PH, POC UA: 5
PROT UR QL STRIP: ABNORMAL
PROTEIN, POC: ABNORMAL
RBC #/AREA URNS HPF: 4 /HPF (ref 0–4)
SP GR UR STRIP: 1.01 (ref 1–1.03)
SPECIFIC GRAVITY, POC UA: 1.01
SQUAMOUS #/AREA URNS HPF: 0 /HPF
URN SPEC COLLECT METH UR: ABNORMAL
UROBILINOGEN UR STRIP-ACNC: NEGATIVE EU/DL
UROBILINOGEN, POC UA: NEGATIVE
WBC #/AREA URNS HPF: 1 /HPF (ref 0–5)

## 2023-07-10 PROCEDURE — 3079F DIAST BP 80-89 MM HG: CPT | Mod: CPTII,S$GLB,, | Performed by: NURSE PRACTITIONER

## 2023-07-10 PROCEDURE — 1159F MED LIST DOCD IN RCRD: CPT | Mod: CPTII,S$GLB,, | Performed by: NURSE PRACTITIONER

## 2023-07-10 PROCEDURE — 99214 PR OFFICE/OUTPT VISIT, EST, LEVL IV, 30-39 MIN: ICD-10-PCS | Mod: S$GLB,,, | Performed by: NURSE PRACTITIONER

## 2023-07-10 PROCEDURE — 81000 URINALYSIS NONAUTO W/SCOPE: CPT | Performed by: NURSE PRACTITIONER

## 2023-07-10 PROCEDURE — 99214 OFFICE O/P EST MOD 30 MIN: CPT | Mod: S$GLB,,, | Performed by: NURSE PRACTITIONER

## 2023-07-10 PROCEDURE — 3008F PR BODY MASS INDEX (BMI) DOCUMENTED: ICD-10-PCS | Mod: CPTII,S$GLB,, | Performed by: NURSE PRACTITIONER

## 2023-07-10 PROCEDURE — 81002 URINALYSIS NONAUTO W/O SCOPE: CPT | Mod: S$GLB,,, | Performed by: NURSE PRACTITIONER

## 2023-07-10 PROCEDURE — 4010F PR ACE/ARB THEARPY RXD/TAKEN: ICD-10-PCS | Mod: CPTII,S$GLB,, | Performed by: NURSE PRACTITIONER

## 2023-07-10 PROCEDURE — 4010F ACE/ARB THERAPY RXD/TAKEN: CPT | Mod: CPTII,S$GLB,, | Performed by: NURSE PRACTITIONER

## 2023-07-10 PROCEDURE — 87147 CULTURE TYPE IMMUNOLOGIC: CPT | Performed by: NURSE PRACTITIONER

## 2023-07-10 PROCEDURE — 87088 URINE BACTERIA CULTURE: CPT | Performed by: NURSE PRACTITIONER

## 2023-07-10 PROCEDURE — 3075F SYST BP GE 130 - 139MM HG: CPT | Mod: CPTII,S$GLB,, | Performed by: NURSE PRACTITIONER

## 2023-07-10 PROCEDURE — 99999 PR PBB SHADOW E&M-EST. PATIENT-LVL III: CPT | Mod: PBBFAC,,, | Performed by: NURSE PRACTITIONER

## 2023-07-10 PROCEDURE — 87086 URINE CULTURE/COLONY COUNT: CPT | Performed by: NURSE PRACTITIONER

## 2023-07-10 PROCEDURE — 3075F PR MOST RECENT SYSTOLIC BLOOD PRESS GE 130-139MM HG: ICD-10-PCS | Mod: CPTII,S$GLB,, | Performed by: NURSE PRACTITIONER

## 2023-07-10 PROCEDURE — 81002 POCT URINE DIPSTICK WITHOUT MICROSCOPE: ICD-10-PCS | Mod: S$GLB,,, | Performed by: NURSE PRACTITIONER

## 2023-07-10 PROCEDURE — 3079F PR MOST RECENT DIASTOLIC BLOOD PRESSURE 80-89 MM HG: ICD-10-PCS | Mod: CPTII,S$GLB,, | Performed by: NURSE PRACTITIONER

## 2023-07-10 PROCEDURE — 1159F PR MEDICATION LIST DOCUMENTED IN MEDICAL RECORD: ICD-10-PCS | Mod: CPTII,S$GLB,, | Performed by: NURSE PRACTITIONER

## 2023-07-10 PROCEDURE — 3008F BODY MASS INDEX DOCD: CPT | Mod: CPTII,S$GLB,, | Performed by: NURSE PRACTITIONER

## 2023-07-10 PROCEDURE — 99999 PR PBB SHADOW E&M-EST. PATIENT-LVL III: ICD-10-PCS | Mod: PBBFAC,,, | Performed by: NURSE PRACTITIONER

## 2023-07-10 RX ORDER — TAMSULOSIN HYDROCHLORIDE 0.4 MG/1
0.4 CAPSULE ORAL DAILY
Qty: 30 CAPSULE | Refills: 2 | Status: SHIPPED | OUTPATIENT
Start: 2023-07-10 | End: 2023-10-12

## 2023-07-10 RX ORDER — CIPROFLOXACIN 500 MG/1
500 TABLET ORAL EVERY 12 HOURS
Qty: 14 TABLET | Refills: 0 | Status: SHIPPED | OUTPATIENT
Start: 2023-07-10 | End: 2023-07-17

## 2023-07-10 NOTE — PROGRESS NOTES
Subjective:      Patient ID: Giuliana Rodas is a 53 y.o. female.  Pt presents to clinic for acute left flank pain that began suddenly 2 days ago.     Flank Pain  This is a new problem. The current episode started in the past 7 days. The problem occurs constantly. The problem is unchanged. The pain is present in the costovertebral angle. The quality of the pain is described as aching and cramping. The pain does not radiate. The pain is at a severity of 10/10. The pain is severe. The symptoms are aggravated by bending, position, standing and twisting. Stiffness is present All day. Pertinent negatives include no abdominal pain, bladder incontinence, bowel incontinence, chest pain, dysuria, fever, headaches, leg pain, numbness, paresis, paresthesias, pelvic pain, perianal numbness, tingling, weakness or weight loss. Risk factors include lack of exercise, menopause, obesity, sedentary lifestyle and poor posture. Treatments tried: increased fluid intake, rest, muscle relaxer. The treatment provided no relief.   Review of Systems   Constitutional:  Negative for activity change, appetite change, fatigue, fever, unexpected weight change and weight loss.   Eyes: Negative.    Respiratory:  Negative for chest tightness and shortness of breath.    Cardiovascular:  Negative for chest pain, palpitations, leg swelling and claudication.   Gastrointestinal:  Negative for abdominal pain, bowel incontinence, change in bowel habit, constipation, diarrhea, nausea, vomiting and change in bowel habit.   Genitourinary:  Positive for decreased urine volume, difficulty urinating, flank pain, frequency and urgency. Negative for bladder incontinence, dysuria, hematuria, menstrual problem, nocturia and pelvic pain.   Musculoskeletal:  Positive for back pain. Negative for arthralgias, gait problem and leg pain.   Integumentary:  Negative for rash.   Neurological:  Negative for tingling, weakness, numbness, headaches and paresthesias.  "  Psychiatric/Behavioral: Negative.     All other systems reviewed and are negative.      Objective:     Vitals:    07/10/23 0826   BP: 136/80   Pulse: 64   Resp: 16   Temp: 98.5 °F (36.9 °C)   TempSrc: Oral   SpO2: 97%   Weight: 90.9 kg (200 lb 6.4 oz)   Height: 5' 2" (1.575 m)     Physical Exam  Vitals and nursing note reviewed.   Constitutional:       General: She is not in acute distress.     Appearance: Normal appearance. She is well-developed and well-groomed. She is not ill-appearing.   HENT:      Head: Normocephalic and atraumatic.      Right Ear: External ear normal.      Left Ear: External ear normal.      Nose: Nose normal.      Mouth/Throat:      Lips: Pink.      Mouth: Mucous membranes are moist.   Eyes:      General: Lids are normal. Vision grossly intact. Gaze aligned appropriately.      Conjunctiva/sclera: Conjunctivae normal.      Pupils: Pupils are equal, round, and reactive to light.   Neck:      Trachea: Phonation normal.   Cardiovascular:      Rate and Rhythm: Normal rate and regular rhythm.      Heart sounds: Normal heart sounds.   Pulmonary:      Effort: Pulmonary effort is normal. No accessory muscle usage or respiratory distress.      Breath sounds: Normal breath sounds and air entry.   Abdominal:      General: Abdomen is flat. Bowel sounds are normal. There is no distension.      Palpations: Abdomen is soft.      Tenderness: There is no abdominal tenderness. There is left CVA tenderness. There is no right CVA tenderness, guarding or rebound.   Musculoskeletal:      Cervical back: Neck supple.      Thoracic back: Normal.      Lumbar back: Spasms and tenderness present. No swelling, edema, deformity, signs of trauma or bony tenderness. Normal range of motion. Negative right straight leg raise test and negative left straight leg raise test.      Right hip: Normal.      Left hip: Normal.      Right lower leg: Normal. No edema.      Left lower leg: Normal. No edema.   Skin:     General: Skin is " warm and dry.      Findings: No rash.   Neurological:      General: No focal deficit present.      Mental Status: She is alert and oriented to person, place, and time. Mental status is at baseline.      Sensory: Sensation is intact.      Motor: Motor function is intact.      Coordination: Coordination is intact.      Gait: Gait is intact.   Psychiatric:         Attention and Perception: Attention and perception normal.         Mood and Affect: Mood and affect normal.         Speech: Speech normal.         Behavior: Behavior normal. Behavior is cooperative.         Thought Content: Thought content normal.         Cognition and Memory: Cognition and memory normal.         Judgment: Judgment normal.     Assessment and Plan:     1. Acute left flank pain  UTI vs nephrolithiasis vs musculoskeletal pain  Treatment has included: OTC medication, prescription medication, heat, and herbal medication  PLAN: Treatment per orders - also push fluids, may use Pyridium OTC prn. Call or return to clinic prn if these symptoms worsen or fail to improve as anticipated.  For the potential kidney stone, I recommended pushing plenty of fluids, 2 quarts daily, straining the urine (and bring in the stone if it passes) and analgesics per orders. An IVP will be scheduled, then see the patient for return office visit. Call if severe pain, fever, vomiting, gross hematuria or dysuria occurs.  - Urinalysis  - Urine Culture High Risk  - POCT URINE DIPSTICK WITHOUT MICROSCOPE  - Comprehensive Metabolic Panel; Future  - CBC Auto Differential; Future  - tamsulosin (FLOMAX) 0.4 mg Cap; Take 1 capsule (0.4 mg total) by mouth once daily.  Dispense: 30 capsule; Refill: 2  - ciprofloxacin HCl (CIPRO) 500 MG tablet; Take 1 tablet (500 mg total) by mouth every 12 (twelve) hours. for 7 days  Dispense: 14 tablet; Refill: 0           JESUS Rowell, FNP-C  Family/Internal Medicine  [facility]

## 2023-07-11 ENCOUNTER — LAB VISIT (OUTPATIENT)
Dept: LAB | Facility: HOSPITAL | Age: 53
End: 2023-07-11
Attending: NURSE PRACTITIONER
Payer: MEDICARE

## 2023-07-11 ENCOUNTER — PATIENT MESSAGE (OUTPATIENT)
Dept: FAMILY MEDICINE | Facility: CLINIC | Age: 53
End: 2023-07-11
Payer: MEDICARE

## 2023-07-11 DIAGNOSIS — R10.9 ACUTE LEFT FLANK PAIN: ICD-10-CM

## 2023-07-11 DIAGNOSIS — R10.9 ACUTE LEFT FLANK PAIN: Primary | ICD-10-CM

## 2023-07-11 LAB
ALBUMIN SERPL BCP-MCNC: 4.2 G/DL (ref 3.5–5.2)
ALP SERPL-CCNC: 96 U/L (ref 55–135)
ALT SERPL W/O P-5'-P-CCNC: 29 U/L (ref 10–44)
ANION GAP SERPL CALC-SCNC: 8 MMOL/L (ref 8–16)
AST SERPL-CCNC: 26 U/L (ref 10–40)
BASOPHILS # BLD AUTO: 0.03 K/UL (ref 0–0.2)
BASOPHILS NFR BLD: 0.6 % (ref 0–1.9)
BILIRUB SERPL-MCNC: 0.5 MG/DL (ref 0.1–1)
BUN SERPL-MCNC: 21 MG/DL (ref 6–20)
CALCIUM SERPL-MCNC: 10.5 MG/DL (ref 8.7–10.5)
CHLORIDE SERPL-SCNC: 104 MMOL/L (ref 95–110)
CO2 SERPL-SCNC: 29 MMOL/L (ref 23–29)
CREAT SERPL-MCNC: 1.1 MG/DL (ref 0.5–1.4)
DIFFERENTIAL METHOD: ABNORMAL
EOSINOPHIL # BLD AUTO: 0.1 K/UL (ref 0–0.5)
EOSINOPHIL NFR BLD: 2.4 % (ref 0–8)
ERYTHROCYTE [DISTWIDTH] IN BLOOD BY AUTOMATED COUNT: 12.4 % (ref 11.5–14.5)
EST. GFR  (NO RACE VARIABLE): 59 ML/MIN/1.73 M^2
GLUCOSE SERPL-MCNC: 96 MG/DL (ref 70–110)
HCT VFR BLD AUTO: 43.5 % (ref 37–48.5)
HGB BLD-MCNC: 14.7 G/DL (ref 12–16)
IMM GRANULOCYTES # BLD AUTO: 0.01 K/UL (ref 0–0.04)
IMM GRANULOCYTES NFR BLD AUTO: 0.2 % (ref 0–0.5)
LYMPHOCYTES # BLD AUTO: 1.8 K/UL (ref 1–4.8)
LYMPHOCYTES NFR BLD: 39.5 % (ref 18–48)
MCH RBC QN AUTO: 28.7 PG (ref 27–31)
MCHC RBC AUTO-ENTMCNC: 33.8 G/DL (ref 32–36)
MCV RBC AUTO: 85 FL (ref 82–98)
MONOCYTES # BLD AUTO: 0.4 K/UL (ref 0.3–1)
MONOCYTES NFR BLD: 8.4 % (ref 4–15)
NEUTROPHILS # BLD AUTO: 2.3 K/UL (ref 1.8–7.7)
NEUTROPHILS NFR BLD: 48.9 % (ref 38–73)
NRBC BLD-RTO: 0 /100 WBC
PLATELET # BLD AUTO: 136 K/UL (ref 150–450)
PMV BLD AUTO: 9.1 FL (ref 9.2–12.9)
POTASSIUM SERPL-SCNC: 4.4 MMOL/L (ref 3.5–5.1)
PROT SERPL-MCNC: 8 G/DL (ref 6–8.4)
RBC # BLD AUTO: 5.13 M/UL (ref 4–5.4)
SODIUM SERPL-SCNC: 141 MMOL/L (ref 136–145)
WBC # BLD AUTO: 4.66 K/UL (ref 3.9–12.7)

## 2023-07-11 PROCEDURE — 36415 COLL VENOUS BLD VENIPUNCTURE: CPT | Performed by: NURSE PRACTITIONER

## 2023-07-11 PROCEDURE — 80053 COMPREHEN METABOLIC PANEL: CPT | Performed by: NURSE PRACTITIONER

## 2023-07-11 PROCEDURE — 85025 COMPLETE CBC W/AUTO DIFF WBC: CPT | Performed by: NURSE PRACTITIONER

## 2023-07-11 RX ORDER — AMOXICILLIN AND CLAVULANATE POTASSIUM 875; 125 MG/1; MG/1
1 TABLET, FILM COATED ORAL EVERY 12 HOURS
Qty: 20 TABLET | Refills: 0 | Status: SHIPPED | OUTPATIENT
Start: 2023-07-11 | End: 2023-07-31

## 2023-07-12 LAB — BACTERIA UR CULT: ABNORMAL

## 2023-07-18 ENCOUNTER — TELEPHONE (OUTPATIENT)
Dept: FAMILY MEDICINE | Facility: CLINIC | Age: 53
End: 2023-07-18
Payer: MEDICARE

## 2023-07-18 NOTE — TELEPHONE ENCOUNTER
Return call to patient, and she states that she in in pain. That she is taking the antibiotics but it is not helping. That she cannot walk and wants to see about a CT scan. Patient informed that her message was sent to the provider and we are waiting to hear a response on the next step. That if she is in crucial pain that she will need to go to the ER for evaluation and treatment. She acknowledged understanding.

## 2023-07-28 ENCOUNTER — TELEPHONE (OUTPATIENT)
Dept: FAMILY MEDICINE | Facility: CLINIC | Age: 53
End: 2023-07-28
Payer: MEDICARE

## 2023-07-28 ENCOUNTER — PATIENT MESSAGE (OUTPATIENT)
Dept: FAMILY MEDICINE | Facility: CLINIC | Age: 53
End: 2023-07-28

## 2023-07-28 ENCOUNTER — OFFICE VISIT (OUTPATIENT)
Dept: FAMILY MEDICINE | Facility: CLINIC | Age: 53
End: 2023-07-28
Payer: MEDICARE

## 2023-07-28 DIAGNOSIS — Z87.19 HISTORY OF DIVERTICULITIS OF COLON: ICD-10-CM

## 2023-07-28 DIAGNOSIS — R10.32 LEFT LOWER QUADRANT PAIN: Primary | ICD-10-CM

## 2023-07-28 PROCEDURE — 4010F ACE/ARB THERAPY RXD/TAKEN: CPT | Mod: CPTII,95,,

## 2023-07-28 PROCEDURE — 99214 OFFICE O/P EST MOD 30 MIN: CPT | Mod: 95,,,

## 2023-07-28 PROCEDURE — 99214 PR OFFICE/OUTPT VISIT, EST, LEVL IV, 30-39 MIN: ICD-10-PCS | Mod: 95,,,

## 2023-07-28 PROCEDURE — 4010F PR ACE/ARB THEARPY RXD/TAKEN: ICD-10-PCS | Mod: CPTII,95,,

## 2023-07-28 NOTE — PROGRESS NOTES
Assessment & Plan  Problem List Items Addressed This Visit          GI    History of diverticulitis of colon     Other Visit Diagnoses       Left lower quadrant pain    -  Primary  Given recent abx use of cipro and Augmentin I do not think it's appropriate to treat with abx at this time without in-person eval or CT results. No systemic symptoms noted but unable to assess abd or vital signs. No vomiting or diarrhea present. No blood in stool. Denies urinary s/s. Subjectively afebrile. Pt ate sandwich approx 30 min ago. Able to tolerate. For now advised liquid diet x 48 hours and then continue with low residue diet.     CT of abd scheduled for Monday. ED precautions given for acute pain, fever or worsening s/s.     Relevant Orders    CT Abdomen Pelvis With Contrast              Health Maintenance reviewed, yes.    The patient location is:  Patient Home   The chief complaint leading to consultation is: noted below  Visit type: Virtual visit with synchronous audio and video  Total time spent with patient: 30  Each patient to whom he or she provides medical services by telemedicine is:  (1) informed of the relationship between the physician and patient and the respective role of any other health care provider with respect to management of the patient; and (2) notified that he or she may decline to receive medical services by telemedicine and may withdraw from such care at any time.    Follow-up: No follow-ups on file.    ______________________________________________________________________    Chief Complaint  No chief complaint on file.      HPI  Giuliana Rodas is a 53 y.o. female with multiple medical diagnoses as listed in the medical history and problem list that presents for abd pain via virtual visit.  Pt is unknown to me     Discussed limitations of virtual visits. Reviewed recent labs and visits.     Frequent diverticulitis flare-ups. Has appt with Dr. Chino in approx 10 days.   Seen May 26 for diverticulitis  "concerns. Treated with Augmentin. Symptoms resolved. Then developed flank pain, Started July 8th, seen July 10th, started on cipro but then switched to Augmentin on July 12 x 10 days for kidney infection. Just finished Augmentin x 5 days ago. Was having lower back pain but improved now, mostly resolved. Int low-grade fever with chills. TMAX 100.3  x 15 days ago... No recent fever. Today developed abd pain. "Knows its diverticulitis because will be to LLQ that will radiate to lower back, tender to touch and worse with movements". Denies urinary s/s at this time. PMH of IC, dormant for 10 days. Always has microscopic blood in urine.   Reviewed recent labs. Kidney function stable. Okay for CT with contrast    PAST MEDICAL HISTORY:  Past Medical History:   Diagnosis Date    Bile reflux gastritis     Breast cyst     Eczema     Fibrocystic breast     Fibromyalgia     Gastroparesis     dr. harden    GERD (gastroesophageal reflux disease)     Hyperglyceridemia     Hyperlipidemia     Hypertension     IC (interstitial cystitis)     dr. labadie    Psoriasis     Tension headache        PAST SURGICAL HISTORY:  Past Surgical History:   Procedure Laterality Date    BREAST BIOPSY Right     over 10 years ago/ milk duct    CHOLECYSTECTOMY      COLONOSCOPY N/A 11/8/2022    Procedure: COLONOSCOPY;  Surgeon: Damon Mcmahon MD;  Location: [facility];  Service: Endoscopy;  Laterality: N/A;  vacc-inst portal-tb-smith or ray    ESOPHAGOGASTRODUODENOSCOPY N/A 3/12/2020    Procedure: EGD (ESOPHAGOGASTRODUODENOSCOPY);  Surgeon: Damon Mcmahon MD;  Location: [address]);  Service: Endoscopy;  Laterality: N/A;  use pcf scope    HEMORRHOID SURGERY      HYSTERECTOMY      KNEE SURGERY      OOPHORECTOMY      one ov removed       SOCIAL HISTORY:  Social History     Socioeconomic History    Marital status:     Number of children: 2   Occupational History    Occupation:      Employer: [employer]   Tobacco Use    Smoking status: " Never    Smokeless tobacco: Never   Substance and Sexual Activity    Alcohol use: No    Drug use: No    Sexual activity: Yes     Partners: Male     Birth control/protection: See Surgical Hx   Social History Narrative    Lives at home with  and daughter     Social Determinants of Health     Financial Resource Strain: Medium Risk    Difficulty of Paying Living Expenses: Somewhat hard   Food Insecurity: Food Insecurity Present    Worried About Running Out of Food in the Last Year: Sometimes true    Ran Out of Food in the Last Year: Sometimes true   Transportation Needs: No Transportation Needs    Lack of Transportation (Medical): No    Lack of Transportation (Non-Medical): No   Physical Activity: Insufficiently Active    Days of Exercise per Week: 4 days    Minutes of Exercise per Session: 20 min   Stress: No Stress Concern Present    Feeling of Stress : Not at all   Social Connections: Unknown    Frequency of Communication with Friends and Family: More than three times a week    Frequency of Social Gatherings with Friends and Family: Once a week    Active Member of Clubs or Organizations: No    Attends Club or Organization Meetings: Patient refused    Marital Status:    Housing Stability: Unknown    Unable to Pay for Housing in the Last Year: Patient refused    Number of Places Lived in the Last Year: 1    Unstable Housing in the Last Year: No       FAMILY HISTORY:  Family History   Problem Relation Age of Onset    Hypertension Mother     Migraines Mother     Breast cancer Mother     Hypertension Father     Stroke Father     Heart disease Father     Hyperlipidemia Father     Diabetes Father     Breast cancer Paternal Grandmother     Colon cancer Neg Hx     Ovarian cancer Neg Hx     Inflammatory bowel disease Neg Hx     Celiac disease Neg Hx        ALLERGIES AND MEDICATIONS: updated and reviewed.  Review of patient's allergies indicates:   Allergen Reactions    Chlorthalidone Swelling     Hypokalemia      Dicyclomine Edema             Adhesive Rash    Amitriptyline Hallucinations    Depo-provera [medroxyprogesterone] Anxiety    Prozac [fluoxetine] Anxiety    Topiramate Rash     Current Outpatient Medications   Medication Sig Dispense Refill    amlodipine-olmesartan (ANGEL) 5-40 mg per tablet TAKE ONE CAPSULE BY MOUTH EVERY DAY. HOLD until follow up with PCP 90 tablet 3    amoxicillin-clavulanate 875-125mg (AUGMENTIN) 875-125 mg per tablet Take 1 tablet by mouth every 12 (twelve) hours. 20 tablet 0    atenoloL (TENORMIN) 50 MG tablet TAKE ONE TABLET BY MOUTH TWICE DAILY AS NEEDED 180 tablet 3    atorvastatin (LIPITOR) 40 MG tablet TAKE 1 TABLET BY MOUTH EVERY DAY 90 tablet 3    BIFIDOBACTERIUM INFANTIS (ALIGN ORAL) Take 1 tablet by mouth once daily.      BIOFREEZE, MENTHOL, TOP Apply topically.      coal tar (NEUTROGENA T-GEL) 0.5 % shampoo Apply topically nightly as needed.      fluocinonide (LIDEX) 0.05 % external solution Apply topically 2 (two) times daily as needed (rash, itching at scalp). Avoid use on face, body folds, groin/genitalia. 60 mL 3    fluticasone propionate (FLONASE) 50 mcg/actuation nasal spray SPRAY twice IN EACH NOSTRIL DAILY 48 g 1    glucosamine sulfate 500 mg Tab Take by mouth.      hydroCHLOROthiazide (HYDRODIURIL) 12.5 MG Tab TAKE 1 TABLET BY MOUTH EVERY DAY 90 tablet 0    hydrocortisone 2.5 % cream Apply topically 2 (two) times daily. For use when flaring on eyelids for up to 2 weeks then take a 1 week break or decrease to BIW PRN 28 g 2    ketoconazole (NIZORAL) 2 % cream Apply topically once daily. For use on eyelids, ears, etc daily 60 g 1    ketoconazole (NIZORAL) 2 % shampoo wash hair AT least TWO OR THREE TIMES weekly. let sit on scalp AT least 5 MINUTES prior to rinsing 120 mL 11    levocetirizine (XYZAL) 5 MG tablet Take 1 tablet (5 mg total) by mouth every evening. 90 tablet 3    LIDOcaine HCL 2% (XYLOCAINE) 2 % jelly Apply topically as needed. Apply topically once nightly to  affected part of foot/feet. 30 mL 2    magnesium 30 mg Tab Take 250 mg by mouth once.       menthol/camphor (ICY HOT ADVANCED TOP) Apply topically.      methocarbamoL (ROBAXIN) 500 MG Tab TAKE ONE TABLET BY MOUTH EVERY 8 HOURS AS NEEDED FOR HEADACHE AND MUSCLE SPASM 60 tablet 3    metronidazole 1% (METROGEL) 1 % Gel Apply topically 2 (two) times daily. For rosacea 60 g 2    multivit with min-folic acid 200 mcg Chew Take by mouth.      multivit-min-folic acid-biotin (HAIR,SKIN AND NAILS,FA-BIOTIN,) 66.7-1,000 mcg Tab Take by mouth.      omega-3 fatty acids/fish oil (FISH OIL-OMEGA-3 FATTY ACIDS) 300-1,000 mg capsule Take 1 capsule by mouth once daily.      ondansetron (ZOFRAN) 4 MG tablet Take 1 tablet (4 mg total) by mouth every 8 (eight) hours as needed for Nausea. 12 tablet 0    PREMARIN vaginal cream PRN      promethazine (PHENERGAN) 25 MG tablet Take 0.5 tablets (12.5 mg total) by mouth every 6 (six) hours as needed for Nausea. 10 tablet 0    propylene glycol (SYSTANE BALANCE OPHT) Apply to eye.      tamsulosin (FLOMAX) 0.4 mg Cap Take 1 capsule (0.4 mg total) by mouth once daily. 30 capsule 2    trolamine salicylate (ASPERCREME) 10 % cream Apply topically as needed.      vitamin D (VITAMIN D3) 1000 units Tab Take 1,000 Units by mouth once daily.      vitamin E 400 UNIT capsule Take 400 Units by mouth once daily.       No current facility-administered medications for this visit.         ROS  Review of Systems   Constitutional:  Negative for fever.   Gastrointestinal:  Positive for abdominal pain and diarrhea. Negative for constipation, nausea and vomiting.   Genitourinary:  Negative for dysuria, frequency and hematuria.   Musculoskeletal:  Positive for myalgias. Negative for arthralgias.   Neurological:  Negative for headaches.         Physical Exam  Physical Exam  Constitutional:       General: She is not in acute distress.     Appearance: Normal appearance. She is not toxic-appearing.   Neurological:       Mental Status: She is alert.         Health Maintenance         Date Due Completion Date    TETANUS VACCINE 09/16/2015 9/16/2005    COVID-19 Vaccine (2 - Booster for Domo series) 05/24/2021 3/29/2021    Influenza Vaccine (1) 09/01/2023 10/12/2020    Lipid Panel 12/30/2023 12/30/2022    Mammogram 05/03/2024 5/3/2022    Hemoglobin A1c (Diabetic Prevention Screening) 06/08/2025 6/8/2022    DEXA Scan 03/15/2026 3/15/2021    Colorectal Cancer Screening 11/08/2032 11/8/2022              Answers submitted by the patient for this visit:  Abdominal Pain Questionnaire (Submitted on 7/28/2023)  Chief Complaint: Abdominal pain  Chronicity: recurrent  Onset: today  Onset quality: sudden  Frequency: constantly  Episode duration: 3 Weeks  Progression since onset: rapidly worsening  Pain location: LLQ  Pain - numeric: 10/10  Pain quality: sharp  anorexia: No  belching: No  flatus: No  hematochezia: No  melena: No  weight loss: No  Aggravated by: certain positions, movement  Relieved by: nothing, recumbency  Diagnostic workup: CT scan  Pain severity: severe  Treatments tried: nothing, antibiotics  Improvement on treatment: no relief  abdominal surgery: Yes  colon cancer: No  Crohn's disease: No  gallstones: Yes  GERD: Yes  irritable bowel syndrome: Yes  kidney stones: No  pancreatitis: Yes  PUD: Yes  ulcerative colitis: No  UTI: Yes

## 2023-07-28 NOTE — PATIENT INSTRUCTIONS
Pleasure meeting you today.     For patients with mild symptoms (e.g., recurrent left lower quadrant pain, bloating, constipation), treatment includes dietary modification, especially gradually increasing the fiber content over weeks and increasing hydration     For now I recommend a liquid diet x 48 hours. Ensure hydration. Followed by a  low-residue diet. This reduces the frequency and volume of stools while prolonging intestinal transit time. It consists of foodstuffs that leave a minimal residue after digestion and absorption in the gut (e.g., refined bread, cereals, white rice, vegetable and fruit juice without pulp, dairy products). The diet is low in fibers and undigested material. Read further education on the low residue (low fiber diet).     Once flare up has resolved we recommend a high-fiber diet to avoid diverticulitis flare ups.     F/u with gastro as you have scheduled.     Get CT done Monday. Go to ED for acute changes including fever >101, worsening abd pain, etc.

## 2023-07-28 NOTE — TELEPHONE ENCOUNTER
----- Message from Aki Lieberman sent at 7/28/2023  8:09 AM CDT -----  Regarding: Giuliana  Type: Patient Callback     Who called: Giuliana     What is the request in detail: Has diverticulitis and in a lot of pain. She is a patient of doctor Bob. Please contact the patient as soon as possible.     Can the clinic reply by [facility]? Yes     Would the patient rather a call back or a response via [facility]? Callback     Best call back number: [phone]      Additional Information:

## 2023-07-31 ENCOUNTER — TELEPHONE (OUTPATIENT)
Dept: FAMILY MEDICINE | Facility: CLINIC | Age: 53
End: 2023-07-31
Payer: MEDICARE

## 2023-07-31 ENCOUNTER — HOSPITAL ENCOUNTER (OUTPATIENT)
Dept: RADIOLOGY | Facility: HOSPITAL | Age: 53
Discharge: HOME | End: 2023-07-31
Payer: MEDICARE

## 2023-07-31 DIAGNOSIS — R10.32 LEFT LOWER QUADRANT PAIN: ICD-10-CM

## 2023-07-31 DIAGNOSIS — K57.32 SIGMOID DIVERTICULITIS: Primary | ICD-10-CM

## 2023-07-31 PROCEDURE — 74177 CT ABDOMEN PELVIS WITH CONTRAST: ICD-10-PCS | Mod: 26,,, | Performed by: RADIOLOGY

## 2023-07-31 PROCEDURE — 25500020 PHARM REV CODE 255

## 2023-07-31 PROCEDURE — 74177 CT ABD & PELVIS W/CONTRAST: CPT | Mod: TC

## 2023-07-31 PROCEDURE — 74177 CT ABD & PELVIS W/CONTRAST: CPT | Mod: 26,,, | Performed by: RADIOLOGY

## 2023-07-31 RX ORDER — METRONIDAZOLE 500 MG/1
500 TABLET ORAL EVERY 8 HOURS
Qty: 21 TABLET | Refills: 0 | Status: SHIPPED | OUTPATIENT
Start: 2023-07-31 | End: 2023-08-07

## 2023-07-31 RX ORDER — CIPROFLOXACIN 500 MG/1
500 TABLET ORAL EVERY 12 HOURS
Qty: 14 TABLET | Refills: 0 | Status: SHIPPED | OUTPATIENT
Start: 2023-07-31 | End: 2023-08-07

## 2023-07-31 RX ADMIN — IOHEXOL 100 ML: 350 INJECTION, SOLUTION INTRAVENOUS at 03:07

## 2023-07-31 RX ADMIN — IOHEXOL 15 ML: 300 INJECTION, SOLUTION INTRAVENOUS at 03:07

## 2023-07-31 NOTE — TELEPHONE ENCOUNTER
Feeling better, felt feverish today with migraine HA. TMAX 99.5. Feeling nauseated, but tolerating food. Admits intermittent abd pain. Will come in go. Pain is not constant. Will wait for eval with GI and hold off on abx for now/. Rx placed for acute fever, pain, etc. Pt agreed to current plan. Continue low residue diet

## 2023-08-07 ENCOUNTER — OFFICE VISIT (OUTPATIENT)
Dept: SURGERY | Facility: CLINIC | Age: 53
End: 2023-08-07
Payer: MEDICARE

## 2023-08-07 VITALS
HEART RATE: 64 BPM | HEIGHT: 62 IN | SYSTOLIC BLOOD PRESSURE: 108 MMHG | RESPIRATION RATE: 18 BRPM | WEIGHT: 201.81 LBS | DIASTOLIC BLOOD PRESSURE: 71 MMHG | BODY MASS INDEX: 37.14 KG/M2

## 2023-08-07 DIAGNOSIS — K57.32 DIVERTICULITIS LARGE INTESTINE W/O PERFORATION OR ABSCESS W/O BLEEDING: Primary | ICD-10-CM

## 2023-08-07 PROCEDURE — 1159F MED LIST DOCD IN RCRD: CPT | Mod: CPTII,S$GLB,, | Performed by: COLON & RECTAL SURGERY

## 2023-08-07 PROCEDURE — 99203 OFFICE O/P NEW LOW 30 MIN: CPT | Mod: S$GLB,,, | Performed by: COLON & RECTAL SURGERY

## 2023-08-07 PROCEDURE — 1159F PR MEDICATION LIST DOCUMENTED IN MEDICAL RECORD: ICD-10-PCS | Mod: CPTII,S$GLB,, | Performed by: COLON & RECTAL SURGERY

## 2023-08-07 PROCEDURE — 3074F PR MOST RECENT SYSTOLIC BLOOD PRESSURE < 130 MM HG: ICD-10-PCS | Mod: CPTII,S$GLB,, | Performed by: COLON & RECTAL SURGERY

## 2023-08-07 PROCEDURE — 3008F BODY MASS INDEX DOCD: CPT | Mod: CPTII,S$GLB,, | Performed by: COLON & RECTAL SURGERY

## 2023-08-07 PROCEDURE — 99999 PR PBB SHADOW E&M-EST. PATIENT-LVL III: ICD-10-PCS | Mod: PBBFAC,,, | Performed by: COLON & RECTAL SURGERY

## 2023-08-07 PROCEDURE — 1160F PR REVIEW ALL MEDS BY PRESCRIBER/CLIN PHARMACIST DOCUMENTED: ICD-10-PCS | Mod: CPTII,S$GLB,, | Performed by: COLON & RECTAL SURGERY

## 2023-08-07 PROCEDURE — 3078F PR MOST RECENT DIASTOLIC BLOOD PRESSURE < 80 MM HG: ICD-10-PCS | Mod: CPTII,S$GLB,, | Performed by: COLON & RECTAL SURGERY

## 2023-08-07 PROCEDURE — 99999 PR PBB SHADOW E&M-EST. PATIENT-LVL III: CPT | Mod: PBBFAC,,, | Performed by: COLON & RECTAL SURGERY

## 2023-08-07 PROCEDURE — 4010F PR ACE/ARB THEARPY RXD/TAKEN: ICD-10-PCS | Mod: CPTII,S$GLB,, | Performed by: COLON & RECTAL SURGERY

## 2023-08-07 PROCEDURE — 99203 PR OFFICE/OUTPT VISIT, NEW, LEVL III, 30-44 MIN: ICD-10-PCS | Mod: S$GLB,,, | Performed by: COLON & RECTAL SURGERY

## 2023-08-07 PROCEDURE — 4010F ACE/ARB THERAPY RXD/TAKEN: CPT | Mod: CPTII,S$GLB,, | Performed by: COLON & RECTAL SURGERY

## 2023-08-07 PROCEDURE — 3078F DIAST BP <80 MM HG: CPT | Mod: CPTII,S$GLB,, | Performed by: COLON & RECTAL SURGERY

## 2023-08-07 PROCEDURE — 3074F SYST BP LT 130 MM HG: CPT | Mod: CPTII,S$GLB,, | Performed by: COLON & RECTAL SURGERY

## 2023-08-07 PROCEDURE — 3008F PR BODY MASS INDEX (BMI) DOCUMENTED: ICD-10-PCS | Mod: CPTII,S$GLB,, | Performed by: COLON & RECTAL SURGERY

## 2023-08-07 PROCEDURE — 1160F RVW MEDS BY RX/DR IN RCRD: CPT | Mod: CPTII,S$GLB,, | Performed by: COLON & RECTAL SURGERY

## 2023-08-07 NOTE — PROGRESS NOTES
[facility]  Colon and Rectal Surgery    [address]  [place], [place]  Tel: [phone]  Fax: [phone]  [email or web address]/   MD Naseem Hubbard MD Brian Kann, MD W. Forrest Johnston, MD Matthew Giglia, MD Jennifer Paruch, MD William Kethman, MD Danielle Kay, MD     Patient name: Giuliana Rodas   YOB: 1970   MRN: [id number]        Colon and Rectal Surgery History and Physical    Subjective:       Patient ID: Giuliana Rodas is a 53 y.o. female.    Chief Complaint: Diverticulitis  HPI  Ms Rodas is a 54yo female hx of gastroparesis, endometriosis, migraines, htn, fibromyalgia and IBS who presents for evaluation for multiple bouts of diverticulitis. Ms. Rodas states her first bout of diverticulitis was approximately a year ago. She states her pain started in the RLQ and became severe. She had associated nausea, fever and diarrhea. She was treated with low fiber diet and antibiotics with improvement in symptoms. She has had 3 subsequent episodes most recent one 10 days ago. Symptoms were very similar to previous episodes. She switched herself to a liquid diet with improvement in symptoms. She was already taking antibiotics at that time for a kidney infection and no further antibiotics were prescribed. She has no personal hx of IBD or colon cancer. She denies family hx of IBD or colon cancer. Never smoker.     CT A/P 7/8/22:  (Images personally reviewed.)  -Acute uncomplicated sigmoid colon diverticulitis.  -Prior cholecystectomy and hysterectomy.  -Bilateral simple and mildly complex renal cysts.    CT A/P 7/24/22:  (Images personally reviewed.)  Persistent acute uncomplicated LEFT lower quadrant diverticulitis without significant detrimental change from 07/08/2022 no evidence for abscess.    CT A/P 3/7/23:  (Images personally reviewed.)  Acute, uncomplicated sigmoid diverticulitis.    CT A/P 7/31/23:  (Images personally reviewed.)  1.  Mild acute diverticulitis of the sigmoid colon.  Recommend clinical correlation and follow-up.  2. Hepatic steatosis.  3. There are 4 stable hypodense lesions in the spleen which are nonspecific, possibly complex cysts or hemangiomas.  See above comments.  Recommend clinical correlation and surveillance.  4. Status post cholecystectomy with stable mild dilation of the common duct measures 11 mm.  This could be a postoperative change.  Recommend clinical correlation.  5. There are 2 suspected duodenal diverticula adjacent to the ampulla, similar to the prior study.  6. Bilateral renal cysts.  7. Small fat containing umbilical hernia.    Last colonoscopy - 11/8/22               - Diverticulosis in the sigmoid colon.                          - The entire examined colon is normal. Biopsied.                          - The examined portion of the ileum was normal.     Path Random colon, biopsy:   Colonic mucosa with no significant histopathologic abnormality   No significant intraepithelial lymphocytosis   No abnormal collagen formation     No family hx of CRC or IBD.      Review of patient's allergies indicates:   Allergen Reactions    Chlorthalidone Swelling     Hypokalemia     Dicyclomine Edema             Adhesive Rash    Amitriptyline Hallucinations    Depo-provera [medroxyprogesterone] Anxiety    Prozac [fluoxetine] Anxiety    Topiramate Rash       Past Medical History:   Diagnosis Date    Bile reflux gastritis     Breast cyst     Eczema     Fibrocystic breast     Fibromyalgia     Gastroparesis     dr. harden    GERD (gastroesophageal reflux disease)     Hyperglyceridemia     Hyperlipidemia     Hypertension     IC (interstitial cystitis)     dr. labadie    Psoriasis     Tension headache        Past Surgical History:   Procedure Laterality Date    BREAST BIOPSY Right     over 10 years ago/ milk duct    CHOLECYSTECTOMY      COLONOSCOPY N/A 11/8/2022    Procedure: COLONOSCOPY;  Surgeon: Damon Mcmahon MD;  Location:  [facility] ENDO;  Service: Endoscopy;  Laterality: N/A;  vacc-inst portal-tb-smith or ray    ESOPHAGOGASTRODUODENOSCOPY N/A 3/12/2020    Procedure: EGD (ESOPHAGOGASTRODUODENOSCOPY);  Surgeon: Damon Mcmahon MD;  Location: [facility] (4TH FLR);  Service: Endoscopy;  Laterality: N/A;  use pcf scope    HEMORRHOID SURGERY      HYSTERECTOMY      KNEE SURGERY      OOPHORECTOMY      one ov removed       Current Outpatient Medications   Medication Sig Dispense Refill    amlodipine-olmesartan (ANGEL) 5-40 mg per tablet TAKE ONE CAPSULE BY MOUTH EVERY DAY. HOLD until follow up with PCP 90 tablet 3    atenoloL (TENORMIN) 50 MG tablet TAKE ONE TABLET BY MOUTH TWICE DAILY AS NEEDED 180 tablet 3    atorvastatin (LIPITOR) 40 MG tablet TAKE 1 TABLET BY MOUTH EVERY DAY 90 tablet 3    BIFIDOBACTERIUM INFANTIS (ALIGN ORAL) Take 1 tablet by mouth once daily.      BIOFREEZE, MENTHOL, TOP Apply topically.      coal tar (NEUTROGENA T-GEL) 0.5 % shampoo Apply topically nightly as needed.      fluocinonide (LIDEX) 0.05 % external solution Apply topically 2 (two) times daily as needed (rash, itching at scalp). Avoid use on face, body folds, groin/genitalia. 60 mL 3    fluticasone propionate (FLONASE) 50 mcg/actuation nasal spray SPRAY twice IN EACH NOSTRIL DAILY 48 g 1    glucosamine sulfate 500 mg Tab Take by mouth.      hydroCHLOROthiazide (HYDRODIURIL) 12.5 MG Tab TAKE 1 TABLET BY MOUTH EVERY DAY 90 tablet 0    hydrocortisone 2.5 % cream Apply topically 2 (two) times daily. For use when flaring on eyelids for up to 2 weeks then take a 1 week break or decrease to BIW PRN 28 g 2    ketoconazole (NIZORAL) 2 % cream Apply topically once daily. For use on eyelids, ears, etc daily 60 g 1    ketoconazole (NIZORAL) 2 % shampoo wash hair AT least TWO OR THREE TIMES weekly. let sit on scalp AT least 5 MINUTES prior to rinsing 120 mL 11    magnesium 30 mg Tab Take 250 mg by mouth once.       menthol/camphor (ICY HOT ADVANCED TOP) Apply topically.       methocarbamoL (ROBAXIN) 500 MG Tab TAKE ONE TABLET BY MOUTH EVERY 8 HOURS AS NEEDED FOR HEADACHE AND MUSCLE SPASM 60 tablet 3    metronidazole 1% (METROGEL) 1 % Gel Apply topically 2 (two) times daily. For rosacea 60 g 2    multivit with min-folic acid 200 mcg Chew Take by mouth.      multivit-min-folic acid-biotin (HAIR,SKIN AND NAILS,FA-BIOTIN,) 66.7-1,000 mcg Tab Take by mouth.      omega-3 fatty acids/fish oil (FISH OIL-OMEGA-3 FATTY ACIDS) 300-1,000 mg capsule Take 1 capsule by mouth once daily.      ondansetron (ZOFRAN) 4 MG tablet Take 1 tablet (4 mg total) by mouth every 8 (eight) hours as needed for Nausea. 12 tablet 0    PREMARIN vaginal cream PRN      propylene glycol (SYSTANE BALANCE OPHT) Apply to eye.      tamsulosin (FLOMAX) 0.4 mg Cap Take 1 capsule (0.4 mg total) by mouth once daily. 30 capsule 2    vitamin D (VITAMIN D3) 1000 units Tab Take 1,000 Units by mouth once daily.      vitamin E 400 UNIT capsule Take 400 Units by mouth once daily.      ciprofloxacin HCl (CIPRO) 500 MG tablet Take 1 tablet (500 mg total) by mouth every 12 (twelve) hours. for 7 days (Patient not taking: Reported on 8/7/2023) 14 tablet 0    levocetirizine (XYZAL) 5 MG tablet Take 1 tablet (5 mg total) by mouth every evening. 90 tablet 3    LIDOcaine HCL 2% (XYLOCAINE) 2 % jelly Apply topically as needed. Apply topically once nightly to affected part of foot/feet. (Patient not taking: Reported on 8/7/2023) 30 mL 2    metroNIDAZOLE (FLAGYL) 500 MG tablet Take 1 tablet (500 mg total) by mouth every 8 (eight) hours. for 7 days (Patient not taking: Reported on 8/7/2023) 21 tablet 0    promethazine (PHENERGAN) 25 MG tablet Take 0.5 tablets (12.5 mg total) by mouth every 6 (six) hours as needed for Nausea. (Patient not taking: Reported on 8/7/2023) 10 tablet 0    trolamine salicylate (ASPERCREME) 10 % cream Apply topically as needed.       No current facility-administered medications for this visit.       Family History   Problem  Relation Age of Onset    Hypertension Mother     Migraines Mother     Breast cancer Mother     Hypertension Father     Stroke Father     Heart disease Father     Hyperlipidemia Father     Diabetes Father     Breast cancer Paternal Grandmother     Colon cancer Neg Hx     Ovarian cancer Neg Hx     Inflammatory bowel disease Neg Hx     Celiac disease Neg Hx        Social History     Socioeconomic History    Marital status:     Number of children: 2   Occupational History    Occupation:      Employer: [employer]   Tobacco Use    Smoking status: Never    Smokeless tobacco: Never   Substance and Sexual Activity    Alcohol use: No    Drug use: No    Sexual activity: Yes     Partners: Male     Birth control/protection: See Surgical Hx   Social History Narrative    Lives at home with  and daughter     Social Determinants of Health     Financial Resource Strain: Medium Risk (7/28/2023)    Overall Financial Resource Strain (CARDIA)     Difficulty of Paying Living Expenses: Somewhat hard   Food Insecurity: Food Insecurity Present (7/28/2023)    Hunger Vital Sign     Worried About Running Out of Food in the Last Year: Sometimes true     Ran Out of Food in the Last Year: Sometimes true   Transportation Needs: No Transportation Needs (7/28/2023)    PRAPARE - Transportation     Lack of Transportation (Medical): No     Lack of Transportation (Non-Medical): No   Physical Activity: Insufficiently Active (7/28/2023)    Exercise Vital Sign     Days of Exercise per Week: 4 days     Minutes of Exercise per Session: 20 min   Stress: No Stress Concern Present (7/28/2023)    [place] [employer] - Occupational Stress Questionnaire     Feeling of Stress : Not at all   Social Connections: Unknown (7/28/2023)    Social Connection and Isolation Panel [NHANES]     Frequency of Communication with Friends and Family: More than three times a week     Frequency of Social Gatherings with Friends and Family: Once  "a week     Active Member of Clubs or Organizations: No     Attends Club or Organization Meetings: Patient refused     Marital Status:    Housing Stability: Unknown (7/28/2023)    Housing Stability Vital Sign     Unable to Pay for Housing in the Last Year: Patient refused     Number of Places Lived in the Last Year: 1     Unstable Housing in the Last Year: No       Review of Systems   Constitutional:  Positive for fatigue and fever. Negative for activity change and chills.   HENT:  Negative for congestion and sore throat.    Eyes:  Negative for discharge and visual disturbance.   Respiratory:  Negative for chest tightness and shortness of breath.    Cardiovascular:  Negative for chest pain.   Gastrointestinal:  Positive for abdominal pain, diarrhea and nausea. Negative for constipation and vomiting.   Endocrine: Negative for cold intolerance and heat intolerance.   Genitourinary:  Positive for flank pain. Negative for frequency and hematuria.   Musculoskeletal:  Negative for arthralgias and myalgias.   Neurological:  Negative for dizziness and numbness.   Psychiatric/Behavioral:  Negative for agitation and confusion.        Objective:       Physical Exam:  /71 (BP Location: Left arm, Patient Position: Sitting, BP Method: Large (Automatic))   Pulse 64   Resp 18   Ht 5' 2" (1.575 m)   Wt 91.6 kg (201 lb 13.3 oz)   BMI 36.92 kg/m²   General: well developed, no distress  Head: normocephalic  Neck: supple, symmetrical, trachea midline  Lungs:  normal respiratory effort  Heart: regular rate and rhythm and no murmur  Abdomen: soft, mildly tender in LLQ, nondistended, well healed laparoscopic incisions  Extremities: warm, well perfused and no cyanosis or edema, or clubbing    Laboratory Studies:  Complete Blood Count:  Lab Results   Component Value Date/Time    WBC 4.66 07/11/2023 10:37 AM    HGB 14.7 07/11/2023 10:37 AM    HCT 43.5 07/11/2023 10:37 AM    HCT 40 07/24/2022 03:31 AM    RBC 5.13 07/11/2023 " 10:37 AM     (L) 07/11/2023 10:37 AM       Basic Chemistry Panel:  Lab Results   Component Value Date/Time     07/11/2023 10:37 AM    K 4.4 07/11/2023 10:37 AM     07/11/2023 10:37 AM    CO2 29 07/11/2023 10:37 AM    BUN 21 (H) 07/11/2023 10:37 AM    CREATININE 1.1 07/11/2023 10:37 AM    CALCIUM 10.5 07/11/2023 10:37 AM       Lab Results   Component Value Date/Time    CRP 27.6 (H) 01/30/2023 03:50 PM           Assessment:       1. Diverticulitis large intestine w/o perforation or abscess w/o bleeding        Plan:       52yo female hx of gastroparesis, endometriosis, migraines, htn, fibromyalgia and IBS who presents for evaluation for multiple bouts of diverticulitis. Her hx is consistent with multiple episodes of diverticulitis. CT imaging shows inflammation of sigmoid colon without evidence of perforation. She is having more frequent episodes of diverticulitis which have thus far been able to be managed nonoperatively. She does state that the flairs are life limiting and she would be interested in surgical intervention.     Ms. Rodas is a reasonable candidate for laparoscopic sigmoidectomy given her multiple and more frequent diverticulitis flairs. We discussed both non-operative and operative treatment as well as their risks and benefits and she would like to proceed with operative intervention.   Timing of surgery will be planned after her most recent flair has had a few months to calm down. Tentatively plan for October 11th, will have her follow up on October 2nd prior to OR.  Discussed continuing low fiber diet until her symptoms have fully resolved and then she can slowly add back in fiber.      Steve Lowery MD  Colon & Rectal Fellow    I have interviewed and examined the patient, reviewed the notes and assessments, and/or personally supervised the procedure(s) performed by Dr Lowery, and I concur with her/his documentation of Giuliana Rodas.  See below addendum for my evaluation and  additional findings.    52 yo F with diverticular disease and multiple episodes of uncomplicated diverticulitis over the past year and a half, the most recent being approximately 10 days ago, diagnosis confirmed via CT scan.  She is currently recovering from this episode, taking oral antibiotics.    Given the recurrent nature of her episodes and their impact on her quality of life, she wishes to undergo an elective laparoscopic sigmoid colectomy.  We have tentatively scheduled this for 10/11/2023.  RTO 1 week prior for preop visit.      Marcio Chino MD, FACS, FASCRS  Senior Staff Surgeon  Department of Colon & Rectal Surgery

## 2023-08-19 DIAGNOSIS — E78.5 HYPERLIPIDEMIA, UNSPECIFIED HYPERLIPIDEMIA TYPE: ICD-10-CM

## 2023-08-19 NOTE — TELEPHONE ENCOUNTER
No care due was identified.  Health [facility] Embedded Care Due Messages. Reference number: [id number].   8/19/2023 8:03:34 AM CDT

## 2023-08-20 RX ORDER — ATORVASTATIN CALCIUM 40 MG/1
TABLET, FILM COATED ORAL
Qty: 90 TABLET | Refills: 1 | Status: SHIPPED | OUTPATIENT
Start: 2023-08-20 | End: 2024-03-22

## 2023-08-20 NOTE — TELEPHONE ENCOUNTER
Refill Decision Note   Giuliana Adrianna  is requesting a refill authorization.  Brief Assessment and Rationale for Refill:  Approve     Medication Therapy Plan:       Medication Reconciliation Completed: No   Comments:     No Care Gaps recommended.     Note composed:4:32 PM 08/20/2023

## 2023-09-01 ENCOUNTER — TELEPHONE (OUTPATIENT)
Dept: SURGERY | Facility: CLINIC | Age: 53
End: 2023-09-01
Payer: MEDICARE

## 2023-09-01 NOTE — TELEPHONE ENCOUNTER
----- Message from Anali Pinto sent at 9/1/2023 11:25 AM CDT -----  Regarding: Reschedule procedure  Contact: [phone]  Hi, pt called to reschedule the procedure date of 10/11/23. Pls call the pt at [phone] to [facility] with the pt.

## 2023-09-01 NOTE — TELEPHONE ENCOUNTER
Spoke with patient. States she has a wedding reception Oct 21st and would like to reschedule surgery until after the 21st . Will return call with new date.

## 2023-09-03 DIAGNOSIS — J34.3 NASAL TURBINATE HYPERTROPHY: ICD-10-CM

## 2023-09-03 NOTE — TELEPHONE ENCOUNTER
No care due was identified.  [facility] Embedded Care Due Messages. Reference number: [id number].   9/03/2023 8:03:36 AM CDT   diabetes

## 2023-09-04 RX ORDER — FLUTICASONE PROPIONATE 50 MCG
SPRAY, SUSPENSION (ML) NASAL
Qty: 48 G | Refills: 2 | Status: SHIPPED | OUTPATIENT
Start: 2023-09-04

## 2023-09-04 NOTE — TELEPHONE ENCOUNTER
Refill Decision Note      Refill Decision Note   Giuliana Rodas  is requesting a refill authorization.  Brief Assessment and Rationale for Refill:  Approve     Medication Therapy Plan:         Comments:     Note composed:7:13 AM 09/04/2023             Appointments     Last Visit   4/25/2023 Giuliana Rojas MD   Next Visit   Visit date not found Giuliana Rojas MD

## 2023-09-06 ENCOUNTER — TELEPHONE (OUTPATIENT)
Dept: SURGERY | Facility: CLINIC | Age: 53
End: 2023-09-06
Payer: MEDICARE

## 2023-09-12 DIAGNOSIS — Z12.31 VISIT FOR SCREENING MAMMOGRAM: Primary | ICD-10-CM

## 2023-09-14 ENCOUNTER — HOSPITAL ENCOUNTER (OUTPATIENT)
Dept: RADIOLOGY | Facility: HOSPITAL | Age: 53
Discharge: HOME | End: 2023-09-14
Attending: OBSTETRICS & GYNECOLOGY
Payer: MEDICARE

## 2023-09-14 DIAGNOSIS — Z12.31 VISIT FOR SCREENING MAMMOGRAM: ICD-10-CM

## 2023-09-14 PROCEDURE — 77063 BREAST TOMOSYNTHESIS BI: CPT | Mod: 26,,, | Performed by: RADIOLOGY

## 2023-09-14 PROCEDURE — 77067 SCR MAMMO BI INCL CAD: CPT | Mod: TC

## 2023-09-14 PROCEDURE — 77067 MAMMO DIGITAL SCREENING BILAT WITH TOMO: ICD-10-PCS | Mod: 26,,, | Performed by: RADIOLOGY

## 2023-09-14 PROCEDURE — 77063 MAMMO DIGITAL SCREENING BILAT WITH TOMO: ICD-10-PCS | Mod: 26,,, | Performed by: RADIOLOGY

## 2023-09-14 PROCEDURE — 77067 SCR MAMMO BI INCL CAD: CPT | Mod: 26,,, | Performed by: RADIOLOGY

## 2023-09-18 DIAGNOSIS — I10 PRIMARY HYPERTENSION: ICD-10-CM

## 2023-09-18 RX ORDER — HYDROCHLOROTHIAZIDE 12.5 MG/1
TABLET ORAL
Qty: 90 TABLET | Refills: 2 | Status: SHIPPED | OUTPATIENT
Start: 2023-09-18

## 2023-09-18 NOTE — TELEPHONE ENCOUNTER
No care due was identified.  Health [facility] Embedded Care Due Messages. Reference number: [id number].   9/18/2023 9:08:09 AM CDT

## 2023-09-18 NOTE — TELEPHONE ENCOUNTER
Refill Routing Note   Medication(s) are not appropriate for processing by [facility] for the following reason(s):      Drug-disease interaction    [facility] action(s):  Defer Care Due:  None identified     Medication Therapy Plan: Drug-Disease: hydroCHLOROthiazide and Hypokalemia      Pharmacist review requested: Yes     Appointments  past 12m or future 3m with PCP    Date Provider   Last Visit   4/25/2023 Giuliana Rojas MD   Next Visit   Visit date not found Giuliana Rojas MD   ED visits in past 90 days: 0        Note composed:6:40 PM 09/18/2023

## 2023-09-18 NOTE — TELEPHONE ENCOUNTER
Refill Decision Note   Giuliana Rodas  is requesting a refill authorization.  Brief Assessment and Rationale for Refill:  Approve     Medication Therapy Plan:         Pharmacist review requested: Yes   Extended chart review required: Yes   Comments:     Note composed:6:44 PM 09/18/2023

## 2023-09-22 RX ORDER — ATENOLOL 50 MG/1
50 TABLET ORAL 2 TIMES DAILY
Qty: 180 TABLET | Refills: 2 | Status: SHIPPED | OUTPATIENT
Start: 2023-09-22

## 2023-09-22 NOTE — TELEPHONE ENCOUNTER
Refill Routing Note   Medication(s) are not appropriate for processing by [facility] for the following reason(s):      Medication outside of protocol  Clarification of medication (Rx) details (PRN use)    [facility] action(s):  Route Care Due:  None identified     Medication Therapy Plan: Previous SIG noted PRN use; PRN use outside of refill center protocol; Patient appears to be using chronically, SIG updated to remove PRN for review by PCP.      Appointments  past 12m or future 3m with PCP    Date Provider   Last Visit   4/25/2023 Giuliana Rojas MD   Next Visit   Visit date not found Giuliana Rojas MD   ED visits in past 90 days: 0        Note composed:10:44 AM 09/22/2023

## 2023-09-22 NOTE — TELEPHONE ENCOUNTER
No care due was identified.  Health [facility] Embedded Care Due Messages. Reference number: [id number].   9/22/2023 9:11:13 AM CDT

## 2023-09-26 RX ORDER — AMLODIPINE AND OLMESARTAN MEDOXOMIL 5; 40 MG/1; MG/1
TABLET ORAL
Qty: 30 TABLET | Refills: 1 | Status: SHIPPED | OUTPATIENT
Start: 2023-09-26 | End: 2023-11-27

## 2023-09-26 NOTE — TELEPHONE ENCOUNTER
Care Due:                  Date            Visit Type   Department     Provider  --------------------------------------------------------------------------------                                [place]      [facility]/OF  MEDICINE/INTERN  Last Visit: 04-      FICE VISIT   [facility]         Giuliana Christiansen  Next Visit: None Scheduled  None         None Found                                                            Last  Test          Frequency    Reason                     Performed    Due Date  --------------------------------------------------------------------------------    Lipid Panel.  12 months..  atorvastatin.............  12- 12-    Health [facility] Embedded Care Due Messages. Reference number: [id number].   9/26/2023 1:45:11 PM CDT

## 2023-09-26 NOTE — TELEPHONE ENCOUNTER
Refill Routing Note   Medication(s) are not appropriate for processing by [facility] for the following reason(s):      No active prescription written by provider    [facility] action(s):  Defer Care Due:  Labs due            Appointments  past 12m or future 3m with PCP    Date Provider   Last Visit   4/25/2023 Giuliana Rojas MD   Next Visit   Visit date not found Giuliana Rojas MD   ED visits in past 90 days: 0        Note composed:1:45 PM 09/26/2023

## 2023-09-29 ENCOUNTER — TELEPHONE (OUTPATIENT)
Dept: SURGERY | Facility: CLINIC | Age: 53
End: 2023-09-29
Payer: MEDICARE

## 2023-09-29 RX ORDER — METRONIDAZOLE 500 MG/1
500 TABLET ORAL EVERY 8 HOURS
Qty: 30 TABLET | Refills: 0 | Status: SHIPPED | OUTPATIENT
Start: 2023-09-29 | End: 2023-10-09

## 2023-09-29 RX ORDER — CIPROFLOXACIN 500 MG/1
500 TABLET ORAL EVERY 12 HOURS
Qty: 20 TABLET | Refills: 0 | Status: SHIPPED | OUTPATIENT
Start: 2023-09-29 | End: 2023-10-09

## 2023-09-29 NOTE — TELEPHONE ENCOUNTER
Spoke with patient regarding appointment reminder. Appointment confirmed. She stated that she needed to know about taking medication for pain as well after speaking with Cat earlier. Pt informed I will call back with an update when received

## 2023-09-29 NOTE — TELEPHONE ENCOUNTER
----- Message from Cristy Gayle sent at 9/29/2023 11:47 AM CDT -----  Regarding: Patient Advice  Contact: Pt [phone]  Pt is calling to see if there is anything that she take for pain states she is having a flare up please call

## 2023-09-29 NOTE — TELEPHONE ENCOUNTER
Spoke with patient. States she is having some abdominal pain and slight fever, patient has a history of diverticulitis. Message sent to NP.

## 2023-09-29 NOTE — TELEPHONE ENCOUNTER
----- Message from Kacy Clark sent at 9/29/2023  4:14 PM CDT -----  Regarding: awaiting a c/b for a prescription  Contact: Pt @[phone]  Pt   is awaiting a return call back from someone in the office in reference to an antibiotic  Please call. Thanks.

## 2023-10-02 ENCOUNTER — HOSPITAL ENCOUNTER (OUTPATIENT)
Dept: CARDIOLOGY | Facility: CLINIC | Age: 53
Discharge: HOME | End: 2023-10-02
Payer: MEDICARE

## 2023-10-02 ENCOUNTER — LAB VISIT (OUTPATIENT)
Dept: LAB | Facility: HOSPITAL | Age: 53
End: 2023-10-02
Attending: COLON & RECTAL SURGERY
Payer: MEDICARE

## 2023-10-02 ENCOUNTER — OFFICE VISIT (OUTPATIENT)
Dept: SURGERY | Facility: CLINIC | Age: 53
End: 2023-10-02
Payer: MEDICARE

## 2023-10-02 VITALS
WEIGHT: 204.13 LBS | HEART RATE: 69 BPM | SYSTOLIC BLOOD PRESSURE: 120 MMHG | BODY MASS INDEX: 37.56 KG/M2 | RESPIRATION RATE: 16 BRPM | DIASTOLIC BLOOD PRESSURE: 77 MMHG | HEIGHT: 62 IN

## 2023-10-02 DIAGNOSIS — K57.32 DIVERTICULITIS LARGE INTESTINE W/O PERFORATION OR ABSCESS W/O BLEEDING: Primary | ICD-10-CM

## 2023-10-02 DIAGNOSIS — K57.32 DIVERTICULITIS LARGE INTESTINE W/O PERFORATION OR ABSCESS W/O BLEEDING: ICD-10-CM

## 2023-10-02 LAB
ALBUMIN SERPL BCP-MCNC: 4.2 G/DL (ref 3.5–5.2)
ALP SERPL-CCNC: 83 U/L (ref 55–135)
ALT SERPL W/O P-5'-P-CCNC: 45 U/L (ref 10–44)
ANION GAP SERPL CALC-SCNC: 11 MMOL/L (ref 8–16)
AST SERPL-CCNC: 46 U/L (ref 10–40)
BASOPHILS # BLD AUTO: 0.03 K/UL (ref 0–0.2)
BASOPHILS NFR BLD: 0.8 % (ref 0–1.9)
BILIRUB SERPL-MCNC: 0.6 MG/DL (ref 0.1–1)
BUN SERPL-MCNC: 22 MG/DL (ref 6–20)
CALCIUM SERPL-MCNC: 10.3 MG/DL (ref 8.7–10.5)
CHLORIDE SERPL-SCNC: 103 MMOL/L (ref 95–110)
CO2 SERPL-SCNC: 28 MMOL/L (ref 23–29)
CREAT SERPL-MCNC: 0.9 MG/DL (ref 0.5–1.4)
CRP SERPL-MCNC: 1.6 MG/L (ref 0–8.2)
DIFFERENTIAL METHOD: ABNORMAL
EOSINOPHIL # BLD AUTO: 0.1 K/UL (ref 0–0.5)
EOSINOPHIL NFR BLD: 2.8 % (ref 0–8)
ERYTHROCYTE [DISTWIDTH] IN BLOOD BY AUTOMATED COUNT: 12.6 % (ref 11.5–14.5)
EST. GFR  (NO RACE VARIABLE): >60 ML/MIN/1.73 M^2
GLUCOSE SERPL-MCNC: 91 MG/DL (ref 70–110)
HCT VFR BLD AUTO: 43 % (ref 37–48.5)
HGB BLD-MCNC: 14.6 G/DL (ref 12–16)
IMM GRANULOCYTES # BLD AUTO: 0.01 K/UL (ref 0–0.04)
IMM GRANULOCYTES NFR BLD AUTO: 0.3 % (ref 0–0.5)
LYMPHOCYTES # BLD AUTO: 1.5 K/UL (ref 1–4.8)
LYMPHOCYTES NFR BLD: 37 % (ref 18–48)
MCH RBC QN AUTO: 29.3 PG (ref 27–31)
MCHC RBC AUTO-ENTMCNC: 34 G/DL (ref 32–36)
MCV RBC AUTO: 86 FL (ref 82–98)
MONOCYTES # BLD AUTO: 0.3 K/UL (ref 0.3–1)
MONOCYTES NFR BLD: 7.3 % (ref 4–15)
NEUTROPHILS # BLD AUTO: 2.1 K/UL (ref 1.8–7.7)
NEUTROPHILS NFR BLD: 51.8 % (ref 38–73)
NRBC BLD-RTO: 0 /100 WBC
PLATELET # BLD AUTO: 126 K/UL (ref 150–450)
PMV BLD AUTO: 10.1 FL (ref 9.2–12.9)
POTASSIUM SERPL-SCNC: 3.9 MMOL/L (ref 3.5–5.1)
PROT SERPL-MCNC: 7.8 G/DL (ref 6–8.4)
RBC # BLD AUTO: 4.98 M/UL (ref 4–5.4)
SODIUM SERPL-SCNC: 142 MMOL/L (ref 136–145)
WBC # BLD AUTO: 3.97 K/UL (ref 3.9–12.7)

## 2023-10-02 PROCEDURE — 4010F ACE/ARB THERAPY RXD/TAKEN: CPT | Mod: CPTII,S$GLB,, | Performed by: COLON & RECTAL SURGERY

## 2023-10-02 PROCEDURE — 3074F SYST BP LT 130 MM HG: CPT | Mod: CPTII,S$GLB,, | Performed by: COLON & RECTAL SURGERY

## 2023-10-02 PROCEDURE — 3078F PR MOST RECENT DIASTOLIC BLOOD PRESSURE < 80 MM HG: ICD-10-PCS | Mod: CPTII,S$GLB,, | Performed by: COLON & RECTAL SURGERY

## 2023-10-02 PROCEDURE — 3078F DIAST BP <80 MM HG: CPT | Mod: CPTII,S$GLB,, | Performed by: COLON & RECTAL SURGERY

## 2023-10-02 PROCEDURE — 1159F MED LIST DOCD IN RCRD: CPT | Mod: CPTII,S$GLB,, | Performed by: COLON & RECTAL SURGERY

## 2023-10-02 PROCEDURE — 93010 EKG 12-LEAD: ICD-10-PCS | Mod: S$GLB,,, | Performed by: INTERNAL MEDICINE

## 2023-10-02 PROCEDURE — 4010F PR ACE/ARB THEARPY RXD/TAKEN: ICD-10-PCS | Mod: CPTII,S$GLB,, | Performed by: COLON & RECTAL SURGERY

## 2023-10-02 PROCEDURE — 85025 COMPLETE CBC W/AUTO DIFF WBC: CPT | Performed by: COLON & RECTAL SURGERY

## 2023-10-02 PROCEDURE — 93005 ELECTROCARDIOGRAM TRACING: CPT | Mod: S$GLB,,, | Performed by: COLON & RECTAL SURGERY

## 2023-10-02 PROCEDURE — 99999 PR PBB SHADOW E&M-EST. PATIENT-LVL III: CPT | Mod: PBBFAC,,, | Performed by: COLON & RECTAL SURGERY

## 2023-10-02 PROCEDURE — 3074F PR MOST RECENT SYSTOLIC BLOOD PRESSURE < 130 MM HG: ICD-10-PCS | Mod: CPTII,S$GLB,, | Performed by: COLON & RECTAL SURGERY

## 2023-10-02 PROCEDURE — 93010 ELECTROCARDIOGRAM REPORT: CPT | Mod: S$GLB,,, | Performed by: INTERNAL MEDICINE

## 2023-10-02 PROCEDURE — 93005 EKG 12-LEAD: ICD-10-PCS | Mod: S$GLB,,, | Performed by: COLON & RECTAL SURGERY

## 2023-10-02 PROCEDURE — 3008F BODY MASS INDEX DOCD: CPT | Mod: CPTII,S$GLB,, | Performed by: COLON & RECTAL SURGERY

## 2023-10-02 PROCEDURE — 1160F PR REVIEW ALL MEDS BY PRESCRIBER/CLIN PHARMACIST DOCUMENTED: ICD-10-PCS | Mod: CPTII,S$GLB,, | Performed by: COLON & RECTAL SURGERY

## 2023-10-02 PROCEDURE — 86140 C-REACTIVE PROTEIN: CPT | Performed by: COLON & RECTAL SURGERY

## 2023-10-02 PROCEDURE — 1159F PR MEDICATION LIST DOCUMENTED IN MEDICAL RECORD: ICD-10-PCS | Mod: CPTII,S$GLB,, | Performed by: COLON & RECTAL SURGERY

## 2023-10-02 PROCEDURE — 80053 COMPREHEN METABOLIC PANEL: CPT | Performed by: COLON & RECTAL SURGERY

## 2023-10-02 PROCEDURE — 36415 COLL VENOUS BLD VENIPUNCTURE: CPT | Performed by: COLON & RECTAL SURGERY

## 2023-10-02 PROCEDURE — 1160F RVW MEDS BY RX/DR IN RCRD: CPT | Mod: CPTII,S$GLB,, | Performed by: COLON & RECTAL SURGERY

## 2023-10-02 PROCEDURE — 99214 PR OFFICE/OUTPT VISIT, EST, LEVL IV, 30-39 MIN: ICD-10-PCS | Mod: S$GLB,,, | Performed by: COLON & RECTAL SURGERY

## 2023-10-02 PROCEDURE — 3008F PR BODY MASS INDEX (BMI) DOCUMENTED: ICD-10-PCS | Mod: CPTII,S$GLB,, | Performed by: COLON & RECTAL SURGERY

## 2023-10-02 PROCEDURE — 99214 OFFICE O/P EST MOD 30 MIN: CPT | Mod: S$GLB,,, | Performed by: COLON & RECTAL SURGERY

## 2023-10-02 PROCEDURE — 99999 PR PBB SHADOW E&M-EST. PATIENT-LVL III: ICD-10-PCS | Mod: PBBFAC,,, | Performed by: COLON & RECTAL SURGERY

## 2023-10-02 RX ORDER — CIPROFLOXACIN 500 MG/1
500 TABLET ORAL ONCE
Qty: 1 TABLET | Refills: 0 | Status: SHIPPED | OUTPATIENT
Start: 2023-10-02 | End: 2023-10-02

## 2023-10-02 RX ORDER — METRONIDAZOLE 500 MG/1
500 TABLET ORAL SEE ADMIN INSTRUCTIONS
Qty: 2 TABLET | Refills: 0 | Status: ON HOLD | OUTPATIENT
Start: 2023-10-02 | End: 2023-11-06 | Stop reason: HOSPADM

## 2023-10-02 RX ORDER — METRONIDAZOLE 500 MG/1
500 TABLET ORAL 3 TIMES DAILY
Qty: 30 TABLET | Refills: 0 | OUTPATIENT
Start: 2023-10-02

## 2023-10-02 RX ORDER — CIPROFLOXACIN 500 MG/1
500 TABLET ORAL 2 TIMES DAILY
Qty: 20 TABLET | Refills: 0 | OUTPATIENT
Start: 2023-10-02

## 2023-10-02 NOTE — PROGRESS NOTES
Colon and Rectal Surgery History and Physical    Subjective:       Patient ID: Giuliana Rodas is a 53 y.o. female.    Chief Complaint: Diverticulitis  HPI  Ms Rodas is a 54yo female hx of gastroparesis, endometriosis, migraines, htn, fibromyalgia and IBS who presents for evaluation for multiple bouts of diverticulitis. I Initially saw her 8//23.  At that time, she stated that her first bout of diverticulitis was approximately a year prior. She states her pain started in the RLQ and became severe. She had associated nausea, fever and diarrhea. She was treated with low fiber diet and antibiotics with improvement in symptoms. She has had 3 subsequent episodes, the most recent being in late July, treated with oral antibiotics.  She has no personal hx of IBD or colon cancer. She denies family hx of IBD or colon cancer. Never smoker.     CT A/P 7/8/22:  (Images personally reviewed.)  -Acute uncomplicated sigmoid colon diverticulitis.  -Prior cholecystectomy and hysterectomy.  -Bilateral simple and mildly complex renal cysts.    CT A/P 7/24/22:  (Images personally reviewed.)  Persistent acute uncomplicated LEFT lower quadrant diverticulitis without significant detrimental change from 07/08/2022 no evidence for abscess.    CT A/P 3/7/23:  (Images personally reviewed.)  Acute, uncomplicated sigmoid diverticulitis.    CT A/P 7/31/23:  (Images personally reviewed.)  1. Mild acute diverticulitis of the sigmoid colon.  Recommend clinical correlation and follow-up.  2. Hepatic steatosis.  3. There are 4 stable hypodense lesions in the spleen which are nonspecific, possibly complex cysts or hemangiomas.  See above comments.  Recommend clinical correlation and surveillance.  4. Status post cholecystectomy with stable mild dilation of the common duct measures 11 mm.  This could be a postoperative change.  Recommend clinical correlation.  5. There are 2 suspected duodenal diverticula adjacent to the ampulla, similar to the  prior study.  6. Bilateral renal cysts.  7. Small fat containing umbilical hernia.    Last colonoscopy - 11/8/22               - Diverticulosis in the sigmoid colon.                          - The entire examined colon is normal. Biopsied.                          - The examined portion of the ileum was normal.     Path Random colon, biopsy:   Colonic mucosa with no significant histopathologic abnormality   No significant intraepithelial lymphocytosis   No abnormal collagen formation     No family hx of CRC or IBD.    At that time, she was interested in undergoing elective sigmoid colectomy due to the frequency and severity of her episodes.  We discussed that she did seem to be a reasonable candidate for laparoscopic sigmoidectomy given her multiple and more frequent diverticulitis flares. We discussed both non-operative and operative treatment as well as their risks and benefits and she stated she would prefer to proceed with operative intervention. She was tentatively scheduled for 11/1/23.    She returns today for pre-op visit. Approx 2 weeks ago she had recurrent LLQ pain radiating to back, similar to prior episodes.  +Subjective chills. She called 9/29/23 and was started on Cipro/Flagyl.  She is feeling marginally better.  Has worse pain with movement.  BM's had been mostly on the looser side for past few months, worse since restarting abx.    Review of patient's allergies indicates:   Allergen Reactions    Chlorthalidone Swelling     Hypokalemia     Dicyclomine Edema             Adhesive Rash    Amitriptyline Hallucinations    Depo-provera [medroxyprogesterone] Anxiety    Prozac [fluoxetine] Anxiety    Topiramate Rash       Past Medical History:   Diagnosis Date    Bile reflux gastritis     Breast cyst     Eczema     Fibrocystic breast     Fibromyalgia     Gastroparesis     dr. harden    GERD (gastroesophageal reflux disease)     Hyperglyceridemia     Hyperlipidemia     Hypertension     IC (interstitial  cystitis)     dr. labadie    Psoriasis     Tension headache        Past Surgical History:   Procedure Laterality Date    BREAST BIOPSY Right     over 10 years ago/ milk duct    CHOLECYSTECTOMY      COLONOSCOPY N/A 11/8/2022    Procedure: COLONOSCOPY;  Surgeon: Damon Mcmahon MD;  Location: [facility] ENDO;  Service: Endoscopy;  Laterality: N/A;  vacc-inst portal-tb-smith or ray    ESOPHAGOGASTRODUODENOSCOPY N/A 3/12/2020    Procedure: EGD (ESOPHAGOGASTRODUODENOSCOPY);  Surgeon: Damon Mcmahon MD;  [facility]  Service: Endoscopy;  Laterality: N/A;  use pcf scope    HEMORRHOID SURGERY      HYSTERECTOMY      KNEE SURGERY      OOPHORECTOMY      one ov removed       Current Outpatient Medications   Medication Sig Dispense Refill    amlodipine-olmesartan (ANGEL) 5-40 mg per tablet TAKE ONE TABLET BY MOUTH EVERY DAY. 30 tablet 1    atenoloL (TENORMIN) 50 MG tablet Take 1 tablet (50 mg total) by mouth 2 (two) times a day. 180 tablet 2    atorvastatin (LIPITOR) 40 MG tablet TAKE 1 TABLET BY MOUTH EVERY DAY 90 tablet 1    BIFIDOBACTERIUM INFANTIS (ALIGN ORAL) Take 1 tablet by mouth once daily.      BIOFREEZE, MENTHOL, TOP Apply topically.      ciprofloxacin HCl (CIPRO) 500 MG tablet Take 1 tablet (500 mg total) by mouth every 12 (twelve) hours. for 10 days 20 tablet 0    coal tar (NEUTROGENA T-GEL) 0.5 % shampoo Apply topically nightly as needed.      fluocinonide (LIDEX) 0.05 % external solution Apply topically 2 (two) times daily as needed (rash, itching at scalp). Avoid use on face, body folds, groin/genitalia. 60 mL 3    fluticasone propionate (FLONASE) 50 mcg/actuation nasal spray SPRAY twice IN EACH NOSTRIL DAILY 48 g 2    glucosamine sulfate 500 mg Tab Take by mouth.      hydroCHLOROthiazide (HYDRODIURIL) 12.5 MG Tab TAKE 1 TABLET BY MOUTH EVERY DAY 90 tablet 2    hydrocortisone 2.5 % cream Apply topically 2 (two) times daily. For use when flaring on eyelids for up to 2 weeks then take a 1 week break or  decrease to BIW PRN 28 g 2    ketoconazole (NIZORAL) 2 % cream Apply topically once daily. For use on eyelids, ears, etc daily 60 g 1    ketoconazole (NIZORAL) 2 % shampoo wash hair AT least TWO OR THREE TIMES weekly. let sit on scalp AT least 5 MINUTES prior to rinsing 120 mL 11    levocetirizine (XYZAL) 5 MG tablet Take 1 tablet (5 mg total) by mouth every evening. 90 tablet 3    LIDOcaine HCL 2% (XYLOCAINE) 2 % jelly Apply topically as needed. Apply topically once nightly to affected part of foot/feet. 30 mL 2    magnesium 30 mg Tab Take 250 mg by mouth once.       menthol/camphor (ICY HOT ADVANCED TOP) Apply topically.      methocarbamoL (ROBAXIN) 500 MG Tab TAKE ONE TABLET BY MOUTH EVERY 8 HOURS AS NEEDED FOR HEADACHE AND MUSCLE SPASM 60 tablet 3    metroNIDAZOLE (FLAGYL) 500 MG tablet Take 1 tablet (500 mg total) by mouth every 8 (eight) hours. for 10 days 30 tablet 0    metronidazole 1% (METROGEL) 1 % Gel Apply topically 2 (two) times daily. For rosacea 60 g 2    multivit with min-folic acid 200 mcg Chew Take by mouth.      multivit-min-folic acid-biotin (HAIR,SKIN AND NAILS,FA-BIOTIN,) 66.7-1,000 mcg Tab Take by mouth.      omega-3 fatty acids/fish oil (FISH OIL-OMEGA-3 FATTY ACIDS) 300-1,000 mg capsule Take 1 capsule by mouth once daily.      propylene glycol (SYSTANE BALANCE OPHT) Apply to eye.      tamsulosin (FLOMAX) 0.4 mg Cap Take 1 capsule (0.4 mg total) by mouth once daily. 30 capsule 2    trolamine salicylate (ASPERCREME) 10 % cream Apply topically as needed.      vitamin D (VITAMIN D3) 1000 units Tab Take 1,000 Units by mouth once daily.      vitamin E 400 UNIT capsule Take 400 Units by mouth once daily.      metroNIDAZOLE (FLAGYL) 500 MG tablet Take 1 tablet (500 mg total) by mouth As instructed (Take 1 tablet by mouth at 9pm & 11pm). 2 tablet 0    ondansetron (ZOFRAN) 4 MG tablet Take 1 tablet (4 mg total) by mouth every 8 (eight) hours as needed for Nausea. (Patient not taking: Reported on  10/2/2023) 12 tablet 0    PREMARIN vaginal cream PRN      promethazine (PHENERGAN) 25 MG tablet Take 0.5 tablets (12.5 mg total) by mouth every 6 (six) hours as needed for Nausea. (Patient not taking: Reported on 8/7/2023) 10 tablet 0     No current facility-administered medications for this visit.       Family History   Problem Relation Age of Onset    Hypertension Mother     Migraines Mother     Breast cancer Mother     Hypertension Father     Stroke Father     Heart disease Father     Hyperlipidemia Father     Diabetes Father     Breast cancer Paternal Grandmother     Colon cancer Neg Hx     Ovarian cancer Neg Hx     Inflammatory bowel disease Neg Hx     Celiac disease Neg Hx        Social History     Socioeconomic History    Marital status:     Number of children: 2   Occupational History    Occupation:      Employer: [employer]   Tobacco Use    Smoking status: Never    Smokeless tobacco: Never   Substance and Sexual Activity    Alcohol use: No    Drug use: No    Sexual activity: Yes     Partners: Male     Birth control/protection: See Surgical Hx   Social History Narrative    Lives at home with  and daughter     Social Determinants of Health     Financial Resource Strain: Medium Risk (7/28/2023)    Overall Financial Resource Strain (CARDIA)     Difficulty of Paying Living Expenses: Somewhat hard   Food Insecurity: Food Insecurity Present (7/28/2023)    Hunger Vital Sign     Worried About Running Out of Food in the Last Year: Sometimes true     Ran Out of Food in the Last Year: Sometimes true   Transportation Needs: No Transportation Needs (7/28/2023)    PRAPARE - Transportation     Lack of Transportation (Medical): No     Lack of Transportation (Non-Medical): No   Physical Activity: Insufficiently Active (7/28/2023)    Exercise Vital Sign     Days of Exercise per Week: 4 days     Minutes of Exercise per Session: 20 min   Stress: No Stress Concern Present (7/28/2023)    [facility] of  "Occupational Health - Occupational Stress Questionnaire     Feeling of Stress : Not at all   Social Connections: Unknown (7/28/2023)    Social Connection and Isolation Panel [NHANES]     Frequency of Communication with Friends and Family: More than three times a week     Frequency of Social Gatherings with Friends and Family: Once a week     Active Member of Clubs or Organizations: No     Attends Club or Organization Meetings: Patient refused     Marital Status:    Housing Stability: Unknown (7/28/2023)    Housing Stability Vital Sign     Unable to Pay for Housing in the Last Year: Patient refused     Number of Places Lived in the Last Year: 1     Unstable Housing in the Last Year: No       Review of Systems   Constitutional:  Positive for chills and fatigue. Negative for activity change.   HENT:  Negative for congestion and sore throat.    Eyes:  Negative for discharge and visual disturbance.   Respiratory:  Negative for chest tightness and shortness of breath.    Cardiovascular:  Negative for chest pain.   Gastrointestinal:  Positive for abdominal pain and diarrhea. Negative for constipation, nausea and vomiting.   Endocrine: Negative for cold intolerance and heat intolerance.   Genitourinary:  Negative for flank pain, frequency and hematuria.   Musculoskeletal:  Negative for arthralgias and myalgias.   Neurological:  Negative for dizziness and numbness.   Psychiatric/Behavioral:  Negative for agitation and confusion.        Objective:       Physical Exam:  Blood Pressure 120/77 (BP Location: Left arm, Patient Position: Sitting, BP Method: X-Large (Automatic))   Pulse 69   Respiration 16   Height 5' 2" (1.575 m)   Weight 92.6 kg (204 lb 2.3 oz)   Body Mass Index 37.34 kg/m²   General: well developed, no distress  Head: normocephalic  Neck: supple, symmetrical, trachea midline  Lungs:  normal respiratory effort  Heart: regular rate and rhythm and no murmur  Abdomen: soft, mildly tender in LLQ, " nondistended, well healed laparoscopic incisions  Extremities: warm, well perfused and no cyanosis or edema, or clubbing    Laboratory Studies:  Complete Blood Count:  Lab Results   Component Value Date/Time    WBC 3.97 10/02/2023 12:14 PM    HGB 14.6 10/02/2023 12:14 PM    HCT 43.0 10/02/2023 12:14 PM    HCT 40 07/24/2022 03:31 AM    RBC 4.98 10/02/2023 12:14 PM     (L) 10/02/2023 12:14 PM       Basic Chemistry Panel:  Lab Results   Component Value Date/Time     10/02/2023 12:14 PM    K 3.9 10/02/2023 12:14 PM     10/02/2023 12:14 PM    CO2 28 10/02/2023 12:14 PM    BUN 22 (H) 10/02/2023 12:14 PM    CREATININE 0.9 10/02/2023 12:14 PM    CALCIUM 10.3 10/02/2023 12:14 PM       Lab Results   Component Value Date/Time    CRP 1.6 10/02/2023 12:14 PM           Assessment:       1. Diverticulitis large intestine w/o perforation or abscess w/o bleeding          Plan:   Scheduled for elective laparoscopic sigmoid colectomy 11/1/23.     Currently on abx for recurrent symptoms that began last week.  I suspect she most likely has smoldering chronic diverticulitis and not an acute flare.  Will check CRP with pre-op labs to be drawn today.  Complete course of abx as prescribed.  I don't think repeat imaging is warranted unless CRP is markedly elevated.    Continue low residue diet until surgery.    I have discussed the procedure at length with Giuliana Rodas.  We discussed the rationale, risks, benefits, and alternatives in depth.  We discussed the expected outcomes and potential complications including but not limited to  need for temporary stoma, bleeding, infection, anastomotic leak, recurrence, prolonged pain, need for further procedures, altered continence, incisional hernia, post-operative sexual dysfunction, post-operative bladder dysfunction, and injury to adjacent organs.  She verbalized her understanding of the procedure and wishes to proceed.  Written consent was obtained.    Marcio Chino MD,  LATRELL WOODSON  Senior Staff Surgeon  Department of Colon & Rectal Surgery

## 2023-10-12 DIAGNOSIS — R10.9 ACUTE LEFT FLANK PAIN: ICD-10-CM

## 2023-10-12 RX ORDER — TAMSULOSIN HYDROCHLORIDE 0.4 MG/1
1 CAPSULE ORAL
Qty: 30 CAPSULE | Refills: 2 | Status: SHIPPED | OUTPATIENT
Start: 2023-10-12

## 2023-10-20 ENCOUNTER — TELEPHONE (OUTPATIENT)
Dept: PREADMISSION TESTING | Facility: HOSPITAL | Age: 53
End: 2023-10-20
Payer: MEDICARE

## 2023-10-20 DIAGNOSIS — M79.609 PAIN IN EXTREMITY, UNSPECIFIED EXTREMITY: ICD-10-CM

## 2023-10-20 DIAGNOSIS — Z01.818 PRE-OP TESTING: Primary | ICD-10-CM

## 2023-10-20 NOTE — ANESTHESIA PAT ROS NOTE
10/20/2023  Giuliana Rodas is a 53 y.o., female.      Pre-op Assessment          Review of Systems  Anesthesia Hx:   History of prior surgery of interest to airway management or planning:  Previous anesthesia: General COLONOSCOPY  11/8/22 with general anesthesia.        Denies Family Hx of Anesthesia complications.   Personal Hx of Anesthesia complications, Post-Operative Nausea/Vomiting, in the past, but not with recent anesthetics / prophylaxis                    Social:  No Alcohol Use, Non-Smoker       Hematology/Oncology:  Hematology Normal   Oncology Normal                                   EENT/Dental:  EENT/Dental Normal           Cardiovascular:     Hypertension       Denies Angina.     hyperlipidemia     Functional Capacity good / => 4 METS                         Pulmonary:       Denies Shortness of breath.  Denies Recent URI.                 Hepatic/GI:     GERD Liver Disease, (FATTY LIVER)  HX OF GASTROPARESIS. DIVERTICULITIS LARGE INTESTINE.          Musculoskeletal:  Musculoskeletal Normal                Neurological:   Dx of Headaches   Pain Etiology/Diagnosis, Fibromayalgia                           Endocrine:        Obesity / BMI > 30  Dermatological:  PSORIASIS   Psych:  Psychiatric Normal                         Anesthesia Assessment: Preoperative EQUATION    Planned Procedure: Procedure(s) (LRB):  COLECTOMY-LAPAROSCOPIC LEFT ERAS LOW (N/A)  CYSTOSCOPY,WITH URETERAL CATHETER INSERTION (Bilateral)  Requested Anesthesia Type:General  Surgeon: Marcio Chino MD  Service: Colon and Rectal  Known or anticipated Date of Surgery:11/1/2023    Surgeon notes: reviewed    Electronic QUestionnaire Assessment completed via nurse interview with patient.        Triage considerations:     The patient has no apparent active cardiac condition (No unstable coronary Syndrome such as severe unstable angina  or recent [<1 month] myocardial infarction, decompensated CHF, severe valvular   disease or significant arrhythmia)    Previous anesthesia records:No problems and GENERAL NATURAL AIRWAY    Airway:  Mallampati: III   Mouth Opening: Normal  TM Distance: > 6 cm    Last PCP note: 3-6 months ago , within [facility]   Subspecialty notes: Gastroenterology, CRS    Other important co-morbidities: GERD, HLD, HTN, Obesity, and FAT\TY LIER, HX OF GASTROPARESIS       Tests already available:  Available tests,  within 3 months , within [facility] .     10/2/23  CBC, CMP, CRP, EKG    7/31/23  CT ABDOMEN PELVIS            Instructions given. (See in Nurse's note)    Optimization:  Anesthesia Preop Clinic Assessment NOT Indicated    Medical Opinion NOT Indicated             Plan:    Testing:  PT/INR, PTT, and T&S-T&S AM OF SURGERY        Patient  has previously scheduled Medical Appointment: NOT AT THIS TIME    Navigation: Tests Scheduled.                           Results will be tracked by Preop Clinic.

## 2023-10-23 ENCOUNTER — LAB VISIT (OUTPATIENT)
Dept: LAB | Facility: HOSPITAL | Age: 53
End: 2023-10-23
Attending: ANESTHESIOLOGY
Payer: MEDICARE

## 2023-10-23 DIAGNOSIS — M79.609 PAIN IN EXTREMITY, UNSPECIFIED EXTREMITY: ICD-10-CM

## 2023-10-23 DIAGNOSIS — Z01.818 PRE-OP TESTING: ICD-10-CM

## 2023-10-23 LAB
APTT PPP: 26.6 SEC (ref 21–32)
INR PPP: 1 (ref 0.8–1.2)
PROTHROMBIN TIME: 10.2 SEC (ref 9–12.5)

## 2023-10-23 PROCEDURE — 85610 PROTHROMBIN TIME: CPT | Performed by: ANESTHESIOLOGY

## 2023-10-23 PROCEDURE — 85730 THROMBOPLASTIN TIME PARTIAL: CPT | Performed by: ANESTHESIOLOGY

## 2023-10-23 PROCEDURE — 36415 COLL VENOUS BLD VENIPUNCTURE: CPT | Performed by: ANESTHESIOLOGY

## 2023-10-31 ENCOUNTER — TELEPHONE (OUTPATIENT)
Dept: SURGERY | Facility: CLINIC | Age: 53
End: 2023-10-31
Payer: MEDICARE

## 2023-10-31 ENCOUNTER — ANESTHESIA EVENT (OUTPATIENT)
Dept: SURGERY | Facility: HOSPITAL | Age: 53
DRG: 331 | End: 2023-10-31
Payer: MEDICARE

## 2023-10-31 RX ORDER — CIPROFLOXACIN 500 MG/1
500 TABLET ORAL 2 TIMES DAILY
Qty: 2 TABLET | Refills: 0 | Status: ON HOLD | OUTPATIENT
Start: 2023-10-31 | End: 2023-11-06 | Stop reason: HOSPADM

## 2023-10-31 NOTE — TELEPHONE ENCOUNTER
Spoke with patient. Assured patient that cramping is due to medication for prep and should linn after results. If patient does not have results and symptoms increase to go to the ED. Patient reports having results but has just started prep.

## 2023-11-01 ENCOUNTER — HOSPITAL ENCOUNTER (INPATIENT)
Facility: HOSPITAL | Age: 53
LOS: 2 days | Discharge: HOME | DRG: 331 | End: 2023-11-03
Attending: COLON & RECTAL SURGERY | Admitting: COLON & RECTAL SURGERY
Payer: MEDICARE

## 2023-11-01 ENCOUNTER — ANESTHESIA (OUTPATIENT)
Dept: SURGERY | Facility: HOSPITAL | Age: 53
DRG: 331 | End: 2023-11-01
Payer: MEDICARE

## 2023-11-01 DIAGNOSIS — Z87.19 HISTORY OF DIVERTICULITIS OF COLON: Primary | ICD-10-CM

## 2023-11-01 DIAGNOSIS — K57.92 DIVERTICULITIS: ICD-10-CM

## 2023-11-01 PROBLEM — K57.32 DIVERTICULITIS LARGE INTESTINE W/O PERFORATION OR ABSCESS W/O BLEEDING: Status: ACTIVE | Noted: 2023-11-01

## 2023-11-01 LAB
ABO + RH BLD: NORMAL
BLD GP AB SCN CELLS X3 SERPL QL: NORMAL
SPECIMEN OUTDATE: NORMAL

## 2023-11-01 PROCEDURE — 63600175 PHARM REV CODE 636 W HCPCS

## 2023-11-01 PROCEDURE — 25000003 PHARM REV CODE 250: Performed by: NURSE ANESTHETIST, CERTIFIED REGISTERED

## 2023-11-01 PROCEDURE — D9220A PRA ANESTHESIA: Mod: ANES,,, | Performed by: ANESTHESIOLOGY

## 2023-11-01 PROCEDURE — C1729 CATH, DRAINAGE: HCPCS | Performed by: COLON & RECTAL SURGERY

## 2023-11-01 PROCEDURE — D9220A PRA ANESTHESIA: Mod: CRNA,,, | Performed by: NURSE ANESTHETIST, CERTIFIED REGISTERED

## 2023-11-01 PROCEDURE — 52005 PR CYSTOURETHROSCOPY,URETER CATHETER: ICD-10-PCS | Mod: ,,, | Performed by: STUDENT IN AN ORGANIZED HEALTH CARE EDUCATION/TRAINING PROGRAM

## 2023-11-01 PROCEDURE — 63600175 PHARM REV CODE 636 W HCPCS: Performed by: NURSE ANESTHETIST, CERTIFIED REGISTERED

## 2023-11-01 PROCEDURE — 88305 TISSUE EXAM BY PATHOLOGIST: ICD-10-PCS | Mod: 26,,, | Performed by: PATHOLOGY

## 2023-11-01 PROCEDURE — 36000710: Performed by: COLON & RECTAL SURGERY

## 2023-11-01 PROCEDURE — 71000033 HC RECOVERY, INTIAL HOUR: Performed by: COLON & RECTAL SURGERY

## 2023-11-01 PROCEDURE — 63600175 PHARM REV CODE 636 W HCPCS: Performed by: STUDENT IN AN ORGANIZED HEALTH CARE EDUCATION/TRAINING PROGRAM

## 2023-11-01 PROCEDURE — 99900035 HC TECH TIME PER 15 MIN (STAT)

## 2023-11-01 PROCEDURE — 71000015 HC POSTOP RECOV 1ST HR: Performed by: COLON & RECTAL SURGERY

## 2023-11-01 PROCEDURE — 20600001 HC STEP DOWN PRIVATE ROOM

## 2023-11-01 PROCEDURE — 88307 TISSUE EXAM BY PATHOLOGIST: CPT | Performed by: PATHOLOGY

## 2023-11-01 PROCEDURE — D9220A PRA ANESTHESIA: ICD-10-PCS | Mod: ANES,,, | Performed by: ANESTHESIOLOGY

## 2023-11-01 PROCEDURE — 36000711: Performed by: COLON & RECTAL SURGERY

## 2023-11-01 PROCEDURE — 88305 TISSUE EXAM BY PATHOLOGIST: CPT | Performed by: PATHOLOGY

## 2023-11-01 PROCEDURE — 25000003 PHARM REV CODE 250: Performed by: NURSE PRACTITIONER

## 2023-11-01 PROCEDURE — 88305 TISSUE EXAM BY PATHOLOGIST: CPT | Mod: 26,,, | Performed by: PATHOLOGY

## 2023-11-01 PROCEDURE — 36415 COLL VENOUS BLD VENIPUNCTURE: CPT | Performed by: COLON & RECTAL SURGERY

## 2023-11-01 PROCEDURE — 44207 PR LAP,SURG,COLECTOMY,W/ANAST: ICD-10-PCS | Mod: ,,, | Performed by: COLON & RECTAL SURGERY

## 2023-11-01 PROCEDURE — 71000016 HC POSTOP RECOV ADDL HR: Performed by: COLON & RECTAL SURGERY

## 2023-11-01 PROCEDURE — 44207 L COLECTOMY/COLOPROCTOSTOMY: CPT | Mod: ,,, | Performed by: COLON & RECTAL SURGERY

## 2023-11-01 PROCEDURE — D9220A PRA ANESTHESIA: ICD-10-PCS | Mod: CRNA,,, | Performed by: NURSE ANESTHETIST, CERTIFIED REGISTERED

## 2023-11-01 PROCEDURE — 44213 LAP MOBIL SPLENIC FL ADD-ON: CPT | Mod: ,,, | Performed by: COLON & RECTAL SURGERY

## 2023-11-01 PROCEDURE — 25000003 PHARM REV CODE 250: Performed by: STUDENT IN AN ORGANIZED HEALTH CARE EDUCATION/TRAINING PROGRAM

## 2023-11-01 PROCEDURE — 88307 PR  SURG PATH,LEVEL V: ICD-10-PCS | Mod: 26,,, | Performed by: PATHOLOGY

## 2023-11-01 PROCEDURE — 63600175 PHARM REV CODE 636 W HCPCS: Performed by: NURSE PRACTITIONER

## 2023-11-01 PROCEDURE — 44213 PR LAP, SURG MOBIL SPLENIC FL DUR PTL COLECTOMY: ICD-10-PCS | Mod: ,,, | Performed by: COLON & RECTAL SURGERY

## 2023-11-01 PROCEDURE — 37000009 HC ANESTHESIA EA ADD 15 MINS: Performed by: COLON & RECTAL SURGERY

## 2023-11-01 PROCEDURE — 86900 BLOOD TYPING SEROLOGIC ABO: CPT | Performed by: NURSE PRACTITIONER

## 2023-11-01 PROCEDURE — C1769 GUIDE WIRE: HCPCS | Performed by: COLON & RECTAL SURGERY

## 2023-11-01 PROCEDURE — 63600175 PHARM REV CODE 636 W HCPCS: Performed by: ANESTHESIOLOGY

## 2023-11-01 PROCEDURE — 52005 CYSTO W/URTRL CATHJ: CPT | Mod: ,,, | Performed by: STUDENT IN AN ORGANIZED HEALTH CARE EDUCATION/TRAINING PROGRAM

## 2023-11-01 PROCEDURE — 94761 N-INVAS EAR/PLS OXIMETRY MLT: CPT

## 2023-11-01 PROCEDURE — 27000221 HC OXYGEN, UP TO 24 HOURS

## 2023-11-01 PROCEDURE — 88307 TISSUE EXAM BY PATHOLOGIST: CPT | Mod: 26,,, | Performed by: PATHOLOGY

## 2023-11-01 PROCEDURE — 37000008 HC ANESTHESIA 1ST 15 MINUTES: Performed by: COLON & RECTAL SURGERY

## 2023-11-01 PROCEDURE — C1758 CATHETER, URETERAL: HCPCS | Performed by: COLON & RECTAL SURGERY

## 2023-11-01 PROCEDURE — 27201423 OPTIME MED/SURG SUP & DEVICES STERILE SUPPLY: Performed by: COLON & RECTAL SURGERY

## 2023-11-01 RX ORDER — MUPIROCIN 20 MG/G
1 OINTMENT TOPICAL
Status: COMPLETED | OUTPATIENT
Start: 2023-11-01 | End: 2023-11-01

## 2023-11-01 RX ORDER — ACETAMINOPHEN 10 MG/ML
1000 INJECTION, SOLUTION INTRAVENOUS EVERY 8 HOURS
Status: COMPLETED | OUTPATIENT
Start: 2023-11-01 | End: 2023-11-02

## 2023-11-01 RX ORDER — SODIUM CHLORIDE 0.9 % (FLUSH) 0.9 %
3 SYRINGE (ML) INJECTION
Status: DISCONTINUED | OUTPATIENT
Start: 2023-11-01 | End: 2023-11-01 | Stop reason: HOSPADM

## 2023-11-01 RX ORDER — SODIUM CHLORIDE 9 MG/ML
INJECTION, SOLUTION INTRAVENOUS
Status: DISCONTINUED | OUTPATIENT
Start: 2023-11-01 | End: 2023-11-01

## 2023-11-01 RX ORDER — LIDOCAINE HYDROCHLORIDE ANHYDROUS AND DEXTROSE MONOHYDRATE .8; 5 G/100ML; G/100ML
INJECTION, SOLUTION INTRAVENOUS CONTINUOUS PRN
Status: DISCONTINUED | OUTPATIENT
Start: 2023-11-01 | End: 2023-11-01

## 2023-11-01 RX ORDER — HEPARIN SODIUM 5000 [USP'U]/ML
5000 INJECTION, SOLUTION INTRAVENOUS; SUBCUTANEOUS EVERY 8 HOURS
Status: COMPLETED | OUTPATIENT
Start: 2023-11-01 | End: 2023-11-01

## 2023-11-01 RX ORDER — OXYCODONE HYDROCHLORIDE 10 MG/1
10 TABLET ORAL EVERY 4 HOURS PRN
Status: DISCONTINUED | OUTPATIENT
Start: 2023-11-01 | End: 2023-11-03 | Stop reason: HOSPADM

## 2023-11-01 RX ORDER — HYDROMORPHONE HYDROCHLORIDE 1 MG/ML
0.2 INJECTION, SOLUTION INTRAMUSCULAR; INTRAVENOUS; SUBCUTANEOUS EVERY 5 MIN PRN
Status: COMPLETED | OUTPATIENT
Start: 2023-11-01 | End: 2023-11-01

## 2023-11-01 RX ORDER — MUPIROCIN 20 MG/G
OINTMENT TOPICAL 2 TIMES DAILY
Status: DISCONTINUED | OUTPATIENT
Start: 2023-11-01 | End: 2023-11-03 | Stop reason: HOSPADM

## 2023-11-01 RX ORDER — HYDROMORPHONE HYDROCHLORIDE 2 MG/ML
INJECTION, SOLUTION INTRAMUSCULAR; INTRAVENOUS; SUBCUTANEOUS
Status: DISCONTINUED | OUTPATIENT
Start: 2023-11-01 | End: 2023-11-01

## 2023-11-01 RX ORDER — IBUPROFEN 400 MG/1
800 TABLET ORAL EVERY 8 HOURS
Status: DISCONTINUED | OUTPATIENT
Start: 2023-11-02 | End: 2023-11-03 | Stop reason: HOSPADM

## 2023-11-01 RX ORDER — GABAPENTIN 300 MG/1
300 CAPSULE ORAL
Status: COMPLETED | OUTPATIENT
Start: 2023-11-01 | End: 2023-11-01

## 2023-11-01 RX ORDER — TRIPROLIDINE/PSEUDOEPHEDRINE 2.5MG-60MG
600 TABLET ORAL
Status: COMPLETED | OUTPATIENT
Start: 2023-11-01 | End: 2023-11-01

## 2023-11-01 RX ORDER — HYDROMORPHONE HYDROCHLORIDE 1 MG/ML
INJECTION, SOLUTION INTRAMUSCULAR; INTRAVENOUS; SUBCUTANEOUS
Status: COMPLETED
Start: 2023-11-01 | End: 2023-11-01

## 2023-11-01 RX ORDER — HALOPERIDOL 5 MG/ML
0.5 INJECTION INTRAMUSCULAR EVERY 10 MIN PRN
Status: DISCONTINUED | OUTPATIENT
Start: 2023-11-01 | End: 2023-11-01 | Stop reason: HOSPADM

## 2023-11-01 RX ORDER — FENTANYL CITRATE 50 UG/ML
INJECTION, SOLUTION INTRAMUSCULAR; INTRAVENOUS
Status: DISCONTINUED | OUTPATIENT
Start: 2023-11-01 | End: 2023-11-01

## 2023-11-01 RX ORDER — TRAMADOL HYDROCHLORIDE 50 MG/1
50 TABLET ORAL EVERY 6 HOURS PRN
Status: DISCONTINUED | OUTPATIENT
Start: 2023-11-01 | End: 2023-11-03 | Stop reason: HOSPADM

## 2023-11-01 RX ORDER — ACETAMINOPHEN 650 MG/20.3ML
975 LIQUID ORAL
Status: COMPLETED | OUTPATIENT
Start: 2023-11-01 | End: 2023-11-01

## 2023-11-01 RX ORDER — DEXAMETHASONE SODIUM PHOSPHATE 4 MG/ML
INJECTION, SOLUTION INTRA-ARTICULAR; INTRALESIONAL; INTRAMUSCULAR; INTRAVENOUS; SOFT TISSUE
Status: DISCONTINUED | OUTPATIENT
Start: 2023-11-01 | End: 2023-11-01

## 2023-11-01 RX ORDER — METRONIDAZOLE 500 MG/100ML
500 INJECTION, SOLUTION INTRAVENOUS
Status: COMPLETED | OUTPATIENT
Start: 2023-11-01 | End: 2023-11-01

## 2023-11-01 RX ORDER — ACETAMINOPHEN 500 MG
1000 TABLET ORAL EVERY 8 HOURS
Status: DISCONTINUED | OUTPATIENT
Start: 2023-11-02 | End: 2023-11-03 | Stop reason: HOSPADM

## 2023-11-01 RX ORDER — HYDROMORPHONE HYDROCHLORIDE 1 MG/ML
0.5 INJECTION, SOLUTION INTRAMUSCULAR; INTRAVENOUS; SUBCUTANEOUS ONCE
Status: COMPLETED | OUTPATIENT
Start: 2023-11-01 | End: 2023-11-01

## 2023-11-01 RX ORDER — ONDANSETRON 2 MG/ML
INJECTION INTRAMUSCULAR; INTRAVENOUS
Status: DISCONTINUED | OUTPATIENT
Start: 2023-11-01 | End: 2023-11-01

## 2023-11-01 RX ORDER — DEXMEDETOMIDINE HYDROCHLORIDE 100 UG/ML
INJECTION, SOLUTION INTRAVENOUS
Status: DISCONTINUED | OUTPATIENT
Start: 2023-11-01 | End: 2023-11-01

## 2023-11-01 RX ORDER — OXYCODONE HYDROCHLORIDE 5 MG/1
5 TABLET ORAL EVERY 6 HOURS PRN
Status: DISCONTINUED | OUTPATIENT
Start: 2023-11-01 | End: 2023-11-03 | Stop reason: HOSPADM

## 2023-11-01 RX ORDER — KETAMINE HCL IN 0.9 % NACL 50 MG/5 ML
SYRINGE (ML) INTRAVENOUS
Status: DISCONTINUED | OUTPATIENT
Start: 2023-11-01 | End: 2023-11-01

## 2023-11-01 RX ORDER — MIDAZOLAM HYDROCHLORIDE 1 MG/ML
INJECTION, SOLUTION INTRAMUSCULAR; INTRAVENOUS
Status: DISCONTINUED | OUTPATIENT
Start: 2023-11-01 | End: 2023-11-01

## 2023-11-01 RX ORDER — ALVIMOPAN 12 MG/1
12 CAPSULE ORAL ONCE
Status: COMPLETED | OUTPATIENT
Start: 2023-11-01 | End: 2023-11-01

## 2023-11-01 RX ORDER — ALVIMOPAN 12 MG/1
12 CAPSULE ORAL 2 TIMES DAILY
Status: DISCONTINUED | OUTPATIENT
Start: 2023-11-01 | End: 2023-11-03

## 2023-11-01 RX ORDER — GABAPENTIN 300 MG/1
300 CAPSULE ORAL 3 TIMES DAILY
Status: DISCONTINUED | OUTPATIENT
Start: 2023-11-01 | End: 2023-11-02

## 2023-11-01 RX ORDER — ROCURONIUM BROMIDE 10 MG/ML
INJECTION, SOLUTION INTRAVENOUS
Status: DISCONTINUED | OUTPATIENT
Start: 2023-11-01 | End: 2023-11-01

## 2023-11-01 RX ORDER — TAMSULOSIN HYDROCHLORIDE 0.4 MG/1
0.4 CAPSULE ORAL DAILY
Status: DISCONTINUED | OUTPATIENT
Start: 2023-11-01 | End: 2023-11-03 | Stop reason: HOSPADM

## 2023-11-01 RX ORDER — ATENOLOL 50 MG/1
50 TABLET ORAL 2 TIMES DAILY
Status: DISCONTINUED | OUTPATIENT
Start: 2023-11-01 | End: 2023-11-03 | Stop reason: HOSPADM

## 2023-11-01 RX ORDER — SODIUM CHLORIDE 0.9 % (FLUSH) 0.9 %
10 SYRINGE (ML) INJECTION
Status: DISCONTINUED | OUTPATIENT
Start: 2023-11-01 | End: 2023-11-03 | Stop reason: HOSPADM

## 2023-11-01 RX ORDER — LIDOCAINE HYDROCHLORIDE 10 MG/ML
1 INJECTION, SOLUTION EPIDURAL; INFILTRATION; INTRACAUDAL; PERINEURAL
Status: DISCONTINUED | OUTPATIENT
Start: 2023-11-01 | End: 2023-11-01

## 2023-11-01 RX ORDER — GABAPENTIN 300 MG/1
300 CAPSULE ORAL 3 TIMES DAILY
Status: DISCONTINUED | OUTPATIENT
Start: 2023-11-01 | End: 2023-11-03 | Stop reason: HOSPADM

## 2023-11-01 RX ORDER — METHOCARBAMOL 500 MG/1
500 TABLET, FILM COATED ORAL 3 TIMES DAILY
Status: DISCONTINUED | OUTPATIENT
Start: 2023-11-01 | End: 2023-11-03 | Stop reason: HOSPADM

## 2023-11-01 RX ORDER — ONDANSETRON 2 MG/ML
4 INJECTION INTRAMUSCULAR; INTRAVENOUS EVERY 6 HOURS PRN
Status: DISCONTINUED | OUTPATIENT
Start: 2023-11-01 | End: 2023-11-03 | Stop reason: HOSPADM

## 2023-11-01 RX ORDER — SODIUM CHLORIDE 9 MG/ML
INJECTION, SOLUTION INTRAVENOUS CONTINUOUS
Status: DISCONTINUED | OUTPATIENT
Start: 2023-11-01 | End: 2023-11-02

## 2023-11-01 RX ORDER — PROPOFOL 10 MG/ML
VIAL (ML) INTRAVENOUS
Status: DISCONTINUED | OUTPATIENT
Start: 2023-11-01 | End: 2023-11-01

## 2023-11-01 RX ORDER — ENOXAPARIN SODIUM 100 MG/ML
40 INJECTION SUBCUTANEOUS EVERY 24 HOURS
Status: DISCONTINUED | OUTPATIENT
Start: 2023-11-02 | End: 2023-11-03 | Stop reason: HOSPADM

## 2023-11-01 RX ORDER — ATORVASTATIN CALCIUM 40 MG/1
40 TABLET, FILM COATED ORAL DAILY
Status: DISCONTINUED | OUTPATIENT
Start: 2023-11-02 | End: 2023-11-03 | Stop reason: HOSPADM

## 2023-11-01 RX ADMIN — HYDROMORPHONE HYDROCHLORIDE 0.2 MG: 1 INJECTION, SOLUTION INTRAMUSCULAR; INTRAVENOUS; SUBCUTANEOUS at 01:11

## 2023-11-01 RX ADMIN — METHOCARBAMOL 500 MG: 500 TABLET ORAL at 09:11

## 2023-11-01 RX ADMIN — HYDROMORPHONE HYDROCHLORIDE 0.2 MG: 2 INJECTION INTRAMUSCULAR; INTRAVENOUS; SUBCUTANEOUS at 12:11

## 2023-11-01 RX ADMIN — HYDROMORPHONE HYDROCHLORIDE 0.2 MG: 1 INJECTION, SOLUTION INTRAMUSCULAR; INTRAVENOUS; SUBCUTANEOUS at 02:11

## 2023-11-01 RX ADMIN — Medication 10 MG: at 10:11

## 2023-11-01 RX ADMIN — Medication 30 MG: at 08:11

## 2023-11-01 RX ADMIN — GABAPENTIN 300 MG: 300 CAPSULE ORAL at 07:11

## 2023-11-01 RX ADMIN — ROCURONIUM BROMIDE 50 MG: 10 INJECTION INTRAVENOUS at 08:11

## 2023-11-01 RX ADMIN — GABAPENTIN 300 MG: 300 CAPSULE ORAL at 02:11

## 2023-11-01 RX ADMIN — OXYCODONE HYDROCHLORIDE 10 MG: 10 TABLET ORAL at 01:11

## 2023-11-01 RX ADMIN — SODIUM CHLORIDE: 0.9 INJECTION, SOLUTION INTRAVENOUS at 08:11

## 2023-11-01 RX ADMIN — ROCURONIUM BROMIDE 10 MG: 10 INJECTION INTRAVENOUS at 11:11

## 2023-11-01 RX ADMIN — MUPIROCIN 1 G: 20 OINTMENT TOPICAL at 07:11

## 2023-11-01 RX ADMIN — ONDANSETRON 4 MG: 2 INJECTION INTRAMUSCULAR; INTRAVENOUS at 11:11

## 2023-11-01 RX ADMIN — LIDOCAINE HYDROCHLORIDE ANHYDROUS AND DEXTROSE MONOHYDRATE 0.02 MG/KG/MIN: .8; 5 INJECTION, SOLUTION INTRAVENOUS at 08:11

## 2023-11-01 RX ADMIN — MUPIROCIN: 20 OINTMENT TOPICAL at 09:11

## 2023-11-01 RX ADMIN — METRONIDAZOLE 500 MG: 500 INJECTION, SOLUTION INTRAVENOUS at 08:11

## 2023-11-01 RX ADMIN — IBUPROFEN 800 MG: 800 INJECTION INTRAVENOUS at 09:11

## 2023-11-01 RX ADMIN — ROCURONIUM BROMIDE 10 MG: 10 INJECTION INTRAVENOUS at 10:11

## 2023-11-01 RX ADMIN — ALVIMOPAN 12 MG: 12 CAPSULE ORAL at 07:11

## 2023-11-01 RX ADMIN — Medication 10 MG: at 09:11

## 2023-11-01 RX ADMIN — FENTANYL CITRATE 25 MCG: 50 INJECTION, SOLUTION INTRAMUSCULAR; INTRAVENOUS at 09:11

## 2023-11-01 RX ADMIN — DEXMEDETOMIDINE 4 MCG: 100 INJECTION, SOLUTION, CONCENTRATE INTRAVENOUS at 12:11

## 2023-11-01 RX ADMIN — SODIUM CHLORIDE: 0.9 INJECTION, SOLUTION INTRAVENOUS at 12:11

## 2023-11-01 RX ADMIN — ALVIMOPAN 12 MG: 12 CAPSULE ORAL at 03:11

## 2023-11-01 RX ADMIN — FENTANYL CITRATE 25 MCG: 50 INJECTION, SOLUTION INTRAMUSCULAR; INTRAVENOUS at 12:11

## 2023-11-01 RX ADMIN — HYDROMORPHONE HYDROCHLORIDE 0.5 MG: 1 INJECTION, SOLUTION INTRAMUSCULAR; INTRAVENOUS; SUBCUTANEOUS at 04:11

## 2023-11-01 RX ADMIN — SODIUM CHLORIDE, SODIUM GLUCONATE, SODIUM ACETATE, POTASSIUM CHLORIDE, MAGNESIUM CHLORIDE, SODIUM PHOSPHATE, DIBASIC, AND POTASSIUM PHOSPHATE: .53; .5; .37; .037; .03; .012; .00082 INJECTION, SOLUTION INTRAVENOUS at 08:11

## 2023-11-01 RX ADMIN — ACETAMINOPHEN 976.6 MG: 650 SOLUTION ORAL at 07:11

## 2023-11-01 RX ADMIN — CEFTRIAXONE SODIUM 2 G: 2 INJECTION, POWDER, FOR SOLUTION INTRAMUSCULAR; INTRAVENOUS at 08:11

## 2023-11-01 RX ADMIN — IBUPROFEN 800 MG: 800 INJECTION INTRAVENOUS at 01:11

## 2023-11-01 RX ADMIN — MUPIROCIN: 20 OINTMENT TOPICAL at 01:11

## 2023-11-01 RX ADMIN — HEPARIN SODIUM 5000 UNITS: 5000 INJECTION INTRAVENOUS; SUBCUTANEOUS at 07:11

## 2023-11-01 RX ADMIN — GABAPENTIN 300 MG: 300 CAPSULE ORAL at 09:11

## 2023-11-01 RX ADMIN — IBUPROFEN 600 MG: 100 SUSPENSION ORAL at 07:11

## 2023-11-01 RX ADMIN — FENTANYL CITRATE 100 MCG: 50 INJECTION, SOLUTION INTRAMUSCULAR; INTRAVENOUS at 08:11

## 2023-11-01 RX ADMIN — METHOCARBAMOL 500 MG: 500 TABLET ORAL at 02:11

## 2023-11-01 RX ADMIN — TAMSULOSIN HYDROCHLORIDE 0.4 MG: 0.4 CAPSULE ORAL at 01:11

## 2023-11-01 RX ADMIN — ALVIMOPAN 12 MG: 12 CAPSULE ORAL at 09:11

## 2023-11-01 RX ADMIN — ROCURONIUM BROMIDE 10 MG: 10 INJECTION INTRAVENOUS at 09:11

## 2023-11-01 RX ADMIN — ROCURONIUM BROMIDE 20 MG: 10 INJECTION INTRAVENOUS at 08:11

## 2023-11-01 RX ADMIN — ACETAMINOPHEN 1000 MG: 10 INJECTION, SOLUTION INTRAVENOUS at 09:11

## 2023-11-01 RX ADMIN — ATENOLOL 50 MG: 25 TABLET ORAL at 01:11

## 2023-11-01 RX ADMIN — ACETAMINOPHEN 1000 MG: 10 INJECTION, SOLUTION INTRAVENOUS at 01:11

## 2023-11-01 RX ADMIN — MIDAZOLAM HYDROCHLORIDE 2 MG: 1 INJECTION, SOLUTION INTRAMUSCULAR; INTRAVENOUS at 07:11

## 2023-11-01 RX ADMIN — PROPOFOL 150 MG: 10 INJECTION, EMULSION INTRAVENOUS at 08:11

## 2023-11-01 RX ADMIN — SUGAMMADEX 200 MG: 100 INJECTION, SOLUTION INTRAVENOUS at 12:11

## 2023-11-01 RX ADMIN — DEXAMETHASONE SODIUM PHOSPHATE 8 MG: 4 INJECTION, SOLUTION INTRAMUSCULAR; INTRAVENOUS at 08:11

## 2023-11-01 NOTE — ANESTHESIA PREPROCEDURE EVALUATION
11/01/2023  Giuliana Rodas is a 53 y.o., female.      Pre-op Assessment    I have reviewed the Patient Summary Reports.     I have reviewed the Nursing Notes. I have reviewed the NPO Status.   I have reviewed the Medications.     Review of Systems  Anesthesia Hx:  No problems with previous Anesthesia   History of prior surgery of interest to airway management or planning:  Previous anesthesia: General COLONOSCOPY  11/8/22 with general anesthesia.        Denies Family Hx of Anesthesia complications.   Personal Hx of Anesthesia complications, Post-Operative Nausea/Vomiting, in the past, but not with recent anesthetics / prophylaxis                    Social:  No Alcohol Use, Non-Smoker       Hematology/Oncology:  Hematology Normal   Oncology Normal                                   EENT/Dental:  EENT/Dental Normal           Cardiovascular:  Exercise tolerance: good   Hypertension       Denies Angina.     hyperlipidemia     Functional Capacity good / => 4 METS                         Pulmonary:       Denies Shortness of breath.  Denies Recent URI.                 Renal/:  Renal/ Normal                 Hepatic/GI:     GERD Liver Disease, (FATTY LIVER)  HX OF GASTROPARESIS. DIVERTICULITIS LARGE INTESTINE.          Musculoskeletal:  Musculoskeletal Normal                Neurological:  Neurology Normal           Dx of Headaches   Pain Etiology/Diagnosis, Fibromayalgia                           Endocrine:  Endocrine Normal          Obesity / BMI > 30  Dermatological:  Skin Normal PSORIASIS   Psych:  Psychiatric Normal                    Physical Exam  General: Well nourished, Cooperative, Alert and Oriented    Airway:  Mallampati: II   Mouth Opening: Normal  TM Distance: Normal  Tongue: Normal  Neck ROM: Normal ROM    Dental:  Intact          Anesthesia Assessment: Preoperative EQUATION    Planned Procedure:  Procedure(s) (LRB):  COLECTOMY-LAPAROSCOPIC LEFT ERAS LOW (N/A)  CYSTOSCOPY,WITH URETERAL CATHETER INSERTION (Bilateral)  Requested Anesthesia Type:General  Surgeon: Marcio Chino MD  Service: Colon and Rectal  Known or anticipated Date of Surgery:11/1/2023    Surgeon notes: reviewed    Electronic QUestionnaire Assessment completed via nurse interview with patient.        Triage considerations:     The patient has no apparent active cardiac condition (No unstable coronary Syndrome such as severe unstable angina or recent [<1 month] myocardial infarction, decompensated CHF, severe valvular   disease or significant arrhythmia)    Previous anesthesia records:No problems and GENERAL NATURAL AIRWAY    Airway:  Mallampati: III   Mouth Opening: Normal  TM Distance: > 6 cm    Last PCP note: 3-6 months ago , within [facility]   Subspecialty notes: Gastroenterology, CRS    Other important co-morbidities: GERD, HLD, HTN, Obesity, and FAT\TY LIER, HX OF GASTROPARESIS       Tests already available:  Available tests,  within 3 months , within [facility] .     10/2/23  CBC, CMP, CRP, EKG    7/31/23  CT ABDOMEN PELVIS            Instructions given. (See in Nurse's note)    Optimization:  Anesthesia Preop Clinic Assessment NOT Indicated    Medical Opinion NOT Indicated             Plan:    Testing:  PT/INR, PTT, and T&S-T&S AM OF SURGERY        Patient  has previously scheduled Medical Appointment: NOT AT THIS TIME    Navigation: Tests Scheduled.                           Results will be tracked by Preop Clinic.       Anesthesia Plan  Type of Anesthesia, risks & benefits discussed:    Anesthesia Type: Gen ETT  Intra-op Monitoring Plan: Standard ASA Monitors  Post Op Pain Control Plan: multimodal analgesia and IV/PO Opioids PRN  Induction:  IV  Airway Plan: Direct, Post-Induction  Informed Consent: Informed consent signed with the Patient and all parties understand the risks and agree with anesthesia plan.  All questions answered.   ASA  Score: 2  Day of Surgery Review of History & Physical: H&P Update referred to the surgeon/provider.    Ready For Surgery From Anesthesia Perspective.     .

## 2023-11-01 NOTE — OP NOTE
[facility] - Surgery ([facility])  Urology Department  Operative Note    Date of Procedure: 11/1/2023     Pre-Operative Diagnosis:   Diverticulitis large intestine w/o perforation or abscess w/o bleeding [K57.32]  Patient Active Problem List    Diagnosis Date Noted    Aortic atherosclerosis 05/19/2023    Hypokalemia 07/27/2022    Thrombocytopenia 07/26/2022    Class 1 obesity in adult 07/26/2022    Urinary retention 07/26/2022    History of diverticulitis of colon 07/24/2022    Interstitial cystitis 05/16/2021    Chronic pain of right knee 06/17/2020    Lower extremity weakness 06/17/2020    Decreased ROM of right knee 06/17/2020    Anemia 03/12/2020    Tension headache 07/18/2017    Chronic migraine without aura, with intractable migraine, so stated, with status migrainosus 08/29/2014    Occipital neuralgia 08/29/2014    IBS (irritable bowel syndrome) 09/24/2013    Hyperlipidemia 09/24/2013    Gastroparesis 09/24/2013    GERD (gastroesophageal reflux disease) 08/20/2013    Fibromyalgia 04/30/2013    Hypertension        Post-Operative Diagnosis: same    Procedure:   Cystoscopy with bilateral ureteral catheter placement    Primary: Marcio Chino MD  Fellow: Steve Lowery MD    Assisting Surgeon: None    Anesthesia: General    Specimen(s): none    Indications: Giuliana Rodas is a 53 y.o. female with diverticulitis. Dr. Chino has requested intra-operative ureteral catheters to allow for early intra-operative identification and repair of any injuries.      Findings:   Normal cystoscopy  Bilateral ureteral catheters placed without issue    Estimated Blood Loss: min    Drains:   1.  bilateral 5 Fr ureteral catheters  2.  16 Fr balderrama catheter    Procedure in Detail: Upon entering the room the patient was under general anesthesia.  The patient was then placed in the dorsal lithotomy position and prepped and draped in the usual sterile fashion. Preoperative antibiotics were administered per the primary surgeon preference.   Timeout was performed.      A 22 Fr cystoscope was inserted into the urethra and formal cystourethroscopy was performed. The urethra was normal.  The right and left ureteral orifices were in the normal anatomic position. There were no mucosal abnormalities. A 5 Fr open ended ureteral catheter was then used to cannulate the left ureteral orifice. The catheter was then advanced to the level of the renal pelvis. The cystoscope was then removed leaving the ureteral catheter in place.    The cystoscope was then reinserted alongside the ureteral catheter 5 Fr open ended ureteral catheter was then used to cannulate the right ureteral orifice. This was advanced to the level of the renal pelvis. The cystoscope was then removed.     A 16 Fr balderrama catheter was inserted and the balloon was filled with 10mL of sterile water. The ureteral catheters were secured in the standard fashion. There were no complications with the procedure and the patient tolerated our procedure well.     The case was then turned over to the primary surgeon.     Isrrael Jamil MD

## 2023-11-01 NOTE — TRANSFER OF CARE
"Anesthesia Transfer of Care Note    Patient: Giuliana Rodas    Procedure(s) Performed: Procedure(s) (LRB):  COLECTOMY-LAPAROSCOPIC LEFT ERAS LOW (N/A)  CYSTOSCOPY,WITH URETERAL CATHETER INSERTION  MOBILIZATION, SPLENIC FLEXURE, LAPAROSCOPIC    Patient location: GI    Anesthesia Type: general    Transport from OR: Transported from OR on 6-10 L/min O2 by face mask with adequate spontaneous ventilation    Post pain: adequate analgesia    Post assessment: tolerated procedure well and no apparent anesthetic complications    Post vital signs: stable    Level of consciousness: awake and alert    Nausea/Vomiting: no nausea/vomiting    Complications: none    Transfer of care protocol was followed    Last vitals: Visit Vitals  BP (!) 148/73   Pulse 63   Temp 37.2 °C (98.9 °F)   Resp 18   Ht 5' 2" (1.575 m)   Wt 90.6 kg (199 lb 11.8 oz)   SpO2 96%   Breastfeeding No   BMI 36.53 kg/m²     "

## 2023-11-01 NOTE — H&P
Colon and Rectal Surgery History and Physical    Patient name: Giuliana Rodas   YOB: 1970   MRN: [id number]    Subjective:       Patient ID: Giuliana Rodas is a 53 y.o. female.    Chief Complaint: No chief complaint on file.    HPI  Ms Rodas is a 54yo female hx of gastroparesis, endometriosis, migraines, htn, fibromyalgia and IBS who presents for evaluation for multiple bouts of diverticulitis. I Initially saw her 8//23.  At that time, she stated that her first bout of diverticulitis was approximately a year prior. She states her pain started in the RLQ and became severe. She had associated nausea, fever and diarrhea. She was treated with low fiber diet and antibiotics with improvement in symptoms. She has had 3 subsequent episodes, the most recent being in late July, treated with oral antibiotics.  She has no personal hx of IBD or colon cancer. She denies family hx of IBD or colon cancer. Never smoker.      CT A/P 7/8/22:  (Images personally reviewed.)  -Acute uncomplicated sigmoid colon diverticulitis.  -Prior cholecystectomy and hysterectomy.  -Bilateral simple and mildly complex renal cysts.     CT A/P 7/24/22:  (Images personally reviewed.)  Persistent acute uncomplicated LEFT lower quadrant diverticulitis without significant detrimental change from 07/08/2022 no evidence for abscess.     CT A/P 3/7/23:  (Images personally reviewed.)  Acute, uncomplicated sigmoid diverticulitis.     CT A/P 7/31/23:  (Images personally reviewed.)  1. Mild acute diverticulitis of the sigmoid colon.  Recommend clinical correlation and follow-up.  2. Hepatic steatosis.  3. There are 4 stable hypodense lesions in the spleen which are nonspecific, possibly complex cysts or hemangiomas.  See above comments.  Recommend clinical correlation and surveillance.  4. Status post cholecystectomy with stable mild dilation of the common duct measures 11 mm.  This could be a postoperative change.  Recommend clinical  correlation.  5. There are 2 suspected duodenal diverticula adjacent to the ampulla, similar to the prior study.  6. Bilateral renal cysts.  7. Small fat containing umbilical hernia.     Last colonoscopy - 11/8/22                                - Diverticulosis in the sigmoid colon.                          - The entire examined colon is normal. Biopsied.                          - The examined portion of the ileum was normal.      Path Random colon, biopsy:   Colonic mucosa with no significant histopathologic abnormality   No significant intraepithelial lymphocytosis   No abnormal collagen formation      No family hx of CRC or IBD.     At that time, she was interested in undergoing elective sigmoid colectomy due to the frequency and severity of her episodes.  We discussed that she did seem to be a reasonable candidate for laparoscopic sigmoidectomy given her multiple and more frequent diverticulitis flares. We discussed both non-operative and operative treatment as well as their risks and benefits and she stated she would prefer to proceed with operative intervention. She was tentatively scheduled for 11/1/23.     She returns today for pre-op visit. Approx 1 month ago ago she had recurrent LLQ pain radiating to back, similar to prior episodes.  +Subjective chills. She called 9/29/23 and was started on Cipro/Flagyl.  She is feeling marginally better.  Has worse pain with movement.  BM's had been mostly on the looser side for past few months, worse since restarting abx.        Review of patient's allergies indicates:   Allergen Reactions    Chlorthalidone Swelling     Hypokalemia     Dicyclomine Edema             Adhesive Rash    Amitriptyline Hallucinations    Depo-provera [medroxyprogesterone] Anxiety    Prozac [fluoxetine] Anxiety    Topiramate Rash       Past Medical History:   Diagnosis Date    Bile reflux gastritis     Breast cyst     Eczema     Fibrocystic breast     Fibromyalgia     Gastroparesis       dereck    GERD (gastroesophageal reflux disease)     Hyperglyceridemia     Hyperlipidemia     Hypertension     IC (interstitial cystitis)     dr. labadie    Psoriasis     Tension headache        Past Surgical History:   Procedure Laterality Date    BREAST BIOPSY Right     over 10 years ago/ milk duct    CHOLECYSTECTOMY      COLONOSCOPY N/A 11/8/2022    Procedure: COLONOSCOPY;  Surgeon: Damon Mcmahon MD;  Location: [facility] ENDO;  Service: Endoscopy;  Laterality: N/A;  vacc-inst portal-tb-smith or ray    ESOPHAGOGASTRODUODENOSCOPY N/A 3/12/2020    Procedure: EGD (ESOPHAGOGASTRODUODENOSCOPY);  Surgeon: Damon Mcmahon MD;  Location: [facility] ENDO ([facility] F[facility]);  Service: Endoscopy;  Laterality: N/A;  use pcf scope    HEMORRHOID SURGERY      HYSTERECTOMY      KNEE SURGERY      OOPHORECTOMY      one ov removed       No current facility-administered medications for this encounter.       Family History   Problem Relation Age of Onset    Hypertension Mother     Migraines Mother     Breast cancer Mother     Hypertension Father     Stroke Father     Heart disease Father     Hyperlipidemia Father     Diabetes Father     Breast cancer Paternal Grandmother     Colon cancer Neg Hx     Ovarian cancer Neg Hx     Inflammatory bowel disease Neg Hx     Celiac disease Neg Hx        Social History     Socioeconomic History    Marital status:     Number of children: 2   Occupational History    Occupation:      Employer: [employer]   Tobacco Use    Smoking status: Never    Smokeless tobacco: Never   Substance and Sexual Activity    Alcohol use: No    Drug use: No    Sexual activity: Yes     Partners: Male     Birth control/protection: See Surgical Hx   Social History Narrative    Lives at home with  and daughter     Social Determinants of Health     Financial Resource Strain: Medium Risk (7/28/2023)    Overall Financial Resource Strain (CARDIA)     Difficulty of Paying Living Expenses: Somewhat hard   Food Insecurity: Food  Insecurity Present (7/28/2023)    Hunger Vital Sign     Worried About Running Out of Food in the Last Year: Sometimes true     Ran Out of Food in the Last Year: Sometimes true   Transportation Needs: No Transportation Needs (7/28/2023)    PRAPARE - Transportation     Lack of Transportation (Medical): No     Lack of Transportation (Non-Medical): No   Physical Activity: Insufficiently Active (7/28/2023)    Exercise Vital Sign     Days of Exercise per Week: 4 days     Minutes of Exercise per Session: 20 min   Stress: No Stress Concern Present (7/28/2023)    [employer] [place] [employer] - Occupational Stress Questionnaire     Feeling of Stress : Not at all   Social Connections: Unknown (7/28/2023)    Social Connection and Isolation Panel [NHANES]     Frequency of Communication with Friends and Family: More than three times a week     Frequency of Social Gatherings with Friends and Family: Once a week     Active Member of Clubs or Organizations: No     Attends Club or Organization Meetings: Patient refused     Marital Status:    Housing Stability: Unknown (7/28/2023)    Housing Stability Vital Sign     Unable to Pay for Housing in the Last Year: Patient refused     Number of Places Lived in the Last Year: 1     Unstable Housing in the Last Year: No       Review of Systems   Constitutional: Negative.    HENT: Negative.     Respiratory: Negative.     Cardiovascular: Negative.    Gastrointestinal: Negative.    Genitourinary: Negative.    Musculoskeletal: Negative.    Neurological: Negative.    Hematological: Negative.        Objective:      Physical Exam      Physical Exam:  There were no vitals taken for this visit.  General: no distress  Head: normocephalic  Neck: supple, symmetrical, trachea midline  Lungs:  normal respiratory effort  Heart: regular rate and rhythm  Abdomen: soft, non-tender non-distented; bowel sounds normal; no masses,  no organomegaly  Extremities: no cyanosis or edema, or  clubbing    Laboratory Studies:  Complete Blood Count:  Lab Results   Component Value Date/Time    WBC 3.97 10/02/2023 12:14 PM    HGB 14.6 10/02/2023 12:14 PM    HCT 43.0 10/02/2023 12:14 PM    HCT 40 07/24/2022 03:31 AM    RBC 4.98 10/02/2023 12:14 PM     (L) 10/02/2023 12:14 PM       Basic Chemistry Panel:  Lab Results   Component Value Date/Time     10/02/2023 12:14 PM    K 3.9 10/02/2023 12:14 PM     10/02/2023 12:14 PM    CO2 28 10/02/2023 12:14 PM    BUN 22 (H) 10/02/2023 12:14 PM    CREATININE 0.9 10/02/2023 12:14 PM    CALCIUM 10.3 10/02/2023 12:14 PM       Lab Results   Component Value Date/Time    CRP 1.6 10/02/2023 12:14 PM           Assessment:       1. History of diverticulitis of colon    2. Diverticulitis      52yo female with chronic smoldering diverticulitis presents for surgery today.  Plan:       Proceed to OR for laparoscopic assisted sigmoidectomy. Discussed risks, benefits and alternatives to surgery and Ms. Rodas is agreeable to going forward with the procedure.     Steve Lowery MD  Colon & Rectal Fellow

## 2023-11-01 NOTE — ANESTHESIA PROCEDURE NOTES
Intubation    Date/Time: 11/1/2023 8:15 AM    Performed by: Marcio Paz CRNA  Authorized by: Jason Zhou MD    Intubation:     Induction:  Intravenous    Intubated:  Postinduction    Mask Ventilation:  Easy with oral airway    Attempts:  1    Attempted By:  Student    Method of Intubation:  Direct    Blade:  Lal 3    Laryngeal View Grade: Grade IIA - cords partially seen      Difficult Airway Encountered?: No      Complications:  None    Airway Device:  Oral endotracheal tube    Airway Device Size:  7.0    Style/Cuff Inflation:  Cuffed    Inflation Amount (mL):  5    Tube secured:  22    Secured at:  The lips    Placement Verified By:  Capnometry    Complicating Factors:  None    Findings Post-Intubation:  BS equal bilateral and atraumatic/condition of teeth unchanged

## 2023-11-01 NOTE — ANESTHESIA POSTPROCEDURE EVALUATION
Anesthesia Post Evaluation    Patient: Giuliana Rodas    Procedure(s) Performed: Procedure(s) (LRB):  COLECTOMY-LAPAROSCOPIC LEFT ERAS LOW (N/A)  CYSTOSCOPY,WITH URETERAL CATHETER INSERTION  MOBILIZATION, SPLENIC FLEXURE, LAPAROSCOPIC    Final Anesthesia Type: general      Patient location during evaluation: PACU  Patient participation: Yes- Able to Participate  Level of consciousness: awake and alert and oriented  Post-procedure vital signs: reviewed and stable  Pain management: adequate  Airway patency: patent    PONV status at discharge: No PONV  Anesthetic complications: no      Cardiovascular status: blood pressure returned to baseline  Respiratory status: unassisted, room air and spontaneous ventilation  Hydration status: euvolemic  Follow-up not needed.          Vitals Value Taken Time   /71 11/01/23 1301   Temp 36.5 °C (97.7 °F) 11/01/23 1230   Pulse 73 11/01/23 1315   Resp 16 11/01/23 1315   SpO2 96 % 11/01/23 1315   Vitals shown include unvalidated device data.      No case tracking events are documented in the log.      Pain/Germania Score: Pain Rating Prior to Med Admin: 0 (11/1/2023  7:24 AM)  Germania Score: 8 (11/1/2023 12:30 PM)

## 2023-11-01 NOTE — NURSING TRANSFER
Nursing Transfer Note      11/1/2023   4:54 PM    Nurse giving handoff:berenice mijares   Nurse receiving handoff:lillian mijares    Reason patient is being transferred: post procedure    Transfer To: 1056    Transfer via bed    Transfer with na    Transported by transport      Patient belongings transferred with patient: Yes    Chart send with patient: Yes    Notified: mother    Patient reassessed at: 11/1/23 @9452

## 2023-11-01 NOTE — PROGRESS NOTES
Pt has dark red urine in balderrama accompanied with constant back pain. Jamil MD notified and stated this is normal and should improve over next few days.

## 2023-11-01 NOTE — BRIEF OP NOTE
[facility] (Formerly [facility])  Brief Operative Note    SUMMARY     Surgery Date: 11/1/2023     Surgeon(s) and Role:  Panel 1:     * Jimena Matta MD - Primary     * Steve Lowery MD - Fellow  Panel 2:     * Gio Jeter MD - Primary     * Isrrael Jamil MD - Resident - Assisting        Pre-op Diagnosis:  Diverticulitis large intestine w/o perforation or abscess w/o bleeding [K57.32]    Post-op Diagnosis:  Post-Op Diagnosis Codes:     * Diverticulitis large intestine w/o perforation or abscess w/o bleeding [K57.32]    Procedure(s) (LRB):  COLECTOMY-LAPAROSCOPIC LEFT ERAS LOW (N/A)  CYSTOSCOPY,WITH URETERAL CATHETER INSERTION  MOBILIZATION, SPLENIC FLEXURE, LAPAROSCOPIC    Anesthesia: General    Implants:  * No implants in log *    Operative Findings: Moderately inflamed sigmoid colon. Splenic flexure mobilized for length. Sigmoidectomy completed. 29mm EEA anastomosis completed between descending colon and rectum. Leak test negative    Estimated Blood Loss: 100 mL    Estimated Blood Loss has been documented.         Specimens:   Specimen (24h ago, onward)       Start     Ordered    11/01/23 1155  Specimen to Pathology, Surgery Other  Once        Comments: Pre-op Diagnosis: Diverticulitis large intestine w/o perforation or abscess w/o bleeding [K57.32]Procedure(s):COLECTOMY-LAPAROSCOPIC LEFT ERAS LOWCYSTOSCOPY,WITH URETERAL CATHETER INSERTION Number of specimens: 2Name of specimens: 1. Sigmoid colon- permanent2. Donuts- permanent     References:    Click here for ordering Quick Tip   Question Answer Comment   Procedure Type: Other    Specimen Class: Complex case/Special    Which provider would you like to cc? JIMENA MATTA    Release to patient Immediate        11/01/23 1157                    [id number]

## 2023-11-02 LAB
ANION GAP SERPL CALC-SCNC: 12 MMOL/L (ref 8–16)
BASOPHILS # BLD AUTO: 0.01 K/UL (ref 0–0.2)
BASOPHILS NFR BLD: 0.1 % (ref 0–1.9)
BUN SERPL-MCNC: 21 MG/DL (ref 6–20)
CALCIUM SERPL-MCNC: 8.4 MG/DL (ref 8.7–10.5)
CHLORIDE SERPL-SCNC: 104 MMOL/L (ref 95–110)
CO2 SERPL-SCNC: 18 MMOL/L (ref 23–29)
CREAT SERPL-MCNC: 1 MG/DL (ref 0.5–1.4)
DIFFERENTIAL METHOD: ABNORMAL
EOSINOPHIL # BLD AUTO: 0 K/UL (ref 0–0.5)
EOSINOPHIL NFR BLD: 0 % (ref 0–8)
ERYTHROCYTE [DISTWIDTH] IN BLOOD BY AUTOMATED COUNT: 12.5 % (ref 11.5–14.5)
EST. GFR  (NO RACE VARIABLE): >60 ML/MIN/1.73 M^2
GLUCOSE SERPL-MCNC: 131 MG/DL (ref 70–110)
HCT VFR BLD AUTO: 36.5 % (ref 37–48.5)
HGB BLD-MCNC: 12.7 G/DL (ref 12–16)
IMM GRANULOCYTES # BLD AUTO: 0.02 K/UL (ref 0–0.04)
IMM GRANULOCYTES NFR BLD AUTO: 0.2 % (ref 0–0.5)
LYMPHOCYTES # BLD AUTO: 0.8 K/UL (ref 1–4.8)
LYMPHOCYTES NFR BLD: 9.2 % (ref 18–48)
MAGNESIUM SERPL-MCNC: 1.8 MG/DL (ref 1.6–2.6)
MCH RBC QN AUTO: 29.4 PG (ref 27–31)
MCHC RBC AUTO-ENTMCNC: 34.8 G/DL (ref 32–36)
MCV RBC AUTO: 85 FL (ref 82–98)
MONOCYTES # BLD AUTO: 0.8 K/UL (ref 0.3–1)
MONOCYTES NFR BLD: 9.5 % (ref 4–15)
NEUTROPHILS # BLD AUTO: 7 K/UL (ref 1.8–7.7)
NEUTROPHILS NFR BLD: 81 % (ref 38–73)
NRBC BLD-RTO: 0 /100 WBC
PHOSPHATE SERPL-MCNC: 2.7 MG/DL (ref 2.7–4.5)
PLATELET # BLD AUTO: 123 K/UL (ref 150–450)
PMV BLD AUTO: 9.9 FL (ref 9.2–12.9)
POTASSIUM SERPL-SCNC: 3.5 MMOL/L (ref 3.5–5.1)
RBC # BLD AUTO: 4.32 M/UL (ref 4–5.4)
SODIUM SERPL-SCNC: 134 MMOL/L (ref 136–145)
WBC # BLD AUTO: 8.59 K/UL (ref 3.9–12.7)

## 2023-11-02 PROCEDURE — 25000003 PHARM REV CODE 250: Performed by: STUDENT IN AN ORGANIZED HEALTH CARE EDUCATION/TRAINING PROGRAM

## 2023-11-02 PROCEDURE — 85025 COMPLETE CBC W/AUTO DIFF WBC: CPT | Performed by: STUDENT IN AN ORGANIZED HEALTH CARE EDUCATION/TRAINING PROGRAM

## 2023-11-02 PROCEDURE — 25000003 PHARM REV CODE 250: Performed by: NURSE PRACTITIONER

## 2023-11-02 PROCEDURE — 83735 ASSAY OF MAGNESIUM: CPT | Performed by: STUDENT IN AN ORGANIZED HEALTH CARE EDUCATION/TRAINING PROGRAM

## 2023-11-02 PROCEDURE — 63600175 PHARM REV CODE 636 W HCPCS: Performed by: STUDENT IN AN ORGANIZED HEALTH CARE EDUCATION/TRAINING PROGRAM

## 2023-11-02 PROCEDURE — 84100 ASSAY OF PHOSPHORUS: CPT | Performed by: STUDENT IN AN ORGANIZED HEALTH CARE EDUCATION/TRAINING PROGRAM

## 2023-11-02 PROCEDURE — 80048 BASIC METABOLIC PNL TOTAL CA: CPT | Performed by: STUDENT IN AN ORGANIZED HEALTH CARE EDUCATION/TRAINING PROGRAM

## 2023-11-02 PROCEDURE — 20600001 HC STEP DOWN PRIVATE ROOM

## 2023-11-02 PROCEDURE — 36415 COLL VENOUS BLD VENIPUNCTURE: CPT | Performed by: STUDENT IN AN ORGANIZED HEALTH CARE EDUCATION/TRAINING PROGRAM

## 2023-11-02 PROCEDURE — 94761 N-INVAS EAR/PLS OXIMETRY MLT: CPT

## 2023-11-02 RX ADMIN — IBUPROFEN 800 MG: 400 TABLET ORAL at 01:11

## 2023-11-02 RX ADMIN — MUPIROCIN: 20 OINTMENT TOPICAL at 08:11

## 2023-11-02 RX ADMIN — ENOXAPARIN SODIUM 40 MG: 40 INJECTION SUBCUTANEOUS at 04:11

## 2023-11-02 RX ADMIN — ALVIMOPAN 12 MG: 12 CAPSULE ORAL at 08:11

## 2023-11-02 RX ADMIN — ATENOLOL 50 MG: 25 TABLET ORAL at 08:11

## 2023-11-02 RX ADMIN — METHOCARBAMOL 500 MG: 500 TABLET ORAL at 08:11

## 2023-11-02 RX ADMIN — ATORVASTATIN CALCIUM 40 MG: 40 TABLET, FILM COATED ORAL at 08:11

## 2023-11-02 RX ADMIN — ACETAMINOPHEN 1000 MG: 500 TABLET ORAL at 09:11

## 2023-11-02 RX ADMIN — OXYCODONE HYDROCHLORIDE 10 MG: 10 TABLET ORAL at 09:11

## 2023-11-02 RX ADMIN — ACETAMINOPHEN 1000 MG: 10 INJECTION, SOLUTION INTRAVENOUS at 10:11

## 2023-11-02 RX ADMIN — OXYCODONE HYDROCHLORIDE 5 MG: 5 TABLET ORAL at 04:11

## 2023-11-02 RX ADMIN — METHOCARBAMOL 500 MG: 500 TABLET ORAL at 09:11

## 2023-11-02 RX ADMIN — OXYCODONE HYDROCHLORIDE 10 MG: 10 TABLET ORAL at 11:11

## 2023-11-02 RX ADMIN — GABAPENTIN 300 MG: 300 CAPSULE ORAL at 09:11

## 2023-11-02 RX ADMIN — MUPIROCIN: 20 OINTMENT TOPICAL at 09:11

## 2023-11-02 RX ADMIN — OXYCODONE HYDROCHLORIDE 10 MG: 10 TABLET ORAL at 06:11

## 2023-11-02 RX ADMIN — GABAPENTIN 300 MG: 300 CAPSULE ORAL at 02:11

## 2023-11-02 RX ADMIN — GABAPENTIN 300 MG: 300 CAPSULE ORAL at 08:11

## 2023-11-02 RX ADMIN — IBUPROFEN 800 MG: 400 TABLET ORAL at 09:11

## 2023-11-02 RX ADMIN — IBUPROFEN 800 MG: 800 INJECTION INTRAVENOUS at 05:11

## 2023-11-02 RX ADMIN — TAMSULOSIN HYDROCHLORIDE 0.4 MG: 0.4 CAPSULE ORAL at 08:11

## 2023-11-02 RX ADMIN — ALVIMOPAN 12 MG: 12 CAPSULE ORAL at 09:11

## 2023-11-02 RX ADMIN — METHOCARBAMOL 500 MG: 500 TABLET ORAL at 02:11

## 2023-11-02 RX ADMIN — ACETAMINOPHEN 1000 MG: 500 TABLET ORAL at 01:11

## 2023-11-02 RX ADMIN — ATENOLOL 50 MG: 25 TABLET ORAL at 09:11

## 2023-11-02 NOTE — NURSING
Nurses Note -- 4 Eyes      11/2/2023   0700      Skin assessed during: Q Shift Change      [] No Altered Skin Integrity Present    []Prevention Measures Documented      [x] Yes- Altered Skin Integrity Present or Discovered   [x] LDA Added if Not in Epic (Describe Wound)  Surgical  incision to midline   [] New Altered Skin Integrity was Present on Admit and Documented in LDA   [] Wound Image Taken    Wound Care Consulted? No    Attending Nurse:  Nicole Fabian RN/Staff Member:  Naseem CHAO, Nicole CHAO

## 2023-11-02 NOTE — PLAN OF CARE
[facility]  Initial Discharge Assessment       Primary Care Provider: Giuliana Rojas MD    Admission Diagnosis: Diverticulitis large intestine w/o perforation or abscess w/o bleeding [K57.32]  Diverticulitis [K57.92]    Admission Date: 11/1/2023  Expected Discharge Date: 11/5/2023    Transition of Care Barriers: None    Payor: [employer] MEDICARE / Plan: [employer] 65 / Product Type: Medicare Advantage /     Extended Emergency Contact Information  Primary Emergency Contact: AdriannaRiaz  Address: [address] [place] [address]           [place] [address] [facility]  Home Phone: [phone]  Mobile Phone: [phone]  Relation: Spouse  Secondary Emergency Contact: Tali Rodas   [facility]  Home Phone: [phone]  Mobile Phone: [phone]  Relation: Relative  Mother: TOSHA Trinidad   [facility]  Home Phone: [phone]  Mobile Phone: [phone]    Discharge Plan A: Home with family  Discharge Plan B: Home      [facility] - [place] - [address]  [address]  [place] [address]  Phone: [phone] Fax: [phone]    [id number] - [place] - [address]  [address] [place] [address]  Phone: [phone] Fax: [phone]      Initial Assessment (most recent)       Adult Discharge Assessment - 11/02/23 1547          Discharge Assessment    Assessment Type Discharge Planning Assessment     Confirmed/corrected address, phone number and insurance Yes     Confirmed Demographics Correct on Facesheet     Source of Information patient     Does patient/caregiver understand observation status Yes     Communicated CARIE with patient/caregiver Yes     People in Home spouse     Do you expect to return to your current living situation? Yes     Do you have help at home or someone to help you manage your care at home? Yes     Prior to hospitilization cognitive status: Alert/Oriented     Current  cognitive status: Alert/Oriented     Home Layout Able to live on 1st floor     Equipment Currently Used at Home none     Readmission within 30 days? No     Patient currently being followed by outpatient case management? No     Do you currently have service(s) that help you manage your care at home? No     Do you take prescription medications? Yes     Do you have prescription coverage? Yes     Coverage PHN     Do you have any problems affording any of your prescribed medications? No     Is the patient taking medications as prescribed? yes     How do you get to doctors appointments? car, drives self     Are you on dialysis? No     Do you take coumadin? No     DME Needed Upon Discharge  none     Discharge Plan discussed with: Patient     Transition of Care Barriers None     Discharge Plan A Home with family     Discharge Plan B Home                   Spoke to pt. Pt lives at home with spouse. Post hospital  stay spouse will be pt support person and pt. has transportation at d/c with spouse. There have been no hospitalizations within the last 30 days per pt and pt wife. Verified pt PCP and preferred pharmacy. Pt stated not on Coumadin and is not receiving dialysis. All questions answered regarding case management/ discharge planning , pt verbalized understanding. Discharge booklet with SW contact information given to pt.     Discharge Plan A and Plan B have been determined by review of patient's clinical status, future medical and therapeutic needs, and coverage/benefits for post-acute care in coordination with multidisciplinary team members.     Lorraine Mcgee LCSW  Case Management/[facility]  [phone]

## 2023-11-02 NOTE — PLAN OF CARE
Diverticulitis large intestine w/o perforation or abscess w/o bleeding     Problem: Adult Inpatient Plan of Care    Goal: Plan of Care Review  Outcome: Ongoing, Progressing    Goal: Patient-Specific Goal (Individualized)  Outcome: Ongoing, Progressing    Goal: Absence of Hospital-Acquired Illness or Injury  Outcome: Ongoing, Progressing    Goal: Optimal Comfort and Wellbeing  Outcome: Ongoing, Progressing    Goal: Readiness for Transition of Care  Outcome: Ongoing, Progressing     Problem: Infection    Goal: Absence of Infection Signs and Symptoms  Outcome: Ongoing, Progressing     Problem: Fall Injury Risk    Goal: Absence of Fall and Fall-Related Injury  Outcome: Ongoing, Progressing

## 2023-11-02 NOTE — OP NOTE
DATE OF PROCEDURE:  11/01/2023     PREOPERATIVE DIAGNOSES:  Recurrent sigmoid diverticulitis     POSTOPERATIVE DIAGNOSES:  Recurrent sigmoid diverticulitis     PROCEDURES PERFORMED:    Laparoscopic-assisted sigmoid colectomy  Laparoscopic-assisted splenic flexure mobilization     SURGEON:  Marcio Chino M.D.     ASSISTANT:  Steve Lowery M.D. [RES]     ANESTHESIA:  General endotracheal     IV FLUIDS:  1600 mL     ESTIMATED BLOOD LOSS:  100  mL     URINE OUTPUT:  250 mL     DRAINS:    Stanford catheter  #10 Mushtaq-Mandel drain in the pelvis     SPECIMENS:  Sigmoid colon  Proximal anastomotic ring  Distal anastomotic ring     COMPLICATIONS:  None.     OPERATIVE FINDINGS:  Chronic diverticular disease of the sigmoid colon.     INDICATIONS:  The patient is a 53-year-old female who has had recurrent episodes of sigmoid diverticulitis.  Due to the frequency and severity of her attacks, she has elected to proceed with an elective laparoscopic sigmoid colectomy.     DESCRIPTION OF PROCEDURE:  The patient was identified, brought to the Operating Room, placed on the table in a supine position after obtaining informed consent.  Venous sequential stockings were placed.  She was given preoperative intravenous antibiotics as well as subcutaneous heparin.  After induction of general endotracheal anesthesia, she was repositioned in a modified lithotomy position using Yellofin stirrups.  Preoperative cystoscopy and bilateral ureteral catheter placement was performed by Urology.  Please see their operative note for details regarding this portion of the procedure.  Rectal irrigation was then performed. The abdomen was prepped and draped in the usual sterile fashion.      A 1 cm supraumbilical incision was made through skin, subcutaneous tissue, and fascia to enter the peritoneal cavity and a 12 mm balloon tip trocar was inserted. The abdomen was insufflated with carbon dioxide.  Once adequate pneumoperitoneum had been achieved, the  laparoscoped was inserted.  There were no significant adhesions to the anterior abdominal wall.  The abdomen was then desufflated.  A 7.5 cm Pfannenstiel incision was made and carried down through skin and subcutaneous tissue to the level of the fascia.  The fascia was incised transversely, and fascial flaps were raised off the underlying rectus abdominis muscle superiorly and inferiorly.  The peritoneum was opened sharply to gain access to the peritoneal cavity and the peritoneum was opened vertically along the entire length of the incision with electrocautery.  A GelPort was placed and the abdomen was reinsufflated.   A 5 mm trocar was placed in the left lower quadrant under direct visualization.    On initial inspection, the sigmoid colon was noted to be chronically thickened and fibrotic from chronic diverticular disease.  There were some adhesions to the left pelvic sidewall.  Utilizing a laparoscopic hand assisted technique, we began by mobilizing the lateral attachments of the sigmoid and descending colon.  The white line of Toldt was incised using the LigaSure device, and the colon and mesentery were mobilized medially off of the retroperitoneum, identifying and protecting the left ureter.  Full medial mobilization was carried up to the level of splenic flexure.  We then entered the lesser sac through the gastrocolic ligament and detached the omentum from the distal half of the transverse colon.  The splenic flexure was fully mobilized. I then performed wide division of the mesentery of the distal transverse colon, splenic flexure, and descending colon utilizing the LigaSure device, maintaining adequate collateral flow through the marginal artery.  Mobilization and division of the mesentery was carried down to the pedicle of the inferior mesenteric artery.    We then removed the GelPort cap and desufflated the abdomen.  The mobilized colon was delivered through the wound protector portion of the GelPort.   The junction of the descending and sigmoid colon was divided with a TITI stapler at a point where the colon had a relatively normal appearance.  The intervening mesentery was divided with LigaSure device.  We then isolated the pedicle of the inferior mesenteric artery, and a low ligation was performed, dividing the pedicle between Giovanna clamps and securing the pedicles with 0 Vicryl suture ligatures, ensuring that the ureters were identified and protected.  We then mobilized the upper portion of the rectum and mesorectum.  The mesorectum was cleared from the upper portion of the rectum with the LigaSure device.  The upper rectum was stapled with a TA 30 stapler, and divided proximal to the staple line.  The resected sigmoid colon was passed from the field.    We then performed a standard end-to-end anastomosis utilizing the 29 mm powered EEA stapler, ensuring adequate orientation of the colon and mesocolon.  After completing the anastomosis, the anastomotic rings were inspected and noted to be intact circumferentially.  We then performed flexible endoscopy to visualize the anastomosis, which appeared to be intact and hemostatic circumferentially.  There was no extravasation of air from the staple line during insufflation testing.  The visible portion of the anastomosis was reinforced with interrupted 3-0 Vicryl seromuscular sutures.  The pelvis then irrigated with sterile saline.  There was some diffuse oozing from the pelvic sidewalls and the cut edges of the mesorectum.  This was able to be controlled with the application of topical Sunday.  Once hemostasis was noted to be adequate, a #10 Mushtaq-Mandel drain was placed through the left lower quadrant 5 mm trocar incision and directed into the pelvis adjacent to the anastomosis. This was secured at the skin level with a 3 0 nylon suture.    We then changed gowns and gloves brought the sterile closing tray to the field.  The fascial defect at the supraumbilical  trocar site was closed with a 0 Vicryl figure-of-eight suture.  The Pfannenstiel incision was closed 1st with a running 3-0 Vicryl to reapproximate the peritoneum, followed by interrupted 3-0 Vicryl figure-of-eight sutures to reapproximate the rectus muscles in the midline.  The fascia was closed transversely with a running #0 PDS suture.  Subcutaneous tissues were irrigated and the skin incisions were closed with 4 0 Monocryl subcuticular closures.  Dermabond was placed as a dressing over the incisions, and the Mushtaq-Mandel drain was connected to bulb suction.    The patient tolerated the procedure well with no complications.  She was extubated in the Operating Room and taken to Recovery in satisfactory condition.  All needle, instrument and sponge counts were correct at the end of the case.  I was present throughout the entire procedure.    Marcio Chino MD, FACS, FASCRS  Senior Staff Surgeon  Department of Colon & Rectal Surgery     This note was created using voice recognition software, and may contain some unrecognized transcriptional errors.

## 2023-11-02 NOTE — PROGRESS NOTES
Colon and Rectal Surgery Progress Note    Patient name: Giuliana Rodas   YOB: 1970   MRN: [id number]    Patient Name: Giuliana Rodas  Date: 11/2/2023    Subjective:  OR yesterday. Pain controlled, a little sore. No nasuea, tolerating CLD. No BM or flatus.   Medications:    Current Facility-Administered Medications:     0.9%  NaCl infusion, , Intravenous, Continuous, Yaquelin Nina NP, Last Rate: 40 mL/hr at 11/01/23 1245, New Bag at 11/01/23 1245    0.9%  NaCl infusion, , Intravenous, Continuous, Steve Lowery MD    acetaminophen 1,000 mg/100 mL (10 mg/mL) injection 1,000 mg, 1,000 mg, Intravenous, Q8H, Stopped at 11/01/23 2143 **FOLLOWED BY** acetaminophen tablet 1,000 mg, 1,000 mg, Oral, Q8H, Steve Lowery MD    alvimopan capsule 12 mg, 12 mg, Oral, BID, Steve Lowery MD, 12 mg at 11/01/23 2130    atenoloL tablet 50 mg, 50 mg, Oral, BID, Steve Lowery MD, 50 mg at 11/01/23 1323    atorvastatin tablet 40 mg, 40 mg, Oral, Daily, Steve Lowery MD    enoxaparin injection 40 mg, 40 mg, Subcutaneous, Daily, Steve Lowery MD    gabapentin capsule 300 mg, 300 mg, Oral, TID, Yaquelin Nina NP, 300 mg at 11/01/23 1424    gabapentin capsule 300 mg, 300 mg, Oral, TID, Steve Lowery MD, 300 mg at 11/01/23 2130    [COMPLETED] ibuprofen 800 mg in dextrose 5 % 250 mL (Vial-Mate), 800 mg, Intravenous, Q8H, Last Rate: 250 mL/hr at 11/02/23 0511, 800 mg at 11/02/23 0511 **FOLLOWED BY** ibuprofen tablet 800 mg, 800 mg, Oral, Q8H, Steve Lowery MD    methocarbamoL tablet 500 mg, 500 mg, Oral, TID, Steve Lowery MD, 500 mg at 11/01/23 2130    mupirocin 2 % ointment, , Nasal, BID, Steve Lowery MD, Given at 11/01/23 2128    ondansetron injection 4 mg, 4 mg, Intravenous, Q6H PRN, Steve Lowery MD    oxyCODONE immediate release tablet 5 mg, 5 mg, Oral, Q6H PRN, Steve Lowery MD, 5 mg at 11/02/23 0456    oxyCODONE immediate release tablet Tab 10 mg, 10 mg,  Oral, Q4H PRN, Steve Lowery MD, 10 mg at 11/01/23 1322    sodium chloride 0.9% flush 10 mL, 10 mL, Intra-Catheter, PRN, Steve Lowery MD    tamsulosin 24 hr capsule 0.4 mg, 0.4 mg, Oral, Daily, Steve Lowery MD, 0.4 mg at 11/01/23 1323    traMADoL tablet 50 mg, 50 mg, Oral, Q6H PRN, Steve Lowery MD    Vital Signs:  Vitals:    11/02/23 0456   BP:    Pulse:    Resp: 16   Temp:        In/Out:  Intake/Output - Last 3 Shifts         10/31 0700 11/01 0659 11/01 0700 11/02 0659    IV Piggyback  1600    Total Intake(mL/kg)  1600 (17.7)    Urine (mL/kg/hr)  2750 (1.3)    Drains  30    Blood  100    Total Output  2880    Net  -1280                  Physical Exam:  General: Alert, oriented, in no apparent distress  HEENT: Sclera anicteric, trachea midline  Lungs: Normal respiratory rate and effort on room air  Abdomen: Soft, appropriate mary-incisional TTP, nondistended. Incisions clean/dry/intact without erythema or drainage. FILI drain thin sanguinous output  Extremities: Warm, well perfused, no edema, SCDs in place  Neuro: Grossly intact, moves all extremities  Psych: Appropriate affect    Laboratory Studies:  Complete Blood Count:  Lab Results   Component Value Date/Time    WBC 8.59 11/02/2023 03:34 AM    HGB 12.7 11/02/2023 03:34 AM    HCT 36.5 (L) 11/02/2023 03:34 AM    HCT 40 07/24/2022 03:31 AM    RBC 4.32 11/02/2023 03:34 AM     (L) 11/02/2023 03:34 AM       Basic Chemistry Panel:  Lab Results   Component Value Date/Time     (L) 11/02/2023 03:34 AM    K 3.5 11/02/2023 03:34 AM     11/02/2023 03:34 AM    CO2 18 (L) 11/02/2023 03:34 AM    BUN 21 (H) 11/02/2023 03:34 AM    CREATININE 1.0 11/02/2023 03:34 AM    CALCIUM 8.4 (L) 11/02/2023 03:34 AM       Lab Results   Component Value Date/Time    CRP 1.6 10/02/2023 12:14 PM       Imaging Studies:  None new    Assessment:  Giuliana ABBIE Rodas is a 53 y.o. year old female with history of diverticulitis now s/p laparoscopic sigmoidectomy      Plan:  Neuro: Cont multimodal oral analgesia, limit narcotics    CV: HDS without tachycardia, CTM      Pulm: Satting well on RA, encourage IS/ambulation ppx    GI:  - Advance to LRD  - Cont Entereg while AROBF  - Cont Zofran/compazine prn nausea  - WOCN consult for postoperative stoma teaching    :   - DC Stanford POD#1  - Good UOP, BUN/Cr stable/WNL, bloody urine 2/2 to stents    Heme:   - Hgb stable,  start lovenox tomorrow  - FILI drain with thin sanguinous output, CTM    ID:   - No postoperative antibiotics indicated    Endo:   - No history of DM2, monitor BG on BMP    FEN:   - NS@40, replete lytes Mg>2 Phos>3 K>4, advance to LRD    PPX:   - SCDs  - IS/Ambulation  - No GI PPX indicated    Dispo: Ongoing, PIO Lowery MD  Colorectal Surgery Fellow

## 2023-11-03 VITALS
HEART RATE: 60 BPM | BODY MASS INDEX: 36.76 KG/M2 | TEMPERATURE: 98 F | HEIGHT: 62 IN | DIASTOLIC BLOOD PRESSURE: 71 MMHG | OXYGEN SATURATION: 98 % | WEIGHT: 199.75 LBS | SYSTOLIC BLOOD PRESSURE: 109 MMHG | RESPIRATION RATE: 18 BRPM

## 2023-11-03 LAB
ANION GAP SERPL CALC-SCNC: 7 MMOL/L (ref 8–16)
BUN SERPL-MCNC: 24 MG/DL (ref 6–20)
CALCIUM SERPL-MCNC: 8.8 MG/DL (ref 8.7–10.5)
CHLORIDE SERPL-SCNC: 109 MMOL/L (ref 95–110)
CO2 SERPL-SCNC: 24 MMOL/L (ref 23–29)
CREAT SERPL-MCNC: 1.1 MG/DL (ref 0.5–1.4)
EST. GFR  (NO RACE VARIABLE): 59.3 ML/MIN/1.73 M^2
GLUCOSE SERPL-MCNC: 94 MG/DL (ref 70–110)
POTASSIUM SERPL-SCNC: 3.7 MMOL/L (ref 3.5–5.1)
SODIUM SERPL-SCNC: 140 MMOL/L (ref 136–145)

## 2023-11-03 PROCEDURE — 36415 COLL VENOUS BLD VENIPUNCTURE: CPT | Performed by: STUDENT IN AN ORGANIZED HEALTH CARE EDUCATION/TRAINING PROGRAM

## 2023-11-03 PROCEDURE — 25000003 PHARM REV CODE 250: Performed by: STUDENT IN AN ORGANIZED HEALTH CARE EDUCATION/TRAINING PROGRAM

## 2023-11-03 PROCEDURE — 80048 BASIC METABOLIC PNL TOTAL CA: CPT | Performed by: STUDENT IN AN ORGANIZED HEALTH CARE EDUCATION/TRAINING PROGRAM

## 2023-11-03 RX ORDER — POLYETHYLENE GLYCOL 3350 17 G/17G
17 POWDER, FOR SOLUTION ORAL DAILY
Qty: 510 G | Refills: 0 | Status: SHIPPED | OUTPATIENT
Start: 2023-11-03 | End: 2024-01-04

## 2023-11-03 RX ORDER — OXYCODONE HYDROCHLORIDE 5 MG/1
5 TABLET ORAL EVERY 6 HOURS PRN
Qty: 20 TABLET | Refills: 0 | Status: ON HOLD | OUTPATIENT
Start: 2023-11-03 | End: 2023-11-06 | Stop reason: HOSPADM

## 2023-11-03 RX ADMIN — GABAPENTIN 300 MG: 300 CAPSULE ORAL at 08:11

## 2023-11-03 RX ADMIN — GABAPENTIN 300 MG: 300 CAPSULE ORAL at 02:11

## 2023-11-03 RX ADMIN — METHOCARBAMOL 500 MG: 500 TABLET ORAL at 08:11

## 2023-11-03 RX ADMIN — ACETAMINOPHEN 1000 MG: 500 TABLET ORAL at 01:11

## 2023-11-03 RX ADMIN — ATORVASTATIN CALCIUM 40 MG: 40 TABLET, FILM COATED ORAL at 08:11

## 2023-11-03 RX ADMIN — IBUPROFEN 800 MG: 400 TABLET ORAL at 05:11

## 2023-11-03 RX ADMIN — OXYCODONE HYDROCHLORIDE 5 MG: 5 TABLET ORAL at 08:11

## 2023-11-03 RX ADMIN — TAMSULOSIN HYDROCHLORIDE 0.4 MG: 0.4 CAPSULE ORAL at 08:11

## 2023-11-03 RX ADMIN — IBUPROFEN 800 MG: 400 TABLET ORAL at 01:11

## 2023-11-03 RX ADMIN — METHOCARBAMOL 500 MG: 500 TABLET ORAL at 02:11

## 2023-11-03 RX ADMIN — ACETAMINOPHEN 1000 MG: 500 TABLET ORAL at 05:11

## 2023-11-03 NOTE — PLAN OF CARE
[facility] Plan of Care Note    Dx: Diverticulitis of large intestine w/o perforation/abscess/bleeding    Shift Events: Pain controlled with prn oxycodone. VSS. Incisions maintained with dermabond.     Goals of Care: Pain management, monitor FILI drain output.     Neuro: A&Ox4    Vital Signs: WNL    Respiratory: RA    Diet: Low fiber/low residue    Is patient tolerating current diet? Yes    GTTS: N/A    Urine Output/Bowel Movement: Soft BM 11/2 per pt., void to hat with 600mL out.     Drains/Tubes/Tube Feeds (include total output/shift): Left lower abd. FILI drain with sanguineous output- 15mL    Lines: Rifht FA/AC PIV's    Accuchecks: N/A    Skin: Small umbilical incision, transverse incision to lower abd/pelvic area both SADIE with dermabond.     Fall Risk Score: 6    Activity level: Ad renzo    Any scheduled procedures: N/A    Any safety concerns: No    Other: N/A       Problem: Adult Inpatient Plan of Care  Goal: Plan of Care Review  Outcome: Ongoing, Progressing  Goal: Patient-Specific Goal (Individualized)  Outcome: Ongoing, Progressing  Goal: Absence of Hospital-Acquired Illness or Injury  Outcome: Ongoing, Progressing  Goal: Optimal Comfort and Wellbeing  Outcome: Ongoing, Progressing  Goal: Readiness for Transition of Care  Outcome: Ongoing, Progressing     Problem: Infection  Goal: Absence of Infection Signs and Symptoms  Outcome: Ongoing, Progressing     Problem: Fall Injury Risk  Goal: Absence of Fall and Fall-Related Injury  Outcome: Ongoing, Progressing

## 2023-11-03 NOTE — HOSPITAL COURSE
52yo female hx of smoldering diverticulitis who presented for sigmoidectomy. She underwent laparoscopic sigmoidectomy on 11/1. Postop she did well with return of bowel function on POD2, tolerance of a regular diet, ambulating on her own, and pain well controlled on an oral regimen. She was discharged home in good condition

## 2023-11-03 NOTE — DISCHARGE SUMMARY
[facility]  Colorectal Surgery  Discharge Summary      Patient Name: Giuliana Rodas  MRN: [id number]  Admission Date: 11/1/2023  Hospital Length of Stay: 2 days  Discharge Date and Time:  11/03/2023 3:17 PM  Attending Physician: Marcio Chino MD   Discharging Provider: Steve Lowery MD  Primary Care Provider: Giuliana Rojas MD     HPI:  No notes on file    Procedure(s) (LRB):  COLECTOMY-LAPAROSCOPIC LEFT ERAS LOW (N/A)  CYSTOSCOPY,WITH URETERAL CATHETER INSERTION  MOBILIZATION, SPLENIC FLEXURE, LAPAROSCOPIC     Hospital Course:  52yo female hx of smoldering diverticulitis who presented for sigmoidectomy. She underwent laparoscopic sigmoidectomy on 11/1. Postop she did well with return of bowel function on POD2, tolerance of a regular diet, ambulating on her own, and pain well controlled on an oral regimen. She was discharged home in good condition      Goals of Care Treatment Preferences:  Code Status: Full Code          Significant Diagnostic Studies: N/A    Pending Diagnostic Studies:     Procedure Component Value Units Date/Time    Specimen to Pathology, Surgery Other [[id number]] Collected: 11/01/23 1154    Order Status: Sent Lab Status: In process Updated: 11/01/23 1449    Specimen: Tissue         Final Active Diagnoses:    Diagnosis Date Noted POA    PRINCIPAL PROBLEM:  Diverticulitis large intestine w/o perforation or abscess w/o bleeding [K57.32] 11/01/2023 Yes      Problems Resolved During this Admission:      Discharged Condition: good    Disposition: Home or Self Care    Follow Up:   Follow-up Information     Marcio Chino MD Follow up in 2 week(s).    Specialty: Colon and Rectal Surgery  Contact information:  [address]  [facility] [address]  [phone]                       Patient Instructions:      Diet Adult Regular     Notify your health care provider if you experience any of the following:  temperature >100.4     Notify your health care provider if you experience any of  the following:  persistent nausea and vomiting or diarrhea     Notify your health care provider if you experience any of the following:  severe uncontrolled pain     Activity as tolerated     Medications:  Reconciled Home Medications:      Medication List      START taking these medications    oxyCODONE 5 MG immediate release tablet  Commonly known as: ROXICODONE  Take 1 tablet (5 mg total) by mouth every 6 (six) hours as needed for Pain.     polyethylene glycol 17 gram Pwpk  Commonly known as: GLYCOLAX  Take 17 g by mouth once daily.        CONTINUE taking these medications    ALIGN ORAL  Take 1 tablet by mouth once daily.     amlodipine-olmesartan 5-40 mg per tablet  Commonly known as: ANGEL  TAKE ONE TABLET BY MOUTH EVERY DAY.     atenoloL 50 MG tablet  Commonly known as: TENORMIN  Take 1 tablet (50 mg total) by mouth 2 (two) times a day.     atorvastatin 40 MG tablet  Commonly known as: LIPITOR  TAKE 1 TABLET BY MOUTH EVERY DAY     BIOFREEZE (MENTHOL) TOP  Apply topically.     ciprofloxacin HCl 500 MG tablet  Commonly known as: CIPRO  Take 1 tablet (500 mg total) by mouth 2 (two) times a day. Take one tablet at 9p and one tablet at 11p the night prior to the procedure.     coal tar 0.5 % shampoo  Commonly known as: NEUTROGENA T-GEL  Apply topically nightly as needed.     fish oil-omega-3 fatty acids 300-1,000 mg capsule  Take 1 capsule by mouth once daily.     fluocinonide 0.05 % external solution  Commonly known as: LIDEX  Apply topically 2 (two) times daily as needed (rash, itching at scalp). Avoid use on face, body folds, groin/genitalia.     fluticasone propionate 50 mcg/actuation nasal spray  Commonly known as: FLONASE  SPRAY twice IN EACH NOSTRIL DAILY     glucosamine sulfate 500 mg Tab  Take by mouth.     HAIR,SKIN AND NAILS(FA-BIOTIN) 66.7-1,000 mcg Tab  Generic drug: multivit-min-folic acid-biotin  Take by mouth.     hydroCHLOROthiazide 12.5 MG Tab  Commonly known as: HYDRODIURIL  TAKE 1 TABLET BY MOUTH  EVERY DAY     hydrocortisone 2.5 % cream  Apply topically 2 (two) times daily. For use when flaring on eyelids for up to 2 weeks then take a 1 week break or decrease to BIW PRN     ICY HOT ADVANCED TOP  Apply topically.     * ketoconazole 2 % cream  Commonly known as: NIZORAL  Apply topically once daily. For use on eyelids, ears, etc daily     * ketoconazole 2 % shampoo  Commonly known as: NIZORAL  wash hair AT least TWO OR THREE TIMES weekly. let sit on scalp AT least 5 MINUTES prior to rinsing     levocetirizine 5 MG tablet  Commonly known as: XYZAL  Take 1 tablet (5 mg total) by mouth every evening.     LIDOcaine HCL 2% 2 % jelly  Commonly known as: XYLOCAINE  Apply topically as needed. Apply topically once nightly to affected part of foot/feet.     magnesium 30 mg Tab  Take 250 mg by mouth once.     methocarbamoL 500 MG Tab  Commonly known as: ROBAXIN  TAKE ONE TABLET BY MOUTH EVERY 8 HOURS AS NEEDED FOR HEADACHE AND MUSCLE SPASM     * metronidazole 1% 1 % Gel  Commonly known as: METROGEL  Apply topically 2 (two) times daily. For rosacea     * metroNIDAZOLE 500 MG tablet  Commonly known as: FLAGYL  Take 1 tablet (500 mg total) by mouth As instructed (Take 1 tablet by mouth at 9pm & 11pm).     multivit with min-folic acid 200 mcg Chew  Take by mouth.     ondansetron 4 MG tablet  Commonly known as: ZOFRAN  Take 1 tablet (4 mg total) by mouth every 8 (eight) hours as needed for Nausea.     PREMARIN vaginal cream  Generic drug: conjugated estrogens  PRN     SYSTANE BALANCE OPHT  Apply to eye.     tamsulosin 0.4 mg Cap  Commonly known as: FLOMAX  TAKE ONE CAPSULE BY MOUTH ONCE DAILY     trolamine salicylate 10 % cream  Commonly known as: ASPERCREME  Apply topically as needed.     vitamin D 1000 units Tab  Commonly known as: VITAMIN D3  Take 1,000 Units by mouth once daily.     vitamin E 400 UNIT capsule  Take 400 Units by mouth once daily.         * This list has 4 medication(s) that are the same as other medications  prescribed for you. Read the directions carefully, and ask your doctor or other care provider to review them with you.            ASK your doctor about these medications    promethazine 25 MG tablet  Commonly known as: PHENERGAN  Take 0.5 tablets (12.5 mg total) by mouth every 6 (six) hours as needed for Nausea.            Steve Lowery MD  Colorectal Surgery  [facility]

## 2023-11-03 NOTE — PLAN OF CARE
[facility] GIS  Discharge Final Note    Primary Care Provider: Giuliana Rojas MD    Expected Discharge Date: 11/3/2023    Final Discharge Note (most recent)       Final Note - 11/03/23 1543          Final Note    Assessment Type Final Discharge Note     Anticipated Discharge Disposition Home or Self Care     Hospital Resources/Appts/Education Provided Provided patient/caregiver with written discharge plan information        Post-Acute Status    Patient choice form signed by patient/caregiver List with quality metrics by geographic area provided     Discharge Delays Medication Delivery (P)                      Important Message from Medicare             Contact Info       Marcio Chino MD   Specialty: Colon and Rectal Surgery    [address] [facility] [address]   Phone: [phone]       Next Steps: Follow up in 2 week(s)          Pt d/c home with family. No d/c needs reported by medical team at this time.     Lorraine Mcgee LCSW  Case Management/[facility]  [phone]

## 2023-11-03 NOTE — PLAN OF CARE
Met with patient and discussed discharger plans.   Patient does not feel she will need any post acute services upon hospital discharge. States physician says she will discharge home tomorrow. Patient has transportation home.

## 2023-11-03 NOTE — PROGRESS NOTES
Colon and Rectal Surgery Progress Note    Patient name: Giuliana Rodas   YOB: 1970   MRN: [id number]    Patient Name: Giuliana Rodas  Date: 11/3/2023    Subjective:  NAEO. Pain controlled, some burning at drain site. Ambulating. +BM x2, somewhat formed.   Medications:    Current Facility-Administered Medications:     [COMPLETED] acetaminophen 1,000 mg/100 mL (10 mg/mL) injection 1,000 mg, 1,000 mg, Intravenous, Q8H, Stopped at 11/02/23 1015 **FOLLOWED BY** acetaminophen tablet 1,000 mg, 1,000 mg, Oral, Q8H, Steve Lowery MD, 1,000 mg at 11/03/23 0551    alvimopan capsule 12 mg, 12 mg, Oral, BID, Steve Lowery MD, 12 mg at 11/02/23 2143    atenoloL tablet 50 mg, 50 mg, Oral, BID, Steve Lowery MD, 50 mg at 11/02/23 2143    atorvastatin tablet 40 mg, 40 mg, Oral, Daily, Steve Lowery MD, 40 mg at 11/02/23 0833    enoxaparin injection 40 mg, 40 mg, Subcutaneous, Daily, Steve Lowery MD, 40 mg at 11/02/23 1638    gabapentin capsule 300 mg, 300 mg, Oral, TID, Steve Lowery MD, 300 mg at 11/02/23 2143    [COMPLETED] ibuprofen 800 mg in dextrose 5 % 250 mL (Vial-Mate), 800 mg, Intravenous, Q8H, Stopped at 11/02/23 0611 **FOLLOWED BY** ibuprofen tablet 800 mg, 800 mg, Oral, Q8H, Steve Lowery MD, 800 mg at 11/03/23 0552    methocarbamoL tablet 500 mg, 500 mg, Oral, TID, Steve Lowery MD, 500 mg at 11/02/23 2143    mupirocin 2 % ointment, , Nasal, BID, Steve Lowery MD, Given at 11/02/23 2143    ondansetron injection 4 mg, 4 mg, Intravenous, Q6H PRN, Steve Lowery MD    oxyCODONE immediate release tablet 5 mg, 5 mg, Oral, Q6H PRN, Steve Lowery MD, 5 mg at 11/02/23 0456    oxyCODONE immediate release tablet Tab 10 mg, 10 mg, Oral, Q4H PRN, Steve Lowery MD, 10 mg at 11/02/23 2143    sodium chloride 0.9% flush 10 mL, 10 mL, Intra-Catheter, PRN, Steve Lowery MD    tamsulosin 24 hr capsule 0.4 mg, 0.4 mg, Oral, Daily, Steve Lowery MD, 0.4 mg at 11/02/23  0833    traMADoL tablet 50 mg, 50 mg, Oral, Q6H PRN, Steve Lowery MD    Vital Signs:  Vitals:    11/03/23 0536   BP: (!) 148/70   Pulse: 62   Resp: 18   Temp: 97.9 °F (36.6 °C)       In/Out:  Intake/Output - Last 3 Shifts         11/01 0700 11/02 0659 11/02 0700 11/03 0659    P.O. 240 700    IV Piggyback 1600 946.1    Total Intake(mL/kg) 1840 (20.3) 1646.1 (18.2)    Urine (mL/kg/hr) 2750 (1.3) 2000 (0.9)    Drains 30 35    Stool  0    Blood 100     Total Output 2880 2035    Net -1040 -389          Stool Occurrence  2 x            Physical Exam:  General: Alert, oriented, in no apparent distress  HEENT: Sclera anicteric, trachea midline  Lungs: Normal respiratory rate and effort on room air  Abdomen: Soft, appropriate mary-incisional TTP, nondistended. Incisions clean/dry/intact without erythema or drainage. FILI drain thin sanguinous output  Extremities: Warm, well perfused, no edema, SCDs in place  Neuro: Grossly intact, moves all extremities  Psych: Appropriate affect    Laboratory Studies:  Complete Blood Count:  Lab Results   Component Value Date/Time    WBC 8.59 11/02/2023 03:34 AM    HGB 12.7 11/02/2023 03:34 AM    HCT 36.5 (L) 11/02/2023 03:34 AM    HCT 40 07/24/2022 03:31 AM    RBC 4.32 11/02/2023 03:34 AM     (L) 11/02/2023 03:34 AM       Basic Chemistry Panel:  Lab Results   Component Value Date/Time     11/03/2023 02:19 AM    K 3.7 11/03/2023 02:19 AM     11/03/2023 02:19 AM    CO2 24 11/03/2023 02:19 AM    BUN 24 (H) 11/03/2023 02:19 AM    CREATININE 1.1 11/03/2023 02:19 AM    CALCIUM 8.8 11/03/2023 02:19 AM       Lab Results   Component Value Date/Time    CRP 1.6 10/02/2023 12:14 PM       Imaging Studies:  None new    Assessment:  Giuliana Rodas is a 53 y.o. year old female with history of diverticulitis now s/p laparoscopic sigmoidectomy     Plan:  Neuro: Cont multimodal oral analgesia, limit narcotics    CV: HDS without tachycardia, CTM      Pulm: Satting well on RA, encourage  IS/ambulation ppx    GI:  - LRD  - DC entereg  - Cont Zofran/compazine prn nausea    :   - Good UOP, BUN/Cr stable/WNL,     Heme:   - Hgb stable,  start lovenox tomorrow  - FILI drain with thin sanguinous output, CTM    ID:   - No postoperative antibiotics indicated    Endo:   - No history of DM2, monitor BG on BMP    FEN:   - NS@40, replete lytes Mg>2 Phos>3 K>4, advance to LRD    PPX:   - SCDs  - IS/Ambulation  - No GI PPX indicated    Dispo: home today vs tomorrow    Steve Lowery MD  Colorectal Surgery Fellow

## 2023-11-05 ENCOUNTER — HOSPITAL ENCOUNTER (OUTPATIENT)
Facility: HOSPITAL | Age: 53
Discharge: HOME | End: 2023-11-06
Attending: EMERGENCY MEDICINE | Admitting: COLON & RECTAL SURGERY
Payer: MEDICARE

## 2023-11-05 DIAGNOSIS — R50.9 FEVER: ICD-10-CM

## 2023-11-05 PROBLEM — R50.82 POSTOPERATIVE FEVER: Status: ACTIVE | Noted: 2023-11-05

## 2023-11-05 LAB
ALBUMIN SERPL BCP-MCNC: 2.9 G/DL (ref 3.5–5.2)
ALP SERPL-CCNC: 57 U/L (ref 55–135)
ALT SERPL W/O P-5'-P-CCNC: 21 U/L (ref 10–44)
ANION GAP SERPL CALC-SCNC: 8 MMOL/L (ref 8–16)
AST SERPL-CCNC: 21 U/L (ref 10–40)
BACTERIA #/AREA URNS AUTO: ABNORMAL /HPF
BASOPHILS # BLD AUTO: 0.03 K/UL (ref 0–0.2)
BASOPHILS NFR BLD: 0.5 % (ref 0–1.9)
BILIRUB SERPL-MCNC: 0.4 MG/DL (ref 0.1–1)
BILIRUB UR QL STRIP: NEGATIVE
BUN SERPL-MCNC: 18 MG/DL (ref 6–20)
CALCIUM SERPL-MCNC: 8.6 MG/DL (ref 8.7–10.5)
CHLORIDE SERPL-SCNC: 106 MMOL/L (ref 95–110)
CLARITY UR REFRACT.AUTO: CLEAR
CO2 SERPL-SCNC: 24 MMOL/L (ref 23–29)
COLOR UR AUTO: ABNORMAL
CREAT SERPL-MCNC: 0.8 MG/DL (ref 0.5–1.4)
CRP SERPL-MCNC: 24.8 MG/L (ref 0–8.2)
DIFFERENTIAL METHOD: ABNORMAL
EOSINOPHIL # BLD AUTO: 0.3 K/UL (ref 0–0.5)
EOSINOPHIL NFR BLD: 4.7 % (ref 0–8)
ERYTHROCYTE [DISTWIDTH] IN BLOOD BY AUTOMATED COUNT: 12.5 % (ref 11.5–14.5)
EST. GFR  (NO RACE VARIABLE): >60 ML/MIN/1.73 M^2
GLUCOSE SERPL-MCNC: 94 MG/DL (ref 70–110)
GLUCOSE UR QL STRIP: NEGATIVE
HCT VFR BLD AUTO: 33.6 % (ref 37–48.5)
HGB BLD-MCNC: 11.7 G/DL (ref 12–16)
HGB UR QL STRIP: ABNORMAL
HYALINE CASTS UR QL AUTO: 1 /LPF
IMM GRANULOCYTES # BLD AUTO: 0.03 K/UL (ref 0–0.04)
IMM GRANULOCYTES NFR BLD AUTO: 0.5 % (ref 0–0.5)
INFLUENZA A, MOLECULAR: NEGATIVE
INFLUENZA B, MOLECULAR: NEGATIVE
KETONES UR QL STRIP: NEGATIVE
LEUKOCYTE ESTERASE UR QL STRIP: NEGATIVE
LIPASE SERPL-CCNC: 41 U/L (ref 4–60)
LYMPHOCYTES # BLD AUTO: 1.4 K/UL (ref 1–4.8)
LYMPHOCYTES NFR BLD: 24.3 % (ref 18–48)
MCH RBC QN AUTO: 30 PG (ref 27–31)
MCHC RBC AUTO-ENTMCNC: 34.8 G/DL (ref 32–36)
MCV RBC AUTO: 86 FL (ref 82–98)
MICROSCOPIC COMMENT: ABNORMAL
MONOCYTES # BLD AUTO: 0.5 K/UL (ref 0.3–1)
MONOCYTES NFR BLD: 8.4 % (ref 4–15)
NEUTROPHILS # BLD AUTO: 3.6 K/UL (ref 1.8–7.7)
NEUTROPHILS NFR BLD: 61.6 % (ref 38–73)
NITRITE UR QL STRIP: NEGATIVE
NRBC BLD-RTO: 0 /100 WBC
PH UR STRIP: 5 [PH] (ref 5–8)
PLATELET # BLD AUTO: 123 K/UL (ref 150–450)
PMV BLD AUTO: 9.5 FL (ref 9.2–12.9)
POTASSIUM SERPL-SCNC: 3.7 MMOL/L (ref 3.5–5.1)
PROT SERPL-MCNC: 5.9 G/DL (ref 6–8.4)
PROT UR QL STRIP: NEGATIVE
RBC # BLD AUTO: 3.9 M/UL (ref 4–5.4)
RBC #/AREA URNS AUTO: 8 /HPF (ref 0–4)
SARS-COV-2 RDRP RESP QL NAA+PROBE: NEGATIVE
SODIUM SERPL-SCNC: 138 MMOL/L (ref 136–145)
SP GR UR STRIP: 1 (ref 1–1.03)
SPECIMEN SOURCE: NORMAL
URN SPEC COLLECT METH UR: ABNORMAL
WBC # BLD AUTO: 5.8 K/UL (ref 3.9–12.7)
WBC #/AREA URNS AUTO: 1 /HPF (ref 0–5)

## 2023-11-05 PROCEDURE — 81001 URINALYSIS AUTO W/SCOPE: CPT

## 2023-11-05 PROCEDURE — 96372 THER/PROPH/DIAG INJ SC/IM: CPT | Performed by: SURGERY

## 2023-11-05 PROCEDURE — 80053 COMPREHEN METABOLIC PANEL: CPT | Performed by: EMERGENCY MEDICINE

## 2023-11-05 PROCEDURE — 25000003 PHARM REV CODE 250

## 2023-11-05 PROCEDURE — 96360 HYDRATION IV INFUSION INIT: CPT | Mod: 59

## 2023-11-05 PROCEDURE — G0378 HOSPITAL OBSERVATION PER HR: HCPCS

## 2023-11-05 PROCEDURE — 83690 ASSAY OF LIPASE: CPT

## 2023-11-05 PROCEDURE — 25500020 PHARM REV CODE 255: Performed by: EMERGENCY MEDICINE

## 2023-11-05 PROCEDURE — 25000003 PHARM REV CODE 250: Performed by: SURGERY

## 2023-11-05 PROCEDURE — 87502 INFLUENZA DNA AMP PROBE: CPT

## 2023-11-05 PROCEDURE — 86140 C-REACTIVE PROTEIN: CPT | Performed by: EMERGENCY MEDICINE

## 2023-11-05 PROCEDURE — 96361 HYDRATE IV INFUSION ADD-ON: CPT

## 2023-11-05 PROCEDURE — U0002 COVID-19 LAB TEST NON-CDC: HCPCS

## 2023-11-05 PROCEDURE — 63600175 PHARM REV CODE 636 W HCPCS: Performed by: SURGERY

## 2023-11-05 PROCEDURE — 85025 COMPLETE CBC W/AUTO DIFF WBC: CPT | Performed by: EMERGENCY MEDICINE

## 2023-11-05 PROCEDURE — 63600175 PHARM REV CODE 636 W HCPCS

## 2023-11-05 PROCEDURE — 96374 THER/PROPH/DIAG INJ IV PUSH: CPT

## 2023-11-05 PROCEDURE — 99285 EMERGENCY DEPT VISIT HI MDM: CPT | Mod: 25

## 2023-11-05 PROCEDURE — 25000003 PHARM REV CODE 250: Performed by: COLON & RECTAL SURGERY

## 2023-11-05 PROCEDURE — 87040 BLOOD CULTURE FOR BACTERIA: CPT | Performed by: SURGERY

## 2023-11-05 RX ORDER — SODIUM CHLORIDE 0.9 % (FLUSH) 0.9 %
10 SYRINGE (ML) INJECTION
Status: DISCONTINUED | OUTPATIENT
Start: 2023-11-05 | End: 2023-11-06 | Stop reason: HOSPADM

## 2023-11-05 RX ORDER — METHOCARBAMOL 500 MG/1
500 TABLET, FILM COATED ORAL EVERY 8 HOURS PRN
Status: DISCONTINUED | OUTPATIENT
Start: 2023-11-05 | End: 2023-11-06 | Stop reason: HOSPADM

## 2023-11-05 RX ORDER — OXYCODONE HYDROCHLORIDE 5 MG/1
5 TABLET ORAL EVERY 6 HOURS PRN
Status: DISCONTINUED | OUTPATIENT
Start: 2023-11-05 | End: 2023-11-06 | Stop reason: HOSPADM

## 2023-11-05 RX ORDER — MORPHINE SULFATE 4 MG/ML
4 INJECTION, SOLUTION INTRAMUSCULAR; INTRAVENOUS
Status: COMPLETED | OUTPATIENT
Start: 2023-11-05 | End: 2023-11-05

## 2023-11-05 RX ORDER — ACETAMINOPHEN 325 MG/1
650 TABLET ORAL EVERY 6 HOURS
Status: DISCONTINUED | OUTPATIENT
Start: 2023-11-05 | End: 2023-11-06 | Stop reason: HOSPADM

## 2023-11-05 RX ORDER — NALOXONE HCL 0.4 MG/ML
0.02 VIAL (ML) INJECTION
Status: DISCONTINUED | OUTPATIENT
Start: 2023-11-05 | End: 2023-11-06 | Stop reason: HOSPADM

## 2023-11-05 RX ORDER — IBUPROFEN 600 MG/1
600 TABLET ORAL EVERY 6 HOURS
Status: DISCONTINUED | OUTPATIENT
Start: 2023-11-05 | End: 2023-11-06 | Stop reason: HOSPADM

## 2023-11-05 RX ORDER — VALSARTAN 160 MG/1
160 TABLET ORAL DAILY
Status: DISCONTINUED | OUTPATIENT
Start: 2023-11-05 | End: 2023-11-06 | Stop reason: HOSPADM

## 2023-11-05 RX ORDER — ATENOLOL 50 MG/1
50 TABLET ORAL 2 TIMES DAILY
Status: DISCONTINUED | OUTPATIENT
Start: 2023-11-05 | End: 2023-11-06 | Stop reason: HOSPADM

## 2023-11-05 RX ORDER — TAMSULOSIN HYDROCHLORIDE 0.4 MG/1
1 CAPSULE ORAL DAILY
Status: DISCONTINUED | OUTPATIENT
Start: 2023-11-05 | End: 2023-11-06 | Stop reason: HOSPADM

## 2023-11-05 RX ORDER — ONDANSETRON 4 MG/1
4 TABLET, ORALLY DISINTEGRATING ORAL
Status: COMPLETED | OUTPATIENT
Start: 2023-11-05 | End: 2023-11-05

## 2023-11-05 RX ORDER — HYDROCHLOROTHIAZIDE 12.5 MG/1
12.5 TABLET ORAL DAILY
Status: DISCONTINUED | OUTPATIENT
Start: 2023-11-06 | End: 2023-11-06 | Stop reason: HOSPADM

## 2023-11-05 RX ORDER — AMLODIPINE BESYLATE 5 MG/1
5 TABLET ORAL DAILY
Status: DISCONTINUED | OUTPATIENT
Start: 2023-11-05 | End: 2023-11-06 | Stop reason: HOSPADM

## 2023-11-05 RX ORDER — AMLODIPINE AND OLMESARTAN MEDOXOMIL 5; 40 MG/1; MG/1
1 TABLET ORAL DAILY
Status: DISCONTINUED | OUTPATIENT
Start: 2023-11-05 | End: 2023-11-05

## 2023-11-05 RX ORDER — ENOXAPARIN SODIUM 100 MG/ML
40 INJECTION SUBCUTANEOUS EVERY 24 HOURS
Status: DISCONTINUED | OUTPATIENT
Start: 2023-11-05 | End: 2023-11-06 | Stop reason: HOSPADM

## 2023-11-05 RX ORDER — DEXTROSE MONOHYDRATE, SODIUM CHLORIDE, AND POTASSIUM CHLORIDE 50; 1.49; 4.5 G/1000ML; G/1000ML; G/1000ML
INJECTION, SOLUTION INTRAVENOUS CONTINUOUS
Status: DISCONTINUED | OUTPATIENT
Start: 2023-11-05 | End: 2023-11-06 | Stop reason: HOSPADM

## 2023-11-05 RX ORDER — ATORVASTATIN CALCIUM 40 MG/1
40 TABLET, FILM COATED ORAL DAILY
Status: DISCONTINUED | OUTPATIENT
Start: 2023-11-06 | End: 2023-11-06 | Stop reason: HOSPADM

## 2023-11-05 RX ADMIN — TAMSULOSIN HYDROCHLORIDE 0.4 MG: 0.4 CAPSULE ORAL at 03:11

## 2023-11-05 RX ADMIN — DEXTROSE, SODIUM CHLORIDE, AND POTASSIUM CHLORIDE: 5; .45; .15 INJECTION INTRAVENOUS at 03:11

## 2023-11-05 RX ADMIN — ONDANSETRON 4 MG: 4 TABLET, ORALLY DISINTEGRATING ORAL at 11:11

## 2023-11-05 RX ADMIN — OXYCODONE HYDROCHLORIDE 5 MG: 5 TABLET ORAL at 04:11

## 2023-11-05 RX ADMIN — AMLODIPINE BESYLATE 5 MG: 5 TABLET ORAL at 03:11

## 2023-11-05 RX ADMIN — ENOXAPARIN SODIUM 40 MG: 40 INJECTION SUBCUTANEOUS at 04:11

## 2023-11-05 RX ADMIN — ATENOLOL 50 MG: 50 TABLET ORAL at 08:11

## 2023-11-05 RX ADMIN — IBUPROFEN 600 MG: 600 TABLET, FILM COATED ORAL at 06:11

## 2023-11-05 RX ADMIN — VALSARTAN 160 MG: 160 TABLET, FILM COATED ORAL at 03:11

## 2023-11-05 RX ADMIN — MORPHINE SULFATE 4 MG: 4 INJECTION INTRAVENOUS at 11:11

## 2023-11-05 RX ADMIN — SODIUM CHLORIDE 1000 ML: 9 INJECTION, SOLUTION INTRAVENOUS at 11:11

## 2023-11-05 RX ADMIN — IOHEXOL 100 ML: 350 INJECTION, SOLUTION INTRAVENOUS at 12:11

## 2023-11-05 RX ADMIN — ACETAMINOPHEN 650 MG: 325 TABLET ORAL at 06:11

## 2023-11-05 NOTE — CONSULTS
Subjective:       Patient ID: Giuliana Rodas is a 53 y.o. female.    Chief Complaint: Fever (Pt had cholectomy on Wednesday. Pt reports fever of 100.9 at home. Pt advised to come to the ER.)    HPI    Ms Rodas is a 52yo female hx of gastroparesis, endometriosis, migraines, htn, fibromyalgia and IBS who recently underwent laparoscopic sigmoidectomy with end to end anastomosis and ureteral catheters on 11/1/23 (path pending). She had an uneventful postoperative course with early return of bowel function and discharge on POD 2. She was doing well at home until yesterday when she had a fever of 100.9F followed by fever of 100.4F today. States that with her fibromyalgia she frequently feels feverish without fever, but this feels different to her. She reports mild increase in pain and mild nausea today. She has been tolerating a low residue diet without difficulty, no emesis, and is having bowel function. She reports 4-5 loose bowel movements per day since surgery, no blood. No respiratory complaints. No dysuria, hematuria, or difficulty urinating. She does note bruising around her incisions that has increased in the past two days.     Due to fever she presented to the ER for evaluation. Hemodynamically stable, afebrile to 98.7. Labs with no leukocytosis, creatinine at baseline, CRP 24.8. CXR normal, UA normal, blood cultures pending. CT obtained with  postoperative changes, no air or fluid around the anastomosis, no free fluid.     Last colonoscopy - 11/8/22                                - Diverticulosis in the sigmoid colon.                          - The entire examined colon is normal. Biopsied.                          - The examined portion of the ileum was normal.      Path Random colon, biopsy:   Colonic mucosa with no significant histopathologic abnormality   No significant intraepithelial lymphocytosis   No abnormal collagen formation      No family hx of CRC or IBD.      Review of patient's allergies  indicates:   Allergen Reactions    Chlorthalidone Swelling     Hypokalemia     Dicyclomine Edema             Adhesive Rash    Amitriptyline Hallucinations    Depo-provera [medroxyprogesterone] Anxiety    Prozac [fluoxetine] Anxiety    Topiramate Rash       Past Medical History:   Diagnosis Date    Bile reflux gastritis     Breast cyst     Eczema     Fibrocystic breast     Fibromyalgia     Gastroparesis     dr. harden    GERD (gastroesophageal reflux disease)     Hyperglyceridemia     Hyperlipidemia     Hypertension     IC (interstitial cystitis)     dr. labadie    Psoriasis     Tension headache        Past Surgical History:   Procedure Laterality Date    BREAST BIOPSY Right     over 10 years ago/ milk duct    CHOLECYSTECTOMY      COLONOSCOPY N/A 11/8/2022    Procedure: COLONOSCOPY;  Surgeon: Damon Mcmahon MD;  Location: [facility];  Service: Endoscopy;  Laterality: N/A;  vacc-inst tram-tb-smith or ray    CYSTOSCOPY,WITH URETERAL CATHETER INSERTION  11/1/2023    Procedure: CYSTOSCOPY,WITH URETERAL CATHETER INSERTION;  Surgeon: Gio Jeter MD;  Location: [facility] OR [address] [place] [address];  Service: Urology;;    ESOPHAGOGASTRODUODENOSCOPY N/A 3/12/2020    Procedure: EGD (ESOPHAGOGASTRODUODENOSCOPY);  Surgeon: Damon Mcmahon MD;  Location: [facility] (4TH FLR);  Service: Endoscopy;  Laterality: N/A;  use pcf scope    HEMORRHOID SURGERY      HYSTERECTOMY      KNEE SURGERY      LAPAROSCOPIC LEFT COLECTOMY N/A 11/1/2023    Procedure: COLECTOMY-LAPAROSCOPIC LEFT ERAS LOW;  Surgeon: Marcio Chino MD;  Location: [facility] OR [address] [place] [address];  Service: Colon and Rectal;  Laterality: N/A;    MOBILIZATION, SPLENIC FLEXURE, LAPAROSCOPIC  11/1/2023    Procedure: MOBILIZATION, SPLENIC FLEXURE, LAPAROSCOPIC;  Surgeon: Marcio Chino MD;  Location: [facility] OR [address] [place] [address];  Service: Colon and Rectal;;    OOPHORECTOMY      one ov removed       No current facility-administered medications for this encounter.     Current Outpatient Medications   Medication Sig  Dispense Refill    amlodipine-olmesartan (ANGEL) 5-40 mg per tablet TAKE ONE TABLET BY MOUTH EVERY DAY. 30 tablet 1    atenoloL (TENORMIN) 50 MG tablet Take 1 tablet (50 mg total) by mouth 2 (two) times a day. 180 tablet 2    atorvastatin (LIPITOR) 40 MG tablet TAKE 1 TABLET BY MOUTH EVERY DAY 90 tablet 1    BIFIDOBACTERIUM INFANTIS (ALIGN ORAL) Take 1 tablet by mouth once daily.      BIOFREEZE, MENTHOL, TOP Apply topically.      ciprofloxacin HCl (CIPRO) 500 MG tablet Take 1 tablet (500 mg total) by mouth 2 (two) times a day. Take one tablet at 9p and one tablet at 11p the night prior to the procedure. 2 tablet 0    coal tar (NEUTROGENA T-GEL) 0.5 % shampoo Apply topically nightly as needed.      fluocinonide (LIDEX) 0.05 % external solution Apply topically 2 (two) times daily as needed (rash, itching at scalp). Avoid use on face, body folds, groin/genitalia. 60 mL 3    fluticasone propionate (FLONASE) 50 mcg/actuation nasal spray SPRAY twice IN EACH NOSTRIL DAILY 48 g 2    glucosamine sulfate 500 mg Tab Take by mouth.      hydroCHLOROthiazide (HYDRODIURIL) 12.5 MG Tab TAKE 1 TABLET BY MOUTH EVERY DAY 90 tablet 2    hydrocortisone 2.5 % cream Apply topically 2 (two) times daily. For use when flaring on eyelids for up to 2 weeks then take a 1 week break or decrease to BIW PRN 28 g 2    ketoconazole (NIZORAL) 2 % cream Apply topically once daily. For use on eyelids, ears, etc daily 60 g 1    ketoconazole (NIZORAL) 2 % shampoo wash hair AT least TWO OR THREE TIMES weekly. let sit on scalp AT least 5 MINUTES prior to rinsing 120 mL 11    levocetirizine (XYZAL) 5 MG tablet Take 1 tablet (5 mg total) by mouth every evening. 90 tablet 3    LIDOcaine HCL 2% (XYLOCAINE) 2 % jelly Apply topically as needed. Apply topically once nightly to affected part of foot/feet. 30 mL 2    magnesium 30 mg Tab Take 250 mg by mouth once.       menthol/camphor (ICY HOT ADVANCED TOP) Apply topically.      methocarbamoL (ROBAXIN) 500 MG Tab  TAKE ONE TABLET BY MOUTH EVERY 8 HOURS AS NEEDED FOR HEADACHE AND MUSCLE SPASM 60 tablet 3    metroNIDAZOLE (FLAGYL) 500 MG tablet Take 1 tablet (500 mg total) by mouth As instructed (Take 1 tablet by mouth at 9pm & 11pm). 2 tablet 0    metronidazole 1% (METROGEL) 1 % Gel Apply topically 2 (two) times daily. For rosacea 60 g 2    multivit with min-folic acid 200 mcg Chew Take by mouth.      multivit-min-folic acid-biotin (HAIR,SKIN AND NAILS,FA-BIOTIN,) 66.7-1,000 mcg Tab Take by mouth.      omega-3 fatty acids/fish oil (FISH OIL-OMEGA-3 FATTY ACIDS) 300-1,000 mg capsule Take 1 capsule by mouth once daily.      ondansetron (ZOFRAN) 4 MG tablet Take 1 tablet (4 mg total) by mouth every 8 (eight) hours as needed for Nausea. 12 tablet 0    oxyCODONE (ROXICODONE) 5 MG immediate release tablet Take 1 tablet (5 mg total) by mouth every 6 (six) hours as needed for Pain. 20 tablet 0    polyethylene glycol (GLYCOLAX) 17 gram/dose powder Use cap to measure 17 grams.  Dissolve as directed and take by mouth once daily. 510 g 0    PREMARIN vaginal cream PRN      promethazine (PHENERGAN) 25 MG tablet Take 0.5 tablets (12.5 mg total) by mouth every 6 (six) hours as needed for Nausea. (Patient not taking: Reported on 8/7/2023) 10 tablet 0    propylene glycol (SYSTANE BALANCE OPHT) Apply to eye.      tamsulosin (FLOMAX) 0.4 mg Cap TAKE ONE CAPSULE BY MOUTH ONCE DAILY 30 capsule 2    trolamine salicylate (ASPERCREME) 10 % cream Apply topically as needed.      vitamin D (VITAMIN D3) 1000 units Tab Take 1,000 Units by mouth once daily.      vitamin E 400 UNIT capsule Take 400 Units by mouth once daily.         Family History   Problem Relation Age of Onset    Hypertension Mother     Migraines Mother     Breast cancer Mother     Hypertension Father     Stroke Father     Heart disease Father     Hyperlipidemia Father     Diabetes Father     Breast cancer Paternal Grandmother     Colon cancer Neg Hx     Ovarian cancer Neg Hx      Inflammatory bowel disease Neg Hx     Celiac disease Neg Hx        Social History     Socioeconomic History    Marital status:     Number of children: 2   Occupational History    Occupation:      Employer: [employer]   Tobacco Use    Smoking status: Never    Smokeless tobacco: Never   Substance and Sexual Activity    Alcohol use: No    Drug use: No    Sexual activity: Yes     Partners: Male     Birth control/protection: See Surgical Hx   Social History Narrative    Lives at home with  and daughter     Social Determinants of Health     Financial Resource Strain: Medium Risk (7/28/2023)    Overall Financial Resource Strain (CARDIA)     Difficulty of Paying Living Expenses: Somewhat hard   Food Insecurity: Food Insecurity Present (7/28/2023)    Hunger Vital Sign     Worried About Running Out of Food in the Last Year: Sometimes true     Ran Out of Food in the Last Year: Sometimes true   Transportation Needs: No Transportation Needs (7/28/2023)    PRAPARE - Transportation     Lack of Transportation (Medical): No     Lack of Transportation (Non-Medical): No   Physical Activity: Insufficiently Active (7/28/2023)    Exercise Vital Sign     Days of Exercise per Week: 4 days     Minutes of Exercise per Session: 20 min   Stress: No Stress Concern Present (7/28/2023)    [employer] [employer] - Occupational Stress Questionnaire     Feeling of Stress : Not at all   Social Connections: Unknown (7/28/2023)    Social Connection and Isolation Panel [NHANES]     Frequency of Communication with Friends and Family: More than three times a week     Frequency of Social Gatherings with Friends and Family: Once a week     Active Member of Clubs or Organizations: No     Attends Club or Organization Meetings: Patient refused     Marital Status:    Housing Stability: Unknown (7/28/2023)    Housing Stability Vital Sign     Unable to Pay for Housing in the Last Year: Patient refused     Number of  Places Lived in the Last Year: 1     Unstable Housing in the Last Year: No       Review of Systems    Objective:      Physical Exam      Physical Exam:  BP (!) 149/69   Pulse 66   Temp 98.7 °F (37.1 °C) (Oral)   Resp 16   Wt 88.5 kg (195 lb)   SpO2 95%   Breastfeeding No   BMI 35.67 kg/m²     General: Alert and oriented x 3. No acute distress. Well-nourished.  HEENT: EOMI. Sclera anicteric. Moist mucous membranes. No scleral icterus. No cervical lymphadenopathy.  Lungs: Clear to auscultation bilaterally. No accessory muscle use.  Cardiac: Regular rate and rhythm. No murmur. No JVD.  Abdomen: soft, appropriately tender, no rebound, no guarding.  Extremities: No edema. Non-tender.? Negative Peter's sign  Skin: No rashes or lesions. Warm. Supraumbilical incision intact with surrounding ecchymosis and pfannenstiel intact with surrounding ecchymosis   Neuro: No focal neurological deficits. CN II-XII grossly intact, but not individually tested.  Psych: Cooperative. Appropriate mood and affect.              Laboratory Studies:  Complete Blood Count:  Lab Results   Component Value Date/Time    WBC 5.80 11/05/2023 12:23 PM    HGB 11.7 (L) 11/05/2023 12:23 PM    HCT 33.6 (L) 11/05/2023 12:23 PM    HCT 40 07/24/2022 03:31 AM    RBC 3.90 (L) 11/05/2023 12:23 PM     (L) 11/05/2023 12:23 PM       Basic Chemistry Panel:  Lab Results   Component Value Date/Time     11/05/2023 12:57 PM    K 3.7 11/05/2023 12:57 PM     11/05/2023 12:57 PM    CO2 24 11/05/2023 12:57 PM    BUN 18 11/05/2023 12:57 PM    CREATININE 0.8 11/05/2023 12:57 PM    CALCIUM 8.6 (L) 11/05/2023 12:57 PM       Lab Results   Component Value Date/Time    CRP 24.8 (H) 11/05/2023 12:57 PM         CT personally reviewed. Anastomosis intact without surrounding air or fluid. Small volume intra-abdominal air, likely postoperative. No free fluid.     CXR personally reviewed. No acute abnormality.    Assessment:       1. Fever        Ms. Rodas is  a 53 year old woman POD 5 s/p laparoscopic sigmoidectomy for smoldering diverticulitis that presents with fever. She does not appear to have a leak on her CT scan, no signs of pneumonia or UTI as well. CRP is mildly elevated to 25. She does have bruising around her incisions without overt cellulitis. Unclear source of her fever, would favor it's related to her incision, however blood cultures also pending.     Plan:       Admit to CRS  LRD  No abx for now, monitor for fevers  Follow up blood cultures  Monitor for expanding ecchymosis around her incisions    Raimundo Hamm MD  Department of Colon & Rectal Surgery

## 2023-11-05 NOTE — ED TRIAGE NOTES
Giuliana Rodas, a 53 y.o. female presents to the ED w/ complaint of fever 100.9 at home starting yesterday, nausea. Recent colectomy 11/1. On call dr instructed pt to be seen at ER for further eval     Triage note:  Chief Complaint   Patient presents with    Fever     Pt had cholectomy on Wednesday. Pt reports fever of 100.9 at home. Pt advised to come to the ER.     Review of patient's allergies indicates:   Allergen Reactions    Chlorthalidone Swelling     Hypokalemia     Dicyclomine Edema             Adhesive Rash    Amitriptyline Hallucinations    Depo-provera [medroxyprogesterone] Anxiety    Prozac [fluoxetine] Anxiety    Topiramate Rash     Past Medical History:   Diagnosis Date    Bile reflux gastritis     Breast cyst     Eczema     Fibrocystic breast     Fibromyalgia     Gastroparesis     dr. harden    GERD (gastroesophageal reflux disease)     Hyperglyceridemia     Hyperlipidemia     Hypertension     IC (interstitial cystitis)     dr. labadie    Psoriasis     Tension headache

## 2023-11-05 NOTE — ED PROVIDER NOTES
Encounter Date: 11/5/2023       History     Chief Complaint   Patient presents with    Fever     Pt had cholectomy on Wednesday. Pt reports fever of 100.9 at home. Pt advised to come to the ER.     Pt is a 53 year old female with hx of smoldering diverticulitis s/p sigmoidectomy 11/1/23 who presents for evaluation of fever(tmax 100.9), which began 1 day ago. Associated abdominal pain and diarrhea. Pt states fever has been well controlled with tylenol. She denies nausea, vomiting, chest pain, shortness of breath, numbness, or weakness.     The history is provided by the patient.     Review of patient's allergies indicates:   Allergen Reactions    Chlorthalidone Swelling     Hypokalemia     Dicyclomine Edema             Adhesive Rash    Amitriptyline Hallucinations    Depo-provera [medroxyprogesterone] Anxiety    Prozac [fluoxetine] Anxiety    Topiramate Rash     Past Medical History:   Diagnosis Date    Bile reflux gastritis     Breast cyst     Eczema     Fibrocystic breast     Fibromyalgia     Gastroparesis     dr. harden    GERD (gastroesophageal reflux disease)     Hyperglyceridemia     Hyperlipidemia     Hypertension     IC (interstitial cystitis)     dr. labadie    Psoriasis     Tension headache      Past Surgical History:   Procedure Laterality Date    BREAST BIOPSY Right     over 10 years ago/ milk duct    CHOLECYSTECTOMY      COLONOSCOPY N/A 11/8/2022    Procedure: COLONOSCOPY;  Surgeon: Damon Mcmahon MD;  Location: [facility];  Service: Endoscopy;  Laterality: N/A;  vacc-inst tram-tb-smith or ray    CYSTOSCOPY,WITH URETERAL CATHETER INSERTION  11/1/2023    Procedure: CYSTOSCOPY,WITH URETERAL CATHETER INSERTION;  Surgeon: Gio Jeter MD;  [facility];  Service: Urology;;    ESOPHAGOGASTRODUODENOSCOPY N/A 3/12/2020    Procedure: EGD (ESOPHAGOGASTRODUODENOSCOPY);  Surgeon: Damon Mcmahon MD;  Location: [facility] (4TH FLR);  Service: Endoscopy;  Laterality: N/A;  use pcf scope    HEMORRHOID  SURGERY      HYSTERECTOMY      KNEE SURGERY      LAPAROSCOPIC LEFT COLECTOMY N/A 11/1/2023    Procedure: COLECTOMY-LAPAROSCOPIC LEFT ERAS LOW;  Surgeon: Marcio Chino MD;  Location: [facility] OR 2ND FLR;  Service: Colon and Rectal;  Laterality: N/A;    MOBILIZATION, SPLENIC FLEXURE, LAPAROSCOPIC  11/1/2023    Procedure: MOBILIZATION, SPLENIC FLEXURE, LAPAROSCOPIC;  Surgeon: Marcio Chino MD;  Location: [facility] OR 2ND FLR;  Service: Colon and Rectal;;    OOPHORECTOMY      one ov removed     Family History   Problem Relation Age of Onset    Hypertension Mother     Migraines Mother     Breast cancer Mother     Hypertension Father     Stroke Father     Heart disease Father     Hyperlipidemia Father     Diabetes Father     Breast cancer Paternal Grandmother     Colon cancer Neg Hx     Ovarian cancer Neg Hx     Inflammatory bowel disease Neg Hx     Celiac disease Neg Hx      Social History     Tobacco Use    Smoking status: Never    Smokeless tobacco: Never   Substance Use Topics    Alcohol use: No    Drug use: No     Review of Systems    Physical Exam     Initial Vitals [11/05/23 1035]   BP Pulse Resp Temp SpO2   (!) 168/82 68 20 98.7 °F (37.1 °C) 98 %      MAP       --         Physical Exam    Nursing note and vitals reviewed.  Constitutional: She appears well-developed and well-nourished. No distress.   HENT:   Head: Normocephalic and atraumatic.   Mouth/Throat: Oropharynx is clear and moist.   Eyes: EOM are normal. Pupils are equal, round, and reactive to light.   Neck: Neck supple.   Normal range of motion.  Cardiovascular:  Normal rate, regular rhythm and intact distal pulses.           No murmur heard.  Pulmonary/Chest: Breath sounds normal. No respiratory distress. She has no wheezes.   Abdominal: Abdomen is soft. There is abdominal tenderness (diffuse).   Abdominal incision clean dry and intact with surrounding mild ecchymosis    Musculoskeletal:         General: No edema. Normal range of motion.      Cervical back:  Normal range of motion and neck supple.     Neurological: She is alert and oriented to person, place, and time.   Skin: Skin is warm and dry. Capillary refill takes less than 2 seconds.         ED Course   Procedures  Labs Reviewed   URINALYSIS, REFLEX TO URINE CULTURE - Abnormal; Notable for the following components:       Result Value    Occult Blood UA 3+ (*)     All other components within normal limits    Narrative:     Specimen Source->Urine   CBC W/ AUTO DIFFERENTIAL - Abnormal; Notable for the following components:    RBC 3.90 (*)     Hemoglobin 11.7 (*)     Hematocrit 33.6 (*)     Platelets 123 (*)     All other components within normal limits   URINALYSIS MICROSCOPIC - Abnormal; Notable for the following components:    RBC, UA 8 (*)     All other components within normal limits    Narrative:     Specimen Source->Urine   COMPREHENSIVE METABOLIC PANEL - Abnormal; Notable for the following components:    Calcium 8.6 (*)     Total Protein 5.9 (*)     Albumin 2.9 (*)     All other components within normal limits   INFLUENZA A & B BY MOLECULAR   LIPASE   SARS-COV-2 RNA AMPLIFICATION, QUAL   C-REACTIVE PROTEIN   C-REACTIVE PROTEIN          Imaging Results              CT Abdomen Pelvis With IV Contrast (Final result)  Result time 11/05/23 13:03:52      Final result by Dior De MD (11/05/23 13:03:52)                   Impression:      Status post sigmoidectomy.  The suture lines appear intact.  Subtle adjacent/regional postop fat stranding, few remaining tiny air bubbles within the abdomen and subcutaneous soft tissue of the anterior abdomen thought to be normal postop findings.  No intra-abdominal or intrapelvic abscess seen.    Mildly prominent biliary ducts unchanged from prior several studies, no definite obstructive process seen other than a stable appearing 2nd duodenal diverticulum, to correlate with liver enzymes, this can be seen after cholecystectomy.  The gallbladder is absent.    Stable  splenic small lesions.    Stable renal lesions.    Hysterectomy.    Mild cardiomegaly      Electronically signed by: Dior De MD  Date:    11/05/2023  Time:    13:03               Narrative:    EXAMINATION:  CT ABDOMEN PELVIS WITH IV CONTRAST    CLINICAL HISTORY:  Abdominal pain, acute, nonlocalized;    TECHNIQUE:  Low dose axial images, sagittal and coronal reformations were obtained from the lung bases to the pubic symphysis following the IV administration of 100 mL of Omnipaque 350 .  Oral contrast was not given. Axial and coronal images reformatted.    COMPARISON:  07/31/2023 CT abdomen and pelvis    FINDINGS:  The lung bases are clear.    The cardiac silhouette is mildly enlarged.  No pericardial effusion.    The liver is normal in size, no liver lesions.  The gallbladder is removed.  Slight prominence of the intrahepatic biliary ducts especially in the left hepatic lobe.  The common bile duct measures 11 mm which can be after cholecystectomy.  There is no obstructive stone or choledocholithiasis identified.  There is a 2nd duodenum air and fluid filled diverticulum.    The stomach, pancreas and pancreatic duct appear normal.    Small hypoattenuating lesions of the spleen are indeterminate, unchanged from 11/22/2021, possibly benign hemangiomas.    The abdominal aorta tapers normally, there is mild atherosclerotic plaque, no para-aortic lymphadenopathy.    The bilateral adrenal glands appear normal.    Bilateral kidneys demonstrate hypoattenuating lesions, the largest ones represent cysts, the smaller ones are too small to be characterized, they are unchanged from the prior study.  There is no hydronephrosis or hydroureter.  Extrarenal pelvis bilaterally.  No definite forming stones identified.    Postsurgical changes of prior sigmoidectomy.  There is a suture at the recto-anal level and at the colorectal level.  There is mild adjacent soft tissue inflammatory changes adjacent to the distal bowel  segment.  Few air tiny scattered air bubbles within the presacral region, anterior abdomen and subcutaneous soft tissues of the abdomen, likely normal postop findings.  No intra-abdominal or intrapelvic abscess seen.  No significant stool retention.  The small bowel is not dilated.  The appendix demonstrate high density material within possibly retained contrast, it measures 9 mm diameter, there is no adjacent inflammatory change.    Small amount of air noted in the bladder which could be due to instrumentation or cystitis, to correlate clinically.    The uterus is removed.  The ovaries are not definitely identified, they may be small or may have been removed.    No abdominal wall hernia.  The inguinal regions appear normal.    The osseous structures demonstrate no destructive osseous lesions.                                       Medications   sodium chloride 0.9% bolus 1,000 mL 1,000 mL (0 mLs Intravenous Stopped 11/5/23 1343)   morphine injection 4 mg (4 mg Intravenous Given 11/5/23 1136)   ondansetron disintegrating tablet 4 mg (4 mg Oral Given 11/5/23 1124)   iohexoL (OMNIPAQUE 350) injection 100 mL (100 mLs Intravenous Given 11/5/23 1239)     Medical Decision Making  Pt presents for evaluation of fever. Labs without leukocytosis, anemia, or electrolyte abnormality. CXR without acute process. Covid and flu negative. CT abdomen without acute process. Colorectal surgery consulted and case discussed. Plan to admit for further management and evaluation     Amount and/or Complexity of Data Reviewed  Labs: ordered.  Radiology: ordered.    Risk  Prescription drug management.              Attending Attestation:   Physician Attestation Statement for Resident:  As the supervising MD   Physician Attestation Statement: I have personally seen and examined this patient.   I agree with the above history.  -:   As the supervising MD I agree with the above PE.     As the supervising MD I agree with the above treatment, course,  plan, and disposition.   -: 52 yo F s/p lap sigmoidectomy with Dr. Chino on 11/1/23 presents with fever    No HA/cp/sob/cough/URI/urinary symptoms/LE edema or pain  abdominal discomfort after surgery, not getting worse  Diarrhea: approx 2 episodes of watery stool/day, chronic for the patient, non bloody  Not on antibiotics    Comfortable  Ctab  Rrr  Abd with ecchymosis at the surgical sites , no discharge, diffuse mild ttp  No cvat  LE no calf ttp, no erythema    I have reviewed the following: old records at this facility.                                Clinical Impression:   Final diagnoses:  [R50.9] Fever               Preet Nielson Jr., MD  Resident  11/05/23 1651       Marce Aguila MD  11/05/23 9244

## 2023-11-05 NOTE — PLAN OF CARE
[facility] Emergency Dept  Initial Discharge Assessment       Primary Care Provider: Giuliana Rojas MD    Admission Diagnosis: Fever [R50.9]    Admission Date: 11/5/2023  Expected Discharge Date:     Pt stated she is independent with her ADL's and ambulation and does not require assistance or equipment    Pt to d/c home with no needs when ready    Transition of Care Barriers: (P) None    Payor: [employer]ARE / Plan: [employer] 65 / Product Type: Medicare Advantage /     Extended Emergency Contact Information  Primary Emergency Contact: Riaz Rodas  Address: [address] [place] [address]           SHAYY DAVIS [address] [facility]  Home Phone: [phone]  Mobile Phone: [phone]  Relation: Spouse  Secondary Emergency Contact: Tali Rodas   [facility]  Home Phone: [phone]  Mobile Phone: [phone]  Relation: Relative  Mother: TOSHA Trinidad   [facility]  Home Phone: [phone]  Mobile Phone: [phone]    Discharge Plan A: (P) Home  Discharge Plan B: (P) Home      [facility] - [place], [place] - [address]  [address]  [place] [address]  Phone: [phone] Fax: [phone]    [id number] - [place] - [address]  [address] [place] [place] [address]  Phone: [phone] Fax: [phone]      Initial Assessment (most recent)       Adult Discharge Assessment - 11/05/23 1623          Discharge Assessment    Assessment Type Discharge Planning Assessment (P)      Confirmed/corrected address, phone number and insurance Yes (P)      Confirmed Demographics Correct on Facesheet (P)      Source of Information patient (P)      Reason For Admission Postoperative fever (P)      People in Home spouse (P)      Facility Arrived From: home (P)      Do you expect to return to your current living situation? Yes (P)      Do you have help at home or someone to help you manage your care at home? No (P)      Prior  to hospitilization cognitive status: Alert/Oriented;No Deficits (P)      Current cognitive status: Alert/Oriented;No Deficits (P)      Home Accessibility not wheelchair accessible;stairs to enter home (P)      Number of Stairs, Main Entrance one (P)      Home Layout Able to live on 1st floor (P)      Equipment Currently Used at Home none (P)      Patient currently being followed by outpatient case management? No (P)      Do you currently have service(s) that help you manage your care at home? No (P)      Do you take prescription medications? Yes (P)      Do you have any problems affording any of your prescribed medications? No (P)      Is the patient taking medications as prescribed? yes (P)      Who is going to help you get home at discharge? family/friends (P)      How do you get to doctors appointments? car, drives self (P)      Are you on dialysis? No (P)      Do you take coumadin? No (P)      DME Needed Upon Discharge  none (P)      Discharge Plan discussed with: Patient (P)      Transition of Care Barriers None (P)      Discharge Plan A Home (P)      Discharge Plan B Home (P)         Physical Activity    On average, how many days per week do you engage in moderate to strenuous exercise (like a brisk walk)? 2 days (P)      On average, how many minutes do you engage in exercise at this level? 20 min (P)         Financial Resource Strain    How hard is it for you to pay for the very basics like food, housing, medical care, and heating? Not very hard (P)         Housing Stability    In the last 12 months, was there a time when you were not able to pay the mortgage or rent on time? No (P)      In the last 12 months, was there a time when you did not have a steady place to sleep or slept in a shelter (including now)? No (P)         Transportation Needs    In the past 12 months, has lack of transportation kept you from medical appointments or from getting medications? No (P)      In the past 12 months, has lack of  transportation kept you from meetings, work, or from getting things needed for daily living? No (P)         Food Insecurity    Within the past 12 months, you worried that your food would run out before you got the money to buy more. Never true (P)      Within the past 12 months, the food you bought just didn't last and you didn't have money to get more. Never true (P)         Stress    Do you feel stress - tense, restless, nervous, or anxious, or unable to sleep at night because your mind is troubled all the time - these days? Only a little (P)         Social Connections    In a typical week, how many times do you talk on the phone with family, friends, or neighbors? More than three times a week (P)      How often do you get together with friends or relatives? More than three times a week (P)      How often do you attend Pentecostalism or Jew services? Never (P)      Do you belong to any clubs or organizations such as Pentecostalism groups, unions, fraternal or athletic groups, or school groups? No (P)      How often do you attend meetings of the clubs or organizations you belong to? Never (P)      Are you , , , , never , or living with a partner?  (P)         Alcohol Use    Q1: How often do you have a drink containing alcohol? Never (P)      Q2: How many drinks containing alcohol do you have on a typical day when you are drinking? Patient does not drink (P)      Q3: How often do you have six or more drinks on one occasion? Never (P)         OTHER    Name(s) of People in Home spouse Riaz (P)                       Venus Driscoll CD, MSW, LMSW, RSW   Case Management  [facility]  Email: [email or web address]

## 2023-11-06 VITALS
TEMPERATURE: 99 F | WEIGHT: 202.81 LBS | BODY MASS INDEX: 37.32 KG/M2 | HEIGHT: 62 IN | DIASTOLIC BLOOD PRESSURE: 73 MMHG | RESPIRATION RATE: 16 BRPM | SYSTOLIC BLOOD PRESSURE: 149 MMHG | OXYGEN SATURATION: 96 % | HEART RATE: 69 BPM

## 2023-11-06 LAB
ANION GAP SERPL CALC-SCNC: 11 MMOL/L (ref 8–16)
BASOPHILS # BLD AUTO: 0.02 K/UL (ref 0–0.2)
BASOPHILS NFR BLD: 0.4 % (ref 0–1.9)
BUN SERPL-MCNC: 15 MG/DL (ref 6–20)
CALCIUM SERPL-MCNC: 9.1 MG/DL (ref 8.7–10.5)
CHLORIDE SERPL-SCNC: 108 MMOL/L (ref 95–110)
CO2 SERPL-SCNC: 23 MMOL/L (ref 23–29)
CREAT SERPL-MCNC: 0.8 MG/DL (ref 0.5–1.4)
CRP SERPL-MCNC: 26 MG/L (ref 0–8.2)
DIFFERENTIAL METHOD: ABNORMAL
EOSINOPHIL # BLD AUTO: 0.2 K/UL (ref 0–0.5)
EOSINOPHIL NFR BLD: 4.5 % (ref 0–8)
ERYTHROCYTE [DISTWIDTH] IN BLOOD BY AUTOMATED COUNT: 12.5 % (ref 11.5–14.5)
EST. GFR  (NO RACE VARIABLE): >60 ML/MIN/1.73 M^2
GLUCOSE SERPL-MCNC: 99 MG/DL (ref 70–110)
HCT VFR BLD AUTO: 32.9 % (ref 37–48.5)
HGB BLD-MCNC: 11.2 G/DL (ref 12–16)
IMM GRANULOCYTES # BLD AUTO: 0.03 K/UL (ref 0–0.04)
IMM GRANULOCYTES NFR BLD AUTO: 0.6 % (ref 0–0.5)
LYMPHOCYTES # BLD AUTO: 1.2 K/UL (ref 1–4.8)
LYMPHOCYTES NFR BLD: 25.4 % (ref 18–48)
MCH RBC QN AUTO: 29.2 PG (ref 27–31)
MCHC RBC AUTO-ENTMCNC: 34 G/DL (ref 32–36)
MCV RBC AUTO: 86 FL (ref 82–98)
MONOCYTES # BLD AUTO: 0.4 K/UL (ref 0.3–1)
MONOCYTES NFR BLD: 9.4 % (ref 4–15)
NEUTROPHILS # BLD AUTO: 2.8 K/UL (ref 1.8–7.7)
NEUTROPHILS NFR BLD: 59.7 % (ref 38–73)
NRBC BLD-RTO: 0 /100 WBC
PLATELET # BLD AUTO: 110 K/UL (ref 150–450)
PMV BLD AUTO: 9.7 FL (ref 9.2–12.9)
POTASSIUM SERPL-SCNC: 3.7 MMOL/L (ref 3.5–5.1)
RBC # BLD AUTO: 3.84 M/UL (ref 4–5.4)
SODIUM SERPL-SCNC: 142 MMOL/L (ref 136–145)
WBC # BLD AUTO: 4.68 K/UL (ref 3.9–12.7)

## 2023-11-06 PROCEDURE — 86140 C-REACTIVE PROTEIN: CPT | Performed by: SURGERY

## 2023-11-06 PROCEDURE — G0378 HOSPITAL OBSERVATION PER HR: HCPCS

## 2023-11-06 PROCEDURE — 25000003 PHARM REV CODE 250: Performed by: COLON & RECTAL SURGERY

## 2023-11-06 PROCEDURE — 80048 BASIC METABOLIC PNL TOTAL CA: CPT | Performed by: SURGERY

## 2023-11-06 PROCEDURE — 85025 COMPLETE CBC W/AUTO DIFF WBC: CPT | Performed by: SURGERY

## 2023-11-06 PROCEDURE — 25000003 PHARM REV CODE 250: Performed by: SURGERY

## 2023-11-06 PROCEDURE — 36415 COLL VENOUS BLD VENIPUNCTURE: CPT | Performed by: SURGERY

## 2023-11-06 RX ORDER — OXYCODONE HYDROCHLORIDE 5 MG/1
5 TABLET ORAL EVERY 4 HOURS PRN
Qty: 20 TABLET | Refills: 0 | Status: SHIPPED | OUTPATIENT
Start: 2023-11-06 | End: 2024-01-04

## 2023-11-06 RX ADMIN — AMLODIPINE BESYLATE 5 MG: 5 TABLET ORAL at 09:11

## 2023-11-06 RX ADMIN — ATENOLOL 50 MG: 50 TABLET ORAL at 09:11

## 2023-11-06 RX ADMIN — HYDROCHLOROTHIAZIDE 12.5 MG: 12.5 TABLET ORAL at 10:11

## 2023-11-06 RX ADMIN — OXYCODONE HYDROCHLORIDE 5 MG: 5 TABLET ORAL at 10:11

## 2023-11-06 RX ADMIN — IBUPROFEN 600 MG: 600 TABLET, FILM COATED ORAL at 12:11

## 2023-11-06 RX ADMIN — ACETAMINOPHEN 650 MG: 325 TABLET ORAL at 12:11

## 2023-11-06 RX ADMIN — VALSARTAN 160 MG: 160 TABLET, FILM COATED ORAL at 09:11

## 2023-11-06 RX ADMIN — ATORVASTATIN CALCIUM 40 MG: 40 TABLET, FILM COATED ORAL at 09:11

## 2023-11-06 RX ADMIN — METHOCARBAMOL 500 MG: 500 TABLET ORAL at 01:11

## 2023-11-06 RX ADMIN — TAMSULOSIN HYDROCHLORIDE 0.4 MG: 0.4 CAPSULE ORAL at 09:11

## 2023-11-06 NOTE — HOSPITAL COURSE
Ms Rodas is a 52yo female hx of gastroparesis, endometriosis, migraines, htn, fibromyalgia and IBS who recently underwent laparoscopic sigmoidectomy with end to end anastomosis and ureteral catheters on 11/1/23 (path pending). She had an uneventful postoperative course with early return of bowel function and discharge on POD 2. She was doing well at home until yesterday when she had a fever of 100.9F followed by fever of 100.4F today. States that with her fibromyalgia she frequently feels feverish without fever, but this feels different to her. She reports mild increase in pain and mild nausea today. She has been tolerating a low residue diet without difficulty, no emesis, and is having bowel function. She reports 4-5 loose bowel movements per day since surgery, no blood. No respiratory complaints. No dysuria, hematuria, or difficulty urinating. She does note bruising around her incisions that has increased in the past two days.      Due to fever she presented to the ER for evaluation. Hemodynamically stable, afebrile to 98.7. Labs with no leukocytosis, creatinine at baseline, CRP 24.8. CXR normal, UA normal, blood cultures pending. CT obtained with  postoperative changes, no air or fluid around the anastomosis, no free fluid.   No fever overnight, feeling well, no abd pain, pos for bm.  On day of discharge pt is magaly regular diet, inc line healing well, positive for bm with flatus, ambulating in [place]oks without difficulty, adequate pain control with oral medication, VS stable and afebrile.     Keep remaing fu appt

## 2023-11-06 NOTE — NURSING
Nurses Note -- 4 Eyes      11/6/2023   7:42 AM      Skin assessed during: Admit      [x] No Altered Skin Integrity Present    []Prevention Measures Documented      [] Yes- Altered Skin Integrity Present or Discovered   [] LDA Added if Not in Epic (Describe Wound)   [] New Altered Skin Integrity was Present on Admit and Documented in LDA   [] Wound Image Taken    Wound Care Consulted? No    Attending Nurse:  Sonya Fabian RN/Staff Member:   Nicole Hutton RN

## 2023-11-06 NOTE — PLAN OF CARE
[facility]  Discharge Final Note    Primary Care Provider: Giuliana Rojas MD    Expected Discharge Date: 11/6/2023    Final Discharge Note (most recent)       Final Note - 11/06/23 1149          Final Note    Assessment Type Final Discharge Note     Anticipated Discharge Disposition Home or Self Care     Hospital Resources/Appts/Education Provided Provided patient/caregiver with written discharge plan information        Post-Acute Status    Discharge Delays None known at this time                     Important Message from Medicare             Contact Info       Marcio Chino MD   Specialty: Colon and Rectal Surgery    [facility] [address]   Phone: [phone]       Next Steps: Follow up in 2 week(s)          Pt d/c home with family. No d/c needs reported by medical team at this time.     Lorraine Mcgee LCSW  Case Management/[facility]  [phone]

## 2023-11-06 NOTE — DISCHARGE SUMMARY
[facility]  Colorectal Surgery  Discharge Summary      Patient Name: Giuliana Rodas  MRN: [id number]  Admission Date: 11/5/2023  Hospital Length of Stay: 0 days  Discharge Date and Time: 11/6/2023  Attending Physician: Marcio Chino MD   Discharging Provider: Lashonda Mario NP  Primary Care Provider: Giuliana Rojas MD     HPI:  No notes on file    * No surgery found *     Hospital Course:  Ms Rodas is a 54yo female hx of gastroparesis, endometriosis, migraines, htn, fibromyalgia and IBS who recently underwent laparoscopic sigmoidectomy with end to end anastomosis and ureteral catheters on 11/1/23 (path pending). She had an uneventful postoperative course with early return of bowel function and discharge on POD 2. She was doing well at home until yesterday when she had a fever of 100.9F followed by fever of 100.4F today. States that with her fibromyalgia she frequently feels feverish without fever, but this feels different to her. She reports mild increase in pain and mild nausea today. She has been tolerating a low residue diet without difficulty, no emesis, and is having bowel function. She reports 4-5 loose bowel movements per day since surgery, no blood. No respiratory complaints. No dysuria, hematuria, or difficulty urinating. She does note bruising around her incisions that has increased in the past two days.      Due to fever she presented to the ER for evaluation. Hemodynamically stable, afebrile to 98.7. Labs with no leukocytosis, creatinine at baseline, CRP 24.8. CXR normal, UA normal, blood cultures pending. CT obtained with  postoperative changes, no air or fluid around the anastomosis, no free fluid.   No fever overnight, feeling well, no abd pain, pos for bm.  On day of discharge pt is magaly regular diet, inc line healing well, positive for bm with flatus, ambulating in [place]oks without difficulty, adequate pain control with oral medication, VS stable and afebrile.     Keep remaing fu appt        Goals of Care Treatment Preferences:  Code Status: Full Code      Consults (From admission, onward)        Status Ordering Provider     Inpatient consult to Colorectal Surgery  Once        Provider:  (Not yet assigned)    Completed OH CALLE JR.          Significant Diagnostic Studies: Labs:   BMP:   Recent Labs   Lab 11/05/23  1257 11/06/23  0624   GLU 94 99    142   K 3.7 3.7    108   CO2 24 23   BUN 18 15   CREATININE 0.8 0.8   CALCIUM 8.6* 9.1    and CBC   Recent Labs   Lab 11/05/23  1223 11/06/23  0624   WBC 5.80 4.68   HGB 11.7* 11.2*   HCT 33.6* 32.9*   * 110*       Pending Diagnostic Studies:     None        Final Active Diagnoses:    Diagnosis Date Noted POA    PRINCIPAL PROBLEM:  Postoperative fever [R50.82] 11/05/2023 Yes    Thrombocytopenia [D69.6] 07/26/2022 Yes      Problems Resolved During this Admission:      Discharged Condition: good    Disposition: Home or Self Care    Follow Up:   Follow-up Information     Marcio Chino MD Follow up in 2 week(s).    Specialty: Colon and Rectal Surgery  Contact information:  [address] [place] [id number]  [phone]                       Patient Instructions:      Diet Adult Regular   Order Comments: Low fiber, no fresh fruit or fresh vegetables, no nuts, grapes, popcorn or raisins     Lifting restrictions   Order Comments: No lifting anything greater than 5 pounds     No dressing needed   Order Comments: May shower, no tub bath     Notify your health care provider if you experience any of the following:  temperature >100.4     Notify your health care provider if you experience any of the following:  persistent nausea and vomiting or diarrhea     Notify your health care provider if you experience any of the following:  severe uncontrolled pain     Notify your health care provider if you experience any of the following:  redness, tenderness, or signs of infection (pain, swelling, redness, odor or green/yellow discharge  around incision site)     Notify your health care provider if you experience any of the following:  difficulty breathing or increased cough     Notify your health care provider if you experience any of the following:  severe persistent headache     Notify your health care provider if you experience any of the following:  worsening rash     Notify your health care provider if you experience any of the following:  persistent dizziness, light-headedness, or visual disturbances     Notify your health care provider if you experience any of the following:  increased confusion or weakness     Medications:  Reconciled Home Medications:      Medication List      CHANGE how you take these medications    metronidazole 1% 1 % Gel  Commonly known as: METROGEL  Apply topically 2 (two) times daily. For rosacea  What changed: Another medication with the same name was removed. Continue taking this medication, and follow the directions you see here.     oxyCODONE 5 MG immediate release tablet  Commonly known as: ROXICODONE  Take 1 tablet (5 mg total) by mouth every 4 (four) hours as needed for Pain.  What changed: when to take this        CONTINUE taking these medications    ALIGN ORAL  Take 1 tablet by mouth once daily.     amlodipine-olmesartan 5-40 mg per tablet  Commonly known as: ANGEL  TAKE ONE TABLET BY MOUTH EVERY DAY.     atenoloL 50 MG tablet  Commonly known as: TENORMIN  Take 1 tablet (50 mg total) by mouth 2 (two) times a day.     atorvastatin 40 MG tablet  Commonly known as: LIPITOR  TAKE 1 TABLET BY MOUTH EVERY DAY     BIOFREEZE (MENTHOL) TOP  Apply topically.     coal tar 0.5 % shampoo  Commonly known as: NEUTROGENA T-GEL  Apply topically nightly as needed.     fish oil-omega-3 fatty acids 300-1,000 mg capsule  Take 1 capsule by mouth once daily.     fluocinonide 0.05 % external solution  Commonly known as: LIDEX  Apply topically 2 (two) times daily as needed (rash, itching at scalp). Avoid use on face, body folds,  groin/genitalia.     fluticasone propionate 50 mcg/actuation nasal spray  Commonly known as: FLONASE  SPRAY twice IN EACH NOSTRIL DAILY     glucosamine sulfate 500 mg Tab  Take by mouth.     HAIR,SKIN AND NAILS(FA-BIOTIN) 66.7-1,000 mcg Tab  Generic drug: multivit-min-folic acid-biotin  Take by mouth.     hydroCHLOROthiazide 12.5 MG Tab  Commonly known as: HYDRODIURIL  TAKE 1 TABLET BY MOUTH EVERY DAY     hydrocortisone 2.5 % cream  Apply topically 2 (two) times daily. For use when flaring on eyelids for up to 2 weeks then take a 1 week break or decrease to BIW PRN     ICY HOT ADVANCED TOP  Apply topically.     * ketoconazole 2 % cream  Commonly known as: NIZORAL  Apply topically once daily. For use on eyelids, ears, etc daily     * ketoconazole 2 % shampoo  Commonly known as: NIZORAL  wash hair AT least TWO OR THREE TIMES weekly. let sit on scalp AT least 5 MINUTES prior to rinsing     levocetirizine 5 MG tablet  Commonly known as: XYZAL  Take 1 tablet (5 mg total) by mouth every evening.     LIDOcaine HCL 2% 2 % jelly  Commonly known as: XYLOCAINE  Apply topically as needed. Apply topically once nightly to affected part of foot/feet.     magnesium 30 mg Tab  Take 250 mg by mouth once.     methocarbamoL 500 MG Tab  Commonly known as: ROBAXIN  TAKE ONE TABLET BY MOUTH EVERY 8 HOURS AS NEEDED FOR HEADACHE AND MUSCLE SPASM     multivit with min-folic acid 200 mcg Chew  Take by mouth.     ondansetron 4 MG tablet  Commonly known as: ZOFRAN  Take 1 tablet (4 mg total) by mouth every 8 (eight) hours as needed for Nausea.     polyethylene glycol 17 gram/dose powder  Commonly known as: GLYCOLAX  Use cap to measure 17 grams.  Dissolve as directed and take by mouth once daily.     PREMARIN vaginal cream  Generic drug: conjugated estrogens  PRN     promethazine 25 MG tablet  Commonly known as: PHENERGAN  Take 0.5 tablets (12.5 mg total) by mouth every 6 (six) hours as needed for Nausea.     SYSTANE BALANCE OPHT  Apply to  eye.     tamsulosin 0.4 mg Cap  Commonly known as: FLOMAX  TAKE ONE CAPSULE BY MOUTH ONCE DAILY     trolamine salicylate 10 % cream  Commonly known as: ASPERCREME  Apply topically as needed.     vitamin D 1000 units Tab  Commonly known as: VITAMIN D3  Take 1,000 Units by mouth once daily.     vitamin E 400 UNIT capsule  Take 400 Units by mouth once daily.         * This list has 2 medication(s) that are the same as other medications prescribed for you. Read the directions carefully, and ask your doctor or other care provider to review them with you.            STOP taking these medications    ciprofloxacin HCl 500 MG tablet  Commonly known as: ALICJA Mario NP  Colorectal Surgery  [facility]

## 2023-11-06 NOTE — NURSING
Reviewed discharge instructions with patient and removed IV. Provided a wheelchair for  to take her home. Patient was A&O at time of dc and VS WDL.

## 2023-11-06 NOTE — PLAN OF CARE
[facility] Plan of Care Note  Dx: post-op fever    Shift Events: independent ambulation to RR; awaiting stool sample for CDIF testing; maintenance IVF; PRN robaxin x1 for migraine    Goals of Care: monitor incisions and for s/s of infection; pain management; nausea control     Neuro: ANOx4    Vital Signs: Temp: 98.4 °F (36.9 °C) (11/06 0542)  Pulse: 57 (11/06 0542)  Resp: 18 (11/06 0542)  BP: 141/74 (11/06 0542)  SpO2: 95 % (11/06 0542)    Respiratory: RA    Diet: low fiber/low residue    Is patient tolerating current diet? Yes    GTTS: D5NS0.5+20mEq K+ @ 40    Urine Output/Bowel Movement: 2 smear-like BMs; clear yellow UOP per hat/toilet    Drains/Tubes/Tube Feeds (include total output/shift): N/A    Lines: R AC 20g    Accuchecks: N/A    Skin: transverse pelvis + DB; laps x2 + DB with bruising - marked, non-spreading    Fall Risk Score: 9    Activity level? Independent     Any scheduled procedures? N/A    Any safety concerns? N/A    Other: patient denies nausea overnight, BM frequency decreased to smears at this time, pain controlled with meds per MAR      Problem: Fall Injury Risk  Goal: Absence of Fall and Fall-Related Injury  Outcome: Ongoing, Progressing     Problem: Infection  Goal: Absence of Infection Signs and Symptoms  Outcome: Ongoing, Progressing     Problem: Adult Inpatient Plan of Care  Goal: Optimal Comfort and Wellbeing  Outcome: Ongoing, Progressing     Problem: Adult Inpatient Plan of Care  Goal: Absence of Hospital-Acquired Illness or Injury  Outcome: Ongoing, Progressing

## 2023-11-06 NOTE — PLAN OF CARE
CHW met with patient/family at bedside. Patient experience rounding completed and reviewed the following.     Do you know your discharge plan? Yes or No,    If yes, what is the plan? (Home, Home Health, Rehab, SNF, LTAC, or Other)     Home    Have you discussed your needs and preferences with your SW/CM? Yes or No       Yes    If you are discharging home, do you have help at home? Yes or No          Yes    Do you think you will need help additional at home at discharge? Yes or No      No    Do you currently have difficulty keeping up with bills, affording medicine or buying food? Yes or No          No    Assigned SW/CM notified of any patient/family needs or concerns. Appropriate resources provided to address patient's needs.     Lorraine Schmidt CHW  Case management   [phone]

## 2023-11-06 NOTE — PLAN OF CARE
I have reviewed the chart of Giuliana Rodas and collaborated with Marcio Chino MD in the care of the patient who is hospitalized for the following:    Active Hospital Problems    Diagnosis    Postoperative fever    Thrombocytopenia          I have reviewed the Giuliana Rodas with the multidisciplinary team during discharge huddle.       Ragini Campos PA-C  [facility]

## 2023-11-07 LAB
FINAL PATHOLOGIC DIAGNOSIS: NORMAL
Lab: NORMAL

## 2023-11-08 ENCOUNTER — PATIENT OUTREACH (OUTPATIENT)
Dept: ADMINISTRATIVE | Facility: CLINIC | Age: 53
End: 2023-11-08
Payer: MEDICARE

## 2023-11-08 NOTE — PROGRESS NOTES
C3 nurse spoke with Giuliana Rodas for a TCC post hospital discharge follow up call. The patient denies any new symptoms. She was not aware she had a med available at the pharmacy, and is going to try picking that up today. Patient Advocacy Number provided to the pt in response to complaints about not being issued a wheelchair upon discharge and not gotten out of bed while in the hospital. C3 nurse scheduled a HOSFU with the pts PCP, Giuliana Rojas MD on 11/14/23 at 1000 and the pt verbalized understanding. No messages routed at this time.

## 2023-11-10 LAB
BACTERIA BLD CULT: NORMAL
BACTERIA BLD CULT: NORMAL

## 2023-11-14 ENCOUNTER — OFFICE VISIT (OUTPATIENT)
Dept: FAMILY MEDICINE | Facility: CLINIC | Age: 53
End: 2023-11-14
Payer: MEDICARE

## 2023-11-14 VITALS
WEIGHT: 198.88 LBS | SYSTOLIC BLOOD PRESSURE: 118 MMHG | TEMPERATURE: 98 F | HEIGHT: 62 IN | HEART RATE: 68 BPM | DIASTOLIC BLOOD PRESSURE: 80 MMHG | BODY MASS INDEX: 36.6 KG/M2 | OXYGEN SATURATION: 95 %

## 2023-11-14 DIAGNOSIS — Z90.49 S/P PARTIAL COLECTOMY: ICD-10-CM

## 2023-11-14 DIAGNOSIS — K57.32 SIGMOID DIVERTICULITIS: ICD-10-CM

## 2023-11-14 DIAGNOSIS — Z09 HOSPITAL DISCHARGE FOLLOW-UP: Primary | ICD-10-CM

## 2023-11-14 PROCEDURE — 3079F PR MOST RECENT DIASTOLIC BLOOD PRESSURE 80-89 MM HG: ICD-10-PCS | Mod: CPTII,S$GLB,, | Performed by: FAMILY MEDICINE

## 2023-11-14 PROCEDURE — 1159F PR MEDICATION LIST DOCUMENTED IN MEDICAL RECORD: ICD-10-PCS | Mod: CPTII,S$GLB,, | Performed by: FAMILY MEDICINE

## 2023-11-14 PROCEDURE — 3008F PR BODY MASS INDEX (BMI) DOCUMENTED: ICD-10-PCS | Mod: CPTII,S$GLB,, | Performed by: FAMILY MEDICINE

## 2023-11-14 PROCEDURE — 99999 PR PBB SHADOW E&M-EST. PATIENT-LVL V: ICD-10-PCS | Mod: PBBFAC,,, | Performed by: FAMILY MEDICINE

## 2023-11-14 PROCEDURE — 3074F SYST BP LT 130 MM HG: CPT | Mod: CPTII,S$GLB,, | Performed by: FAMILY MEDICINE

## 2023-11-14 PROCEDURE — 1111F PR DISCHARGE MEDS RECONCILED W/ CURRENT OUTPATIENT MED LIST: ICD-10-PCS | Mod: CPTII,S$GLB,, | Performed by: FAMILY MEDICINE

## 2023-11-14 PROCEDURE — 4010F PR ACE/ARB THEARPY RXD/TAKEN: ICD-10-PCS | Mod: CPTII,S$GLB,, | Performed by: FAMILY MEDICINE

## 2023-11-14 PROCEDURE — 99999 PR PBB SHADOW E&M-EST. PATIENT-LVL V: CPT | Mod: PBBFAC,,, | Performed by: FAMILY MEDICINE

## 2023-11-14 PROCEDURE — 4010F ACE/ARB THERAPY RXD/TAKEN: CPT | Mod: CPTII,S$GLB,, | Performed by: FAMILY MEDICINE

## 2023-11-14 PROCEDURE — 1111F DSCHRG MED/CURRENT MED MERGE: CPT | Mod: CPTII,S$GLB,, | Performed by: FAMILY MEDICINE

## 2023-11-14 PROCEDURE — 3079F DIAST BP 80-89 MM HG: CPT | Mod: CPTII,S$GLB,, | Performed by: FAMILY MEDICINE

## 2023-11-14 PROCEDURE — 1159F MED LIST DOCD IN RCRD: CPT | Mod: CPTII,S$GLB,, | Performed by: FAMILY MEDICINE

## 2023-11-14 PROCEDURE — 99214 OFFICE O/P EST MOD 30 MIN: CPT | Mod: S$GLB,,, | Performed by: FAMILY MEDICINE

## 2023-11-14 PROCEDURE — 3074F PR MOST RECENT SYSTOLIC BLOOD PRESSURE < 130 MM HG: ICD-10-PCS | Mod: CPTII,S$GLB,, | Performed by: FAMILY MEDICINE

## 2023-11-14 PROCEDURE — 99214 PR OFFICE/OUTPT VISIT, EST, LEVL IV, 30-39 MIN: ICD-10-PCS | Mod: S$GLB,,, | Performed by: FAMILY MEDICINE

## 2023-11-14 PROCEDURE — 3008F BODY MASS INDEX DOCD: CPT | Mod: CPTII,S$GLB,, | Performed by: FAMILY MEDICINE

## 2023-11-14 NOTE — PROGRESS NOTES
Subjective     Patient ID: Giuliana Rodas is a 53 y.o. female.    Chief Complaint: Hospital Follow Up    53 year old here for hospital follow up.l she is s/p partial colectomy for recurrent diverticulitis.     She has no fever or chills. She is eating well.s he is having regular bowel movements. She has some weakness and is using a walking stick to help with abdominal straining. She is overall feeling well.       Past Medical History:   Diagnosis Date    Bile reflux gastritis     Breast cyst     Eczema     Fibrocystic breast     Fibromyalgia     Gastroparesis     dr. harden    GERD (gastroesophageal reflux disease)     Hyperglyceridemia     Hyperlipidemia     Hypertension     IC (interstitial cystitis)     dr. labadie    Psoriasis     Tension headache       Past Surgical History:   Procedure Laterality Date    BREAST BIOPSY Right     over 10 years ago/ milk duct    CHOLECYSTECTOMY      COLONOSCOPY N/A 11/08/2022    Procedure: COLONOSCOPY;  Surgeon: Damon Mcmahon MD;  Location: [facility];  Service: Endoscopy;  Laterality: N/A;  vacc-inst portal-tb-smith or ray    CYSTOSCOPY,WITH URETERAL CATHETER INSERTION  11/01/2023    Procedure: CYSTOSCOPY,WITH URETERAL CATHETER INSERTION;  Surgeon: Gio Jeter MD;  [facility];  Service: Urology;;    ESOPHAGOGASTRODUODENOSCOPY N/A 03/12/2020    Procedure: EGD (ESOPHAGOGASTRODUODENOSCOPY);  Surgeon: Damon Mcmahon MD;  Location: [facility] (4TH FLR);  Service: Endoscopy;  Laterality: N/A;  use pcf scope    HEMORRHOID SURGERY      HYSTERECTOMY      KNEE SURGERY      LAPAROSCOPIC LEFT COLECTOMY N/A 11/01/2023    Procedure: COLECTOMY-LAPAROSCOPIC LEFT ERAS LOW;  Surgeon: Marcio Chino MD;  [facility];  Service: Colon and Rectal;  Laterality: N/A;    MOBILIZATION, SPLENIC FLEXURE, LAPAROSCOPIC  11/01/2023    Procedure: MOBILIZATION, SPLENIC FLEXURE, LAPAROSCOPIC;  Surgeon: Marcio Chino MD;  [facility];  Service: Colon and  Rectal;;    OOPHORECTOMY      one ov removed     Family History   Problem Relation Age of Onset    Hypertension Mother     Migraines Mother     Breast cancer Mother     Arthritis Mother     Cancer Mother     Stroke Mother     Hypertension Father     Stroke Father     Heart disease Father     Hyperlipidemia Father     Diabetes Father     Arthritis Father     Cancer Father     Breast cancer Paternal Grandmother     Colon cancer Neg Hx     Ovarian cancer Neg Hx     Inflammatory bowel disease Neg Hx     Celiac disease Neg Hx      Social History     Socioeconomic History    Marital status:     Number of children: 2   Occupational History    Occupation:      Employer: [employer]   Tobacco Use    Smoking status: Never    Smokeless tobacco: Never   Substance and Sexual Activity    Alcohol use: No    Drug use: Never    Sexual activity: Yes     Partners: Male     Birth control/protection: None   Social History Narrative    Lives at home with  and daughter     Social Determinants of Health     Financial Resource Strain: Low Risk  (11/5/2023)    Overall Financial Resource Strain (CARDIA)     Difficulty of Paying Living Expenses: Not very hard   Food Insecurity: No Food Insecurity (11/5/2023)    Hunger Vital Sign     Worried About Running Out of Food in the Last Year: Never true     Ran Out of Food in the Last Year: Never true   Transportation Needs: No Transportation Needs (11/5/2023)    PRAPARE - Transportation     Lack of Transportation (Medical): No     Lack of Transportation (Non-Medical): No   Physical Activity: Insufficiently Active (11/5/2023)    Exercise Vital Sign     Days of Exercise per Week: 2 days     Minutes of Exercise per Session: 20 min   Stress: No Stress Concern Present (11/5/2023)    [place] [employer] - Occupational Stress Questionnaire     Feeling of Stress : Only a little   Social Connections: Moderately Isolated (11/5/2023)    Social Connection and Isolation  "Panel [NHANES]     Frequency of Communication with Friends and Family: More than three times a week     Frequency of Social Gatherings with Friends and Family: More than three times a week     Attends Sabianism Services: Never     Active Member of Clubs or Organizations: No     Attends Club or Organization Meetings: Never     Marital Status:    Housing Stability: Low Risk  (11/5/2023)    Housing Stability Vital Sign     Unable to Pay for Housing in the Last Year: No     Number of Places Lived in the Last Year: 1     Unstable Housing in the Last Year: No       Review of Systems   Respiratory:  Negative for cough, chest tightness and shortness of breath.    Cardiovascular:  Negative for chest pain and leg swelling.   Gastrointestinal:  Negative for abdominal pain, blood in stool, diarrhea, nausea, vomiting and fecal incontinence.          Objective   Vitals:    11/14/23 0953   BP: 118/80   Pulse: 68   Temp: 98.3 °F (36.8 °C)   TempSrc: Oral   SpO2: 95%   Weight: 90.2 kg (198 lb 13.7 oz)   Height: 5' 2" (1.575 m)       Physical Exam  Constitutional:       General: She is not in acute distress.     Appearance: Normal appearance. She is well-developed. She is obese. She is not ill-appearing, toxic-appearing or diaphoretic.   HENT:      Head: Normocephalic and atraumatic.   Eyes:      Conjunctiva/sclera: Conjunctivae normal.   Neck:      Vascular: No carotid bruit.   Cardiovascular:      Rate and Rhythm: Normal rate and regular rhythm.      Heart sounds: Normal heart sounds. No murmur heard.     No friction rub. No gallop.   Pulmonary:      Effort: Pulmonary effort is normal. No respiratory distress.      Breath sounds: Normal breath sounds. No stridor. No wheezing, rhonchi or rales.   Abdominal:      General: Abdomen is flat. Bowel sounds are normal. There is no distension.      Palpations: Abdomen is soft. There is no mass.      Tenderness: There is no abdominal tenderness. There is no guarding or rebound.      " Hernia: No hernia is present.      Comments: Mild bruising   Musculoskeletal:      Cervical back: Normal range of motion and neck supple.   Neurological:      Mental Status: She is alert and oriented to person, place, and time.            Assessment and Plan     1. Hospital discharge follow-up    2. Sigmoid diverticulitis    3. S/P partial colectomy    Patient is healing and doing well. She is feeling well.              No follow-ups on file.

## 2023-11-20 ENCOUNTER — OFFICE VISIT (OUTPATIENT)
Dept: SURGERY | Facility: CLINIC | Age: 53
End: 2023-11-20
Payer: MEDICARE

## 2023-11-20 VITALS
BODY MASS INDEX: 36.71 KG/M2 | DIASTOLIC BLOOD PRESSURE: 77 MMHG | HEIGHT: 62 IN | HEART RATE: 72 BPM | SYSTOLIC BLOOD PRESSURE: 124 MMHG | RESPIRATION RATE: 18 BRPM | WEIGHT: 199.5 LBS

## 2023-11-20 DIAGNOSIS — K57.32 DIVERTICULITIS LARGE INTESTINE W/O PERFORATION OR ABSCESS W/O BLEEDING: Primary | ICD-10-CM

## 2023-11-20 PROCEDURE — 3074F SYST BP LT 130 MM HG: CPT | Mod: CPTII,S$GLB,, | Performed by: COLON & RECTAL SURGERY

## 2023-11-20 PROCEDURE — 99999 PR PBB SHADOW E&M-EST. PATIENT-LVL III: CPT | Mod: PBBFAC,,, | Performed by: COLON & RECTAL SURGERY

## 2023-11-20 PROCEDURE — 99024 POSTOP FOLLOW-UP VISIT: CPT | Mod: S$GLB,,, | Performed by: COLON & RECTAL SURGERY

## 2023-11-20 PROCEDURE — 1160F RVW MEDS BY RX/DR IN RCRD: CPT | Mod: CPTII,S$GLB,, | Performed by: COLON & RECTAL SURGERY

## 2023-11-20 PROCEDURE — 3078F DIAST BP <80 MM HG: CPT | Mod: CPTII,S$GLB,, | Performed by: COLON & RECTAL SURGERY

## 2023-11-20 PROCEDURE — 1159F MED LIST DOCD IN RCRD: CPT | Mod: CPTII,S$GLB,, | Performed by: COLON & RECTAL SURGERY

## 2023-11-20 NOTE — PROGRESS NOTES
"CRS Post-operative visit    Visit Info:     Procedure:   Laparoscopic-assisted sigmoid colectomy  Laparoscopic-assisted splenic flexure mobilization     Date of Procedure: November 1, 2023    Indication: 53-year-old female who has had recurrent episodes of sigmoid diverticulitis.  Due to the frequency and severity of her attacks, she elected to proceed with an elective laparoscopic sigmoid colectomy.     Operative Findings: Chronic diverticular disease of the sigmoid colon.     Date of Discharge: November 3, 2023    Current Status: Eating well, no N/V. No F/C.  Was doing well from pain point-of-view but starting earlier today she started having some "achy" LLQ pain - she did walk quite a bit this past weekend, thinks it may be related to that.  BM's 3-4x/day, getting more formed - she does notice some scant BRB on toilet tissue when she wipes that she thinks is from wiping so much due to BM frequency.    Pathology:   A.   SIGMOID COLON, RESECTION:   - Benign colon showing diverticulosis, without perforation, and serosal adhesions.   - Margins appear viable.   - Nine(9) benign reactive appearing lymph nodes (0/9).   - Negative for malignancy.     B.   ANASTOMOTIC DONUTS, EXCISION:   - Benign colonic tissue showing no significant histopathologic abnormality.   - Negative for malignancy.     Physical Exam:  General: White female in NAD   Neuro: aaox4   Respiratory: resps even unlabored  Abdomen: soft, mild LLQ pain, no R/G, non-distended, lap trocar and Pfannenstiel incisions well-healed.  Extremities: Warm dry and intact      Assessment:  1. Diverticulitis large intestine w/o perforation or abscess w/o bleeding        S/p laparoscopic sigmoid colectomy    Plan:  Diet as tolerated - increase fiber intake  Gradually increase activity  Asked her to let me know if LLQ persists or worsens  Otherwise RTO prn  Colonosocpy 1 year post-op    Marcio Chino MD, FACS, FASCRS  Department of Colon & Rectal Surgery     This note " was created using voice recognition software, and may contain some unrecognized transcriptional errors.

## 2023-12-20 ENCOUNTER — PATIENT MESSAGE (OUTPATIENT)
Dept: RESEARCH | Facility: HOSPITAL | Age: 53
End: 2023-12-20
Payer: MEDICARE

## 2024-01-02 ENCOUNTER — TELEPHONE (OUTPATIENT)
Dept: FAMILY MEDICINE | Facility: CLINIC | Age: 54
End: 2024-01-02
Payer: MEDICARE

## 2024-01-02 NOTE — TELEPHONE ENCOUNTER
Call returned. Patient reports experiencing intermittent lower right abdominal pain that occurred two weeks before [place], pain occurred with movement or when she coughed/sneezed. Symptoms resolved on their own, but returned again yesterday. Appointment scheduled with Dr. Rojas on 1/4/24 at 8:40 am for further evaluation. ER precautions given for severe/worsening symptoms. She verbalized understanding.

## 2024-01-02 NOTE — TELEPHONE ENCOUNTER
----- Message from Elliott Mac sent at 1/2/2024  1:41 PM CST -----  Regarding: self  Type: Patient Call Back    Who called:self    What is the request in detail:pt is calling in regards of a sharp burning pain in lower right abdomen     Can the clinic reply by [facility]?no    Would the patient rather a call back or a response via [facility]? callback    Best call back number:[phone]    Additional Information:

## 2024-01-04 ENCOUNTER — OFFICE VISIT (OUTPATIENT)
Dept: FAMILY MEDICINE | Facility: CLINIC | Age: 54
End: 2024-01-04
Payer: MEDICARE

## 2024-01-04 VITALS
SYSTOLIC BLOOD PRESSURE: 126 MMHG | DIASTOLIC BLOOD PRESSURE: 76 MMHG | HEART RATE: 65 BPM | TEMPERATURE: 98 F | WEIGHT: 200.63 LBS | OXYGEN SATURATION: 96 % | HEIGHT: 62 IN | BODY MASS INDEX: 36.92 KG/M2

## 2024-01-04 DIAGNOSIS — D69.6 THROMBOCYTOPENIA: ICD-10-CM

## 2024-01-04 DIAGNOSIS — I70.0 AORTIC ATHEROSCLEROSIS: ICD-10-CM

## 2024-01-04 DIAGNOSIS — E66.01 MORBID OBESITY: ICD-10-CM

## 2024-01-04 DIAGNOSIS — K45.8 OTHER SPECIFIED ABDOMINAL HERNIA WITHOUT OBSTRUCTION OR GANGRENE: ICD-10-CM

## 2024-01-04 DIAGNOSIS — L57.0 ACTINIC KERATOSIS: Primary | ICD-10-CM

## 2024-01-04 PROCEDURE — 99999 PR PBB SHADOW E&M-EST. PATIENT-LVL V: CPT | Mod: PBBFAC,,, | Performed by: FAMILY MEDICINE

## 2024-01-04 PROCEDURE — 99214 OFFICE O/P EST MOD 30 MIN: CPT | Mod: S$GLB,,, | Performed by: FAMILY MEDICINE

## 2024-01-04 NOTE — PROGRESS NOTES
"Subjective     Patient ID: Giuliana Rodas is a 53 y.o. female.    Chief Complaint: Abdominal Pain (Burning sensation in lower right abdomen since 2 weeks before denise. Lasted until denise, went away, then came back on Monday night.)    53 year old female presents for follow up. She states she had some burning sensation on the RLQ abdomen when she rotated in bed. Sehtsates the burning went away. She states it retured and then it went away. She doesn't have it anymore.     Abdominal Pain      Review of Systems   Gastrointestinal:  Positive for abdominal pain.          Objective   Vitals:    01/04/24 0849   BP: 126/76   Pulse: 65   Temp: 98.2 °F (36.8 °C)   TempSrc: Oral   SpO2: 96%   Weight: 91 kg (200 lb 9.9 oz)   Height: 5' 2" (1.575 m)       Physical Exam  Constitutional:       Appearance: Normal appearance.   HENT:      Head: Normocephalic and atraumatic.   Abdominal:      Tenderness: There is abdominal tenderness.      Hernia: A hernia is present.   Neurological:      Mental Status: She is alert.            Assessment and Plan     Giuliana was seen today for abdominal pain.    Diagnoses and all orders for this visit:    Actinic keratosis  -     Ambulatory referral/consult to Dermatology; Future    Thrombocytopenia  stable  Aortic atherosclerosis  Due for lipids    Morbid obesity    Other specified abdominal hernia without obstruction or gangrene    Reassurance. Pain resolved. If returns consider surgery consult.                      No follow-ups on file.    "

## 2024-01-06 DIAGNOSIS — R10.9 ACUTE LEFT FLANK PAIN: ICD-10-CM

## 2024-01-08 NOTE — TELEPHONE ENCOUNTER
No care due was identified.  [facility] Embedded Care Due Messages. Reference number: [id number].   1/08/2024 12:46:21 PM CST

## 2024-01-08 NOTE — TELEPHONE ENCOUNTER
Refill Routing Note   Medication(s) are not appropriate for processing by [facility] for the following reason(s):        No active prescription written by provider    [facility] action(s):  Defer               Appointments  past 12m or future 3m with PCP    Date Provider   Last Visit   1/4/2024 Giuliana Rojas MD   Next Visit   Visit date not found Giuliana Rojas MD   ED visits in past 90 days: 0        Note composed:12:48 PM 01/08/2024

## 2024-01-09 RX ORDER — TAMSULOSIN HYDROCHLORIDE 0.4 MG/1
1 CAPSULE ORAL DAILY
Qty: 90 CAPSULE | Refills: 3 | OUTPATIENT
Start: 2024-01-09

## 2024-01-09 NOTE — TELEPHONE ENCOUNTER
Provider Staff:  Please note Refusal of medication.     Action required for this patient.      Requested Prescriptions     Refused Prescriptions Disp Refills    tamsulosin (FLOMAX) 0.4 mg Cap [Pharmacy Med Name: tamsulosin 0.4 mg capsule] 90 capsule 3     Sig: Take 1 capsule (0.4 mg total) by mouth once daily.     Refused By: AVELINO MOHAN     Reason for Refusal: Patient needs an appointment      Thanks!  [facility]   Note composed: 01/09/2024 9:21 AM

## 2024-02-05 RX ORDER — LEVOCETIRIZINE DIHYDROCHLORIDE 5 MG/1
5 TABLET, FILM COATED ORAL NIGHTLY
Qty: 90 TABLET | Refills: 3 | Status: SHIPPED | OUTPATIENT
Start: 2024-02-05

## 2024-02-20 ENCOUNTER — OFFICE VISIT (OUTPATIENT)
Dept: DERMATOLOGY | Facility: CLINIC | Age: 54
End: 2024-02-20
Payer: MEDICARE

## 2024-02-20 DIAGNOSIS — L21.9 SEBORRHEIC DERMATITIS: ICD-10-CM

## 2024-02-20 DIAGNOSIS — B07.9 VERRUCA VULGARIS: Primary | ICD-10-CM

## 2024-02-20 DIAGNOSIS — D36.9 ANGIOFIBROMA: ICD-10-CM

## 2024-02-20 PROCEDURE — 99213 OFFICE O/P EST LOW 20 MIN: CPT | Mod: 25,S$GLB,, | Performed by: STUDENT IN AN ORGANIZED HEALTH CARE EDUCATION/TRAINING PROGRAM

## 2024-02-20 PROCEDURE — 99999 PR PBB SHADOW E&M-EST. PATIENT-LVL III: CPT | Mod: PBBFAC,,, | Performed by: STUDENT IN AN ORGANIZED HEALTH CARE EDUCATION/TRAINING PROGRAM

## 2024-02-20 PROCEDURE — 17110 DESTRUCTION B9 LES UP TO 14: CPT | Mod: S$GLB,,, | Performed by: STUDENT IN AN ORGANIZED HEALTH CARE EDUCATION/TRAINING PROGRAM

## 2024-02-20 RX ORDER — TRIAMCINOLONE ACETONIDE 1 MG/G
CREAM TOPICAL 2 TIMES DAILY
Qty: 80 G | Refills: 1 | Status: SHIPPED | OUTPATIENT
Start: 2024-02-20

## 2024-02-20 NOTE — PROGRESS NOTES
Subjective:      Patient ID:  Giuliana Rodas is a 53 y.o. female who presents for   Chief Complaint   Patient presents with    Lesion     Pt last seen 1/4/2023 for skin check.     Patient with new complaint of lesion(s)  Location: L cheekbone/ temple  Duration: 5 months  Symptoms: none  Relieving factors/Previous treatments: none        Lesion - Initial      Review of Systems   Constitutional:  Negative for fever, chills, fatigue and malaise.   Skin:  Positive for activity-related sunscreen use. Negative for daily sunscreen use.   Hematologic/Lymphatic: Bruises/bleeds easily.       Objective:   Physical Exam   Skin:   Areas Examined (abnormalities noted in diagram):   Head / Face Inspection Performed            Diagram Legend     Erythematous scaling macule/papule c/w actinic keratosis       Vascular papule c/w angioma      Pigmented verrucoid papule/plaque c/w seborrheic keratosis      Yellow umbilicated papule c/w sebaceous hyperplasia      Irregularly shaped tan macule c/w lentigo     1-2 mm smooth white papules consistent with Milia      Movable subcutaneous cyst with punctum c/w epidermal inclusion cyst      Subcutaneous movable cyst c/w pilar cyst      Firm pink to brown papule c/w dermatofibroma      Pedunculated fleshy papule(s) c/w skin tag(s)      Evenly pigmented macule c/w junctional nevus     Mildly variegated pigmented, slightly irregular-bordered macule c/w mildly atypical nevus      Flesh colored to evenly pigmented papule c/w intradermal nevus       Pink pearly papule/plaque c/w basal cell carcinoma      Erythematous hyperkeratotic cursted plaque c/w SCC      Surgical scar with no sign of skin cancer recurrence      Open and closed comedones      Inflammatory papules and pustules      Verrucoid papule consistent consistent with wart     Erythematous eczematous patches and plaques     Dystrophic onycholytic nail with subungual debris c/w onychomycosis     Umbilicated papule    Erythematous-base  heme-crusted tan verrucoid plaque consistent with inflamed seborrheic keratosis     Erythematous Silvery Scaling Plaque c/w Psoriasis     See annotation      Assessment / Plan:        Verruca vulgaris  Cryosurgery procedure note:    Verbal consent from the patient is obtained including, but not limited to, risk of hypopigmentation/hyperpigmentation, scar, recurrence of lesion. Liquid nitrogen cryosurgery is applied to 1 lesions to produce a freeze injury. The patient is aware that blisters may form and is instructed on wound care with gentle cleansing and use of vaseline ointment to keep moist until healed. The patient is supplied a handout on cryosurgery and is instructed to call if lesions do not completely resolve.    Angiofibroma  Reassurance given to patient. No treatment is necessary.   Treatment of benign, asymptomatic lesions may be considered cosmetic.    Seb derm  - Behind ears, along hair line. Uses TAC sparingly and it helps to control it. Refill sent. Counseled to avoid use to face--risk of atrophy  - HC 2.5 to face         Follow up if symptoms worsen or fail to improve.

## 2024-02-20 NOTE — PATIENT INSTRUCTIONS

## 2024-03-05 ENCOUNTER — TELEPHONE (OUTPATIENT)
Dept: FAMILY MEDICINE | Facility: CLINIC | Age: 54
End: 2024-03-05
Payer: MEDICARE

## 2024-03-05 NOTE — TELEPHONE ENCOUNTER
----- Message from Funmilayo Mims sent at 3/5/2024 11:34 AM CST -----  Type: Patient Call Back    Who called:pt     What is the request in detail:pt requesting to discuss getting the prescription Robaxin. She states she used to get it from a doctor that used to work for [facility]. Please call pt     Can the clinic reply by [facility]?    Would the patient rather a call back or a response via [facility]? call    Best call back number:[phone] (home) [phone] (work)      Additional Information:

## 2024-03-07 ENCOUNTER — TELEPHONE (OUTPATIENT)
Dept: FAMILY MEDICINE | Facility: CLINIC | Age: 54
End: 2024-03-07

## 2024-03-07 DIAGNOSIS — G44.209 TENSION HEADACHE: ICD-10-CM

## 2024-03-07 RX ORDER — METHOCARBAMOL 500 MG/1
TABLET, FILM COATED ORAL
Qty: 60 TABLET | Refills: 3 | Status: SHIPPED | OUTPATIENT
Start: 2024-03-07 | End: 2024-12-20

## 2024-03-21 DIAGNOSIS — E78.5 HYPERLIPIDEMIA, UNSPECIFIED HYPERLIPIDEMIA TYPE: ICD-10-CM

## 2024-03-21 NOTE — TELEPHONE ENCOUNTER
Refill Routing Note   Medication(s) are not appropriate for processing by [facility] for the following reason(s):        Required labs outdated    [facility] action(s):  Defer     Requires labs : Yes             Appointments  past 12m or future 3m with PCP    Date Provider   Last Visit   1/4/2024 Giuliana Rojas MD   Next Visit   Visit date not found Giuliana Rojas MD   ED visits in past 90 days: 0        Note composed:5:10 PM 03/21/2024

## 2024-03-21 NOTE — TELEPHONE ENCOUNTER
Care Due:                  Date            Visit Type   Department     Provider  --------------------------------------------------------------------------------                                [facility]                              PRIMARY      MEDICINE/INTERN  Last Visit: 01-      CARE ([facility])   AL MED         Giuliana Chritsiansen  Next Visit: None Scheduled  None         None Found                                                            Last  Test          Frequency    Reason                     Performed    Due Date  --------------------------------------------------------------------------------    Lipid Panel.  12 months..  atorvastatin.............  12- 12-    [facility] Embedded Care Due Messages. Reference number: [id number].   3/21/2024 8:02:53 AM CDT   169.9

## 2024-03-22 RX ORDER — ATORVASTATIN CALCIUM 40 MG/1
TABLET, FILM COATED ORAL
Qty: 90 TABLET | Refills: 0 | Status: SHIPPED | OUTPATIENT
Start: 2024-03-22

## 2024-04-09 ENCOUNTER — OFFICE VISIT (OUTPATIENT)
Dept: FAMILY MEDICINE | Facility: CLINIC | Age: 54
End: 2024-04-09
Payer: MEDICARE

## 2024-04-09 VITALS
SYSTOLIC BLOOD PRESSURE: 120 MMHG | HEIGHT: 62 IN | OXYGEN SATURATION: 97 % | HEART RATE: 77 BPM | WEIGHT: 200.19 LBS | BODY MASS INDEX: 36.84 KG/M2 | DIASTOLIC BLOOD PRESSURE: 76 MMHG | TEMPERATURE: 98 F

## 2024-04-09 DIAGNOSIS — M79.7 FIBROMYALGIA: ICD-10-CM

## 2024-04-09 DIAGNOSIS — E66.01 MORBID OBESITY: Primary | ICD-10-CM

## 2024-04-09 DIAGNOSIS — R53.83 FATIGUE, UNSPECIFIED TYPE: ICD-10-CM

## 2024-04-09 PROCEDURE — 99214 OFFICE O/P EST MOD 30 MIN: CPT | Mod: S$GLB,,, | Performed by: FAMILY MEDICINE

## 2024-04-09 PROCEDURE — 3074F SYST BP LT 130 MM HG: CPT | Mod: CPTII,S$GLB,, | Performed by: FAMILY MEDICINE

## 2024-04-09 PROCEDURE — 1159F MED LIST DOCD IN RCRD: CPT | Mod: CPTII,S$GLB,, | Performed by: FAMILY MEDICINE

## 2024-04-09 PROCEDURE — 99999 PR PBB SHADOW E&M-EST. PATIENT-LVL III: CPT | Mod: PBBFAC,,, | Performed by: FAMILY MEDICINE

## 2024-04-09 PROCEDURE — 4010F ACE/ARB THERAPY RXD/TAKEN: CPT | Mod: CPTII,S$GLB,, | Performed by: FAMILY MEDICINE

## 2024-04-09 PROCEDURE — 3008F BODY MASS INDEX DOCD: CPT | Mod: CPTII,S$GLB,, | Performed by: FAMILY MEDICINE

## 2024-04-09 PROCEDURE — 3078F DIAST BP <80 MM HG: CPT | Mod: CPTII,S$GLB,, | Performed by: FAMILY MEDICINE

## 2024-04-09 RX ORDER — AMOXICILLIN 500 MG/1
500 CAPSULE ORAL 3 TIMES DAILY
COMMUNITY
Start: 2024-04-03

## 2024-04-09 RX ORDER — LIRAGLUTIDE 6 MG/ML
3 INJECTION, SOLUTION SUBCUTANEOUS DAILY
Qty: 15 ML | Refills: 2 | Status: SHIPPED | OUTPATIENT
Start: 2024-04-09 | End: 2024-06-19

## 2024-04-09 NOTE — PROGRESS NOTES
"Subjective     Patient ID: Giuliana Rodas is a 53 y.o. female.    Chief Complaint: Weight Loss and Referral (Rheumatologist)    53 year old female presents for concerns about her weight. She states she wants to lose weight. She feels that she is ready to lose weight. She is fine with injections, but is interested in weight loss.     She states she was diagnosed with fibromyalgia in 2000. She has tried cymbalta for this, but states she didn't have success with this. She was seeing Dr. Hay, rheumatology, but would rather see someone new if she had to.       Review of Systems       Objective   Vitals:    04/09/24 1448   BP: 120/76   Pulse: 77   Temp: 98.4 °F (36.9 °C)   TempSrc: Oral   SpO2: 97%   Weight: 90.8 kg (200 lb 2.8 oz)   Height: 5' 2" (1.575 m)       Physical Exam       Assessment and Plan     1. Morbid obesity  -     liraglutide, weight loss, (SAXENDA) 3 mg/0.5 mL (18 mg/3 mL) PnIj; Inject 3 mg into the skin Daily.  Dispense: 15 mL; Refill: 2  -     Ambulatory referral/consult to Bariatric/Obesity Medicine; Future; Expected date: 04/16/2024  -     CBC Auto Differential; Future; Expected date: 04/09/2024  -     Comprehensive Metabolic Panel; Future; Expected date: 04/09/2024  -     Lipid Panel; Future; Expected date: 04/09/2024  -     TSH; Future; Expected date: 04/09/2024  -     Hemoglobin A1C; Future; Expected date: 04/09/2024  Due for labs and will try saxenda for weight loss. Referral to bariatric medicine    2. Fatigue, unspecified type  -     CBC Auto Differential; Future; Expected date: 04/09/2024  -     Comprehensive Metabolic Panel; Future; Expected date: 04/09/2024  -     Lipid Panel; Future; Expected date: 04/09/2024  -     TSH; Future; Expected date: 04/09/2024  -     Hemoglobin A1C; Future; Expected date: 04/09/2024  Stable AND DUE FOR LABS    3. Fibromyalgia  -     milnacipran (SAVELLA) 12.5 mg (5)-25 mg(8)-50 mg(42) DsPk; Use as directed  Dispense: 1 each; Refill: 0                 No " follow-ups on file.

## 2024-04-10 ENCOUNTER — TELEPHONE (OUTPATIENT)
Dept: BARIATRICS | Facility: CLINIC | Age: 54
End: 2024-04-10
Payer: MEDICARE

## 2024-04-11 ENCOUNTER — LAB VISIT (OUTPATIENT)
Dept: LAB | Facility: HOSPITAL | Age: 54
End: 2024-04-11
Attending: FAMILY MEDICINE
Payer: MEDICARE

## 2024-04-11 DIAGNOSIS — E66.01 MORBID OBESITY: ICD-10-CM

## 2024-04-11 DIAGNOSIS — R53.83 FATIGUE, UNSPECIFIED TYPE: ICD-10-CM

## 2024-04-11 LAB
ALBUMIN SERPL BCP-MCNC: 4 G/DL (ref 3.5–5.2)
ALP SERPL-CCNC: 87 U/L (ref 55–135)
ALT SERPL W/O P-5'-P-CCNC: 22 U/L (ref 10–44)
ANION GAP SERPL CALC-SCNC: 9 MMOL/L (ref 8–16)
AST SERPL-CCNC: 25 U/L (ref 10–40)
BASOPHILS # BLD AUTO: 0.02 K/UL (ref 0–0.2)
BASOPHILS NFR BLD: 0.5 % (ref 0–1.9)
BILIRUB SERPL-MCNC: 0.2 MG/DL (ref 0.1–1)
BUN SERPL-MCNC: 23 MG/DL (ref 6–20)
CALCIUM SERPL-MCNC: 10.2 MG/DL (ref 8.7–10.5)
CHLORIDE SERPL-SCNC: 105 MMOL/L (ref 95–110)
CHOLEST SERPL-MCNC: 170 MG/DL (ref 120–199)
CHOLEST/HDLC SERPL: 3.9 {RATIO} (ref 2–5)
CO2 SERPL-SCNC: 27 MMOL/L (ref 23–29)
CREAT SERPL-MCNC: 1.1 MG/DL (ref 0.5–1.4)
DIFFERENTIAL METHOD BLD: ABNORMAL
EOSINOPHIL # BLD AUTO: 0.1 K/UL (ref 0–0.5)
EOSINOPHIL NFR BLD: 2.1 % (ref 0–8)
ERYTHROCYTE [DISTWIDTH] IN BLOOD BY AUTOMATED COUNT: 13 % (ref 11.5–14.5)
EST. GFR  (NO RACE VARIABLE): 59 ML/MIN/1.73 M^2
ESTIMATED AVG GLUCOSE: 111 MG/DL (ref 68–131)
GLUCOSE SERPL-MCNC: 100 MG/DL (ref 70–110)
HBA1C MFR BLD: 5.5 % (ref 4–5.6)
HCT VFR BLD AUTO: 45.1 % (ref 37–48.5)
HDLC SERPL-MCNC: 44 MG/DL (ref 40–75)
HDLC SERPL: 25.9 % (ref 20–50)
HGB BLD-MCNC: 14.4 G/DL (ref 12–16)
IMM GRANULOCYTES # BLD AUTO: 0.01 K/UL (ref 0–0.04)
IMM GRANULOCYTES NFR BLD AUTO: 0.3 % (ref 0–0.5)
LDLC SERPL CALC-MCNC: 69.4 MG/DL (ref 63–159)
LYMPHOCYTES # BLD AUTO: 1.4 K/UL (ref 1–4.8)
LYMPHOCYTES NFR BLD: 36.1 % (ref 18–48)
MCH RBC QN AUTO: 27.8 PG (ref 27–31)
MCHC RBC AUTO-ENTMCNC: 31.9 G/DL (ref 32–36)
MCV RBC AUTO: 87 FL (ref 82–98)
MONOCYTES # BLD AUTO: 0.4 K/UL (ref 0.3–1)
MONOCYTES NFR BLD: 10.7 % (ref 4–15)
NEUTROPHILS # BLD AUTO: 1.9 K/UL (ref 1.8–7.7)
NEUTROPHILS NFR BLD: 50.3 % (ref 38–73)
NONHDLC SERPL-MCNC: 126 MG/DL
NRBC BLD-RTO: 0 /100 WBC
PLATELET # BLD AUTO: 131 K/UL (ref 150–450)
PMV BLD AUTO: 10 FL (ref 9.2–12.9)
POTASSIUM SERPL-SCNC: 4.1 MMOL/L (ref 3.5–5.1)
PROT SERPL-MCNC: 7.7 G/DL (ref 6–8.4)
RBC # BLD AUTO: 5.18 M/UL (ref 4–5.4)
SODIUM SERPL-SCNC: 141 MMOL/L (ref 136–145)
TRIGL SERPL-MCNC: 283 MG/DL (ref 30–150)
TSH SERPL DL<=0.005 MIU/L-ACNC: 2.14 UIU/ML (ref 0.4–4)
WBC # BLD AUTO: 3.82 K/UL (ref 3.9–12.7)

## 2024-04-11 PROCEDURE — 80053 COMPREHEN METABOLIC PANEL: CPT | Performed by: FAMILY MEDICINE

## 2024-04-11 PROCEDURE — 85025 COMPLETE CBC W/AUTO DIFF WBC: CPT | Performed by: FAMILY MEDICINE

## 2024-04-11 PROCEDURE — 36415 COLL VENOUS BLD VENIPUNCTURE: CPT | Mod: PO | Performed by: FAMILY MEDICINE

## 2024-04-11 PROCEDURE — 84443 ASSAY THYROID STIM HORMONE: CPT | Performed by: FAMILY MEDICINE

## 2024-04-11 PROCEDURE — 80061 LIPID PANEL: CPT | Performed by: FAMILY MEDICINE

## 2024-04-11 PROCEDURE — 83036 HEMOGLOBIN GLYCOSYLATED A1C: CPT | Performed by: FAMILY MEDICINE

## 2024-05-06 ENCOUNTER — TELEPHONE (OUTPATIENT)
Dept: BARIATRICS | Facility: CLINIC | Age: 54
End: 2024-05-06
Payer: MEDICARE

## 2024-06-03 ENCOUNTER — TELEPHONE (OUTPATIENT)
Dept: ADMINISTRATIVE | Facility: CLINIC | Age: 54
End: 2024-06-03
Payer: MEDICARE

## 2024-06-03 ENCOUNTER — PATIENT MESSAGE (OUTPATIENT)
Dept: ADMINISTRATIVE | Facility: CLINIC | Age: 54
End: 2024-06-03
Payer: MEDICARE

## 2024-06-04 ENCOUNTER — TELEPHONE (OUTPATIENT)
Dept: ADMINISTRATIVE | Facility: CLINIC | Age: 54
End: 2024-06-04
Payer: MEDICARE

## 2024-06-04 ENCOUNTER — OFFICE VISIT (OUTPATIENT)
Dept: HOME HEALTH SERVICES | Facility: CLINIC | Age: 54
End: 2024-06-04
Payer: MEDICARE

## 2024-06-04 DIAGNOSIS — I70.0 AORTIC ATHEROSCLEROSIS: ICD-10-CM

## 2024-06-04 DIAGNOSIS — Z00.00 ENCOUNTER FOR PREVENTIVE HEALTH EXAMINATION: Primary | ICD-10-CM

## 2024-06-04 PROCEDURE — 3044F HG A1C LEVEL LT 7.0%: CPT | Mod: CPTII,95,, | Performed by: NURSE PRACTITIONER

## 2024-06-04 PROCEDURE — G0439 PPPS, SUBSEQ VISIT: HCPCS | Mod: 95,,, | Performed by: NURSE PRACTITIONER

## 2024-06-04 PROCEDURE — 1160F RVW MEDS BY RX/DR IN RCRD: CPT | Mod: CPTII,95,, | Performed by: NURSE PRACTITIONER

## 2024-06-04 PROCEDURE — 4010F ACE/ARB THERAPY RXD/TAKEN: CPT | Mod: CPTII,95,, | Performed by: NURSE PRACTITIONER

## 2024-06-04 PROCEDURE — 1159F MED LIST DOCD IN RCRD: CPT | Mod: CPTII,95,, | Performed by: NURSE PRACTITIONER

## 2024-06-04 NOTE — PATIENT INSTRUCTIONS
Counseling and Referral of Other Preventative  (Italic type indicates deductible and co-insurance are waived)    Patient Name: Giuliana Rodas  Today's Date: 6/4/2024    Health Maintenance       Date Due Completion Date    TETANUS VACCINE 09/16/2015 9/16/2005    COVID-19 Vaccine (2 - 2023-24 season) 09/01/2023 3/29/2021    Influenza Vaccine (Season Ended) 09/01/2024 10/12/2020    Lipid Panel 04/11/2025 4/11/2024    Mammogram 09/14/2025 9/14/2023    DEXA Scan 03/15/2026 3/15/2021    Hemoglobin A1c (Diabetic Prevention Screening) 04/11/2027 4/11/2024    Colorectal Cancer Screening 11/08/2032 11/8/2022        No orders of the defined types were placed in this encounter.    The following information is provided to all patients.  This information is to help you find resources for any of the problems found today that may be affecting your health:                  Living healthy guide: www.[facility].[place].gov      Understanding Diabetes: www.diabetes.org      Eating healthy: www.cdc.gov/healthyweight      [facility] home safety checklist: www.cdc.gov/steadi/patient.html      Agency on Aging: www.goea.[place].gov      Alcoholics anonymous (AA): www.aa.org      Physical Activity: www.storm.nih.gov/wv2zxma      Tobacco use: www.quitwithusla.org

## 2024-06-04 NOTE — TELEPHONE ENCOUNTER
11/15/2023    Pre-Procedure Diagnosis:   1. Bilateral lower extremity spider veins  2. Bilateral lower extremity reticular veins    Post-Procedure Diagnsosis: Same    Procedure: Bilateral lower extremity sclerotherapy with Asclera 2%    Surgeon: MD MIGUE Deutsch    Anesthesia: Asclera     Estimated blood loss: <5cc    Complications: None       Time out performed and patient was prepped and draped in sterile fashion.  Vein light used to identify reticular veins feeding bilateral lower extremity spider veins.  Skin overlying affected areas cleaned with alcohol.  Reticular veins were then injected with 0.5% Asclera foam sclerosant created with Tessari method with resolution of reticular veins.  Next the spider veins were identified and the overlying skin was cleaned with alcohol.  The veins were accessed with a 30 gauge needle and injected with 0.25% Asclera liquid sclerosant with near resolution of all treated areas.  Once all areas of concern to the patient were treated, sterile cotton balls and Tegaderms were applied for light compression as well as ice for 5 minutes.  We then placed a Coban wrap over the treated areas followed by compression stockings.  The patient ambulated after the procedure without issues.      MD MIGUE Deutsch  [facility]     Confirmed AWV, domenica.

## 2024-06-04 NOTE — PROGRESS NOTES
The patient location is: [place]  The chief complaint leading to consultation is: awv    Visit type: audiovisual    Face to Face time with patient: 25  60 minutes of total time spent on the encounter, which includes face to face time and non-face to face time preparing to see the patient (eg, review of tests), Obtaining and/or reviewing separately obtained history, Documenting clinical information in the electronic or other health record, Independently interpreting results (not separately reported) and communicating results to the patient/family/caregiver, or Care coordination (not separately reported).         Each patient to whom he or she provides medical services by telemedicine is:  (1) informed of the relationship between the physician and patient and the respective role of any other health care provider with respect to management of the patient; and (2) notified that he or she may decline to receive medical services by telemedicine and may withdraw from such care at any time.    Notes:       Giuliana Rodas presented for an initial Medicare AWV today. The following components were reviewed and updated:    Medical history  Family History  Social history  Allergies and Current Medications  Health Risk Assessment  Health Maintenance  Care Team    **See Completed Assessments for Annual Wellness visit with in the encounter summary    The following assessments were completed:  Depression Screening  Cognitive function Screening  Timed Get Up Test  Whisper Test      Opioid documentation:      Patient does not have a current opioid prescription.          There were no vitals filed for this visit.  There is no height or weight on file to calculate BMI.       Physical Exam  Constitutional:       Appearance: Normal appearance.   Neurological:      Mental Status: She is alert.   Psychiatric:         Attention and Perception: Attention and perception normal.         Mood and Affect: Mood and affect normal.         Speech:  Speech normal.         Behavior: Behavior normal. Behavior is cooperative.         Thought Content: Thought content normal.         Cognition and Memory: Cognition and memory normal.         Judgment: Judgment normal.           Diagnoses and health risks identified today and associated recommendations/orders:  1. Encounter for preventive health examination  Stable, followed by provider    2. Aortic atherosclerosis  Stable, followed by provider, takes amlodipine, atorvastatin      Provided Giuliana with a 5-10 year written screening schedule and personal prevention plan. Recommendations were developed using the USPSTF age appropriate recommendations. Education, counseling, and referrals were provided as needed.  After Visit Summary printed and given to patient which includes a list of additional screenings\tests needed.    Follow up in about 1 year (around 6/4/2025) for your next annual wellness visit.      Vladimir Ribera NP  I offered to discuss advanced care planning, including how to pick a person who would make decisions for you if you were unable to make them for yourself, called a health care power of , and what kind of decisions you might make such as use of life sustaining treatments such as ventilators and tube feeding when faced with a life limiting illness recorded on a living will that they will need to know. (How you want to be cared for as you near the end of your natural life)     X Patient is interested in learning more about how to make advanced directives.  I provided them paperwork and offered to discuss this with them.

## 2024-06-17 DIAGNOSIS — I10 PRIMARY HYPERTENSION: ICD-10-CM

## 2024-06-17 RX ORDER — HYDROCHLOROTHIAZIDE 12.5 MG/1
TABLET ORAL
Qty: 90 TABLET | Refills: 3 | Status: SHIPPED | OUTPATIENT
Start: 2024-06-17

## 2024-06-17 NOTE — TELEPHONE ENCOUNTER
Refill Decision Note   Giuliana Adrianna  is requesting a refill authorization.  Brief Assessment and Rationale for Refill:  Approve     Medication Therapy Plan:         Pharmacist review requested: Yes   Extended chart review required: Yes   Comments:     Note composed:3:28 PM 06/17/2024

## 2024-06-17 NOTE — TELEPHONE ENCOUNTER
No care due was identified.  [facility] Embedded Care Due Messages. Reference number: [id number].   6/17/2024 8:38:51 AM CDT

## 2024-06-17 NOTE — TELEPHONE ENCOUNTER
Refill Routing Note   Medication(s) are not appropriate for processing by [facility] for the following reason(s):        Drug-disease interaction    [facility] action(s):  Defer      Medication Therapy Plan: Drug-Disease: hydroCHLOROthiazide and Hypokalemia    Pharmacist review requested: Yes     Appointments  past 12m or future 3m with PCP    Date Provider   Last Visit   4/9/2024 Giuliana Rojas MD   Next Visit   Visit date not found Giuliana Rojas MD   ED visits in past 90 days: 0        Note composed:12:50 PM 06/17/2024

## 2024-06-19 ENCOUNTER — PATIENT MESSAGE (OUTPATIENT)
Dept: FAMILY MEDICINE | Facility: CLINIC | Age: 54
End: 2024-06-19

## 2024-06-19 ENCOUNTER — E-CONSULT (OUTPATIENT)
Dept: GASTROENTEROLOGY | Facility: CLINIC | Age: 54
End: 2024-06-19
Payer: MEDICARE

## 2024-06-19 ENCOUNTER — OFFICE VISIT (OUTPATIENT)
Dept: FAMILY MEDICINE | Facility: CLINIC | Age: 54
End: 2024-06-19
Payer: MEDICARE

## 2024-06-19 VITALS
WEIGHT: 201.75 LBS | BODY MASS INDEX: 37.13 KG/M2 | DIASTOLIC BLOOD PRESSURE: 72 MMHG | HEIGHT: 62 IN | SYSTOLIC BLOOD PRESSURE: 148 MMHG | OXYGEN SATURATION: 97 % | HEART RATE: 85 BPM | TEMPERATURE: 99 F

## 2024-06-19 DIAGNOSIS — K85.90 ACUTE PANCREATITIS WITHOUT INFECTION OR NECROSIS, UNSPECIFIED PANCREATITIS TYPE: Primary | ICD-10-CM

## 2024-06-19 DIAGNOSIS — E66.01 CLASS 2 SEVERE OBESITY DUE TO EXCESS CALORIES WITH SERIOUS COMORBIDITY AND BODY MASS INDEX (BMI) OF 36.0 TO 36.9 IN ADULT: Primary | ICD-10-CM

## 2024-06-19 DIAGNOSIS — E66.01 MORBID OBESITY: ICD-10-CM

## 2024-06-19 DIAGNOSIS — Z87.19 HISTORY OF ACUTE PANCREATITIS: ICD-10-CM

## 2024-06-19 PROCEDURE — 3044F HG A1C LEVEL LT 7.0%: CPT | Mod: CPTII,S$GLB,, | Performed by: INTERNAL MEDICINE

## 2024-06-19 PROCEDURE — 1160F RVW MEDS BY RX/DR IN RCRD: CPT | Mod: CPTII,S$GLB,, | Performed by: INTERNAL MEDICINE

## 2024-06-19 PROCEDURE — 99214 OFFICE O/P EST MOD 30 MIN: CPT | Mod: S$GLB,,, | Performed by: INTERNAL MEDICINE

## 2024-06-19 PROCEDURE — 3077F SYST BP >= 140 MM HG: CPT | Mod: CPTII,S$GLB,, | Performed by: INTERNAL MEDICINE

## 2024-06-19 PROCEDURE — 99999 PR PBB SHADOW E&M-EST. PATIENT-LVL V: CPT | Mod: PBBFAC,,, | Performed by: INTERNAL MEDICINE

## 2024-06-19 PROCEDURE — 4010F ACE/ARB THERAPY RXD/TAKEN: CPT | Mod: CPTII,S$GLB,, | Performed by: INTERNAL MEDICINE

## 2024-06-19 PROCEDURE — 3008F BODY MASS INDEX DOCD: CPT | Mod: CPTII,S$GLB,, | Performed by: INTERNAL MEDICINE

## 2024-06-19 PROCEDURE — 1159F MED LIST DOCD IN RCRD: CPT | Mod: CPTII,S$GLB,, | Performed by: INTERNAL MEDICINE

## 2024-06-19 PROCEDURE — 3078F DIAST BP <80 MM HG: CPT | Mod: CPTII,S$GLB,, | Performed by: INTERNAL MEDICINE

## 2024-06-19 RX ORDER — SEMAGLUTIDE 0.5 MG/.5ML
0.5 INJECTION, SOLUTION SUBCUTANEOUS
Qty: 2 ML | Refills: 3 | Status: SHIPPED | OUTPATIENT
Start: 2024-07-15 | End: 2024-06-22

## 2024-06-19 RX ORDER — SEMAGLUTIDE 0.25 MG/.5ML
0.25 INJECTION, SOLUTION SUBCUTANEOUS
Qty: 2 ML | Refills: 0 | Status: SHIPPED | OUTPATIENT
Start: 2024-06-19 | End: 2024-06-22

## 2024-06-19 NOTE — TELEPHONE ENCOUNTER
No care due was identified.  [facility] Embedded Care Due Messages. Reference number: [id number].   6/19/2024 8:02:02 AM CDT

## 2024-06-19 NOTE — PROGRESS NOTES
BARIATRIC MEDICINE NEW PATIENT EVALUATION    HISTORY OF PRESENT ILLNESS:   Patient presents for treatment of obesity.      Recent prescription for Saxenda but did not get it.    Noted dx of fibromyalgia.  Prescribed Savella.  Feels her symptoms prohibit her to exercise.    Co-morbidities associated with obesity   HTN  Hyperlipidemia  Aortic atherosclerosis    H/o 2 pregnancies    Lowest adult weight: 140 lb  Highest adult weight: 202 lb    Goal weight: 150 lb    History of Weight Loss Efforts  Surgery or Procedures: no  Medications:   Apple cider vinegar gummies    Kidney stones: no  Glaucoma: no  H/o eating disorder: no  Heart disease or arrhythmia: no  Anxiety or depression:  no  Personal or family h/o MEN2 or thyroid cancer: no  H/o pancreatitis: yes - after gallbladder removal with stent placement - gallstone pancreatitis around 2010.  Had to be on Creon in 2014.  Current or past use of narcotics: no    Current Physical Activity  Not much per patient.  Swam at friend's house two ago.     Current Eating Habits  Breakfast - fried egg with shredded cheese, sauteed vegetable, 1.5 piece of cloud; coffee with half and half stevia  Lunch - sandwich with 15 grain bread or low carb tortilla, grilled chicken, tomatoes, harley/mustard; ginger ale zero sugar  Dinner - hamburger jocy or rotisserie chicken or steak or roast or pork chop with veggie; sometimes brown rice  Snacks - atkins bar     Weekend eating routine - same    Number of dining out occasions per week - once a week - Mexican or [employer] - taco salad    Who does the cooking in the Household?      Alcohol: no     Tobacco: no     Sleep: 6-7 hrs    EMMA: no     Willingness to change (1-10): 10    Weight History  Wt Readings from Last 3 Encounters:   06/19/24 0951 91.5 kg (201 lb 11.5 oz)   04/09/24 1448 90.8 kg (200 lb 2.8 oz)   01/04/24 0849 91 kg (200 lb 9.9 oz)        Estimated body mass index is 36.9 kg/m² as calculated from the following:    Height as  "of this encounter: 5' 2" (1.575 m).    Weight as of this encounter: 91.5 kg (201 lb 11.5 oz).  Patient weight not recorded     Hemoglobin A1C   Date Value Ref Range Status   04/11/2024 5.5 4.0 - 5.6 % Final     Comment:     ADA Screening Guidelines:  5.7-6.4%  Consistent with prediabetes  >or=6.5%  Consistent with diabetes    High levels of fetal hemoglobin interfere with the HbA1C  assay. Heterozygous hemoglobin variants (HbS, HgC, etc)do  not significantly interfere with this assay.   However, presence of multiple variants may affect accuracy.     06/08/2022 5.2 4.0 - 5.6 % Final     Comment:     ADA Screening Guidelines:  5.7-6.4%  Consistent with prediabetes  >or=6.5%  Consistent with diabetes    High levels of fetal hemoglobin interfere with the HbA1C  assay. Heterozygous hemoglobin variants (HbS, HgC, etc)do  not significantly interfere with this assay.   However, presence of multiple variants may affect accuracy.     11/01/2021 5.6 4.0 - 5.6 % Final     Comment:     ADA Screening Guidelines:  5.7-6.4%  Consistent with prediabetes  >or=6.5%  Consistent with diabetes    High levels of fetal hemoglobin interfere with the HbA1C  assay. Heterozygous hemoglobin variants (HbS, HgC, etc)do  not significantly interfere with this assay.   However, presence of multiple variants may affect accuracy.         PAST MEDICAL HISTORY:  Past Medical History:   Diagnosis Date    Bile reflux gastritis     Breast cyst     Eczema     Fibrocystic breast     Fibromyalgia     Gastroparesis     dr. harden    GERD (gastroesophageal reflux disease)     Hyperglyceridemia     Hyperlipidemia     Hypertension     IC (interstitial cystitis)     dr. labadie    Psoriasis     Tension headache        PAST SURGICAL HISTORY:  Past Surgical History:   Procedure Laterality Date    BREAST BIOPSY Right     over 10 years ago/ milk duct    CHOLECYSTECTOMY      COLONOSCOPY N/A 11/08/2022    Procedure: COLONOSCOPY;  Surgeon: Damon Mcmahon MD;  " Location: [facility] ENDO;  Service: Endoscopy;  Laterality: N/A;  vacc-inst tram-tb-smith or ray    CYSTOSCOPY,WITH URETERAL CATHETER INSERTION  11/01/2023    Procedure: CYSTOSCOPY,WITH URETERAL CATHETER INSERTION;  Surgeon: Gio Jeter MD;  Location: [facility] OR 2ND FLR;  Service: Urology;;    ESOPHAGOGASTRODUODENOSCOPY N/A 03/12/2020    Procedure: EGD (ESOPHAGOGASTRODUODENOSCOPY);  Surgeon: Damon Mcmahon MD;  Location: [facility] (4TH FLR);  Service: Endoscopy;  Laterality: N/A;  use pcf scope    HEMORRHOID SURGERY      HYSTERECTOMY      KNEE SURGERY      LAPAROSCOPIC LEFT COLECTOMY N/A 11/01/2023    Procedure: COLECTOMY-LAPAROSCOPIC LEFT ERAS LOW;  Surgeon: Marcio Chino MD;  Location: [facility] OR 2ND FLR;  Service: Colon and Rectal;  Laterality: N/A;    MOBILIZATION, SPLENIC FLEXURE, LAPAROSCOPIC  11/01/2023    Procedure: MOBILIZATION, SPLENIC FLEXURE, LAPAROSCOPIC;  Surgeon: Marcio Chino MD;  [facility];  Service: Colon and Rectal;;    OOPHORECTOMY      one ov removed       SOCIAL HISTORY:  Social History     Socioeconomic History    Marital status:     Number of children: 2   Occupational History    Occupation:      Employer: [employer]   Tobacco Use    Smoking status: Never    Smokeless tobacco: Never   Substance and Sexual Activity    Alcohol use: No    Drug use: Never    Sexual activity: Yes     Partners: Male     Birth control/protection: None   Social History Narrative    Lives at home with  and daughter     Social Determinants of Health     Financial Resource Strain: Medium Risk (11/20/2023)    Overall Financial Resource Strain (CARDIA)     Difficulty of Paying Living Expenses: Somewhat hard   Food Insecurity: Food Insecurity Present (11/20/2023)    Hunger Vital Sign     Worried About Running Out of Food in the Last Year: Sometimes true     Ran Out of Food in the Last Year: Sometimes true   Transportation Needs: No Transportation Needs (11/20/2023)    PRAPARE -  Transportation     Lack of Transportation (Medical): No     Lack of Transportation (Non-Medical): No   Physical Activity: Insufficiently Active (11/20/2023)    Exercise Vital Sign     Days of Exercise per Week: 2 days     Minutes of Exercise per Session: 20 min   Stress: No Stress Concern Present (11/20/2023)    [place] [employer] - Occupational Stress Questionnaire     Feeling of Stress : Not at all   Housing Stability: Low Risk  (11/20/2023)    Housing Stability Vital Sign     Unable to Pay for Housing in the Last Year: No     Number of Places Lived in the Last Year: 1     Unstable Housing in the Last Year: No       FAMILY HISTORY:  Family History   Problem Relation Name Age of Onset    Hypertension Mother Buzz     Migraines Mother Buzz     Breast cancer Mother Buzz     Arthritis Mother Buzz     Cancer Mother Buzz     Stroke Mother Buzz     Hypertension Father Mamadou     Stroke Father Mamadou     Heart disease Father Mamadou     Hyperlipidemia Father Mamadou     Diabetes Father Mamadou     Arthritis Father Mamadou     Cancer Father Mamadou     Breast cancer Paternal Grandmother      Colon cancer Neg Hx      Ovarian cancer Neg Hx      Inflammatory bowel disease Neg Hx      Celiac disease Neg Hx          ALLERGIES AND MEDICATIONS:  Review of patient's allergies indicates:   Allergen Reactions    Chlorthalidone Swelling     Hypokalemia     Dicyclomine Edema             Adhesive Rash    Amitriptyline Hallucinations    Depo-provera [medroxyprogesterone] Anxiety    Prozac [fluoxetine] Anxiety    Topiramate Rash      Current Outpatient Medications   Medication Sig Dispense Refill    amlodipine-olmesartan (ANGEL) 5-40 mg per tablet TAKE ONE TABLET BY MOUTH EVERY DAY. 90 tablet 3    amoxicillin (AMOXIL) 500 MG capsule Take 500 mg by mouth 3 (three) times daily.      atenoloL (TENORMIN) 50 MG tablet Take 1 tablet (50 mg total) by mouth 2 (two) times a day. 180 tablet 2    atorvastatin (LIPITOR) 40 MG tablet TAKE 1 TABLET BY  MOUTH EVERY DAY 90 tablet 0    BIFIDOBACTERIUM INFANTIS (ALIGN ORAL) Take 1 tablet by mouth once daily.      BIOFREEZE, MENTHOL, TOP Apply topically.      coal tar (NEUTROGENA T-GEL) 0.5 % shampoo Apply topically nightly as needed.      fluocinonide (LIDEX) 0.05 % external solution Apply topically 2 (two) times daily as needed (rash, itching at scalp). Avoid use on face, body folds, groin/genitalia. 60 mL 3    fluticasone propionate (FLONASE) 50 mcg/actuation nasal spray SPRAY twice IN EACH NOSTRIL DAILY 48 g 2    glucosamine sulfate 500 mg Tab Take by mouth.      hydroCHLOROthiazide (HYDRODIURIL) 12.5 MG Tab TAKE 1 TABLET BY MOUTH EVERY DAY 90 tablet 3    hydrocortisone 2.5 % cream Apply topically 2 (two) times daily. For use when flaring on eyelids for up to 2 weeks then take a 1 week break or decrease to BIW PRN 28 g 2    ketoconazole (NIZORAL) 2 % cream Apply topically once daily. For use on eyelids, ears, etc daily 60 g 1    ketoconazole (NIZORAL) 2 % shampoo wash hair AT least TWO OR THREE TIMES weekly. let sit on scalp AT least 5 MINUTES prior to rinsing 120 mL 11    levocetirizine (XYZAL) 5 MG tablet TAKE ONE TABLET BY MOUTH EVERY EVENING 90 tablet 3    LIDOcaine HCL 2% (XYLOCAINE) 2 % jelly Apply topically as needed. Apply topically once nightly to affected part of foot/feet. 30 mL 2    liraglutide, weight loss, (SAXENDA) 3 mg/0.5 mL (18 mg/3 mL) PnIj Inject 3 mg into the skin Daily. 15 mL 2    magnesium 30 mg Tab Take 250 mg by mouth once.       menthol/camphor (ICY HOT ADVANCED TOP) Apply topically.      methocarbamoL (ROBAXIN) 500 MG Tab TAKE ONE TABLET BY MOUTH EVERY 8 HOURS AS NEEDED FOR HEADACHE AND MUSCLE SPASM 60 tablet 3    metronidazole 1% (METROGEL) 1 % Gel Apply topically 2 (two) times daily. For rosacea 60 g 2    milnacipran (SAVELLA) 12.5 mg (5)-25 mg(8)-50 mg(42) DsPk Use as directed 1 each 0    milnacipran (SAVELLA) 50 mg Tab Take 1 tablet (50 mg total) by mouth 2 (two) times daily. 60  tablet 11    multivit with min-folic acid 200 mcg Chew Take by mouth.      multivit-min-folic acid-biotin (HAIR,SKIN AND NAILS,FA-BIOTIN,) 66.7-1,000 mcg Tab Take by mouth.      omega-3 fatty acids/fish oil (FISH OIL-OMEGA-3 FATTY ACIDS) 300-1,000 mg capsule Take 1 capsule by mouth once daily.      propylene glycol (SYSTANE BALANCE OPHT) Apply to eye.      tamsulosin (FLOMAX) 0.4 mg Cap TAKE ONE CAPSULE BY MOUTH ONCE DAILY 30 capsule 2    triamcinolone acetonide 0.1% (KENALOG) 0.1 % cream Apply topically 2 (two) times daily. Use to affected areas for up to 2 weeks then take a 1 week break or decrease to 3 times weekly. Do not apply to groin or face 80 g 1    trolamine salicylate (ASPERCREME) 10 % cream Apply topically as needed.      vitamin D (VITAMIN D3) 1000 units Tab Take 1,000 Units by mouth once daily.      vitamin E 400 UNIT capsule Take 400 Units by mouth once daily.       No current facility-administered medications for this visit.       REVIEW OF SYSTEMS:  Review of Systems   Constitutional:  Negative for chills and fever.   HENT:  Negative for congestion and postnasal drip.    Eyes:  Negative for photophobia and visual disturbance.   Respiratory:  Negative for cough and shortness of breath.    Cardiovascular:  Negative for chest pain and palpitations.   Gastrointestinal:  Negative for nausea and vomiting.   Genitourinary:  Negative for dysuria and frequency.   Musculoskeletal:  Negative for arthralgias and back pain.   Neurological:  Negative for light-headedness and headaches.   Psychiatric/Behavioral:  Negative for dysphoric mood. The patient is not nervous/anxious.         VITALS:  Vitals:    06/19/24 0951   BP: (!) 148/72   Pulse: 85   Temp: 99.2 °F (37.3 °C)       Physical Examination: General appearance - alert, well appearing, and in no distress  Mental status - alert, oriented to person, place, and time  Eyes - pupils equal and reactive, extraocular eye movements intact  Chest - no tachypnea,  retractions or cyanosis  Neurological - alert, oriented, normal speech, no focal findings or movement disorder noted  Musculoskeletal - no joint tenderness, deformity or swelling     ASSESSMENT AND PLAN:  Class 2 severe obesity due to excess calories with serious comorbidity and body mass index (BMI) of 36.0 to 36.9 in adult  -     Ambulatory Referral/Consult to Lifestyle Nutrition; Future; Expected date: 06/26/2024  -     semaglutide, weight loss, (WEGOVY) 0.25 mg/0.5 mL PnIj; Inject 0.25 mg into the skin every 7 days.  Dispense: 2 mL; Refill: 0  -     semaglutide, weight loss, (WEGOVY) 0.5 mg/0.5 mL PnIj; Inject 0.5 mg into the skin every 7 days.  Dispense: 2 mL; Refill: 3  - If Wegovy is not covered will try for Saxenda.  If this is not covered will try Qsymia versus phentermine (in 3 month intervals) with topiramate generic Rx.    Morbid obesity  -     Ambulatory referral/consult to Bariatric/Obesity Medicine    History of acute pancreatitis  -     E-Consult to Gastroenterology - recurrent pancreatitis will still be a potential risk and unclear how high the risk will be.         GOALS:  Physical Activity  - Advised for exercise at least 150 min moderate intensity weekly.  Consider Aqua aerobics given concerns of pain due to fibromyalgia.    Nutrition  - Education material was provided and advised to schedule with nutritionist.        Follow up 3-4 months in bariatric clinic.

## 2024-06-19 NOTE — TELEPHONE ENCOUNTER
Refill Routing Note   Medication(s) are not appropriate for processing by [facility] for the following reason(s):        Required vitals abnormal    [facility] action(s):  Defer               Appointments  past 12m or future 3m with PCP    Date Provider   Last Visit   4/9/2024 Giuliana Rojas MD   Next Visit   Visit date not found Giuliana Rojas MD   ED visits in past 90 days: 0        Note composed:11:01 AM 06/19/2024

## 2024-06-19 NOTE — CONSULTS
[facility] 4th Fl  Response for E-Consult     Patient Name: Giuliana Rodas  MRN: [id number]  Primary Care Provider: Giuliana Rojas MD   Requesting Provider: Nino Yoo MD  E-Consult to Gastroenterology  Consult performed by: Phuc Driscoll MD  Consult ordered by: Nino Yoo MD  Reason for consult: okay for medication  Assessment/Recommendations: Seems reasonable to try, although recurrent pancreatitis is a potential risk.  It's is unclear how high the risk is          Recommendation: as above    Contingency that warrants a repeat eConsult or referral: na    Total time of Consultation: 5 minute    I did not speak to the requesting provider verbally about this.     *This eConsult is based on the clinical data available to me and is furnished without benefit of a physical examination. The eConsult will need to be interpreted in light of any clinical issues or changes in patient status not available to me at the time of filing this eConsults. Significant changes in patient condition or level of acuity should result in immediate formal consultation and reevaluation. Please alert me if you have further questions.    Thank you for this eConsult referral.     Phuc Driscoll MD  [facility] 4th Fl

## 2024-06-19 NOTE — PATIENT INSTRUCTIONS
Start Wegovy 0.25 mg once a week x 1 month. At the end of that month, the dose will continue to increase monthly.       Decrease portions as soon as you start Wegovy. Avoid fried, rich or greasy foods.      Some nausea in the first 2 weeks is not unusual.     If you get pain across the upper abdomen and around to your back, please call the office.       Www.[email or web address] to sign up for coupon card.        Lifestyle Tips for Weight Management:    1) Take a look at the following resources for more information regarding the Mediterranean style of eating to help with overall health and weight management:     https://www.gripNote.Equigerminal/article/5997094/mediterranean-diet-plan-for-beginners/     https://www.gripNote.Equigerminal/article/661477/30-day-mediterranean-diet-meal-plan-1200-calories/     2) The following are books and online blogs/websites that may give you more ideas for recipes and also can explain the process of improving weight management efforts:     The Minimalist [place]  https://Trigemina/    The Mediterranean [facility]  www.Qardio.com    The Mediterranean Diet Cookbook for Beginners: Meal Plans, Expert Guidance, and 100 Recipes to Get You Started by Domonique Pardo     The Skinnytaste Cookbook: Light on Calories, Big on Flavor by Kanwal Syed     The Obesity Code: Unlocking the Secrets of Weight Loss by Dr. Yemi Redd     3) Try a calorie tracking program.  Some apps available include Xeneta and OneUp Sports.  For some people, using a program like Weight Watchers can be an easy resource to keep yourself accountable for nutrition goals.     4) Try a meal kits delivery/ service or check out a local healthy meal  service.    iSqu[employer]  [address] [place] [address]   [email or web address] [email or web address]     Healthy Course  [address] [place] [address]   https://healthycoursemeals.com/    HelloFresh  www.hellofresh.com    Blue Apron  www.blueapron.com    5) Nutritional  goals:  Calories for women: 0363-1815 per day; Calories for men 8998-5953 per day.  Protein: 80-90 gm per day  Carbohydrates: not more than 30 gm of carbohydrate per meal and not more than 15 gm for snack.  Fiber is the preferred carbohydrate source.  Avoid starch and sugar as a source.  Avoid all sugar sweetened beverages.  Fats should come more from unsaturated fats instead of saturated fats.     6) Physical Activity goals:  Continue aerobic/cardio exercise at least 5 times per week for at least 30 min.  Ensure you are doing resistance training at least twice a week to maintain muscle mass.

## 2024-06-22 DIAGNOSIS — E66.01 CLASS 2 SEVERE OBESITY DUE TO EXCESS CALORIES WITH SERIOUS COMORBIDITY AND BODY MASS INDEX (BMI) OF 36.0 TO 36.9 IN ADULT: Primary | ICD-10-CM

## 2024-06-22 RX ORDER — PHENTERMINE HYDROCHLORIDE 37.5 MG/1
37.5 TABLET ORAL
Qty: 30 TABLET | Refills: 2 | Status: SHIPPED | OUTPATIENT
Start: 2024-06-22 | End: 2024-09-20

## 2024-06-24 RX ORDER — ATENOLOL 50 MG/1
50 TABLET ORAL 2 TIMES DAILY PRN
Qty: 180 TABLET | Refills: 1 | Status: SHIPPED | OUTPATIENT
Start: 2024-06-24

## 2024-07-08 DIAGNOSIS — E78.5 HYPERLIPIDEMIA, UNSPECIFIED HYPERLIPIDEMIA TYPE: ICD-10-CM

## 2024-07-08 NOTE — TELEPHONE ENCOUNTER
No care due was identified.  [facility] Embedded Care Due Messages. Reference number: [id number].   7/08/2024 9:42:51 AM CDT

## 2024-07-09 RX ORDER — ATORVASTATIN CALCIUM 40 MG/1
TABLET, FILM COATED ORAL
Qty: 90 TABLET | Refills: 2 | Status: SHIPPED | OUTPATIENT
Start: 2024-07-09

## 2024-07-09 NOTE — TELEPHONE ENCOUNTER
Refill Decision Note   Giuliana Rodas  is requesting a refill authorization.  Brief Assessment and Rationale for Refill:  Approve     Medication Therapy Plan:         Comments:     Note composed:2:47 AM 07/09/2024

## 2024-09-11 NOTE — PHARMACY MED REC
"Admission Medication History     The home medication history was taken by Bell Almean.    You may go to "Admission" then "Reconcile Home Medications" tabs to review and/or act upon these items.      The home medication list has been updated by the Pharmacy department.    Please read ALL comments highlighted in yellow.    Please address this information as you see fit.     Feel free to contact us if you have any questions or require assistance.      The medications listed below were removed from the home medication list. Please reorder if appropriate:  Patient reports no longer taking the following medication(s):   Glycolax    Medications listed below were obtained from: Patient/family and Analytic software- [employer] and added to home medication:   T-Gel Shampoo   Fiber gummies   Vitamin D   Glucosamine   Systane    Pataday   Icy Hot   BioFreeze   Asper creme    The medication reconciliation was completed by the patient's bedside.    Bell Aleman  [phone]                        .          " 4 = No assist / stand by assistance

## 2024-09-19 ENCOUNTER — OFFICE VISIT (OUTPATIENT)
Dept: DERMATOLOGY | Facility: CLINIC | Age: 54
End: 2024-09-19
Payer: MEDICARE

## 2024-09-19 DIAGNOSIS — L81.4 LENTIGINES: ICD-10-CM

## 2024-09-19 DIAGNOSIS — L40.8 SEBOPSORIASIS: ICD-10-CM

## 2024-09-19 DIAGNOSIS — L57.0 ACTINIC KERATOSIS: ICD-10-CM

## 2024-09-19 DIAGNOSIS — L73.8 SEBACEOUS GLAND HYPERPLASIA: ICD-10-CM

## 2024-09-19 DIAGNOSIS — L71.9 ROSACEA: Primary | ICD-10-CM

## 2024-09-19 PROCEDURE — 99999 PR PBB SHADOW E&M-EST. PATIENT-LVL III: CPT | Mod: PBBFAC,,, | Performed by: DERMATOLOGY

## 2024-09-19 RX ORDER — METRONIDAZOLE 7.5 MG/G
GEL TOPICAL
Qty: 45 G | Refills: 3 | Status: SHIPPED | OUTPATIENT
Start: 2024-09-19

## 2024-09-19 RX ORDER — FLUOCINONIDE TOPICAL SOLUTION USP, 0.05% 0.5 MG/ML
SOLUTION TOPICAL
Qty: 60 ML | Refills: 6 | Status: SHIPPED | OUTPATIENT
Start: 2024-09-19

## 2024-09-19 NOTE — PROGRESS NOTES
Subjective:      Patient ID:  Giuliana Rodas is a 54 y.o. female who presents for   Chief Complaint   Patient presents with    Lesion     Left upper lip, painful, several months, raised     New spot on left upper cutaneous lip does not bleed.  Would like skin check.  Would like rx for flare of the seborrheic dermatitis on her scalp.     Lesion - Initial  Affected locations: lips  Signs / symptoms: asymptomatic      Review of Systems   Constitutional:  Negative for fever, chills, weight loss, weight gain, fatigue, night sweats and malaise.   Skin:  Positive for daily sunscreen use and activity-related sunscreen use. Negative for wears hat.   Hematologic/Lymphatic: Bruises/bleeds easily.       Objective:   Physical Exam   Constitutional: She appears well-developed and well-nourished. No distress.   Neurological: She is alert and oriented to person, place, and time. She is not disoriented.   Psychiatric: She has a normal mood and affect.   Skin:   Areas Examined (abnormalities noted in diagram):   Scalp / Hair Palpated and Inspected  Head / Face Inspection Performed  Neck Inspection Performed  RUE Inspected  LUE Inspection Performed                 Diagram Legend     Erythematous scaling macule/papule c/w actinic keratosis       Vascular papule c/w angioma      Pigmented verrucoid papule/plaque c/w seborrheic keratosis      Yellow umbilicated papule c/w sebaceous hyperplasia      Irregularly shaped tan macule c/w lentigo     1-2 mm smooth white papules consistent with Milia      Movable subcutaneous cyst with punctum c/w epidermal inclusion cyst      Subcutaneous movable cyst c/w pilar cyst      Firm pink to brown papule c/w dermatofibroma      Pedunculated fleshy papule(s) c/w skin tag(s)      Evenly pigmented macule c/w junctional nevus     Mildly variegated pigmented, slightly irregular-bordered macule c/w mildly atypical nevus      Flesh colored to evenly pigmented papule c/w intradermal nevus       Pink pearly  "papule/plaque c/w basal cell carcinoma      Erythematous hyperkeratotic cursted plaque c/w SCC      Surgical scar with no sign of skin cancer recurrence      Open and closed comedones      Inflammatory papules and pustules      Verrucoid papule consistent consistent with wart     Erythematous eczematous patches and plaques     Dystrophic onycholytic nail with subungual debris c/w onychomycosis     Umbilicated papule    Erythematous-base heme-crusted tan verrucoid plaque consistent with inflamed seborrheic keratosis     Erythematous Silvery Scaling Plaque c/w Psoriasis     See annotation      Assessment / Plan:        Rosacea  - -     metroNIDAZOLE (METROGEL) 0.75 % gel; Use hs on face  Dispense: 45 g; Refill: 3    Actinic keratosis  -     Ambulatory referral/consult to Dermatology   Cryosurgery Procedure Note    Verbal consent from the patient is obtained and the patient is aware of the precancerous quality and need for treatment of these lesions. Liquid nitrogen cryosurgery is applied to the 1 actinic keratosis, as detailed in the physical exam, to produce a freeze injury.  Call if recurs       Sebopsoriasis      fluocinonide (LIDEX) 0.05 % external solution; Use hs for scalp  Dispense: 60 mL; Refill: 6      Sebaceous gland hyperplasia  reassurance      Lentigines  The "ABCD" rules to observe pigmented lesions were reviewed.    . No lesions suspicious for malignancy noted.    Recommend daily sun protection/avoidance and use of at least SPF 30, broad spectrum sunscreen (OTC drug).                Follow up in about 1 year (around 9/19/2025).  "

## 2024-11-13 ENCOUNTER — PATIENT MESSAGE (OUTPATIENT)
Dept: ADMINISTRATIVE | Facility: HOSPITAL | Age: 54
End: 2024-11-13
Payer: MEDICARE

## 2024-12-20 ENCOUNTER — OFFICE VISIT (OUTPATIENT)
Dept: FAMILY MEDICINE | Facility: CLINIC | Age: 54
End: 2024-12-20
Payer: MEDICARE

## 2024-12-20 VITALS
WEIGHT: 191.13 LBS | HEART RATE: 80 BPM | BODY MASS INDEX: 35.17 KG/M2 | SYSTOLIC BLOOD PRESSURE: 124 MMHG | OXYGEN SATURATION: 97 % | HEIGHT: 62 IN | TEMPERATURE: 99 F | DIASTOLIC BLOOD PRESSURE: 72 MMHG

## 2024-12-20 DIAGNOSIS — I10 PRIMARY HYPERTENSION: Primary | ICD-10-CM

## 2024-12-20 DIAGNOSIS — M79.7 FIBROMYALGIA: ICD-10-CM

## 2024-12-20 DIAGNOSIS — G44.209 TENSION HEADACHE: ICD-10-CM

## 2024-12-20 PROCEDURE — 99999 PR PBB SHADOW E&M-EST. PATIENT-LVL III: CPT | Mod: PBBFAC,,, | Performed by: FAMILY MEDICINE

## 2024-12-20 RX ORDER — DULOXETIN HYDROCHLORIDE 30 MG/1
30 CAPSULE, DELAYED RELEASE ORAL DAILY
Qty: 30 CAPSULE | Refills: 11 | Status: SHIPPED | OUTPATIENT
Start: 2024-12-20 | End: 2025-12-20

## 2024-12-20 RX ORDER — CYCLOBENZAPRINE HCL 5 MG
5 TABLET ORAL 2 TIMES DAILY PRN
Qty: 30 TABLET | Refills: 5 | Status: SHIPPED | OUTPATIENT
Start: 2024-12-20 | End: 2024-12-30

## 2024-12-20 NOTE — PROGRESS NOTES
Subjective     Patient ID: Giuliana Rodas is a 54 y.o. female.    Chief Complaint: Medication Management    54 year old female presents for follow-up on medications. She was getting palpitations and elevated heart rate. Her body was adjusting to her heart rate. She was switched from atenolol to carvediolol. She does mot drink any caffeine. She only gets caffeine from her excedrin, which she uses for headaches.     Her insurance also states that she will no longer be able to get her Savella for her fibromyalgia.  She can not take amitriptyline as it caused hallucinations.  She states she will likely try Cymbalta again.    Her insurance also states that she can not take her Robaxin anymore.      She also has leg cramps in his on a statin.  She states she will try to start taking it at night and add coenzyme Q10 with this.        History of Present Illness             Past Medical History:   Diagnosis Date    Bile reflux gastritis     Breast cyst     Eczema     Fibrocystic breast     Fibromyalgia     Gastroparesis     dr. harden    GERD (gastroesophageal reflux disease)     Hyperglyceridemia     Hyperlipidemia     Hypertension     IC (interstitial cystitis)     dr. labadie    Psoriasis     Tension headache       Past Surgical History:   Procedure Laterality Date    BREAST BIOPSY Right     over 10 years ago/ milk duct    CHOLECYSTECTOMY      COLONOSCOPY N/A 11/08/2022    Procedure: COLONOSCOPY;  Surgeon: Damon Mcmahon MD;  Location: [facility];  Service: Endoscopy;  Laterality: N/A;  vacc-inst tram-tb-smith or ray    CYSTOSCOPY,WITH URETERAL CATHETER INSERTION  11/01/2023    Procedure: CYSTOSCOPY,WITH URETERAL CATHETER INSERTION;  Surgeon: Gio Jeter MD;  [facility];  Service: Urology;;    ESOPHAGOGASTRODUODENOSCOPY N/A 03/12/2020    Procedure: EGD (ESOPHAGOGASTRODUODENOSCOPY);  Surgeon: Damon Mcmahon MD;  Location: [facility] (4TH FLR);  Service: Endoscopy;  Laterality: N/A;  use pcf scope     HEMORRHOID SURGERY      HYSTERECTOMY      KNEE SURGERY      LAPAROSCOPIC LEFT COLECTOMY N/A 11/01/2023    Procedure: COLECTOMY-LAPAROSCOPIC LEFT ERAS LOW;  Surgeon: Marcio Chino MD;  Location: [facility] OR 2ND FLR;  Service: Colon and Rectal;  Laterality: N/A;    MOBILIZATION, SPLENIC FLEXURE, LAPAROSCOPIC  11/01/2023    Procedure: MOBILIZATION, SPLENIC FLEXURE, LAPAROSCOPIC;  Surgeon: Marcio Chino MD;  Location: [facility] OR 2ND FLR;  Service: Colon and Rectal;;    OOPHORECTOMY      one ov removed     Family History   Problem Relation Name Age of Onset    Hypertension Mother Buzz     Migraines Mother Buzz     Breast cancer Mother Buzz     Arthritis Mother Buzz     Cancer Mother Buzz     Stroke Mother Buzz     Hypertension Father Mamadou     Stroke Father Mamadou     Heart disease Father Mamadou     Hyperlipidemia Father Mamadou     Diabetes Father Mamadou     Arthritis Father Mamadou     Cancer Father Mamadou     Breast cancer Paternal Grandmother      Colon cancer Neg Hx      Ovarian cancer Neg Hx      Inflammatory bowel disease Neg Hx      Celiac disease Neg Hx       Social History     Socioeconomic History    Marital status:     Number of children: 2   Occupational History    Occupation:      Employer: [employer]   Tobacco Use    Smoking status: Never    Smokeless tobacco: Never   Substance and Sexual Activity    Alcohol use: No    Drug use: Never    Sexual activity: Yes     Partners: Male     Birth control/protection: None   Social History Narrative    Lives at home with  and daughter     Social Drivers of Health     Financial Resource Strain: Medium Risk (12/19/2024)    Overall Financial Resource Strain (CARDIA)     Difficulty of Paying Living Expenses: Somewhat hard   Food Insecurity: No Food Insecurity (12/19/2024)    Hunger Vital Sign     Worried About Running Out of Food in the Last Year: Never true     Ran Out of Food in the Last Year: Never true   Transportation Needs: No Transportation Needs (11/20/2023)     "PRAPARE - Transportation     Lack of Transportation (Medical): No     Lack of Transportation (Non-Medical): No   Physical Activity: Inactive (12/19/2024)    Exercise Vital Sign     Days of Exercise per Week: 0 days     Minutes of Exercise per Session: 0 min   Stress: Stress Concern Present (12/19/2024)    [employer] [employer] - Occupational Stress Questionnaire     Feeling of Stress : To some extent   Housing Stability: Low Risk  (11/20/2023)    Housing Stability Vital Sign     Unable to Pay for Housing in the Last Year: No     Number of Places Lived in the Last Year: 1     Unstable Housing in the Last Year: No       Review of Systems         Objective     Vitals:    12/20/24 1050   BP: 124/72   Pulse: 80   Temp: 98.8 °F (37.1 °C)   TempSrc: Oral   SpO2: 97%   Weight: 86.7 kg (191 lb 2.2 oz)   Height: 5' 2" (1.575 m)        Physical Exam  Constitutional:       General: She is not in acute distress.     Appearance: She is well-developed. She is not diaphoretic.   HENT:      Head: Normocephalic and atraumatic.   Eyes:      Conjunctiva/sclera: Conjunctivae normal.   Neck:      Vascular: No carotid bruit.   Cardiovascular:      Rate and Rhythm: Normal rate and regular rhythm.      Heart sounds: Normal heart sounds. No murmur heard.     No friction rub. No gallop.   Pulmonary:      Effort: Pulmonary effort is normal. No respiratory distress.      Breath sounds: Normal breath sounds. No stridor. No wheezing, rhonchi or rales.   Musculoskeletal:      Cervical back: Normal range of motion and neck supple.      Right lower leg: No edema.      Left lower leg: No edema.   Neurological:      Mental Status: She is alert and oriented to person, place, and time.       Physical Exam                Assessment and Plan     1. Primary hypertension  -     CBC Auto Differential; Future; Expected date: 12/20/2024  -     Comprehensive Metabolic Panel; Future; Expected date: 12/20/2024  -     Lipid Panel; Future; " Expected date: 12/20/2024  -     TSH; Future; Expected date: 12/20/2024  Blood pressure is normal today.  Due for labs now.  .  2. Fibromyalgia  -     DULoxetine (CYMBALTA) 30 MG capsule; Take 1 capsule (30 mg total) by mouth once daily.  Dispense: 30 capsule; Refill: 11  We will wean off of Savella and she will take a half a tablet every day for the next 2 weeks.  Start Cymbalta 30 mg next week.    Giuliana was seen today for medication management.    Diagnoses and all orders for this visit:    Primary hypertension  -     CBC Auto Differential; Future  -     Comprehensive Metabolic Panel; Future  -     Lipid Panel; Future  -     TSH; Future    Fibromyalgia  -     DULoxetine (CYMBALTA) 30 MG capsule; Take 1 capsule (30 mg total) by mouth once daily.        Assessment & Plan                      No follow-ups on file.        This note was generated with the assistance of ambient listening technology. Verbal consent was obtained by the patient and accompanying visitor(s) for the recording of patient appointment to facilitate this note. I attest to having reviewed and edited the generated note for accuracy, though some syntax or spelling errors may persist. Please contact the author of this note for any clarification.

## 2025-01-13 ENCOUNTER — LAB VISIT (OUTPATIENT)
Dept: LAB | Facility: HOSPITAL | Age: 55
End: 2025-01-13
Attending: FAMILY MEDICINE
Payer: MEDICARE

## 2025-01-13 DIAGNOSIS — I10 PRIMARY HYPERTENSION: ICD-10-CM

## 2025-01-13 LAB
ALBUMIN SERPL BCP-MCNC: 4.2 G/DL (ref 3.5–5.2)
ALP SERPL-CCNC: 80 U/L (ref 40–150)
ALT SERPL W/O P-5'-P-CCNC: 24 U/L (ref 10–44)
ANION GAP SERPL CALC-SCNC: 9 MMOL/L (ref 8–16)
AST SERPL-CCNC: 22 U/L (ref 10–40)
BASOPHILS # BLD AUTO: 0.04 K/UL (ref 0–0.2)
BASOPHILS NFR BLD: 1 % (ref 0–1.9)
BILIRUB SERPL-MCNC: 0.6 MG/DL (ref 0.1–1)
BUN SERPL-MCNC: 32 MG/DL (ref 6–20)
CALCIUM SERPL-MCNC: 9.9 MG/DL (ref 8.7–10.5)
CHLORIDE SERPL-SCNC: 104 MMOL/L (ref 95–110)
CHOLEST SERPL-MCNC: 184 MG/DL (ref 120–199)
CHOLEST/HDLC SERPL: 4.3 {RATIO} (ref 2–5)
CO2 SERPL-SCNC: 28 MMOL/L (ref 23–29)
CREAT SERPL-MCNC: 1.1 MG/DL (ref 0.5–1.4)
DIFFERENTIAL METHOD BLD: ABNORMAL
EOSINOPHIL # BLD AUTO: 0.2 K/UL (ref 0–0.5)
EOSINOPHIL NFR BLD: 5.2 % (ref 0–8)
ERYTHROCYTE [DISTWIDTH] IN BLOOD BY AUTOMATED COUNT: 12 % (ref 11.5–14.5)
EST. GFR  (NO RACE VARIABLE): 59 ML/MIN/1.73 M^2
GLUCOSE SERPL-MCNC: 104 MG/DL (ref 70–110)
HCT VFR BLD AUTO: 41.3 % (ref 37–48.5)
HDLC SERPL-MCNC: 43 MG/DL (ref 40–75)
HDLC SERPL: 23.4 % (ref 20–50)
HGB BLD-MCNC: 14 G/DL (ref 12–16)
IMM GRANULOCYTES # BLD AUTO: 0 K/UL (ref 0–0.04)
IMM GRANULOCYTES NFR BLD AUTO: 0 % (ref 0–0.5)
LDLC SERPL CALC-MCNC: 99.6 MG/DL (ref 63–159)
LYMPHOCYTES # BLD AUTO: 1.6 K/UL (ref 1–4.8)
LYMPHOCYTES NFR BLD: 42.8 % (ref 18–48)
MCH RBC QN AUTO: 30.1 PG (ref 27–31)
MCHC RBC AUTO-ENTMCNC: 33.9 G/DL (ref 32–36)
MCV RBC AUTO: 89 FL (ref 82–98)
MONOCYTES # BLD AUTO: 0.3 K/UL (ref 0.3–1)
MONOCYTES NFR BLD: 8.4 % (ref 4–15)
NEUTROPHILS # BLD AUTO: 1.6 K/UL (ref 1.8–7.7)
NEUTROPHILS NFR BLD: 42.6 % (ref 38–73)
NONHDLC SERPL-MCNC: 141 MG/DL
NRBC BLD-RTO: 0 /100 WBC
PLATELET # BLD AUTO: 146 K/UL (ref 150–450)
PMV BLD AUTO: 9.5 FL (ref 9.2–12.9)
POTASSIUM SERPL-SCNC: 4 MMOL/L (ref 3.5–5.1)
PROT SERPL-MCNC: 7.7 G/DL (ref 6–8.4)
RBC # BLD AUTO: 4.65 M/UL (ref 4–5.4)
SODIUM SERPL-SCNC: 141 MMOL/L (ref 136–145)
TRIGL SERPL-MCNC: 207 MG/DL (ref 30–150)
TSH SERPL DL<=0.005 MIU/L-ACNC: 1.1 UIU/ML (ref 0.4–4)
WBC # BLD AUTO: 3.83 K/UL (ref 3.9–12.7)

## 2025-01-13 PROCEDURE — 36415 COLL VENOUS BLD VENIPUNCTURE: CPT | Performed by: FAMILY MEDICINE

## 2025-01-13 PROCEDURE — 85025 COMPLETE CBC W/AUTO DIFF WBC: CPT | Performed by: FAMILY MEDICINE

## 2025-01-13 PROCEDURE — 80061 LIPID PANEL: CPT | Performed by: FAMILY MEDICINE

## 2025-01-13 PROCEDURE — 84443 ASSAY THYROID STIM HORMONE: CPT | Performed by: FAMILY MEDICINE

## 2025-01-13 PROCEDURE — 80053 COMPREHEN METABOLIC PANEL: CPT | Performed by: FAMILY MEDICINE

## 2025-01-15 DIAGNOSIS — L40.8 SEBOPSORIASIS: ICD-10-CM

## 2025-01-17 RX ORDER — KETOCONAZOLE 20 MG/ML
SHAMPOO, SUSPENSION TOPICAL
Qty: 120 ML | Refills: 11 | Status: SHIPPED | OUTPATIENT
Start: 2025-01-17

## 2025-01-29 RX ORDER — LEVOCETIRIZINE DIHYDROCHLORIDE 5 MG/1
5 TABLET, FILM COATED ORAL NIGHTLY
Qty: 90 TABLET | Refills: 3 | Status: SHIPPED | OUTPATIENT
Start: 2025-01-29

## 2025-01-29 NOTE — TELEPHONE ENCOUNTER
No care due was identified.  Health [facility] Embedded Care Due Messages. Reference number: [id number].   1/29/2025 8:00:50 AM CST

## 2025-01-29 NOTE — TELEPHONE ENCOUNTER
Refill Decision Note   Giuliana Rodas  is requesting a refill authorization.  Brief Assessment and Rationale for Refill:  Approve     Medication Therapy Plan:         Comments:     Note composed:12:12 PM 01/29/2025             Appointments     Last Visit   12/20/2024 Giuliana Rojas MD   Next Visit   Visit date not found Giuliana Roajs MD

## 2025-02-12 DIAGNOSIS — H01.139 ECZEMATOUS DERMATITIS OF EYELID, UNSPECIFIED LATERALITY: ICD-10-CM

## 2025-02-12 NOTE — TELEPHONE ENCOUNTER
Refill Routing Note   Medication(s) are not appropriate for processing by [facility] for the following reason(s):        Outside of protocol    [facility] action(s):  Route               Appointments  past 12m or future 3m with PCP    Date Provider   Last Visit   12/20/2024 Giuliana Rojas MD   Next Visit   Visit date not found Giuliana Rojas MD   ED visits in past 90 days: 0        Note composed:12:49 PM 02/12/2025

## 2025-02-13 RX ORDER — HYDROCORTISONE 25 MG/G
CREAM TOPICAL
Qty: 28.35 G | Refills: 2 | Status: SHIPPED | OUTPATIENT
Start: 2025-02-13

## 2025-02-24 ENCOUNTER — TELEPHONE (OUTPATIENT)
Dept: FAMILY MEDICINE | Facility: CLINIC | Age: 55
End: 2025-02-24
Payer: MEDICARE

## 2025-02-24 NOTE — TELEPHONE ENCOUNTER
----- Message from Andreina sent at 2/24/2025  8:57 AM CST -----  Regarding: Refill Request  Type: RX Refill RequestWho Called: Self Refill or New Rx: New rx RX Name and Strength: asked for antibiotics for the flu Preferred Pharmacy with phone number:.[facility] [address] - [phone] [place] [address]Twhzt: [phone] Fax: [phone] Would the patient rather a call back or a response via [facility]? Call Best Call Back Number: [phone]

## 2025-02-25 ENCOUNTER — OFFICE VISIT (OUTPATIENT)
Dept: FAMILY MEDICINE | Facility: CLINIC | Age: 55
End: 2025-02-25
Payer: MEDICARE

## 2025-02-25 VITALS
BODY MASS INDEX: 34.89 KG/M2 | HEIGHT: 62 IN | DIASTOLIC BLOOD PRESSURE: 62 MMHG | HEART RATE: 81 BPM | SYSTOLIC BLOOD PRESSURE: 120 MMHG | OXYGEN SATURATION: 94 % | TEMPERATURE: 100 F | WEIGHT: 189.63 LBS

## 2025-02-25 DIAGNOSIS — J06.9 UPPER RESPIRATORY TRACT INFECTION, UNSPECIFIED TYPE: Primary | ICD-10-CM

## 2025-02-25 DIAGNOSIS — J10.1 INFLUENZA A: ICD-10-CM

## 2025-02-25 PROCEDURE — 99999 PR PBB SHADOW E&M-EST. PATIENT-LVL III: CPT | Mod: PBBFAC,,, | Performed by: FAMILY MEDICINE

## 2025-02-25 PROCEDURE — 4010F ACE/ARB THERAPY RXD/TAKEN: CPT | Mod: CPTII,S$GLB,, | Performed by: FAMILY MEDICINE

## 2025-02-25 PROCEDURE — 1159F MED LIST DOCD IN RCRD: CPT | Mod: CPTII,S$GLB,, | Performed by: FAMILY MEDICINE

## 2025-02-25 PROCEDURE — 0241U SARS-COV2 (COVID) WITH FLU/RSV BY PCR: CPT | Performed by: FAMILY MEDICINE

## 2025-02-25 PROCEDURE — 3074F SYST BP LT 130 MM HG: CPT | Mod: CPTII,S$GLB,, | Performed by: FAMILY MEDICINE

## 2025-02-25 PROCEDURE — 99214 OFFICE O/P EST MOD 30 MIN: CPT | Mod: S$GLB,,, | Performed by: FAMILY MEDICINE

## 2025-02-25 PROCEDURE — 3078F DIAST BP <80 MM HG: CPT | Mod: CPTII,S$GLB,, | Performed by: FAMILY MEDICINE

## 2025-02-25 PROCEDURE — 3008F BODY MASS INDEX DOCD: CPT | Mod: CPTII,S$GLB,, | Performed by: FAMILY MEDICINE

## 2025-02-25 RX ORDER — PROMETHAZINE HYDROCHLORIDE AND DEXTROMETHORPHAN HYDROBROMIDE 6.25; 15 MG/5ML; MG/5ML
5 SYRUP ORAL EVERY 4 HOURS PRN
Qty: 118 ML | Refills: 0 | Status: SHIPPED | OUTPATIENT
Start: 2025-02-25 | End: 2025-03-07

## 2025-02-25 RX ORDER — OSELTAMIVIR PHOSPHATE 75 MG/1
75 CAPSULE ORAL 2 TIMES DAILY
Qty: 10 CAPSULE | Refills: 0 | Status: SHIPPED | OUTPATIENT
Start: 2025-02-25 | End: 2025-03-02

## 2025-02-25 NOTE — PROGRESS NOTES
"Subjective     Patient ID: Giuliana Rodas is a 54 y.o. female.    Chief Complaint: Generalized Body Aches, Cough, and Headache (Symptoms started Saturday )    URI   This is a new problem. The current episode started in the past 7 days (3 days). The maximum temperature recorded prior to her arrival was 102 - 102.9 F. Associated symptoms include congestion, coughing, headaches, rhinorrhea, sinus pain and a sore throat. Pertinent negatives include no ear pain. The treatment provided no relief.       History of Present Illness               Review of Systems   Constitutional:  Positive for fever.   HENT:  Positive for nasal congestion, rhinorrhea and sore throat. Negative for ear pain.    Respiratory:  Positive for cough.    Musculoskeletal:  Positive for myalgias.   Neurological:  Positive for headaches.            Objective     Vitals:    02/25/25 1553   BP: 120/62   Pulse: 81   Temp: (!) 100.4 °F (38 °C)   TempSrc: Oral   SpO2: (!) 94%   Weight: 86 kg (189 lb 9.5 oz)   Height: 5' 2" (1.575 m)        Physical Exam  Constitutional:       General: She is not in acute distress.     Appearance: Normal appearance. She is well-developed. She is not ill-appearing, toxic-appearing or diaphoretic.   HENT:      Head: Normocephalic and atraumatic.      Right Ear: External ear normal.      Left Ear: External ear normal.      Mouth/Throat:      Pharynx: No oropharyngeal exudate.   Cardiovascular:      Rate and Rhythm: Normal rate and regular rhythm.      Heart sounds: Normal heart sounds. No murmur heard.     No friction rub. No gallop.   Pulmonary:      Effort: Pulmonary effort is normal. No respiratory distress.      Breath sounds: Normal breath sounds. No stridor. No wheezing, rhonchi or rales.   Chest:      Chest wall: No tenderness.   Musculoskeletal:      Cervical back: Normal range of motion and neck supple.   Lymphadenopathy:      Cervical: Cervical adenopathy present.   Neurological:      Mental Status: She is alert and " oriented to person, place, and time.       Physical Exam                Assessment and Plan     1. Upper respiratory tract infection, unspecified type  -     SARS-Cov2 (COVID) with FLU/RSV by PCR  -     promethazine-dextromethorphan (PROMETHAZINE-DM) 6.25-15 mg/5 mL Syrp; Take 5 mLs by mouth every 4 (four) hours as needed.  Dispense: 118 mL; Refill: 0    2. Influenza A  -     oseltamivir (TAMIFLU) 75 MG capsule; Take 1 capsule (75 mg total) by mouth 2 (two) times daily. for 5 days  Dispense: 10 capsule; Refill: 0      Giuliana was seen today for generalized body aches, cough and headache.    Diagnoses and all orders for this visit:    Upper respiratory tract infection, unspecified type  -     Cancel: POCT Influenza A/B Molecular  -     SARS-Cov2 (COVID) with FLU/RSV by PCR  -     promethazine-dextromethorphan (PROMETHAZINE-DM) 6.25-15 mg/5 mL Syrp; Take 5 mLs by mouth every 4 (four) hours as needed.    Influenza A  -     oseltamivir (TAMIFLU) 75 MG capsule; Take 1 capsule (75 mg total) by mouth 2 (two) times daily. for 5 days        Assessment & Plan                      No follow-ups on file.        This note was generated with the assistance of ambient listening technology. Verbal consent was obtained by the patient and accompanying visitor(s) for the recording of patient appointment to facilitate this note. I attest to having reviewed and edited the generated note for accuracy, though some syntax or spelling errors may persist. Please contact the author of this note for any clarification.     tenderness.   Musculoskeletal:      Cervical back: Normal range of motion and neck supple.   Lymphadenopathy:      Cervical: Cervical adenopathy present.   Neurological:      Mental Status: She is alert and oriented to person, place, and time.       Physical Exam                Assessment and Plan     1. Upper respiratory tract infection, unspecified type  -     SARS-Cov2 (COVID) with FLU/RSV by PCR  -     promethazine-dextromethorphan (PROMETHAZINE-DM) 6.25-15 mg/5 mL Syrp; Take 5 mLs by mouth every 4 (four) hours as needed.  Dispense: 118 mL; Refill: 0    2. Influenza A  -     oseltamivir (TAMIFLU) 75 MG capsule; Take 1 capsule (75 mg total) by mouth 2 (two) times daily. for 5 days  Dispense: 10 capsule; Refill: 0      Giuliana was seen today for generalized body aches, cough and headache.    Diagnoses and all orders for this visit:    Upper respiratory tract infection, unspecified type  -     Cancel: POCT Influenza A/B Molecular  -     SARS-Cov2 (COVID) with FLU/RSV by PCR  -     promethazine-dextromethorphan (PROMETHAZINE-DM) 6.25-15 mg/5 mL Syrp; Take 5 mLs by mouth every 4 (four) hours as needed.    Influenza A  -     oseltamivir (TAMIFLU) 75 MG capsule; Take 1 capsule (75 mg total) by mouth 2 (two) times daily. for 5 days        Assessment & Plan                      No follow-ups on file.        This note was generated with the assistance of ambient listening technology. Verbal consent was obtained by the patient and accompanying visitor(s) for the recording of patient appointment to facilitate this note. I attest to having reviewed and edited the generated note for accuracy, though some syntax or spelling errors may persist. Please contact the author of this note for any clarification.         [1]   Social History  Socioeconomic History    Marital status:     Number of children: 2   Occupational History    Occupation:      Employer: [employer]   Tobacco Use    Smoking status: Never    Smokeless  tobacco: Never   Substance and Sexual Activity    Alcohol use: No    Drug use: Never    Sexual activity: Yes     Partners: Male     Birth control/protection: None   Social History Narrative    Lives at home with  and daughter     Social Drivers of Health     Financial Resource Strain: Medium Risk (12/19/2024)    Overall Financial Resource Strain (CARDIA)     Difficulty of Paying Living Expenses: Somewhat hard   Food Insecurity: No Food Insecurity (12/19/2024)    Hunger Vital Sign     Worried About Running Out of Food in the Last Year: Never true     Ran Out of Food in the Last Year: Never true   Transportation Needs: No Transportation Needs (11/20/2023)    PRAPARE - Transportation     Lack of Transportation (Medical): No     Lack of Transportation (Non-Medical): No   Physical Activity: Inactive (12/19/2024)    Exercise Vital Sign     Days of Exercise per Week: 0 days     Minutes of Exercise per Session: 0 min   Stress: Stress Concern Present (12/19/2024)    [employer] - Occupational Stress Questionnaire     Feeling of Stress : To some extent   Housing Stability: Low Risk  (11/20/2023)    Housing Stability Vital Sign     Unable to Pay for Housing in the Last Year: No     Number of Places Lived in the Last Year: 1     Unstable Housing in the Last Year: No

## 2025-02-26 ENCOUNTER — PATIENT MESSAGE (OUTPATIENT)
Dept: FAMILY MEDICINE | Facility: CLINIC | Age: 55
End: 2025-02-26
Payer: MEDICARE

## 2025-02-26 LAB
INFLUENZA A, MOLECULAR: DETECTED
INFLUENZA B, MOLECULAR: NOT DETECTED
RSV AG BY MOLECULAR METHOD: NOT DETECTED
SARS-COV-2 RNA RESP QL NAA+PROBE: NOT DETECTED

## 2025-02-27 ENCOUNTER — PATIENT MESSAGE (OUTPATIENT)
Dept: FAMILY MEDICINE | Facility: CLINIC | Age: 55
End: 2025-02-27
Payer: MEDICARE

## 2025-03-28 DIAGNOSIS — Z78.0 MENOPAUSE: Primary | ICD-10-CM

## 2025-04-03 DIAGNOSIS — G44.209 TENSION HEADACHE: ICD-10-CM

## 2025-04-03 RX ORDER — CYCLOBENZAPRINE HCL 5 MG
5 TABLET ORAL 2 TIMES DAILY PRN
Qty: 30 TABLET | Refills: 0 | Status: SHIPPED | OUTPATIENT
Start: 2025-04-03

## 2025-04-03 NOTE — TELEPHONE ENCOUNTER
No care due was identified.  Health [facility] Embedded Care Due Messages. Reference number: [id number].   4/03/2025 8:01:47 AM CDT

## 2025-04-04 DIAGNOSIS — E78.5 HYPERLIPIDEMIA, UNSPECIFIED HYPERLIPIDEMIA TYPE: ICD-10-CM

## 2025-04-04 RX ORDER — ATORVASTATIN CALCIUM 40 MG/1
40 TABLET, FILM COATED ORAL
Qty: 90 TABLET | Refills: 2 | OUTPATIENT
Start: 2025-04-04

## 2025-04-04 RX ORDER — ATORVASTATIN CALCIUM 40 MG/1
40 TABLET, FILM COATED ORAL DAILY
Qty: 90 TABLET | Refills: 3 | Status: SHIPPED | OUTPATIENT
Start: 2025-04-04

## 2025-04-04 NOTE — TELEPHONE ENCOUNTER
Refill Decision Note   Giuliana Rodas  is requesting a refill authorization.  Brief Assessment and Rationale for Refill:  Approve     Medication Therapy Plan:        Comments:     Note composed:1:26 PM 04/04/2025

## 2025-04-04 NOTE — TELEPHONE ENCOUNTER
Refill Decision Note   Giuliana Adrianna  is requesting a refill authorization.  Brief Assessment and Rationale for Refill:  Quick Discontinue     Medication Therapy Plan:  Sent to pharmacy (4/4/2025  1:27 PM CDT)      Comments:     Note composed:1:27 PM 04/04/2025

## 2025-04-04 NOTE — TELEPHONE ENCOUNTER
No care due was identified.  Health [facility] Embedded Care Due Messages. Reference number: [id number].   4/04/2025 9:12:42 AM CDT

## 2025-04-04 NOTE — TELEPHONE ENCOUNTER
No care due was identified.  [facility] Embedded Care Due Messages. Reference number: [id number].   4/04/2025 11:39:41 AM CDT

## 2025-04-18 DIAGNOSIS — G44.209 TENSION HEADACHE: ICD-10-CM

## 2025-04-18 NOTE — TELEPHONE ENCOUNTER
No care due was identified.  [facility] Embedded Care Due Messages. Reference number: [id number].   4/18/2025 9:25:26 AM CDT

## 2025-04-22 ENCOUNTER — HOSPITAL ENCOUNTER (OUTPATIENT)
Dept: RADIOLOGY | Facility: CLINIC | Age: 55
Discharge: HOME | End: 2025-04-22
Attending: OBSTETRICS & GYNECOLOGY
Payer: MEDICARE

## 2025-04-22 DIAGNOSIS — Z78.0 MENOPAUSE: ICD-10-CM

## 2025-04-22 PROCEDURE — 77080 DXA BONE DENSITY AXIAL: CPT | Mod: 26,,, | Performed by: INTERNAL MEDICINE

## 2025-04-22 PROCEDURE — 77080 DXA BONE DENSITY AXIAL: CPT | Mod: TC,PO

## 2025-04-22 RX ORDER — CYCLOBENZAPRINE HCL 5 MG
5 TABLET ORAL 2 TIMES DAILY PRN
Qty: 30 TABLET | Refills: 0 | Status: SHIPPED | OUTPATIENT
Start: 2025-04-22

## 2025-04-25 ENCOUNTER — OFFICE VISIT (OUTPATIENT)
Dept: FAMILY MEDICINE | Facility: CLINIC | Age: 55
End: 2025-04-25
Payer: MEDICARE

## 2025-04-25 VITALS
TEMPERATURE: 99 F | OXYGEN SATURATION: 97 % | WEIGHT: 191.81 LBS | HEART RATE: 82 BPM | SYSTOLIC BLOOD PRESSURE: 148 MMHG | DIASTOLIC BLOOD PRESSURE: 60 MMHG | HEIGHT: 62 IN | BODY MASS INDEX: 35.3 KG/M2

## 2025-04-25 DIAGNOSIS — L21.9 SEBORRHEIC DERMATITIS: ICD-10-CM

## 2025-04-25 DIAGNOSIS — M72.2 PLANTAR FASCIITIS OF LEFT FOOT: Primary | ICD-10-CM

## 2025-04-25 PROCEDURE — 99999 PR PBB SHADOW E&M-EST. PATIENT-LVL V: CPT | Mod: PBBFAC,,, | Performed by: FAMILY MEDICINE

## 2025-04-25 NOTE — PROGRESS NOTES
"Subjective     Patient ID: Giuliana Rodas is a 54 y.o. female.    Chief Complaint: Dry Skin and Heel Pain    Foot Injury   The incident occurred more than 1 week ago. The incident occurred at home (worse with geting out of bed the first thing in the morning.). The pain is present in the left hip. The quality of the pain is described as aching. The pain is at a severity of 6/10. The pain is moderate. Associated symptoms include an inability to bear weight. She reports no foreign bodies present. Nothing aggravates the symptoms. She has tried nothing for the symptoms.       History of Present Illness               Review of Systems         Objective     Vitals:    04/25/25 1357   BP: (!) 148/60   Pulse: 82   Temp: 98.7 °F (37.1 °C)   TempSrc: Oral   SpO2: 97%   Weight: 87 kg (191 lb 12.8 oz)   Height: 5' 2" (1.575 m)        Physical Exam  Physical Exam                Assessment and Plan     1. Plantar fasciitis of left foot  Advised to roll an iced can or bottle under his left foot.      2. Seborrheic dermatitis  Ketoconazole for her face.   Giuliana was seen today for dry skin and heel pain.    Diagnoses and all orders for this visit:    Plantar fasciitis of left foot    Seborrheic dermatitis        Assessment & Plan                      No follow-ups on file.        This note was generated with the assistance of ambient listening technology. Verbal consent was obtained by the patient and accompanying visitor(s) for the recording of patient appointment to facilitate this note. I attest to having reviewed and edited the generated note for accuracy, though some syntax or spelling errors may persist. Please contact the author of this note for any clarification.    "

## 2025-05-14 DIAGNOSIS — G44.209 TENSION HEADACHE: ICD-10-CM

## 2025-05-14 NOTE — TELEPHONE ENCOUNTER
No care due was identified.  [facility] Embedded Care Due Messages. Reference number: [id number].   5/14/2025 6:49:36 PM CDT

## 2025-05-16 NOTE — TELEPHONE ENCOUNTER
----- Message from Chencho sent at 5/16/2025 11:18 AM CDT -----  Regarding: self  Type: Patient Call BackWho called:selfWhat is the request in detail:Patient requesting a phone call regarding why her     cyclobenzaprine (FLEXERIL) 5 MG tablet was deniedCan the clinic reply by [facility]? NoWould the patient rather a call back or a response via [facility]? Call [facility] call back number:[phone] Information:Thank you.

## 2025-05-20 RX ORDER — CYCLOBENZAPRINE HCL 5 MG
5 TABLET ORAL 2 TIMES DAILY PRN
Qty: 30 TABLET | Refills: 0 | Status: SHIPPED | OUTPATIENT
Start: 2025-05-20

## 2025-05-22 ENCOUNTER — TELEPHONE (OUTPATIENT)
Dept: FAMILY MEDICINE | Facility: CLINIC | Age: 55
End: 2025-05-22
Payer: MEDICARE

## 2025-05-22 ENCOUNTER — PATIENT MESSAGE (OUTPATIENT)
Dept: FAMILY MEDICINE | Facility: CLINIC | Age: 55
End: 2025-05-22
Payer: MEDICARE

## 2025-05-22 DIAGNOSIS — G44.209 TENSION HEADACHE: ICD-10-CM

## 2025-05-22 DIAGNOSIS — Z12.31 ENCOUNTER FOR SCREENING MAMMOGRAM FOR BREAST CANCER: Primary | ICD-10-CM

## 2025-05-22 NOTE — TELEPHONE ENCOUNTER
No care due was identified.  [facility] Embedded Care Due Messages. Reference number: [id number].   5/22/2025 7:59:29 AM CDT

## 2025-05-22 NOTE — TELEPHONE ENCOUNTER
----- Message from Oviceversa sent at 5/22/2025  9:18 AM CDT -----  Caller is requesting to schedule their annual mammogram appointment.  Order is not listed in EPIC.  Please enter order and contact patient to schedule. Name of Caller: self  Where would they like the mammogram performed? [facility]  Would the patient rather a call back or a response via [facility]? Call back  Best Call Back Number: [phone]  Additional Information:

## 2025-05-23 DIAGNOSIS — G44.209 TENSION HEADACHE: ICD-10-CM

## 2025-05-23 RX ORDER — CYCLOBENZAPRINE HCL 5 MG
5 TABLET ORAL 2 TIMES DAILY PRN
Qty: 30 TABLET | Refills: 0 | OUTPATIENT
Start: 2025-05-23

## 2025-05-23 NOTE — TELEPHONE ENCOUNTER
No care due was identified.  Health [facility] Embedded Care Due Messages. Reference number: [id number].   5/23/2025 9:26:41 AM CDT

## 2025-05-23 NOTE — TELEPHONE ENCOUNTER
Refill Decision Note   Giuliana Rodas  is requesting a refill authorization.  Brief Assessment and Rationale for Refill:  Quick Discontinue     Medication Therapy Plan:  Receipt confirmed by pharmacy (5/20/2025  4:01 PM CDT)      Comments:     Note composed:10:00 AM 05/23/2025             Appointments     Last Visit   4/25/2025 Giuliana Rojas MD   Next Visit   Visit date not found Giuliana Rojas MD

## 2025-05-26 DIAGNOSIS — Z00.00 ENCOUNTER FOR MEDICARE ANNUAL WELLNESS EXAM: ICD-10-CM

## 2025-05-29 ENCOUNTER — HOSPITAL ENCOUNTER (OUTPATIENT)
Dept: RADIOLOGY | Facility: HOSPITAL | Age: 55
Discharge: HOME | End: 2025-05-29
Attending: FAMILY MEDICINE
Payer: MEDICARE

## 2025-05-29 DIAGNOSIS — Z12.31 ENCOUNTER FOR SCREENING MAMMOGRAM FOR BREAST CANCER: ICD-10-CM

## 2025-05-29 PROCEDURE — 77067 SCR MAMMO BI INCL CAD: CPT | Mod: TC

## 2025-06-02 ENCOUNTER — RESULTS FOLLOW-UP (OUTPATIENT)
Dept: FAMILY MEDICINE | Facility: CLINIC | Age: 55
End: 2025-06-02

## 2025-06-04 DIAGNOSIS — I10 PRIMARY HYPERTENSION: ICD-10-CM

## 2025-06-04 RX ORDER — HYDROCHLOROTHIAZIDE 12.5 MG/1
12.5 TABLET ORAL
Qty: 90 TABLET | Refills: 2 | Status: SHIPPED | OUTPATIENT
Start: 2025-06-04

## 2025-06-11 ENCOUNTER — OFFICE VISIT (OUTPATIENT)
Dept: FAMILY MEDICINE | Facility: CLINIC | Age: 55
End: 2025-06-11
Payer: MEDICARE

## 2025-06-11 DIAGNOSIS — M79.641 PAIN IN BOTH HANDS: Primary | ICD-10-CM

## 2025-06-11 DIAGNOSIS — M79.642 PAIN IN BOTH HANDS: Primary | ICD-10-CM

## 2025-06-11 DIAGNOSIS — G44.209 TENSION HEADACHE: ICD-10-CM

## 2025-06-11 PROCEDURE — 4010F ACE/ARB THERAPY RXD/TAKEN: CPT | Mod: CPTII,95,, | Performed by: FAMILY MEDICINE

## 2025-06-11 PROCEDURE — 98004 SYNCH AUDIO-VIDEO EST SF 10: CPT | Mod: 95,,, | Performed by: FAMILY MEDICINE

## 2025-06-11 RX ORDER — CYCLOBENZAPRINE HCL 5 MG
5 TABLET ORAL 2 TIMES DAILY PRN
Qty: 90 TABLET | Refills: 0 | Status: SHIPPED | OUTPATIENT
Start: 2025-06-11

## 2025-06-11 NOTE — PROGRESS NOTES
The patient location is: [place]  The chief complaint leading to consultation is: medication refill    Visit type: audiovisual    Face to Face time with patient:   10 minutes of total time spent on the encounter, which includes face to face time and non-face to face time preparing to see the patient (eg, review of tests), Obtaining and/or reviewing separately obtained history, Documenting clinical information in the electronic or other health record, Independently interpreting results (not separately reported) and communicating results to the patient/family/caregiver, or Care coordination (not separately reported).         Each patient to whom he or she provides medical services by telemedicine is:  (1) informed of the relationship between the physician and patient and the respective role of any other health care provider with respect to management of the patient; and (2) notified that he or she may decline to receive medical services by telemedicine and may withdraw from such care at any time.    Notes:     Subjective     Patient ID: Giuliana Rodas is a 55 y.o. female.    Chief Complaint: No chief complaint on file.    55 year old female presents for refills of her flexeril. She was taking it scheduled and she states that she has been out of it and wants to get back on it. She has been advised that it would be an as needed medication rather than a scheduled medication.   She has been stretching and moving more. She states she has stiffness and ashiness in general.       She has arthritis in her left thumb. She states the pain is so severe she can barely move it.         History of Present Illness             Review of Systems         Objective     There were no vitals filed for this visit.     Physical Exam  Physical Exam                Assessment and Plan     Assessment & Plan    Diagnoses and all orders for this visit:    Pain in both hands  -     Ambulatory referral/consult to Orthopedics; Future  Referral to  ortho per patient request    Tension headache  -     cyclobenzaprine (FLEXERIL) 5 MG tablet; Take 1 tablet (5 mg total) by mouth 2 (two) times daily as needed for Muscle spasms.  Refilled flexeril for tension headaches.       Assessment & Plan                      No follow-ups on file.        This note was generated with the assistance of ambient listening technology. Verbal consent was obtained by the patient and accompanying visitor(s) for the recording of patient appointment to facilitate this note. I attest to having reviewed and edited the generated note for accuracy, though some syntax or spelling errors may persist. Please contact the author of this note for any clarification.

## 2025-06-13 DIAGNOSIS — M79.641 BILATERAL HAND PAIN: Primary | ICD-10-CM

## 2025-06-13 DIAGNOSIS — M79.642 BILATERAL HAND PAIN: Primary | ICD-10-CM

## 2025-07-02 NOTE — PROGRESS NOTES
Assessment: 55 y.o. female with B first CMC OA    I explained my diagnostic impression and the reasoning behind it in detail, using layman's terms.     Plan:   - #D thumb brace   - Can consider injection in the future if needed   - Heat   - Voltaren gel PRN  - Discontinue use of stress ball   - RTC PRN       All questions were answered in detail. The patient is in full agreement with the treatment plan and will proceed accordingly.    Chief Complaint   Patient presents with    Right Hand - Pain, Numbness    Left Hand - Pain, Numbness       Initial visit (7/10/25): Giuliana Rodas is a 55 y.o. female who presents today complaining of Pain and Numbness of the Right Hand and Pain and Numbness of the Left Hand    Giuliana presents with bilateral thumb pain that started approximately 1.5 to 2 years ago. Her right thumb was initially affected, with pain onset noticed after frequent crocheting. Subsequently, her left thumb began hurting worse than the right. Pain is worse with , scrolling on phone.  Disruptive to ADLs particularly those requiring . She reports slight tingling in her hands, which does not particularly bother her.    She has tried using biofreeze and compression gloves for relief but finds limited benefit. She takes Excedrin for headaches but has not specifically used it for hand pain. She has stopped crocheting due to the pain.    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 She is right-handed.    She denies any formal medical diagnoses.    PREVIOUS TREATMENTS:  Giuliana previously engaged in crocheting but discontinued this activity due to pain. She uses compression gloves, although she reports they are not very helpful. She applies Biofreeze, but finds it not very effective. Giuliana tried using stress balls but stopped as they exacerbated her pain.      This is the extent of the patient's complaints at this time.     Hand dominance: Right     Occupation: Desk worker,  works part time        Review of patient's allergies indicates:   Allergen Reactions    Chlorthalidone Swelling     Hypokalemia     Dicyclomine Edema             Adhesive Rash    Amitriptyline Hallucinations    Depo-provera [medroxyprogesterone] Anxiety    Prozac [fluoxetine] Anxiety    Topiramate Rash       Current Medications[1]    Physical Exam:   Vitals:    07/10/25 0957   PainSc:   4   PainLoc: Hand       General:  Patient is alert, awake and oriented to time, place and person. Mood and affect are appropriate.  Patient does not appear to be in any distress, denies any constitutional symptoms and appears stated age.   HEENT: Pupils are equal and round, sclera are not injected. External examination of ears and nose reveals no abnormalities. Cranial nerves II-X are grossly intact  Skin:  no rashes, abrasions or open wounds on the affected extremity   Resp:  No respiratory distress or audible wheezing   CV: 2+  pulses, all extremities warm and well perfused   Bilateral Hand/Wrist Examination:    Observation/Inspection:  Swelling  none    Deformity  none  Discoloration  none     Scars   none    Atrophy  none    HAND/WRIST EXAMINATION:  Finkelstein's Test   Neg  WHAT Test    Neg  Snuff box tenderness   Neg  Hamm's Test    Neg  Hook of Hamate Tenderness  Neg  CMC grind    Pos  Circumduction test   Pos    Neurovascular Exam:  Digits WWP, brisk CR < 3s throughout  NVI motor/LTS to M/R/U nerves, radial pulse 2+  Tinel's Test - Carpal Tunnel  Neg  Tinel's Test - Cubital Tunnel  Neg  Phalen's Test    Neg  Median Nerve Compression Test Neg    ROM hand/wrist/elbow full, painless      Imaging: 3 views of the bilateral hands show mild degenerative changes at the first CMC joints    I personally reviewed and interpreted the patient's imaging obtained today in clinic     This note was created by combination of typed  and M-Modal dictation. Transcription and phonetic errors may be present.  If there are any questions,  please contact me.    Past Medical History:   Diagnosis Date    Bile reflux gastritis     Breast cyst     Eczema     Fibrocystic breast     Fibromyalgia     Gastroparesis     dr. harden    GERD (gastroesophageal reflux disease)     Hyperglyceridemia     Hyperlipidemia     Hypertension     IC (interstitial cystitis)     dr. labadie    Psoriasis     Tension headache        Active Problem List with Overview Notes    Diagnosis Date Noted    Postoperative fever 11/05/2023    Diverticulitis large intestine w/o perforation or abscess w/o bleeding 11/01/2023    Aortic atherosclerosis 05/19/2023    Hypokalemia 07/27/2022    Thrombocytopenia 07/26/2022    Morbid obesity 07/26/2022    Urinary retention 07/26/2022    History of diverticulitis of colon 07/24/2022    Interstitial cystitis 05/16/2021    Chronic pain of right knee 06/17/2020    Lower extremity weakness 06/17/2020    Decreased ROM of right knee 06/17/2020    Anemia 03/12/2020    Tension headache 07/18/2017    Chronic migraine without aura, with intractable migraine, so stated, with status migrainosus 08/29/2014     Headaches have been much better with the once monthly headache control injections.  Periodic headaches have been treated with subcutaneous sumatriptan, but the medication may be slightly too strong for the patient. She may benefit from a different class of medication to address the headaches.      Occipital neuralgia 08/29/2014     She has tried nerve block in the past but did not find that it provided lasting relief. She wishes to defer at this time.      IBS (irritable bowel syndrome) 09/24/2013    Hyperlipidemia 09/24/2013    Gastroparesis 09/24/2013    GERD (gastroesophageal reflux disease) 08/20/2013    Fibromyalgia 04/30/2013    Hypertension        Past Surgical History:   Procedure Laterality Date    BREAST BIOPSY Right     over 10 years ago/ milk duct    CHOLECYSTECTOMY      COLONOSCOPY N/A 11/08/2022    Procedure: COLONOSCOPY;  Surgeon: Damon  AUSTIN Mcmahon MD;  Location: [facility] ENDO;  Service: Endoscopy;  Laterality: N/A;  vacc-inst portal-tb-smith or ray    CYSTOSCOPY,WITH URETERAL CATHETER INSERTION  11/01/2023    Procedure: CYSTOSCOPY,WITH URETERAL CATHETER INSERTION;  Surgeon: Gio Jeter MD;  Location: [facility] OR 2ND FLR;  Service: Urology;;    ESOPHAGOGASTRODUODENOSCOPY N/A 03/12/2020    Procedure: EGD (ESOPHAGOGASTRODUODENOSCOPY);  Surgeon: Daomn Mcmahon MD;  Location: [facility] (4TH FLR);  Service: Endoscopy;  Laterality: N/A;  use pcf scope    HEMORRHOID SURGERY      HYSTERECTOMY      KNEE SURGERY      LAPAROSCOPIC LEFT COLECTOMY N/A 11/01/2023    Procedure: COLECTOMY-LAPAROSCOPIC LEFT ERAS LOW;  Surgeon: Marcio Chino MD;  Location: [facility] OR 2ND FLR;  Service: Colon and Rectal;  Laterality: N/A;    MOBILIZATION, SPLENIC FLEXURE, LAPAROSCOPIC  11/01/2023    Procedure: MOBILIZATION, SPLENIC FLEXURE, LAPAROSCOPIC;  Surgeon: Marcio Chino MD;  Location: [facility] OR 2ND FLR;  Service: Colon and Rectal;;    OOPHORECTOMY      one ov removed       Family History   Problem Relation Name Age of Onset    Hypertension Mother Buzz     Migraines Mother Buzz     Breast cancer Mother Buzz     Arthritis Mother Buzz     Cancer Mother Buzz     Stroke Mother Buzz     Hypertension Father Mamadou     Stroke Father Mamadou     Heart disease Father Mamadou     Hyperlipidemia Father Mamadou     Diabetes Father Mamadou     Arthritis Father Mamadou     Cancer Father Mamadou     Breast cancer Paternal Grandmother      Colon cancer Neg Hx      Ovarian cancer Neg Hx      Inflammatory bowel disease Neg Hx      Celiac disease Neg Hx                        [1]   Current Outpatient Medications:     amLODIPine (NORVASC) 10 MG tablet, TAKE ONE TABLET BY MOUTH EVERY EVENING, Disp: 90 tablet, Rfl: 3    atorvastatin (LIPITOR) 40 MG tablet, Take 1 tablet (40 mg total) by mouth once daily., Disp: 90 tablet, Rfl: 3    BIFIDOBACTERIUM INFANTIS (ALIGN ORAL), Take 1 tablet by mouth once daily., Disp: , Rfl:     BIOFREEZE,  MENTHOL, TOP, Apply topically., Disp: , Rfl:     carvediloL (COREG) 25 MG tablet, TAKE ONE TABLET BY MOUTH TWICE DAILY **REPLACES ATENOLOL**, Disp: 180 tablet, Rfl: 3    coal tar (NEUTROGENA T-GEL) 0.5 % shampoo, Apply topically nightly as needed., Disp: , Rfl:     cyclobenzaprine (FLEXERIL) 5 MG tablet, Take 1 tablet (5 mg total) by mouth 2 (two) times daily as needed for Muscle spasms., Disp: 90 tablet, Rfl: 0    DULoxetine (CYMBALTA) 30 MG capsule, Take 1 capsule (30 mg total) by mouth once daily., Disp: 30 capsule, Rfl: 11    fluocinonide (LIDEX) 0.05 % external solution, Use hs for scalp, Disp: 60 mL, Rfl: 6    fluticasone propionate (FLONASE) 50 mcg/actuation nasal spray, SPRAY twice IN EACH NOSTRIL DAILY, Disp: 48 g, Rfl: 2    glucosamine sulfate 500 mg Tab, Take by mouth., Disp: , Rfl:     hydroCHLOROthiazide 12.5 MG Tab, TAKE 1 TABLET BY MOUTH EVERY DAY, Disp: 90 tablet, Rfl: 2    hydrocortisone 2.5 % cream, APPLY TWICE DAILY on eyelids when flaring FOR up to 2 WEEKS, TAKE one week break OR DECREASE to TWICE WEEKLY, Disp: 28.35 g, Rfl: 2    ketoconazole (NIZORAL) 2 % cream, Apply topically once daily. For use on eyelids, ears, etc daily, Disp: 60 g, Rfl: 1    ketoconazole (NIZORAL) 2 % shampoo, wash hair AT least TWO OR THREE TIMES weekly. let sit on scalp AT least 5 MINUTES prior to rinsing, Disp: 120 mL, Rfl: 11    levocetirizine (XYZAL) 5 MG tablet, TAKE ONE TABLET BY MOUTH EVERY EVENING, Disp: 90 tablet, Rfl: 3    LIDOcaine HCL 2% (XYLOCAINE) 2 % jelly, Apply topically as needed. Apply topically once nightly to affected part of foot/feet., Disp: 30 mL, Rfl: 2    magnesium 30 mg Tab, Take 250 mg by mouth once. , Disp: , Rfl:     menthol/camphor (ICY HOT ADVANCED TOP), Apply topically., Disp: , Rfl:     metroNIDAZOLE (METROGEL) 0.75 % gel, Use hs on face, Disp: 45 g, Rfl: 3    multivit with min-folic acid 200 mcg Chew, Take by mouth., Disp: , Rfl:     multivit-min-folic acid-biotin (HAIR,SKIN AND  NAILS,FA-BIOTIN,) 66.7-1,000 mcg Tab, Take by mouth., Disp: , Rfl:     olmesartan (BENICAR) 40 MG tablet, TAKE ONE TABLET BY MOUTH EVERY EVENING, Disp: 90 tablet, Rfl: 3    omega-3 fatty acids/fish oil (FISH OIL-OMEGA-3 FATTY ACIDS) 300-1,000 mg capsule, Take 1 capsule by mouth once daily., Disp: , Rfl:     propylene glycol (SYSTANE BALANCE OPHT), Apply to eye., Disp: , Rfl:     tamsulosin (FLOMAX) 0.4 mg Cap, TAKE ONE CAPSULE BY MOUTH ONCE DAILY, Disp: 30 capsule, Rfl: 2    triamcinolone acetonide 0.1% (KENALOG) 0.1 % cream, Apply topically 2 (two) times daily. Use to affected areas for up to 2 weeks then take a 1 week break or decrease to 3 times weekly. Do not apply to groin or face, Disp: 80 g, Rfl: 1    trolamine salicylate (ASPERCREME) 10 % cream, Apply topically as needed., Disp: , Rfl:     vitamin D (VITAMIN D3) 1000 units Tab, Take 1,000 Units by mouth once daily., Disp: , Rfl:     vitamin E 400 UNIT capsule, Take 400 Units by mouth once daily., Disp: , Rfl:

## 2025-07-07 DIAGNOSIS — J34.3 NASAL TURBINATE HYPERTROPHY: ICD-10-CM

## 2025-07-07 NOTE — TELEPHONE ENCOUNTER
No care due was identified.  [facility] Embedded Care Due Messages. Reference number: [id number].   7/07/2025 12:11:21 PM CDT

## 2025-07-09 DIAGNOSIS — J34.3 NASAL TURBINATE HYPERTROPHY: ICD-10-CM

## 2025-07-09 RX ORDER — FLUTICASONE PROPIONATE 50 MCG
2 SPRAY, SUSPENSION (ML) NASAL
Qty: 48 G | Refills: 2 | OUTPATIENT
Start: 2025-07-09

## 2025-07-09 RX ORDER — FLUTICASONE PROPIONATE 50 MCG
2 SPRAY, SUSPENSION (ML) NASAL DAILY
Qty: 48 G | Refills: 3 | Status: SHIPPED | OUTPATIENT
Start: 2025-07-09

## 2025-07-09 NOTE — TELEPHONE ENCOUNTER
Refill Decision Note   Giuliana Rodas  is requesting a refill authorization.  Brief Assessment and Rationale for Refill:  Quick Discontinue     Medication Therapy Plan:         Comments:     Note composed:12:24 PM 07/09/2025

## 2025-07-09 NOTE — TELEPHONE ENCOUNTER
No care due was identified.  [facility] Embedded Care Due Messages. Reference number: [id number].   7/09/2025 9:09:14 AM CDT

## 2025-07-09 NOTE — TELEPHONE ENCOUNTER
Refill Decision Note   Giuliana Rodas  is requesting a refill authorization.  Brief Assessment and Rationale for Refill:  Approve     Medication Therapy Plan:        Comments:     Note composed:12:23 PM 07/09/2025

## 2025-07-10 ENCOUNTER — APPOINTMENT (OUTPATIENT)
Dept: RADIOLOGY | Facility: HOSPITAL | Age: 55
End: 2025-07-10
Attending: ORTHOPAEDIC SURGERY
Payer: MEDICARE

## 2025-07-10 ENCOUNTER — OFFICE VISIT (OUTPATIENT)
Dept: ORTHOPEDICS | Facility: CLINIC | Age: 55
End: 2025-07-10
Payer: MEDICARE

## 2025-07-10 DIAGNOSIS — M79.642 PAIN IN BOTH HANDS: ICD-10-CM

## 2025-07-10 DIAGNOSIS — M79.642 PAIN IN BOTH HANDS: Primary | ICD-10-CM

## 2025-07-10 DIAGNOSIS — M79.642 BILATERAL HAND PAIN: ICD-10-CM

## 2025-07-10 DIAGNOSIS — M79.641 PAIN IN BOTH HANDS: ICD-10-CM

## 2025-07-10 DIAGNOSIS — M79.641 PAIN IN BOTH HANDS: Primary | ICD-10-CM

## 2025-07-10 DIAGNOSIS — M79.641 BILATERAL HAND PAIN: ICD-10-CM

## 2025-07-10 PROCEDURE — 1159F MED LIST DOCD IN RCRD: CPT | Mod: CPTII,S$GLB,, | Performed by: ORTHOPAEDIC SURGERY

## 2025-07-10 PROCEDURE — 73130 X-RAY EXAM OF HAND: CPT | Mod: TC,50,FY,PN

## 2025-07-10 PROCEDURE — 4010F ACE/ARB THERAPY RXD/TAKEN: CPT | Mod: CPTII,S$GLB,, | Performed by: ORTHOPAEDIC SURGERY

## 2025-07-10 PROCEDURE — 99203 OFFICE O/P NEW LOW 30 MIN: CPT | Mod: S$GLB,,, | Performed by: ORTHOPAEDIC SURGERY

## 2025-07-10 PROCEDURE — 99999 PR PBB SHADOW E&M-EST. PATIENT-LVL IV: CPT | Mod: PBBFAC,,, | Performed by: ORTHOPAEDIC SURGERY

## 2025-07-10 PROCEDURE — 1160F RVW MEDS BY RX/DR IN RCRD: CPT | Mod: CPTII,S$GLB,, | Performed by: ORTHOPAEDIC SURGERY

## 2025-07-10 PROCEDURE — 73130 X-RAY EXAM OF HAND: CPT | Mod: 26,50,, | Performed by: RADIOLOGY

## (undated) DEVICE — ADHESIVE DERMABOND ADVANCED

## (undated) DEVICE — SUT 0 18IN COATED VICRYL V

## (undated) DEVICE — APPLICATOR ARISTA FLEX XL

## (undated) DEVICE — SYR 10CC LUER LOCK

## (undated) DEVICE — NDL BOX COUNTER

## (undated) DEVICE — KIT ANTIFOG W/SPONG & FLUID

## (undated) DEVICE — CUTTER PROXIMATE BLUE 75MM

## (undated) DEVICE — LEGGING CLEAR POLY 2/PACK

## (undated) DEVICE — STAPLER INT PROX TX 30X3.5MM

## (undated) DEVICE — TOWEL OR DISP STRL BLUE 4/PK

## (undated) DEVICE — PACK PERI/GYN OPTIMA

## (undated) DEVICE — TUBING HF INSUFFLATION W/ FLTR

## (undated) DEVICE — SUT 3-0 VICRYL SH CR/8 18

## (undated) DEVICE — KIT VUETIP TROCAR SWAB

## (undated) DEVICE — ELECTRODE REM PLYHSV RETURN 9

## (undated) DEVICE — Device

## (undated) DEVICE — SEE MEDLINE ITEM 156902

## (undated) DEVICE — CATH 5FR OPEN END URETERAL

## (undated) DEVICE — SET IRR URLGY 2LINE UNIV SPIKE

## (undated) DEVICE — DRAPE UINDERBUT GRAD PCH

## (undated) DEVICE — STAPLER ECHELON PWR CIR 29MM

## (undated) DEVICE — SOL IRR WATER STRL 3000 ML

## (undated) DEVICE — KIT EVACUATOR FLAT DRAIN 100CC

## (undated) DEVICE — TIP YANKAUERS BULB NO VENT

## (undated) DEVICE — LUBRICANT SURGILUBE 2 OZ

## (undated) DEVICE — CART STAPLE RELD 30X3.5 BLU

## (undated) DEVICE — PAD PINK TRENDELENBURG POS XL

## (undated) DEVICE — POWDER ARISTA AH 3G

## (undated) DEVICE — SUT MONOCRYL 4-0 PS-2

## (undated) DEVICE — NDL 22GA X1 1/2 REG BEVEL

## (undated) DEVICE — SUT CTD VICRYL VIL BR CR/SH

## (undated) DEVICE — WIRE GD LUB STD 3CM .038 150CM

## (undated) DEVICE — DRAPE CORETEMP FLD WRM 56X62IN

## (undated) DEVICE — SUT MONOCRYL 4-0 PS-1 UND

## (undated) DEVICE — BOVIE SUCTION

## (undated) DEVICE — BOWL STERILE LARGE 32OZ

## (undated) DEVICE — COVER LIGHT HANDLE 80/CA

## (undated) DEVICE — MARKER SKIN STND TIP BLUE BARR

## (undated) DEVICE — TRAY CATH FOL SIL URIMTR 16FR

## (undated) DEVICE — COVER MAYO STND XL 30X57IN

## (undated) DEVICE — SUT CTD VICRYL 0 VIL BR/CT

## (undated) DEVICE — SEALER LIGASURE LAP 37CM 5MM

## (undated) DEVICE — SUT CTD VICRYL VIL BR SH 27

## (undated) DEVICE — TRAY SKIN SCRUB WET PREMIUM

## (undated) DEVICE — KIT GELPORT LAPAROSCOPIC ABD

## (undated) DEVICE — SUT 1 36IN PDS II VIO MONO

## (undated) DEVICE — DRAPE ABDOMINAL TIBURON 14X11

## (undated) DEVICE — GOWN SURGICAL X-LARGE

## (undated) DEVICE — CATH POLLACK OPEN-END FLEXI-TI